# Patient Record
Sex: MALE | Race: OTHER | HISPANIC OR LATINO | URBAN - METROPOLITAN AREA
[De-identification: names, ages, dates, MRNs, and addresses within clinical notes are randomized per-mention and may not be internally consistent; named-entity substitution may affect disease eponyms.]

---

## 2018-01-08 ENCOUNTER — INPATIENT (INPATIENT)
Facility: HOSPITAL | Age: 73
LOS: 55 days | Discharge: NURSING, REHAB OR LONG-TERM CARE | DRG: 853 | End: 2018-03-05
Attending: STUDENT IN AN ORGANIZED HEALTH CARE EDUCATION/TRAINING PROGRAM | Admitting: STUDENT IN AN ORGANIZED HEALTH CARE EDUCATION/TRAINING PROGRAM
Payer: COMMERCIAL

## 2018-01-08 VITALS
RESPIRATION RATE: 18 BRPM | TEMPERATURE: 98 F | HEART RATE: 125 BPM | DIASTOLIC BLOOD PRESSURE: 63 MMHG | OXYGEN SATURATION: 97 % | SYSTOLIC BLOOD PRESSURE: 97 MMHG

## 2018-01-08 DIAGNOSIS — I48.91 UNSPECIFIED ATRIAL FIBRILLATION: ICD-10-CM

## 2018-01-08 LAB
ALBUMIN SERPL ELPH-MCNC: 3.1 G/DL — LOW (ref 3.3–5)
ALP SERPL-CCNC: 160 U/L — HIGH (ref 40–120)
ALT FLD-CCNC: 35 U/L RC — SIGNIFICANT CHANGE UP (ref 10–45)
ANION GAP SERPL CALC-SCNC: 16 MMOL/L — SIGNIFICANT CHANGE UP (ref 5–17)
APPEARANCE UR: ABNORMAL
APTT BLD: 29.1 SEC — SIGNIFICANT CHANGE UP (ref 27.5–37.4)
AST SERPL-CCNC: 56 U/L — HIGH (ref 10–40)
BACTERIA # UR AUTO: ABNORMAL /HPF
BASE EXCESS BLDV CALC-SCNC: -4.3 MMOL/L — LOW (ref -2–2)
BASE EXCESS BLDV CALC-SCNC: 0.3 MMOL/L — SIGNIFICANT CHANGE UP (ref -2–2)
BASOPHILS # BLD AUTO: 0 K/UL — SIGNIFICANT CHANGE UP (ref 0–0.2)
BASOPHILS NFR BLD AUTO: 0 % — SIGNIFICANT CHANGE UP (ref 0–2)
BILIRUB SERPL-MCNC: 1.2 MG/DL — SIGNIFICANT CHANGE UP (ref 0.2–1.2)
BILIRUB UR-MCNC: NEGATIVE — SIGNIFICANT CHANGE UP
BUN SERPL-MCNC: 35 MG/DL — HIGH (ref 7–23)
CA-I SERPL-SCNC: 1.04 MMOL/L — LOW (ref 1.12–1.3)
CA-I SERPL-SCNC: 1.05 MMOL/L — LOW (ref 1.12–1.3)
CALCIUM SERPL-MCNC: 8.9 MG/DL — SIGNIFICANT CHANGE UP (ref 8.4–10.5)
CHLORIDE BLDV-SCNC: 103 MMOL/L — SIGNIFICANT CHANGE UP (ref 96–108)
CHLORIDE BLDV-SCNC: 93 MMOL/L — LOW (ref 96–108)
CHLORIDE SERPL-SCNC: 87 MMOL/L — LOW (ref 96–108)
CO2 BLDV-SCNC: 21 MMOL/L — LOW (ref 22–30)
CO2 BLDV-SCNC: 26 MMOL/L — SIGNIFICANT CHANGE UP (ref 22–30)
CO2 SERPL-SCNC: 23 MMOL/L — SIGNIFICANT CHANGE UP (ref 22–31)
COLOR SPEC: YELLOW — SIGNIFICANT CHANGE UP
CREAT SERPL-MCNC: 0.83 MG/DL — SIGNIFICANT CHANGE UP (ref 0.5–1.3)
DIFF PNL FLD: ABNORMAL
EOSINOPHIL # BLD AUTO: 0 K/UL — SIGNIFICANT CHANGE UP (ref 0–0.5)
EOSINOPHIL NFR BLD AUTO: 0 % — SIGNIFICANT CHANGE UP (ref 0–6)
ETHANOL SERPL-MCNC: SIGNIFICANT CHANGE UP MG/DL (ref 0–10)
GAS PNL BLDV: 122 MMOL/L — LOW (ref 136–145)
GAS PNL BLDV: 125 MMOL/L — LOW (ref 136–145)
GAS PNL BLDV: SIGNIFICANT CHANGE UP
GLUCOSE BLDV-MCNC: 339 MG/DL — HIGH (ref 70–99)
GLUCOSE BLDV-MCNC: 397 MG/DL — HIGH (ref 70–99)
GLUCOSE SERPL-MCNC: 417 MG/DL — HIGH (ref 70–99)
GLUCOSE UR QL: >1000
HCO3 BLDV-SCNC: 20 MMOL/L — LOW (ref 21–29)
HCO3 BLDV-SCNC: 25 MMOL/L — SIGNIFICANT CHANGE UP (ref 21–29)
HCT VFR BLD CALC: 45.4 % — SIGNIFICANT CHANGE UP (ref 39–50)
HCT VFR BLDA CALC: 42 % — SIGNIFICANT CHANGE UP (ref 39–50)
HCT VFR BLDA CALC: 48 % — SIGNIFICANT CHANGE UP (ref 39–50)
HGB BLD CALC-MCNC: 13.6 G/DL — SIGNIFICANT CHANGE UP (ref 13–17)
HGB BLD CALC-MCNC: 15.8 G/DL — SIGNIFICANT CHANGE UP (ref 13–17)
HGB BLD-MCNC: 15.9 G/DL — SIGNIFICANT CHANGE UP (ref 13–17)
INR BLD: 1.18 RATIO — HIGH (ref 0.88–1.16)
KETONES UR-MCNC: ABNORMAL
LACTATE BLDV-MCNC: 2.9 MMOL/L — HIGH (ref 0.7–2)
LACTATE BLDV-MCNC: 5.2 MMOL/L — CRITICAL HIGH (ref 0.7–2)
LEUKOCYTE ESTERASE UR-ACNC: ABNORMAL
LYMPHOCYTES # BLD AUTO: 0.4 K/UL — LOW (ref 1–3.3)
LYMPHOCYTES # BLD AUTO: 3 % — LOW (ref 13–44)
MAGNESIUM SERPL-MCNC: 2.1 MG/DL — SIGNIFICANT CHANGE UP (ref 1.6–2.6)
MCHC RBC-ENTMCNC: 33 PG — SIGNIFICANT CHANGE UP (ref 27–34)
MCHC RBC-ENTMCNC: 35.1 GM/DL — SIGNIFICANT CHANGE UP (ref 32–36)
MCV RBC AUTO: 94.3 FL — SIGNIFICANT CHANGE UP (ref 80–100)
MONOCYTES # BLD AUTO: 0.6 K/UL — SIGNIFICANT CHANGE UP (ref 0–0.9)
MONOCYTES NFR BLD AUTO: 4 % — SIGNIFICANT CHANGE UP (ref 2–14)
NEUTROPHILS # BLD AUTO: 25.2 K/UL — HIGH (ref 1.8–7.4)
NEUTROPHILS NFR BLD AUTO: 90 % — HIGH (ref 43–77)
NEUTS BAND # BLD: 3 % — SIGNIFICANT CHANGE UP (ref 0–8)
NITRITE UR-MCNC: NEGATIVE — SIGNIFICANT CHANGE UP
PCO2 BLDV: 34 MMHG — LOW (ref 35–50)
PCO2 BLDV: 42 MMHG — SIGNIFICANT CHANGE UP (ref 35–50)
PH BLDV: 7.38 — SIGNIFICANT CHANGE UP (ref 7.35–7.45)
PH BLDV: 7.39 — SIGNIFICANT CHANGE UP (ref 7.35–7.45)
PH UR: 6.5 — SIGNIFICANT CHANGE UP (ref 5–8)
PHOSPHATE SERPL-MCNC: 2.8 MG/DL — SIGNIFICANT CHANGE UP (ref 2.5–4.5)
PLAT MORPH BLD: NORMAL — SIGNIFICANT CHANGE UP
PLATELET # BLD AUTO: 149 K/UL — LOW (ref 150–400)
PO2 BLDV: 23 MMHG — LOW (ref 25–45)
PO2 BLDV: 37 MMHG — SIGNIFICANT CHANGE UP (ref 25–45)
POTASSIUM BLDV-SCNC: 4 MMOL/L — SIGNIFICANT CHANGE UP (ref 3.5–5)
POTASSIUM BLDV-SCNC: 5.3 MMOL/L — HIGH (ref 3.5–5)
POTASSIUM SERPL-MCNC: 4.5 MMOL/L — SIGNIFICANT CHANGE UP (ref 3.5–5.3)
POTASSIUM SERPL-SCNC: 4.5 MMOL/L — SIGNIFICANT CHANGE UP (ref 3.5–5.3)
PROT SERPL-MCNC: 7.7 G/DL — SIGNIFICANT CHANGE UP (ref 6–8.3)
PROT UR-MCNC: 30 MG/DL
PROTHROM AB SERPL-ACNC: 12.9 SEC — HIGH (ref 9.8–12.7)
RAPID RVP RESULT: SIGNIFICANT CHANGE UP
RBC # BLD: 4.82 M/UL — SIGNIFICANT CHANGE UP (ref 4.2–5.8)
RBC # FLD: 11.8 % — SIGNIFICANT CHANGE UP (ref 10.3–14.5)
RBC BLD AUTO: NORMAL — SIGNIFICANT CHANGE UP
RBC CASTS # UR COMP ASSIST: ABNORMAL /HPF (ref 0–2)
SAO2 % BLDV: 34 % — LOW (ref 67–88)
SAO2 % BLDV: 65 % — LOW (ref 67–88)
SODIUM SERPL-SCNC: 126 MMOL/L — LOW (ref 135–145)
SP GR SPEC: >1.03 — HIGH (ref 1.01–1.02)
UROBILINOGEN FLD QL: 4
WBC # BLD: 26.3 K/UL — HIGH (ref 3.8–10.5)
WBC # FLD AUTO: 26.3 K/UL — HIGH (ref 3.8–10.5)
WBC UR QL: >50 /HPF (ref 0–5)

## 2018-01-08 PROCEDURE — 99285 EMERGENCY DEPT VISIT HI MDM: CPT | Mod: 25

## 2018-01-08 PROCEDURE — 93321 DOPPLER ECHO F-UP/LMTD STD: CPT | Mod: 26

## 2018-01-08 PROCEDURE — 92960 CARDIOVERSION ELECTRIC EXT: CPT

## 2018-01-08 PROCEDURE — 93308 TTE F-UP OR LMTD: CPT | Mod: 26

## 2018-01-08 PROCEDURE — 74174 CTA ABD&PLVS W/CONTRAST: CPT | Mod: 26

## 2018-01-08 PROCEDURE — 70450 CT HEAD/BRAIN W/O DYE: CPT | Mod: 26

## 2018-01-08 PROCEDURE — 71045 X-RAY EXAM CHEST 1 VIEW: CPT | Mod: 26

## 2018-01-08 PROCEDURE — 99223 1ST HOSP IP/OBS HIGH 75: CPT | Mod: GC

## 2018-01-08 PROCEDURE — 99152 MOD SED SAME PHYS/QHP 5/>YRS: CPT

## 2018-01-08 RX ORDER — ASPIRIN/CALCIUM CARB/MAGNESIUM 324 MG
324 TABLET ORAL ONCE
Qty: 0 | Refills: 0 | Status: COMPLETED | OUTPATIENT
Start: 2018-01-08 | End: 2018-01-08

## 2018-01-08 RX ORDER — PHENYLEPHRINE HYDROCHLORIDE 10 MG/ML
0.4 INJECTION INTRAVENOUS
Qty: 80 | Refills: 0 | Status: DISCONTINUED | OUTPATIENT
Start: 2018-01-08 | End: 2018-01-10

## 2018-01-08 RX ORDER — SODIUM CHLORIDE 9 MG/ML
1000 INJECTION INTRAMUSCULAR; INTRAVENOUS; SUBCUTANEOUS ONCE
Qty: 0 | Refills: 0 | Status: COMPLETED | OUTPATIENT
Start: 2018-01-08 | End: 2018-01-08

## 2018-01-08 RX ORDER — HEPARIN SODIUM 5000 [USP'U]/ML
6000 INJECTION INTRAVENOUS; SUBCUTANEOUS EVERY 6 HOURS
Qty: 0 | Refills: 0 | Status: DISCONTINUED | OUTPATIENT
Start: 2018-01-08 | End: 2018-01-09

## 2018-01-08 RX ORDER — FENTANYL CITRATE 50 UG/ML
100 INJECTION INTRAVENOUS ONCE
Qty: 0 | Refills: 0 | Status: DISCONTINUED | OUTPATIENT
Start: 2018-01-08 | End: 2018-01-08

## 2018-01-08 RX ORDER — CEFTRIAXONE 500 MG/1
1 INJECTION, POWDER, FOR SOLUTION INTRAMUSCULAR; INTRAVENOUS ONCE
Qty: 0 | Refills: 0 | Status: COMPLETED | OUTPATIENT
Start: 2018-01-08 | End: 2018-01-08

## 2018-01-08 RX ORDER — AZITHROMYCIN 500 MG/1
500 TABLET, FILM COATED ORAL ONCE
Qty: 0 | Refills: 0 | Status: COMPLETED | OUTPATIENT
Start: 2018-01-08 | End: 2018-01-08

## 2018-01-08 RX ORDER — METOPROLOL TARTRATE 50 MG
5 TABLET ORAL ONCE
Qty: 0 | Refills: 0 | Status: COMPLETED | OUTPATIENT
Start: 2018-01-08 | End: 2018-01-08

## 2018-01-08 RX ORDER — HEPARIN SODIUM 5000 [USP'U]/ML
INJECTION INTRAVENOUS; SUBCUTANEOUS
Qty: 25000 | Refills: 0 | Status: DISCONTINUED | OUTPATIENT
Start: 2018-01-08 | End: 2018-01-09

## 2018-01-08 RX ORDER — HEPARIN SODIUM 5000 [USP'U]/ML
3000 INJECTION INTRAVENOUS; SUBCUTANEOUS EVERY 6 HOURS
Qty: 0 | Refills: 0 | Status: DISCONTINUED | OUTPATIENT
Start: 2018-01-08 | End: 2018-01-09

## 2018-01-08 RX ORDER — HEPARIN SODIUM 5000 [USP'U]/ML
6000 INJECTION INTRAVENOUS; SUBCUTANEOUS ONCE
Qty: 0 | Refills: 0 | Status: COMPLETED | OUTPATIENT
Start: 2018-01-08 | End: 2018-01-08

## 2018-01-08 RX ORDER — SODIUM CHLORIDE 9 MG/ML
2000 INJECTION INTRAMUSCULAR; INTRAVENOUS; SUBCUTANEOUS ONCE
Qty: 0 | Refills: 0 | Status: COMPLETED | OUTPATIENT
Start: 2018-01-08 | End: 2018-01-08

## 2018-01-08 RX ORDER — KETAMINE HYDROCHLORIDE 100 MG/ML
100 INJECTION INTRAMUSCULAR; INTRAVENOUS ONCE
Qty: 0 | Refills: 0 | Status: DISCONTINUED | OUTPATIENT
Start: 2018-01-08 | End: 2018-01-08

## 2018-01-08 RX ADMIN — Medication 5 MILLIGRAM(S): at 18:30

## 2018-01-08 RX ADMIN — Medication 324 MILLIGRAM(S): at 18:34

## 2018-01-08 RX ADMIN — KETAMINE HYDROCHLORIDE 100 MILLIGRAM(S): 100 INJECTION INTRAMUSCULAR; INTRAVENOUS at 20:40

## 2018-01-08 RX ADMIN — FENTANYL CITRATE 100 MICROGRAM(S): 50 INJECTION INTRAVENOUS at 21:02

## 2018-01-08 RX ADMIN — CEFTRIAXONE 100 GRAM(S): 500 INJECTION, POWDER, FOR SOLUTION INTRAMUSCULAR; INTRAVENOUS at 21:06

## 2018-01-08 RX ADMIN — HEPARIN SODIUM 1300 UNIT(S)/HR: 5000 INJECTION INTRAVENOUS; SUBCUTANEOUS at 23:50

## 2018-01-08 RX ADMIN — PHENYLEPHRINE HYDROCHLORIDE 10.5 MICROGRAM(S)/KG/MIN: 10 INJECTION INTRAVENOUS at 21:51

## 2018-01-08 RX ADMIN — SODIUM CHLORIDE 4000 MILLILITER(S): 9 INJECTION INTRAMUSCULAR; INTRAVENOUS; SUBCUTANEOUS at 21:04

## 2018-01-08 RX ADMIN — FENTANYL CITRATE 100 MICROGRAM(S): 50 INJECTION INTRAVENOUS at 21:04

## 2018-01-08 RX ADMIN — AZITHROMYCIN 250 MILLIGRAM(S): 500 TABLET, FILM COATED ORAL at 21:51

## 2018-01-08 RX ADMIN — HEPARIN SODIUM 6000 UNIT(S): 5000 INJECTION INTRAVENOUS; SUBCUTANEOUS at 23:00

## 2018-01-08 RX ADMIN — SODIUM CHLORIDE 4000 MILLILITER(S): 9 INJECTION INTRAMUSCULAR; INTRAVENOUS; SUBCUTANEOUS at 17:44

## 2018-01-08 RX ADMIN — SODIUM CHLORIDE 4000 MILLILITER(S): 9 INJECTION INTRAMUSCULAR; INTRAVENOUS; SUBCUTANEOUS at 18:20

## 2018-01-08 NOTE — ED PROVIDER NOTE - ATTENDING CONTRIBUTION TO CARE
I performed a history and physical exam of the patient and discussed their management with the resident and /or advanced care provider. I reviewed the resident and /or ACP's note and agree with the documented findings and plan of care. My medical decision making and observations are found above.  Lungs clear.

## 2018-01-08 NOTE — ED PROVIDER NOTE - MEDICAL DECISION MAKING DETAILS
Joann: Found on floor, acute altered sensorium. ?trauma. Had had a cough for 3 days. On arrival EKG concerning for MI. Cardiology called to bedside for cath eval. Decision made by them to monitor and continue medical eval. Ct first.

## 2018-01-08 NOTE — ED ADULT NURSE REASSESSMENT NOTE - NS ED NURSE REASSESS COMMENT FT1
20:36: ketamine 70mg given ivp by md. and 20:45ketamine 30mg ivp given for sedation for cardioversion . hypotensive, heart rate 140-170's and mentating well prior to ketamine administration.  20:50: Cardioverted with 50J x1 time , re-evaluated no response. 20:55= cardioverted with 100J x1 with fentanyl 100mcg IVP given. no response.

## 2018-01-08 NOTE — ED PROCEDURE NOTE - PROCEDURE ADDITIONAL DETAILS
Ketamine and fentanyl used for sedation 50 then 100 then 200J cardioversion attempted. Patient remained in afib at a rate of 130-150. Patient tolerated procedure well.

## 2018-01-08 NOTE — ED PROVIDER NOTE - CARE PLAN
Principal Discharge DX:	Atrial fibrillation with rapid ventricular response  Secondary Diagnosis:	Elevated lactic acid level  Secondary Diagnosis:	Leukocytosis

## 2018-01-08 NOTE — ED PROVIDER NOTE - PHYSICAL EXAMINATION
Gen: NAD, AOx3, slight slurred speech  Head: NCAT  HEENT: PERRL, oral mucosa moist, normal conjunctiva, +scleral icterus, neck supple  Lung: CTAB, no respiratory distress  CV: rrr, no murmur, Normal perfusion  Abd: soft, NTND  MSK: No edema, no visible deformities, pelvis stable, no midline cervical ttp  Neuro: No focal neurologic deficits, CN II-XII intact, 5/5 global strength, sensation intact, no asterixis  Skin: No rash   Psych: normal affect

## 2018-01-08 NOTE — ED PROVIDER NOTE - PROGRESS NOTE DETAILS
hr to 160-170s, appears to be new onset A fib, no h/o a fib, not on A/C, CT head negative, will give ASA 324mg. now with some abd pain and diffuse ttp, denies nausea/vomiting/diarrhea will get CTA abd -Slowey DO BP low after 5mg metoprolol, normal MS, no pain. no CP/SOB. HR improved. will continue hydration -Slowey DO patient remained HD unstable, still mentating but weak/dizzy, BP in 70s HR in 160s a fib. received 3.5L and long since 1x dose of 5mg metoprolol. cardioversion attempted with patient and son consent, patient sedated with ketamine/fentanyl, unsuccessful cardioversion after 3 attempts with max 200J. still in A fib. BP now stable. will obtain CTA abd r/o ischemic gut, repeat VBG, 5th L fluid may need CCU and cardizem gtt for a fib rvr -Slowey DO Joann: attempted cardioversion to improve profusion. No response at 200J. BP up enough to get CT scan looking for bowel pathology. Will continue to treat as sepsis vs primary cardiac problem. patient awake, states 'feeling better and crazy' no respiratory distress, no CP. patient still with hr up to 160/170s, BP downtrending again after attempted cardioversion now 80systolic. will start phenylephrine and Follow up CTA possible CCU admission -Faizan DALAL on phenylephrine, RN titrating to appropriate BP, CTA prelim from radiology resident Christiano- no ischemic gut or cute pathology. CCU accepting -Slowey DO

## 2018-01-08 NOTE — CONSULT NOTE ADULT - SUBJECTIVE AND OBJECTIVE BOX
Called by ED: 5:24pm  Saw patient at bedside: 5:26pm  Spoke with Cath Attendin:36pm    Patient seen and evaluated at bedside in Critical D - son present at bedside    Note: The patient could not name his medications, PCP or Cardiologist, was AOx2, son unable to add to history. No prior records in [facility] or [facility]    Chief Complaint: "I fell down" today    HPI: 72M w/ reported PMHx of DM2, denied h/o CAD who came to the ED after he fell today. The fall was unwitnessed. He denied LOC or HT. No current CP, SOB. Per the son the patient is an alcoholic. The patient reports his last drink was 2 weeks ago. The patient states this was his first fall. He does not know why he saw a Cardiologist in the past. No FHx of CAD. Nonsmoker.    PMHx:   DM2    PSHx: Denied    Home Meds: The patient can not remember any of his home meds    Allergies:  No Known Allergies    FAMILY HISTORY: Noncontributory    Social History: Retired, used to work in maintenance  Smoking History: denied  Alcohol Use: Family reports patient is an "alcoholic"  Drug Use: denied    Review of Systems:  REVIEW OF SYSTEMS:    CONSTITUTIONAL: No weakness, fevers or chills  EYES/ENT: No visual changes;  No dysphagia  NECK: No pain or stiffness  RESPIRATORY: No cough, wheezing, hemoptysis; No shortness of breath  CARDIOVASCULAR: No chest pain or palpitations; No lower extremity edema  GASTROINTESTINAL: No abdominal or epigastric pain. No nausea, vomiting, or hematemesis; No diarrhea or constipation. No melena or hematochezia.  BACK: No back pain  GENITOURINARY: No dysuria, frequency or hematuria  NEUROLOGICAL: No numbness or weakness, + fall  SKIN: No itching, burning, rashes, or lesions   All other review of systems is negative unless indicated above.    Physical Exam: On my exam BP: 133/64, HR:114  T(F): 97.5 (-), Max: 97.5 ()  HR: 112 () (112 - 125)  BP: 133/64 () (97/63 - 133/64)  RR: 16 (-)  SpO2: 100% on RA  GENERAL: No acute distress, well-developed, AOx2, no tongue fasciculations, no asterixis  HEAD:  Atraumatic, Normocephalic  ENT: EOMI, PERRLA, conjunctiva and sclera clear, Neck supple, No JVD, dry mucosa  CHEST/LUNG: Clear to auscultation bilaterally; No wheeze, equal breath sounds bilaterally   BACK: No spinal tenderness  HEART: tachy, regular; 2/6 systolic murmur at the base, no rubs, or gallops  ABDOMEN: Soft, Nontender, Nondistended; Bowel sounds present  EXTREMITIES:  No clubbing, cyanosis, or edema  PSYCH: Nl behavior, nl affect  NEUROLOGY: AAOx2, non-focal, cranial nerves intact  SKIN: Normal color, No rashes or lesions  LINES: PIV    Cardiovascular Diagnostic Testing:    ECG: Personally reviewed (NO Prior available) sinus tachy @ 117, nl axis, nl GA, ARSd:122 (RBBB), QTc:482, GENEVIEVE in II, III, avf, STD in I, avl    Imaging: No imaging back currently    Labs: No labs back at this time

## 2018-01-08 NOTE — ED PROVIDER NOTE - OBJECTIVE STATEMENT
71yo M with unknown PMH except for DM2, last seen by son 3 days ago, today found down on floor beside bed face down, patient does not recall event and is poor historian. son fought patient to come here today. +trouble speaking and walking which is not normal. denies focal weakness. no HA. no CP/SOB. has had cough last 2 weeks, no fevers. +generalized weakness. patient denies drinking, son states drinks 6+ beers daily, patient then admits to drinking and states last drink a few weeks ago. no h/o withdrawal.     PCP- unknown

## 2018-01-08 NOTE — CONSULT NOTE ADULT - ASSESSMENT
72M w/ reported PMHx of DM2, denied h/o CAD who came to the ED after he fell today. Cardiology called due to GENEVIEVE in the inferior leads in the setting of a RBBB. The patient is currently hemodynamically stable without symptoms (AOx2 however). The ECG is concerning however the clinical picture is not c/w ACS.    Plan:    1. ST Elevations: given AMS a/w CTH ordered by ED. This patient does not need to be taken for a LHC at this time. If CTH neg for bleed would treat empirically with , Brilinta 180 (may need to ultimately switch to Plavix pending assessment of compliance), Heparin 5000U then gtt and Lipitor 80. If serial ECGs and Elma are unremarkable will not perform LCH urgently. If the patient displays any signs of hemodynamic or electrical instability will take patient to the cath lab. Monitor on Tele, check TTE, TSH, HbA1c, FLP. d/w Interventional Cardiology

## 2018-01-08 NOTE — ED ADULT NURSE NOTE - OBJECTIVE STATEMENT
72 year old male , A+O x3, presents with son stating that he was found on floor at 9am today face down next to his bed. pt reports having a mechanical fall this morning.  Pt reported not feeling well x1wk with a cough. Denies sob, dyspnia, chest pain, dizziness, or any nausea. tachycardic on arrival. HR regular. Pt reports that he has generalized body pain. Lungs cta unlabored, abd soft nontender, nondistended. Moves all extremities x4 . Purple bruise with abrasion to left knee. able to bare weight on legs. Sclera yellow. Pts son states he drinks beer daily, pt states he hasn't had a alcoholic drink in one month.  EKG done at bedside revealing ST elevations. Cardiology called and at bedside at 17:40. 72 year old male , A+O x3, presents with son stating that he was found on floor at 9am today face down next to his bed. pt reports having a mechanical fall this morning.  Denies hitting head, denies LOC. Pt is not a great historian and must be coaxed to answer questions. Son at bedside assisting with interview. Pt reported not feeling well x1wk with a cough. Denies sob, dyspnia, chest pain, dizziness, or any nausea. tachycardic on arrival. HR regular. Pt reports that he has generalized body pain. Lungs cta unlabored, abd soft nontender, nondistended. Moves all extremities x4 . Purple bruise with abrasion to left knee. able to bare weight on legs. Sclera yellow. Pts son states he drinks beer daily, pt states he hasn't had a alcoholic drink in one month.  EKG done at bedside revealing ST elevations. Cardiology called and at bedside at 17:40.

## 2018-01-08 NOTE — ED ADULT TRIAGE NOTE - CHIEF COMPLAINT QUOTE
Pt was found on the ground this afternoon by family member, found to be weak, c/o thirst. Pt lives alone and was last seen normal 2 days ago.   Family also notes slurred speech x 2.

## 2018-01-09 DIAGNOSIS — Z29.9 ENCOUNTER FOR PROPHYLACTIC MEASURES, UNSPECIFIED: ICD-10-CM

## 2018-01-09 DIAGNOSIS — I21.4 NON-ST ELEVATION (NSTEMI) MYOCARDIAL INFARCTION: ICD-10-CM

## 2018-01-09 DIAGNOSIS — E11.65 TYPE 2 DIABETES MELLITUS WITH HYPERGLYCEMIA: ICD-10-CM

## 2018-01-09 DIAGNOSIS — N30.00 ACUTE CYSTITIS WITHOUT HEMATURIA: ICD-10-CM

## 2018-01-09 DIAGNOSIS — A41.9 SEPSIS, UNSPECIFIED ORGANISM: ICD-10-CM

## 2018-01-09 DIAGNOSIS — I48.91 UNSPECIFIED ATRIAL FIBRILLATION: ICD-10-CM

## 2018-01-09 DIAGNOSIS — F10.10 ALCOHOL ABUSE, UNCOMPLICATED: ICD-10-CM

## 2018-01-09 DIAGNOSIS — G93.41 METABOLIC ENCEPHALOPATHY: ICD-10-CM

## 2018-01-09 LAB
ALBUMIN SERPL ELPH-MCNC: 2 G/DL — LOW (ref 3.3–5)
ALBUMIN SERPL ELPH-MCNC: 2.1 G/DL — LOW (ref 3.3–5)
ALBUMIN SERPL ELPH-MCNC: 2.2 G/DL — LOW (ref 3.3–5)
ALP SERPL-CCNC: 126 U/L — HIGH (ref 40–120)
ALP SERPL-CCNC: 154 U/L — HIGH (ref 40–120)
ALP SERPL-CCNC: 182 U/L — HIGH (ref 40–120)
ALT FLD-CCNC: 33 U/L RC — SIGNIFICANT CHANGE UP (ref 10–45)
ALT FLD-CCNC: 41 U/L RC — SIGNIFICANT CHANGE UP (ref 10–45)
ALT FLD-CCNC: 49 U/L RC — HIGH (ref 10–45)
ANION GAP SERPL CALC-SCNC: 10 MMOL/L — SIGNIFICANT CHANGE UP (ref 5–17)
ANION GAP SERPL CALC-SCNC: 14 MMOL/L — SIGNIFICANT CHANGE UP (ref 5–17)
ANION GAP SERPL CALC-SCNC: 16 MMOL/L — SIGNIFICANT CHANGE UP (ref 5–17)
APPEARANCE UR: CLEAR — SIGNIFICANT CHANGE UP
APTT BLD: 127.7 SEC — CRITICAL HIGH (ref 27.5–37.4)
APTT BLD: 80.1 SEC — HIGH (ref 27.5–37.4)
APTT BLD: > 200 SEC (ref 27.5–37.4)
AST SERPL-CCNC: 40 U/L — SIGNIFICANT CHANGE UP (ref 10–40)
AST SERPL-CCNC: 56 U/L — HIGH (ref 10–40)
AST SERPL-CCNC: 80 U/L — HIGH (ref 10–40)
BASOPHILS # BLD AUTO: 0 K/UL — SIGNIFICANT CHANGE UP (ref 0–0.2)
BILIRUB SERPL-MCNC: 0.4 MG/DL — SIGNIFICANT CHANGE UP (ref 0.2–1.2)
BILIRUB SERPL-MCNC: 0.6 MG/DL — SIGNIFICANT CHANGE UP (ref 0.2–1.2)
BILIRUB SERPL-MCNC: 0.9 MG/DL — SIGNIFICANT CHANGE UP (ref 0.2–1.2)
BILIRUB UR-MCNC: NEGATIVE — SIGNIFICANT CHANGE UP
BLD GP AB SCN SERPL QL: NEGATIVE — SIGNIFICANT CHANGE UP
BUN SERPL-MCNC: 20 MG/DL — SIGNIFICANT CHANGE UP (ref 7–23)
BUN SERPL-MCNC: 23 MG/DL — SIGNIFICANT CHANGE UP (ref 7–23)
BUN SERPL-MCNC: 26 MG/DL — HIGH (ref 7–23)
BURR CELLS BLD QL SMEAR: PRESENT — SIGNIFICANT CHANGE UP
CALCIUM SERPL-MCNC: 7.3 MG/DL — LOW (ref 8.4–10.5)
CALCIUM SERPL-MCNC: 7.3 MG/DL — LOW (ref 8.4–10.5)
CALCIUM SERPL-MCNC: 7.5 MG/DL — LOW (ref 8.4–10.5)
CHLORIDE SERPL-SCNC: 103 MMOL/L — SIGNIFICANT CHANGE UP (ref 96–108)
CHLORIDE SERPL-SCNC: 105 MMOL/L — SIGNIFICANT CHANGE UP (ref 96–108)
CHLORIDE SERPL-SCNC: 98 MMOL/L — SIGNIFICANT CHANGE UP (ref 96–108)
CHOLEST SERPL-MCNC: 103 MG/DL — SIGNIFICANT CHANGE UP (ref 10–199)
CK MB BLD-MCNC: 1.4 % — SIGNIFICANT CHANGE UP (ref 0–3.5)
CK MB BLD-MCNC: 1.6 % — SIGNIFICANT CHANGE UP (ref 0–3.5)
CK MB CFR SERPL CALC: 7.2 NG/ML — HIGH (ref 0–6.7)
CK MB CFR SERPL CALC: 8.7 NG/ML — HIGH (ref 0–6.7)
CK SERPL-CCNC: 461 U/L — HIGH (ref 30–200)
CK SERPL-CCNC: 633 U/L — HIGH (ref 30–200)
CO2 SERPL-SCNC: 15 MMOL/L — LOW (ref 22–31)
CO2 SERPL-SCNC: 15 MMOL/L — LOW (ref 22–31)
CO2 SERPL-SCNC: 20 MMOL/L — LOW (ref 22–31)
COLOR SPEC: YELLOW — SIGNIFICANT CHANGE UP
CREAT SERPL-MCNC: 0.43 MG/DL — LOW (ref 0.5–1.3)
CREAT SERPL-MCNC: 0.48 MG/DL — LOW (ref 0.5–1.3)
CREAT SERPL-MCNC: 0.61 MG/DL — SIGNIFICANT CHANGE UP (ref 0.5–1.3)
DIFF PNL FLD: ABNORMAL
EOSINOPHIL # BLD AUTO: 0 K/UL — SIGNIFICANT CHANGE UP (ref 0–0.5)
GLUCOSE BLDC GLUCOMTR-MCNC: 168 MG/DL — HIGH (ref 70–99)
GLUCOSE BLDC GLUCOMTR-MCNC: 185 MG/DL — HIGH (ref 70–99)
GLUCOSE BLDC GLUCOMTR-MCNC: 220 MG/DL — HIGH (ref 70–99)
GLUCOSE BLDC GLUCOMTR-MCNC: 269 MG/DL — HIGH (ref 70–99)
GLUCOSE BLDC GLUCOMTR-MCNC: 290 MG/DL — HIGH (ref 70–99)
GLUCOSE BLDC GLUCOMTR-MCNC: 346 MG/DL — HIGH (ref 70–99)
GLUCOSE SERPL-MCNC: 210 MG/DL — HIGH (ref 70–99)
GLUCOSE SERPL-MCNC: 302 MG/DL — HIGH (ref 70–99)
GLUCOSE SERPL-MCNC: 363 MG/DL — HIGH (ref 70–99)
GLUCOSE UR QL: >1000
HBA1C BLD-MCNC: 9.1 % — HIGH (ref 4–5.6)
HCT VFR BLD CALC: 37.6 % — LOW (ref 39–50)
HCT VFR BLD CALC: 40.7 % — SIGNIFICANT CHANGE UP (ref 39–50)
HCT VFR BLD CALC: 41.2 % — SIGNIFICANT CHANGE UP (ref 39–50)
HDLC SERPL-MCNC: 16 MG/DL — LOW (ref 40–125)
HGB BLD-MCNC: 12.5 G/DL — LOW (ref 13–17)
HGB BLD-MCNC: 13.8 G/DL — SIGNIFICANT CHANGE UP (ref 13–17)
HGB BLD-MCNC: 13.9 G/DL — SIGNIFICANT CHANGE UP (ref 13–17)
HIV 1+2 AB+HIV1 P24 AG SERPL QL IA: SIGNIFICANT CHANGE UP
KETONES UR-MCNC: ABNORMAL
LACTATE SERPL-SCNC: 2 MMOL/L — SIGNIFICANT CHANGE UP (ref 0.7–2)
LACTATE SERPL-SCNC: 2.9 MMOL/L — HIGH (ref 0.7–2)
LEUKOCYTE ESTERASE UR-ACNC: ABNORMAL
LIPID PNL WITH DIRECT LDL SERPL: 67 MG/DL — SIGNIFICANT CHANGE UP
LYMPHOCYTES # BLD AUTO: 0.9 K/UL — LOW (ref 1–3.3)
LYMPHOCYTES # BLD AUTO: 1 % — LOW (ref 13–44)
MAGNESIUM SERPL-MCNC: 1.9 MG/DL — SIGNIFICANT CHANGE UP (ref 1.6–2.6)
MAGNESIUM SERPL-MCNC: 2.1 MG/DL — SIGNIFICANT CHANGE UP (ref 1.6–2.6)
MAGNESIUM SERPL-MCNC: 2.5 MG/DL — SIGNIFICANT CHANGE UP (ref 1.6–2.6)
MCHC RBC-ENTMCNC: 31.2 PG — SIGNIFICANT CHANGE UP (ref 27–34)
MCHC RBC-ENTMCNC: 31.6 PG — SIGNIFICANT CHANGE UP (ref 27–34)
MCHC RBC-ENTMCNC: 32.1 PG — SIGNIFICANT CHANGE UP (ref 27–34)
MCHC RBC-ENTMCNC: 33.2 GM/DL — SIGNIFICANT CHANGE UP (ref 32–36)
MCHC RBC-ENTMCNC: 33.5 GM/DL — SIGNIFICANT CHANGE UP (ref 32–36)
MCHC RBC-ENTMCNC: 34.2 GM/DL — SIGNIFICANT CHANGE UP (ref 32–36)
MCV RBC AUTO: 91.5 FL — SIGNIFICANT CHANGE UP (ref 80–100)
MCV RBC AUTO: 95.1 FL — SIGNIFICANT CHANGE UP (ref 80–100)
MCV RBC AUTO: 95.9 FL — SIGNIFICANT CHANGE UP (ref 80–100)
MONOCYTES # BLD AUTO: 2 K/UL — HIGH (ref 0–0.9)
MONOCYTES NFR BLD AUTO: 3 % — SIGNIFICANT CHANGE UP (ref 2–14)
NEUTROPHILS # BLD AUTO: 29.4 K/UL — HIGH (ref 1.8–7.4)
NEUTROPHILS NFR BLD AUTO: 86 % — HIGH (ref 43–77)
NEUTS BAND # BLD: 10 % — HIGH (ref 0–8)
NITRITE UR-MCNC: NEGATIVE — SIGNIFICANT CHANGE UP
PH UR: 6.5 — SIGNIFICANT CHANGE UP (ref 5–8)
PHOSPHATE SERPL-MCNC: 2 MG/DL — LOW (ref 2.5–4.5)
PHOSPHATE SERPL-MCNC: 2.8 MG/DL — SIGNIFICANT CHANGE UP (ref 2.5–4.5)
PLAT MORPH BLD: NORMAL — SIGNIFICANT CHANGE UP
PLATELET # BLD AUTO: 133 K/UL — LOW (ref 150–400)
PLATELET # BLD AUTO: 145 K/UL — LOW (ref 150–400)
PLATELET # BLD AUTO: 162 K/UL — SIGNIFICANT CHANGE UP (ref 150–400)
POTASSIUM SERPL-MCNC: 3.6 MMOL/L — SIGNIFICANT CHANGE UP (ref 3.5–5.3)
POTASSIUM SERPL-MCNC: 4 MMOL/L — SIGNIFICANT CHANGE UP (ref 3.5–5.3)
POTASSIUM SERPL-MCNC: 4.3 MMOL/L — SIGNIFICANT CHANGE UP (ref 3.5–5.3)
POTASSIUM SERPL-SCNC: 3.6 MMOL/L — SIGNIFICANT CHANGE UP (ref 3.5–5.3)
POTASSIUM SERPL-SCNC: 4 MMOL/L — SIGNIFICANT CHANGE UP (ref 3.5–5.3)
POTASSIUM SERPL-SCNC: 4.3 MMOL/L — SIGNIFICANT CHANGE UP (ref 3.5–5.3)
PROT SERPL-MCNC: 5.2 G/DL — LOW (ref 6–8.3)
PROT SERPL-MCNC: 5.7 G/DL — LOW (ref 6–8.3)
PROT SERPL-MCNC: 6.1 G/DL — SIGNIFICANT CHANGE UP (ref 6–8.3)
PROT UR-MCNC: SIGNIFICANT CHANGE UP
RBC # BLD: 3.96 M/UL — LOW (ref 4.2–5.8)
RBC # BLD: 4.29 M/UL — SIGNIFICANT CHANGE UP (ref 4.2–5.8)
RBC # BLD: 4.45 M/UL — SIGNIFICANT CHANGE UP (ref 4.2–5.8)
RBC # FLD: 12 % — SIGNIFICANT CHANGE UP (ref 10.3–14.5)
RBC # FLD: 12.1 % — SIGNIFICANT CHANGE UP (ref 10.3–14.5)
RBC # FLD: 13.3 % — SIGNIFICANT CHANGE UP (ref 10.3–14.5)
RBC BLD AUTO: ABNORMAL
RH IG SCN BLD-IMP: POSITIVE — SIGNIFICANT CHANGE UP
SODIUM SERPL-SCNC: 129 MMOL/L — LOW (ref 135–145)
SODIUM SERPL-SCNC: 132 MMOL/L — LOW (ref 135–145)
SODIUM SERPL-SCNC: 135 MMOL/L — SIGNIFICANT CHANGE UP (ref 135–145)
SP GR SPEC: >1.03 — HIGH (ref 1.01–1.02)
TOTAL CHOLESTEROL/HDL RATIO MEASUREMENT: 6.4 RATIO — SIGNIFICANT CHANGE UP (ref 3.4–9.6)
TRIGL SERPL-MCNC: 102 MG/DL — SIGNIFICANT CHANGE UP (ref 10–149)
TROPONIN T SERPL-MCNC: 0.33 NG/ML — HIGH (ref 0–0.06)
TROPONIN T SERPL-MCNC: 0.38 NG/ML — HIGH (ref 0–0.06)
TSH SERPL-MCNC: 4.51 UIU/ML — HIGH (ref 0.27–4.2)
TSH SERPL-MCNC: 4.6 UIU/ML — HIGH (ref 0.27–4.2)
UROBILINOGEN FLD QL: 8
WBC # BLD: 26.1 K/UL — HIGH (ref 3.8–10.5)
WBC # BLD: 29.48 K/UL — HIGH (ref 3.8–10.5)
WBC # BLD: 32.3 K/UL — HIGH (ref 3.8–10.5)
WBC # FLD AUTO: 26.1 K/UL — HIGH (ref 3.8–10.5)
WBC # FLD AUTO: 29.48 K/UL — HIGH (ref 3.8–10.5)
WBC # FLD AUTO: 32.3 K/UL — HIGH (ref 3.8–10.5)

## 2018-01-09 PROCEDURE — 93306 TTE W/DOPPLER COMPLETE: CPT | Mod: 26

## 2018-01-09 PROCEDURE — 93010 ELECTROCARDIOGRAM REPORT: CPT | Mod: 77

## 2018-01-09 PROCEDURE — 93010 ELECTROCARDIOGRAM REPORT: CPT | Mod: 76

## 2018-01-09 PROCEDURE — 99291 CRITICAL CARE FIRST HOUR: CPT

## 2018-01-09 RX ORDER — SODIUM CHLORIDE 9 MG/ML
1000 INJECTION INTRAMUSCULAR; INTRAVENOUS; SUBCUTANEOUS
Qty: 0 | Refills: 0 | Status: DISCONTINUED | OUTPATIENT
Start: 2018-01-09 | End: 2018-01-09

## 2018-01-09 RX ORDER — DEXTROSE 50 % IN WATER 50 %
12.5 SYRINGE (ML) INTRAVENOUS ONCE
Qty: 0 | Refills: 0 | Status: DISCONTINUED | OUTPATIENT
Start: 2018-01-09 | End: 2018-01-26

## 2018-01-09 RX ORDER — PIPERACILLIN AND TAZOBACTAM 4; .5 G/20ML; G/20ML
3.38 INJECTION, POWDER, LYOPHILIZED, FOR SOLUTION INTRAVENOUS EVERY 8 HOURS
Qty: 0 | Refills: 0 | Status: DISCONTINUED | OUTPATIENT
Start: 2018-01-09 | End: 2018-01-09

## 2018-01-09 RX ORDER — FOLIC ACID 0.8 MG
1 TABLET ORAL DAILY
Qty: 0 | Refills: 0 | Status: DISCONTINUED | OUTPATIENT
Start: 2018-01-09 | End: 2018-01-30

## 2018-01-09 RX ORDER — DEXTROSE 50 % IN WATER 50 %
25 SYRINGE (ML) INTRAVENOUS ONCE
Qty: 0 | Refills: 0 | Status: DISCONTINUED | OUTPATIENT
Start: 2018-01-09 | End: 2018-01-26

## 2018-01-09 RX ORDER — SIMVASTATIN 20 MG/1
1 TABLET, FILM COATED ORAL
Qty: 0 | Refills: 0 | COMMUNITY

## 2018-01-09 RX ORDER — ASPIRIN/CALCIUM CARB/MAGNESIUM 324 MG
81 TABLET ORAL DAILY
Qty: 0 | Refills: 0 | Status: DISCONTINUED | OUTPATIENT
Start: 2018-01-10 | End: 2018-01-30

## 2018-01-09 RX ORDER — DIGOXIN 250 MCG
0.25 TABLET ORAL EVERY 6 HOURS
Qty: 0 | Refills: 0 | Status: DISCONTINUED | OUTPATIENT
Start: 2018-01-09 | End: 2018-01-09

## 2018-01-09 RX ORDER — FINASTERIDE 5 MG/1
1 TABLET, FILM COATED ORAL
Qty: 0 | Refills: 0 | COMMUNITY

## 2018-01-09 RX ORDER — METFORMIN HYDROCHLORIDE 850 MG/1
1000 TABLET ORAL
Qty: 0 | Refills: 0 | COMMUNITY

## 2018-01-09 RX ORDER — INFLUENZA VIRUS VACCINE 15; 15; 15; 15 UG/.5ML; UG/.5ML; UG/.5ML; UG/.5ML
0.5 SUSPENSION INTRAMUSCULAR ONCE
Qty: 0 | Refills: 0 | Status: DISCONTINUED | OUTPATIENT
Start: 2018-01-09 | End: 2018-01-26

## 2018-01-09 RX ORDER — MAGNESIUM SULFATE 500 MG/ML
2 VIAL (ML) INJECTION ONCE
Qty: 0 | Refills: 0 | Status: COMPLETED | OUTPATIENT
Start: 2018-01-09 | End: 2018-01-09

## 2018-01-09 RX ORDER — ATORVASTATIN CALCIUM 80 MG/1
80 TABLET, FILM COATED ORAL AT BEDTIME
Qty: 0 | Refills: 0 | Status: DISCONTINUED | OUTPATIENT
Start: 2018-01-09 | End: 2018-01-30

## 2018-01-09 RX ORDER — AMIODARONE HYDROCHLORIDE 400 MG/1
1 TABLET ORAL
Qty: 900 | Refills: 0 | Status: DISCONTINUED | OUTPATIENT
Start: 2018-01-09 | End: 2018-01-09

## 2018-01-09 RX ORDER — SODIUM CHLORIDE 9 MG/ML
1000 INJECTION INTRAMUSCULAR; INTRAVENOUS; SUBCUTANEOUS ONCE
Qty: 0 | Refills: 0 | Status: COMPLETED | OUTPATIENT
Start: 2018-01-09 | End: 2018-01-09

## 2018-01-09 RX ORDER — INSULIN LISPRO 100/ML
2 VIAL (ML) SUBCUTANEOUS
Qty: 0 | Refills: 0 | Status: DISCONTINUED | OUTPATIENT
Start: 2018-01-09 | End: 2018-01-10

## 2018-01-09 RX ORDER — GLUCAGON INJECTION, SOLUTION 0.5 MG/.1ML
1 INJECTION, SOLUTION SUBCUTANEOUS ONCE
Qty: 0 | Refills: 0 | Status: DISCONTINUED | OUTPATIENT
Start: 2018-01-09 | End: 2018-01-26

## 2018-01-09 RX ORDER — INSULIN LISPRO 100/ML
VIAL (ML) SUBCUTANEOUS
Qty: 0 | Refills: 0 | Status: DISCONTINUED | OUTPATIENT
Start: 2018-01-09 | End: 2018-01-30

## 2018-01-09 RX ORDER — HEPARIN SODIUM 5000 [USP'U]/ML
INJECTION INTRAVENOUS; SUBCUTANEOUS
Qty: 25000 | Refills: 0 | Status: DISCONTINUED | OUTPATIENT
Start: 2018-01-09 | End: 2018-01-10

## 2018-01-09 RX ORDER — TICAGRELOR 90 MG/1
180 TABLET ORAL ONCE
Qty: 0 | Refills: 0 | Status: COMPLETED | OUTPATIENT
Start: 2018-01-09 | End: 2018-01-09

## 2018-01-09 RX ORDER — AMIODARONE HYDROCHLORIDE 400 MG/1
150 TABLET ORAL ONCE
Qty: 0 | Refills: 0 | Status: DISCONTINUED | OUTPATIENT
Start: 2018-01-09 | End: 2018-01-09

## 2018-01-09 RX ORDER — INSULIN LISPRO 100/ML
VIAL (ML) SUBCUTANEOUS AT BEDTIME
Qty: 0 | Refills: 0 | Status: DISCONTINUED | OUTPATIENT
Start: 2018-01-09 | End: 2018-01-30

## 2018-01-09 RX ORDER — TAMSULOSIN HYDROCHLORIDE 0.4 MG/1
0.4 CAPSULE ORAL AT BEDTIME
Qty: 0 | Refills: 0 | Status: DISCONTINUED | OUTPATIENT
Start: 2018-01-09 | End: 2018-01-23

## 2018-01-09 RX ORDER — INSULIN GLARGINE 100 [IU]/ML
8 INJECTION, SOLUTION SUBCUTANEOUS ONCE
Qty: 0 | Refills: 0 | Status: COMPLETED | OUTPATIENT
Start: 2018-01-09 | End: 2018-01-09

## 2018-01-09 RX ORDER — THIAMINE MONONITRATE (VIT B1) 100 MG
100 TABLET ORAL DAILY
Qty: 0 | Refills: 0 | Status: DISCONTINUED | OUTPATIENT
Start: 2018-01-09 | End: 2018-01-30

## 2018-01-09 RX ORDER — CEFTRIAXONE 500 MG/1
1 INJECTION, POWDER, FOR SOLUTION INTRAMUSCULAR; INTRAVENOUS ONCE
Qty: 0 | Refills: 0 | Status: DISCONTINUED | OUTPATIENT
Start: 2018-01-09 | End: 2018-01-09

## 2018-01-09 RX ORDER — HEPARIN SODIUM 5000 [USP'U]/ML
3000 INJECTION INTRAVENOUS; SUBCUTANEOUS EVERY 6 HOURS
Qty: 0 | Refills: 0 | Status: DISCONTINUED | OUTPATIENT
Start: 2018-01-09 | End: 2018-01-10

## 2018-01-09 RX ORDER — METFORMIN HYDROCHLORIDE 850 MG/1
0 TABLET ORAL
Qty: 0 | Refills: 0 | COMMUNITY

## 2018-01-09 RX ORDER — AMIODARONE HYDROCHLORIDE 400 MG/1
150 TABLET ORAL ONCE
Qty: 0 | Refills: 0 | Status: COMPLETED | OUTPATIENT
Start: 2018-01-09 | End: 2018-01-09

## 2018-01-09 RX ORDER — CEFTRIAXONE 500 MG/1
INJECTION, POWDER, FOR SOLUTION INTRAMUSCULAR; INTRAVENOUS
Qty: 0 | Refills: 0 | Status: DISCONTINUED | OUTPATIENT
Start: 2018-01-09 | End: 2018-01-09

## 2018-01-09 RX ORDER — TAMSULOSIN HYDROCHLORIDE 0.4 MG/1
1 CAPSULE ORAL
Qty: 0 | Refills: 0 | COMMUNITY

## 2018-01-09 RX ORDER — TICAGRELOR 90 MG/1
90 TABLET ORAL
Qty: 0 | Refills: 0 | Status: DISCONTINUED | OUTPATIENT
Start: 2018-01-10 | End: 2018-01-12

## 2018-01-09 RX ORDER — PANTOPRAZOLE SODIUM 20 MG/1
40 TABLET, DELAYED RELEASE ORAL
Qty: 0 | Refills: 0 | Status: DISCONTINUED | OUTPATIENT
Start: 2018-01-09 | End: 2018-01-30

## 2018-01-09 RX ORDER — INSULIN GLARGINE 100 [IU]/ML
11 INJECTION, SOLUTION SUBCUTANEOUS AT BEDTIME
Qty: 0 | Refills: 0 | Status: DISCONTINUED | OUTPATIENT
Start: 2018-01-09 | End: 2018-01-10

## 2018-01-09 RX ORDER — DEXTROSE 50 % IN WATER 50 %
1 SYRINGE (ML) INTRAVENOUS ONCE
Qty: 0 | Refills: 0 | Status: DISCONTINUED | OUTPATIENT
Start: 2018-01-09 | End: 2018-01-26

## 2018-01-09 RX ORDER — CALCIUM GLUCONATE 100 MG/ML
2 VIAL (ML) INTRAVENOUS ONCE
Qty: 0 | Refills: 0 | Status: COMPLETED | OUTPATIENT
Start: 2018-01-09 | End: 2018-01-09

## 2018-01-09 RX ORDER — INSULIN LISPRO 100/ML
8 VIAL (ML) SUBCUTANEOUS ONCE
Qty: 0 | Refills: 0 | Status: COMPLETED | OUTPATIENT
Start: 2018-01-09 | End: 2018-01-09

## 2018-01-09 RX ORDER — DIGOXIN 250 MCG
0.5 TABLET ORAL ONCE
Qty: 0 | Refills: 0 | Status: COMPLETED | OUTPATIENT
Start: 2018-01-09 | End: 2018-01-09

## 2018-01-09 RX ORDER — SODIUM CHLORIDE 9 MG/ML
1000 INJECTION, SOLUTION INTRAVENOUS
Qty: 0 | Refills: 0 | Status: DISCONTINUED | OUTPATIENT
Start: 2018-01-09 | End: 2018-01-26

## 2018-01-09 RX ORDER — PIPERACILLIN AND TAZOBACTAM 4; .5 G/20ML; G/20ML
3.38 INJECTION, POWDER, LYOPHILIZED, FOR SOLUTION INTRAVENOUS EVERY 8 HOURS
Qty: 0 | Refills: 0 | Status: DISCONTINUED | OUTPATIENT
Start: 2018-01-09 | End: 2018-01-16

## 2018-01-09 RX ORDER — SODIUM CHLORIDE 9 MG/ML
1000 INJECTION INTRAMUSCULAR; INTRAVENOUS; SUBCUTANEOUS
Qty: 0 | Refills: 0 | Status: DISCONTINUED | OUTPATIENT
Start: 2018-01-09 | End: 2018-01-10

## 2018-01-09 RX ORDER — VANCOMYCIN HCL 1 G
1000 VIAL (EA) INTRAVENOUS EVERY 12 HOURS
Qty: 0 | Refills: 0 | Status: DISCONTINUED | OUTPATIENT
Start: 2018-01-09 | End: 2018-01-11

## 2018-01-09 RX ORDER — POTASSIUM CHLORIDE 20 MEQ
40 PACKET (EA) ORAL ONCE
Qty: 0 | Refills: 0 | Status: COMPLETED | OUTPATIENT
Start: 2018-01-09 | End: 2018-01-09

## 2018-01-09 RX ORDER — FINASTERIDE 5 MG/1
5 TABLET, FILM COATED ORAL DAILY
Qty: 0 | Refills: 0 | Status: DISCONTINUED | OUTPATIENT
Start: 2018-01-09 | End: 2018-01-30

## 2018-01-09 RX ORDER — PANTOPRAZOLE SODIUM 20 MG/1
1 TABLET, DELAYED RELEASE ORAL
Qty: 0 | Refills: 0 | COMMUNITY

## 2018-01-09 RX ORDER — HEPARIN SODIUM 5000 [USP'U]/ML
6000 INJECTION INTRAVENOUS; SUBCUTANEOUS EVERY 6 HOURS
Qty: 0 | Refills: 0 | Status: DISCONTINUED | OUTPATIENT
Start: 2018-01-09 | End: 2018-01-10

## 2018-01-09 RX ORDER — POTASSIUM PHOSPHATE, MONOBASIC POTASSIUM PHOSPHATE, DIBASIC 236; 224 MG/ML; MG/ML
15 INJECTION, SOLUTION INTRAVENOUS ONCE
Qty: 0 | Refills: 0 | Status: COMPLETED | OUTPATIENT
Start: 2018-01-09 | End: 2018-01-09

## 2018-01-09 RX ADMIN — SODIUM CHLORIDE 250 MILLILITER(S): 9 INJECTION INTRAMUSCULAR; INTRAVENOUS; SUBCUTANEOUS at 13:00

## 2018-01-09 RX ADMIN — Medication 8 UNIT(S): at 04:46

## 2018-01-09 RX ADMIN — HEPARIN SODIUM 1300 UNIT(S)/HR: 5000 INJECTION INTRAVENOUS; SUBCUTANEOUS at 01:09

## 2018-01-09 RX ADMIN — Medication 100 MILLIGRAM(S): at 11:47

## 2018-01-09 RX ADMIN — TAMSULOSIN HYDROCHLORIDE 0.4 MILLIGRAM(S): 0.4 CAPSULE ORAL at 22:25

## 2018-01-09 RX ADMIN — Medication 1: at 17:20

## 2018-01-09 RX ADMIN — Medication 40 MILLIEQUIVALENT(S): at 06:32

## 2018-01-09 RX ADMIN — Medication 50 GRAM(S): at 02:35

## 2018-01-09 RX ADMIN — Medication 0.5 MILLIGRAM(S): at 00:45

## 2018-01-09 RX ADMIN — Medication 250 MILLIGRAM(S): at 17:20

## 2018-01-09 RX ADMIN — TICAGRELOR 180 MILLIGRAM(S): 90 TABLET ORAL at 02:01

## 2018-01-09 RX ADMIN — HEPARIN SODIUM 1100 UNIT(S)/HR: 5000 INJECTION INTRAVENOUS; SUBCUTANEOUS at 08:03

## 2018-01-09 RX ADMIN — POTASSIUM PHOSPHATE, MONOBASIC POTASSIUM PHOSPHATE, DIBASIC 62.5 MILLIMOLE(S): 236; 224 INJECTION, SOLUTION INTRAVENOUS at 09:44

## 2018-01-09 RX ADMIN — Medication 400 GRAM(S): at 10:52

## 2018-01-09 RX ADMIN — HEPARIN SODIUM 900 UNIT(S)/HR: 5000 INJECTION INTRAVENOUS; SUBCUTANEOUS at 15:25

## 2018-01-09 RX ADMIN — PIPERACILLIN AND TAZOBACTAM 25 GRAM(S): 4; .5 INJECTION, POWDER, LYOPHILIZED, FOR SOLUTION INTRAVENOUS at 13:35

## 2018-01-09 RX ADMIN — PHENYLEPHRINE HYDROCHLORIDE 10.5 MICROGRAM(S)/KG/MIN: 10 INJECTION INTRAVENOUS at 18:55

## 2018-01-09 RX ADMIN — Medication 1 MILLIGRAM(S): at 11:47

## 2018-01-09 RX ADMIN — Medication 0.25 MILLIGRAM(S): at 06:25

## 2018-01-09 RX ADMIN — Medication 3: at 11:47

## 2018-01-09 RX ADMIN — AMIODARONE HYDROCHLORIDE 600 MILLIGRAM(S): 400 TABLET ORAL at 01:43

## 2018-01-09 RX ADMIN — Medication 2 UNIT(S): at 17:19

## 2018-01-09 RX ADMIN — PHENYLEPHRINE HYDROCHLORIDE 10.5 MICROGRAM(S)/KG/MIN: 10 INJECTION INTRAVENOUS at 09:48

## 2018-01-09 RX ADMIN — PIPERACILLIN AND TAZOBACTAM 25 GRAM(S): 4; .5 INJECTION, POWDER, LYOPHILIZED, FOR SOLUTION INTRAVENOUS at 22:27

## 2018-01-09 RX ADMIN — ATORVASTATIN CALCIUM 80 MILLIGRAM(S): 80 TABLET, FILM COATED ORAL at 22:25

## 2018-01-09 RX ADMIN — PHENYLEPHRINE HYDROCHLORIDE 10.5 MICROGRAM(S)/KG/MIN: 10 INJECTION INTRAVENOUS at 22:24

## 2018-01-09 RX ADMIN — SODIUM CHLORIDE 2000 MILLILITER(S): 9 INJECTION INTRAMUSCULAR; INTRAVENOUS; SUBCUTANEOUS at 00:45

## 2018-01-09 RX ADMIN — HEPARIN SODIUM 0 UNIT(S)/HR: 5000 INJECTION INTRAVENOUS; SUBCUTANEOUS at 07:01

## 2018-01-09 RX ADMIN — HEPARIN SODIUM 900 UNIT(S)/HR: 5000 INJECTION INTRAVENOUS; SUBCUTANEOUS at 22:25

## 2018-01-09 RX ADMIN — SODIUM CHLORIDE 250 MILLILITER(S): 9 INJECTION INTRAMUSCULAR; INTRAVENOUS; SUBCUTANEOUS at 10:35

## 2018-01-09 RX ADMIN — INSULIN GLARGINE 11 UNIT(S): 100 INJECTION, SOLUTION SUBCUTANEOUS at 22:27

## 2018-01-09 RX ADMIN — Medication 1 TABLET(S): at 11:47

## 2018-01-09 RX ADMIN — INSULIN GLARGINE 8 UNIT(S): 100 INJECTION, SOLUTION SUBCUTANEOUS at 05:42

## 2018-01-09 RX ADMIN — Medication 2: at 08:04

## 2018-01-09 RX ADMIN — FINASTERIDE 5 MILLIGRAM(S): 5 TABLET, FILM COATED ORAL at 16:19

## 2018-01-09 NOTE — H&P ADULT - FAMILY HISTORY
Sibling  Still living? Unknown  Family history of lung cancer, Age at diagnosis: Age Unknown  Family history of type 2 diabetes mellitus, Age at diagnosis: Age Unknown

## 2018-01-09 NOTE — H&P ADULT - PROBLEM SELECTOR PROBLEM 8
Need for prophylactic measure R/O Deep vein thrombosis (DVT) of popliteal vein of left lower extremity, unspecified chronicity

## 2018-01-09 NOTE — PROGRESS NOTE ADULT - SUBJECTIVE AND OBJECTIVE BOX
Patient is a 72y old  Male who presents with a chief complaint of Fall (09 Jan 2018 00:52)      Events 	    MEDICATIONS  (STANDING):  atorvastatin 80 milliGRAM(s) Oral at bedtime  cefTRIAXone   IVPB 1 Gram(s) IV Intermittent once  cefTRIAXone   IVPB      dextrose 5%. 1000 milliLiter(s) (50 mL/Hr) IV Continuous <Continuous>  dextrose 50% Injectable 12.5 Gram(s) IV Push once  dextrose 50% Injectable 25 Gram(s) IV Push once  dextrose 50% Injectable 25 Gram(s) IV Push once  digoxin  Injectable 0.25 milliGRAM(s) IV Push every 6 hours  folic acid 1 milliGRAM(s) Oral daily  heparin  Infusion.  Unit(s)/Hr (13 mL/Hr) IV Continuous <Continuous>  influenza   Vaccine 0.5 milliLiter(s) IntraMuscular once  insulin lispro (HumaLOG) corrective regimen sliding scale   SubCutaneous three times a day before meals  insulin lispro (HumaLOG) corrective regimen sliding scale   SubCutaneous at bedtime  multivitamin 1 Tablet(s) Oral daily  phenylephrine    Infusion 0.4 MICROgram(s)/kG/Min (10.5 mL/Hr) IV Continuous <Continuous>  potassium phosphate IVPB 15 milliMole(s) IV Intermittent once  thiamine 100 milliGRAM(s) Oral daily    MEDICATIONS  (PRN):  dextrose Gel 1 Dose(s) Oral once PRN Blood Glucose LESS THAN 70 milliGRAM(s)/deciliter  glucagon  Injectable 1 milliGRAM(s) IntraMuscular once PRN Glucose LESS THAN 70 milligrams/deciliter  heparin  Injectable 6000 Unit(s) IV Push every 6 hours PRN For aPTT less than 40  heparin  Injectable 3000 Unit(s) IV Push every 6 hours PRN For aPTT between 40 - 57  LORazepam   Injectable 2 milliGRAM(s) IV Push every 2 hours PRN Symptom-triggered: 2 point increase in CIWA -Ar score and a total score of 7 or LESS      REVIEW OF SYSTEMS:  Otherwise, 10 point ROS done and otherwise negative.      Vital Signs Last 24 Hrs  T(C): 36.5 (09 Jan 2018 03:55), Max: 36.6 (09 Jan 2018 00:05)  T(F): 97.7 (09 Jan 2018 03:55), Max: 97.8 (09 Jan 2018 00:05)  HR: 128 (09 Jan 2018 06:37) (112 - 166)  BP: 95/51 (09 Jan 2018 06:37) (66/55 - 133/64)  BP(mean): 67 (09 Jan 2018 06:37) (52 - 92)  RR: 19 (09 Jan 2018 06:37) (10 - 26)  SpO2: 100% (09 Jan 2018 06:37) (97% - 100%)  I&O's Summary    08 Jan 2018 07:01  -  09 Jan 2018 07:00  --------------------------------------------------------  IN: 5876.9 mL / OUT: 1500 mL / NET: 4376.9 mL      CAPILLARY BLOOD GLUCOSE      POCT Blood Glucose.: 290 mg/dL (09 Jan 2018 03:58)  POCT Blood Glucose.: 346 mg/dL (09 Jan 2018 00:21)  POCT Blood Glucose.: 362 mg/dL (08 Jan 2018 17:30)        PHYSICAL EXAM:  Appearance: Normal	  HEENT:   Normal oral mucosa, PERRL, EOMI	  Lymphatic: No lymphadenopathy  Cardiovascular: Normal S1 S2, No JVD, No murmurs, No edema  Respiratory: Lungs clear to auscultation	  Psychiatry: A & O x 3, Mood & affect appropriate  Gastrointestinal:  Soft, Non-tender, + BS	  Skin: No rashes, No ecchymoses, No cyanosis	  Neurologic: Non-focal  Extremities: Normal range of motion, No clubbing, cyanosis or edema  Vascular: Peripheral pulses palpable 2+ bilaterally    LABS:                        13.8   32.3  )-----------( 145      ( 09 Jan 2018 05:37 )             41.2                         13.9   29.48 )-----------( 162      ( 09 Jan 2018 00:31 )             40.7     01-09    132<L>  |  103  |  23  ----------------------------<  302<H>  3.6   |  15<L>  |  0.48<L>  01-09    129<L>  |  98  |  26<H>  ----------------------------<  363<H>  4.3   |  15<L>  |  0.61    Ca    7.3<L>      09 Jan 2018 05:37  Ca    7.3<L>      09 Jan 2018 00:31  Phos  2.0     01-09  Mg     2.5     01-09    TPro  5.7<L>  /  Alb  2.1<L>  /  TBili  0.6  /  DBili  x   /  AST  56<H>  /  ALT  41  /  AlkPhos  154<H>  01-09  TPro  6.1  /  Alb  2.2<L>  /  TBili  0.9  /  DBili  x   /  AST  80<H>  /  ALT  49<H>  /  AlkPhos  182<H>  01-09  	 	    PT/INR - ( 08 Jan 2018 17:43 )   PT: 12.9 sec;   INR: 1.18 ratio         PTT - ( 09 Jan 2018 05:37 )  PTT:> 200 sec    proBNP:     HgA1c: Hemoglobin A1C, Whole Blood: 9.1 % (01-09 @ 03:39)    TSH: Thyroid Stimulating Hormone, Serum: 4.51 uIU/mL (01-08 @ 21:45)       09 Jan 2018 00:31 Troponin 0.38 ng/mL /  U/L / CKMB x     / CKMB Units 8.7 ng/mL   08 Jan 2018 17:43 Troponin 0.60 ng/mL /  U/L / CKMB x     / CKMB Units 13.4 ng/mL        TELEMETRY: 	    ECG:  	  RADIOLOGY:  OTHER: 	    PREVIOUS DIAGNOSTIC TESTING:    [ ] Echocardiogram:  [ ]  Catheterization:  [ ] Stress Test: Patient is a 72y old  Male who presents with a chief complaint of Fall (09 Jan 2018 00:52)    Patient is a 72 year-old male with past medical history of T2DM, DLD, alcohol misuse brought in by family after having been found face down on the floor for approximately 2 days. Patient was currently residing alone on one floor of the house (wife away in [place]) while other family members including a son live on other floors within the same house. He was not checked upon for 2 days. Patient reports multiple recent episodes of falls at home due to physical instability". He reports chronic alcohol use, which family states ranges from 6-12 beers almost daily. He has been more lethargic lately, with forgetfulness and disorientationHe also endorses fever, chills, dysuria and urinary frequency. Patient denies SOB, chest pain, back pain, diarrhea, melena/hematochezia, recent travel, sick contact or surgery. Patient is originally from [place] and has been in the  for 9 years.     ED course: Initial EKG noted for IW ST-elevations. Patient was hypotensive to 70/40 with HR in 170bpm with Atrial fibrillation + RVR on EKG. Labs notable for WBC 26K, lac 5.2 and troponin 0.6. Given IV NS blus x 5L and multiple DCCV with temporary resumption of NSR in 80s bpm before return to atrial fibrillation.    Events 	    MEDICATIONS  (STANDING):  atorvastatin 80 milliGRAM(s) Oral at bedtime  cefTRIAXone   IVPB 1 Gram(s) IV Intermittent once  cefTRIAXone   IVPB      dextrose 5%. 1000 milliLiter(s) (50 mL/Hr) IV Continuous <Continuous>  dextrose 50% Injectable 12.5 Gram(s) IV Push once  dextrose 50% Injectable 25 Gram(s) IV Push once  dextrose 50% Injectable 25 Gram(s) IV Push once  digoxin  Injectable 0.25 milliGRAM(s) IV Push every 6 hours  folic acid 1 milliGRAM(s) Oral daily  heparin  Infusion.  Unit(s)/Hr (13 mL/Hr) IV Continuous <Continuous>  influenza   Vaccine 0.5 milliLiter(s) IntraMuscular once  insulin lispro (HumaLOG) corrective regimen sliding scale   SubCutaneous three times a day before meals  insulin lispro (HumaLOG) corrective regimen sliding scale   SubCutaneous at bedtime  multivitamin 1 Tablet(s) Oral daily  phenylephrine    Infusion 0.4 MICROgram(s)/kG/Min (10.5 mL/Hr) IV Continuous <Continuous>  potassium phosphate IVPB 15 milliMole(s) IV Intermittent once  thiamine 100 milliGRAM(s) Oral daily    MEDICATIONS  (PRN):  dextrose Gel 1 Dose(s) Oral once PRN Blood Glucose LESS THAN 70 milliGRAM(s)/deciliter  glucagon  Injectable 1 milliGRAM(s) IntraMuscular once PRN Glucose LESS THAN 70 milligrams/deciliter  heparin  Injectable 6000 Unit(s) IV Push every 6 hours PRN For aPTT less than 40  heparin  Injectable 3000 Unit(s) IV Push every 6 hours PRN For aPTT between 40 - 57  LORazepam   Injectable 2 milliGRAM(s) IV Push every 2 hours PRN Symptom-triggered: 2 point increase in CIWA -Ar score and a total score of 7 or LESS      REVIEW OF SYSTEMS:  Unable to attain given altered mentation.       Vital Signs Last 24 Hrs  T(C): 36.5 (09 Jan 2018 03:55), Max: 36.6 (09 Jan 2018 00:05)  T(F): 97.7 (09 Jan 2018 03:55), Max: 97.8 (09 Jan 2018 00:05)  HR: 128 (09 Jan 2018 06:37) (112 - 166)  BP: 95/51 (09 Jan 2018 06:37) (66/55 - 133/64)  BP(mean): 67 (09 Jan 2018 06:37) (52 - 92)  RR: 19 (09 Jan 2018 06:37) (10 - 26)  SpO2: 100% (09 Jan 2018 06:37) (97% - 100%)  I&O's Summary    08 Jan 2018 07:01  -  09 Jan 2018 07:00  --------------------------------------------------------  IN: 5876.9 mL / OUT: 1500 mL / NET: 4376.9 mL      CAPILLARY BLOOD GLUCOSE      POCT Blood Glucose.: 290 mg/dL (09 Jan 2018 03:58)  POCT Blood Glucose.: 346 mg/dL (09 Jan 2018 00:21)  POCT Blood Glucose.: 362 mg/dL (08 Jan 2018 17:30)        PHYSICAL EXAM:  Appearance: Normal	  HEENT:   Normal oral mucosa, PERRL, EOMI	  Lymphatic: No lymphadenopathy  Cardiovascular: Irregularly irregular rhythm, No JVD, No murmurs, No edema  Respiratory: Lungs clear to auscultation	  Psychiatry: Altered mentation  Gastrointestinal:  Soft, Non-tender, + BS	  Skin: No rashes, No ecchymoses, No cyanosis	  Neurologic: Non-focal  Extremities: Normal range of motion, No clubbing, cyanosis or edema  Vascular: Peripheral pulses palpable 2+ bilaterally    LABS:                        13.8   32.3  )-----------( 145      ( 09 Jan 2018 05:37 )             41.2                         13.9   29.48 )-----------( 162      ( 09 Jan 2018 00:31 )             40.7     01-09    132<L>  |  103  |  23  ----------------------------<  302<H>  3.6   |  15<L>  |  0.48<L>  01-09    129<L>  |  98  |  26<H>  ----------------------------<  363<H>  4.3   |  15<L>  |  0.61    Ca    7.3<L>      09 Jan 2018 05:37  Ca    7.3<L>      09 Jan 2018 00:31  Phos  2.0     01-09  Mg     2.5     01-09    TPro  5.7<L>  /  Alb  2.1<L>  /  TBili  0.6  /  DBili  x   /  AST  56<H>  /  ALT  41  /  AlkPhos  154<H>  01-09  TPro  6.1  /  Alb  2.2<L>  /  TBili  0.9  /  DBili  x   /  AST  80<H>  /  ALT  49<H>  /  AlkPhos  182<H>  01-09  	 	    PT/INR - ( 08 Jan 2018 17:43 )   PT: 12.9 sec;   INR: 1.18 ratio         PTT - ( 09 Jan 2018 05:37 )  PTT:> 200 sec    proBNP:     HgA1c: Hemoglobin A1C, Whole Blood: 9.1 % (01-09 @ 03:39)    TSH: Thyroid Stimulating Hormone, Serum: 4.51 uIU/mL (01-08 @ 21:45)       09 Jan 2018 00:31 Troponin 0.38 ng/mL /  U/L / CKMB x     / CKMB Units 8.7 ng/mL   08 Jan 2018 17:43 Troponin 0.60 ng/mL /  U/L / CKMB x     / CKMB Units 13.4 ng/mL        TELEMETRY: 	    ECG:  	  RADIOLOGY:  OTHER: 	    PREVIOUS DIAGNOSTIC TESTING:    [ ] Echocardiogram:  [ ]  Catheterization:  [ ] Stress Test:

## 2018-01-09 NOTE — H&P ADULT - PROBLEM SELECTOR PLAN 4
-Extensive history of drinking 6-12 cans of beer daily  -No evidence of fasciculations or tremors on exam  - CIWA protocol, symptom-triggered ativan -Complicated UTI in this male patient in sepsis  -CTX 1g daily (day#2) out of 7-day course  -Follow up urine cultures.

## 2018-01-09 NOTE — H&P ADULT - PROBLEM SELECTOR PLAN 3
-Leukocytosis, lactic acidosis, +tropnin, s/p IVF x 6L with positive U/A. Only atelectasis on CT chest  - Starting CTX 1g daily for UTI  -Follow up urine culture and blood culture  - Volume resuscitation as needed

## 2018-01-09 NOTE — H&P ADULT - NSHPREVIEWOFSYSTEMS_GEN_ALL_CORE
REVIEW OF SYSTEMS:  CONSTITUTIONAL: + fevers, chills, night sweats  EYES/ENT: No visual changes;  No vertigo or throat pain   NECK: No pain or stiffness  RESPIRATORY: No cough, wheezing, hemoptysis; No shortness of breath  CARDIOVASCULAR: No chest pain or palpitations  GASTROINTESTINAL: No abdominal or epigastric pain. No nausea, vomiting, or hematemesis; No diarrhea or constipation. No melena or hematochezia.  GENITOURINARY: No dysuria, frequency or hematuria  NEUROLOGICAL: No numbness or weakness  SKIN: No itching, burning, rashes, or lesions   All other review of systems is negative unless indicated above.

## 2018-01-09 NOTE — H&P ADULT - HISTORY OF PRESENT ILLNESS
Patient is a 72 year-old male with past medical history of T2DM, DLD, alcohol misuse brought in by family after having been found face down on the floor for approximately 2 days. Patient was currently residing alone on one floor of the house (wife away in [place]) while other family members including a son live on other floors within the same house. He was not checked upon for 2 days. Patient reports multiple recent episodes of falls at home due to physical instability". He reports chronic alcohol use, which family states ranges from 6-12 beers almost daily. He has been more lethargic lately, with forgetfulness and disorientationHe also endorses fever, chills, dysuria and urinary frequency. Patient denies SOB, chest pain, back pain, diarrhea, melena/hematochezia, recent travel, sick contact or surgery. Patient is originally from [place] and has been in the  for 9 years.     ED course: Hypotensive to 70/40 with HR in 170bpm with Atrial fibrillation + RVR on EKG. Labs notable for WBC 26K, lac 5.2 and troponin 0.6. Given IV NS blus x 5L and cardioversion x 2 with temporary resumption of NSR in 80s bpm. Patient is a 72 year-old male with past medical history of T2DM, DLD, alcohol misuse brought in by family after having been found face down on the floor for approximately 2 days. Patient was currently residing alone on one floor of the house (wife away in [place]) while other family members including a son live on other floors within the same house. He was not checked upon for 2 days. Patient reports multiple recent episodes of falls at home due to physical instability". He reports chronic alcohol use, which family states ranges from 6-12 beers almost daily. He has been more lethargic lately, with forgetfulness and disorientationHe also endorses fever, chills, dysuria and urinary frequency. Patient denies SOB, chest pain, back pain, diarrhea, melena/hematochezia, recent travel, sick contact or surgery. Patient is originally from [place] and has been in the  for 9 years.     ED course: Hypotensive to 70/40 with HR in 170bpm with Atrial fibrillation + RVR on EKG. Labs notable for WBC 26K, lac 5.2 and troponin 0.6. Given IV NS blus x 5L and multiple DCCV with temporary resumption of NSR in 80s bpm before return to atrial fibrillation. Patient is a 72 year-old male with past medical history of T2DM, DLD, alcohol misuse brought in by family after having been found face down on the floor for approximately 2 days. Patient was currently residing alone on one floor of the house (wife away in [place]) while other family members including a son live on other floors within the same house. He was not checked upon for 2 days. Patient reports multiple recent episodes of falls at home due to physical instability". He reports chronic alcohol use, which family states ranges from 6-12 beers almost daily. He has been more lethargic lately, with forgetfulness and disorientationHe also endorses fever, chills, dysuria and urinary frequency. Patient denies SOB, chest pain, back pain, diarrhea, melena/hematochezia, recent travel, sick contact or surgery. Patient is originally from [place] and has been in the  for 9 years.     ED course: Initial EKG noted for IW ST-elevations. Patient was hypotensive to 70/40 with HR in 170bpm with Atrial fibrillation + RVR on EKG. Labs notable for WBC 26K, lac 5.2 and troponin 0.6. Given IV NS blus x 5L and multiple DCCV with temporary resumption of NSR in 80s bpm before return to atrial fibrillation.

## 2018-01-09 NOTE — H&P ADULT - PROBLEM SELECTOR PLAN 2
-No ST elevations on EKG, making troponins. Likely due to sepsis  -ASA and brilinta load  -Starting lipitor 80mg  -Introduce BB and ACE when appropriate  -Repeat TTE when rate controlled -No ST elevations on EKG, making troponins. Likely due to sepsis  -ASA and brilinta load followed by daily maintenance.  -Starting lipitor 80mg  -Introduce BB and ACE when appropriate  -Repeat TTE when rate controlled

## 2018-01-09 NOTE — H&P ADULT - PROBLEM SELECTOR PLAN 6
-A1c  -FS + ISS AC and qhs  -Diabetic diet -Hyperglycemia on presentation, no evidence of anion gap on labs  -A1c  -FS + ISS AC and qhs  -Diabetic diet -Likely causing patient to fall, due to hypotension from sepsis vs A-fib  -CTH head negative for blees. Likely 2/2 severe sepsis vs rapid A-fib/hypotension  -C/w CTX 1g daily  -Optimize rate control

## 2018-01-09 NOTE — H&P ADULT - PROBLEM SELECTOR PLAN 5
-CTH head negative for blees. Likely 2/2 severe sepsis vs rapid A-fib/hypotension  -C/w CTX 1g daily  -Optimize rate control -Likely causing patient to fall, due to hypotension from sepsis vs A-fib  -CTH head negative for blees. Likely 2/2 severe sepsis vs rapid A-fib/hypotension  -C/w CTX 1g daily  -Optimize rate control -Extensive history of drinking 6-12 cans of beer daily  -No evidence of fasciculations or tremors on exam  - CIWA protocol, symptom-triggered ativan

## 2018-01-09 NOTE — H&P ADULT - PROBLEM SELECTOR PLAN 8
-On heparin drip  -Podiatry consult for foot care -Palpable cord-like lesions on LLE  -On heparin drip which would not   -Duplex of LE ordered.

## 2018-01-09 NOTE — H&P ADULT - PROBLEM SELECTOR PROBLEM 6
Type 2 diabetes mellitus with hyperglycemia, unspecified long term insulin use status Metabolic encephalopathy

## 2018-01-09 NOTE — H&P ADULT - NSHPSOCIALHISTORY_GEN_ALL_CORE
Denies smoking. Everyday alcohol user. No Denies smoking. Everyday alcohol user. Not sexually active  Lives with wife

## 2018-01-09 NOTE — H&P ADULT - ASSESSMENT
Patient is a 72 year-old male with past medical history of T2DM, DLD, alcohol misuse brought in by family after having been found face down on the floor for approximately 2 days found in hemodynamically unstable A-fib with RVR (170s bpm) s/p multiple cardioversions and requiring vasopressin for pressor support

## 2018-01-09 NOTE — PROGRESS NOTE ADULT - ASSESSMENT
Patient is a 72 year-old male with past medical history of T2DM, DLD, alcohol misuse brought in by family after having been found face down on the floor for approximately 2 days found in hemodynamically unstable A-fib with RVR (170s bpm) s/p multiple cardioversions and requiring vasopressin for pressor support.     Neuro:   -Altered Mental Status/metabolic encephalopathy, in setting of severe sepsis, likely leading to fall  -CT head negative for bleed  -Abx management with Vanc/Zosyn for broad spectrum coverage    Pulmonary:   -CT Chest notable for atelectasis/trace pleural effusions bilaterally  -CXR without evidence of clear infiltrate  -Currently satting well on RA    Cardiovascular:   -Afib with RVR (HR 80s); underwent cardioversion in ED several times unsuccessfully; now with spontaneous conversion to NSR  -Given troponin elevations, can possibly be 2/2 primary cardiac event, although more likely due to sepsis vs alcohol misuse  -Received digoxin load 0.5 mg followd by 0/25 mg x 2 doses, each 6 hours apart ON  -Amiodarone x 1 mg ON  -Heparin gtt for anticoagulation (CHADS VASC of 2, Age, T2DM)  -f/u TSH level  -Elevated cardiac enzymes, NSTEMI vs demand ischemia 2/2 sepsis Patient is a 72 year-old male with past medical history of T2DM, DLD, alcohol misuse brought in by family after having been found face down on the floor for approximately 2 days found in hemodynamically unstable A-fib with RVR (170s bpm) s/p multiple cardioversions and requiring vasopressin for pressor support.     Neuro:   -Altered Mental Status/metabolic encephalopathy, in setting of severe sepsis, likely leading to fall  -CT head negative for bleed  -Abx management with Vanc/Zosyn for broad spectrum coverage    Pulmonary:   -CT Chest notable for atelectasis/trace pleural effusions bilaterally  -CXR without evidence of clear infiltrate  -Currently satting well on RA    Cardiovascular:   -Afib with RVR (HR 80s); underwent cardioversion in ED several times unsuccessfully; now with spontaneous conversion to NSR  -Given troponin elevations, can possibly be 2/2 primary cardiac event, although more likely due to sepsis vs alcohol misuse  -Received digoxin load 0.5 mg followd by 0/25 mg x 2 doses, each 6 hours apart ON  -Amiodarone x 1 mg ON  -Heparin gtt for anticoagulation (CHADS VASC of 2, Age, T2DM)  -f/u TSH level  -Elevated cardiac enzymes, NSTEMI vs demand ischemia 2/2 sepsis  -ASA and brilinta load followed by daily maintenance  -Lipitor 80 mg  -Introduce 80 mg when appropriate  -R/o DVT of LLE; palpable, cord-like lesion on exam  -Duplex ordered, already on anticoagulation with heparin gtt    GI:   -No active issues  -Carb controlled diet    /Renal:   -Grossly positive u/a, + leuk esterase, negative nitrites  -Patient has hx of BPH; on home flomax and finasteride  -CT A/P with contrast without evidence of pyelonephritis  -Cystitis vs prostatitis; on broad spectrum abx as above  -f/u urine cx Patient is a 72 year-old male with past medical history of T2DM, DLD, alcohol misuse brought in by family after having been found face down on the floor for approximately 2 days found in hemodynamically unstable A-fib with RVR (170s bpm) s/p multiple cardioversions and requiring vasopressin for pressor support.     Neuro:   -Altered Mental Status/metabolic encephalopathy, in setting of severe sepsis, likely leading to fall  -CT head negative for bleed  -Abx management with Vanc/Zosyn for broad spectrum coverage    Pulmonary:   -CT Chest notable for atelectasis/trace pleural effusions bilaterally  -CXR without evidence of clear infiltrate  -Currently satting well on RA    Cardiovascular:   -Afib with RVR (HR 80s); underwent cardioversion in ED several times unsuccessfully; now with spontaneous conversion to NSR  -Given troponin elevations, can possibly be 2/2 primary cardiac event, although more likely due to sepsis vs alcohol misuse  -Received digoxin load 0.5 mg followd by 0/25 mg x 2 doses, each 6 hours apart ON  -Amiodarone x 1 mg ON  -Heparin gtt for anticoagulation (CHADS VASC of 2, Age, T2DM)  -f/u TSH level  -Elevated cardiac enzymes, NSTEMI vs demand ischemia 2/2 sepsis  -ASA and brilinta load followed by daily maintenance  -Lipitor 80 mg  -Introduce 80 mg when appropriate  -R/o DVT of LLE; palpable, cord-like lesion on exam  -Duplex ordered, already on anticoagulation with heparin gtt    GI:   -No active issues  -Carb controlled diet    /Renal:   -Grossly positive u/a, + leuk esterase, negative nitrites  -Patient has hx of BPH; on home flomax and finasteride  -CT A/P with contrast without evidence of pyelonephritis  -Cystitis vs prostatitis; on broad spectrum abx as above  -f/u urine cx     ID:   -Severe sepsis (leukocytosis, tachycardia, lactic acidosis, hypotension) s/p IVF x 6L, on phenylephrine  - source likely given grossly positive U/a, cystitis vs prostatitis  -On Vanc/Zosyn  -f/u urine and blood cx     Endo:   -On home metformin   -A1C 9.2 with FS elevated to 300s  -Started lantus 11 u QHS and humalog 2 u with meals   -monitor FS    Hematology  -Leukocytosis >20 in likely setting of sepsis  -Bandemia noted on differential  -Abx as above  -trend daily cbc Patient is a 72 year-old male with past medical history of T2DM, DLD, alcohol misuse brought in by family after having been found face down on the floor for approximately 2 days found in hemodynamically unstable A-fib with RVR (170s bpm) s/p multiple cardioversions and requiring vasopressin for pressor support.     Neuro:   -Altered Mental Status/metabolic encephalopathy, in setting of severe sepsis, likely leading to fall  -CT head negative for bleed  -Abx management with Vanc/Zosyn for broad spectrum coverage  -CIWA protocol for alcohol misuse    Pulmonary:   -CT Chest notable for atelectasis/trace pleural effusions bilaterally  -CXR without evidence of clear infiltrate  -Currently satting well on RA    Cardiovascular:   -Afib with RVR (HR 80s); underwent cardioversion in ED several times unsuccessfully; now with spontaneous conversion to NSR  -Given troponin elevations, can possibly be 2/2 primary cardiac event, although more likely due to sepsis vs alcohol misuse  -Received digoxin load 0.5 mg followd by 0/25 mg x 2 doses, each 6 hours apart ON  -Amiodarone x 1 mg ON  -Heparin gtt for anticoagulation (CHADS VASC of 2, Age, T2DM)  -f/u TSH level  -Elevated cardiac enzymes, NSTEMI vs demand ischemia 2/2 sepsis  -ASA and brilinta load followed by daily maintenance  -Lipitor 80 mg  -Introduce 80 mg when appropriate  -R/o DVT of LLE; palpable, cord-like lesion on exam  -Duplex ordered, already on anticoagulation with heparin gtt    GI:   -No active issues  -Carb controlled diet    /Renal:   -Grossly positive u/a, + leuk esterase, negative nitrites  -Patient has hx of BPH; on home flomax and finasteride  -CT A/P with contrast without evidence of pyelonephritis  -Cystitis vs prostatitis; on broad spectrum abx as above  -f/u urine cx     ID:   -Severe sepsis (leukocytosis, tachycardia, lactic acidosis, hypotension) s/p IVF x 6L, on phenylephrine  - source likely given grossly positive U/a, cystitis vs prostatitis  -On Vanc/Zosyn  -f/u urine and blood cx     Endo:   -On home metformin   -A1C 9.2 with FS elevated to 300s  -Started lantus 11 u QHS and humalog 2 u with meals   -monitor FS    Hematology  -Leukocytosis >20 in likely setting of sepsis  -Bandemia noted on differential  -Abx as above  -trend daily cbc

## 2018-01-09 NOTE — CHART NOTE - NSCHARTNOTEFT_GEN_A_CORE
**************************************  MIDNIGHT ROUNDS  MIREYA MARTINEZ MD, PGY-2  SPECTRE# [id number]  **************************************  Patient is a 72y old  Male who presents with a chief complaint of Fall (09 Jan 2018 00:52)    HPI: Patient is a 72 year-old male with past medical history of T2DM, DLD, alcohol misuse brought in by family after having been found face down on the floor for approximately 2 days. Patient was currently residing alone on one floor of the house (wife away in [place]) while other family members including a son live on other floors within the same house. He was not checked upon for 2 days. Patient reports multiple recent episodes of falls at home due to physical instability". He reports chronic alcohol use, which family states ranges from 6-12 beers almost daily. He has been more lethargic lately, with forgetfulness and disorientationHe also endorses fever, chills, dysuria and urinary frequency. Patient denies SOB, chest pain, back pain, diarrhea, melena/hematochezia, recent travel, sick contact or surgery. Patient is originally from [place] and has been in the  for 9 years.     ED course: Initial EKG noted for IW ST-elevations. Patient was hypotensive to 70/40 with HR in 170bpm with Atrial fibrillation + RVR on EKG. Labs notable for WBC 26K, lac 5.2 and troponin 0.6. Given IV NS blus x 5L and multiple DCCV with temporary resumption of NSR in 80s bpm before return to atrial fibrillation. (09 Jan 2018 00:52)      SUBJECTIVE / OVERNIGHT EVENTS: No overnight events, In normal sinus rhythm      MEDICATIONS  (STANDING):  atorvastatin 80 milliGRAM(s) Oral at bedtime  dextrose 5%. 1000 milliLiter(s) (50 mL/Hr) IV Continuous <Continuous>  dextrose 50% Injectable 12.5 Gram(s) IV Push once  dextrose 50% Injectable 25 Gram(s) IV Push once  dextrose 50% Injectable 25 Gram(s) IV Push once  finasteride 5 milliGRAM(s) Oral daily  folic acid 1 milliGRAM(s) Oral daily  heparin  Infusion.  Unit(s)/Hr (13 mL/Hr) IV Continuous <Continuous>  influenza   Vaccine 0.5 milliLiter(s) IntraMuscular once  insulin glargine Injectable (LANTUS) 11 Unit(s) SubCutaneous at bedtime  insulin lispro (HumaLOG) corrective regimen sliding scale   SubCutaneous three times a day before meals  insulin lispro (HumaLOG) corrective regimen sliding scale   SubCutaneous at bedtime  insulin lispro Injectable (HumaLOG) 2 Unit(s) SubCutaneous three times a day with meals  multivitamin 1 Tablet(s) Oral daily  pantoprazole    Tablet 40 milliGRAM(s) Oral before breakfast  phenylephrine    Infusion 0.4 MICROgram(s)/kG/Min (10.5 mL/Hr) IV Continuous <Continuous>  piperacillin/tazobactam IVPB. 3.375 Gram(s) IV Intermittent every 8 hours  sodium chloride 0.9%. 1000 milliLiter(s) (250 mL/Hr) IV Continuous <Continuous>  tamsulosin 0.4 milliGRAM(s) Oral at bedtime  thiamine 100 milliGRAM(s) Oral daily  vancomycin  IVPB 1000 milliGRAM(s) IV Intermittent every 12 hours    MEDICATIONS  (PRN):  dextrose Gel 1 Dose(s) Oral once PRN Blood Glucose LESS THAN 70 milliGRAM(s)/deciliter  glucagon  Injectable 1 milliGRAM(s) IntraMuscular once PRN Glucose LESS THAN 70 milligrams/deciliter  heparin  Injectable 6000 Unit(s) IV Push every 6 hours PRN For aPTT less than 40  heparin  Injectable 3000 Unit(s) IV Push every 6 hours PRN For aPTT between 40 - 57  LORazepam   Injectable 2 milliGRAM(s) IV Push every 2 hours PRN Symptom-triggered: 2 point increase in CIWA -Ar score and a total score of 7 or LESS        CAPILLARY BLOOD GLUCOSE      POCT Blood Glucose.: 185 mg/dL (09 Jan 2018 17:11)  POCT Blood Glucose.: 269 mg/dL (09 Jan 2018 11:44)  POCT Blood Glucose.: 220 mg/dL (09 Jan 2018 08:01)  POCT Blood Glucose.: 290 mg/dL (09 Jan 2018 03:58)  POCT Blood Glucose.: 346 mg/dL (09 Jan 2018 00:21)    I&O's Summary    08 Jan 2018 07:01  -  09 Jan 2018 07:00  --------------------------------------------------------  IN: 5905.9 mL / OUT: 1500 mL / NET: 4405.9 mL    09 Jan 2018 07:01  -  09 Jan 2018 21:57  --------------------------------------------------------  IN: 4044.9 mL / OUT: 1660 mL / NET: 2384.9 mL      ICU Vital Signs Last 24 Hrs  T(C): 36.9 (09 Jan 2018 20:00), Max: 36.9 (09 Jan 2018 20:00)  T(F): 98.4 (09 Jan 2018 20:00), Max: 98.4 (09 Jan 2018 20:00)  HR: 74 (09 Jan 2018 21:00) (72 - 146)  BP: 92/56 (09 Jan 2018 21:00) (74/43 - 118/83)  BP(mean): 73 (09 Jan 2018 21:00) (52 - 103)  ABP: --  ABP(mean): --  RR: 16 (09 Jan 2018 21:00) (10 - 26)  SpO2: 100% (09 Jan 2018 21:00) (97% - 100%)    PHYSICAL EXAM  GENERAL: NAD, well-developed  HEAD:  Atraumatic, Normocephalic  EYES: EOMI, PERRLA, conjunctiva and sclera clear  NECK: Supple, No JVD  CHEST/LUNG: Clear to auscultation bilaterally; No wheeze  HEART: Regular rate and rhythm; No murmurs, rubs, or gallops  ABDOMEN: Soft, Nontender, Nondistended; Bowel sounds present  EXTREMITIES:  2+ Peripheral Pulses. Evidence of b/l onychomycosis and dry feet.  PSYCH: AAOx2. No focal deficits  SKIN: No rashes or lesions    LABS:                        12.5   26.1  )-----------( 133      ( 09 Jan 2018 14:07 )             37.6     01-09    135  |  105  |  20  ----------------------------<  210<H>  4.0   |  20<L>  |  0.43<L>    Ca    7.5<L>      09 Jan 2018 14:07  Phos  2.0     01-09  Mg     2.1     01-09    TPro  5.2<L>  /  Alb  2.0<L>  /  TBili  0.4  /  DBili  x   /  AST  40  /  ALT  33  /  AlkPhos  126<H>  01-09    PT/INR - ( 08 Jan 2018 17:43 )   PT: 12.9 sec;   INR: 1.18 ratio         PTT - ( 09 Jan 2018 21:15 )  PTT:80.1 sec  CARDIAC MARKERS ( 09 Jan 2018 05:37 )  x     / 0.33 ng/mL / 461 U/L / x     / 7.2 ng/mL  CARDIAC MARKERS ( 09 Jan 2018 00:31 )  x     / 0.38 ng/mL / 633 U/L / x     / 8.7 ng/mL  CARDIAC MARKERS ( 08 Jan 2018 17:43 )  x     / 0.60 ng/mL / 961 U/L / x     / 13.4 ng/mL      Urinalysis Basic - ( 09 Jan 2018 01:19 )    Color: Yellow / Appearance: Clear / SG: >1.030 / pH: x  Gluc: x / Ketone: Trace  / Bili: Negative / Urobili: 8   Blood: x / Protein: Trace / Nitrite: Negative   Leuk Esterase: Large / RBC: 0-2 /HPF / WBC >50 /HPF   Sq Epi: x / Non Sq Epi: x / Bacteria: Few /HPF    RADIOLOGY & ADDITIONAL TESTS:    Imaging Personally Reviewed: CT abdomen/pelvis      A/P: Patient is a 72 year-old male with past medical history of T2DM, DLD, alcohol misuse brought in by family after having been found face down on the floor for approximately 2 days brought in for A-fib with RVR (170s bpm) s/p failed DCCV found in severe sepsis requiring pressor support    #Neuro: Metabolic encephalopathy likely 2/2 severe sepsis vs persistent hypotension, complicated by fall at home. CT head negative for acute findings.   -Fall precautions, infectious work up, [facility] protocol for unclear alcohol use history.  #Pulm: CT scan notable for atelectasis/trace pleural effusions bilaterally. CXR without evidence of clear infiltrate. Would consider CT chest. Tolerating room air  #Cardiac:   -Afib with RVR s/p unsuccessful DCCV. Currently in NSR. Likely 2/2 underlying infection vs primary cardiac event  -Received digoxin load 0.5 mg followd by 0/25 mg x 2 doses, each 6 hours apart. S/p amiodarone 150mg IVP. Currently off of rate control medications  -Heparin gtt for anticoagulation (CHADSVASC of 2, Age, T2DM)    -NSTEMI: vs demand ischemia in setting of sepsis. Troponins peaked   -C/w DAPT, statin. Will start BB and ACE when BP tolerates   -Eventual LHC when medically optimized    -Hypotension: wean off of vasopressin as tolerated    #ID: Patient in severe sepsis, likely due to UTI vs prostatitis. Grossly positive U/A, + leuk esterase, negative nitrites. s/p IVF x 6L, on pressor support. RVP (-)ve  -CT A/P with contrast without evidence of pyelonephritis  -F/u UCx, BCx, urine legionella   -C/w vanc/zosyn for broad coverage  #: Started on flomax and finasteride  #Endo: Endo: A1C 9.2, poorly controlled. Started lantus 11 u QHS and humalog 2 u with meals. FS+ISS coverage, titrate iinsulin as tolerated. Podiatry consult.  #Heme: Rule out DVT of LLE; palpable, cord-like lesion on exam. Duplex ordered, already on anticoagulation with heparin gtt                                      Care Discussed with Consultants/Other Providers: **************************************  MIDNIGHT ROUNDS  MIREYA MARTINEZ MD, PGY-2  SPECTRE# [id number]  **************************************  Patient is a 72y old  Male who presents with a chief complaint of Fall (09 Jan 2018 00:52)    HPI: Patient is a 72 year-old male with past medical history of T2DM, DLD, alcohol misuse brought in by family after having been found face down on the floor for approximately 2 days. Patient was currently residing alone on one floor of the house (wife away in [place]) while other family members including a son live on other floors within the same house. He was not checked upon for 2 days. Patient reports multiple recent episodes of falls at home due to physical instability". He reports chronic alcohol use, which family states ranges from 6-12 beers almost daily. He has been more lethargic lately, with forgetfulness and disorientationHe also endorses fever, chills, dysuria and urinary frequency. Patient denies SOB, chest pain, back pain, diarrhea, melena/hematochezia, recent travel, sick contact or surgery. Patient is originally from [place] and has been in the  for 9 years.     ED course: Initial EKG noted for IW ST-elevations. Patient was hypotensive to 70/40 with HR in 170bpm with Atrial fibrillation + RVR on EKG. Labs notable for WBC 26K, lac 5.2 and troponin 0.6. Given IV NS blus x 5L and multiple DCCV with temporary resumption of NSR in 80s bpm before return to atrial fibrillation. (09 Jan 2018 00:52)      SUBJECTIVE / OVERNIGHT EVENTS: No overnight events, In normal sinus rhythm      MEDICATIONS  (STANDING):  atorvastatin 80 milliGRAM(s) Oral at bedtime  dextrose 5%. 1000 milliLiter(s) (50 mL/Hr) IV Continuous <Continuous>  dextrose 50% Injectable 12.5 Gram(s) IV Push once  dextrose 50% Injectable 25 Gram(s) IV Push once  dextrose 50% Injectable 25 Gram(s) IV Push once  finasteride 5 milliGRAM(s) Oral daily  folic acid 1 milliGRAM(s) Oral daily  heparin  Infusion.  Unit(s)/Hr (13 mL/Hr) IV Continuous <Continuous>  influenza   Vaccine 0.5 milliLiter(s) IntraMuscular once  insulin glargine Injectable (LANTUS) 11 Unit(s) SubCutaneous at bedtime  insulin lispro (HumaLOG) corrective regimen sliding scale   SubCutaneous three times a day before meals  insulin lispro (HumaLOG) corrective regimen sliding scale   SubCutaneous at bedtime  insulin lispro Injectable (HumaLOG) 2 Unit(s) SubCutaneous three times a day with meals  multivitamin 1 Tablet(s) Oral daily  pantoprazole    Tablet 40 milliGRAM(s) Oral before breakfast  phenylephrine    Infusion 0.4 MICROgram(s)/kG/Min (10.5 mL/Hr) IV Continuous <Continuous>  piperacillin/tazobactam IVPB. 3.375 Gram(s) IV Intermittent every 8 hours  sodium chloride 0.9%. 1000 milliLiter(s) (250 mL/Hr) IV Continuous <Continuous>  tamsulosin 0.4 milliGRAM(s) Oral at bedtime  thiamine 100 milliGRAM(s) Oral daily  vancomycin  IVPB 1000 milliGRAM(s) IV Intermittent every 12 hours    MEDICATIONS  (PRN):  dextrose Gel 1 Dose(s) Oral once PRN Blood Glucose LESS THAN 70 milliGRAM(s)/deciliter  glucagon  Injectable 1 milliGRAM(s) IntraMuscular once PRN Glucose LESS THAN 70 milligrams/deciliter  heparin  Injectable 6000 Unit(s) IV Push every 6 hours PRN For aPTT less than 40  heparin  Injectable 3000 Unit(s) IV Push every 6 hours PRN For aPTT between 40 - 57  LORazepam   Injectable 2 milliGRAM(s) IV Push every 2 hours PRN Symptom-triggered: 2 point increase in CIWA -Ar score and a total score of 7 or LESS        CAPILLARY BLOOD GLUCOSE      POCT Blood Glucose.: 185 mg/dL (09 Jan 2018 17:11)  POCT Blood Glucose.: 269 mg/dL (09 Jan 2018 11:44)  POCT Blood Glucose.: 220 mg/dL (09 Jan 2018 08:01)  POCT Blood Glucose.: 290 mg/dL (09 Jan 2018 03:58)  POCT Blood Glucose.: 346 mg/dL (09 Jan 2018 00:21)    I&O's Summary    08 Jan 2018 07:01  -  09 Jan 2018 07:00  --------------------------------------------------------  IN: 5905.9 mL / OUT: 1500 mL / NET: 4405.9 mL    09 Jan 2018 07:01  -  09 Jan 2018 21:57  --------------------------------------------------------  IN: 4044.9 mL / OUT: 1660 mL / NET: 2384.9 mL      ICU Vital Signs Last 24 Hrs  T(C): 36.9 (09 Jan 2018 20:00), Max: 36.9 (09 Jan 2018 20:00)  T(F): 98.4 (09 Jan 2018 20:00), Max: 98.4 (09 Jan 2018 20:00)  HR: 74 (09 Jan 2018 21:00) (72 - 146)  BP: 92/56 (09 Jan 2018 21:00) (74/43 - 118/83)  BP(mean): 73 (09 Jan 2018 21:00) (52 - 103)  ABP: --  ABP(mean): --  RR: 16 (09 Jan 2018 21:00) (10 - 26)  SpO2: 100% (09 Jan 2018 21:00) (97% - 100%)    PHYSICAL EXAM  GENERAL: NAD, well-developed  HEAD:  Atraumatic, Normocephalic  EYES: EOMI, PERRLA, conjunctiva and sclera clear  NECK: Supple, No JVD  CHEST/LUNG: Clear to auscultation bilaterally; No wheeze  HEART: Regular rate and rhythm; No murmurs, rubs, or gallops  ABDOMEN: Soft, Nontender, Nondistended; Bowel sounds present  EXTREMITIES:  2+ Peripheral Pulses. Evidence of b/l onychomycosis and dry feet.  PSYCH: AAOx2. No focal deficits  SKIN: No rashes or lesions    LABS:                        12.5   26.1  )-----------( 133      ( 09 Jan 2018 14:07 )             37.6     01-09    135  |  105  |  20  ----------------------------<  210<H>  4.0   |  20<L>  |  0.43<L>    Ca    7.5<L>      09 Jan 2018 14:07  Phos  2.0     01-09  Mg     2.1     01-09    TPro  5.2<L>  /  Alb  2.0<L>  /  TBili  0.4  /  DBili  x   /  AST  40  /  ALT  33  /  AlkPhos  126<H>  01-09    PT/INR - ( 08 Jan 2018 17:43 )   PT: 12.9 sec;   INR: 1.18 ratio         PTT - ( 09 Jan 2018 21:15 )  PTT:80.1 sec  CARDIAC MARKERS ( 09 Jan 2018 05:37 )  x     / 0.33 ng/mL / 461 U/L / x     / 7.2 ng/mL  CARDIAC MARKERS ( 09 Jan 2018 00:31 )  x     / 0.38 ng/mL / 633 U/L / x     / 8.7 ng/mL  CARDIAC MARKERS ( 08 Jan 2018 17:43 )  x     / 0.60 ng/mL / 961 U/L / x     / 13.4 ng/mL      Urinalysis Basic - ( 09 Jan 2018 01:19 )    Color: Yellow / Appearance: Clear / SG: >1.030 / pH: x  Gluc: x / Ketone: Trace  / Bili: Negative / Urobili: 8   Blood: x / Protein: Trace / Nitrite: Negative   Leuk Esterase: Large / RBC: 0-2 /HPF / WBC >50 /HPF   Sq Epi: x / Non Sq Epi: x / Bacteria: Few /HPF    RADIOLOGY & ADDITIONAL TESTS:    Imaging Personally Reviewed: CT abdomen/pelvis      A/P: Patient is a 72 year-old male with past medical history of T2DM, DLD, alcohol misuse brought in by family after having been found face down on the floor for approximately 2 days brought in for A-fib with RVR (170s bpm) s/p failed DCCV found in severe sepsis requiring pressor support    #Neuro: Metabolic encephalopathy likely 2/2 severe sepsis vs persistent hypotension, complicated by fall at home. CT head negative for acute findings.   -Fall precautions, infectious work up, [facility] protocol for unclear alcohol use history. Folate, thiamine, MVI. Encourage PO intake.   #Pulm: CT scan notable for atelectasis/trace pleural effusions bilaterally. CXR without evidence of clear infiltrate. Would consider CT chest. Tolerating room air  #Cardiac:   -Afib with RVR s/p unsuccessful DCCV. Currently in NSR. Likely 2/2 underlying infection vs primary cardiac event  -Received digoxin load 0.5 mg followd by 0/25 mg x 2 doses, each 6 hours apart. S/p amiodarone 150mg IVP. Currently off of rate control medications  -Heparin gtt for anticoagulation (CHADSVASC of 2, Age, T2DM)    -NSTEMI: vs demand ischemia in setting of sepsis. Troponins peaked   -C/w DAPT, statin. Will start BB and ACE when BP tolerates   -Eventual LHC when medically optimized    -Hypotension: wean off of vasopressin as tolerated    #ID: Patient in severe sepsis, likely due to UTI vs prostatitis. Grossly positive U/A, + leuk esterase, negative nitrites. s/p IVF x 6L, on pressor support. RVP (-)ve  -CT A/P with contrast without evidence of pyelonephritis  -F/u UCx, BCx, urine legionella   -C/w vanc/zosyn for broad coverage  #: Started on flomax and finasteride  #Endo: Endo: A1C 9.2, poorly controlled. Started lantus 11 u QHS and humalog 2 u with meals. FS+ISS coverage, titrate iinsulin as tolerated. Podiatry consult.  #Heme: Rule out DVT of LLE; palpable, cord-like lesion on exam. Duplex ordered, already on anticoagulation with heparin gtt                                      Care Discussed with Consultants/Other Providers:

## 2018-01-09 NOTE — H&P ADULT - NSHPLABSRESULTS_GEN_ALL_CORE
< from: CT Angio Abdomen and Pelvis w/ IV Cont (01.08.18 @ 21:26) >      EXAM:  CT ANGIO ABD PELV (W)AW IC                            PROCEDURE DATE:  01/08/2018            INTERPRETATION:  CLINICAL INFORMATION: Abdominal pain for several days,   clinical concern for ischemic bowel.    COMPARISON: No similar prior studies available for comparison.    PROCEDURE:   CT Angiography of the Abdomen and Pelvis.   Images were obtained in the arterial phase and venous phases.  Intravenous contrast: 90 ml Omnipaque 350. 10 ml discarded.  Oral contrast: None.  Sagittal and coronal reformats were performed as well as 3D (MIP)   reconstructions.    FINDINGS:    LOWER CHEST: Bibasilar subsegmental atelectasis. Trace bilateral pleural   effusions. Coronary and aortic valvular calcifications.    LIVER: Within normal limits.  BILE DUCTS: Normal caliber.  GALLBLADDER: Within normal limits.  SPLEEN: Within normal limits.  PANCREAS: Within normal limits.  ADRENALS: Within normal limits.  KIDNEYS/URETERS: Within normal limits.    BLADDER: Within normal limits.  REPRODUCTIVE ORGANS: Enlarged, heterogeneous prostate.    BOWEL: No bowel obstruction. Appendix normal.  PERITONEUM: No ascites.  VESSELS:  The abdominal aorta, celiac axis, superior mesenteric artery,   bilateral renal arteries, bilateral common iliac arteries, and bilateral   internal/external iliac arteries are widely patent without evidence of   aneurysm or dissection. Calcifications are noted at the origin of the   caliac and superior mesenteric arteries without significant stenosis.  RETROPERITONEUM: No lymphadenopathy.    ABDOMINAL WALL: Within normal limits.  BONES: Degenerative changes.    IMPRESSION:   No CT evidence of mesenteric ischemia.                    AMMY MUNSON M.D., RADIOLOGY RESIDENT  This document has been electronically signed.  BHARAT LAY M.D., ATTENDING RADIOLOGIST  This document has been electronically signed. Jan 8 2018 11:33PM                < end of copied text >

## 2018-01-09 NOTE — H&P ADULT - NSHPPHYSICALEXAM_GEN_ALL_CORE
Vital Signs Last 24 Hrs  T(C): 36.6 (09 Jan 2018 00:05), Max: 36.6 (09 Jan 2018 00:05)  T(F): 97.8 (09 Jan 2018 00:05), Max: 97.8 (09 Jan 2018 00:05)  HR: 140 (09 Jan 2018 01:07) (112 - 166)  BP: 99/61 (09 Jan 2018 01:07) (66/55 - 133/64)  BP(mean): 66 (09 Jan 2018 01:07) (52 - 79)  RR: 19 (09 Jan 2018 01:07) (15 - 23)  SpO2: 99% (09 Jan 2018 01:07) (97% - 100%)    General: WN/WD NAD  Neurology: A&Ox1, nonfocal, DAVIES x 4  Head:  Normocephalic, atraumatic  ENT:  Mucosa moist, no ulcerations  Neck:  Supple, no sinuses or palpable masses  Lymphatic:  No palpable cervical, supraclavicular, axillary or inguinal adenopathy  Respiratory: CTA B/L  CV: RRR, S1S2, no murmur  Abdominal: Soft, NT, ND no palpable mass  MSK: No edema, cord-like veins LLE, + peripheral pulses, FROM all 4 extremity

## 2018-01-09 NOTE — H&P ADULT - PROBLEM SELECTOR PROBLEM 7
R/O Deep vein thrombosis (DVT) of popliteal vein of left lower extremity, unspecified chronicity Type 2 diabetes mellitus with hyperglycemia, unspecified long term insulin use status

## 2018-01-09 NOTE — H&P ADULT - PROBLEM SELECTOR PLAN 1
-Patient presented in A-fib with RVR in 180s with BP 70/30 underwent cardioversion multiple times with only temporary resumption of NSR.  -Given troponin elevations, can possible be 2/2 primary cardiac event, although more likely due to sepsis vs alcohol misuse  -Digoxin load 0.5mg followed by 0.25mg x 2 doses, each 6 hours apart  -Amiodarone 150mg IVP followed by IVBP for rate control  -Heparin drip for anticoagulation -Patient presented in A-fib with RVR in 180s with BP 70/30 underwent cardioversion multiple times with only temporary resumption of NSR.  -Given troponin elevations, can possible be 2/2 primary cardiac event, although more likely due to sepsis vs alcohol misuse  -Digoxin load 0.5mg followed by 0.25mg x 2 doses, each 6 hours apart  -Amiodarone 150mg IVP followed by IVBP for rate control  -Heparin drip for anticoagulation CHADSVASC=2 (Age, T2DM) -Patient presented in A-fib with RVR in 180s with BP 70/30 underwent cardioversion multiple times with only temporary resumption of NSR.  -Given troponin elevations, can possible be 2/2 primary cardiac event, although more likely due to sepsis vs alcohol misuse  -Digoxin load 0.5mg followed by 0.25mg x 2 doses, each 6 hours apart  -Amiodarone 150mg IVP bolus x 1  -Heparin drip for anticoagulation CHADSVASC=2 (Age, T2DM)  -Follow up TSH level.

## 2018-01-09 NOTE — H&P ADULT - PROBLEM SELECTOR PLAN 7
-Palpable cord-like lesions on LLE  -On heparin drip which would not   -Duplex of LE ordered. -Hyperglycemia on presentation, no evidence of anion gap on labs  -A1c  -FS + ISS AC and qhs  -Diabetic diet

## 2018-01-10 LAB
ALBUMIN SERPL ELPH-MCNC: 1.7 G/DL — LOW (ref 3.3–5)
ALBUMIN SERPL ELPH-MCNC: 1.8 G/DL — LOW (ref 3.3–5)
ALP SERPL-CCNC: 98 U/L — SIGNIFICANT CHANGE UP (ref 40–120)
ALP SERPL-CCNC: 99 U/L — SIGNIFICANT CHANGE UP (ref 40–120)
ALT FLD-CCNC: 29 U/L RC — SIGNIFICANT CHANGE UP (ref 10–45)
ALT FLD-CCNC: 32 U/L RC — SIGNIFICANT CHANGE UP (ref 10–45)
ANION GAP SERPL CALC-SCNC: 8 MMOL/L — SIGNIFICANT CHANGE UP (ref 5–17)
ANION GAP SERPL CALC-SCNC: 8 MMOL/L — SIGNIFICANT CHANGE UP (ref 5–17)
APTT BLD: 67.9 SEC — HIGH (ref 27.5–37.4)
APTT BLD: 81.1 SEC — HIGH (ref 27.5–37.4)
AST SERPL-CCNC: 34 U/L — SIGNIFICANT CHANGE UP (ref 10–40)
AST SERPL-CCNC: 35 U/L — SIGNIFICANT CHANGE UP (ref 10–40)
BASOPHILS # BLD AUTO: 0 K/UL — SIGNIFICANT CHANGE UP (ref 0–0.2)
BILIRUB SERPL-MCNC: 0.5 MG/DL — SIGNIFICANT CHANGE UP (ref 0.2–1.2)
BILIRUB SERPL-MCNC: 0.5 MG/DL — SIGNIFICANT CHANGE UP (ref 0.2–1.2)
BUN SERPL-MCNC: 14 MG/DL — SIGNIFICANT CHANGE UP (ref 7–23)
BUN SERPL-MCNC: 17 MG/DL — SIGNIFICANT CHANGE UP (ref 7–23)
C TRACH RRNA SPEC QL NAA+PROBE: SIGNIFICANT CHANGE UP
CALCIUM SERPL-MCNC: 7.4 MG/DL — LOW (ref 8.4–10.5)
CALCIUM SERPL-MCNC: 7.5 MG/DL — LOW (ref 8.4–10.5)
CHLORIDE SERPL-SCNC: 105 MMOL/L — SIGNIFICANT CHANGE UP (ref 96–108)
CHLORIDE SERPL-SCNC: 105 MMOL/L — SIGNIFICANT CHANGE UP (ref 96–108)
CO2 SERPL-SCNC: 21 MMOL/L — LOW (ref 22–31)
CO2 SERPL-SCNC: 21 MMOL/L — LOW (ref 22–31)
CREAT SERPL-MCNC: 0.44 MG/DL — LOW (ref 0.5–1.3)
CREAT SERPL-MCNC: 0.44 MG/DL — LOW (ref 0.5–1.3)
CULTURE RESULTS: NO GROWTH — SIGNIFICANT CHANGE UP
EOSINOPHIL # BLD AUTO: 0 K/UL — SIGNIFICANT CHANGE UP (ref 0–0.5)
GLUCOSE BLDC GLUCOMTR-MCNC: 156 MG/DL — HIGH (ref 70–99)
GLUCOSE BLDC GLUCOMTR-MCNC: 177 MG/DL — HIGH (ref 70–99)
GLUCOSE BLDC GLUCOMTR-MCNC: 179 MG/DL — HIGH (ref 70–99)
GLUCOSE BLDC GLUCOMTR-MCNC: 201 MG/DL — HIGH (ref 70–99)
GLUCOSE SERPL-MCNC: 150 MG/DL — HIGH (ref 70–99)
GLUCOSE SERPL-MCNC: 216 MG/DL — HIGH (ref 70–99)
HCT VFR BLD CALC: 32.2 % — LOW (ref 39–50)
HCT VFR BLD CALC: 34.5 % — LOW (ref 39–50)
HCT VFR BLD CALC: 34.7 % — LOW (ref 39–50)
HGB BLD-MCNC: 10.2 G/DL — LOW (ref 13–17)
HGB BLD-MCNC: 11.4 G/DL — LOW (ref 13–17)
HGB BLD-MCNC: 11.5 G/DL — LOW (ref 13–17)
INR BLD: 1.15 RATIO — SIGNIFICANT CHANGE UP (ref 0.88–1.16)
LYMPHOCYTES # BLD AUTO: 1 K/UL — SIGNIFICANT CHANGE UP (ref 1–3.3)
LYMPHOCYTES # BLD AUTO: 6 % — LOW (ref 13–44)
MAGNESIUM SERPL-MCNC: 1.9 MG/DL — SIGNIFICANT CHANGE UP (ref 1.6–2.6)
MAGNESIUM SERPL-MCNC: 2 MG/DL — SIGNIFICANT CHANGE UP (ref 1.6–2.6)
MCHC RBC-ENTMCNC: 30.2 PG — SIGNIFICANT CHANGE UP (ref 27–34)
MCHC RBC-ENTMCNC: 31.4 PG — SIGNIFICANT CHANGE UP (ref 27–34)
MCHC RBC-ENTMCNC: 31.8 GM/DL — LOW (ref 32–36)
MCHC RBC-ENTMCNC: 31.8 PG — SIGNIFICANT CHANGE UP (ref 27–34)
MCHC RBC-ENTMCNC: 33 GM/DL — SIGNIFICANT CHANGE UP (ref 32–36)
MCHC RBC-ENTMCNC: 33.4 GM/DL — SIGNIFICANT CHANGE UP (ref 32–36)
MCV RBC AUTO: 95 FL — SIGNIFICANT CHANGE UP (ref 80–100)
MCV RBC AUTO: 95.2 FL — SIGNIFICANT CHANGE UP (ref 80–100)
MCV RBC AUTO: 95.2 FL — SIGNIFICANT CHANGE UP (ref 80–100)
METAMYELOCYTES # FLD: 1 % — HIGH (ref 0–0)
MONOCYTES # BLD AUTO: 1 K/UL — HIGH (ref 0–0.9)
MONOCYTES NFR BLD AUTO: 6 % — SIGNIFICANT CHANGE UP (ref 2–14)
N GONORRHOEA RRNA SPEC QL NAA+PROBE: SIGNIFICANT CHANGE UP
NEUTROPHILS # BLD AUTO: 11.3 K/UL — HIGH (ref 1.8–7.4)
NEUTROPHILS NFR BLD AUTO: 81 % — HIGH (ref 43–77)
NEUTS BAND # BLD: 6 % — SIGNIFICANT CHANGE UP (ref 0–8)
PHOSPHATE SERPL-MCNC: 2 MG/DL — LOW (ref 2.5–4.5)
PHOSPHATE SERPL-MCNC: 2.2 MG/DL — LOW (ref 2.5–4.5)
PLAT MORPH BLD: NORMAL — SIGNIFICANT CHANGE UP
PLATELET # BLD AUTO: 131 K/UL — LOW (ref 150–400)
PLATELET # BLD AUTO: 133 K/UL — LOW (ref 150–400)
PLATELET # BLD AUTO: 145 K/UL — LOW (ref 150–400)
POTASSIUM SERPL-MCNC: 3.5 MMOL/L — SIGNIFICANT CHANGE UP (ref 3.5–5.3)
POTASSIUM SERPL-MCNC: 3.8 MMOL/L — SIGNIFICANT CHANGE UP (ref 3.5–5.3)
POTASSIUM SERPL-SCNC: 3.5 MMOL/L — SIGNIFICANT CHANGE UP (ref 3.5–5.3)
POTASSIUM SERPL-SCNC: 3.8 MMOL/L — SIGNIFICANT CHANGE UP (ref 3.5–5.3)
PROT SERPL-MCNC: 5.1 G/DL — LOW (ref 6–8.3)
PROT SERPL-MCNC: 5.3 G/DL — LOW (ref 6–8.3)
PROTHROM AB SERPL-ACNC: 12.5 SEC — SIGNIFICANT CHANGE UP (ref 9.8–12.7)
RBC # BLD: 3.38 M/UL — LOW (ref 4.2–5.8)
RBC # BLD: 3.63 M/UL — LOW (ref 4.2–5.8)
RBC # BLD: 3.64 M/UL — LOW (ref 4.2–5.8)
RBC # FLD: 12 % — SIGNIFICANT CHANGE UP (ref 10.3–14.5)
RBC # FLD: 12.1 % — SIGNIFICANT CHANGE UP (ref 10.3–14.5)
RBC # FLD: 12.1 % — SIGNIFICANT CHANGE UP (ref 10.3–14.5)
RBC BLD AUTO: SIGNIFICANT CHANGE UP
SODIUM SERPL-SCNC: 134 MMOL/L — LOW (ref 135–145)
SODIUM SERPL-SCNC: 134 MMOL/L — LOW (ref 135–145)
SPECIMEN SOURCE: SIGNIFICANT CHANGE UP
SPECIMEN SOURCE: SIGNIFICANT CHANGE UP
WBC # BLD: 10.6 K/UL — HIGH (ref 3.8–10.5)
WBC # BLD: 13.3 K/UL — HIGH (ref 3.8–10.5)
WBC # BLD: 16.3 K/UL — HIGH (ref 3.8–10.5)
WBC # FLD AUTO: 10.6 K/UL — HIGH (ref 3.8–10.5)
WBC # FLD AUTO: 13.3 K/UL — HIGH (ref 3.8–10.5)
WBC # FLD AUTO: 16.3 K/UL — HIGH (ref 3.8–10.5)

## 2018-01-10 PROCEDURE — 93010 ELECTROCARDIOGRAM REPORT: CPT

## 2018-01-10 PROCEDURE — 93970 EXTREMITY STUDY: CPT | Mod: 26

## 2018-01-10 PROCEDURE — 99291 CRITICAL CARE FIRST HOUR: CPT

## 2018-01-10 PROCEDURE — 71250 CT THORAX DX C-: CPT | Mod: 26

## 2018-01-10 RX ORDER — PHENYLEPHRINE HYDROCHLORIDE 10 MG/ML
0.37 INJECTION INTRAVENOUS
Qty: 80 | Refills: 0 | Status: DISCONTINUED | OUTPATIENT
Start: 2018-01-10 | End: 2018-01-10

## 2018-01-10 RX ORDER — HEPARIN SODIUM 5000 [USP'U]/ML
900 INJECTION INTRAVENOUS; SUBCUTANEOUS
Qty: 25000 | Refills: 0 | Status: DISCONTINUED | OUTPATIENT
Start: 2018-01-10 | End: 2018-01-11

## 2018-01-10 RX ORDER — POTASSIUM PHOSPHATE, MONOBASIC POTASSIUM PHOSPHATE, DIBASIC 236; 224 MG/ML; MG/ML
15 INJECTION, SOLUTION INTRAVENOUS ONCE
Qty: 0 | Refills: 0 | Status: COMPLETED | OUTPATIENT
Start: 2018-01-10 | End: 2018-01-10

## 2018-01-10 RX ORDER — SODIUM CHLORIDE 9 MG/ML
500 INJECTION INTRAMUSCULAR; INTRAVENOUS; SUBCUTANEOUS
Qty: 0 | Refills: 0 | Status: DISCONTINUED | OUTPATIENT
Start: 2018-01-10 | End: 2018-01-10

## 2018-01-10 RX ORDER — HEPARIN SODIUM 5000 [USP'U]/ML
INJECTION INTRAVENOUS; SUBCUTANEOUS
Qty: 25000 | Refills: 0 | Status: DISCONTINUED | OUTPATIENT
Start: 2018-01-10 | End: 2018-01-10

## 2018-01-10 RX ORDER — POTASSIUM CHLORIDE 20 MEQ
40 PACKET (EA) ORAL ONCE
Qty: 0 | Refills: 0 | Status: DISCONTINUED | OUTPATIENT
Start: 2018-01-10 | End: 2018-01-10

## 2018-01-10 RX ORDER — INSULIN GLARGINE 100 [IU]/ML
6 INJECTION, SOLUTION SUBCUTANEOUS AT BEDTIME
Qty: 0 | Refills: 0 | Status: DISCONTINUED | OUTPATIENT
Start: 2018-01-10 | End: 2018-01-11

## 2018-01-10 RX ORDER — HEPARIN SODIUM 5000 [USP'U]/ML
3000 INJECTION INTRAVENOUS; SUBCUTANEOUS EVERY 6 HOURS
Qty: 0 | Refills: 0 | Status: DISCONTINUED | OUTPATIENT
Start: 2018-01-10 | End: 2018-01-10

## 2018-01-10 RX ORDER — INSULIN LISPRO 100/ML
3 VIAL (ML) SUBCUTANEOUS
Qty: 0 | Refills: 0 | Status: DISCONTINUED | OUTPATIENT
Start: 2018-01-10 | End: 2018-01-30

## 2018-01-10 RX ORDER — SODIUM,POTASSIUM PHOSPHATES 278-250MG
1 POWDER IN PACKET (EA) ORAL ONCE
Qty: 0 | Refills: 0 | Status: COMPLETED | OUTPATIENT
Start: 2018-01-10 | End: 2018-01-10

## 2018-01-10 RX ORDER — MAGNESIUM SULFATE 500 MG/ML
1 VIAL (ML) INJECTION ONCE
Qty: 0 | Refills: 0 | Status: COMPLETED | OUTPATIENT
Start: 2018-01-10 | End: 2018-01-10

## 2018-01-10 RX ORDER — SODIUM CHLORIDE 9 MG/ML
500 INJECTION INTRAMUSCULAR; INTRAVENOUS; SUBCUTANEOUS ONCE
Qty: 0 | Refills: 0 | Status: COMPLETED | OUTPATIENT
Start: 2018-01-10 | End: 2018-01-10

## 2018-01-10 RX ORDER — VASOPRESSIN 20 [USP'U]/ML
0.04 INJECTION INTRAVENOUS
Qty: 100 | Refills: 0 | Status: DISCONTINUED | OUTPATIENT
Start: 2018-01-10 | End: 2018-01-11

## 2018-01-10 RX ORDER — INSULIN GLARGINE 100 [IU]/ML
13 INJECTION, SOLUTION SUBCUTANEOUS AT BEDTIME
Qty: 0 | Refills: 0 | Status: DISCONTINUED | OUTPATIENT
Start: 2018-01-10 | End: 2018-01-10

## 2018-01-10 RX ORDER — HEPARIN SODIUM 5000 [USP'U]/ML
6000 INJECTION INTRAVENOUS; SUBCUTANEOUS EVERY 6 HOURS
Qty: 0 | Refills: 0 | Status: DISCONTINUED | OUTPATIENT
Start: 2018-01-10 | End: 2018-01-10

## 2018-01-10 RX ORDER — POTASSIUM CHLORIDE 20 MEQ
40 PACKET (EA) ORAL ONCE
Qty: 0 | Refills: 0 | Status: COMPLETED | OUTPATIENT
Start: 2018-01-10 | End: 2018-01-10

## 2018-01-10 RX ADMIN — SODIUM CHLORIDE 1000 MILLILITER(S): 9 INJECTION INTRAMUSCULAR; INTRAVENOUS; SUBCUTANEOUS at 05:50

## 2018-01-10 RX ADMIN — VASOPRESSIN 2.4 UNIT(S)/MIN: 20 INJECTION INTRAVENOUS at 13:37

## 2018-01-10 RX ADMIN — HEPARIN SODIUM 900 UNIT(S)/HR: 5000 INJECTION INTRAVENOUS; SUBCUTANEOUS at 20:28

## 2018-01-10 RX ADMIN — PANTOPRAZOLE SODIUM 40 MILLIGRAM(S): 20 TABLET, DELAYED RELEASE ORAL at 05:52

## 2018-01-10 RX ADMIN — Medication 2: at 13:32

## 2018-01-10 RX ADMIN — ATORVASTATIN CALCIUM 80 MILLIGRAM(S): 80 TABLET, FILM COATED ORAL at 21:21

## 2018-01-10 RX ADMIN — Medication 100 MILLIGRAM(S): at 11:20

## 2018-01-10 RX ADMIN — Medication 250 MILLIGRAM(S): at 17:44

## 2018-01-10 RX ADMIN — Medication 1: at 17:45

## 2018-01-10 RX ADMIN — Medication 1 PACKET(S): at 16:42

## 2018-01-10 RX ADMIN — Medication 1: at 08:40

## 2018-01-10 RX ADMIN — Medication 3 UNIT(S): at 17:44

## 2018-01-10 RX ADMIN — Medication 1 TABLET(S): at 11:21

## 2018-01-10 RX ADMIN — Medication 2 UNIT(S): at 08:39

## 2018-01-10 RX ADMIN — POTASSIUM PHOSPHATE, MONOBASIC POTASSIUM PHOSPHATE, DIBASIC 62.5 MILLIMOLE(S): 236; 224 INJECTION, SOLUTION INTRAVENOUS at 05:49

## 2018-01-10 RX ADMIN — Medication 100 GRAM(S): at 05:50

## 2018-01-10 RX ADMIN — INSULIN GLARGINE 6 UNIT(S): 100 INJECTION, SOLUTION SUBCUTANEOUS at 21:21

## 2018-01-10 RX ADMIN — TICAGRELOR 90 MILLIGRAM(S): 90 TABLET ORAL at 05:52

## 2018-01-10 RX ADMIN — TAMSULOSIN HYDROCHLORIDE 0.4 MILLIGRAM(S): 0.4 CAPSULE ORAL at 21:21

## 2018-01-10 RX ADMIN — SODIUM CHLORIDE 1000 MILLILITER(S): 9 INJECTION INTRAMUSCULAR; INTRAVENOUS; SUBCUTANEOUS at 12:39

## 2018-01-10 RX ADMIN — VASOPRESSIN 2.4 UNIT(S)/MIN: 20 INJECTION INTRAVENOUS at 20:28

## 2018-01-10 RX ADMIN — VASOPRESSIN 2.4 UNIT(S)/MIN: 20 INJECTION INTRAVENOUS at 13:13

## 2018-01-10 RX ADMIN — TICAGRELOR 90 MILLIGRAM(S): 90 TABLET ORAL at 17:45

## 2018-01-10 RX ADMIN — HEPARIN SODIUM 900 UNIT(S)/HR: 5000 INJECTION INTRAVENOUS; SUBCUTANEOUS at 05:46

## 2018-01-10 RX ADMIN — Medication 1 MILLIGRAM(S): at 11:20

## 2018-01-10 RX ADMIN — PIPERACILLIN AND TAZOBACTAM 25 GRAM(S): 4; .5 INJECTION, POWDER, LYOPHILIZED, FOR SOLUTION INTRAVENOUS at 05:48

## 2018-01-10 RX ADMIN — Medication 81 MILLIGRAM(S): at 11:20

## 2018-01-10 RX ADMIN — Medication 2 UNIT(S): at 13:32

## 2018-01-10 RX ADMIN — PIPERACILLIN AND TAZOBACTAM 25 GRAM(S): 4; .5 INJECTION, POWDER, LYOPHILIZED, FOR SOLUTION INTRAVENOUS at 21:21

## 2018-01-10 RX ADMIN — Medication 250 MILLIGRAM(S): at 05:49

## 2018-01-10 RX ADMIN — SODIUM CHLORIDE 1500 MILLILITER(S): 9 INJECTION INTRAMUSCULAR; INTRAVENOUS; SUBCUTANEOUS at 02:01

## 2018-01-10 RX ADMIN — FINASTERIDE 5 MILLIGRAM(S): 5 TABLET, FILM COATED ORAL at 11:20

## 2018-01-10 RX ADMIN — Medication 40 MILLIEQUIVALENT(S): at 05:51

## 2018-01-10 RX ADMIN — HEPARIN SODIUM 900 UNIT(S)/HR: 5000 INJECTION INTRAVENOUS; SUBCUTANEOUS at 13:35

## 2018-01-10 RX ADMIN — PIPERACILLIN AND TAZOBACTAM 25 GRAM(S): 4; .5 INJECTION, POWDER, LYOPHILIZED, FOR SOLUTION INTRAVENOUS at 13:12

## 2018-01-10 NOTE — DIETITIAN INITIAL EVALUATION ADULT. - NS AS NUTRI DX KNOWLEDGE BELIEFS2
Educated pt on consistent carbohydrate diet, portion sizing including benefit of mixed meals, "my plate" model, label reading and strategies to promote improved glucose control. Recommended pt to increase consumption of whole grains, consume protein and fiber with CHO, avoid concentrated sweets. Discussed healthier snack and meal ideas, carbohydrate counting methods, and reviewed sample meal. Discussed heart healthy diet; identified foods high in sodium and fats, limit sodium intake, increased consumption of lean meats, fruit and vegetables, healthy fats and oils, cooking tips, and healthy snacking options./Food - and nutrition - related knowledge deficit

## 2018-01-10 NOTE — CHART NOTE - NSCHARTNOTEFT_GEN_A_CORE
Upon Nutritional Assessment by the Registered Dietitian your patient was determined to meet criteria / has evidence of the following diagnosis/diagnoses:          [ ]  Mild Protein Calorie Malnutrition        [x ]  Moderate Protein Calorie Malnutrition        [ ] Severe Protein Calorie Malnutrition        [ ] Unspecified Protein Calorie Malnutrition        [ ] Underweight / BMI <19        [ ] Morbid Obesity / BMI > 40      Findings as based on:  [x ] Comprehensive nutrition assessment   [ ] Nutrition Focused Physical Exam  [x ] Other: pt reports decreased po intakes since 2-3 weeks PTA w/EtOH usage, reports unintended wt loss      Nutrition Plan/Recommendations:  Recommend diet change to Consistent Carbohydrate with evening snack DASH + Glucerna 1 x day        PROVIDER Section:     By signing this assessment you are acknowledging and agree with the diagnosis/diagnoses assigned by the Registered Dietitian    Comments:

## 2018-01-10 NOTE — DIETITIAN INITIAL EVALUATION ADULT. - PERTINENT LABORATORY DATA
Na 134 [135 - 145], K+ 3.8 [3.5 - 5.3], BUN 14 [7 - 23], Cr 0.44 [0.50 - 1.30],  [70 - 99], Phos 2.2 [2.5 - 4.5], Alk Phos 98 [40 - 120], AST 34 [10 - 40], ALT 32 [10 - 45], Mg 2.0 [1.6 - 2.6], Ca 7.5 [8.4 - 10.5], HbA1c 9.1%; Fingersticks (1/8-10) [phone]

## 2018-01-10 NOTE — DIETITIAN INITIAL EVALUATION ADULT. - ORAL INTAKE PTA
fair/Pt reports appetite and po intakes were not as good since 2-3 weeks PTA 2/2 'not feeling well'. Usual breakfast - coffee, ham and cheese sandwich; Lunch - at senior center - vegetables, salads, chicken, rice; Dinner - 'lady who cooks and cleans the house' prepares soups, pasta, fish and vegetables. Pt snacks on fruits and drinks small amounts of orange juice or water

## 2018-01-10 NOTE — CHART NOTE - NSCHARTNOTEFT_GEN_A_CORE
**************************************  MIDNIGHT ROUNDS  MIREYA MARTINEZ MD, PGY-2  SPECTRE# [id number]  **************************************  Patient is a 72y old  Male who presents with a chief complaint of Fall (09 Jan 2018 00:52)    HPI:  Patient is a 72 year-old male with past medical history of T2DM, DLD, alcohol misuse brought in by family after having been found face down on the floor for approximately 2 days. Patient was currently residing alone on one floor of the house (wife away in [place]) while other family members including a son live on other floors within the same house. He was not checked upon for 2 days. Patient reports multiple recent episodes of falls at home due to physical instability". He reports chronic alcohol use, which family states ranges from 6-12 beers almost daily. He has been more lethargic lately, with forgetfulness and disorientationHe also endorses fever, chills, dysuria and urinary frequency. Patient denies SOB, chest pain, back pain, diarrhea, melena/hematochezia, recent travel, sick contact or surgery. Patient is originally from [place] and has been in the  for 9 years.     ED course: Initial EKG noted for IW ST-elevations. Patient was hypotensive to 70/40 with HR in 170bpm with Atrial fibrillation + RVR on EKG. Labs notable for WBC 26K, lac 5.2 and troponin 0.6. Given IV NS blus x 5L and multiple DCCV with temporary resumption of NSR in 80s bpm before return to atrial fibrillation. (09 Jan 2018 00:52)      SUBJECTIVE / OVERNIGHT EVENTS: No overnight events.      MEDICATIONS  (STANDING):  aspirin  chewable 81 milliGRAM(s) Oral daily  atorvastatin 80 milliGRAM(s) Oral at bedtime  dextrose 5%. 1000 milliLiter(s) (50 mL/Hr) IV Continuous <Continuous>  dextrose 50% Injectable 12.5 Gram(s) IV Push once  dextrose 50% Injectable 25 Gram(s) IV Push once  dextrose 50% Injectable 25 Gram(s) IV Push once  finasteride 5 milliGRAM(s) Oral daily  folic acid 1 milliGRAM(s) Oral daily  heparin  Infusion. 900 Unit(s)/Hr (9 mL/Hr) IV Continuous <Continuous>  influenza   Vaccine 0.5 milliLiter(s) IntraMuscular once  insulin glargine Injectable (LANTUS) 6 Unit(s) SubCutaneous at bedtime  insulin lispro (HumaLOG) corrective regimen sliding scale   SubCutaneous three times a day before meals  insulin lispro (HumaLOG) corrective regimen sliding scale   SubCutaneous at bedtime  insulin lispro Injectable (HumaLOG) 3 Unit(s) SubCutaneous three times a day with meals  multivitamin 1 Tablet(s) Oral daily  pantoprazole    Tablet 40 milliGRAM(s) Oral before breakfast  piperacillin/tazobactam IVPB. 3.375 Gram(s) IV Intermittent every 8 hours  tamsulosin 0.4 milliGRAM(s) Oral at bedtime  thiamine 100 milliGRAM(s) Oral daily  ticagrelor 90 milliGRAM(s) Oral two times a day  vancomycin  IVPB 1000 milliGRAM(s) IV Intermittent every 12 hours  vasopressin Infusion 0.04 Unit(s)/Min (2.4 mL/Hr) IV Continuous <Continuous>    MEDICATIONS  (PRN):  dextrose Gel 1 Dose(s) Oral once PRN Blood Glucose LESS THAN 70 milliGRAM(s)/deciliter  glucagon  Injectable 1 milliGRAM(s) IntraMuscular once PRN Glucose LESS THAN 70 milligrams/deciliter  LORazepam   Injectable 2 milliGRAM(s) IV Push every 2 hours PRN Symptom-triggered: 2 point increase in CIWA -Ar score and a total score of 7 or LESS        CAPILLARY BLOOD GLUCOSE      POCT Blood Glucose.: 156 mg/dL (10 Chao 2018 21:18)  POCT Blood Glucose.: 177 mg/dL (10 Chao 2018 17:41)  POCT Blood Glucose.: 201 mg/dL (10 Chao 2018 12:16)  POCT Blood Glucose.: 179 mg/dL (10 Chao 2018 07:56)  POCT Blood Glucose.: 168 mg/dL (09 Jan 2018 22:23)    I&O's Summary    09 Jan 2018 07:01  -  10 Chao 2018 07:00  --------------------------------------------------------  IN: 6159.2 mL / OUT: 4060 mL / NET: 2099.2 mL    10 Chao 2018 07:01  -  10 Chao 2018 21:32  --------------------------------------------------------  IN: 1952.3 mL / OUT: 1300 mL / NET: 652.3 mL        PHYSICAL EXAM  GENERAL: NAD, well-developed  HEAD:  Atraumatic, Normocephalic  EYES: EOMI, PERRLA, conjunctiva and sclera clear  NECK: Supple, No JVD  CHEST/LUNG: Clear to auscultation bilaterally; No wheeze  HEART: Regular rate and rhythm; No murmurs, rubs, or gallops  ABDOMEN: Soft, Nontender, Nondistended; Bowel sounds present  EXTREMITIES:  2+ Peripheral Pulses, No clubbing, cyanosis, or edema  PSYCH: AAOx3  SKIN: No rashes or lesions    LABS:                        10.2   10.6  )-----------( 133      ( 10 Chao 2018 19:04 )             32.2     01-10    134<L>  |  105  |  14  ----------------------------<  216<H>  3.8   |  21<L>  |  0.44<L>    Ca    7.5<L>      10 Chao 2018 13:18  Phos  2.2     01-10  Mg     2.0     01-10    TPro  5.3<L>  /  Alb  1.8<L>  /  TBili  0.5  /  DBili  x   /  AST  34  /  ALT  32  /  AlkPhos  98  01-10    PT/INR - ( 10 Chao 2018 03:38 )   PT: 12.5 sec;   INR: 1.15 ratio         PTT - ( 10 Chao 2018 19:04 )  PTT:81.1 sec  CARDIAC MARKERS ( 09 Jan 2018 05:37 )  x     / 0.33 ng/mL / 461 U/L / x     / 7.2 ng/mL  CARDIAC MARKERS ( 09 Jan 2018 00:31 )  x     / 0.38 ng/mL / 633 U/L / x     / 8.7 ng/mL      Urinalysis Basic - ( 09 Jan 2018 01:19 )    Color: Yellow / Appearance: Clear / SG: >1.030 / pH: x  Gluc: x / Ketone: Trace  / Bili: Negative / Urobili: 8   Blood: x / Protein: Trace / Nitrite: Negative   Leuk Esterase: Large / RBC: 0-2 /HPF / WBC >50 /HPF   Sq Epi: x / Non Sq Epi: x / Bacteria: Few /HPF        RADIOLOGY & ADDITIONAL TESTS:    Imaging Personally Reviewed:  Consultant(s) Notes Reviewed:    Care Discussed with Consultants/Other Providers: **************************************  MIDNIGHT ROUNDS  MIREYA MARTINEZ MD, PGY-2  SPECTRE# [id number]  **************************************  Patient is a 72y old  Male who presents with a chief complaint of Fall (09 Jan 2018 00:52)    HPI:  Patient is a 72 year-old male with past medical history of T2DM, DLD, alcohol misuse brought in by family after having been found face down on the floor for approximately 2 days. Patient was currently residing alone on one floor of the house (wife away in [place]) while other family members including a son live on other floors within the same house. He was not checked upon for 2 days. Patient reports multiple recent episodes of falls at home due to physical instability". He reports chronic alcohol use, which family states ranges from 6-12 beers almost daily. He has been more lethargic lately, with forgetfulness and disorientationHe also endorses fever, chills, dysuria and urinary frequency. Patient denies SOB, chest pain, back pain, diarrhea, melena/hematochezia, recent travel, sick contact or surgery. Patient is originally from [place] and has been in the  for 9 years.     ED course: Initial EKG noted for IW ST-elevations. Patient was hypotensive to 70/40 with HR in 170bpm with Atrial fibrillation + RVR on EKG. Labs notable for WBC 26K, lac 5.2 and troponin 0.6. Given IV NS blus x 5L and multiple DCCV with temporary resumption of NSR in 80s bpm before return to atrial fibrillation. (09 Jan 2018 00:52)      SUBJECTIVE / OVERNIGHT EVENTS: No overnight events.      MEDICATIONS  (STANDING):  aspirin  chewable 81 milliGRAM(s) Oral daily  atorvastatin 80 milliGRAM(s) Oral at bedtime  dextrose 5%. 1000 milliLiter(s) (50 mL/Hr) IV Continuous <Continuous>  dextrose 50% Injectable 12.5 Gram(s) IV Push once  dextrose 50% Injectable 25 Gram(s) IV Push once  dextrose 50% Injectable 25 Gram(s) IV Push once  finasteride 5 milliGRAM(s) Oral daily  folic acid 1 milliGRAM(s) Oral daily  heparin  Infusion. 900 Unit(s)/Hr (9 mL/Hr) IV Continuous <Continuous>  influenza   Vaccine 0.5 milliLiter(s) IntraMuscular once  insulin glargine Injectable (LANTUS) 6 Unit(s) SubCutaneous at bedtime  insulin lispro (HumaLOG) corrective regimen sliding scale   SubCutaneous three times a day before meals  insulin lispro (HumaLOG) corrective regimen sliding scale   SubCutaneous at bedtime  insulin lispro Injectable (HumaLOG) 3 Unit(s) SubCutaneous three times a day with meals  multivitamin 1 Tablet(s) Oral daily  pantoprazole    Tablet 40 milliGRAM(s) Oral before breakfast  piperacillin/tazobactam IVPB. 3.375 Gram(s) IV Intermittent every 8 hours  tamsulosin 0.4 milliGRAM(s) Oral at bedtime  thiamine 100 milliGRAM(s) Oral daily  ticagrelor 90 milliGRAM(s) Oral two times a day  vancomycin  IVPB 1000 milliGRAM(s) IV Intermittent every 12 hours  vasopressin Infusion 0.04 Unit(s)/Min (2.4 mL/Hr) IV Continuous <Continuous>    MEDICATIONS  (PRN):  dextrose Gel 1 Dose(s) Oral once PRN Blood Glucose LESS THAN 70 milliGRAM(s)/deciliter  glucagon  Injectable 1 milliGRAM(s) IntraMuscular once PRN Glucose LESS THAN 70 milligrams/deciliter  LORazepam   Injectable 2 milliGRAM(s) IV Push every 2 hours PRN Symptom-triggered: 2 point increase in CIWA -Ar score and a total score of 7 or LESS        CAPILLARY BLOOD GLUCOSE      POCT Blood Glucose.: 156 mg/dL (10 Chao 2018 21:18)  POCT Blood Glucose.: 177 mg/dL (10 Chao 2018 17:41)  POCT Blood Glucose.: 201 mg/dL (10 Chao 2018 12:16)  POCT Blood Glucose.: 179 mg/dL (10 Chao 2018 07:56)  POCT Blood Glucose.: 168 mg/dL (09 Jan 2018 22:23)    I&O's Summary    09 Jan 2018 07:01  -  10 Hcao 2018 07:00  --------------------------------------------------------  IN: 6159.2 mL / OUT: 4060 mL / NET: 2099.2 mL    10 Chao 2018 07:01  -  10 Chao 2018 21:32  --------------------------------------------------------  IN: 1952.3 mL / OUT: 1300 mL / NET: 652.3 mL        PHYSICAL EXAM  GENERAL: NAD, well-developed  HEAD:  Atraumatic, Normocephalic  EYES: EOMI, PERRLA, conjunctiva and sclera clear  NECK: Supple, No JVD  CHEST/LUNG: Clear to auscultation bilaterally; No wheeze  HEART: Regular rate and rhythm; No murmurs, rubs, or gallops  ABDOMEN: Soft, Nontender, Nondistended; Bowel sounds present  EXTREMITIES:  2+ Peripheral Pulses, No clubbing, cyanosis, or edema  PSYCH: AAOx3  SKIN: No rashes or lesions    LABS:                        10.2   10.6  )-----------( 133      ( 10 Chao 2018 19:04 )             32.2     01-10    134<L>  |  105  |  14  ----------------------------<  216<H>  3.8   |  21<L>  |  0.44<L>    Ca    7.5<L>      10 Chao 2018 13:18  Phos  2.2     01-10  Mg     2.0     01-10    TPro  5.3<L>  /  Alb  1.8<L>  /  TBili  0.5  /  DBili  x   /  AST  34  /  ALT  32  /  AlkPhos  98  01-10    PT/INR - ( 10 Chao 2018 03:38 )   PT: 12.5 sec;   INR: 1.15 ratio         PTT - ( 10 Chao 2018 19:04 )  PTT:81.1 sec  CARDIAC MARKERS ( 09 Jan 2018 05:37 )  x     / 0.33 ng/mL / 461 U/L / x     / 7.2 ng/mL  CARDIAC MARKERS ( 09 Jan 2018 00:31 )  x     / 0.38 ng/mL / 633 U/L / x     / 8.7 ng/mL      Urinalysis Basic - ( 09 Jan 2018 01:19 )    Color: Yellow / Appearance: Clear / SG: >1.030 / pH: x  Gluc: x / Ketone: Trace  / Bili: Negative / Urobili: 8   Blood: x / Protein: Trace / Nitrite: Negative   Leuk Esterase: Large / RBC: 0-2 /HPF / WBC >50 /HPF   Sq Epi: x / Non Sq Epi: x / Bacteria: Few /HPF      A/P: 72 year-old male with past medical history of T2DM, DLD, alcohol misuse brought in by family after having been found face down on the floor for approximately 2 days brought in for A-fib with RVR (170s bpm) s/p failed DCCV found in severe sepsis requiring pressor support a/w NSTEMI likely 2/2 demand.  - Currently in NSR  - Discontinue heparin gtt this morning  - Cont vanc/zosyn  - C/w DAPT, statin. Will start BB and ACE when BP tolerates   - Lantus qhs, humalog AC, titrate as needed  - wean off of pressors as tolerated. AM cortisol  - Eventual LHC when medically optimized **************************************  MIDNIGHT ROUNDS  MIREYA MARTINEZ MD, PGY-2  SPECTRE# [id number]  **************************************  Patient is a 72y old  Male who presents with a chief complaint of Fall (09 Jan 2018 00:52)    HPI:  Patient is a 72 year-old male with past medical history of T2DM, DLD, alcohol misuse brought in by family after having been found face down on the floor for approximately 2 days. Patient was currently residing alone on one floor of the house (wife away in [place]) while other family members including a son live on other floors within the same house. He was not checked upon for 2 days. Patient reports multiple recent episodes of falls at home due to physical instability". He reports chronic alcohol use, which family states ranges from 6-12 beers almost daily. He has been more lethargic lately, with forgetfulness and disorientationHe also endorses fever, chills, dysuria and urinary frequency. Patient denies SOB, chest pain, back pain, diarrhea, melena/hematochezia, recent travel, sick contact or surgery. Patient is originally from [place] and has been in the  for 9 years.     ED course: Initial EKG noted for IW ST-elevations. Patient was hypotensive to 70/40 with HR in 170bpm with Atrial fibrillation + RVR on EKG. Labs notable for WBC 26K, lac 5.2 and troponin 0.6. Given IV NS blus x 5L and multiple DCCV with temporary resumption of NSR in 80s bpm before return to atrial fibrillation. (09 Jan 2018 00:52)      SUBJECTIVE / OVERNIGHT EVENTS: No overnight events.      MEDICATIONS  (STANDING):  aspirin  chewable 81 milliGRAM(s) Oral daily  atorvastatin 80 milliGRAM(s) Oral at bedtime  dextrose 5%. 1000 milliLiter(s) (50 mL/Hr) IV Continuous <Continuous>  dextrose 50% Injectable 12.5 Gram(s) IV Push once  dextrose 50% Injectable 25 Gram(s) IV Push once  dextrose 50% Injectable 25 Gram(s) IV Push once  finasteride 5 milliGRAM(s) Oral daily  folic acid 1 milliGRAM(s) Oral daily  heparin  Infusion. 900 Unit(s)/Hr (9 mL/Hr) IV Continuous <Continuous>  influenza   Vaccine 0.5 milliLiter(s) IntraMuscular once  insulin glargine Injectable (LANTUS) 6 Unit(s) SubCutaneous at bedtime  insulin lispro (HumaLOG) corrective regimen sliding scale   SubCutaneous three times a day before meals  insulin lispro (HumaLOG) corrective regimen sliding scale   SubCutaneous at bedtime  insulin lispro Injectable (HumaLOG) 3 Unit(s) SubCutaneous three times a day with meals  multivitamin 1 Tablet(s) Oral daily  pantoprazole    Tablet 40 milliGRAM(s) Oral before breakfast  piperacillin/tazobactam IVPB. 3.375 Gram(s) IV Intermittent every 8 hours  tamsulosin 0.4 milliGRAM(s) Oral at bedtime  thiamine 100 milliGRAM(s) Oral daily  ticagrelor 90 milliGRAM(s) Oral two times a day  vancomycin  IVPB 1000 milliGRAM(s) IV Intermittent every 12 hours  vasopressin Infusion 0.04 Unit(s)/Min (2.4 mL/Hr) IV Continuous <Continuous>    MEDICATIONS  (PRN):  dextrose Gel 1 Dose(s) Oral once PRN Blood Glucose LESS THAN 70 milliGRAM(s)/deciliter  glucagon  Injectable 1 milliGRAM(s) IntraMuscular once PRN Glucose LESS THAN 70 milligrams/deciliter  LORazepam   Injectable 2 milliGRAM(s) IV Push every 2 hours PRN Symptom-triggered: 2 point increase in CIWA -Ar score and a total score of 7 or LESS        CAPILLARY BLOOD GLUCOSE      POCT Blood Glucose.: 156 mg/dL (10 Chao 2018 21:18)  POCT Blood Glucose.: 177 mg/dL (10 Chao 2018 17:41)  POCT Blood Glucose.: 201 mg/dL (10 Chao 2018 12:16)  POCT Blood Glucose.: 179 mg/dL (10 Chao 2018 07:56)  POCT Blood Glucose.: 168 mg/dL (09 Jan 2018 22:23)    I&O's Summary    09 Jan 2018 07:01  -  10 Chao 2018 07:00  --------------------------------------------------------  IN: 6159.2 mL / OUT: 4060 mL / NET: 2099.2 mL    10 Chao 2018 07:01  -  10 Chao 2018 21:32  --------------------------------------------------------  IN: 1952.3 mL / OUT: 1300 mL / NET: 652.3 mL        PHYSICAL EXAM  GENERAL: NAD, well-developed  HEAD:  Atraumatic, Normocephalic  EYES: EOMI, PERRLA, conjunctiva and sclera clear  NECK: Supple, No JVD  CHEST/LUNG: Clear to auscultation bilaterally; No wheeze  HEART: Regular rate and rhythm; No murmurs, rubs, or gallops  ABDOMEN: Soft, Nontender, Nondistended; Bowel sounds present  EXTREMITIES:  2+ Peripheral Pulses, No clubbing, cyanosis, or edema  PSYCH: AAOx3  SKIN: No rashes or lesions    LABS:                        10.2   10.6  )-----------( 133      ( 10 Chao 2018 19:04 )             32.2     01-10    134<L>  |  105  |  14  ----------------------------<  216<H>  3.8   |  21<L>  |  0.44<L>    Ca    7.5<L>      10 Chao 2018 13:18  Phos  2.2     01-10  Mg     2.0     01-10    TPro  5.3<L>  /  Alb  1.8<L>  /  TBili  0.5  /  DBili  x   /  AST  34  /  ALT  32  /  AlkPhos  98  01-10    PT/INR - ( 10 Chao 2018 03:38 )   PT: 12.5 sec;   INR: 1.15 ratio         PTT - ( 10 Chao 2018 19:04 )  PTT:81.1 sec  CARDIAC MARKERS ( 09 Jan 2018 05:37 )  x     / 0.33 ng/mL / 461 U/L / x     / 7.2 ng/mL  CARDIAC MARKERS ( 09 Jan 2018 00:31 )  x     / 0.38 ng/mL / 633 U/L / x     / 8.7 ng/mL      Urinalysis Basic - ( 09 Jan 2018 01:19 )    Color: Yellow / Appearance: Clear / SG: >1.030 / pH: x  Gluc: x / Ketone: Trace  / Bili: Negative / Urobili: 8   Blood: x / Protein: Trace / Nitrite: Negative   Leuk Esterase: Large / RBC: 0-2 /HPF / WBC >50 /HPF   Sq Epi: x / Non Sq Epi: x / Bacteria: Few /HPF      A/P: 72 year-old male with past medical history of T2DM, DLD, alcohol misuse brought in by family after having been found face down on the floor for approximately 2 days brought in for A-fib with RVR (170s bpm) s/p failed DCCV found in severe sepsis requiring pressor support a/w NSTEMI likely 2/2 demand.  - Currently in NSR  - Discontinue heparin gtt this morning since 48hrs for NSTEMI  - NPO for JANES this morning  - Cont vanc/zosyn  - C/w DAPT, statin. Will start BB and ACE when BP tolerates   - Lantus qhs, humalog AC, titrate as needed  - wean off of pressors as tolerated. AM cortisol  - Eventual LHC when medically optimized

## 2018-01-10 NOTE — DIETITIAN INITIAL EVALUATION ADULT. - PROBLEM SELECTOR PLAN 5
-Extensive history of drinking 6-12 cans of beer daily  -No evidence of fasciculations or tremors on exam  - CIWA protocol, symptom-triggered ativan

## 2018-01-10 NOTE — DIETITIAN INITIAL EVALUATION ADULT. - PERTINENT MEDS FT
phenylephrine, pitressin, lantus, insulin sliding scale, protonix, multivitamin, thiamine, folic acid

## 2018-01-10 NOTE — DIETITIAN INITIAL EVALUATION ADULT. - PROBLEM SELECTOR PLAN 1
-Patient presented in A-fib with RVR in 180s with BP 70/30 underwent cardioversion multiple times with only temporary resumption of NSR.  -Given troponin elevations, can possible be 2/2 primary cardiac event, although more likely due to sepsis vs alcohol misuse  -Digoxin load 0.5mg followed by 0.25mg x 2 doses, each 6 hours apart  -Amiodarone 150mg IVP bolus x 1  -Heparin drip for anticoagulation CHADSVASC=2 (Age, T2DM)  -Follow up TSH level.

## 2018-01-10 NOTE — PROGRESS NOTE ADULT - SUBJECTIVE AND OBJECTIVE BOX
Patient is a 72y old  Male who presents with a chief complaint of Fall (09 Jan 2018 00:52)      Events 	    MEDICATIONS  (STANDING):  aspirin  chewable 81 milliGRAM(s) Oral daily  atorvastatin 80 milliGRAM(s) Oral at bedtime  dextrose 5%. 1000 milliLiter(s) (50 mL/Hr) IV Continuous <Continuous>  dextrose 50% Injectable 12.5 Gram(s) IV Push once  dextrose 50% Injectable 25 Gram(s) IV Push once  dextrose 50% Injectable 25 Gram(s) IV Push once  finasteride 5 milliGRAM(s) Oral daily  folic acid 1 milliGRAM(s) Oral daily  heparin  Infusion.  Unit(s)/Hr (13 mL/Hr) IV Continuous <Continuous>  influenza   Vaccine 0.5 milliLiter(s) IntraMuscular once  insulin glargine Injectable (LANTUS) 11 Unit(s) SubCutaneous at bedtime  insulin lispro (HumaLOG) corrective regimen sliding scale   SubCutaneous three times a day before meals  insulin lispro (HumaLOG) corrective regimen sliding scale   SubCutaneous at bedtime  insulin lispro Injectable (HumaLOG) 2 Unit(s) SubCutaneous three times a day with meals  multivitamin 1 Tablet(s) Oral daily  pantoprazole    Tablet 40 milliGRAM(s) Oral before breakfast  phenylephrine    Infusion 0.4 MICROgram(s)/kG/Min (10.5 mL/Hr) IV Continuous <Continuous>  piperacillin/tazobactam IVPB. 3.375 Gram(s) IV Intermittent every 8 hours  sodium chloride 0.9%. 1000 milliLiter(s) (250 mL/Hr) IV Continuous <Continuous>  tamsulosin 0.4 milliGRAM(s) Oral at bedtime  thiamine 100 milliGRAM(s) Oral daily  ticagrelor 90 milliGRAM(s) Oral two times a day  vancomycin  IVPB 1000 milliGRAM(s) IV Intermittent every 12 hours    MEDICATIONS  (PRN):  dextrose Gel 1 Dose(s) Oral once PRN Blood Glucose LESS THAN 70 milliGRAM(s)/deciliter  glucagon  Injectable 1 milliGRAM(s) IntraMuscular once PRN Glucose LESS THAN 70 milligrams/deciliter  heparin  Injectable 6000 Unit(s) IV Push every 6 hours PRN For aPTT less than 40  heparin  Injectable 3000 Unit(s) IV Push every 6 hours PRN For aPTT between 40 - 57  LORazepam   Injectable 2 milliGRAM(s) IV Push every 2 hours PRN Symptom-triggered: 2 point increase in CIWA -Ar score and a total score of 7 or LESS      REVIEW OF SYSTEMS:  Otherwise, 10 point ROS done and otherwise negative.      Vital Signs Last 24 Hrs  T(C): 36.7 (10 Chao 2018 07:45), Max: 36.9 (09 Jan 2018 20:00)  T(F): 98 (10 Chao 2018 07:45), Max: 98.4 (09 Jan 2018 20:00)  HR: 88 (10 Chao 2018 10:15) (68 - 94)  BP: 94/53 (10 Chao 2018 10:15) (81/38 - 134/66)  BP(mean): 69 (10 Chao 2018 10:15) (53 - 103)  RR: 19 (10 Chao 2018 10:15) (15 - 24)  SpO2: 97% (10 Chao 2018 10:15) (97% - 100%)  I&O's Summary    09 Jan 2018 07:01  -  10 Chao 2018 07:00  --------------------------------------------------------  IN: 6159.2 mL / OUT: 4060 mL / NET: 2099.2 mL    10 Chao 2018 07:01  -  10 Chao 2018 11:28  --------------------------------------------------------  IN: 156.8 mL / OUT: 300 mL / NET: -143.2 mL      CAPILLARY BLOOD GLUCOSE      POCT Blood Glucose.: 179 mg/dL (10 Chao 2018 07:56)  POCT Blood Glucose.: 168 mg/dL (09 Jan 2018 22:23)  POCT Blood Glucose.: 185 mg/dL (09 Jan 2018 17:11)  POCT Blood Glucose.: 269 mg/dL (09 Jan 2018 11:44)        PHYSICAL EXAM:  Appearance: Normal	  HEENT:   Normal oral mucosa, PERRL, EOMI	  Lymphatic: No lymphadenopathy  Cardiovascular: Normal S1 S2, No JVD, No murmurs, No edema  Respiratory: Lungs clear to auscultation	  Psychiatry: A & O x 3, Mood & affect appropriate  Gastrointestinal:  Soft, Non-tender, + BS	  Skin: No rashes, No ecchymoses, No cyanosis	  Neurologic: Non-focal  Extremities: Normal range of motion, No clubbing, cyanosis or edema  Vascular: Peripheral pulses palpable 2+ bilaterally    LABS:                        11.5   16.3  )-----------( 131      ( 10 Chao 2018 03:38 )             34.5                         12.5   26.1  )-----------( 133      ( 09 Jan 2018 14:07 )             37.6     01-10    134<L>  |  105  |  17  ----------------------------<  150<H>  3.5   |  21<L>  |  0.44<L>  01-09    135  |  105  |  20  ----------------------------<  210<H>  4.0   |  20<L>  |  0.43<L>    Ca    7.4<L>      10 Chao 2018 03:38  Ca    7.5<L>      09 Jan 2018 14:07  Phos  2.0     01-10  Mg     1.9     01-10    TPro  5.1<L>  /  Alb  1.7<L>  /  TBili  0.5  /  DBili  x   /  AST  35  /  ALT  29  /  AlkPhos  99  01-10  TPro  5.2<L>  /  Alb  2.0<L>  /  TBili  0.4  /  DBili  x   /  AST  40  /  ALT  33  /  AlkPhos  126<H>  01-09  	 	    PT/INR - ( 10 Chao 2018 03:38 )   PT: 12.5 sec;   INR: 1.15 ratio         PTT - ( 10 Chao 2018 03:38 )  PTT:67.9 sec    proBNP:   01-09 Chol 103 LDL 67 HDL 16<L> Trig 102  HgA1c:   TSH:      09 Jan 2018 05:37 Troponin 0.33 ng/mL /  U/L / CKMB x     / CKMB Units 7.2 ng/mL   09 Jan 2018 00:31 Troponin 0.38 ng/mL /  U/L / CKMB x     / CKMB Units 8.7 ng/mL   08 Jan 2018 17:43 Troponin 0.60 ng/mL /  U/L / CKMB x     / CKMB Units 13.4 ng/mL        TELEMETRY: 	    ECG:  	  RADIOLOGY:  OTHER: 	    PREVIOUS DIAGNOSTIC TESTING:    [ ] Echocardiogram:  [ ]  Catheterization:  [ ] Stress Test: Patient is a 72y old  Male who presents with a chief complaint of Fall (09 Jan 2018 00:52)      Events 	  Patient with persistent hypotension ON.     MEDICATIONS  (STANDING):  aspirin  chewable 81 milliGRAM(s) Oral daily  atorvastatin 80 milliGRAM(s) Oral at bedtime  dextrose 5%. 1000 milliLiter(s) (50 mL/Hr) IV Continuous <Continuous>  dextrose 50% Injectable 12.5 Gram(s) IV Push once  dextrose 50% Injectable 25 Gram(s) IV Push once  dextrose 50% Injectable 25 Gram(s) IV Push once  finasteride 5 milliGRAM(s) Oral daily  folic acid 1 milliGRAM(s) Oral daily  heparin  Infusion.  Unit(s)/Hr (13 mL/Hr) IV Continuous <Continuous>  influenza   Vaccine 0.5 milliLiter(s) IntraMuscular once  insulin glargine Injectable (LANTUS) 11 Unit(s) SubCutaneous at bedtime  insulin lispro (HumaLOG) corrective regimen sliding scale   SubCutaneous three times a day before meals  insulin lispro (HumaLOG) corrective regimen sliding scale   SubCutaneous at bedtime  insulin lispro Injectable (HumaLOG) 2 Unit(s) SubCutaneous three times a day with meals  multivitamin 1 Tablet(s) Oral daily  pantoprazole    Tablet 40 milliGRAM(s) Oral before breakfast  phenylephrine    Infusion 0.4 MICROgram(s)/kG/Min (10.5 mL/Hr) IV Continuous <Continuous>  piperacillin/tazobactam IVPB. 3.375 Gram(s) IV Intermittent every 8 hours  sodium chloride 0.9%. 1000 milliLiter(s) (250 mL/Hr) IV Continuous <Continuous>  tamsulosin 0.4 milliGRAM(s) Oral at bedtime  thiamine 100 milliGRAM(s) Oral daily  ticagrelor 90 milliGRAM(s) Oral two times a day  vancomycin  IVPB 1000 milliGRAM(s) IV Intermittent every 12 hours    MEDICATIONS  (PRN):  dextrose Gel 1 Dose(s) Oral once PRN Blood Glucose LESS THAN 70 milliGRAM(s)/deciliter  glucagon  Injectable 1 milliGRAM(s) IntraMuscular once PRN Glucose LESS THAN 70 milligrams/deciliter  heparin  Injectable 6000 Unit(s) IV Push every 6 hours PRN For aPTT less than 40  heparin  Injectable 3000 Unit(s) IV Push every 6 hours PRN For aPTT between 40 - 57  LORazepam   Injectable 2 milliGRAM(s) IV Push every 2 hours PRN Symptom-triggered: 2 point increase in CIWA -Ar score and a total score of 7 or LESS      REVIEW OF SYSTEMS:  ROS notable for positive for non-productive cough.       Vital Signs Last 24 Hrs  T(C): 36.7 (10 Chao 2018 07:45), Max: 36.9 (09 Jan 2018 20:00)  T(F): 98 (10 Chao 2018 07:45), Max: 98.4 (09 Jan 2018 20:00)  HR: 88 (10 Chao 2018 10:15) (68 - 94)  BP: 94/53 (10 Chao 2018 10:15) (81/38 - 134/66)  BP(mean): 69 (10 Chao 2018 10:15) (53 - 103)  RR: 19 (10 Chao 2018 10:15) (15 - 24)  SpO2: 97% (10 Chao 2018 10:15) (97% - 100%)  I&O's Summary    09 Jan 2018 07:01  -  10 Chao 2018 07:00  --------------------------------------------------------  IN: 6159.2 mL / OUT: 4060 mL / NET: 2099.2 mL    10 Chao 2018 07:01  -  10 Chao 2018 11:28  --------------------------------------------------------  IN: 156.8 mL / OUT: 300 mL / NET: -143.2 mL      CAPILLARY BLOOD GLUCOSE      POCT Blood Glucose.: 179 mg/dL (10 Chao 2018 07:56)  POCT Blood Glucose.: 168 mg/dL (09 Jan 2018 22:23)  POCT Blood Glucose.: 185 mg/dL (09 Jan 2018 17:11)  POCT Blood Glucose.: 269 mg/dL (09 Jan 2018 11:44)        PHYSICAL EXAM:  Appearance: Normal	  HEENT:   Normal oral mucosa, PERRL, EOMI	  Lymphatic: No lymphadenopathy  Cardiovascular: Normal S1 S2, No JVD, No murmurs, No edema  Respiratory: Lungs clear to auscultation	  Psychiatry: A & O x 3, Mood & affect appropriate  Gastrointestinal:  Soft, mild tenderness to palpation  Skin: No rashes, No ecchymoses, No cyanosis	  Neurologic: Non-focal  Extremities: Normal range of motion, No clubbing, cyanosis or edema  Vascular: Peripheral pulses palpable 2+ bilaterally    LABS:                        11.5   16.3  )-----------( 131      ( 10 Chao 2018 03:38 )             34.5                         12.5   26.1  )-----------( 133      ( 09 Jan 2018 14:07 )             37.6     01-10    134<L>  |  105  |  17  ----------------------------<  150<H>  3.5   |  21<L>  |  0.44<L>  01-09    135  |  105  |  20  ----------------------------<  210<H>  4.0   |  20<L>  |  0.43<L>    Ca    7.4<L>      10 Chao 2018 03:38  Ca    7.5<L>      09 Jan 2018 14:07  Phos  2.0     01-10  Mg     1.9     01-10    TPro  5.1<L>  /  Alb  1.7<L>  /  TBili  0.5  /  DBili  x   /  AST  35  /  ALT  29  /  AlkPhos  99  01-10  TPro  5.2<L>  /  Alb  2.0<L>  /  TBili  0.4  /  DBili  x   /  AST  40  /  ALT  33  /  AlkPhos  126<H>  01-09  	 	    PT/INR - ( 10 Chao 2018 03:38 )   PT: 12.5 sec;   INR: 1.15 ratio         PTT - ( 10 Chao 2018 03:38 )  PTT:67.9 sec    proBNP:   01-09 Chol 103 LDL 67 HDL 16<L> Trig 102  HgA1c:   TSH:      09 Jan 2018 05:37 Troponin 0.33 ng/mL /  U/L / CKMB x     / CKMB Units 7.2 ng/mL   09 Jan 2018 00:31 Troponin 0.38 ng/mL /  U/L / CKMB x     / CKMB Units 8.7 ng/mL   08 Jan 2018 17:43 Troponin 0.60 ng/mL /  U/L / CKMB x     / CKMB Units 13.4 ng/mL        TELEMETRY: 	    ECG:  	  RADIOLOGY:  OTHER: 	    PREVIOUS DIAGNOSTIC TESTING:    [ ] Echocardiogram:  [ ]  Catheterization:  [ ] Stress Test:

## 2018-01-10 NOTE — DIETITIAN INITIAL EVALUATION ADULT. - PROBLEM SELECTOR PLAN 7
-Hyperglycemia on presentation, no evidence of anion gap on labs  -A1c  -FS + ISS AC and qhs  -Diabetic diet

## 2018-01-10 NOTE — DIETITIAN INITIAL EVALUATION ADULT. - NS AS NUTRI INTERV MEALS SNACK
Recommend diet change to Consistent Carbohydrate with evening snack DASH, discussed with CCU team. Reviewed alternate menu options and menu ordering procedure. Provide food preferences within therapeutic diet when requested. Encouraged pt to increase po intake of meals as tolerated and discussed importance of adequate nutrition intake.

## 2018-01-10 NOTE — DIETITIAN INITIAL EVALUATION ADULT. - ADHERENCE
poor/Pt with T2DM admitted with HgbA1c 9.1%, states he avoids concentrated sweets, no other diet modification has been made. Pt also with EtOH abuse according to chart, drinking 6-12 beers daily.

## 2018-01-10 NOTE — DIETITIAN INITIAL EVALUATION ADULT. - PROBLEM SELECTOR PLAN 2
-No ST elevations on EKG, making troponins. Likely due to sepsis  -ASA and brilinta load followed by daily maintenance.  -Starting lipitor 80mg  -Introduce BB and ACE when appropriate  -Repeat TTE when rate controlled

## 2018-01-10 NOTE — DIETITIAN INITIAL EVALUATION ADULT. - ENERGY NEEDS
Height: 67 inches, Weight: 166 pounds  BMI: 26 kg/m2 IBW: 148 pounds (+/-10%), %IBW: 112%  Pertinent Info: Per chart, 73 y/o male with PMH of T2DM, DLD, EtOH abuse, found face down for 2 days,, lethargic/forgetful and disoriented. Pt found to be hemodynamically unstable and with severe sepsis, metabolic encephalopathy, NSTEMI, Afib with RVR s/p multiple cardioversion and needing pressor support, NPO midnight today for JANES tomorrow. Current CIWA score is 0. No edema, no pressure ulcers noted at this time.

## 2018-01-10 NOTE — DIETITIAN INITIAL EVALUATION ADULT. - OTHER INFO
Nutrition consult received for HgbA1c 9.1%. Pt initially unable to provide UBW, stated 'I don't know, around 200-250 pounds?' reports some wt loss 2/2 decreased po intakes since 2-3 weeks PTA, but unable to elaborate and provide more detailed information regarding wt history. Suspect pt may have had some wt loss but questionable if pt lost close to  pounds wt loss in 2-3 weeks timeframe.  Pt's current dosing wt is 166 pounds and wt today 162.7 pounds. Pt reports improving appetite, noted fluctuating po intakes between 20-75% per flowsheet. Pt denies GI distress but reports last BM 'last week'. Pt denies chewing/swallowing difficulties. NKFA. PTA takes 'eye vitamin', vitamin D, and vitamin B12. Pt with uncontrolled T2DM, on Metformin and 2 other po meds which pt unable to identify at this time. Pt does not check fingersticks at home regularly, but gets them checked 2 x week at the [facility] with usual readings around 197-208mg/dL.

## 2018-01-10 NOTE — PROGRESS NOTE ADULT - ATTENDING COMMENTS
Patient is seen and examined with fellow, NP and the CCU house-staff. I agree with the history, physical and the assessment and plan.  wean pressor off to maintain SBP >90  chest CT  NPO post MN for JANES  IVF bolus

## 2018-01-10 NOTE — DIETITIAN INITIAL EVALUATION ADULT. - PROBLEM SELECTOR PLAN 6
-Likely causing patient to fall, due to hypotension from sepsis vs A-fib  -CTH head negative for blees. Likely 2/2 severe sepsis vs rapid A-fib/hypotension  -C/w CTX 1g daily  -Optimize rate control

## 2018-01-10 NOTE — DIETITIAN INITIAL EVALUATION ADULT. - PROBLEM SELECTOR PLAN 4
-Complicated UTI in this male patient in sepsis  -CTX 1g daily (day#2) out of 7-day course  -Follow up urine cultures.

## 2018-01-10 NOTE — PROGRESS NOTE ADULT - SUBJECTIVE AND OBJECTIVE BOX
Was called for consult by team.  Went to see pt, and he was exiting room for CT scan.  Spoke with nurse and team.  Team indicated this is not an urgent consult and can be deferred to the AM.  Sugars 100-200s on 11 units glargine qHS (also had 8 units given yesterday AM but sugars today reflective of having 11 units in his system).  Team already increased to 13G and added 3 H which I agree wtih.  Recomend low SSI.  Will see at later juncture.  A1c 9.1.

## 2018-01-10 NOTE — PROGRESS NOTE ADULT - ASSESSMENT
Patient is a 72 year-old male with past medical history of T2DM, DLD, alcohol misuse brought in by family after having been found face down on the floor for approximately 2 days found in hemodynamically unstable A-fib with RVR (170s bpm) s/p multiple cardioversions and requiring phenylephrine for pressor support.     Neuro:   -Altered Mental Status/metabolic encephalopathy, in setting of severe sepsis, likely leading to fall  -CT head negative for bleed  -Abx management with Vanc/Zosyn for broad spectrum coverage  -[facility] protocol for alcohol misuse; current score of 1     Pulmonary:   -CT Chest notable for atelectasis/trace pleural effusions bilaterally  -Patient endorsing cough today  -Chest CT to evaluate for potential infiltrates    Cardiovascular:   -Given unknown etiology for sepsis, concern for hidden endocarditis; NPO after midnight for JANES tomorrow  -EKG with significant aortic insufficiency  -Wean phenylephrine given aortic insufficiency, use vasopressin for pressor support to maintain SBP > 90 if required  -f/u RPR  -Afib with RVR (HR 80s); underwent cardioversion in ED several times unsuccessfully; now with spontaneous conversion to NSR  -Heparin gtt to be d/franklin tonight at midnight  -ASA and brilinta load followed by daily maintenance for now  -Lipitor 80 mg  -f/u Pro BNP       GI:   -Start bowel regimen for constipation  -Carb controlled diet    /Renal:   -Grossly positive u/a, + leuk esterase, negative nitrites  -Patient has hx of BPH; on home flomax and finasteride  -CT A/P with contrast without evidence of pyelonephritis  -Cystitis vs prostatitis; on broad spectrum abx as above  -f/u urine cx     ID:   -Severe sepsis (leukocytosis, tachycardia, lactic acidosis, hypotension) s/p IVF x 6L, on phenylephrine  -GC/Chlamydia negative, HIV negative  -f/u RPR  -JANES to eval for endocarditis  - source likely given grossly positive U/a, cystitis vs prostatitis  -On Vanc/Zosyn  -U cx negative, Blood cx x 2 with NGTD    Endo:   -On home metformin   -A1C 9.2 with FS elevated to 300s  -Started lantus 11 u QHS and humalog 2 u with meals   -monitor FS    Hematology  -Leukocytosis downtrending to 16  -f/u on diff to eval for resolution of bandemia  -Abx as above  -trend daily cbc

## 2018-01-11 DIAGNOSIS — R79.89 OTHER SPECIFIED ABNORMAL FINDINGS OF BLOOD CHEMISTRY: ICD-10-CM

## 2018-01-11 DIAGNOSIS — E11.65 TYPE 2 DIABETES MELLITUS WITH HYPERGLYCEMIA: ICD-10-CM

## 2018-01-11 DIAGNOSIS — I33.0 ACUTE AND SUBACUTE INFECTIVE ENDOCARDITIS: ICD-10-CM

## 2018-01-11 LAB
ALBUMIN SERPL ELPH-MCNC: 1.9 G/DL — LOW (ref 3.3–5)
ALP SERPL-CCNC: 89 U/L — SIGNIFICANT CHANGE UP (ref 40–120)
ALT FLD-CCNC: 29 U/L RC — SIGNIFICANT CHANGE UP (ref 10–45)
ANION GAP SERPL CALC-SCNC: 10 MMOL/L — SIGNIFICANT CHANGE UP (ref 5–17)
APTT BLD: 34 SEC — SIGNIFICANT CHANGE UP (ref 27.5–37.4)
AST SERPL-CCNC: 33 U/L — SIGNIFICANT CHANGE UP (ref 10–40)
BILIRUB SERPL-MCNC: 0.4 MG/DL — SIGNIFICANT CHANGE UP (ref 0.2–1.2)
BUN SERPL-MCNC: 9 MG/DL — SIGNIFICANT CHANGE UP (ref 7–23)
CALCIUM SERPL-MCNC: 7.5 MG/DL — LOW (ref 8.4–10.5)
CHLORIDE SERPL-SCNC: 98 MMOL/L — SIGNIFICANT CHANGE UP (ref 96–108)
CO2 SERPL-SCNC: 22 MMOL/L — SIGNIFICANT CHANGE UP (ref 22–31)
CORTIS AM PEAK SERPL-MCNC: 20.7 UG/DL — HIGH (ref 6–18.4)
CREAT SERPL-MCNC: 0.44 MG/DL — LOW (ref 0.5–1.3)
GLUCOSE BLDC GLUCOMTR-MCNC: 125 MG/DL — HIGH (ref 70–99)
GLUCOSE BLDC GLUCOMTR-MCNC: 184 MG/DL — HIGH (ref 70–99)
GLUCOSE BLDC GLUCOMTR-MCNC: 98 MG/DL — SIGNIFICANT CHANGE UP (ref 70–99)
GLUCOSE SERPL-MCNC: 157 MG/DL — HIGH (ref 70–99)
HCT VFR BLD CALC: 31 % — LOW (ref 39–50)
HGB BLD-MCNC: 11 G/DL — LOW (ref 13–17)
INR BLD: 1.05 RATIO — SIGNIFICANT CHANGE UP (ref 0.88–1.16)
LACTATE SERPL-SCNC: 1 MMOL/L — SIGNIFICANT CHANGE UP (ref 0.7–2)
MAGNESIUM SERPL-MCNC: 1.7 MG/DL — SIGNIFICANT CHANGE UP (ref 1.6–2.6)
MCHC RBC-ENTMCNC: 33.3 PG — SIGNIFICANT CHANGE UP (ref 27–34)
MCHC RBC-ENTMCNC: 35.4 GM/DL — SIGNIFICANT CHANGE UP (ref 32–36)
MCV RBC AUTO: 94.2 FL — SIGNIFICANT CHANGE UP (ref 80–100)
NT-PROBNP SERPL-SCNC: 3078 PG/ML — HIGH (ref 0–300)
PHOSPHATE SERPL-MCNC: 2.7 MG/DL — SIGNIFICANT CHANGE UP (ref 2.5–4.5)
PLATELET # BLD AUTO: 136 K/UL — LOW (ref 150–400)
POTASSIUM SERPL-MCNC: 3.8 MMOL/L — SIGNIFICANT CHANGE UP (ref 3.5–5.3)
POTASSIUM SERPL-SCNC: 3.8 MMOL/L — SIGNIFICANT CHANGE UP (ref 3.5–5.3)
PROT SERPL-MCNC: 5.5 G/DL — LOW (ref 6–8.3)
PROTHROM AB SERPL-ACNC: 11.5 SEC — SIGNIFICANT CHANGE UP (ref 9.8–12.7)
RBC # BLD: 3.29 M/UL — LOW (ref 4.2–5.8)
RBC # FLD: 11.8 % — SIGNIFICANT CHANGE UP (ref 10.3–14.5)
SODIUM SERPL-SCNC: 130 MMOL/L — LOW (ref 135–145)
VANCOMYCIN TROUGH SERPL-MCNC: 5.4 UG/ML — LOW (ref 10–20)
WBC # BLD: 11.3 K/UL — HIGH (ref 3.8–10.5)
WBC # FLD AUTO: 11.3 K/UL — HIGH (ref 3.8–10.5)

## 2018-01-11 PROCEDURE — 99223 1ST HOSP IP/OBS HIGH 75: CPT

## 2018-01-11 PROCEDURE — 93325 DOPPLER ECHO COLOR FLOW MAPG: CPT | Mod: 26

## 2018-01-11 PROCEDURE — 93320 DOPPLER ECHO COMPLETE: CPT | Mod: 26

## 2018-01-11 PROCEDURE — 93010 ELECTROCARDIOGRAM REPORT: CPT

## 2018-01-11 PROCEDURE — 93312 ECHO TRANSESOPHAGEAL: CPT | Mod: 26

## 2018-01-11 RX ORDER — VANCOMYCIN HCL 1 G
1250 VIAL (EA) INTRAVENOUS EVERY 8 HOURS
Qty: 0 | Refills: 0 | Status: DISCONTINUED | OUTPATIENT
Start: 2018-01-11 | End: 2018-01-13

## 2018-01-11 RX ORDER — POTASSIUM CHLORIDE 20 MEQ
40 PACKET (EA) ORAL ONCE
Qty: 0 | Refills: 0 | Status: COMPLETED | OUTPATIENT
Start: 2018-01-11 | End: 2018-01-11

## 2018-01-11 RX ORDER — MAGNESIUM SULFATE 500 MG/ML
2 VIAL (ML) INJECTION ONCE
Qty: 0 | Refills: 0 | Status: DISCONTINUED | OUTPATIENT
Start: 2018-01-11 | End: 2018-01-11

## 2018-01-11 RX ORDER — INSULIN GLARGINE 100 [IU]/ML
12 INJECTION, SOLUTION SUBCUTANEOUS AT BEDTIME
Qty: 0 | Refills: 0 | Status: DISCONTINUED | OUTPATIENT
Start: 2018-01-11 | End: 2018-01-26

## 2018-01-11 RX ORDER — MAGNESIUM SULFATE 500 MG/ML
2 VIAL (ML) INJECTION ONCE
Qty: 0 | Refills: 0 | Status: COMPLETED | OUTPATIENT
Start: 2018-01-11 | End: 2018-01-11

## 2018-01-11 RX ORDER — POLYETHYLENE GLYCOL 3350 17 G/17G
17 POWDER, FOR SOLUTION ORAL DAILY
Qty: 0 | Refills: 0 | Status: DISCONTINUED | OUTPATIENT
Start: 2018-01-11 | End: 2018-01-30

## 2018-01-11 RX ORDER — DOCUSATE SODIUM 100 MG
100 CAPSULE ORAL DAILY
Qty: 0 | Refills: 0 | Status: DISCONTINUED | OUTPATIENT
Start: 2018-01-11 | End: 2018-01-30

## 2018-01-11 RX ORDER — ENOXAPARIN SODIUM 100 MG/ML
40 INJECTION SUBCUTANEOUS DAILY
Qty: 0 | Refills: 0 | Status: DISCONTINUED | OUTPATIENT
Start: 2018-01-11 | End: 2018-01-26

## 2018-01-11 RX ORDER — VANCOMYCIN HCL 1 G
1250 VIAL (EA) INTRAVENOUS EVERY 12 HOURS
Qty: 0 | Refills: 0 | Status: DISCONTINUED | OUTPATIENT
Start: 2018-01-11 | End: 2018-01-11

## 2018-01-11 RX ADMIN — Medication 166.67 MILLIGRAM(S): at 18:33

## 2018-01-11 RX ADMIN — PIPERACILLIN AND TAZOBACTAM 25 GRAM(S): 4; .5 INJECTION, POWDER, LYOPHILIZED, FOR SOLUTION INTRAVENOUS at 06:37

## 2018-01-11 RX ADMIN — Medication 166.67 MILLIGRAM(S): at 05:28

## 2018-01-11 RX ADMIN — Medication 100 MILLIGRAM(S): at 16:23

## 2018-01-11 RX ADMIN — Medication 50 GRAM(S): at 04:50

## 2018-01-11 RX ADMIN — PANTOPRAZOLE SODIUM 40 MILLIGRAM(S): 20 TABLET, DELAYED RELEASE ORAL at 05:28

## 2018-01-11 RX ADMIN — PIPERACILLIN AND TAZOBACTAM 25 GRAM(S): 4; .5 INJECTION, POWDER, LYOPHILIZED, FOR SOLUTION INTRAVENOUS at 16:33

## 2018-01-11 RX ADMIN — ATORVASTATIN CALCIUM 80 MILLIGRAM(S): 80 TABLET, FILM COATED ORAL at 21:52

## 2018-01-11 RX ADMIN — INSULIN GLARGINE 12 UNIT(S): 100 INJECTION, SOLUTION SUBCUTANEOUS at 21:53

## 2018-01-11 RX ADMIN — TAMSULOSIN HYDROCHLORIDE 0.4 MILLIGRAM(S): 0.4 CAPSULE ORAL at 21:52

## 2018-01-11 RX ADMIN — Medication 1 TABLET(S): at 16:23

## 2018-01-11 RX ADMIN — Medication 1 MILLIGRAM(S): at 16:23

## 2018-01-11 RX ADMIN — TICAGRELOR 90 MILLIGRAM(S): 90 TABLET ORAL at 17:48

## 2018-01-11 RX ADMIN — PIPERACILLIN AND TAZOBACTAM 25 GRAM(S): 4; .5 INJECTION, POWDER, LYOPHILIZED, FOR SOLUTION INTRAVENOUS at 21:52

## 2018-01-11 RX ADMIN — FINASTERIDE 5 MILLIGRAM(S): 5 TABLET, FILM COATED ORAL at 16:24

## 2018-01-11 RX ADMIN — Medication 81 MILLIGRAM(S): at 16:23

## 2018-01-11 RX ADMIN — TICAGRELOR 90 MILLIGRAM(S): 90 TABLET ORAL at 05:28

## 2018-01-11 RX ADMIN — Medication 3 UNIT(S): at 17:48

## 2018-01-11 RX ADMIN — Medication 40 MILLIEQUIVALENT(S): at 05:34

## 2018-01-11 RX ADMIN — ENOXAPARIN SODIUM 40 MILLIGRAM(S): 100 INJECTION SUBCUTANEOUS at 16:32

## 2018-01-11 NOTE — CONSULT NOTE ADULT - PROBLEM SELECTOR RECOMMENDATION 3
See above re: lactic acidosis, but mgmt per primary team.  If pt stops drinking, could consider restarting metformin.    WILL SIGN OFF AS SUGARS WELL CONTROLLED AND LEFT D/C RECS ABOVE.  Please reconsult if any issues arise or sugars are CONSISTENTLY high >200 or low <70. [phone] to reconsult, p [phone]

## 2018-01-11 NOTE — PROGRESS NOTE ADULT - ASSESSMENT
Patient is a 72 year-old male with past medical history of T2DM, DLD, alcohol misuse brought in by family after having been found face down on the floor for approximately 2 days found in hemodynamically unstable A-fib with RVR (170s bpm) s/p multiple cardioversions and requiring phenylephrine for pressor support.     Neuro:   -Altered Mental Status/metabolic encephalopathy, in setting of severe sepsis, likely leading to fall  -CT head negative for bleed  -Abx management with Vanc/Zosyn for broad spectrum coverage  -[facility] protocol for alcohol misuse; current score of 1     Pulmonary:   -CT Chest notable for atelectasis/trace pleural effusions bilaterally  -Patient endorsing cough today  -Chest CT to evaluate for potential infiltrates    Cardiovascular:   -Given unknown etiology for sepsis, concern for hidden endocarditis; NPO after midnight for JANES tomorrow  -EKG with significant aortic insufficiency  -Wean phenylephrine given aortic insufficiency, use vasopressin for pressor support to maintain SBP > 90 if required  -f/u RPR  -Afib with RVR (HR 80s); underwent cardioversion in ED several times unsuccessfully; now with spontaneous conversion to NSR  -Heparin gtt to be d/franklin tonight at midnight  -ASA and brilinta load followed by daily maintenance for now  -Lipitor 80 mg  -f/u Pro BNP       GI:   -Start bowel regimen for constipation  -Carb controlled diet    /Renal:   -Grossly positive u/a, + leuk esterase, negative nitrites  -Patient has hx of BPH; on home flomax and finasteride  -CT A/P with contrast without evidence of pyelonephritis  -Cystitis vs prostatitis; on broad spectrum abx as above  -f/u urine cx     ID:   -Severe sepsis (leukocytosis, tachycardia, lactic acidosis, hypotension) s/p IVF x 6L, on phenylephrine  -GC/Chlamydia negative, HIV negative  -f/u RPR  -JANES to eval for endocarditis  - source likely given grossly positive U/a, cystitis vs prostatitis  -On Vanc/Zosyn  -U cx negative, Blood cx x 2 with NGTD    Endo:   -On home metformin   -A1C 9.2 with FS elevated to 300s  -Started lantus 11 u QHS and humalog 2 u with meals   -monitor FS    Hematology  -Leukocytosis downtrending to 16  -f/u on diff to eval for resolution of bandemia  -Abx as above  -trend daily cbc Patient is a 72 year-old male with past medical history of T2DM, DLD, alcohol misuse brought in by family after having been found face down on the floor for approximately 2 days found in hemodynamically unstable A-fib with RVR (170s bpm) s/p multiple cardioversions and requiring phenylephrine for pressor support.     Neuro:   -Improved mental status  -Altered Mental Status/metabolic encephalopathy, in setting of severe sepsis, likely leading to fall  -CT head negative for bleed  -Abx management with Vanc/Zosyn for broad spectrum coverage    Pulmonary:   -CT Chest notable for atelectasis/trace pleural effusions bilaterally  -Patient endorsing cough  -Chest CT notable for trace b/l pleural effusions and mild pulmonary edema; no appreciable infiltrates noted    Cardiovascular:   -Given unknown etiology for sepsis, concern for hidden endocarditis; JANES today   -EKG with significant aortic insufficiency  -d/franklin vasopressin on 1/11  -f/u RPR  -ASA and brilinta load followed by daily maintenance for now  -Lipitor 80 mg  -f/u Pro BNP       GI:   -Start bowel regimen for constipation  -Carb controlled diet    /Renal:   -Grossly positive u/a, + leuk esterase, negative nitrites  -Patient has hx of BPH; on home flomax and finasteride  -CT A/P with contrast without evidence of pyelonephritis  -Cystitis vs prostatitis; on broad spectrum abx as above  -f/u urine cx     ID:   -Sepsis of unknown etiology; ID consulted; f/u recs  -Severe sepsis   -GC/Chlamydia negative, HIV negative  -f/u RPR  -JANES to eval for endocarditis  - source likely given grossly positive U/a, cystitis vs prostatitis  -On Vanc/Zosyn  -U cx negative, Blood cx x 2 with NGTD    Endo:   -Endo consulted; recs to follow  -On home metformin   -A1C 9.2 with FS elevated to 300s  -Lantus 13 u QHS and humalog 3 u with meals   -monitor FS    Hematology  -Leukocytosis resolved; bandemia resolved  -Abx as above  -trend daily cbc

## 2018-01-11 NOTE — CHART NOTE - NSCHARTNOTEFT_GEN_A_CORE
**************************************  MIDNIGHT ROUNDS  MIREYA MARTINEZ MD, PGY-2  SPECTRE# [id number]  ************************************** **************************************  MIDNIGHT ROUNDS  MIREYA MARTINEZ MD, PGY-2  SPECTRE# [id number]  **************************************  Patient is a 72y old  Male who presents with a chief complaint of Fall (09 Jan 2018 00:52)    HPI:  Patient is a 72 year-old male with past medical history of T2DM, DLD, alcohol misuse brought in by family after having been found face down on the floor for approximately 2 days. Patient was currently residing alone on one floor of the house (wife away in [place]) while other family members including a son live on other floors within the same house. He was not checked upon for 2 days. Patient reports multiple recent episodes of falls at home due to physical instability". He reports chronic alcohol use, which family states ranges from 6-12 beers almost daily. He has been more lethargic lately, with forgetfulness and disorientationHe also endorses fever, chills, dysuria and urinary frequency. Patient denies SOB, chest pain, back pain, diarrhea, melena/hematochezia, recent travel, sick contact or surgery. Patient is originally from [place] and has been in the  for 9 years.     ED course: Initial EKG noted for IW ST-elevations. Patient was hypotensive to 70/40 with HR in 170bpm with Atrial fibrillation + RVR on EKG. Labs notable for WBC 26K, lac 5.2 and troponin 0.6. Given IV NS blus x 5L and multiple DCCV with temporary resumption of NSR in 80s bpm before return to atrial fibrillation. (09 Jan 2018 00:52)      SUBJECTIVE / OVERNIGHT EVENTS: No overnight events.      MEDICATIONS  (STANDING):  aspirin  chewable 81 milliGRAM(s) Oral daily  atorvastatin 80 milliGRAM(s) Oral at bedtime  dextrose 5%. 1000 milliLiter(s) (50 mL/Hr) IV Continuous <Continuous>  dextrose 50% Injectable 12.5 Gram(s) IV Push once  dextrose 50% Injectable 25 Gram(s) IV Push once  dextrose 50% Injectable 25 Gram(s) IV Push once  docusate sodium 100 milliGRAM(s) Oral daily  enoxaparin Injectable 40 milliGRAM(s) SubCutaneous daily  finasteride 5 milliGRAM(s) Oral daily  folic acid 1 milliGRAM(s) Oral daily  influenza   Vaccine 0.5 milliLiter(s) IntraMuscular once  insulin glargine Injectable (LANTUS) 12 Unit(s) SubCutaneous at bedtime  insulin lispro (HumaLOG) corrective regimen sliding scale   SubCutaneous three times a day before meals  insulin lispro (HumaLOG) corrective regimen sliding scale   SubCutaneous at bedtime  insulin lispro Injectable (HumaLOG) 3 Unit(s) SubCutaneous three times a day with meals  multivitamin 1 Tablet(s) Oral daily  pantoprazole    Tablet 40 milliGRAM(s) Oral before breakfast  piperacillin/tazobactam IVPB. 3.375 Gram(s) IV Intermittent every 8 hours  tamsulosin 0.4 milliGRAM(s) Oral at bedtime  thiamine 100 milliGRAM(s) Oral daily  ticagrelor 90 milliGRAM(s) Oral two times a day  vancomycin  IVPB 1250 milliGRAM(s) IV Intermittent every 8 hours    MEDICATIONS  (PRN):  dextrose Gel 1 Dose(s) Oral once PRN Blood Glucose LESS THAN 70 milliGRAM(s)/deciliter  glucagon  Injectable 1 milliGRAM(s) IntraMuscular once PRN Glucose LESS THAN 70 milligrams/deciliter  LORazepam   Injectable 2 milliGRAM(s) IV Push every 2 hours PRN Symptom-triggered: 2 point increase in CIWA -Ar score and a total score of 7 or LESS  polyethylene glycol 3350 17 Gram(s) Oral daily PRN Constipation        CAPILLARY BLOOD GLUCOSE      POCT Blood Glucose.: 125 mg/dL (11 Jan 2018 16:55)  POCT Blood Glucose.: 184 mg/dL (11 Jan 2018 06:23)    I&O's Summary    10 Chao 2018 07:01  -  11 Jan 2018 07:00  --------------------------------------------------------  IN: 2548.3 mL / OUT: 2500 mL / NET: 48.3 mL    11 Jan 2018 07:01  -  11 Jan 2018 21:48  --------------------------------------------------------  IN: 342.4 mL / OUT: 1850 mL / NET: -1507.6 mL        PHYSICAL EXAM  GENERAL: NAD, well-developed  HEAD:  Atraumatic, Normocephalic  EYES: EOMI, PERRLA, conjunctiva and sclera clear  NECK: Supple, No JVD  CHEST/LUNG: Clear to auscultation bilaterally; No wheeze  HEART: Regular rate and rhythm; No rubs, or gallops. + murmur  ABDOMEN: Soft, Nontender, Nondistended; Bowel sounds present  EXTREMITIES:  2+ Peripheral Pulses, No clubbing, cyanosis, or edema  PSYCH: AAOx3  SKIN: No rashes or lesions    LABS:                        11.0   11.3  )-----------( 136      ( 11 Jan 2018 04:11 )             31.0     01-11    130<L>  |  98  |  9   ----------------------------<  157<H>  3.8   |  22  |  0.44<L>    Ca    7.5<L>      11 Jan 2018 04:11  Phos  2.7     01-11  Mg     1.7     01-11    TPro  5.5<L>  /  Alb  1.9<L>  /  TBili  0.4  /  DBili  x   /  AST  33  /  ALT  29  /  AlkPhos  89  01-11    PT/INR - ( 11 Jan 2018 04:11 )   PT: 11.5 sec;   INR: 1.05 ratio         PTT - ( 11 Jan 2018 04:11 )  PTT:34.0 sec        A/P: 72 year-old male with past medical history of T2DM, DLD, alcohol misuse brought in by family after having been found face down on the floor for approximately 2 days in A-fib with RVR (170s bpm) s/p failed DCCV found in severe sepsis requiring pressor support a/w NSTEMI likely 2/2 demand, course c/b aoritc vegetation  -Currently in NSR  - JANES (+)ve for echogenic structure seen on the aortic valve consistent with vegetation (left coronary cusp) suggestive of endocarditis  - Pending CT coronaries  - CT surgery and ID following  - Cont vanc/zosyn, ID recs appreciated re: discontinuing vancomycin appreciated  - C/w DAPT, statin. Will start BB and ACE when BP tolerates   - Lantus qhs, humalog AC, titrate as needed  - Eventual LHC when medically optimized. **************************************  MIDNIGHT ROUNDS  MIREYA MARTINEZ MD, PGY-2  SPECTRE# [id number]  **************************************  Patient is a 72y old  Male who presents with a chief complaint of Fall (09 Jan 2018 00:52)    HPI:  Patient is a 72 year-old male with past medical history of T2DM, DLD, alcohol misuse brought in by family after having been found face down on the floor for approximately 2 days. Patient was currently residing alone on one floor of the house (wife away in [place]) while other family members including a son live on other floors within the same house. He was not checked upon for 2 days. Patient reports multiple recent episodes of falls at home due to physical instability". He reports chronic alcohol use, which family states ranges from 6-12 beers almost daily. He has been more lethargic lately, with forgetfulness and disorientationHe also endorses fever, chills, dysuria and urinary frequency. Patient denies SOB, chest pain, back pain, diarrhea, melena/hematochezia, recent travel, sick contact or surgery. Patient is originally from [place] and has been in the  for 9 years.     ED course: Initial EKG noted for IW ST-elevations. Patient was hypotensive to 70/40 with HR in 170bpm with Atrial fibrillation + RVR on EKG. Labs notable for WBC 26K, lac 5.2 and troponin 0.6. Given IV NS blus x 5L and multiple DCCV with temporary resumption of NSR in 80s bpm before return to atrial fibrillation. (09 Jan 2018 00:52)      SUBJECTIVE / OVERNIGHT EVENTS: No overnight events.      MEDICATIONS  (STANDING):  aspirin  chewable 81 milliGRAM(s) Oral daily  atorvastatin 80 milliGRAM(s) Oral at bedtime  dextrose 5%. 1000 milliLiter(s) (50 mL/Hr) IV Continuous <Continuous>  dextrose 50% Injectable 12.5 Gram(s) IV Push once  dextrose 50% Injectable 25 Gram(s) IV Push once  dextrose 50% Injectable 25 Gram(s) IV Push once  docusate sodium 100 milliGRAM(s) Oral daily  enoxaparin Injectable 40 milliGRAM(s) SubCutaneous daily  finasteride 5 milliGRAM(s) Oral daily  folic acid 1 milliGRAM(s) Oral daily  influenza   Vaccine 0.5 milliLiter(s) IntraMuscular once  insulin glargine Injectable (LANTUS) 12 Unit(s) SubCutaneous at bedtime  insulin lispro (HumaLOG) corrective regimen sliding scale   SubCutaneous three times a day before meals  insulin lispro (HumaLOG) corrective regimen sliding scale   SubCutaneous at bedtime  insulin lispro Injectable (HumaLOG) 3 Unit(s) SubCutaneous three times a day with meals  multivitamin 1 Tablet(s) Oral daily  pantoprazole    Tablet 40 milliGRAM(s) Oral before breakfast  piperacillin/tazobactam IVPB. 3.375 Gram(s) IV Intermittent every 8 hours  tamsulosin 0.4 milliGRAM(s) Oral at bedtime  thiamine 100 milliGRAM(s) Oral daily  ticagrelor 90 milliGRAM(s) Oral two times a day  vancomycin  IVPB 1250 milliGRAM(s) IV Intermittent every 8 hours    MEDICATIONS  (PRN):  dextrose Gel 1 Dose(s) Oral once PRN Blood Glucose LESS THAN 70 milliGRAM(s)/deciliter  glucagon  Injectable 1 milliGRAM(s) IntraMuscular once PRN Glucose LESS THAN 70 milligrams/deciliter  LORazepam   Injectable 2 milliGRAM(s) IV Push every 2 hours PRN Symptom-triggered: 2 point increase in CIWA -Ar score and a total score of 7 or LESS  polyethylene glycol 3350 17 Gram(s) Oral daily PRN Constipation        CAPILLARY BLOOD GLUCOSE      POCT Blood Glucose.: 125 mg/dL (11 Jan 2018 16:55)  POCT Blood Glucose.: 184 mg/dL (11 Jan 2018 06:23)    I&O's Summary    10 Chao 2018 07:01  -  11 Jan 2018 07:00  --------------------------------------------------------  IN: 2548.3 mL / OUT: 2500 mL / NET: 48.3 mL    11 Jan 2018 07:01  -  11 Jan 2018 21:48  --------------------------------------------------------  IN: 342.4 mL / OUT: 1850 mL / NET: -1507.6 mL        PHYSICAL EXAM  GENERAL: NAD, well-developed  HEAD:  Atraumatic, Normocephalic  EYES: EOMI, PERRLA, conjunctiva and sclera clear  NECK: Supple, No JVD  CHEST/LUNG: Clear to auscultation bilaterally; No wheeze  HEART: Regular rate and rhythm; No rubs, or gallops. + murmur  ABDOMEN: Soft, Nontender, Nondistended; Bowel sounds present  EXTREMITIES:  2+ Peripheral Pulses, No clubbing, cyanosis, or edema  PSYCH: AAOx3  SKIN: No rashes or lesions    LABS:                        11.0   11.3  )-----------( 136      ( 11 Jan 2018 04:11 )             31.0     01-11    130<L>  |  98  |  9   ----------------------------<  157<H>  3.8   |  22  |  0.44<L>    Ca    7.5<L>      11 Jan 2018 04:11  Phos  2.7     01-11  Mg     1.7     01-11    TPro  5.5<L>  /  Alb  1.9<L>  /  TBili  0.4  /  DBili  x   /  AST  33  /  ALT  29  /  AlkPhos  89  01-11    PT/INR - ( 11 Jan 2018 04:11 )   PT: 11.5 sec;   INR: 1.05 ratio         PTT - ( 11 Jan 2018 04:11 )  PTT:34.0 sec        A/P: 72 year-old male with past medical history of T2DM, DLD, alcohol misuse brought in by family after having been found face down on the floor for approximately 2 days in A-fib with RVR (170s bpm) s/p failed DCCV found in severe sepsis requiring pressor support a/w NSTEMI likely 2/2 demand, course c/b aoritc vegetation  -Currently in NSR  - JANES (+)ve for echogenic structure seen on the aortic valve consistent with vegetation (left coronary cusp) suggestive of endocarditis  - Pending CT coronaries (Ca score)  - CT surgery and ID following  - Cont vanc/zosyn, ID recs appreciated re: discontinuing vancomycin appreciated  - C/w DAPT, statin. Will start BB and ACE when BP tolerates   - Lantus qhs, humalog AC, titrate as needed  - Eventual LHC when medically optimized. **************************************  MIDNIGHT ROUNDS  MIREYA MARTINEZ MD, PGY-2  SPECTRE# [id number]  **************************************  Patient is a 72y old  Male who presents with a chief complaint of Fall (09 Jan 2018 00:52)    HPI:  Patient is a 72 year-old male with past medical history of T2DM, DLD, alcohol misuse brought in by family after having been found face down on the floor for approximately 2 days. Patient was currently residing alone on one floor of the house (wife away in [place]) while other family members including a son live on other floors within the same house. He was not checked upon for 2 days. Patient reports multiple recent episodes of falls at home due to physical instability". He reports chronic alcohol use, which family states ranges from 6-12 beers almost daily. He has been more lethargic lately, with forgetfulness and disorientationHe also endorses fever, chills, dysuria and urinary frequency. Patient denies SOB, chest pain, back pain, diarrhea, melena/hematochezia, recent travel, sick contact or surgery. Patient is originally from [place] and has been in the  for 9 years.     ED course: Initial EKG noted for IW ST-elevations. Patient was hypotensive to 70/40 with HR in 170bpm with Atrial fibrillation + RVR on EKG. Labs notable for WBC 26K, lac 5.2 and troponin 0.6. Given IV NS blus x 5L and multiple DCCV with temporary resumption of NSR in 80s bpm before return to atrial fibrillation. (09 Jan 2018 00:52)      SUBJECTIVE / OVERNIGHT EVENTS: No overnight events.      MEDICATIONS  (STANDING):  aspirin  chewable 81 milliGRAM(s) Oral daily  atorvastatin 80 milliGRAM(s) Oral at bedtime  dextrose 5%. 1000 milliLiter(s) (50 mL/Hr) IV Continuous <Continuous>  dextrose 50% Injectable 12.5 Gram(s) IV Push once  dextrose 50% Injectable 25 Gram(s) IV Push once  dextrose 50% Injectable 25 Gram(s) IV Push once  docusate sodium 100 milliGRAM(s) Oral daily  enoxaparin Injectable 40 milliGRAM(s) SubCutaneous daily  finasteride 5 milliGRAM(s) Oral daily  folic acid 1 milliGRAM(s) Oral daily  influenza   Vaccine 0.5 milliLiter(s) IntraMuscular once  insulin glargine Injectable (LANTUS) 12 Unit(s) SubCutaneous at bedtime  insulin lispro (HumaLOG) corrective regimen sliding scale   SubCutaneous three times a day before meals  insulin lispro (HumaLOG) corrective regimen sliding scale   SubCutaneous at bedtime  insulin lispro Injectable (HumaLOG) 3 Unit(s) SubCutaneous three times a day with meals  multivitamin 1 Tablet(s) Oral daily  pantoprazole    Tablet 40 milliGRAM(s) Oral before breakfast  piperacillin/tazobactam IVPB. 3.375 Gram(s) IV Intermittent every 8 hours  tamsulosin 0.4 milliGRAM(s) Oral at bedtime  thiamine 100 milliGRAM(s) Oral daily  ticagrelor 90 milliGRAM(s) Oral two times a day  vancomycin  IVPB 1250 milliGRAM(s) IV Intermittent every 8 hours    MEDICATIONS  (PRN):  dextrose Gel 1 Dose(s) Oral once PRN Blood Glucose LESS THAN 70 milliGRAM(s)/deciliter  glucagon  Injectable 1 milliGRAM(s) IntraMuscular once PRN Glucose LESS THAN 70 milligrams/deciliter  LORazepam   Injectable 2 milliGRAM(s) IV Push every 2 hours PRN Symptom-triggered: 2 point increase in CIWA -Ar score and a total score of 7 or LESS  polyethylene glycol 3350 17 Gram(s) Oral daily PRN Constipation        CAPILLARY BLOOD GLUCOSE      POCT Blood Glucose.: 125 mg/dL (11 Jan 2018 16:55)  POCT Blood Glucose.: 184 mg/dL (11 Jan 2018 06:23)    I&O's Summary    10 Chao 2018 07:01  -  11 Jan 2018 07:00  --------------------------------------------------------  IN: 2548.3 mL / OUT: 2500 mL / NET: 48.3 mL    11 Jan 2018 07:01  -  11 Jan 2018 21:48  --------------------------------------------------------  IN: 342.4 mL / OUT: 1850 mL / NET: -1507.6 mL        PHYSICAL EXAM  GENERAL: NAD, well-developed  HEAD:  Atraumatic, Normocephalic  EYES: EOMI, PERRLA, conjunctiva and sclera clear  NECK: Supple, No JVD  CHEST/LUNG: Clear to auscultation bilaterally; No wheeze  HEART: Regular rate and rhythm; No rubs, or gallops. + murmur  ABDOMEN: Soft, Nontender, Nondistended; Bowel sounds present  EXTREMITIES:  2+ Peripheral Pulses, No clubbing, cyanosis, or edema  PSYCH: AAOx3  SKIN: No rashes or lesions    LABS:                        11.0   11.3  )-----------( 136      ( 11 Jan 2018 04:11 )             31.0     01-11    130<L>  |  98  |  9   ----------------------------<  157<H>  3.8   |  22  |  0.44<L>    Ca    7.5<L>      11 Jan 2018 04:11  Phos  2.7     01-11  Mg     1.7     01-11    TPro  5.5<L>  /  Alb  1.9<L>  /  TBili  0.4  /  DBili  x   /  AST  33  /  ALT  29  /  AlkPhos  89  01-11    PT/INR - ( 11 Jan 2018 04:11 )   PT: 11.5 sec;   INR: 1.05 ratio         PTT - ( 11 Jan 2018 04:11 )  PTT:34.0 sec        A/P: 72 year-old male with past medical history of T2DM, DLD, alcohol misuse brought in by family after having been found face down on the floor for approximately 2 days in A-fib with RVR (170s bpm) s/p failed DCCV found in severe sepsis requiring pressor support a/w NSTEMI likely 2/2 demand, course c/b aoritc vegetation  -Currently in NSR  - JANES (+)ve for echogenic structure seen on the aortic valve consistent with vegetation (left coronary cusp) suggestive of endocarditis. Will probably need PICC line  - Pending CT coronaries (Ca score)  - CT surgery and ID following  - Cont vanc/zosyn, ID recs appreciated re: discontinuing vancomycin appreciated  - C/w DAPT, statin. Will start BB and ACE when BP tolerates   - Lantus qhs, humalog AC, titrate as needed  - Eventual LHC when medically optimized.

## 2018-01-11 NOTE — CONSULT NOTE ADULT - SUBJECTIVE AND OBJECTIVE BOX
"HPI: Patient is a 72 year-old male with past medical history of T2DM, DLD, alcohol misuse brought in by family after having been found face down on the floor for approximately 2 days. Patient was currently residing alone on one floor of the house (wife away in [place]) while other family members including a son live on other floors within the same house. He was not checked upon for 2 days. Patient reports multiple recent episodes of falls at home due to physical instability". He reports chronic alcohol use, which family states ranges from 6-12 beers almost daily. He has been more lethargic lately, with forgetfulness and disorientationHe also endorses fever, chills, dysuria and urinary frequency. Patient denies SOB, chest pain, back pain, diarrhea, melena/hematochezia, recent travel, sick contact or surgery. Patient is originally from [place] and has been in the  for 9 years.     ED course: Initial EKG noted for IW ST-elevations. Patient was hypotensive to 70/40 with HR in 170bpm with Atrial fibrillation + RVR on EKG. Labs notable for WBC 26K, lac 5.2 and troponin 0.6. Given IV NS blus x 5L and multiple DCCV with temporary resumption of NSR in 80s bpm before return to atrial fibrillation. (09 Jan 2018 00:52)"    Above reviewed. Saw/spoke to patient. Son at bedside. Patient able to provide history but uncertain reliability. Patient has had increasing altered mental status over past week prior to admission. Then patient found on floor by family after 2 days. Patient states has felt warm, but no overt fevers, no chills. He states has had urinary frequency as was told that he had an enlarged prostate by a physician. No flank pain. Also has had dysuria. No CP, no SOB, no abd pain, no diarrhea, no skin or soft tissue wounds or redness. Originally came from [place].    PAST MEDICAL & SURGICAL HISTORY:  High cholesterol  Diabetes: type 2  No significant past surgical history    Allergies    No Known Allergies    ANTIMICROBIALS:  piperacillin/tazobactam IVPB. 3.375 every 8 hours    OTHER MEDS:  aspirin  chewable 81 milliGRAM(s) Oral daily  atorvastatin 80 milliGRAM(s) Oral at bedtime  dextrose 5%. 1000 milliLiter(s) IV Continuous <Continuous>  dextrose 50% Injectable 12.5 Gram(s) IV Push once  dextrose 50% Injectable 25 Gram(s) IV Push once  dextrose 50% Injectable 25 Gram(s) IV Push once  dextrose Gel 1 Dose(s) Oral once PRN  docusate sodium 100 milliGRAM(s) Oral daily  enoxaparin Injectable 40 milliGRAM(s) SubCutaneous daily  finasteride 5 milliGRAM(s) Oral daily  folic acid 1 milliGRAM(s) Oral daily  glucagon  Injectable 1 milliGRAM(s) IntraMuscular once PRN  influenza   Vaccine 0.5 milliLiter(s) IntraMuscular once  insulin glargine Injectable (LANTUS) 6 Unit(s) SubCutaneous at bedtime  insulin lispro (HumaLOG) corrective regimen sliding scale   SubCutaneous three times a day before meals  insulin lispro (HumaLOG) corrective regimen sliding scale   SubCutaneous at bedtime  insulin lispro Injectable (HumaLOG) 3 Unit(s) SubCutaneous three times a day with meals  LORazepam   Injectable 2 milliGRAM(s) IV Push every 2 hours PRN  multivitamin 1 Tablet(s) Oral daily  pantoprazole    Tablet 40 milliGRAM(s) Oral before breakfast  polyethylene glycol 3350 17 Gram(s) Oral daily PRN  tamsulosin 0.4 milliGRAM(s) Oral at bedtime  thiamine 100 milliGRAM(s) Oral daily  ticagrelor 90 milliGRAM(s) Oral two times a day    SOCIAL HISTORY: Alcohol use, no tobacco, no illicit drugs    FAMILY HISTORY:  Family history of type 2 diabetes mellitus (Sibling)  Family history of lung cancer (Sibling)    Drug Dosing Weight  Height (cm): 170.18 (09 Jan 2018 00:25)  Weight (kg): 75.4 (08 Jan 2018 22:56)  BMI (kg/m2): 26 (09 Jan 2018 00:25)  BSA (m2): 1.87 (09 Jan 2018 00:25)    PE:    Vital Signs Last 24 Hrs  T(C): 36.8 (11 Jan 2018 15:45), Max: 36.8 (11 Jan 2018 10:45)  T(F): 98.2 (11 Jan 2018 15:45), Max: 98.2 (11 Jan 2018 10:45)  HR: 82 (11 Jan 2018 15:45) (64 - 90)  BP: 111/67 (11 Jan 2018 15:45) (86/39 - 148/59)  BP(mean): 90 (11 Jan 2018 15:45) (56 - 91)  RR: 23 (11 Jan 2018 15:45) (11 - 27)  SpO2: 98% (11 Jan 2018 15:45) (93% - 100%)    Gen: AOx2-3, NAD, non-toxic, pleasant  CV: S1+S2 normal, no murmurs, nontachycardic  Resp: Clear bilat, no resp distress, no crackles/wheezes  Abd: Soft, nontender, +BS  Ext: No LE edema, no wounds  : No Dimas, tenderness to palpation bladder  IV/Skin: No thrombophlebitis, no rash  Msk: No low back pain, no arthralgias, no joint swelling  Neuro: No sensory deficits, no motor deficits    LABS:                        11.0   11.3  )-----------( 136      ( 11 Jan 2018 04:11 )             31.0     01-11    130<L>  |  98  |  9   ----------------------------<  157<H>  3.8   |  22  |  0.44<L>    Ca    7.5<L>      11 Jan 2018 04:11  Phos  2.7     01-11  Mg     1.7     01-11    TPro  5.5<L>  /  Alb  1.9<L>  /  TBili  0.4  /  DBili  x   /  AST  33  /  ALT  29  /  AlkPhos  89  01-11    MICROBIOLOGY:  Vancomycin Level, Trough: 5.4 ug/mL (01-11-18 @ 04:11)    .Blood Blood-Venous  01-09-18   No growth to date.      .Blood Blood-Venous  01-09-18   No growth to date.      .Urine Clean Catch (Midstream)  01-09-18   No growth     Rapid RVP Result: NotDetec (01-08 @ 19:38)    Urine GC/Chlamydia Neg; 1/8, 1/9 UA positive    HIV negative    RADIOLOGY:    1/10 CT Chest:    FINDINGS:    CHEST:     LUNGS AND LARGE AIRWAYS: Patent central airways.  No pulmonary nodules.   Mild interlobular septal thickening which may be secondary to mild   pulmonary edema.  PLEURA: Trace bilateral pleural effusions with adjacent atelectasis.  VESSELS: Atheromatous disease of the thoracic aorta and coronary arteries.  HEART: Heart size is normal. No pericardial effusion. Aortic valvular and   mitral annular calcifications.  MEDIASTINUM AND KIM: No lymphadenopathy.  CHEST WALL AND LOWER NECK: Heterogeneous thyroid gland.  VISUALIZED UPPER ABDOMEN: Within normal limits.  BONES: Multilevel spinal degenerative changes.    IMPRESSION:   Mild pulmonary edema with trace bilateral pleural effusions and adjacent   atelectasis.

## 2018-01-11 NOTE — PROGRESS NOTE ADULT - ATTENDING COMMENTS
Patient is seen and examined with fellow, NP and the [facility] house-staff. I agree with the history, physical and the assessment and plan.  JANES today to assess the aortic valve  c/w ABx - f/u ID to evaluate for length of abx  CT angio for further ishemic evaluation

## 2018-01-11 NOTE — CONSULT NOTE ADULT - PROBLEM SELECTOR RECOMMENDATION 9
Here, sugars already well controlled on MDI. Goal BG inpt is 140-180.  While NOT NPO, would recommend 12 units latnus qHS + 3 units humalog tidac + low SSI.  If pt is NPO after midnight in the future, would recommend to reduce to 8 units the evening that he is going to be NPO after midnight and obviously humalog will be held if he isn't eating, but ok to continue SSI.     On discharge, would stop his metformin given elevated lactate + heavy EtOH use at home.  Will steer away from GLP1 and DPP4 meds given pt's alcohol use as well (as felt to possibly increase risk of pancreatitis).  Would recommend starting jardiance 10 mg daily (and would later increase dose if tolerated) - side effects are UTI, euglycemic DKA, and genital yeast infections.  Would also start glipizide 10 mg bid, but HOLD dose if he is not eating a meal after taking it.  Our outpatient number if he would like to follow in our [facility] clinic is [phone].

## 2018-01-11 NOTE — CONSULT NOTE ADULT - PROBLEM SELECTOR RECOMMENDATION 2
There is a relative contraindication listed for metformin if pts are heavy drinkers.  Given that he arrived with elevated lactate, would suggest stopping his metformin on discharge.

## 2018-01-11 NOTE — CONSULT NOTE ADULT - ASSESSMENT
71 yo male with h/o DM2 x 3-4 years, following with PCP, with A1c here 9.1 on metformin 1000 mg daily only.  He notably does have alcohol misuse history.

## 2018-01-11 NOTE — PROGRESS NOTE ADULT - SUBJECTIVE AND OBJECTIVE BOX
Patient is a 72y old  Male who presents with a chief complaint of Fall (09 Jan 2018 00:52)      Events 	  No events ON  MEDICATIONS  (STANDING):  aspirin  chewable 81 milliGRAM(s) Oral daily  atorvastatin 80 milliGRAM(s) Oral at bedtime  dextrose 5%. 1000 milliLiter(s) (50 mL/Hr) IV Continuous <Continuous>  dextrose 50% Injectable 12.5 Gram(s) IV Push once  dextrose 50% Injectable 25 Gram(s) IV Push once  dextrose 50% Injectable 25 Gram(s) IV Push once  finasteride 5 milliGRAM(s) Oral daily  folic acid 1 milliGRAM(s) Oral daily  influenza   Vaccine 0.5 milliLiter(s) IntraMuscular once  insulin glargine Injectable (LANTUS) 6 Unit(s) SubCutaneous at bedtime  insulin lispro (HumaLOG) corrective regimen sliding scale   SubCutaneous three times a day before meals  insulin lispro (HumaLOG) corrective regimen sliding scale   SubCutaneous at bedtime  insulin lispro Injectable (HumaLOG) 3 Unit(s) SubCutaneous three times a day with meals  multivitamin 1 Tablet(s) Oral daily  pantoprazole    Tablet 40 milliGRAM(s) Oral before breakfast  piperacillin/tazobactam IVPB. 3.375 Gram(s) IV Intermittent every 8 hours  tamsulosin 0.4 milliGRAM(s) Oral at bedtime  thiamine 100 milliGRAM(s) Oral daily  ticagrelor 90 milliGRAM(s) Oral two times a day  vancomycin  IVPB 1250 milliGRAM(s) IV Intermittent every 12 hours  vasopressin Infusion 0.04 Unit(s)/Min (2.4 mL/Hr) IV Continuous <Continuous>    MEDICATIONS  (PRN):  dextrose Gel 1 Dose(s) Oral once PRN Blood Glucose LESS THAN 70 milliGRAM(s)/deciliter  glucagon  Injectable 1 milliGRAM(s) IntraMuscular once PRN Glucose LESS THAN 70 milligrams/deciliter  LORazepam   Injectable 2 milliGRAM(s) IV Push every 2 hours PRN Symptom-triggered: 2 point increase in CIWA -Ar score and a total score of 7 or LESS      REVIEW OF SYSTEMS:  Otherwise, 10 point ROS done and otherwise negative.      Vital Signs Last 24 Hrs  T(C): 36.4 (11 Jan 2018 04:00), Max: 37.1 (10 Chao 2018 12:45)  T(F): 97.5 (11 Jan 2018 04:00), Max: 98.7 (10 Chao 2018 12:45)  HR: 82 (11 Jan 2018 07:15) (64 - 94)  BP: 131/61 (11 Jan 2018 07:15) (64/47 - 133/77)  BP(mean): 87 (11 Jan 2018 07:15) (52 - 116)  RR: 15 (11 Jan 2018 07:15) (11 - 26)  SpO2: 96% (11 Jan 2018 07:15) (96% - 100%)  I&O's Summary    10 Chao 2018 07:01  -  11 Jan 2018 07:00  --------------------------------------------------------  IN: 2548.3 mL / OUT: 2500 mL / NET: 48.3 mL      CAPILLARY BLOOD GLUCOSE      POCT Blood Glucose.: 184 mg/dL (11 Jan 2018 06:23)  POCT Blood Glucose.: 156 mg/dL (10 Chao 2018 21:18)  POCT Blood Glucose.: 177 mg/dL (10 Chao 2018 17:41)  POCT Blood Glucose.: 201 mg/dL (10 Chao 2018 12:16)  POCT Blood Glucose.: 179 mg/dL (10 Chao 2018 07:56)        PHYSICAL EXAM:  Appearance: Normal	  HEENT:   Normal oral mucosa, PERRL, EOMI	  Lymphatic: No lymphadenopathy  Cardiovascular: Normal S1 S2, No JVD, No murmurs, No edema  Respiratory: Lungs clear to auscultation	  Psychiatry: A & O x 3, Mood & affect appropriate  Gastrointestinal:  Soft, Non-tender, + BS	  Skin: No rashes, No ecchymoses, No cyanosis	  Neurologic: Non-focal  Extremities: Normal range of motion, No clubbing, cyanosis or edema  Vascular: Peripheral pulses palpable 2+ bilaterally    LABS:                        11.0   11.3  )-----------( 136      ( 11 Jan 2018 04:11 )             31.0                         10.2   10.6  )-----------( 133      ( 10 Chao 2018 19:04 )             32.2     01-11    130<L>  |  98  |  9   ----------------------------<  157<H>  3.8   |  22  |  0.44<L>  01-10    134<L>  |  105  |  14  ----------------------------<  216<H>  3.8   |  21<L>  |  0.44<L>    Ca    7.5<L>      11 Jan 2018 04:11  Ca    7.5<L>      10 Chao 2018 13:18  Phos  2.7     01-11  Mg     1.7     01-11    TPro  5.5<L>  /  Alb  1.9<L>  /  TBili  0.4  /  DBili  x   /  AST  33  /  ALT  29  /  AlkPhos  89  01-11  TPro  5.3<L>  /  Alb  1.8<L>  /  TBili  0.5  /  DBili  x   /  AST  34  /  ALT  32  /  AlkPhos  98  01-10  	 	    PT/INR - ( 11 Jan 2018 04:11 )   PT: 11.5 sec;   INR: 1.05 ratio         PTT - ( 11 Jan 2018 04:11 )  PTT:34.0 sec    proBNP:   01-09 Chol 103 LDL 67 HDL 16<L> Trig 102  HgA1c:   TSH:      09 Jan 2018 05:37 Troponin 0.33 ng/mL /  U/L / CKMB x     / CKMB Units 7.2 ng/mL   09 Jan 2018 00:31 Troponin 0.38 ng/mL /  U/L / CKMB x     / CKMB Units 8.7 ng/mL   08 Jan 2018 17:43 Troponin 0.60 ng/mL /  U/L / CKMB x     / CKMB Units 13.4 ng/mL        TELEMETRY: 	    ECG:  	  RADIOLOGY:  OTHER: 	    PREVIOUS DIAGNOSTIC TESTING:    [ ] Echocardiogram:  [ ]  Catheterization:  [ ] Stress Test: Patient is a 72y old  Male who presents with a chief complaint of Fall (09 Jan 2018 00:52)      Events 	  No events ON.     MEDICATIONS  (STANDING):  aspirin  chewable 81 milliGRAM(s) Oral daily  atorvastatin 80 milliGRAM(s) Oral at bedtime  dextrose 5%. 1000 milliLiter(s) (50 mL/Hr) IV Continuous <Continuous>  dextrose 50% Injectable 12.5 Gram(s) IV Push once  dextrose 50% Injectable 25 Gram(s) IV Push once  dextrose 50% Injectable 25 Gram(s) IV Push once  finasteride 5 milliGRAM(s) Oral daily  folic acid 1 milliGRAM(s) Oral daily  influenza   Vaccine 0.5 milliLiter(s) IntraMuscular once  insulin glargine Injectable (LANTUS) 6 Unit(s) SubCutaneous at bedtime  insulin lispro (HumaLOG) corrective regimen sliding scale   SubCutaneous three times a day before meals  insulin lispro (HumaLOG) corrective regimen sliding scale   SubCutaneous at bedtime  insulin lispro Injectable (HumaLOG) 3 Unit(s) SubCutaneous three times a day with meals  multivitamin 1 Tablet(s) Oral daily  pantoprazole    Tablet 40 milliGRAM(s) Oral before breakfast  piperacillin/tazobactam IVPB. 3.375 Gram(s) IV Intermittent every 8 hours  tamsulosin 0.4 milliGRAM(s) Oral at bedtime  thiamine 100 milliGRAM(s) Oral daily  ticagrelor 90 milliGRAM(s) Oral two times a day  vancomycin  IVPB 1250 milliGRAM(s) IV Intermittent every 12 hours  vasopressin Infusion 0.04 Unit(s)/Min (2.4 mL/Hr) IV Continuous <Continuous>    MEDICATIONS  (PRN):  dextrose Gel 1 Dose(s) Oral once PRN Blood Glucose LESS THAN 70 milliGRAM(s)/deciliter  glucagon  Injectable 1 milliGRAM(s) IntraMuscular once PRN Glucose LESS THAN 70 milligrams/deciliter  LORazepam   Injectable 2 milliGRAM(s) IV Push every 2 hours PRN Symptom-triggered: 2 point increase in CIWA -Ar score and a total score of 7 or LESS      REVIEW OF SYSTEMS:  REVIEW OF SYSTEMS:    CONSTITUTIONAL: No weakness, fevers or chills  EYES/ENT: No visual changes;  No vertigo or throat pain   NECK: No pain or stiffness  RESPIRATORY: Positive for cough  CARDIOVASCULAR: No chest pain or palpitations  GASTROINTESTINAL: No abdominal or epigastric pain. No nausea, vomiting, or hematemesis; No diarrhea or constipation. No melena or hematochezia.  GENITOURINARY: No dysuria, frequency or hematuria  NEUROLOGICAL: No numbness or weakness  SKIN: No itching, rashes        Vital Signs Last 24 Hrs  T(C): 36.4 (11 Jan 2018 04:00), Max: 37.1 (10 Chao 2018 12:45)  T(F): 97.5 (11 Jan 2018 04:00), Max: 98.7 (10 Chao 2018 12:45)  HR: 82 (11 Jan 2018 07:15) (64 - 94)  BP: 131/61 (11 Jan 2018 07:15) (64/47 - 133/77)  BP(mean): 87 (11 Jan 2018 07:15) (52 - 116)  RR: 15 (11 Jan 2018 07:15) (11 - 26)  SpO2: 96% (11 Jan 2018 07:15) (96% - 100%)  I&O's Summary    10 Chao 2018 07:01  -  11 Jan 2018 07:00  --------------------------------------------------------  IN: 2548.3 mL / OUT: 2500 mL / NET: 48.3 mL      CAPILLARY BLOOD GLUCOSE      POCT Blood Glucose.: 184 mg/dL (11 Jan 2018 06:23)  POCT Blood Glucose.: 156 mg/dL (10 Chao 2018 21:18)  POCT Blood Glucose.: 177 mg/dL (10 Chao 2018 17:41)  POCT Blood Glucose.: 201 mg/dL (10 Chao 2018 12:16)  POCT Blood Glucose.: 179 mg/dL (10 Chao 2018 07:56)        PHYSICAL EXAM:  Appearance: Normal	  HEENT:   Normal oral mucosa, PERRL, EOMI	  Lymphatic: No lymphadenopathy  Cardiovascular: Normal S1 S2, No JVD, No murmurs, No edema  Respiratory: Lungs clear to auscultation	  Psychiatry: A & O x 3, Mood & affect appropriate  Gastrointestinal:  Soft, Non-tender, + BS	  Skin: No rashes, No ecchymoses, No cyanosis	  Neurologic: Non-focal  Extremities: Normal range of motion, No clubbing, cyanosis or edema  Vascular: Peripheral pulses palpable 2+ bilaterally    LABS:                        11.0   11.3  )-----------( 136      ( 11 Jan 2018 04:11 )             31.0                         10.2   10.6  )-----------( 133      ( 10 Chao 2018 19:04 )             32.2     01-11    130<L>  |  98  |  9   ----------------------------<  157<H>  3.8   |  22  |  0.44<L>  01-10    134<L>  |  105  |  14  ----------------------------<  216<H>  3.8   |  21<L>  |  0.44<L>    Ca    7.5<L>      11 Jan 2018 04:11  Ca    7.5<L>      10 Chao 2018 13:18  Phos  2.7     01-11  Mg     1.7     01-11    TPro  5.5<L>  /  Alb  1.9<L>  /  TBili  0.4  /  DBili  x   /  AST  33  /  ALT  29  /  AlkPhos  89  01-11  TPro  5.3<L>  /  Alb  1.8<L>  /  TBili  0.5  /  DBili  x   /  AST  34  /  ALT  32  /  AlkPhos  98  01-10  	 	    PT/INR - ( 11 Jan 2018 04:11 )   PT: 11.5 sec;   INR: 1.05 ratio         PTT - ( 11 Jan 2018 04:11 )  PTT:34.0 sec    proBNP:   01-09 Chol 103 LDL 67 HDL 16<L> Trig 102  HgA1c:   TSH:      09 Jan 2018 05:37 Troponin 0.33 ng/mL /  U/L / CKMB x     / CKMB Units 7.2 ng/mL   09 Jan 2018 00:31 Troponin 0.38 ng/mL /  U/L / CKMB x     / CKMB Units 8.7 ng/mL   08 Jan 2018 17:43 Troponin 0.60 ng/mL /  U/L / CKMB x     / CKMB Units 13.4 ng/mL        TELEMETRY: 	    ECG:  	  RADIOLOGY:  OTHER: 	    PREVIOUS DIAGNOSTIC TESTING:    [ ] Echocardiogram:  [ ]  Catheterization:  [ ] Stress Test:

## 2018-01-11 NOTE — CONSULT NOTE ADULT - SUBJECTIVE AND OBJECTIVE BOX
HPI:  Patient is a 72 year-old male with past medical history of T2DM, DLD, alcohol misuse brought in by family after having been found face down on the floor for approximately 2 days. Pt found to have ST elevations on initial EKG and now in CCU.    Endocrine consulted for DM2 mgmt.  Multiple attempts made to see pt, out of room, finally was back in room this evening.  He notes that he has h/o DM2 for 3-4 years to his knowledge and currently follows with a PCP for such.  He states he was formerly on 500 mg metformin dose taking this twice a day but now is taking 1000 mg once daily.  He denies diarrhea or GI distress from his metfomrin.  He has never been on insulin outpt.  he is not on any other anti hyperglycmeics currently. A1c is 9.1. He denies complications from his diabetes including neuropathic pain.  He denies h/o low BG at home <70, also denies DKA (although unclear what his level of informedness really is regarding his medical history).      Here, his sugars have been well controlled on MDI by primary team.  He was NPO after midnight last night but today will start eating again at dinner.  Endorses good appetite.       PAST MEDICAL & SURGICAL HISTORY:  High cholesterol  Diabetes: type 2  No significant past surgical history      FAMILY HISTORY:  Family history of type 2 diabetes mellitus (Sibling)  Family history of lung cancer (Sibling)      Social History: + heavy alcohol use     Outpatient Medications:    MEDICATIONS  (STANDING):  aspirin  chewable 81 milliGRAM(s) Oral daily  atorvastatin 80 milliGRAM(s) Oral at bedtime  dextrose 5%. 1000 milliLiter(s) (50 mL/Hr) IV Continuous <Continuous>  dextrose 50% Injectable 12.5 Gram(s) IV Push once  dextrose 50% Injectable 25 Gram(s) IV Push once  dextrose 50% Injectable 25 Gram(s) IV Push once  docusate sodium 100 milliGRAM(s) Oral daily  enoxaparin Injectable 40 milliGRAM(s) SubCutaneous daily  finasteride 5 milliGRAM(s) Oral daily  folic acid 1 milliGRAM(s) Oral daily  influenza   Vaccine 0.5 milliLiter(s) IntraMuscular once  insulin glargine Injectable (LANTUS) 6 Unit(s) SubCutaneous at bedtime  insulin lispro (HumaLOG) corrective regimen sliding scale   SubCutaneous three times a day before meals  insulin lispro (HumaLOG) corrective regimen sliding scale   SubCutaneous at bedtime  insulin lispro Injectable (HumaLOG) 3 Unit(s) SubCutaneous three times a day with meals  multivitamin 1 Tablet(s) Oral daily  pantoprazole    Tablet 40 milliGRAM(s) Oral before breakfast  piperacillin/tazobactam IVPB. 3.375 Gram(s) IV Intermittent every 8 hours  tamsulosin 0.4 milliGRAM(s) Oral at bedtime  thiamine 100 milliGRAM(s) Oral daily  ticagrelor 90 milliGRAM(s) Oral two times a day    MEDICATIONS  (PRN):  dextrose Gel 1 Dose(s) Oral once PRN Blood Glucose LESS THAN 70 milliGRAM(s)/deciliter  glucagon  Injectable 1 milliGRAM(s) IntraMuscular once PRN Glucose LESS THAN 70 milligrams/deciliter  LORazepam   Injectable 2 milliGRAM(s) IV Push every 2 hours PRN Symptom-triggered: 2 point increase in CIWA -Ar score and a total score of 7 or LESS  polyethylene glycol 3350 17 Gram(s) Oral daily PRN Constipation      Allergies    No Known Allergies    Intolerances      Review of Systems:  Constitutional: No fever  Neuro: No tremors  HEENT: No pain  Cardiovascular: No chest pain, palpitations  Respiratory: No SOB, no cough  GI: No nausea, vomiting, abdominal pain  : No dysuria  Skin: no rash  Psych: no depression  Endocrine: no polyuria, polydipsia  Hem/lymph: no swelling    ALL OTHER SYSTEMS REVIEWED AND NEGATIVE    PHYSICAL EXAM:  VITALS: T(C): 36.8 (01-11-18 @ 15:45)  T(F): 98.2 (01-11-18 @ 15:45), Max: 98.2 (01-11-18 @ 10:45)  HR: 82 (01-11-18 @ 15:45) (64 - 90)  BP: 111/67 (01-11-18 @ 15:45) (86/39 - 148/59)  RR:  (11 - 27)  SpO2:  (93% - 100%)  Wt(kg): --  GENERAL: NAD, well-developed  EYES: No proptosis, no lid lag, anicteric  HEENT:  Atraumatic, Normocephalic, moist mucous membranes  RESPIRATORY: breathing comfortably, no accessory muscle use or costal retractions  CARDIOVASCULAR: warm and well perfused, no peripheral edema  GI: non distended, normal bowel sounds  SKIN: Dry, intact, No rashes   MUSCULOSKELETAL: Full range of motion, no lordosis   NEURO: speech fluent, face symmetric   PSYCH: Alert and oriented x 3, normal affect, normal mood    POCT Blood Glucose.: 184 mg/dL (01-11-18 @ 06:23)  POCT Blood Glucose.: 156 mg/dL (01-10-18 @ 21:18)  POCT Blood Glucose.: 177 mg/dL (01-10-18 @ 17:41)  POCT Blood Glucose.: 201 mg/dL (01-10-18 @ 12:16)  POCT Blood Glucose.: 179 mg/dL (01-10-18 @ 07:56)  POCT Blood Glucose.: 168 mg/dL (01-09-18 @ 22:23)  POCT Blood Glucose.: 185 mg/dL (01-09-18 @ 17:11)  POCT Blood Glucose.: 269 mg/dL (01-09-18 @ 11:44)  POCT Blood Glucose.: 220 mg/dL (01-09-18 @ 08:01)  POCT Blood Glucose.: 290 mg/dL (01-09-18 @ 03:58)  POCT Blood Glucose.: 346 mg/dL (01-09-18 @ 00:21)  POCT Blood Glucose.: 362 mg/dL (01-08-18 @ 17:30)                            11.0   11.3  )-----------( 136      ( 11 Jan 2018 04:11 )             31.0       01-11    130<L>  |  98  |  9   ----------------------------<  157<H>  3.8   |  22  |  0.44<L>    EGFR if : 130  EGFR if non : 113    Ca    7.5<L>      01-11  Mg     1.7     01-11  Phos  2.7     01-11    TPro  5.5<L>  /  Alb  1.9<L>  /  TBili  0.4  /  DBili  x   /  AST  33  /  ALT  29  /  AlkPhos  89  01-11      Thyroid Function Tests:  01-09 @ 03:39 TSH 4.60 FreeT4 -- T3 -- Anti TPO -- Anti Thyroglobulin Ab -- TSI --  01-08 @ 21:45 TSH 4.51 FreeT4 -- T3 -- Anti TPO -- Anti Thyroglobulin Ab -- TSI --      Hemoglobin A1C, Whole Blood: 9.1 % <H> [4.0 - 5.6] (01-09-18 @ 03:39)      01-09 Chol 103 LDL 67 HDL 16<L> Trig 102    Radiology:

## 2018-01-11 NOTE — CONSULT NOTE ADULT - ASSESSMENT
Patient is a 72 year-old male with past medical history of T2DM, DLD, alcohol misuse brought in by family after having been found face down on the floor for approximately 2 days.   ED course: Initial EKG noted for IW ST-elevations. Patient was hypotensive to 70/40 with HR in 170bpm with Atrial fibrillation + RVR on EKG. Labs notable for WBC 26K, lac 5.2 and troponin 0.6. Given IV NS blus x 5L and multiple DCCV with temporary resumption of NSR in 80s bpm before return to atrial fibrillation. (09 Jan 2018 00:52)   CT Surgery consulted for  Vegetation  Followed by ID blood cx negative on vancomycin and zosyn  Scheduled for Cardiac CT of [place] in am  On brillinta Patient is a 72 year-old male with past medical history of T2DM, HLD, alcohol misuse brought in by family after having been found face down on the floor for approximately 2 days.   ED course: Initial EKG noted for IW ST-elevations. Patient was hypotensive to 70/40 with HR in 170bpm with Atrial fibrillation + RVR on EKG. Labs notable for WBC 26K, lac 5.2 and troponin 0.6. Given IV NS blus x 5L and multiple DCCV with temporary resumption of NSR in 80s bpm before return to atrial fibrillation. (09 Jan 2018 00:52)   CT Surgery consulted for  Vegetation  Followed by ID blood cx negative on vancomycin and zosyn  Scheduled for Cardiac CT of [place] in am  On brillinta

## 2018-01-12 LAB
ALBUMIN SERPL ELPH-MCNC: 1.9 G/DL — LOW (ref 3.3–5)
ALP SERPL-CCNC: 91 U/L — SIGNIFICANT CHANGE UP (ref 40–120)
ALT FLD-CCNC: 28 U/L RC — SIGNIFICANT CHANGE UP (ref 10–45)
ANION GAP SERPL CALC-SCNC: 10 MMOL/L — SIGNIFICANT CHANGE UP (ref 5–17)
AST SERPL-CCNC: 28 U/L — SIGNIFICANT CHANGE UP (ref 10–40)
BASOPHILS # BLD AUTO: 0 K/UL — SIGNIFICANT CHANGE UP (ref 0–0.2)
BILIRUB SERPL-MCNC: 0.5 MG/DL — SIGNIFICANT CHANGE UP (ref 0.2–1.2)
BUN SERPL-MCNC: 5 MG/DL — LOW (ref 7–23)
CALCIUM SERPL-MCNC: 7.7 MG/DL — LOW (ref 8.4–10.5)
CHLORIDE SERPL-SCNC: 101 MMOL/L — SIGNIFICANT CHANGE UP (ref 96–108)
CO2 SERPL-SCNC: 22 MMOL/L — SIGNIFICANT CHANGE UP (ref 22–31)
CREAT SERPL-MCNC: 0.49 MG/DL — LOW (ref 0.5–1.3)
EOSINOPHIL # BLD AUTO: 0 K/UL — SIGNIFICANT CHANGE UP (ref 0–0.5)
EOSINOPHIL NFR BLD AUTO: 1 % — SIGNIFICANT CHANGE UP (ref 0–6)
GLUCOSE BLDC GLUCOMTR-MCNC: 134 MG/DL — HIGH (ref 70–99)
GLUCOSE BLDC GLUCOMTR-MCNC: 199 MG/DL — HIGH (ref 70–99)
GLUCOSE SERPL-MCNC: 171 MG/DL — HIGH (ref 70–99)
HCT VFR BLD CALC: 33.6 % — LOW (ref 39–50)
HGB BLD-MCNC: 11.7 G/DL — LOW (ref 13–17)
LYMPHOCYTES # BLD AUTO: 0.9 K/UL — LOW (ref 1–3.3)
LYMPHOCYTES # BLD AUTO: 9 % — LOW (ref 13–44)
MAGNESIUM SERPL-MCNC: 1.8 MG/DL — SIGNIFICANT CHANGE UP (ref 1.6–2.6)
MCHC RBC-ENTMCNC: 32.2 PG — SIGNIFICANT CHANGE UP (ref 27–34)
MCHC RBC-ENTMCNC: 34.9 GM/DL — SIGNIFICANT CHANGE UP (ref 32–36)
MCV RBC AUTO: 92.3 FL — SIGNIFICANT CHANGE UP (ref 80–100)
METAMYELOCYTES # FLD: 3 % — HIGH (ref 0–0)
MONOCYTES # BLD AUTO: 0.8 K/UL — SIGNIFICANT CHANGE UP (ref 0–0.9)
MONOCYTES NFR BLD AUTO: 4 % — SIGNIFICANT CHANGE UP (ref 2–14)
NEUTROPHILS # BLD AUTO: 8.6 K/UL — HIGH (ref 1.8–7.4)
NEUTROPHILS NFR BLD AUTO: 82 % — HIGH (ref 43–77)
NEUTS BAND # BLD: 1 % — SIGNIFICANT CHANGE UP (ref 0–8)
PHOSPHATE SERPL-MCNC: 3.7 MG/DL — SIGNIFICANT CHANGE UP (ref 2.5–4.5)
PLAT MORPH BLD: NORMAL — SIGNIFICANT CHANGE UP
PLATELET # BLD AUTO: 215 K/UL — SIGNIFICANT CHANGE UP (ref 150–400)
POTASSIUM SERPL-MCNC: 3.8 MMOL/L — SIGNIFICANT CHANGE UP (ref 3.5–5.3)
POTASSIUM SERPL-SCNC: 3.8 MMOL/L — SIGNIFICANT CHANGE UP (ref 3.5–5.3)
PROT SERPL-MCNC: 5.8 G/DL — LOW (ref 6–8.3)
RBC # BLD: 3.64 M/UL — LOW (ref 4.2–5.8)
RBC # FLD: 11.9 % — SIGNIFICANT CHANGE UP (ref 10.3–14.5)
RBC BLD AUTO: SIGNIFICANT CHANGE UP
SODIUM SERPL-SCNC: 133 MMOL/L — LOW (ref 135–145)
T PALLIDUM AB TITR SER: NEGATIVE — SIGNIFICANT CHANGE UP
VANCOMYCIN TROUGH SERPL-MCNC: 16.3 UG/ML — SIGNIFICANT CHANGE UP (ref 10–20)
WBC # BLD: 10.4 K/UL — SIGNIFICANT CHANGE UP (ref 3.8–10.5)
WBC # FLD AUTO: 10.4 K/UL — SIGNIFICANT CHANGE UP (ref 3.8–10.5)

## 2018-01-12 PROCEDURE — 99152 MOD SED SAME PHYS/QHP 5/>YRS: CPT

## 2018-01-12 PROCEDURE — 99232 SBSQ HOSP IP/OBS MODERATE 35: CPT

## 2018-01-12 PROCEDURE — 93454 CORONARY ARTERY ANGIO S&I: CPT | Mod: 26

## 2018-01-12 RX ORDER — POTASSIUM CHLORIDE 20 MEQ
20 PACKET (EA) ORAL ONCE
Qty: 0 | Refills: 0 | Status: COMPLETED | OUTPATIENT
Start: 2018-01-12 | End: 2018-01-12

## 2018-01-12 RX ORDER — MAGNESIUM SULFATE 500 MG/ML
1 VIAL (ML) INJECTION ONCE
Qty: 0 | Refills: 0 | Status: COMPLETED | OUTPATIENT
Start: 2018-01-12 | End: 2018-01-12

## 2018-01-12 RX ADMIN — PIPERACILLIN AND TAZOBACTAM 25 GRAM(S): 4; .5 INJECTION, POWDER, LYOPHILIZED, FOR SOLUTION INTRAVENOUS at 14:52

## 2018-01-12 RX ADMIN — Medication 81 MILLIGRAM(S): at 12:06

## 2018-01-12 RX ADMIN — ENOXAPARIN SODIUM 40 MILLIGRAM(S): 100 INJECTION SUBCUTANEOUS at 12:05

## 2018-01-12 RX ADMIN — Medication 166.67 MILLIGRAM(S): at 03:44

## 2018-01-12 RX ADMIN — Medication 1 MILLIGRAM(S): at 12:05

## 2018-01-12 RX ADMIN — PANTOPRAZOLE SODIUM 40 MILLIGRAM(S): 20 TABLET, DELAYED RELEASE ORAL at 06:07

## 2018-01-12 RX ADMIN — ATORVASTATIN CALCIUM 80 MILLIGRAM(S): 80 TABLET, FILM COATED ORAL at 21:48

## 2018-01-12 RX ADMIN — Medication 1 TABLET(S): at 12:05

## 2018-01-12 RX ADMIN — Medication 166.67 MILLIGRAM(S): at 19:47

## 2018-01-12 RX ADMIN — Medication 166.67 MILLIGRAM(S): at 10:49

## 2018-01-12 RX ADMIN — Medication 100 MILLIGRAM(S): at 12:05

## 2018-01-12 RX ADMIN — INSULIN GLARGINE 12 UNIT(S): 100 INJECTION, SOLUTION SUBCUTANEOUS at 21:48

## 2018-01-12 RX ADMIN — TICAGRELOR 90 MILLIGRAM(S): 90 TABLET ORAL at 06:07

## 2018-01-12 RX ADMIN — Medication 20 MILLIEQUIVALENT(S): at 06:08

## 2018-01-12 RX ADMIN — Medication 100 GRAM(S): at 06:07

## 2018-01-12 RX ADMIN — FINASTERIDE 5 MILLIGRAM(S): 5 TABLET, FILM COATED ORAL at 12:05

## 2018-01-12 RX ADMIN — PIPERACILLIN AND TAZOBACTAM 25 GRAM(S): 4; .5 INJECTION, POWDER, LYOPHILIZED, FOR SOLUTION INTRAVENOUS at 06:08

## 2018-01-12 RX ADMIN — TAMSULOSIN HYDROCHLORIDE 0.4 MILLIGRAM(S): 0.4 CAPSULE ORAL at 21:48

## 2018-01-12 RX ADMIN — PIPERACILLIN AND TAZOBACTAM 25 GRAM(S): 4; .5 INJECTION, POWDER, LYOPHILIZED, FOR SOLUTION INTRAVENOUS at 21:48

## 2018-01-12 NOTE — CHART NOTE - NSCHARTNOTEFT_GEN_A_CORE
Removal of Femoral Arterial Sheath    Pulses in the (right) lower extremity are (palpable). The patient was placed in the supine position. The insertion site was identified and the sutures were removed per protocol.  The _5___ Swazi femoral sheath was then removed. Direct pressure was applied for  ___25___ minutes.     Monitoring of the (right) groin and both lower extremities including neuro-vascular checks and vital signs every 15 minutes x 4, then every 30 minutes x 2, then every 1 hour was ordered.    Complications: No hematoma or bleeding noted. Fem pulse 2+ DP/PT 1+/2+    Comments: Drsg applied followed by derek.

## 2018-01-12 NOTE — CHART NOTE - NSCHARTNOTEFT_GEN_A_CORE
Pt pending cardiac cath today, CT Surgeon Dr. Mary to review cath results and speak to patient to determine if pt will be a surgical candidate. Hold Brilinta for now. Continue IV abx per ID and management per primary team.

## 2018-01-12 NOTE — PROGRESS NOTE ADULT - SUBJECTIVE AND OBJECTIVE BOX
CC: F/U for valvular lesion    Saw/spoke to patient. Patient overall well. No chills, no fevers, no new complaints. Doing well. In interim found to have valvular lesion so patient restarted on vancomycin.    Allergies  No Known Allergies    ANTIMICROBIALS:  piperacillin/tazobactam IVPB. 3.375 every 8 hours  vancomycin  IVPB 1250 every 8 hours    PE:    Vital Signs Last 24 Hrs  T(C): 36.8 (12 Jan 2018 07:44), Max: 36.8 (11 Jan 2018 15:45)  T(F): 98.2 (12 Jan 2018 07:44), Max: 98.2 (11 Jan 2018 15:45)  HR: 98 (12 Jan 2018 11:44) (74 - 100)  BP: 116/50 (12 Jan 2018 11:44) (97/53 - 134/56)  BP(mean): 69 (12 Jan 2018 11:44) (64 - 92)  RR: 19 (12 Jan 2018 11:44) (13 - 23)  SpO2: 97% (12 Jan 2018 09:44) (96% - 99%)    Gen: AOx3, NAD, non-toxic, pleasant  CV: S1+S2 normal, no murmurs, nontachycardic  Resp: Clear bilat, no resp distress, no crackles/wheezes  Abd: Soft, nontender, +BS  Ext: No LE edema, no wounds    LABS:                        11.7   10.4  )-----------( 215      ( 12 Jan 2018 04:40 )             33.6     01-12    133<L>  |  101  |  5<L>  ----------------------------<  171<H>  3.8   |  22  |  0.49<L>    Ca    7.7<L>      12 Jan 2018 04:40  Phos  3.7     01-12  Mg     1.8     01-12    TPro  5.8<L>  /  Alb  1.9<L>  /  TBili  0.5  /  DBili  x   /  AST  28  /  ALT  28  /  AlkPhos  91  01-12    MICROBIOLOGY:  Vancomycin Level, Trough: 16.3 ug/mL (01-12-18 @ 11:09)    .Blood Blood-Venous  01-09-18   No growth to date.     .Blood Blood-Venous  01-09-18   No growth to date.      .Urine Clean Catch (Midstream)  01-09-18   No growth    Rapid RVP Result: NotDetec (01-08 @ 19:38)    RADIOLOGY:    1/11 JANES:    Conclusions:  1. Calcified trileaflet aortic valve with decreased  opening.   Echogenic structure seen on the aortic valve  consistent with vegetation (left coronary cusp).   Peak transaortic valve gradient equals 31 mm Hg, mean  transaortic valve gradient equals 18 mm Hg, estimated  aortic valve area equals 1 sqcm (by continuity equation),  aortic valve velocity time integral equals 64 cm,  consistent with moderate to severe aortic stenosis.  Moderate-severe eccentric aortic regurgitation.  2. No left atrial or left atrial appendage thrombus.  3. Normal left ventricular internal dimensions and wall  thicknesses.  4. Normal left ventricular systolic function. No segmental  wall motion abnormalities.  5. Agitated saline injection and color flow Doppler  demonstrates no evidence of a patent foramen ovale.  6. Left pleural effusion.

## 2018-01-12 NOTE — CHART NOTE - NSCHARTNOTEFT_GEN_A_CORE
**************************************  MIDNIGHT ROUNDS  MIREYA MARTINEZ MD, PGY-2  SPECTRE# [id number]  **************************************  Patient is a 72y old  Male who presents with a chief complaint of Fall (09 Jan 2018 00:52)    HPI:  Patient is a 72 year-old male with past medical history of T2DM, DLD, alcohol misuse brought in by family after having been found face down on the floor for approximately 2 days. Patient was currently residing alone on one floor of the house (wife away in [place]) while other family members including a son live on other floors within the same house. He was not checked upon for 2 days. Patient reports multiple recent episodes of falls at home due to physical instability". He reports chronic alcohol use, which family states ranges from 6-12 beers almost daily. He has been more lethargic lately, with forgetfulness and disorientationHe also endorses fever, chills, dysuria and urinary frequency. Patient denies SOB, chest pain, back pain, diarrhea, melena/hematochezia, recent travel, sick contact or surgery. Patient is originally from [place] and has been in the  for 9 years.     ED course: Initial EKG noted for IW ST-elevations. Patient was hypotensive to 70/40 with HR in 170bpm with Atrial fibrillation + RVR on EKG. Labs notable for WBC 26K, lac 5.2 and troponin 0.6. Given IV NS blus x 5L and multiple DCCV with temporary resumption of NSR in 80s bpm before return to atrial fibrillation. (09 Jan 2018 00:52)      SUBJECTIVE / OVERNIGHT EVENTS:      MEDICATIONS  (STANDING):  aspirin  chewable 81 milliGRAM(s) Oral daily  atorvastatin 80 milliGRAM(s) Oral at bedtime  dextrose 5%. 1000 milliLiter(s) (50 mL/Hr) IV Continuous <Continuous>  dextrose 50% Injectable 12.5 Gram(s) IV Push once  dextrose 50% Injectable 25 Gram(s) IV Push once  dextrose 50% Injectable 25 Gram(s) IV Push once  docusate sodium 100 milliGRAM(s) Oral daily  enoxaparin Injectable 40 milliGRAM(s) SubCutaneous daily  finasteride 5 milliGRAM(s) Oral daily  folic acid 1 milliGRAM(s) Oral daily  influenza   Vaccine 0.5 milliLiter(s) IntraMuscular once  insulin glargine Injectable (LANTUS) 12 Unit(s) SubCutaneous at bedtime  insulin lispro (HumaLOG) corrective regimen sliding scale   SubCutaneous three times a day before meals  insulin lispro (HumaLOG) corrective regimen sliding scale   SubCutaneous at bedtime  insulin lispro Injectable (HumaLOG) 3 Unit(s) SubCutaneous three times a day with meals  multivitamin 1 Tablet(s) Oral daily  pantoprazole    Tablet 40 milliGRAM(s) Oral before breakfast  piperacillin/tazobactam IVPB. 3.375 Gram(s) IV Intermittent every 8 hours  tamsulosin 0.4 milliGRAM(s) Oral at bedtime  thiamine 100 milliGRAM(s) Oral daily  vancomycin  IVPB 1250 milliGRAM(s) IV Intermittent every 8 hours    MEDICATIONS  (PRN):  dextrose Gel 1 Dose(s) Oral once PRN Blood Glucose LESS THAN 70 milliGRAM(s)/deciliter  glucagon  Injectable 1 milliGRAM(s) IntraMuscular once PRN Glucose LESS THAN 70 milligrams/deciliter  polyethylene glycol 3350 17 Gram(s) Oral daily PRN Constipation        CAPILLARY BLOOD GLUCOSE      POCT Blood Glucose.: 134 mg/dL (12 Jan 2018 21:47)  POCT Blood Glucose.: 199 mg/dL (12 Jan 2018 11:59)    I&O's Summary    11 Jan 2018 07:01  -  12 Jan 2018 07:00  --------------------------------------------------------  IN: 982.4 mL / OUT: 2100 mL / NET: -1117.6 mL    12 Jan 2018 07:01  -  12 Jan 2018 22:06  --------------------------------------------------------  IN: 1030 mL / OUT: 1450 mL / NET: -420 mL        PHYSICAL EXAM  GENERAL: NAD, well-developed  HEAD:  Atraumatic, Normocephalic  EYES: EOMI, PERRLA, conjunctiva and sclera clear  NECK: Supple, No JVD  CHEST/LUNG: Clear to auscultation bilaterally; No wheeze  HEART: Regular rate and rhythm; No murmurs, rubs, or gallops  ABDOMEN: Soft, Nontender, Nondistended; Bowel sounds present  EXTREMITIES:  2+ Peripheral Pulses, No clubbing, cyanosis, or edema  PSYCH: AAOx3  SKIN: No rashes or lesions    LABS:                        11.7   10.4  )-----------( 215      ( 12 Jan 2018 04:40 )             33.6     01-12    133<L>  |  101  |  5<L>  ----------------------------<  171<H>  3.8   |  22  |  0.49<L>    Ca    7.7<L>      12 Jan 2018 04:40  Phos  3.7     01-12  Mg     1.8     01-12    TPro  5.8<L>  /  Alb  1.9<L>  /  TBili  0.5  /  DBili  x   /  AST  28  /  ALT  28  /  AlkPhos  91  01-12    PT/INR - ( 11 Jan 2018 04:11 )   PT: 11.5 sec;   INR: 1.05 ratio         PTT - ( 11 Jan 2018 04:11 )  PTT:34.0 sec        A/P: 72 year-old male with past medical history of T2DM, DLD, alcohol misuse brought in by family after having been found face down on the floor for approximately 2 days in A-fib with RVR (170s bpm) s/p failed DCCV found in severe sepsis requiring pressor support a/w NSTEMI likely 2/2 demand, course c/b aortic vegetation concerning for endocarditis pending surgical evaluation  - Currently in NSR  - Holding [facility], pending cath review by CT surgery for possible surgery next week fro AVR. Severe LAD disease noted on [facility].  - Pending CT coronaries (Ca score)  - ID following: c/w vanc/zosyn, check vanco level. Follow up Bartonella, Brucella, Q fever, Legionella serologies  - C/w ASA, statin. Will start BB and ACE when BP tolerates   - Lantus qhs, humalog AC, titrate as needed  - DVT PPx on lovenox

## 2018-01-12 NOTE — PROGRESS NOTE ADULT - ASSESSMENT
72 year-old male with past medical history of T2DM, DLD, alcohol misuse brought in by family after having been found face down on the floor for approximately 2 days.  No fevers. Patient with history of diagnosis of enlarged prostate and urinary frequency. Also complains of dysuria, and bladder pain. Initially had high WBC, now resolving. On treatment for UTI, but now JANES with Aortic Valve lesions. Suspected culture negative endocarditis. Aortic valve vegetation, AMS, leukocytosis, UTI.  - Vancomycin 1250 q 8 (Check trough before next dose--dosing seem high)  - Zosyn 3.375g q 8 for now  - Repeat BCXs  - Send Bartonella, Brucella, Q fever, Legionella serologies, fungal cultures  - Follow up CTS dwight Small MD  Pager [phone]  After 5pm and on weekends call [phone]

## 2018-01-12 NOTE — PROGRESS NOTE ADULT - ATTENDING COMMENTS
Patient is seen and examined with fellow, NP and the CCU house-staff. I agree with the history, physical and the assessment and plan.  plan for cardiac cath for pre-op for IE  f/u with ID and CTA  brillinta held

## 2018-01-12 NOTE — PHYSICAL THERAPY INITIAL EVALUATION ADULT - MODALITIES TREATMENT COMMENTS
3/4/18: midline sternal surgical complication wound 3/4/18: midline sternal surgical complication wound, 1.8cm x 0.4cm x 0.9cm with tunneling 4.2cm at 12:00, wound bed clean, moist, no odor, no purulence, no erythema

## 2018-01-12 NOTE — PROGRESS NOTE ADULT - SUBJECTIVE AND OBJECTIVE BOX
Patient is a 72y old  Male who presents with a chief complaint of Fall (09 Jan 2018 00:52)      Events 	    MEDICATIONS  (STANDING):  aspirin  chewable 81 milliGRAM(s) Oral daily  atorvastatin 80 milliGRAM(s) Oral at bedtime  dextrose 5%. 1000 milliLiter(s) (50 mL/Hr) IV Continuous <Continuous>  dextrose 50% Injectable 12.5 Gram(s) IV Push once  dextrose 50% Injectable 25 Gram(s) IV Push once  dextrose 50% Injectable 25 Gram(s) IV Push once  docusate sodium 100 milliGRAM(s) Oral daily  enoxaparin Injectable 40 milliGRAM(s) SubCutaneous daily  finasteride 5 milliGRAM(s) Oral daily  folic acid 1 milliGRAM(s) Oral daily  influenza   Vaccine 0.5 milliLiter(s) IntraMuscular once  insulin glargine Injectable (LANTUS) 12 Unit(s) SubCutaneous at bedtime  insulin lispro (HumaLOG) corrective regimen sliding scale   SubCutaneous three times a day before meals  insulin lispro (HumaLOG) corrective regimen sliding scale   SubCutaneous at bedtime  insulin lispro Injectable (HumaLOG) 3 Unit(s) SubCutaneous three times a day with meals  multivitamin 1 Tablet(s) Oral daily  pantoprazole    Tablet 40 milliGRAM(s) Oral before breakfast  piperacillin/tazobactam IVPB. 3.375 Gram(s) IV Intermittent every 8 hours  tamsulosin 0.4 milliGRAM(s) Oral at bedtime  thiamine 100 milliGRAM(s) Oral daily  ticagrelor 90 milliGRAM(s) Oral two times a day  vancomycin  IVPB 1250 milliGRAM(s) IV Intermittent every 8 hours    MEDICATIONS  (PRN):  dextrose Gel 1 Dose(s) Oral once PRN Blood Glucose LESS THAN 70 milliGRAM(s)/deciliter  glucagon  Injectable 1 milliGRAM(s) IntraMuscular once PRN Glucose LESS THAN 70 milligrams/deciliter  polyethylene glycol 3350 17 Gram(s) Oral daily PRN Constipation      REVIEW OF SYSTEMS:  Otherwise, 10 point ROS done and otherwise negative.      Vital Signs Last 24 Hrs  T(C): 36.8 (11 Jan 2018 19:44), Max: 36.8 (11 Jan 2018 10:45)  T(F): 98.2 (11 Jan 2018 19:44), Max: 98.2 (11 Jan 2018 10:45)  HR: 76 (12 Jan 2018 06:44) (72 - 90)  BP: 115/54 (12 Jan 2018 06:44) (97/50 - 134/56)  BP(mean): 86 (12 Jan 2018 06:44) (58 - 92)  RR: 13 (12 Jan 2018 06:44) (13 - 27)  SpO2: 96% (12 Jan 2018 06:44) (94% - 99%)  I&O's Summary    11 Jan 2018 07:01  -  12 Jan 2018 07:00  --------------------------------------------------------  IN: 957.4 mL / OUT: 2100 mL / NET: -1142.6 mL      CAPILLARY BLOOD GLUCOSE      POCT Blood Glucose.: 98 mg/dL (11 Jan 2018 21:51)  POCT Blood Glucose.: 125 mg/dL (11 Jan 2018 16:55)        PHYSICAL EXAM:  Appearance: Normal	  HEENT:   Normal oral mucosa, PERRL, EOMI	  Lymphatic: No lymphadenopathy  Cardiovascular: Normal S1 S2, No JVD, No murmurs, No edema  Respiratory: Lungs clear to auscultation	  Psychiatry: A & O x 3, Mood & affect appropriate  Gastrointestinal:  Soft, Non-tender, + BS	  Skin: No rashes, No ecchymoses, No cyanosis	  Neurologic: Non-focal  Extremities: Normal range of motion, No clubbing, cyanosis or edema  Vascular: Peripheral pulses palpable 2+ bilaterally    LABS:                        11.7   10.4  )-----------( 215      ( 12 Jan 2018 04:40 )             33.6                         11.0   11.3  )-----------( 136      ( 11 Jan 2018 04:11 )             31.0     01-12    133<L>  |  101  |  5<L>  ----------------------------<  171<H>  3.8   |  22  |  0.49<L>  01-11    130<L>  |  98  |  9   ----------------------------<  157<H>  3.8   |  22  |  0.44<L>    Ca    7.7<L>      12 Jan 2018 04:40  Ca    7.5<L>      11 Jan 2018 04:11  Phos  3.7     01-12  Mg     1.8     01-12    TPro  5.8<L>  /  Alb  1.9<L>  /  TBili  0.5  /  DBili  x   /  AST  28  /  ALT  28  /  AlkPhos  91  01-12  TPro  5.5<L>  /  Alb  1.9<L>  /  TBili  0.4  /  DBili  x   /  AST  33  /  ALT  29  /  AlkPhos  89  01-11  	 	    PT/INR - ( 11 Jan 2018 04:11 )   PT: 11.5 sec;   INR: 1.05 ratio         PTT - ( 11 Jan 2018 04:11 )  PTT:34.0 sec    proBNP:   01-09 Chol 103 LDL 67 HDL 16<L> Trig 102  HgA1c:   TSH:      09 Jan 2018 05:37 Troponin 0.33 ng/mL /  U/L / CKMB x     / CKMB Units 7.2 ng/mL   09 Jan 2018 00:31 Troponin 0.38 ng/mL /  U/L / CKMB x     / CKMB Units 8.7 ng/mL   08 Jan 2018 17:43 Troponin 0.60 ng/mL /  U/L / CKMB x     / CKMB Units 13.4 ng/mL        TELEMETRY: 	    ECG:  	  RADIOLOGY:  OTHER: 	    PREVIOUS DIAGNOSTIC TESTING:    [ ] Echocardiogram:  [ ]  Catheterization:  [ ] Stress Test:

## 2018-01-12 NOTE — PROGRESS NOTE ADULT - ASSESSMENT
Patient is a 72 year-old male with past medical history of T2DM, DLD, alcohol misuse brought in by family after having been found face down on the floor for approximately 2 days found in hemodynamically unstable A-fib with RVR (170s bpm) s/p multiple cardioversions and initially requiring pressor support in the setting of septic shock 2/2 to culture negative aortic valve endocarditis.     Neuro:   -No active issues    Pulmonary:   -CT Chest notable for atelectasis/trace pleural effusions bilaterally  -Patient continues to endorse cough  -Chest CT notable for trace b/l pleural effusions and mild pulmonary edema; no appreciable infiltrates noted    Cardiovascular:   -JANES with large vegetation (1.4 x 0.8) on aortic valve leaflet  -CT surgery following aortic valve replacement to take place on Tuesday, potentially  -Plan for ischemic workup with cardiac cath today  -currently hemodynamically stable; d/franklin vasopressin on 1/11  -obtain p2y12 level on Monday to eval for level of platelet inhibition  -ASA and brilinta load followed by daily maintenance for now  -Lipitor 80 mg      GI:   -Start bowel regimen for constipation  -Carb controlled diet    /Renal:   -Grossly positive u/a, + leuk esterase, negative nitrites  -Patient has hx of BPH; on home flomax and finasteride  -CT A/P with contrast without evidence of pyelonephritis  -Cystitis vs prostatitis; on broad spectrum abx as above  -Urine cx negative    ID:   -Aortic valve endocarditis of unknown bacterial etiology; blood cultures negative thus far  -Patient underwent  instrumentation with cystoscopy back in November, perhaps that is the route of infection   -ID following; workup for culture negative endocarditis as per ID   -Septic shock resolved   -GC/Chlamydia negative, HIV negative; syphillis negative  -Continue Vanc/Zosyn begun on 1/9  -f/u new set of blood cultures    Endo:   -Endo consulted; recs as follows:   -While inpatient:     - Lantus 12 u    - Humalog 3 u TIDAC    - If NPO, Lantus 8 u    -[place] regimen after discharge as follows:     -d/c home metformin    - Begin Jardiance 10 mg daily    - Glipizide 10 mg BID - instruct pt to hold if not eating    Follow up in endo clinic:   [phone]      Hematology  -Leukocytosis resolved; bandemia resolved  -Abx as above  -trend daily cbc

## 2018-01-13 LAB
ALBUMIN SERPL ELPH-MCNC: 1.8 G/DL — LOW (ref 3.3–5)
ALP SERPL-CCNC: 78 U/L — SIGNIFICANT CHANGE UP (ref 40–120)
ALT FLD-CCNC: 24 U/L RC — SIGNIFICANT CHANGE UP (ref 10–45)
ANION GAP SERPL CALC-SCNC: 8 MMOL/L — SIGNIFICANT CHANGE UP (ref 5–17)
AST SERPL-CCNC: 24 U/L — SIGNIFICANT CHANGE UP (ref 10–40)
BILIRUB SERPL-MCNC: 0.5 MG/DL — SIGNIFICANT CHANGE UP (ref 0.2–1.2)
BUN SERPL-MCNC: 5 MG/DL — LOW (ref 7–23)
CALCIUM SERPL-MCNC: 7.5 MG/DL — LOW (ref 8.4–10.5)
CHLORIDE SERPL-SCNC: 105 MMOL/L — SIGNIFICANT CHANGE UP (ref 96–108)
CO2 SERPL-SCNC: 23 MMOL/L — SIGNIFICANT CHANGE UP (ref 22–31)
CREAT SERPL-MCNC: 0.54 MG/DL — SIGNIFICANT CHANGE UP (ref 0.5–1.3)
GLUCOSE BLDC GLUCOMTR-MCNC: 115 MG/DL — HIGH (ref 70–99)
GLUCOSE BLDC GLUCOMTR-MCNC: 119 MG/DL — HIGH (ref 70–99)
GLUCOSE BLDC GLUCOMTR-MCNC: 121 MG/DL — HIGH (ref 70–99)
GLUCOSE BLDC GLUCOMTR-MCNC: 150 MG/DL — HIGH (ref 70–99)
GLUCOSE BLDC GLUCOMTR-MCNC: 175 MG/DL — HIGH (ref 70–99)
GLUCOSE SERPL-MCNC: 135 MG/DL — HIGH (ref 70–99)
HCT VFR BLD CALC: 32.4 % — LOW (ref 39–50)
HGB BLD-MCNC: 11.1 G/DL — LOW (ref 13–17)
MAGNESIUM SERPL-MCNC: 1.8 MG/DL — SIGNIFICANT CHANGE UP (ref 1.6–2.6)
MCHC RBC-ENTMCNC: 31.9 PG — SIGNIFICANT CHANGE UP (ref 27–34)
MCHC RBC-ENTMCNC: 34.2 GM/DL — SIGNIFICANT CHANGE UP (ref 32–36)
MCV RBC AUTO: 93.2 FL — SIGNIFICANT CHANGE UP (ref 80–100)
MRSA PCR RESULT.: SIGNIFICANT CHANGE UP
PHOSPHATE SERPL-MCNC: 3.1 MG/DL — SIGNIFICANT CHANGE UP (ref 2.5–4.5)
PLATELET # BLD AUTO: 240 K/UL — SIGNIFICANT CHANGE UP (ref 150–400)
POTASSIUM SERPL-MCNC: 3.7 MMOL/L — SIGNIFICANT CHANGE UP (ref 3.5–5.3)
POTASSIUM SERPL-SCNC: 3.7 MMOL/L — SIGNIFICANT CHANGE UP (ref 3.5–5.3)
PROT SERPL-MCNC: 5.4 G/DL — LOW (ref 6–8.3)
RBC # BLD: 3.48 M/UL — LOW (ref 4.2–5.8)
RBC # FLD: 12.1 % — SIGNIFICANT CHANGE UP (ref 10.3–14.5)
S AUREUS DNA NOSE QL NAA+PROBE: SIGNIFICANT CHANGE UP
SODIUM SERPL-SCNC: 136 MMOL/L — SIGNIFICANT CHANGE UP (ref 135–145)
VANCOMYCIN TROUGH SERPL-MCNC: 23.8 UG/ML — HIGH (ref 10–20)
WBC # BLD: 12.5 K/UL — HIGH (ref 3.8–10.5)
WBC # FLD AUTO: 12.5 K/UL — HIGH (ref 3.8–10.5)

## 2018-01-13 PROCEDURE — 93010 ELECTROCARDIOGRAM REPORT: CPT

## 2018-01-13 PROCEDURE — 99233 SBSQ HOSP IP/OBS HIGH 50: CPT

## 2018-01-13 RX ORDER — POTASSIUM CHLORIDE 20 MEQ
20 PACKET (EA) ORAL ONCE
Qty: 0 | Refills: 0 | Status: COMPLETED | OUTPATIENT
Start: 2018-01-13 | End: 2018-01-13

## 2018-01-13 RX ORDER — VANCOMYCIN HCL 1 G
1000 VIAL (EA) INTRAVENOUS EVERY 8 HOURS
Qty: 0 | Refills: 0 | Status: DISCONTINUED | OUTPATIENT
Start: 2018-01-13 | End: 2018-01-14

## 2018-01-13 RX ORDER — VANCOMYCIN HCL 1 G
1000 VIAL (EA) INTRAVENOUS EVERY 8 HOURS
Qty: 0 | Refills: 0 | Status: DISCONTINUED | OUTPATIENT
Start: 2018-01-13 | End: 2018-01-13

## 2018-01-13 RX ORDER — MAGNESIUM SULFATE 500 MG/ML
2 VIAL (ML) INJECTION ONCE
Qty: 0 | Refills: 0 | Status: COMPLETED | OUTPATIENT
Start: 2018-01-13 | End: 2018-01-13

## 2018-01-13 RX ADMIN — Medication 1: at 18:12

## 2018-01-13 RX ADMIN — Medication 100 MILLIGRAM(S): at 11:50

## 2018-01-13 RX ADMIN — PIPERACILLIN AND TAZOBACTAM 25 GRAM(S): 4; .5 INJECTION, POWDER, LYOPHILIZED, FOR SOLUTION INTRAVENOUS at 14:08

## 2018-01-13 RX ADMIN — Medication 20 MILLIEQUIVALENT(S): at 05:58

## 2018-01-13 RX ADMIN — ATORVASTATIN CALCIUM 80 MILLIGRAM(S): 80 TABLET, FILM COATED ORAL at 22:09

## 2018-01-13 RX ADMIN — Medication 81 MILLIGRAM(S): at 11:50

## 2018-01-13 RX ADMIN — Medication 1 TABLET(S): at 11:51

## 2018-01-13 RX ADMIN — INSULIN GLARGINE 12 UNIT(S): 100 INJECTION, SOLUTION SUBCUTANEOUS at 22:10

## 2018-01-13 RX ADMIN — Medication 1 MILLIGRAM(S): at 11:51

## 2018-01-13 RX ADMIN — Medication 166.67 MILLIGRAM(S): at 04:07

## 2018-01-13 RX ADMIN — FINASTERIDE 5 MILLIGRAM(S): 5 TABLET, FILM COATED ORAL at 11:51

## 2018-01-13 RX ADMIN — Medication 250 MILLIGRAM(S): at 18:03

## 2018-01-13 RX ADMIN — Medication 3 UNIT(S): at 18:12

## 2018-01-13 RX ADMIN — Medication 3 UNIT(S): at 08:36

## 2018-01-13 RX ADMIN — ENOXAPARIN SODIUM 40 MILLIGRAM(S): 100 INJECTION SUBCUTANEOUS at 11:49

## 2018-01-13 RX ADMIN — PIPERACILLIN AND TAZOBACTAM 25 GRAM(S): 4; .5 INJECTION, POWDER, LYOPHILIZED, FOR SOLUTION INTRAVENOUS at 22:10

## 2018-01-13 RX ADMIN — Medication 50 GRAM(S): at 05:58

## 2018-01-13 RX ADMIN — TAMSULOSIN HYDROCHLORIDE 0.4 MILLIGRAM(S): 0.4 CAPSULE ORAL at 22:10

## 2018-01-13 RX ADMIN — PIPERACILLIN AND TAZOBACTAM 25 GRAM(S): 4; .5 INJECTION, POWDER, LYOPHILIZED, FOR SOLUTION INTRAVENOUS at 05:58

## 2018-01-13 RX ADMIN — PANTOPRAZOLE SODIUM 40 MILLIGRAM(S): 20 TABLET, DELAYED RELEASE ORAL at 05:59

## 2018-01-13 NOTE — CHART NOTE - NSCHARTNOTEFT_GEN_A_CORE
**************************************  MIDNIGHT ROUNDS  MIREYA MARTINEZ MD, PGY-2  SPECTRE# [id number]  **************************************  Patient is a 72y old  Male who presents with a chief complaint of Fall (09 Jan 2018 00:52)  HPI:  Patient is a 72 year-old male with past medical history of T2DM, DLD, alcohol misuse brought in by family after having been found face down on the floor for approximately 2 days. Patient was currently residing alone on one floor of the house (wife away in [place]) while other family members including a son live on other floors within the same house. He was not checked upon for 2 days. Patient reports multiple recent episodes of falls at home due to physical instability". He reports chronic alcohol use, which family states ranges from 6-12 beers almost daily. He has been more lethargic lately, with forgetfulness and disorientationHe also endorses fever, chills, dysuria and urinary frequency. Patient denies SOB, chest pain, back pain, diarrhea, melena/hematochezia, recent travel, sick contact or surgery. Patient is originally from [place] and has been in the  for 9 years.     ED course: Initial EKG noted for IW ST-elevations. Patient was hypotensive to 70/40 with HR in 170bpm with Atrial fibrillation + RVR on EKG. Labs notable for WBC 26K, lac 5.2 and troponin 0.6. Given IV NS blus x 5L and multiple DCCV with temporary resumption of NSR in 80s bpm before return to atrial fibrillation. (09 Jan 2018 00:52)      SUBJECTIVE / OVERNIGHT EVENTS: No overnight events.      MEDICATIONS  (STANDING):  aspirin  chewable 81 milliGRAM(s) Oral daily  atorvastatin 80 milliGRAM(s) Oral at bedtime  dextrose 5%. 1000 milliLiter(s) (50 mL/Hr) IV Continuous <Continuous>  dextrose 50% Injectable 12.5 Gram(s) IV Push once  dextrose 50% Injectable 25 Gram(s) IV Push once  dextrose 50% Injectable 25 Gram(s) IV Push once  docusate sodium 100 milliGRAM(s) Oral daily  enoxaparin Injectable 40 milliGRAM(s) SubCutaneous daily  finasteride 5 milliGRAM(s) Oral daily  folic acid 1 milliGRAM(s) Oral daily  influenza   Vaccine 0.5 milliLiter(s) IntraMuscular once  insulin glargine Injectable (LANTUS) 12 Unit(s) SubCutaneous at bedtime  insulin lispro (HumaLOG) corrective regimen sliding scale   SubCutaneous three times a day before meals  insulin lispro (HumaLOG) corrective regimen sliding scale   SubCutaneous at bedtime  insulin lispro Injectable (HumaLOG) 3 Unit(s) SubCutaneous three times a day with meals  multivitamin 1 Tablet(s) Oral daily  pantoprazole    Tablet 40 milliGRAM(s) Oral before breakfast  piperacillin/tazobactam IVPB. 3.375 Gram(s) IV Intermittent every 8 hours  tamsulosin 0.4 milliGRAM(s) Oral at bedtime  thiamine 100 milliGRAM(s) Oral daily  vancomycin  IVPB 1000 milliGRAM(s) IV Intermittent every 8 hours    MEDICATIONS  (PRN):  dextrose Gel 1 Dose(s) Oral once PRN Blood Glucose LESS THAN 70 milliGRAM(s)/deciliter  glucagon  Injectable 1 milliGRAM(s) IntraMuscular once PRN Glucose LESS THAN 70 milligrams/deciliter  polyethylene glycol 3350 17 Gram(s) Oral daily PRN Constipation        CAPILLARY BLOOD GLUCOSE      POCT Blood Glucose.: 115 mg/dL (13 Jan 2018 21:15)  POCT Blood Glucose.: 150 mg/dL (13 Jan 2018 18:08)  POCT Blood Glucose.: 175 mg/dL (13 Jan 2018 17:05)  POCT Blood Glucose.: 119 mg/dL (13 Jan 2018 12:17)  POCT Blood Glucose.: 121 mg/dL (13 Jan 2018 08:26)    I&O's Summary    12 Jan 2018 07:01  -  13 Jan 2018 07:00  --------------------------------------------------------  IN: 1430 mL / OUT: 2650 mL / NET: -1220 mL    13 Jan 2018 07:01  -  13 Jan 2018 22:06  --------------------------------------------------------  IN: 905 mL / OUT: 900 mL / NET: 5 mL      ICU Vital Signs Last 24 Hrs  T(C): 36.4 (13 Jan 2018 19:00), Max: 36.6 (13 Jan 2018 08:00)  T(F): 97.5 (13 Jan 2018 19:00), Max: 97.8 (13 Jan 2018 08:00)  HR: 82 (13 Jan 2018 21:00) (54 - 102)  BP: 110/43 (13 Jan 2018 21:00) (92/37 - 123/47)  BP(mean): 67 (13 Jan 2018 21:00) (51 - 88)  ABP: --  ABP(mean): --  RR: 14 (13 Jan 2018 21:00) (13 - 24)  SpO2: 98% (13 Jan 2018 19:00) (98% - 98%)    PHYSICAL EXAM  GENERAL: NAD, well-developed  HEAD:  Atraumatic, Normocephalic  EYES: EOMI, PERRLA, conjunctiva and sclera clear  NECK: Supple, No JVD  CHEST/LUNG: Clear to auscultation bilaterally; No wheeze  HEART: Regular rate and rhythm; No murmurs, rubs, or gallops  ABDOMEN: Soft, Nontender, Nondistended; Bowel sounds present  EXTREMITIES:  2+ Peripheral Pulses, No clubbing, cyanosis, or edema  PSYCH: AAOx3      LABS:                        11.1   12.5  )-----------( 240      ( 13 Jan 2018 04:56 )             32.4     01-13    136  |  105  |  5<L>  ----------------------------<  135<H>  3.7   |  23  |  0.54    Ca    7.5<L>      13 Jan 2018 04:56  Phos  3.1     01-13  Mg     1.8     01-13    TPro  5.4<L>  /  Alb  1.8<L>  /  TBili  0.5  /  DBili  x   /  AST  24  /  ALT  24  /  AlkPhos  78  01-13      A/P: 72 year-old male with past medical history of T2DM, DLD, alcohol misuse brought in by family after having been found face down on the floor for approximately 2 days in A-fib with RVR (170s bpm) s/p failed DCCV found in severe sepsis requiring pressor support a/w NSTEMI likely 2/2 demand, course c/b aortic vegetation concerning for endocarditis pending surgical evaluation  - Currently in NSR  - Holding Brilinta, pending cath review by CT surgery for possible surgery next week for AVR. Severe LAD disease noted on LHC. (Discussion likely concluded re-eval for surgical intervention in 3 mo given vegetation is likely chronic)  - ID following: c/w vanc/zosyn. Vanco level supratherapeutic, decreased dose. Follow up culture negative endocardisits work-up:  Bartonella, Brucella, Q fever, Legionella serologies  - C/w ASA, statin. Will start BB and ACE when BP tolerates   - Lantus qhs, humalog AC, titrate as needed  - DVT PPx on lovenox. **************************************  MIDNIGHT ROUNDS  MIREYA MARTINEZ MD, PGY-2  SPECTRE# [id number]  **************************************  Patient is a 72y old  Male who presents with a chief complaint of Fall (09 Jan 2018 00:52)  HPI:  Patient is a 72 year-old male with past medical history of T2DM, DLD, alcohol misuse brought in by family after having been found face down on the floor for approximately 2 days. Patient was currently residing alone on one floor of the house (wife away in [place]) while other family members including a son live on other floors within the same house. He was not checked upon for 2 days. Patient reports multiple recent episodes of falls at home due to physical instability". He reports chronic alcohol use, which family states ranges from 6-12 beers almost daily. He has been more lethargic lately, with forgetfulness and disorientationHe also endorses fever, chills, dysuria and urinary frequency. Patient denies SOB, chest pain, back pain, diarrhea, melena/hematochezia, recent travel, sick contact or surgery. Patient is originally from [place] and has been in the  for 9 years.     ED course: Initial EKG noted for IW ST-elevations. Patient was hypotensive to 70/40 with HR in 170bpm with Atrial fibrillation + RVR on EKG. Labs notable for WBC 26K, lac 5.2 and troponin 0.6. Given IV NS blus x 5L and multiple DCCV with temporary resumption of NSR in 80s bpm before return to atrial fibrillation. (09 Jan 2018 00:52)      SUBJECTIVE / OVERNIGHT EVENTS: No overnight events.      MEDICATIONS  (STANDING):  aspirin  chewable 81 milliGRAM(s) Oral daily  atorvastatin 80 milliGRAM(s) Oral at bedtime  dextrose 5%. 1000 milliLiter(s) (50 mL/Hr) IV Continuous <Continuous>  dextrose 50% Injectable 12.5 Gram(s) IV Push once  dextrose 50% Injectable 25 Gram(s) IV Push once  dextrose 50% Injectable 25 Gram(s) IV Push once  docusate sodium 100 milliGRAM(s) Oral daily  enoxaparin Injectable 40 milliGRAM(s) SubCutaneous daily  finasteride 5 milliGRAM(s) Oral daily  folic acid 1 milliGRAM(s) Oral daily  influenza   Vaccine 0.5 milliLiter(s) IntraMuscular once  insulin glargine Injectable (LANTUS) 12 Unit(s) SubCutaneous at bedtime  insulin lispro (HumaLOG) corrective regimen sliding scale   SubCutaneous three times a day before meals  insulin lispro (HumaLOG) corrective regimen sliding scale   SubCutaneous at bedtime  insulin lispro Injectable (HumaLOG) 3 Unit(s) SubCutaneous three times a day with meals  multivitamin 1 Tablet(s) Oral daily  pantoprazole    Tablet 40 milliGRAM(s) Oral before breakfast  piperacillin/tazobactam IVPB. 3.375 Gram(s) IV Intermittent every 8 hours  tamsulosin 0.4 milliGRAM(s) Oral at bedtime  thiamine 100 milliGRAM(s) Oral daily  vancomycin  IVPB 1000 milliGRAM(s) IV Intermittent every 8 hours    MEDICATIONS  (PRN):  dextrose Gel 1 Dose(s) Oral once PRN Blood Glucose LESS THAN 70 milliGRAM(s)/deciliter  glucagon  Injectable 1 milliGRAM(s) IntraMuscular once PRN Glucose LESS THAN 70 milligrams/deciliter  polyethylene glycol 3350 17 Gram(s) Oral daily PRN Constipation        CAPILLARY BLOOD GLUCOSE      POCT Blood Glucose.: 115 mg/dL (13 Jan 2018 21:15)  POCT Blood Glucose.: 150 mg/dL (13 Jan 2018 18:08)  POCT Blood Glucose.: 175 mg/dL (13 Jan 2018 17:05)  POCT Blood Glucose.: 119 mg/dL (13 Jan 2018 12:17)  POCT Blood Glucose.: 121 mg/dL (13 Jan 2018 08:26)    I&O's Summary    12 Jan 2018 07:01  -  13 Jan 2018 07:00  --------------------------------------------------------  IN: 1430 mL / OUT: 2650 mL / NET: -1220 mL    13 Jan 2018 07:01  -  13 Jan 2018 22:06  --------------------------------------------------------  IN: 905 mL / OUT: 900 mL / NET: 5 mL      ICU Vital Signs Last 24 Hrs  T(C): 36.4 (13 Jan 2018 19:00), Max: 36.6 (13 Jan 2018 08:00)  T(F): 97.5 (13 Jan 2018 19:00), Max: 97.8 (13 Jan 2018 08:00)  HR: 82 (13 Jan 2018 21:00) (54 - 102)  BP: 110/43 (13 Jan 2018 21:00) (92/37 - 123/47)  BP(mean): 67 (13 Jan 2018 21:00) (51 - 88)  ABP: --  ABP(mean): --  RR: 14 (13 Jan 2018 21:00) (13 - 24)  SpO2: 98% (13 Jan 2018 19:00) (98% - 98%)    PHYSICAL EXAM  GENERAL: NAD, well-developed  HEAD:  Atraumatic, Normocephalic  EYES: EOMI, PERRLA, conjunctiva and sclera clear  NECK: Supple, No JVD  CHEST/LUNG: Clear to auscultation bilaterally; No wheeze  HEART: Regular rate and rhythm; No murmurs, rubs, or gallops  ABDOMEN: Soft, Nontender, Nondistended; Bowel sounds present  EXTREMITIES:  2+ Peripheral Pulses, No clubbing, cyanosis, or edema  PSYCH: AAOx3      LABS:                        11.1   12.5  )-----------( 240      ( 13 Jan 2018 04:56 )             32.4     01-13    136  |  105  |  5<L>  ----------------------------<  135<H>  3.7   |  23  |  0.54    Ca    7.5<L>      13 Jan 2018 04:56  Phos  3.1     01-13  Mg     1.8     01-13    TPro  5.4<L>  /  Alb  1.8<L>  /  TBili  0.5  /  DBili  x   /  AST  24  /  ALT  24  /  AlkPhos  78  01-13      A/P: 72 year-old male with past medical history of T2DM, DLD, alcohol misuse brought in by family after having been found face down on the floor for approximately 2 days in A-fib with RVR (170s bpm) s/p failed DCCV found in severe sepsis requiring pressor support a/w NSTEMI likely 2/2 demand, course c/b aortic vegetation concerning for endocarditis pending surgical evaluation  - Currently in NSR  - Holding Brilinta, pending cath review by CT surgery, severe LAD disease noted on LHC.  Per CTS likely CABG/AVR decision will depend on re-evaluation in 3 months. (calcified vegetation is likely chronic)  - ID following: c/w vanc/zosyn. Vanco level supratherapeutic, decreased dose. Follow up culture negative endocarditis work-up:  Bartonella, Brucella, Q fever, Legionella serologies  - C/w ASA, statin. Will start BB and ACE when BP tolerates   - Lantus qhs, humalog AC, titrate as needed  - DVT PPx on lovenox.

## 2018-01-13 NOTE — PROGRESS NOTE ADULT - ASSESSMENT
Patient is a 72 year-old male with past medical history of T2DM, DLD, alcohol misuse brought in by family after having been found face down on the floor for approximately 2 days found in hemodynamically unstable A-fib with RVR (170s bpm) s/p multiple cardioversions and initially requiring pressor support in the setting of septic shock 2/2 to culture negative aortic valve endocarditis.     Neuro:   -No active issues    Pulmonary:   -CT Chest notable for atelectasis/trace pleural effusions bilaterally  -Patient continues to endorse cough  -Chest CT notable for trace b/l pleural effusions and mild pulmonary edema; no appreciable infiltrates noted    Cardiovascular:   -JANES with large vegetation (1.4 x 0.8) on aortic valve leaflet  -CT surgery following aortic valve replacement to take place on Tuesday, potentially  -Plan for ischemic workup with cardiac cath today  -currently hemodynamically stable; d/franklin vasopressin on 1/11  -obtain p2y12 level on Monday to eval for level of platelet inhibition  -ASA and brilinta load followed by daily maintenance for now  -Lipitor 80 mg      GI:   -Start bowel regimen for constipation  -Carb controlled diet    /Renal:   -Grossly positive u/a, + leuk esterase, negative nitrites  -Patient has hx of BPH; on home flomax and finasteride  -CT A/P with contrast without evidence of pyelonephritis  -Cystitis vs prostatitis; on broad spectrum abx as above  -Urine cx negative    ID:   -Aortic valve endocarditis of unknown bacterial etiology; blood cultures negative thus far  -Patient underwent  instrumentation with cystoscopy back in November, perhaps that is the route of infection   -ID following; workup for culture negative endocarditis as per ID   -Septic shock resolved   -GC/Chlamydia negative, HIV negative; syphillis negative  -Continue Vanc/Zosyn begun on 1/9  -f/u new set of blood cultures    Endo:   -Endo consulted; recs as follows:   -While inpatient:     - Lantus 12 u    - Humalog 3 u TIDAC    - If NPO, Lantus 8 u    -[place] regimen after discharge as follows:     -d/c home metformin    - Begin Jardiance 10 mg daily    - Glipizide 10 mg BID - instruct pt to hold if not eating    Follow up in endo clinic:   [phone]      Hematology  -Leukocytosis resolved; bandemia resolved  -Abx as above  -trend daily cbc Patient is a 72 year-old male with past medical history of T2DM, DLD, alcohol misuse brought in by family after having been found face down on the floor for approximately 2 days found in hemodynamically unstable A-fib with RVR (170s bpm) s/p multiple cardioversions and initially requiring pressor support in the setting of septic shock 2/2 to culture negative aortic valve endocarditis.     Neuro:   -No active issues    Pulmonary:   -CT Chest notable for atelectasis/trace pleural effusions bilaterally  -Patient continues to endorse cough  -Mild bibasilar crackles   -No plan for diuresis at the moment; would diurese with Lasix only if patient's becomes uncomfortable     Cardiovascular:   1) Endocarditis of aortic valve  -JANES with large vegetation (1.4 x 0.8) on aortic valve leaflet  -Per CT surgery, vegetation appears chronic, heavily calcified. Unlikely to embolize. Thus, patient will be re-evaluated for aortic valve replacement surgery in 3 months.   -Will continue with vanc/zosyn. Patient will require PICC line; rehab placement.   -Await final ID recs  -Goal for vanc trough to remain between 15-20. Lowered dose to 1 g q8hr as trough was elevated to 24.     2) CAD  -Cath with severe proximal LAD disease (70% stenosis), 60% stenosis of RCA  -Plan for CABG? Await CT's note.   -Continue ASA, no need to restart brillinta as pt is not stented  -Continue Lipitor      GI:   -Start bowel regimen for constipation  -Carb controlled diet    /Renal:   -Grossly positive u/a, + leuk esterase, negative nitrites  -Patient has hx of BPH; on home flomax and finasteride  -CT A/P with contrast without evidence of pyelonephritis  -Cystitis vs prostatitis; on broad spectrum abx as above  -Urine cx negative    ID:   -Aortic valve endocarditis of unknown bacterial etiology; blood cultures negative thus far  -Patient underwent  instrumentation with cystoscopy back in November, perhaps that is the route of infection   -ID following; workup for culture negative endocarditis as per ID   -Septic shock resolved   -GC/Chlamydia negative, HIV negative; syphillis negative  -Continue Vanc/Zosyn begun on 1/9  -f/u new set of blood cultures    Endo:   -Endo consulted; recs as follows:   -While inpatient:     - Lantus 12 u    - Humalog 3 u TIDAC    - If NPO, Lantus 8 u    -[place] regimen after discharge as follows:     -d/c home metformin    - Begin Jardiance 10 mg daily    - Glipizide 10 mg BID - instruct pt to hold if not eating    Follow up in endo clinic:   [phone]      Hematology  -Leukocytosis resolved; bandemia resolved  -Abx as above  -trend daily cbc

## 2018-01-13 NOTE — PROGRESS NOTE ADULT - SUBJECTIVE AND OBJECTIVE BOX
Patient is a 72y old  Male who presents with a chief complaint of Fall (09 Jan 2018 00:52)      Events 	    MEDICATIONS  (STANDING):  aspirin  chewable 81 milliGRAM(s) Oral daily  atorvastatin 80 milliGRAM(s) Oral at bedtime  dextrose 5%. 1000 milliLiter(s) (50 mL/Hr) IV Continuous <Continuous>  dextrose 50% Injectable 12.5 Gram(s) IV Push once  dextrose 50% Injectable 25 Gram(s) IV Push once  dextrose 50% Injectable 25 Gram(s) IV Push once  docusate sodium 100 milliGRAM(s) Oral daily  enoxaparin Injectable 40 milliGRAM(s) SubCutaneous daily  finasteride 5 milliGRAM(s) Oral daily  folic acid 1 milliGRAM(s) Oral daily  influenza   Vaccine 0.5 milliLiter(s) IntraMuscular once  insulin glargine Injectable (LANTUS) 12 Unit(s) SubCutaneous at bedtime  insulin lispro (HumaLOG) corrective regimen sliding scale   SubCutaneous three times a day before meals  insulin lispro (HumaLOG) corrective regimen sliding scale   SubCutaneous at bedtime  insulin lispro Injectable (HumaLOG) 3 Unit(s) SubCutaneous three times a day with meals  multivitamin 1 Tablet(s) Oral daily  pantoprazole    Tablet 40 milliGRAM(s) Oral before breakfast  piperacillin/tazobactam IVPB. 3.375 Gram(s) IV Intermittent every 8 hours  tamsulosin 0.4 milliGRAM(s) Oral at bedtime  thiamine 100 milliGRAM(s) Oral daily  vancomycin  IVPB 1250 milliGRAM(s) IV Intermittent every 8 hours    MEDICATIONS  (PRN):  dextrose Gel 1 Dose(s) Oral once PRN Blood Glucose LESS THAN 70 milliGRAM(s)/deciliter  glucagon  Injectable 1 milliGRAM(s) IntraMuscular once PRN Glucose LESS THAN 70 milligrams/deciliter  polyethylene glycol 3350 17 Gram(s) Oral daily PRN Constipation      REVIEW OF SYSTEMS:  Otherwise, 10 point ROS done and otherwise negative.      Vital Signs Last 24 Hrs  T(C): 36.3 (12 Jan 2018 19:45), Max: 36.7 (12 Jan 2018 17:20)  T(F): 97.4 (12 Jan 2018 19:45), Max: 98 (12 Jan 2018 17:20)  HR: 84 (13 Jan 2018 07:00) (54 - 100)  BP: 97/40 (13 Jan 2018 07:00) (87/48 - 116/50)  BP(mean): 61 (13 Jan 2018 07:00) (54 - 78)  RR: 16 (13 Jan 2018 07:00) (13 - 24)  SpO2: 97% (12 Jan 2018 17:20) (97% - 98%)  I&O's Summary    12 Jan 2018 07:01  -  13 Jan 2018 07:00  --------------------------------------------------------  IN: 1380 mL / OUT: 2650 mL / NET: -1270 mL      CAPILLARY BLOOD GLUCOSE      POCT Blood Glucose.: 134 mg/dL (12 Jan 2018 21:47)  POCT Blood Glucose.: 199 mg/dL (12 Jan 2018 11:59)        PHYSICAL EXAM:  Appearance: Normal	  HEENT:   Normal oral mucosa, PERRL, EOMI	  Lymphatic: No lymphadenopathy  Cardiovascular: Normal S1 S2, No JVD, No murmurs, No edema  Respiratory: Lungs clear to auscultation	  Psychiatry: A & O x 3, Mood & affect appropriate  Gastrointestinal:  Soft, Non-tender, + BS	  Skin: No rashes, No ecchymoses, No cyanosis	  Neurologic: Non-focal  Extremities: Normal range of motion, No clubbing, cyanosis or edema  Vascular: Peripheral pulses palpable 2+ bilaterally    LABS:                        11.1   12.5  )-----------( 240      ( 13 Jan 2018 04:56 )             32.4                         11.7   10.4  )-----------( 215      ( 12 Jan 2018 04:40 )             33.6     01-13    136  |  105  |  5<L>  ----------------------------<  135<H>  3.7   |  23  |  0.54  01-12    133<L>  |  101  |  5<L>  ----------------------------<  171<H>  3.8   |  22  |  0.49<L>    Ca    7.5<L>      13 Jan 2018 04:56  Ca    7.7<L>      12 Jan 2018 04:40  Phos  3.1     01-13  Mg     1.8     01-13    TPro  5.4<L>  /  Alb  1.8<L>  /  TBili  0.5  /  DBili  x   /  AST  24  /  ALT  24  /  AlkPhos  78  01-13  TPro  5.8<L>  /  Alb  1.9<L>  /  TBili  0.5  /  DBili  x   /  AST  28  /  ALT  28  /  AlkPhos  91  01-12  	 	        proBNP:   01-09 Chol 103 LDL 67 HDL 16<L> Trig 102  HgA1c:   TSH:      09 Jan 2018 05:37 Troponin 0.33 ng/mL /  U/L / CKMB x     / CKMB Units 7.2 ng/mL   09 Jan 2018 00:31 Troponin 0.38 ng/mL /  U/L / CKMB x     / CKMB Units 8.7 ng/mL   08 Jan 2018 17:43 Troponin 0.60 ng/mL /  U/L / CKMB x     / CKMB Units 13.4 ng/mL        TELEMETRY: 	    ECG:  	  RADIOLOGY:  OTHER: 	    PREVIOUS DIAGNOSTIC TESTING:    [ ] Echocardiogram:  [ ]  Catheterization:  [ ] Stress Test: Patient is a 72y old  Male who presents with a chief complaint of Fall (09 Jan 2018 00:52)      Events 	  No events ON. Patient continues to complain of cough.     MEDICATIONS  (STANDING):  aspirin  chewable 81 milliGRAM(s) Oral daily  atorvastatin 80 milliGRAM(s) Oral at bedtime  dextrose 5%. 1000 milliLiter(s) (50 mL/Hr) IV Continuous <Continuous>  dextrose 50% Injectable 12.5 Gram(s) IV Push once  dextrose 50% Injectable 25 Gram(s) IV Push once  dextrose 50% Injectable 25 Gram(s) IV Push once  docusate sodium 100 milliGRAM(s) Oral daily  enoxaparin Injectable 40 milliGRAM(s) SubCutaneous daily  finasteride 5 milliGRAM(s) Oral daily  folic acid 1 milliGRAM(s) Oral daily  influenza   Vaccine 0.5 milliLiter(s) IntraMuscular once  insulin glargine Injectable (LANTUS) 12 Unit(s) SubCutaneous at bedtime  insulin lispro (HumaLOG) corrective regimen sliding scale   SubCutaneous three times a day before meals  insulin lispro (HumaLOG) corrective regimen sliding scale   SubCutaneous at bedtime  insulin lispro Injectable (HumaLOG) 3 Unit(s) SubCutaneous three times a day with meals  multivitamin 1 Tablet(s) Oral daily  pantoprazole    Tablet 40 milliGRAM(s) Oral before breakfast  piperacillin/tazobactam IVPB. 3.375 Gram(s) IV Intermittent every 8 hours  tamsulosin 0.4 milliGRAM(s) Oral at bedtime  thiamine 100 milliGRAM(s) Oral daily  vancomycin  IVPB 1250 milliGRAM(s) IV Intermittent every 8 hours    MEDICATIONS  (PRN):  dextrose Gel 1 Dose(s) Oral once PRN Blood Glucose LESS THAN 70 milliGRAM(s)/deciliter  glucagon  Injectable 1 milliGRAM(s) IntraMuscular once PRN Glucose LESS THAN 70 milligrams/deciliter  polyethylene glycol 3350 17 Gram(s) Oral daily PRN Constipation      REVIEW OF SYSTEMS:  Otherwise, 10 point ROS done and otherwise negative.      Vital Signs Last 24 Hrs  T(C): 36.3 (12 Jan 2018 19:45), Max: 36.7 (12 Jan 2018 17:20)  T(F): 97.4 (12 Jan 2018 19:45), Max: 98 (12 Jan 2018 17:20)  HR: 84 (13 Jan 2018 07:00) (54 - 100)  BP: 97/40 (13 Jan 2018 07:00) (87/48 - 116/50)  BP(mean): 61 (13 Jan 2018 07:00) (54 - 78)  RR: 16 (13 Jan 2018 07:00) (13 - 24)  SpO2: 97% (12 Jan 2018 17:20) (97% - 98%)  I&O's Summary    12 Jan 2018 07:01  -  13 Jan 2018 07:00  --------------------------------------------------------  IN: 1380 mL / OUT: 2650 mL / NET: -1270 mL      CAPILLARY BLOOD GLUCOSE      POCT Blood Glucose.: 134 mg/dL (12 Jan 2018 21:47)  POCT Blood Glucose.: 199 mg/dL (12 Jan 2018 11:59)        PHYSICAL EXAM:  Appearance: Normal	  HEENT:   Normal oral mucosa, PERRL, EOMI	  Lymphatic: No lymphadenopathy  Cardiovascular: Normal S1 S2, No JVD, No murmurs, No edema  Respiratory: Lungs clear to auscultation	  Psychiatry: A & O x 3, Mood & affect appropriate  Gastrointestinal:  Soft, Non-tender, + BS	  Skin: No rashes, No ecchymoses, No cyanosis	  Neurologic: Non-focal  Extremities: Normal range of motion, No clubbing, cyanosis or edema  Vascular: Peripheral pulses palpable 2+ bilaterally    LABS:                        11.1   12.5  )-----------( 240      ( 13 Jan 2018 04:56 )             32.4                         11.7   10.4  )-----------( 215      ( 12 Jan 2018 04:40 )             33.6     01-13    136  |  105  |  5<L>  ----------------------------<  135<H>  3.7   |  23  |  0.54  01-12    133<L>  |  101  |  5<L>  ----------------------------<  171<H>  3.8   |  22  |  0.49<L>    Ca    7.5<L>      13 Jan 2018 04:56  Ca    7.7<L>      12 Jan 2018 04:40  Phos  3.1     01-13  Mg     1.8     01-13    TPro  5.4<L>  /  Alb  1.8<L>  /  TBili  0.5  /  DBili  x   /  AST  24  /  ALT  24  /  AlkPhos  78  01-13  TPro  5.8<L>  /  Alb  1.9<L>  /  TBili  0.5  /  DBili  x   /  AST  28  /  ALT  28  /  AlkPhos  91  01-12  	 	        proBNP:   01-09 Chol 103 LDL 67 HDL 16<L> Trig 102  HgA1c:   TSH:      09 Jan 2018 05:37 Troponin 0.33 ng/mL /  U/L / CKMB x     / CKMB Units 7.2 ng/mL   09 Jan 2018 00:31 Troponin 0.38 ng/mL /  U/L / CKMB x     / CKMB Units 8.7 ng/mL   08 Jan 2018 17:43 Troponin 0.60 ng/mL /  U/L / CKMB x     / CKMB Units 13.4 ng/mL        TELEMETRY: 	    ECG:  	  RADIOLOGY:  OTHER: 	    PREVIOUS DIAGNOSTIC TESTING:    [ ] Echocardiogram:  [ ]  Catheterization:  [ ] Stress Test:

## 2018-01-14 LAB
ALBUMIN SERPL ELPH-MCNC: 1.8 G/DL — LOW (ref 3.3–5)
ALP SERPL-CCNC: 79 U/L — SIGNIFICANT CHANGE UP (ref 40–120)
ALT FLD-CCNC: 22 U/L RC — SIGNIFICANT CHANGE UP (ref 10–45)
ANION GAP SERPL CALC-SCNC: 10 MMOL/L — SIGNIFICANT CHANGE UP (ref 5–17)
APTT BLD: 36.3 SEC — SIGNIFICANT CHANGE UP (ref 27.5–37.4)
AST SERPL-CCNC: 31 U/L — SIGNIFICANT CHANGE UP (ref 10–40)
BILIRUB SERPL-MCNC: 0.4 MG/DL — SIGNIFICANT CHANGE UP (ref 0.2–1.2)
BUN SERPL-MCNC: 14 MG/DL — SIGNIFICANT CHANGE UP (ref 7–23)
CALCIUM SERPL-MCNC: 7.5 MG/DL — LOW (ref 8.4–10.5)
CHLORIDE SERPL-SCNC: 102 MMOL/L — SIGNIFICANT CHANGE UP (ref 96–108)
CHLORIDE UR-SCNC: <35 MMOL/L — SIGNIFICANT CHANGE UP
CO2 SERPL-SCNC: 21 MMOL/L — LOW (ref 22–31)
CREAT ?TM UR-MCNC: 28 MG/DL — SIGNIFICANT CHANGE UP
CREAT SERPL-MCNC: 1.59 MG/DL — HIGH (ref 0.5–1.3)
CULTURE RESULTS: SIGNIFICANT CHANGE UP
CULTURE RESULTS: SIGNIFICANT CHANGE UP
GLUCOSE BLDC GLUCOMTR-MCNC: 100 MG/DL — HIGH (ref 70–99)
GLUCOSE BLDC GLUCOMTR-MCNC: 103 MG/DL — HIGH (ref 70–99)
GLUCOSE BLDC GLUCOMTR-MCNC: 134 MG/DL — HIGH (ref 70–99)
GLUCOSE BLDC GLUCOMTR-MCNC: 85 MG/DL — SIGNIFICANT CHANGE UP (ref 70–99)
GLUCOSE BLDC GLUCOMTR-MCNC: 85 MG/DL — SIGNIFICANT CHANGE UP (ref 70–99)
GLUCOSE SERPL-MCNC: 105 MG/DL — HIGH (ref 70–99)
HCT VFR BLD CALC: 32.3 % — LOW (ref 39–50)
HGB BLD-MCNC: 11 G/DL — LOW (ref 13–17)
INR BLD: 1.04 RATIO — SIGNIFICANT CHANGE UP (ref 0.88–1.16)
MAGNESIUM SERPL-MCNC: 2.2 MG/DL — SIGNIFICANT CHANGE UP (ref 1.6–2.6)
MCHC RBC-ENTMCNC: 32.5 PG — SIGNIFICANT CHANGE UP (ref 27–34)
MCHC RBC-ENTMCNC: 34.2 GM/DL — SIGNIFICANT CHANGE UP (ref 32–36)
MCV RBC AUTO: 94.9 FL — SIGNIFICANT CHANGE UP (ref 80–100)
OSMOLALITY UR: 100 MOS/KG — LOW (ref 300–900)
PHOSPHATE SERPL-MCNC: 4.5 MG/DL — SIGNIFICANT CHANGE UP (ref 2.5–4.5)
PLATELET # BLD AUTO: 233 K/UL — SIGNIFICANT CHANGE UP (ref 150–400)
POTASSIUM SERPL-MCNC: 4.1 MMOL/L — SIGNIFICANT CHANGE UP (ref 3.5–5.3)
POTASSIUM SERPL-SCNC: 4.1 MMOL/L — SIGNIFICANT CHANGE UP (ref 3.5–5.3)
PROT SERPL-MCNC: 5.4 G/DL — LOW (ref 6–8.3)
PROTHROM AB SERPL-ACNC: 11.2 SEC — SIGNIFICANT CHANGE UP (ref 9.8–12.7)
RBC # BLD: 3.4 M/UL — LOW (ref 4.2–5.8)
RBC # FLD: 12.6 % — SIGNIFICANT CHANGE UP (ref 10.3–14.5)
SODIUM SERPL-SCNC: 133 MMOL/L — LOW (ref 135–145)
SODIUM UR-SCNC: <20 MMOL/L — SIGNIFICANT CHANGE UP
SPECIMEN SOURCE: SIGNIFICANT CHANGE UP
SPECIMEN SOURCE: SIGNIFICANT CHANGE UP
UUN UR-MCNC: 150 MG/DL — SIGNIFICANT CHANGE UP
VANCOMYCIN FLD-MCNC: 29.8 UG/ML
WBC # BLD: 16.4 K/UL — HIGH (ref 3.8–10.5)
WBC # FLD AUTO: 16.4 K/UL — HIGH (ref 3.8–10.5)

## 2018-01-14 PROCEDURE — 99232 SBSQ HOSP IP/OBS MODERATE 35: CPT

## 2018-01-14 PROCEDURE — 99233 SBSQ HOSP IP/OBS HIGH 50: CPT

## 2018-01-14 PROCEDURE — 93010 ELECTROCARDIOGRAM REPORT: CPT

## 2018-01-14 RX ORDER — METOPROLOL TARTRATE 50 MG
12.5 TABLET ORAL
Qty: 0 | Refills: 0 | Status: DISCONTINUED | OUTPATIENT
Start: 2018-01-14 | End: 2018-01-30

## 2018-01-14 RX ORDER — TICAGRELOR 90 MG/1
90 TABLET ORAL EVERY 12 HOURS
Qty: 0 | Refills: 0 | Status: DISCONTINUED | OUTPATIENT
Start: 2018-01-14 | End: 2018-01-26

## 2018-01-14 RX ADMIN — PIPERACILLIN AND TAZOBACTAM 25 GRAM(S): 4; .5 INJECTION, POWDER, LYOPHILIZED, FOR SOLUTION INTRAVENOUS at 22:46

## 2018-01-14 RX ADMIN — TAMSULOSIN HYDROCHLORIDE 0.4 MILLIGRAM(S): 0.4 CAPSULE ORAL at 21:42

## 2018-01-14 RX ADMIN — Medication 250 MILLIGRAM(S): at 02:21

## 2018-01-14 RX ADMIN — Medication 1 MILLIGRAM(S): at 11:43

## 2018-01-14 RX ADMIN — Medication 100 MILLIGRAM(S): at 11:43

## 2018-01-14 RX ADMIN — PIPERACILLIN AND TAZOBACTAM 25 GRAM(S): 4; .5 INJECTION, POWDER, LYOPHILIZED, FOR SOLUTION INTRAVENOUS at 05:22

## 2018-01-14 RX ADMIN — POLYETHYLENE GLYCOL 3350 17 GRAM(S): 17 POWDER, FOR SOLUTION ORAL at 11:42

## 2018-01-14 RX ADMIN — ENOXAPARIN SODIUM 40 MILLIGRAM(S): 100 INJECTION SUBCUTANEOUS at 11:48

## 2018-01-14 RX ADMIN — Medication 81 MILLIGRAM(S): at 11:43

## 2018-01-14 RX ADMIN — PANTOPRAZOLE SODIUM 40 MILLIGRAM(S): 20 TABLET, DELAYED RELEASE ORAL at 07:15

## 2018-01-14 RX ADMIN — FINASTERIDE 5 MILLIGRAM(S): 5 TABLET, FILM COATED ORAL at 11:43

## 2018-01-14 RX ADMIN — Medication 3 UNIT(S): at 17:52

## 2018-01-14 RX ADMIN — Medication 3 UNIT(S): at 08:30

## 2018-01-14 RX ADMIN — INSULIN GLARGINE 12 UNIT(S): 100 INJECTION, SOLUTION SUBCUTANEOUS at 21:42

## 2018-01-14 RX ADMIN — ATORVASTATIN CALCIUM 80 MILLIGRAM(S): 80 TABLET, FILM COATED ORAL at 21:42

## 2018-01-14 RX ADMIN — PIPERACILLIN AND TAZOBACTAM 25 GRAM(S): 4; .5 INJECTION, POWDER, LYOPHILIZED, FOR SOLUTION INTRAVENOUS at 14:31

## 2018-01-14 RX ADMIN — TICAGRELOR 90 MILLIGRAM(S): 90 TABLET ORAL at 21:42

## 2018-01-14 RX ADMIN — Medication 3 UNIT(S): at 12:55

## 2018-01-14 RX ADMIN — Medication 1 TABLET(S): at 11:43

## 2018-01-14 NOTE — PROGRESS NOTE ADULT - ASSESSMENT
72 year-old male with past medical history of T2DM, DLD, alcohol misuse brought in by family after having been found face down on the floor for approximately 2 days.  No fevers. Patient with history of diagnosis of enlarged prostate and urinary frequency. Also complains of dysuria, and bladder pain. Initially had high WBC, now resolving. On treatment for UTI, but now JANES with Aortic Valve lesions. Suspected culture negative endocarditis. Aortic valve vegetation, AMS, leukocytosis, UTI.    - Would dose vanco by level until renal function stable - would hold for now and check a level and redose if <20.  - Continue Zosyn 3.375g q 8 for now  - All cultures NGTD  - F/U Bartonella, Brucella, Q fever, Legionella serologies, fungal cultures  - Follow up CTS eval  - Monitor CrCl  - Next CBC check with diff.    Yo Feliciano MD  Pager [phone]  After 5pm/weekends call [phone]

## 2018-01-14 NOTE — PROGRESS NOTE ADULT - SUBJECTIVE AND OBJECTIVE BOX
72M found down admitted for shock found to have aortic valve vegetation culture negative on abx with resolving shock.  Patient has moderate to severe AI/ moderate to severe AS with >1cm vegetation on aortic valve with no evidence of emboli.  Pt currently asx, denies SOB, with normal EF and nondilated LV, no evidence of heart block.  Pt does not need urgent surgery at this time, recommend pt to have f/u TTE after completion of course of abx to evaluate for progression of AI/AS.  Pt would need AVR should the aortic valve lesions progress.  Pt would need more urgent surgery should he develop worsening heart failure, heart block, and/or evidence of active endocarditis not controlled with abx.  D/w pt who understands the importance of followup for his aortic valve.

## 2018-01-14 NOTE — PROGRESS NOTE ADULT - SUBJECTIVE AND OBJECTIVE BOX
HPI:  Patient is a 72 year-old male with past medical history of T2DM, DLD, alcohol misuse brought in by family after having been found face down on the floor for approximately 2 days. Patient was currently residing alone on one floor of the house (wife away in [place]) while other family members including a son live on other floors within the same house. He was not checked upon for 2 days. Patient reports multiple recent episodes of falls at home due to physical instability". He reports chronic alcohol use, which family states ranges from 6-12 beers almost daily. He has been more lethargic lately, with forgetfulness and disorientationHe also endorses fever, chills, dysuria and urinary frequency. Patient denies SOB, chest pain, back pain, diarrhea, melena/hematochezia, recent travel, sick contact or surgery. Patient is originally from [place] and has been in the  for 9 years.     ED course: Initial EKG noted for IW ST-elevations. Patient was hypotensive to 70/40 with HR in 170bpm with Atrial fibrillation + RVR on EKG. Labs notable for WBC 26K, lac 5.2 and troponin 0.6. Given IV NS blus x 5L and multiple DCCV with temporary resumption of NSR in 80s bpm before return to atrial fibrillation. (09 Jan 2018 00:52)    Interval Events:    REVIEW OF SYSTEMS:  Constitutional:   Eyes:  ENT:  CV:  Resp:  GI:  :  MSK:  Integumentary:  Neurological:  Psychiatric:  Endocrine:  Hematologic/Lymphatic:  Allergic/Immunologic:  [ ] All other systems negative  [ ] Unable to assess ROS because ________    OBJECTIVE:  ICU Vital Signs Last 24 Hrs  T(C): 36.7 (14 Jan 2018 04:00), Max: 36.7 (14 Jan 2018 04:00)  T(F): 98 (14 Jan 2018 04:00), Max: 98 (14 Jan 2018 04:00)  HR: 80 (14 Jan 2018 06:05) (78 - 102)  BP: 100/40 (14 Jan 2018 06:05) (92/44 - 123/47)  BP(mean): 62 (14 Jan 2018 06:05) (51 - 92)  ABP: --  ABP(mean): --  RR: 15 (14 Jan 2018 06:05) (13 - 27)  SpO2: 98% (14 Jan 2018 04:00) (98% - 98%)        01-13 @ 07:01  -  01-14 @ 07:00  --------------------------------------------------------  IN: 1640 mL / OUT: 1800 mL / NET: -160 mL      CAPILLARY BLOOD GLUCOSE      POCT Blood Glucose.: 115 mg/dL (13 Jan 2018 21:15)      PHYSICAL EXAM:  General:   HEENT:   Lymph Nodes:  Neck:   Respiratory:   Cardiovascular:   Abdomen:   Extremities:   Skin:   Neurological:  Psychiatry:    LINES:    HOSPITAL MEDICATIONS:  MEDICATIONS  (STANDING):  aspirin  chewable 81 milliGRAM(s) Oral daily  atorvastatin 80 milliGRAM(s) Oral at bedtime  dextrose 5%. 1000 milliLiter(s) (50 mL/Hr) IV Continuous <Continuous>  dextrose 50% Injectable 12.5 Gram(s) IV Push once  dextrose 50% Injectable 25 Gram(s) IV Push once  dextrose 50% Injectable 25 Gram(s) IV Push once  docusate sodium 100 milliGRAM(s) Oral daily  enoxaparin Injectable 40 milliGRAM(s) SubCutaneous daily  finasteride 5 milliGRAM(s) Oral daily  folic acid 1 milliGRAM(s) Oral daily  influenza   Vaccine 0.5 milliLiter(s) IntraMuscular once  insulin glargine Injectable (LANTUS) 12 Unit(s) SubCutaneous at bedtime  insulin lispro (HumaLOG) corrective regimen sliding scale   SubCutaneous three times a day before meals  insulin lispro (HumaLOG) corrective regimen sliding scale   SubCutaneous at bedtime  insulin lispro Injectable (HumaLOG) 3 Unit(s) SubCutaneous three times a day with meals  multivitamin 1 Tablet(s) Oral daily  pantoprazole    Tablet 40 milliGRAM(s) Oral before breakfast  piperacillin/tazobactam IVPB. 3.375 Gram(s) IV Intermittent every 8 hours  tamsulosin 0.4 milliGRAM(s) Oral at bedtime  thiamine 100 milliGRAM(s) Oral daily  vancomycin  IVPB 1000 milliGRAM(s) IV Intermittent every 8 hours    MEDICATIONS  (PRN):  dextrose Gel 1 Dose(s) Oral once PRN Blood Glucose LESS THAN 70 milliGRAM(s)/deciliter  glucagon  Injectable 1 milliGRAM(s) IntraMuscular once PRN Glucose LESS THAN 70 milligrams/deciliter  polyethylene glycol 3350 17 Gram(s) Oral daily PRN Constipation                            11.0   16.4  )-----------( 233      ( 14 Jan 2018 05:32 )             32.3     01-14    133<L>  |  102  |  14  ----------------------------<  105<H>  4.1   |  21<L>  |  1.59<H>    Ca    7.5<L>      14 Jan 2018 05:32  Phos  4.5     01-14  Mg     2.2     01-14    TPro  5.4<L>  /  Alb  1.8<L>  /  TBili  0.4  /  DBili  x   /  AST  31  /  ALT  22  /  AlkPhos  79  01-14    PT/INR - ( 14 Jan 2018 05:32 )   PT: 11.2 sec;   INR: 1.04 ratio         PTT - ( 14 Jan 2018 05:32 )  PTT:36.3 sec          MICROBIOLOGY:     RADIOLOGY:  [ ] Reviewed and interpreted by me    EKG: HPI:  Patient is a 72 year-old male with past medical history of T2DM, DLD, alcohol misuse brought in by family after having been found face down on the floor for approximately 2 days. Patient was currently residing alone on one floor of the house (wife away in [place]) while other family members including a son live on other floors within the same house. He was not checked upon for 2 days. Patient reports multiple recent episodes of falls at home due to physical instability". He reports chronic alcohol use, which family states ranges from 6-12 beers almost daily. He has been more lethargic lately, with forgetfulness and disorientationHe also endorses fever, chills, dysuria and urinary frequency. Patient denies SOB, chest pain, back pain, diarrhea, melena/hematochezia, recent travel, sick contact or surgery. Patient is originally from [place] and has been in the  for 9 years.     ED course: Initial EKG noted for IW ST-elevations. Patient was hypotensive to 70/40 with HR in 170bpm with Atrial fibrillation + RVR on EKG. Labs notable for WBC 26K, lac 5.2 and troponin 0.6. Given IV NS blus x 5L and multiple DCCV with temporary resumption of NSR in 80s bpm before return to atrial fibrillation. (09 Jan 2018 00:52)    Interval Events:    REVIEW OF SYSTEMS:  No CP, SOB.  [X] All other systems negative  [ ] Unable to assess ROS because ________    OBJECTIVE:  ICU Vital Signs Last 24 Hrs  T(C): 36.7 (14 Jan 2018 04:00), Max: 36.7 (14 Jan 2018 04:00)  T(F): 98 (14 Jan 2018 04:00), Max: 98 (14 Jan 2018 04:00)  HR: 80 (14 Jan 2018 06:05) (78 - 102)  BP: 100/40 (14 Jan 2018 06:05) (92/44 - 123/47)  BP(mean): 62 (14 Jan 2018 06:05) (51 - 92)  ABP: --  ABP(mean): --  RR: 15 (14 Jan 2018 06:05) (13 - 27)  SpO2: 98% (14 Jan 2018 04:00) (98% - 98%)        01-13 @ 07:01  -  01-14 @ 07:00  --------------------------------------------------------  IN: 1640 mL / OUT: 1800 mL / NET: -160 mL      CAPILLARY BLOOD GLUCOSE      POCT Blood Glucose.: 115 mg/dL (13 Jan 2018 21:15)    PHYSICAL EXAM:  General: Well appearing NAD  HEENT: MMM  Neck: No JVD  Respiratory: CTA BL  Cardiovascular: RRR  Abdomen: Soft NTND  Extremities: No swelling/edema BL LE  Skin: Warm dry intact  Neurological: AOx3      HOSPITAL MEDICATIONS:  MEDICATIONS  (STANDING):  aspirin  chewable 81 milliGRAM(s) Oral daily  atorvastatin 80 milliGRAM(s) Oral at bedtime  dextrose 5%. 1000 milliLiter(s) (50 mL/Hr) IV Continuous <Continuous>  dextrose 50% Injectable 12.5 Gram(s) IV Push once  dextrose 50% Injectable 25 Gram(s) IV Push once  dextrose 50% Injectable 25 Gram(s) IV Push once  docusate sodium 100 milliGRAM(s) Oral daily  enoxaparin Injectable 40 milliGRAM(s) SubCutaneous daily  finasteride 5 milliGRAM(s) Oral daily  folic acid 1 milliGRAM(s) Oral daily  influenza   Vaccine 0.5 milliLiter(s) IntraMuscular once  insulin glargine Injectable (LANTUS) 12 Unit(s) SubCutaneous at bedtime  insulin lispro (HumaLOG) corrective regimen sliding scale   SubCutaneous three times a day before meals  insulin lispro (HumaLOG) corrective regimen sliding scale   SubCutaneous at bedtime  insulin lispro Injectable (HumaLOG) 3 Unit(s) SubCutaneous three times a day with meals  multivitamin 1 Tablet(s) Oral daily  pantoprazole    Tablet 40 milliGRAM(s) Oral before breakfast  piperacillin/tazobactam IVPB. 3.375 Gram(s) IV Intermittent every 8 hours  tamsulosin 0.4 milliGRAM(s) Oral at bedtime  thiamine 100 milliGRAM(s) Oral daily  vancomycin  IVPB 1000 milliGRAM(s) IV Intermittent every 8 hours    MEDICATIONS  (PRN):  dextrose Gel 1 Dose(s) Oral once PRN Blood Glucose LESS THAN 70 milliGRAM(s)/deciliter  glucagon  Injectable 1 milliGRAM(s) IntraMuscular once PRN Glucose LESS THAN 70 milligrams/deciliter  polyethylene glycol 3350 17 Gram(s) Oral daily PRN Constipation                            11.0   16.4  )-----------( 233      ( 14 Jan 2018 05:32 )             32.3     01-14    133<L>  |  102  |  14  ----------------------------<  105<H>  4.1   |  21<L>  |  1.59<H>    Ca    7.5<L>      14 Jan 2018 05:32  Phos  4.5     01-14  Mg     2.2     01-14    TPro  5.4<L>  /  Alb  1.8<L>  /  TBili  0.4  /  DBili  x   /  AST  31  /  ALT  22  /  AlkPhos  79  01-14    PT/INR - ( 14 Jan 2018 05:32 )   PT: 11.2 sec;   INR: 1.04 ratio         PTT - ( 14 Jan 2018 05:32 )  PTT:36.3 sec          MICROBIOLOGY:     RADIOLOGY:  [ ] Reviewed and interpreted by me    EKG:

## 2018-01-14 NOTE — CHART NOTE - NSCHARTNOTEFT_GEN_A_CORE
====================  CCU MIDNIGHT ROUNDS  ====================    SAMANTHA CADENA  [id number]    ====================  SUMMARY: HPI:  Patient is a 72 year-old male with past medical history of T2DM, DLD, alcohol misuse brought in by family after having been found face down on the floor for approximately 2 days. Patient was currently residing alone on one floor of the house (wife away in [place]) while other family members including a son live on other floors within the same house. He was not checked upon for 2 days. Patient reports multiple recent episodes of falls at home due to physical instability". He reports chronic alcohol use, which family states ranges from 6-12 beers almost daily. He has been more lethargic lately, with forgetfulness and disorientationHe also endorses fever, chills, dysuria and urinary frequency. Patient denies SOB, chest pain, back pain, diarrhea, melena/hematochezia, recent travel, sick contact or surgery. Patient is originally from [place] and has been in the  for 9 years.     ED course: Initial EKG noted for IW ST-elevations. Patient was hypotensive to 70/40 with HR in 170bpm with Atrial fibrillation + RVR on EKG. Labs notable for WBC 26K, lac 5.2 and troponin 0.6. Given IV NS blus x 5L and multiple DCCV with temporary resumption of NSR in 80s bpm before return to atrial fibrillation. (09 Jan 2018 00:52)    ====================        ====================  NEW EVENTS: No acute events  ====================        ====================  VITALS (Last 12 hrs):  ====================    T(C): 36.4 (01-14-18 @ 19:00), Max: 36.7 (01-14-18 @ 16:00)  HR: 85 (01-14-18 @ 21:00) (80 - 96)  BP: 97/39 (01-14-18 @ 21:00) (96/39 - 124/47)  RR: 25 (01-14-18 @ 21:00) (12 - 25)  SpO2: 98% (01-14-18 @ 21:00) (97% - 100%)      TELEMETRY:      I&O's Summary    13 Jan 2018 07:01  -  14 Jan 2018 07:00  --------------------------------------------------------  IN: 1665 mL / OUT: 2225 mL / NET: -560 mL    14 Jan 2018 07:01  -  14 Jan 2018 21:39  --------------------------------------------------------  IN: 600 mL / OUT: 695 mL / NET: -95 mL        ====================  PLAN:  1.   ====================    HEALTH ISSUES - PROBLEM Dx:  Vegetation of heart valve: Vegetation of heart valve  Elevated lactic acid level: Elevated lactic acid level  Severe sepsis: Severe sepsis  NSTEMI (non-ST elevated myocardial infarction): NSTEMI (non-ST elevated myocardial infarction)  Atrial fibrillation with rapid ventricular response: Atrial fibrillation with rapid ventricular response      [place], U PA #[id number] ====================  CCU MIDNIGHT ROUNDS  ====================    SAMANTHA CADENA  [id number]    ====================  SUMMARY: HPI:  Patient is a 72 year-old male with past medical history of T2DM, DLD, alcohol misuse brought in by family after having been found face down on the floor for approximately 2 days. Patient was currently residing alone on one floor of the house (wife away in [place]) while other family members including a son live on other floors within the same house. He was not checked upon for 2 days. Patient reports multiple recent episodes of falls at home due to physical instability". He reports chronic alcohol use, which family states ranges from 6-12 beers almost daily. He has been more lethargic lately, with forgetfulness and disorientationHe also endorses fever, chills, dysuria and urinary frequency. Patient denies SOB, chest pain, back pain, diarrhea, melena/hematochezia, recent travel, sick contact or surgery. Patient is originally from [place] and has been in the  for 9 years.     ED course: Initial EKG noted for IW ST-elevations. Patient was hypotensive to 70/40 with HR in 170bpm with Atrial fibrillation + RVR on EKG. Labs notable for WBC 26K, lac 5.2 and troponin 0.6. Given IV NS blus x 5L and multiple DCCV with temporary resumption of NSR in 80s bpm before return to atrial fibrillation. (09 Jan 2018 00:52)    ====================        ====================  NEW EVENTS: No acute events  ====================        ====================  VITALS (Last 12 hrs):  ====================    T(C): 36.4 (01-14-18 @ 19:00), Max: 36.7 (01-14-18 @ 16:00)  HR: 85 (01-14-18 @ 21:00) (80 - 96)  BP: 97/39 (01-14-18 @ 21:00) (96/39 - 124/47)  RR: 25 (01-14-18 @ 21:00) (12 - 25)  SpO2: 98% (01-14-18 @ 21:00) (97% - 100%)      TELEMETRY:      I&O's Summary    13 Jan 2018 07:01  -  14 Jan 2018 07:00  --------------------------------------------------------  IN: 1665 mL / OUT: 2225 mL / NET: -560 mL    14 Jan 2018 07:01  -  14 Jan 2018 21:39  --------------------------------------------------------  IN: 600 mL / OUT: 695 mL / NET: -95 mL        ====================  PLAN:  1. Endocarditis  -plan to re-evaluate condition with f/u TTE in 3 months after abx course has been completed  -per CTS no need for surgery at this time unless condition worsens  -vanc by level  -c/w zosyn  -will need eventual PICC line  2. 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  Elevated lactic acid level: Elevated lactic acid level  Severe sepsis: Severe sepsis  NSTEMI (non-ST elevated myocardial infarction): NSTEMI (non-ST elevated myocardial infarction)  Atrial fibrillation with rapid ventricular response: Atrial fibrillation with rapid ventricular response      [place], [facility] PA #[id number] ====================  CCU MIDNIGHT ROUNDS  ====================    SAMANTHA CADENA  [id number]    ====================  SUMMARY: HPI:  Patient is a 72 year-old male with past medical history of T2DM, DLD, alcohol misuse brought in by family after having been found face down on the floor for approximately 2 days. Patient was currently residing alone on one floor of the house (wife away in [place]) while other family members including a son live on other floors within the same house. He was not checked upon for 2 days. Patient reports multiple recent episodes of falls at home due to physical instability". He reports chronic alcohol use, which family states ranges from 6-12 beers almost daily. He has been more lethargic lately, with forgetfulness and disorientationHe also endorses fever, chills, dysuria and urinary frequency. Patient denies SOB, chest pain, back pain, diarrhea, melena/hematochezia, recent travel, sick contact or surgery. Patient is originally from [place] and has been in the  for 9 years.     ED course: Initial EKG noted for IW ST-elevations. Patient was hypotensive to 70/40 with HR in 170bpm with Atrial fibrillation + RVR on EKG. Labs notable for WBC 26K, lac 5.2 and troponin 0.6. Given IV NS blus x 5L and multiple DCCV with temporary resumption of NSR in 80s bpm before return to atrial fibrillation. (09 Jan 2018 00:52)    ====================        ====================  NEW EVENTS: No acute events  ====================        ====================  VITALS (Last 12 hrs):  ====================    T(C): 36.4 (01-14-18 @ 19:00), Max: 36.7 (01-14-18 @ 16:00)  HR: 85 (01-14-18 @ 21:00) (80 - 96)  BP: 97/39 (01-14-18 @ 21:00) (96/39 - 124/47)  RR: 25 (01-14-18 @ 21:00) (12 - 25)  SpO2: 98% (01-14-18 @ 21:00) (97% - 100%)      TELEMETRY:      I&O's Summary    13 Jan 2018 07:01  -  14 Jan 2018 07:00  --------------------------------------------------------  IN: 1665 mL / OUT: 2225 mL / NET: -560 mL    14 Jan 2018 07:01  -  14 Jan 2018 21:39  --------------------------------------------------------  IN: 600 mL / OUT: 695 mL / NET: -95 mL        ====================  PLAN:  1. Endocarditis  -plan to re-evaluate condition with f/u TTE in 3 months after abx course has been completed  -per CTS no need for surgery at this time unless condition worsens  -vanc by level  -c/w zosyn  -will need eventual PICC line  2. CAD  -DAPT, statin, BB initiated  -will f/u with CTS as outpatient for CABG/AVR evaluation  3. 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

## 2018-01-14 NOTE — PROGRESS NOTE ADULT - SUBJECTIVE AND OBJECTIVE BOX
CC: F/U valvular lesion    Interval History/ROS: Patient seen lying in bed, comfortable, remains with nonproductive cough unchanged for several days. Otherwise, feels well and has no other complaints. Denies N/V/D/C, fever, chills, chest pain, sob, abd pain, dysuria. Rising leukocytosis 16K. Elevated Cr 1.59.    Allergies  No Known Allergies      ANTIMICROBIALS:  piperacillin/tazobactam IVPB. 3.375 every 8 hours      OTHER MEDS:  aspirin  chewable 81 milliGRAM(s) Oral daily  atorvastatin 80 milliGRAM(s) Oral at bedtime  dextrose 5%. 1000 milliLiter(s) IV Continuous <Continuous>  dextrose 50% Injectable 12.5 Gram(s) IV Push once  dextrose 50% Injectable 25 Gram(s) IV Push once  dextrose 50% Injectable 25 Gram(s) IV Push once  dextrose Gel 1 Dose(s) Oral once PRN  docusate sodium 100 milliGRAM(s) Oral daily  enoxaparin Injectable 40 milliGRAM(s) SubCutaneous daily  finasteride 5 milliGRAM(s) Oral daily  folic acid 1 milliGRAM(s) Oral daily  glucagon  Injectable 1 milliGRAM(s) IntraMuscular once PRN  influenza   Vaccine 0.5 milliLiter(s) IntraMuscular once  insulin glargine Injectable (LANTUS) 12 Unit(s) SubCutaneous at bedtime  insulin lispro (HumaLOG) corrective regimen sliding scale   SubCutaneous three times a day before meals  insulin lispro (HumaLOG) corrective regimen sliding scale   SubCutaneous at bedtime  insulin lispro Injectable (HumaLOG) 3 Unit(s) SubCutaneous three times a day with meals  multivitamin 1 Tablet(s) Oral daily  pantoprazole    Tablet 40 milliGRAM(s) Oral before breakfast  polyethylene glycol 3350 17 Gram(s) Oral daily PRN  tamsulosin 0.4 milliGRAM(s) Oral at bedtime  thiamine 100 milliGRAM(s) Oral daily      PE:    Vital Signs Last 24 Hrs  T(C): 36.7 (14 Jan 2018 07:00), Max: 36.7 (14 Jan 2018 04:00)  T(F): 98.1 (14 Jan 2018 07:00), Max: 98.1 (14 Jan 2018 07:00)  HR: 82 (14 Jan 2018 08:00) (78 - 102)  BP: 97/39 (14 Jan 2018 08:00) (92/44 - 123/47)  BP(mean): 63 (14 Jan 2018 08:00) (51 - 92)  RR: 19 (14 Jan 2018 08:00) (13 - 27)  SpO2: 96% (14 Jan 2018 08:00) (96% - 98%)    Gen: AOx3, NAD, non-toxic  CV: S1+S2 normal, no murmurs  Resp: Clear bilat, no resp distress  Abd: Soft, nontender, +BS  Ext: No LE edema, no wounds  : No CVA tenderness  IV/Skin: No thrombophlebitis  Neuro: no focal deficits    LABS:                          11.0   16.4  )-----------( 233      ( 14 Jan 2018 05:32 )             32.3       01-14    133<L>  |  102  |  14  ----------------------------<  105<H>  4.1   |  21<L>  |  1.59<H>    Ca    7.5<L>      14 Jan 2018 05:32  Phos  4.5     01-14  Mg     2.2     01-14    TPro  5.4<L>  /  Alb  1.8<L>  /  TBili  0.4  /  DBili  x   /  AST  31  /  ALT  22  /  AlkPhos  79  01-14          MICROBIOLOGY:  Vancomycin Level, Trough: 23.8 ug/mL (01-13-18 @ 09:57)  v  .Blood Blood-Venous  01-13-18   No growth to date.  --  --      .Blood Blood-Peripheral  01-13-18   No growth to date.  --  --      .Blood Blood-Venous  01-09-18   No growth to date.  --  --      .Blood Blood-Venous  01-09-18   No growth to date.  --  --      .Urine Clean Catch (Midstream)  01-09-18   No growth  --  --    Rapid RVP Result: NotDetec (01-08 @ 19:38)    RADIOLOGY:    No new images.

## 2018-01-14 NOTE — PROGRESS NOTE ADULT - ASSESSMENT
Patient is a 72 year-old male with past medical history of T2DM, DLD, alcohol misuse brought in by family after having been found face down on the floor for approximately 2 days found in hemodynamically unstable A-fib with RVR (170s bpm) s/p multiple cardioversions and initially requiring pressor support in the setting of septic shock 2/2 to culture negative aortic valve endocarditis.     Neuro:   -No active issues    Pulmonary:   -CT Chest notable for atelectasis/trace pleural effusions bilaterally  -Patient continues to endorse cough  -Mild bibasilar crackles   -No plan for diuresis at the moment; would diurese with Lasix only if patient's becomes uncomfortable     Cardiovascular:   1) Endocarditis of aortic valve  -JANES with large vegetation (1.4 x 0.8) on aortic valve leaflet  -Per CT surgery, vegetation appears chronic, heavily calcified. Unlikely to embolize. Thus, patient will be re-evaluated for aortic valve replacement surgery in 3 months.   -Will continue with vanc/zosyn. Patient will require PICC line; rehab placement.   -Await final ID recs  -Goal for vanc trough to remain between 15-20. Lowered dose to 1 g q8hr as trough was elevated to 24.     2) CAD  -Cath with severe proximal LAD disease (70% stenosis), 60% stenosis of RCA  -Plan for CABG? Await CT's note.   -Continue ASA, no need to restart brillinta as pt is not stented  -Continue Lipitor      GI:   -Start bowel regimen for constipation  -Carb controlled diet    /Renal:   -Grossly positive u/a, + leuk esterase, negative nitrites  -Patient has hx of BPH; on home flomax and finasteride  -CT A/P with contrast without evidence of pyelonephritis  -Cystitis vs prostatitis; on broad spectrum abx as above  -Urine cx negative    ID:   -Aortic valve endocarditis of unknown bacterial etiology; blood cultures negative thus far  -Patient underwent  instrumentation with cystoscopy back in November, perhaps that is the route of infection   -ID following; workup for culture negative endocarditis as per ID   -Septic shock resolved   -GC/Chlamydia negative, HIV negative; syphillis negative  -Continue Vanc/Zosyn begun on 1/9  -f/u new set of blood cultures    Endo:   -Endo consulted; recs as follows:   -While inpatient:     - Lantus 12 u    - Humalog 3 u TIDAC    - If NPO, Lantus 8 u    -[place] regimen after discharge as follows:     -d/c home metformin    - Begin Jardiance 10 mg daily    - Glipizide 10 mg BID - instruct pt to hold if not eating    Follow up in endo clinic:   [phone]      Hematology  -Leukocytosis resolved; bandemia resolved  -Abx as above  -trend daily cbc

## 2018-01-14 NOTE — PROVIDER CONTACT NOTE (CRITICAL VALUE NOTIFICATION) - ACTION/TREATMENT ORDERED:
DO made aware and to review labs/medication orders. DO made aware and to review labs/medication orders.  Vancomycin DC'd at this time. Repeat vanco level ordered for 1/15 AM.

## 2018-01-15 DIAGNOSIS — N17.9 ACUTE KIDNEY FAILURE, UNSPECIFIED: ICD-10-CM

## 2018-01-15 LAB
ALBUMIN SERPL ELPH-MCNC: 1.8 G/DL — LOW (ref 3.3–5)
ALBUMIN SERPL ELPH-MCNC: 2 G/DL — LOW (ref 3.3–5)
ALP SERPL-CCNC: 82 U/L — SIGNIFICANT CHANGE UP (ref 40–120)
ALP SERPL-CCNC: 83 U/L — SIGNIFICANT CHANGE UP (ref 40–120)
ALT FLD-CCNC: 25 U/L RC — SIGNIFICANT CHANGE UP (ref 10–45)
ALT FLD-CCNC: 25 U/L RC — SIGNIFICANT CHANGE UP (ref 10–45)
ANION GAP SERPL CALC-SCNC: 11 MMOL/L — SIGNIFICANT CHANGE UP (ref 5–17)
ANION GAP SERPL CALC-SCNC: 12 MMOL/L — SIGNIFICANT CHANGE UP (ref 5–17)
APPEARANCE UR: CLEAR — SIGNIFICANT CHANGE UP
AST SERPL-CCNC: 32 U/L — SIGNIFICANT CHANGE UP (ref 10–40)
AST SERPL-CCNC: 33 U/L — SIGNIFICANT CHANGE UP (ref 10–40)
BILIRUB SERPL-MCNC: 0.4 MG/DL — SIGNIFICANT CHANGE UP (ref 0.2–1.2)
BILIRUB SERPL-MCNC: 0.4 MG/DL — SIGNIFICANT CHANGE UP (ref 0.2–1.2)
BILIRUB UR-MCNC: NEGATIVE — SIGNIFICANT CHANGE UP
BUN SERPL-MCNC: 21 MG/DL — SIGNIFICANT CHANGE UP (ref 7–23)
BUN SERPL-MCNC: 22 MG/DL — SIGNIFICANT CHANGE UP (ref 7–23)
CALCIUM SERPL-MCNC: 7.5 MG/DL — LOW (ref 8.4–10.5)
CALCIUM SERPL-MCNC: 7.6 MG/DL — LOW (ref 8.4–10.5)
CHLORIDE SERPL-SCNC: 103 MMOL/L — SIGNIFICANT CHANGE UP (ref 96–108)
CHLORIDE SERPL-SCNC: 105 MMOL/L — SIGNIFICANT CHANGE UP (ref 96–108)
CO2 SERPL-SCNC: 20 MMOL/L — LOW (ref 22–31)
CO2 SERPL-SCNC: 21 MMOL/L — LOW (ref 22–31)
COLOR SPEC: COLORLESS — SIGNIFICANT CHANGE UP
CREAT SERPL-MCNC: 2.03 MG/DL — HIGH (ref 0.5–1.3)
CREAT SERPL-MCNC: 2.13 MG/DL — HIGH (ref 0.5–1.3)
DIFF PNL FLD: ABNORMAL
GLUCOSE BLDC GLUCOMTR-MCNC: 100 MG/DL — HIGH (ref 70–99)
GLUCOSE BLDC GLUCOMTR-MCNC: 118 MG/DL — HIGH (ref 70–99)
GLUCOSE BLDC GLUCOMTR-MCNC: 86 MG/DL — SIGNIFICANT CHANGE UP (ref 70–99)
GLUCOSE BLDC GLUCOMTR-MCNC: 88 MG/DL — SIGNIFICANT CHANGE UP (ref 70–99)
GLUCOSE SERPL-MCNC: 100 MG/DL — HIGH (ref 70–99)
GLUCOSE SERPL-MCNC: 110 MG/DL — HIGH (ref 70–99)
GLUCOSE UR QL: NEGATIVE — SIGNIFICANT CHANGE UP
HCT VFR BLD CALC: 30.7 % — LOW (ref 39–50)
HGB BLD-MCNC: 10.7 G/DL — LOW (ref 13–17)
KETONES UR-MCNC: NEGATIVE — SIGNIFICANT CHANGE UP
LEUKOCYTE ESTERASE UR-ACNC: NEGATIVE — SIGNIFICANT CHANGE UP
MAGNESIUM SERPL-MCNC: 2.3 MG/DL — SIGNIFICANT CHANGE UP (ref 1.6–2.6)
MAGNESIUM SERPL-MCNC: 2.4 MG/DL — SIGNIFICANT CHANGE UP (ref 1.6–2.6)
MCHC RBC-ENTMCNC: 33 PG — SIGNIFICANT CHANGE UP (ref 27–34)
MCHC RBC-ENTMCNC: 35.1 GM/DL — SIGNIFICANT CHANGE UP (ref 32–36)
MCV RBC AUTO: 94.2 FL — SIGNIFICANT CHANGE UP (ref 80–100)
NITRITE UR-MCNC: NEGATIVE — SIGNIFICANT CHANGE UP
PH UR: 6 — SIGNIFICANT CHANGE UP (ref 5–8)
PHOSPHATE SERPL-MCNC: 4.2 MG/DL — SIGNIFICANT CHANGE UP (ref 2.5–4.5)
PHOSPHATE SERPL-MCNC: 4.3 MG/DL — SIGNIFICANT CHANGE UP (ref 2.5–4.5)
PLATELET # BLD AUTO: 281 K/UL — SIGNIFICANT CHANGE UP (ref 150–400)
POTASSIUM SERPL-MCNC: 4 MMOL/L — SIGNIFICANT CHANGE UP (ref 3.5–5.3)
POTASSIUM SERPL-MCNC: 4.4 MMOL/L — SIGNIFICANT CHANGE UP (ref 3.5–5.3)
POTASSIUM SERPL-SCNC: 4 MMOL/L — SIGNIFICANT CHANGE UP (ref 3.5–5.3)
POTASSIUM SERPL-SCNC: 4.4 MMOL/L — SIGNIFICANT CHANGE UP (ref 3.5–5.3)
PROT SERPL-MCNC: 5.3 G/DL — LOW (ref 6–8.3)
PROT SERPL-MCNC: 5.6 G/DL — LOW (ref 6–8.3)
PROT UR-MCNC: NEGATIVE — SIGNIFICANT CHANGE UP
RBC # BLD: 3.26 M/UL — LOW (ref 4.2–5.8)
RBC # FLD: 12.9 % — SIGNIFICANT CHANGE UP (ref 10.3–14.5)
SODIUM SERPL-SCNC: 134 MMOL/L — LOW (ref 135–145)
SODIUM SERPL-SCNC: 138 MMOL/L — SIGNIFICANT CHANGE UP (ref 135–145)
SP GR SPEC: 1.01 — LOW (ref 1.01–1.02)
UROBILINOGEN FLD QL: NEGATIVE — SIGNIFICANT CHANGE UP
VANCOMYCIN TROUGH SERPL-MCNC: 21.8 UG/ML — HIGH (ref 10–20)
WBC # BLD: 14.5 K/UL — HIGH (ref 3.8–10.5)
WBC # FLD AUTO: 14.5 K/UL — HIGH (ref 3.8–10.5)

## 2018-01-15 PROCEDURE — 99222 1ST HOSP IP/OBS MODERATE 55: CPT | Mod: GC

## 2018-01-15 PROCEDURE — 93010 ELECTROCARDIOGRAM REPORT: CPT

## 2018-01-15 PROCEDURE — 99223 1ST HOSP IP/OBS HIGH 75: CPT

## 2018-01-15 PROCEDURE — 99233 SBSQ HOSP IP/OBS HIGH 50: CPT

## 2018-01-15 RX ORDER — SODIUM CHLORIDE 9 MG/ML
500 INJECTION INTRAMUSCULAR; INTRAVENOUS; SUBCUTANEOUS ONCE
Qty: 0 | Refills: 0 | Status: COMPLETED | OUTPATIENT
Start: 2018-01-15 | End: 2018-01-15

## 2018-01-15 RX ADMIN — ENOXAPARIN SODIUM 40 MILLIGRAM(S): 100 INJECTION SUBCUTANEOUS at 12:21

## 2018-01-15 RX ADMIN — Medication 1 TABLET(S): at 12:21

## 2018-01-15 RX ADMIN — PANTOPRAZOLE SODIUM 40 MILLIGRAM(S): 20 TABLET, DELAYED RELEASE ORAL at 06:20

## 2018-01-15 RX ADMIN — TAMSULOSIN HYDROCHLORIDE 0.4 MILLIGRAM(S): 0.4 CAPSULE ORAL at 22:09

## 2018-01-15 RX ADMIN — Medication 12.5 MILLIGRAM(S): at 17:26

## 2018-01-15 RX ADMIN — TICAGRELOR 90 MILLIGRAM(S): 90 TABLET ORAL at 22:40

## 2018-01-15 RX ADMIN — Medication 12.5 MILLIGRAM(S): at 05:45

## 2018-01-15 RX ADMIN — Medication 3 UNIT(S): at 12:21

## 2018-01-15 RX ADMIN — PIPERACILLIN AND TAZOBACTAM 25 GRAM(S): 4; .5 INJECTION, POWDER, LYOPHILIZED, FOR SOLUTION INTRAVENOUS at 22:09

## 2018-01-15 RX ADMIN — ATORVASTATIN CALCIUM 80 MILLIGRAM(S): 80 TABLET, FILM COATED ORAL at 22:08

## 2018-01-15 RX ADMIN — Medication 3 UNIT(S): at 08:14

## 2018-01-15 RX ADMIN — Medication 81 MILLIGRAM(S): at 12:21

## 2018-01-15 RX ADMIN — PIPERACILLIN AND TAZOBACTAM 25 GRAM(S): 4; .5 INJECTION, POWDER, LYOPHILIZED, FOR SOLUTION INTRAVENOUS at 06:41

## 2018-01-15 RX ADMIN — Medication 100 MILLIGRAM(S): at 12:21

## 2018-01-15 RX ADMIN — PIPERACILLIN AND TAZOBACTAM 25 GRAM(S): 4; .5 INJECTION, POWDER, LYOPHILIZED, FOR SOLUTION INTRAVENOUS at 13:02

## 2018-01-15 RX ADMIN — INSULIN GLARGINE 12 UNIT(S): 100 INJECTION, SOLUTION SUBCUTANEOUS at 22:08

## 2018-01-15 RX ADMIN — SODIUM CHLORIDE 1000 MILLILITER(S): 9 INJECTION INTRAMUSCULAR; INTRAVENOUS; SUBCUTANEOUS at 09:21

## 2018-01-15 RX ADMIN — Medication 1 MILLIGRAM(S): at 12:21

## 2018-01-15 RX ADMIN — FINASTERIDE 5 MILLIGRAM(S): 5 TABLET, FILM COATED ORAL at 12:21

## 2018-01-15 RX ADMIN — Medication 3 UNIT(S): at 17:26

## 2018-01-15 RX ADMIN — TICAGRELOR 90 MILLIGRAM(S): 90 TABLET ORAL at 08:16

## 2018-01-15 NOTE — CONSULT NOTE ADULT - ASSESSMENT
Patient is a 71 y/o man with history of ETOH abuse who presents with fall and found with endocarditis, developed ELIAN in-hospital shortly after undergoing cardiac catheterization.

## 2018-01-15 NOTE — CHART NOTE - NSCHARTNOTEFT_GEN_A_CORE
Patient is a 72 year-old male with past medical history of T2DM, DLD, alcohol misuse brought in by family after having been found face down on the floor for approximately 2 days.  Patient reports multiple recent episodes of falls at home due to physical instability". He reports chronic alcohol use, which family states ranges from 6-12 beers almost daily. He has been more lethargic lately, with forgetfulness and disorientation. He also endorses fever, chills, dysuria and urinary frequency.  In the ED patient was noted to be in rapid a-fib, with positive troponin to 0.6. Received 5L bolus and amiodarone and digoxin loaded.   Subsequent JANES revealed aortic valve vegetation.  Patient was started on vancomycin and zosyn. Blood cultures negative to date. Patient also underwent cardiac cath on 1/12 which revealed LAD with 70% stenosis. Patient was seen by CT surgery for evaluation of aortic valve vegetation. Given significant calcification of lesion, it was felt that likely chronic endocarditis and low likelihood of embolization, so emergent surgery was deferred. Patient to follow up with CT surgery in 2-3 months.   Also noted was increasing Cr from baseline, with FeNa 0.9. Likely in setting of vancomycin and cath. Vancomycin with switched to renal dosing. Patient was also fluid challenged, and renal was consulted for increasing Cr.     Plan:     #Neuro - stable    # CV   - CAD- continue ASA, brillinta, atorvastatin, metoprolol. ACEI currently avoided in setting of ELIAN  - Endocarditis - continue zosyn and vancomycin per level. Requires PICC for long term antibiotics. follow up outpatient with CT surgery unless new symptoms develop    #Pulmonary - no active issues    # GI   - Continue PPI and bowel regimen    #   - Continue to trend Cr, follow up renal recs, follow up renal US for infarction,   - Continue home finasteride and tamsulosin    #ID   - endocarditis: continue vancomycin by level and zosyn, PICC line planning    # PPX   - PPI   - lovenox  - thiamine     # Endo   - DM - continue current management, see endo note for outpatient recommendations. Patient is a 72 year-old male with past medical history of T2DM, DLD, alcohol misuse brought in by family after having been found face down on the floor for approximately 2 days.  Patient reports multiple recent episodes of falls at home due to physical instability". He reports chronic alcohol use, which family states ranges from 6-12 beers almost daily. He has been more lethargic lately, with forgetfulness and disorientation. He also endorses fever, chills, dysuria and urinary frequency.  In the ED patient was noted to be in rapid a-fib, with positive troponin to 0.6. Received 5L bolus and amiodarone and digoxin loaded.   Subsequent JANES revealed aortic valve vegetation.  Patient was started on vancomycin and zosyn. Blood cultures negative to date. Patient also underwent cardiac cath on 1/12 which revealed LAD with 70% stenosis. Patient was seen by CT surgery for evaluation of aortic valve vegetation. Given significant calcification of lesion, it was felt that likely chronic endocarditis and low likelihood of embolization, so emergent surgery was deferred. Patient to follow up with CT surgery in 2-3 months.   Patient found to have LAD disease, but no intervention. Will require planning for definitive management.   Also noted was increasing Cr from baseline, with FeNa 0.9. Likely in setting of vancomycin and cath. Vancomycin with switched to renal dosing. Patient was also fluid challenged, and renal was consulted for increasing Cr.                           10.7   14.5  )-----------( 281      ( 15 Chao 2018 03:52 )             30.7   01-15    138  |  105  |  21  ----------------------------<  110<H>  4.4   |  21<L>  |  2.13<H>    Ca    7.6<L>      15 Chao 2018 13:30  Phos  4.2     01-15  Mg     2.3     01-15    TPro  5.6<L>  /  Alb  2.0<L>  /  TBili  0.4  /  DBili  x   /  AST  32  /  ALT  25  /  AlkPhos  82  01-15    CORONARY VESSELS: The coronary circulation is right dominant.  LM:   --  LM: Normal.  LAD:   --  Mid LAD: There was a discrete 70 % stenosis in the proximal  third of the vessel segment.  --  Distal LAD: Angiography showed minor luminal irregularities with no  flow limiting lesions.  CX:   --  Circumflex: Normal.  --  OM1: Normal.  RCA:   --  RPLS: There was a 60 % stenosis.    Conclusions:  1. Calcified trileaflet aortic valve with decreased  opening.   Echogenic structure seen on the aortic valve  consistent with vegetation (left coronary cusp).   Peak transaortic valve gradient equals 31 mm Hg, mean  transaortic valve gradient equals 18 mm Hg, estimated  aortic valve area equals 1 sqcm (by continuity equation),  aortic valve velocity time integral equals 64 cm,  consistent with moderate to severe aortic stenosis.  Moderate-severe eccentric aortic regurgitation.  2. No left atrial or left atrial appendage thrombus.  3. Normal left ventricular internal dimensions and wall  thicknesses.  4. Normal left ventricular systolic function. No segmental  wall motion abnormalities.  5. Agitated saline injection and color flow Doppler  demonstrates no evidence of a patent foramen ovale.  6. Left pleural effusion.    Plan:     #Neuro - stable    # CV   - CAD- continue ASA, brillinta, atorvastatin, metoprolol. ACEI currently avoided in setting of ELIAN. Will plan for definitive management of severe LAD disease.  - Endocarditis - continue zosyn and vancomycin per level. Requires PICC for long term antibiotics. follow up outpatient with CT surgery unless new symptoms develop    #Pulmonary - no active issues    # GI   - Continue PPI and bowel regimen    #   - Continue to trend Cr, follow up renal recs, follow up renal US for infarction  - Continue home finasteride and tamsulosin    #ID   - endocarditis: continue vancomycin by level and zosyn, PICC line planning      # Endo   - DM - continue current management, see endo note for outpatient recommendations.    # PPX   - PPI   - lovenox  - thiamine

## 2018-01-15 NOTE — PROGRESS NOTE ADULT - SUBJECTIVE AND OBJECTIVE BOX
HPI:  Patient is a 72 year-old male with past medical history of T2DM, DLD, alcohol misuse brought in by family after having been found face down on the floor for approximately 2 days. Patient was currently residing alone on one floor of the house (wife away in [place]) while other family members including a son live on other floors within the same house. He was not checked upon for 2 days. Patient reports multiple recent episodes of falls at home due to physical instability". He reports chronic alcohol use, which family states ranges from 6-12 beers almost daily. He has been more lethargic lately, with forgetfulness and disorientationHe also endorses fever, chills, dysuria and urinary frequency. Patient denies SOB, chest pain, back pain, diarrhea, melena/hematochezia, recent travel, sick contact or surgery. Patient is originally from [place] and has been in the  for 9 years.     ED course: Initial EKG noted for IW ST-elevations. Patient was hypotensive to 70/40 with HR in 170bpm with Atrial fibrillation + RVR on EKG. Labs notable for WBC 26K, lac 5.2 and troponin 0.6. Given IV NS blus x 5L and multiple DCCV with temporary resumption of NSR in 80s bpm before return to atrial fibrillation. (2018 00:52)    Interval events: no acute events overnight.     Review Of Systems:  Constitutional: [ ] Fever [ ] Chills [ ] Fatigue [ ] Weight change   HEENT: [ ] Blurred vision [ ] Eye Pain [ ] Headache [ ] Runny nose [ ] Sore Throat   Respiratory: [ ] Cough [ ] Wheezing [ ] Shortness of breath  Cardiovascular: [ ] Chest Pain [ ] Palpitations [ ] MCLEOD [ ] PND [ ] Orthopnea  Gastrointestinal: [ ] Abdominal Pain [ ] Diarrhea [ ] Constipation [ ] Hemorrhoids [ ] Nausea [ ] Vomiting  Genitourinary: [ ] Nocturia [ ] Dysuria [ ] Incontinence  Extremities: [ ] Swelling [ ] Joint Pain  Neurologic: [ ] Focal deficit [ ] Paresthesias [ ] Syncope  Lymphatic: [ ] Swelling [ ] Lymphadenopathy   Skin: [ ] Rash [ ] Ecchymoses [ ] Wounds [ ] Lesions  Psychiatry: [ ] Depression [ ] Suicidal/Homicidal Ideation [ ] Anxiety [ ] Sleep Disturbances  [ ] 10 point review of systems is otherwise negative except as mentioned above            [ ]Unable to obtain    Medications:  aspirin  chewable 81 milliGRAM(s) Oral daily  atorvastatin 80 milliGRAM(s) Oral at bedtime  dextrose 5%. 1000 milliLiter(s) IV Continuous <Continuous>  dextrose 50% Injectable 12.5 Gram(s) IV Push once  dextrose 50% Injectable 25 Gram(s) IV Push once  dextrose 50% Injectable 25 Gram(s) IV Push once  dextrose Gel 1 Dose(s) Oral once PRN  docusate sodium 100 milliGRAM(s) Oral daily  enoxaparin Injectable 40 milliGRAM(s) SubCutaneous daily  finasteride 5 milliGRAM(s) Oral daily  folic acid 1 milliGRAM(s) Oral daily  glucagon  Injectable 1 milliGRAM(s) IntraMuscular once PRN  influenza   Vaccine 0.5 milliLiter(s) IntraMuscular once  insulin glargine Injectable (LANTUS) 12 Unit(s) SubCutaneous at bedtime  insulin lispro (HumaLOG) corrective regimen sliding scale   SubCutaneous three times a day before meals  insulin lispro (HumaLOG) corrective regimen sliding scale   SubCutaneous at bedtime  insulin lispro Injectable (HumaLOG) 3 Unit(s) SubCutaneous three times a day with meals  metoprolol     tartrate 12.5 milliGRAM(s) Oral two times a day  multivitamin 1 Tablet(s) Oral daily  pantoprazole    Tablet 40 milliGRAM(s) Oral before breakfast  piperacillin/tazobactam IVPB. 3.375 Gram(s) IV Intermittent every 8 hours  polyethylene glycol 3350 17 Gram(s) Oral daily PRN  tamsulosin 0.4 milliGRAM(s) Oral at bedtime  thiamine 100 milliGRAM(s) Oral daily  ticagrelor 90 milliGRAM(s) Oral every 12 hours    PMH/PSH/FH/SH: [ ] Unchanged  Vitals:  T(C): 36.8 (01-15-18 @ 06:00), Max: 36.8 (01-15-18 @ 06:00)  HR: 82 (01-15-18 @ 07:00) (80 - 96)  BP: 96/39 (01-15-18 @ 07:00) (96/36 - 124/47)  BP(mean): 59 (01-15-18 @ 07:00) (55 - 92)  RR: 16 (01-15-18 @ 07:00) (12 - 25)  SpO2: 97% (01-15-18 @ 07:00) (94% - 100%)  Wt(kg): --  Daily     Daily Weight in k.3 (15 Chao 2018 04:00)  I&O's Summary    2018 07:01  -  15 Chao 2018 07:00  --------------------------------------------------------  IN: 1205 mL / OUT: 1895 mL / NET: -690 mL        Physical Exam:  Appearance:  Normal, NAD  Eyes: PERRL, EOMI  HENT: Normal oral mus[place]a NC/AT  Cardiovascular: S1, S2, RRR, No m/r/g appreciated, No edema, no elevation in JVP  Procedural Access Site: No hematoma, Non-tender to palpation, 2+ pulse , No bruit, No Ecchymosis  Respiratory: Clear to auscultation bilaterally  Gastrointestinal: Soft, Non-tender, Non-distended, BS+  Musculoskeletal:  No clubbing, No joint deformity   Neurologic: Non-focal  Lymphatic: No lymphadenopathy  Psychiatry: AAOx3, Mood & affect appropriate  Skin: No rashes, No ecchymoses, No cyanosis    Labs:                        10.7   14.5  )-----------( 281      ( 15 Chao 2018 03:52 )             30.7     -15    134<L>  |  103  |  22  ----------------------------<  100<H>  4.0   |  20<L>  |  2.03<H>    Ca    7.5<L>      15 Chao 2018 03:52  Phos  4.3     -15  Mg     2.4     01-15    TPro  5.3<L>  /  Alb  1.8<L>  /  TBili  0.4  /  DBili  x   /  AST  33  /  ALT  25  /  AlkPhos  83  -15    PT/INR - ( 2018 05:32 )   PT: 11.2 sec;   INR: 1.04 ratio         PTT - ( 2018 05:32 )  PTT:36.3 sec      Serum Pro-Brain Natriuretic Peptide: 3078 pg/mL ( @ 04:11)          ECG:    Echo:    Stress Testing:     Cath:    Imaging:    Interpretation of Telemetry: HPI:  Patient is a 72 year-old male with past medical history of T2DM, DLD, alcohol misuse brought in by family after having been found face down on the floor for approximately 2 days. Patient was currently residing alone on one floor of the house (wife away in [place]) while other family members including a son live on other floors within the same house. He was not checked upon for 2 days. Patient reports multiple recent episodes of falls at home due to physical instability". He reports chronic alcohol use, which family states ranges from 6-12 beers almost daily. He has been more lethargic lately, with forgetfulness and disorientationHe also endorses fever, chills, dysuria and urinary frequency. Patient denies SOB, chest pain, back pain, diarrhea, melena/hematochezia, recent travel, sick contact or surgery. Patient is originally from [place] and has been in the  for 9 years.     ED course: Initial EKG noted for IW ST-elevations. Patient was hypotensive to 70/40 with HR in 170bpm with Atrial fibrillation + RVR on EKG. Labs notable for WBC 26K, lac 5.2 and troponin 0.6. Given IV NS blus x 5L and multiple DCCV with temporary resumption of NSR in 80s bpm before return to atrial fibrillation. (2018 00:52)    Interval events: no acute events overnight. Patient had three liquid BM. Tolerating diet.     Review Of Systems:  Constitutional: [ ] Fever [ ] Chills [ ] Fatigue [ ] Weight change   HEENT: [ ] Blurred vision [ ] Eye Pain [ ] Headache [ ] Runny nose [ ] Sore Throat   Respiratory: [x ] Cough [ ] Wheezing [ ] Shortness of breath  Cardiovascular: [ ] Chest Pain [ ] Palpitations [ ] MCLEOD [ ] PND [ ] Orthopnea  Gastrointestinal: [ ] Abdominal Pain [ ] Diarrhea [ ] Constipation [ ] Hemorrhoids [ ] Nausea [ ] Vomiting  Genitourinary: [ ] Nocturia [ ] Dysuria [ ] Incontinence  Extremities: [ ] Swelling [ ] Joint Pain  Neurologic: [ ] Focal deficit [ ] Paresthesias [ ] Syncope  Lymphatic: [ ] Swelling [ ] Lymphadenopathy   Skin: [ ] Rash [ ] Ecchymoses [ ] Wounds [ ] Lesions  Psychiatry: [ ] Depression [ ] Suicidal/Homicidal Ideation [ ] Anxiety [ ] Sleep Disturbances  [x ] 10 point review of systems is otherwise negative except as mentioned above            [ ]Unable to obtain    Medications:  aspirin  chewable 81 milliGRAM(s) Oral daily  atorvastatin 80 milliGRAM(s) Oral at bedtime  dextrose 5%. 1000 milliLiter(s) IV Continuous <Continuous>  dextrose 50% Injectable 12.5 Gram(s) IV Push once  dextrose 50% Injectable 25 Gram(s) IV Push once  dextrose 50% Injectable 25 Gram(s) IV Push once  dextrose Gel 1 Dose(s) Oral once PRN  docusate sodium 100 milliGRAM(s) Oral daily  enoxaparin Injectable 40 milliGRAM(s) SubCutaneous daily  finasteride 5 milliGRAM(s) Oral daily  folic acid 1 milliGRAM(s) Oral daily  glucagon  Injectable 1 milliGRAM(s) IntraMuscular once PRN  influenza   Vaccine 0.5 milliLiter(s) IntraMuscular once  insulin glargine Injectable (LANTUS) 12 Unit(s) SubCutaneous at bedtime  insulin lispro (HumaLOG) corrective regimen sliding scale   SubCutaneous three times a day before meals  insulin lispro (HumaLOG) corrective regimen sliding scale   SubCutaneous at bedtime  insulin lispro Injectable (HumaLOG) 3 Unit(s) SubCutaneous three times a day with meals  metoprolol     tartrate 12.5 milliGRAM(s) Oral two times a day  multivitamin 1 Tablet(s) Oral daily  pantoprazole    Tablet 40 milliGRAM(s) Oral before breakfast  piperacillin/tazobactam IVPB. 3.375 Gram(s) IV Intermittent every 8 hours  polyethylene glycol 3350 17 Gram(s) Oral daily PRN  tamsulosin 0.4 milliGRAM(s) Oral at bedtime  thiamine 100 milliGRAM(s) Oral daily  ticagrelor 90 milliGRAM(s) Oral every 12 hours    PMH/PSH/FH/SH: [x ] Unchanged  Vitals:  T(C): 36.8 (01-15-18 @ 06:00), Max: 36.8 (01-15-18 @ 06:00)  HR: 82 (01-15-18 @ 07:00) (80 - 96)  BP: 96/39 (01-15-18 @ 07:00) (96/36 - 124/47)  BP(mean): 59 (01-15-18 @ 07:00) (55 - 92)  RR: 16 (01-15-18 @ 07:00) (12 - 25)  SpO2: 97% (01-15-18 @ 07:00) (94% - 100%)  Wt(kg): --  Daily     Daily Weight in k.3 (15 Chao 2018 04:00)  I&O's Summary    2018 07:01  -  15 Chao 2018 07:00  --------------------------------------------------------  IN: 1205 mL / OUT: 1895 mL / NET: -690 mL        Physical Exam:  Appearance:  Normal, NAD  Eyes: PERRL, EOMI  HENT: Normal oral mus[place]a NC/AT  Cardiovascular: S1, S2, RRR, No m/r/g appreciated, trace edema at the ankles, no elevation in JVP  Procedural Access Site: No hematoma, Non-tender to palpation, 2+ pulse , No bruit, No Ecchymosis  Respiratory: Trace crackles and bases  Gastrointestinal: Soft, Non-tender, Non-distended, BS+  Musculoskeletal:  No clubbing, No joint deformity   Neurologic: Non-focal  Lymphatic: No lymphadenopathy  Psychiatry: AAOx3, Mood & affect appropriate  Skin: No rashes, No ecchymoses, No cyanosis    Labs:                        10.7   14.5  )-----------( 281      ( 15 Chao 2018 03:52 )             30.7     01-15    134<L>  |  103  |  22  ----------------------------<  100<H>  4.0   |  20<L>  |  2.03<H>    Ca    7.5<L>      15 Chao 2018 03:52  Phos  4.3     01-15  Mg     2.4     01-15    TPro  5.3<L>  /  Alb  1.8<L>  /  TBili  0.4  /  DBili  x   /  AST  33  /  ALT  25  /  AlkPhos  83  -15    PT/INR - ( 2018 05:32 )   PT: 11.2 sec;   INR: 1.04 ratio         PTT - ( 2018 05:32 )  PTT:36.3 sec      Serum Pro-Brain Natriuretic Peptide: 3078 pg/mL ( @ 04:11)

## 2018-01-15 NOTE — CHART NOTE - NSCHARTNOTEFT_GEN_A_CORE
Please see CCU Transfer Note for detailed summary.     Briefly, patient is a 72M w/ hx of DM2, HLD and ETOH use who was brought in by family after being found down. On admission, pt was found to have afib w/ RVR (now s/p cardioversion and digoxin load) and NSTEMI (now s/p cath which showed 70% stenosis of LAD, 60% stenosis of RPLS, no intervention) and subsequent workup was notable for an aortic valve vegetation. CT surgery is following and pt is not a candidate for urgent surgical intervention, and his cultures have all remained negative.  He is on broad spectrum coverage with vancomycin and zosyn. Pt's course has also been complicated by ELIAN (creatinine now 2.13, wnl on admission), which is likely contrast-induced.     Follow up:   - monitor renal function and f/u renal recs. 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 F/u ID recs  - pt will eventually need repeat echo and CT surgery f/u for repeat surgical evaluation     Hilary Coles, MAR   [id number]

## 2018-01-15 NOTE — CONSULT NOTE ADULT - PROBLEM SELECTOR RECOMMENDATION 9
Likely contrast induced nephropathy from cath done on 1/12.  Creatinine first elevated in AM 1/14.  Possible pre-renal contribution + vancomycin.  Urine is completely bland.  Urine electrolytes consistent with pre-renal picture and patient is not clinically in heart failure.  Would conduct bedside sonogram for evidence of urinary retention.  Agree with gentle hydration, can start 75 cc/hr.  Monitor UOP, creatinine trend.  BUN / Cr ratio in setting of urine lytes more consistent with injury from contrast. Likely contrast induced nephropathy from cath done on 1/12.  Creatinine first elevated in AM 1/14.  Possible pre-renal contribution + vancomycin.  Urine is completely bland.  Urine electrolytes consistent with pre-renal picture and patient is not clinically in heart failure.  Would conduct bedside sonogram for evidence of urinary retention.  Agree with gentle hydration, can start 75 cc/hr.  Monitor UOP, creatinine trend.  BUN / Cr ratio in setting of urine lytes more consistent with injury from contrast.  Would check vanco trough before each dose while in ELIAN.

## 2018-01-15 NOTE — CHART NOTE - NSCHARTNOTEFT_GEN_A_CORE
CC:    HPI:      PHYSICAL EXAM:  Vital Signs Last 24 Hrs  T(C): 36.6 (01-15-18 @ 20:23)  T(F): 97.8 (01-15-18 @ 20:23), Max: 98.3 (01-15-18 @ 08:00)  HR: 88 (01-15-18 @ 20:23) (80 - 94)  BP: 103/55 (01-15-18 @ 20:23)  BP(mean): 59 (01-15-18 @ 19:00) (55 - 73)  RR: 18 (01-15-18 @ 20:23) (13 - 20)  SpO2: 95% (01-15-18 @ 20:23) (94% - 100%)  Wt(kg): --    01-14 @ 07:01  -  01-15 @ 07:00  --------------------------------------------------------  IN: 1205 mL / OUT: 1895 mL / NET: -690 mL    01-15 @ 07:01  -  01-15 @ 22:58  --------------------------------------------------------  IN: 1250 mL / OUT: 800 mL / NET: 450 mL      Constitutional: NAD, awake and alert  HEENT: Moist mucus membranes, anicteric, PERRL, no oral ulcers  Neck: Soft and supple  Respiratory: Breath sounds are clear bilaterally, No wheezing, rales or rhonchi  Cardiovascular: S1 and S2, regular rate and rhythm, no ,urmurs, gallops or rubs.   No JVD. No LE edema  Gastrointestinal:  Soft, nontender, nondistended, no guarding, no rebound  Extremities: Warm, well perfused, no cyanosis or clubbing  Vascular: 2+ peripheral pulses in lower ex  Neurological: A/O x 3, no focal deficits, 5/5 strength in upper and lower ext, CN 3-12 intact  Musculoskeletal: Joints without erythema, swelling, tendernss  Skin: No rashes  Psych: affect appropriate, linear speech  Lymph: no cervical lymphadenopathy CC: found down    HPI:  Adapted from chart  72 year-old male with T2DM, alcohol abuse brought in by family  after being found down approx 2 days. Multiple recent falls at home due to physical instability,    chronic alcohol use 6-12 beers daily (patient denies this to me), increased lethargy, forgetfulness, disorientation. +Fever, chills, dysuria and urinary frequency.    In the ED EKG with inferior ST-elevations, hypotensive to 70/40 with AF with RVR     Atrial fibrillation + RVR on EKG. Labs notable for WBC 26K, lac 5.2 and troponin 0.6. Given 5L NS, multiple DCCV with temp resumption of NSR in 80s bpm before return to atrial fibrillation. received amiodarone and digoxin loads.       JANES showed aortic valve vegetation. Started on vancomycin and zosyn, cardiac cath : LAD 70% stenosis, no intervention. CT surgery eval deferred surgery as thought to be chronic endocarditis and low likelihood of embolization.   Cr noted 2.13.      On my exam patient feels well, complains only of chronic dysuria (~1month) and non productive cough x 5 days.     REVIEW OF SYSTEMS:    CONSTITUTIONAL: No weakness, fevers or chills.  HEENT: no blurry vision, sore throat  RESPIRATORY: cough, no wheezing, hemoptysis; No shortness of breath  CARDIOVASCULAR: No chest pain or palpitations, No orthopnea, PN  GASTROINTESTINAL: No abdominal or epigastric pain. No nausea, vomiting, or hematemesis; No diarrhea or constipation. No melena or hematochezia.  GENITOURINARY: +dysuria/frequency  MSK: No joint pains or swelling  SKIN: No rashes    PHYSICAL EXAM:  Vital Signs Last 24 Hrs  T(C): 36.6 (01-15-18 @ 20:23)  T(F): 97.8 (01-15-18 @ 20:23), Max: 98.3 (01-15-18 @ 08:00)  HR: 88 (01-15-18 @ 20:23) (80 - 94)  BP: 103/55 (01-15-18 @ 20:23)  BP(mean): 59 (01-15-18 @ 19:00) (55 - 73)  RR: 18 (01-15-18 @ 20:23) (13 - 20)  SpO2: 95% (01-15-18 @ 20:23) (94% - 100%)  Wt(kg): --     @ 07:01  -  01-15 @ 07:00  --------------------------------------------------------  IN: 1205 mL / OUT: 1895 mL / NET: -690 mL    01-15 @ 07:01  -  01-15 @ 22:58  --------------------------------------------------------  IN: 1250 mL / OUT: 800 mL / NET: 450 mL      Constitutional: NAD, awake and alert  HEENT: Moist mucus membranes, anicteric, PERRL, no oral ulcers  Neck: Soft and supple  Respiratory: Breath sounds are clear bilaterally, No wheezing, rales or rhonchi  Cardiovascular: S1 and S2, regular rate and rhythm, 2/6 systolic murmur, no gallops or rubs.   No JVD. No LE edema  Gastrointestinal:  Soft, nontender, nondistended, no guarding, no rebound  Extremities: Warm, well perfused, no cyanosis or clubbing  Neurological: A/O x 3, no focal deficits, CN 3-12 intact  Musculoskeletal: Joints without erythema, swelling, tendernss  Skin: No rashes  Psych: affect appropriate, linear speech  Lymph: no cervical lymphadenopathy                            10.7   14.5  )-----------( 281      ( 15 Chao 2018 03:52 )             30.7       01-15    138  |  105  |  21  ----------------------------<  110<H>  4.4   |  21<L>  |  2.13<H>    Ca    7.6<L>      15 Chao 2018 13:30  Phos  4.2     01-15  Mg     2.3     01-15    TPro  5.6<L>  /  Alb  2.0<L>  /  TBili  0.4  /  DBili  x   /  AST  32  /  ALT  25  /  AlkPhos  82  -15          CAPILLARY BLOOD GLUCOSE      POCT Blood Glucose.: 86 mg/dL (15 Chao 2018 20:25)      PT/INR - ( 2018 05:32 )   PT: 11.2 sec;   INR: 1.04 ratio         PTT - ( 2018 05:32 )  PTT:36.3 sec    Lactate Trend  01-11 @ 04:11 Lactate:1.0     Urinalysis Basic - ( 15 Chao 2018 09:30 )    Color: Colorless / Appearance: Clear / S.007 / pH: x  Gluc: x / Ketone: Negative  / Bili: Negative / Urobili: Negative   Blood: x / Protein: Negative / Nitrite: Negative   Leuk Esterase: Negative / RBC: 0-2 /HPF / WBC 0-2 /HPF   Sq Epi: x / Non Sq Epi: Occasional /HPF / Bacteria: x    CORONARY VESSELS: The coronary circulation is right dominant.  LM:   --  LM: Normal.  LAD:   --  Mid LAD: There was a discrete 70 % stenosis in the proximal  third of the vessel segment.  --  Distal LAD: Angiography showed minor luminal irregularities with no  flow limiting lesions.  CX:   --  Circumflex: Normal.  --  OM1: Normal.  RCA:   --  RPLS: There was a 60 % stenosis.    Conclusions:  1. Calcified trileaflet aortic valve with decreased  opening.   Echogenic structure seen on the aortic valve  consistent with vegetation (left coronary cusp).   Peak transaortic valve gradient equals 31 mm Hg, mean  transaortic valve gradient equals 18 mm Hg, estimated  aortic valve area equals 1 sqcm (by continuity equation),  aortic valve velocity time integral equals 64 cm,  consistent with moderate to severe aortic stenosis.  Moderate-severe eccentric aortic regurgitation.  2. No left atrial or left atrial appendage thrombus.  3. Normal left ventricular internal dimensions and wall  thicknesses.  4. Normal left ventricular systolic function. No segmental  wall motion abnormalities.  5. Agitated saline injection and color flow Doppler  demonstrates no evidence of a patent foramen ovale.  6. Left pleural effusion.      MEDICATIONS  (STANDING):  aspirin  chewable 81 milliGRAM(s) Oral daily  atorvastatin 80 milliGRAM(s) Oral at bedtime  dextrose 5%. 1000 milliLiter(s) (50 mL/Hr) IV Continuous <Continuous>  dextrose 50% Injectable 12.5 Gram(s) IV Push once  dextrose 50% Injectable 25 Gram(s) IV Push once  dextrose 50% Injectable 25 Gram(s) IV Push once  docusate sodium 100 milliGRAM(s) Oral daily  enoxaparin Injectable 40 milliGRAM(s) SubCutaneous daily  finasteride 5 milliGRAM(s) Oral daily  folic acid 1 milliGRAM(s) Oral daily  influenza   Vaccine 0.5 milliLiter(s) IntraMuscular once  insulin glargine Injectable (LANTUS) 12 Unit(s) SubCutaneous at bedtime  insulin lispro (HumaLOG) corrective regimen sliding scale   SubCutaneous three times a day before meals  insulin lispro (HumaLOG) corrective regimen sliding scale   SubCutaneous at bedtime  insulin lispro Injectable (HumaLOG) 3 Unit(s) SubCutaneous three times a day with meals  metoprolol     tartrate 12.5 milliGRAM(s) Oral two times a day  multivitamin 1 Tablet(s) Oral daily  pantoprazole    Tablet 40 milliGRAM(s) Oral before breakfast  piperacillin/tazobactam IVPB. 3.375 Gram(s) IV Intermittent every 8 hours  tamsulosin 0.4 milliGRAM(s) Oral at bedtime  thiamine 100 milliGRAM(s) Oral daily  ticagrelor 90 milliGRAM(s) Oral every 12 hours    MEDICATIONS  (PRN):  dextrose Gel 1 Dose(s) Oral once PRN Blood Glucose LESS THAN 70 milliGRAM(s)/deciliter  glucagon  Injectable 1 milliGRAM(s) IntraMuscular once PRN Glucose LESS THAN 70 milligrams/deciliter  polyethylene glycol 3350 17 Gram(s) Oral daily PRN Constipation      I personally reviewed and interpreted laboratory values  I personally reviewed and interpreted radiology  I personally reviewed and interpreted EKG        A/P:  72 year old with ETOH abuse who p/w fall, found to have culture negative aortic valve vegetation/endocarditis, developed ELIAN after undergoing cardiac catheterization.    #Endocarditis c/b septic shock  -was on pressors, off since . BP stable ~100  -no signs of emboli, heart failure or heart block  -moderate to severe AI/ moderate to severe AS with >1cm vegetation on aortic valve  -per surgery f/u TTE after completion of course of abx to evaluate for progression of AI/AS and f/u with CTS  -cultures NGTD F/U Bartonella, Brucella, Q fever, Legionella serologies, fungal cultures  -continue vancomycin by level <20 and zosyn 3.375 q8h (started ). Daily vanc levels  -will need PICC    #ELIAN  -eval by renal: thought to be 2/2 ADARSH.   -bladder scan to r/o retention.  -gentle IVF 75 cc/hr.    #NSTEMI  -with 70% LAD stenosis, 60% RCA  -c/w ASA 81 daily, lipitor 80, brilinta BID, lopressor 12.5 BID. ACEI after ELIAN and hypotension.  -discuss with cardiology re necessity of DAPT as no intervention performed  -f/u as outpatient with cardiology    #UTI vs prostatitis  -c/w broad spectrum abx    #T2DM  -per endocrine consult  Lantus 12 u  Humalog 3 u TIDAC  If NPO, Lantus 8 u  --on discharge home: d/c home metformin, begin Jardiance 10 mg daily, glipizide 10 mg BID - instruct pt to hold if not eating    #ETOH abuse  -no signs of withdrawal at this time  -folic acid, thiamine    #DVT ppx  -lovenox 40    Sofie Denson MD  Hospitalist   [phone] CC: found down/septic shock/culture negative aortic valve endocarditis.     HPI:  Adapted from chart  72 year-old male with T2DM, alcohol abuse brought in by family  after being found down approx 2 days. Multiple recent falls at home due to physical instability,    chronic alcohol use 6-12 beers daily (patient denies this to me), increased lethargy, forgetfulness, disorientation. +Fever, chills, dysuria and urinary frequency.    In the ED EKG with inferior ST-elevations, hypotensive to 70/40 with AF with RVR     Atrial fibrillation + RVR on EKG. Labs notable for WBC 26K, lac 5.2 and troponin 0.6. Given 5L NS, multiple DCCV with temp resumption of NSR in 80s bpm before return to atrial fibrillation. received amiodarone and digoxin loads.       JANES showed aortic valve vegetation. Started on vancomycin and zosyn, cardiac cath : LAD 70% stenosis, no intervention. CT surgery eval deferred surgery as thought to be chronic endocarditis and low likelihood of embolization.   Cr noted 2.13.      On my exam patient feels well, complains only of chronic dysuria (~1month) and non productive cough x 5 days.     REVIEW OF SYSTEMS:    CONSTITUTIONAL: No weakness, fevers or chills.  HEENT: no blurry vision, sore throat  RESPIRATORY: cough, no wheezing, hemoptysis; No shortness of breath  CARDIOVASCULAR: No chest pain or palpitations, No orthopnea, PN  GASTROINTESTINAL: No abdominal or epigastric pain. No nausea, vomiting, or hematemesis; No diarrhea or constipation. No melena or hematochezia.  GENITOURINARY: +dysuria/frequency  MSK: No joint pains or swelling  SKIN: No rashes    PHYSICAL EXAM:  Vital Signs Last 24 Hrs  T(C): 36.6 (01-15-18 @ 20:23)  T(F): 97.8 (01-15-18 @ 20:23), Max: 98.3 (01-15-18 @ 08:00)  HR: 88 (01-15-18 @ 20:23) (80 - 94)  BP: 103/55 (01-15-18 @ 20:23)  BP(mean): 59 (01-15-18 @ 19:00) (55 - 73)  RR: 18 (01-15-18 @ 20:23) (13 - 20)  SpO2: 95% (01-15-18 @ 20:23) (94% - 100%)  Wt(kg): --     @ 07:01  -  01-15 @ 07:00  --------------------------------------------------------  IN: 1205 mL / OUT: 1895 mL / NET: -690 mL    01-15 @ 07:01  -  01-15 @ 22:58  --------------------------------------------------------  IN: 1250 mL / OUT: 800 mL / NET: 450 mL      Constitutional: NAD, awake and alert  HEENT: Moist mucus membranes, anicteric, PERRL, no oral ulcers  Neck: Soft and supple  Respiratory: Breath sounds are clear bilaterally, No wheezing, rales or rhonchi  Cardiovascular: S1 and S2, regular rate and rhythm, 2/6 systolic murmur, no gallops or rubs.   No JVD. No LE edema  Gastrointestinal:  Soft, nontender, nondistended, no guarding, no rebound  Extremities: Warm, well perfused, no cyanosis or clubbing  Neurological: A/O x 3, no focal deficits, CN 3-12 intact  Musculoskeletal: Joints without erythema, swelling, tendernss  Skin: No rashes  Psych: affect appropriate, linear speech  Lymph: no cervical lymphadenopathy                            10.7   14.5  )-----------( 281      ( 15 Chao 2018 03:52 )             30.7       01-15    138  |  105  |  21  ----------------------------<  110<H>  4.4   |  21<L>  |  2.13<H>    Ca    7.6<L>      15 Chao 2018 13:30  Phos  4.2     01-15  Mg     2.3     01-15    TPro  5.6<L>  /  Alb  2.0<L>  /  TBili  0.4  /  DBili  x   /  AST  32  /  ALT  25  /  AlkPhos  82  -15          CAPILLARY BLOOD GLUCOSE      POCT Blood Glucose.: 86 mg/dL (15 Chao 2018 20:25)      PT/INR - ( 2018 05:32 )   PT: 11.2 sec;   INR: 1.04 ratio         PTT - ( 2018 05:32 )  PTT:36.3 sec    Lactate Trend   @ 04:11 Lactate:1.0     Urinalysis Basic - ( 15 Chao 2018 09:30 )    Color: Colorless / Appearance: Clear / S.007 / pH: x  Gluc: x / Ketone: Negative  / Bili: Negative / Urobili: Negative   Blood: x / Protein: Negative / Nitrite: Negative   Leuk Esterase: Negative / RBC: 0-2 /HPF / WBC 0-2 /HPF   Sq Epi: x / Non Sq Epi: Occasional /HPF / Bacteria: x    CORONARY VESSELS: The coronary circulation is right dominant.  LM:   --  LM: Normal.  LAD:   --  Mid LAD: There was a discrete 70 % stenosis in the proximal  third of the vessel segment.  --  Distal LAD: Angiography showed minor luminal irregularities with no  flow limiting lesions.  CX:   --  Circumflex: Normal.  --  OM1: Normal.  RCA:   --  RPLS: There was a 60 % stenosis.    Conclusions:  1. Calcified trileaflet aortic valve with decreased  opening.   Echogenic structure seen on the aortic valve  consistent with vegetation (left coronary cusp).   Peak transaortic valve gradient equals 31 mm Hg, mean  transaortic valve gradient equals 18 mm Hg, estimated  aortic valve area equals 1 sqcm (by continuity equation),  aortic valve velocity time integral equals 64 cm,  consistent with moderate to severe aortic stenosis.  Moderate-severe eccentric aortic regurgitation.  2. No left atrial or left atrial appendage thrombus.  3. Normal left ventricular internal dimensions and wall  thicknesses.  4. Normal left ventricular systolic function. No segmental  wall motion abnormalities.  5. Agitated saline injection and color flow Doppler  demonstrates no evidence of a patent foramen ovale.  6. Left pleural effusion.      MEDICATIONS  (STANDING):  aspirin  chewable 81 milliGRAM(s) Oral daily  atorvastatin 80 milliGRAM(s) Oral at bedtime  dextrose 5%. 1000 milliLiter(s) (50 mL/Hr) IV Continuous <Continuous>  dextrose 50% Injectable 12.5 Gram(s) IV Push once  dextrose 50% Injectable 25 Gram(s) IV Push once  dextrose 50% Injectable 25 Gram(s) IV Push once  docusate sodium 100 milliGRAM(s) Oral daily  enoxaparin Injectable 40 milliGRAM(s) SubCutaneous daily  finasteride 5 milliGRAM(s) Oral daily  folic acid 1 milliGRAM(s) Oral daily  influenza   Vaccine 0.5 milliLiter(s) IntraMuscular once  insulin glargine Injectable (LANTUS) 12 Unit(s) SubCutaneous at bedtime  insulin lispro (HumaLOG) corrective regimen sliding scale   SubCutaneous three times a day before meals  insulin lispro (HumaLOG) corrective regimen sliding scale   SubCutaneous at bedtime  insulin lispro Injectable (HumaLOG) 3 Unit(s) SubCutaneous three times a day with meals  metoprolol     tartrate 12.5 milliGRAM(s) Oral two times a day  multivitamin 1 Tablet(s) Oral daily  pantoprazole    Tablet 40 milliGRAM(s) Oral before breakfast  piperacillin/tazobactam IVPB. 3.375 Gram(s) IV Intermittent every 8 hours  tamsulosin 0.4 milliGRAM(s) Oral at bedtime  thiamine 100 milliGRAM(s) Oral daily  ticagrelor 90 milliGRAM(s) Oral every 12 hours    MEDICATIONS  (PRN):  dextrose Gel 1 Dose(s) Oral once PRN Blood Glucose LESS THAN 70 milliGRAM(s)/deciliter  glucagon  Injectable 1 milliGRAM(s) IntraMuscular once PRN Glucose LESS THAN 70 milligrams/deciliter  polyethylene glycol 3350 17 Gram(s) Oral daily PRN Constipation      I personally reviewed and interpreted laboratory values  I personally reviewed and interpreted radiology  I personally reviewed and interpreted EKG        A/P:  72 year old with ETOH abuse who p/w fall, found to have culture negative aortic valve vegetation/endocarditis, developed ELIAN after undergoing cardiac catheterization.    #Endocarditis c/b septic shock  -was on pressors, off since . BP stable ~100  -no signs of emboli, heart failure or heart block  -moderate to severe AI/ moderate to severe AS with >1cm vegetation on aortic valve  -per surgery f/u TTE after completion of course of abx to evaluate for progression of AI/AS and f/u with CTS  -cultures NGTD F/U Bartonella, Brucella, Q fever, Legionella serologies, fungal cultures  -continue vancomycin by level <20 and zosyn 3.375 q8h (started ). Daily vanc levels  -will need PICC    #ELIAN  -eval by renal: thought to be 2/2 ADARSH.   -bladder scan to r/o retention.  -gentle IVF 75 cc/hr.    #NSTEMI  -with 70% LAD stenosis, 60% RCA  -c/w ASA 81 daily, lipitor 80, brilinta BID, lopressor 12.5 BID. ACEI after ELIAN and hypotension.  -discuss with cardiology re necessity of DAPT as no intervention performed  -f/u as outpatient with cardiology    #UTI vs prostatitis  -c/w broad spectrum abx    #T2DM  -per endocrine consult  Lantus 12 u  Humalog 3 u TIDAC  If NPO, Lantus 8 u  --on discharge home: d/c home metformin, begin Jardiance 10 mg daily, glipizide 10 mg BID - instruct pt to hold if not eating    #ETOH abuse  -no signs of withdrawal at this time  -folic acid, thiamine    #DVT ppx  -lovenox 40    Sofie Denson MD  Hospitalist   [phone] CC: found down/septic shock/culture negative aortic valve endocarditis.     HPI:  Adapted from chart  72 year-old male with T2DM, alcohol abuse brought in by family  after being found down approx 2 days. Multiple recent falls at home due to physical instability,    chronic alcohol use 6-12 beers daily (patient denies this to me), increased lethargy, forgetfulness, disorientation. +Fever, chills, dysuria and urinary frequency.    In the ED EKG with inferior ST-elevations, hypotensive to 70/40 with AF with RVR     Atrial fibrillation + RVR on EKG. Labs notable for WBC 26K, lac 5.2 and troponin 0.6. Given 5L NS, multiple DCCV with temp resumption of NSR in 80s bpm before return to atrial fibrillation. received amiodarone and digoxin loads.       JANES showed aortic valve vegetation. Started on vancomycin and zosyn, cardiac cath : LAD 70% stenosis, no intervention. CT surgery eval deferred surgery as thought to be chronic endocarditis and low likelihood of embolization.   Cr noted 2.13.      On my exam patient feels well, complains only of chronic dysuria (~1month) and non productive cough x 5 days.     PAST MEDICAL & SURGICAL HISTORY:  High cholesterol  Diabetes: type 2  No significant past surgical history    FAMILY HISTORY:  Family history of type 2 diabetes mellitus (Sibling)  Family history of lung cancer (Sibling)      Social History:  Tobacco use: denies   EtOH use: >4 months ago per patient  Illicit drug use: no    REVIEW OF SYSTEMS:    CONSTITUTIONAL: No weakness, fevers or chills.  HEENT: no blurry vision, sore throat  RESPIRATORY: cough, no wheezing, hemoptysis; No shortness of breath  CARDIOVASCULAR: No chest pain or palpitations, No orthopnea, PN  GASTROINTESTINAL: No abdominal or epigastric pain. No nausea, vomiting, or hematemesis; No diarrhea or constipation. No melena or hematochezia.  GENITOURINARY: +dysuria/frequency  MSK: No joint pains or swelling  SKIN: No rashes    PHYSICAL EXAM:  Vital Signs Last 24 Hrs  T(C): 36.6 (01-15-18 @ 20:23)  T(F): 97.8 (01-15-18 @ 20:23), Max: 98.3 (01-15-18 @ 08:00)  HR: 88 (01-15-18 @ 20:23) (80 - 94)  BP: 103/55 (01-15-18 @ 20:23)  BP(mean): 59 (01-15-18 @ 19:00) (55 - 73)  RR: 18 (01-15-18 @ 20:23) (13 - 20)  SpO2: 95% (01-15-18 @ 20:23) (94% - 100%)  Wt(kg): --     @ 07:  -  01-15 @ 07:00  --------------------------------------------------------  IN: 1205 mL / OUT: 1895 mL / NET: -690 mL    01-15 @ 07:01  -  01-15 @ 22:58  --------------------------------------------------------  IN: 1250 mL / OUT: 800 mL / NET: 450 mL      Constitutional: NAD, awake and alert  HEENT: Moist mucus membranes, anicteric, PERRL, no oral ulcers  Neck: Soft and supple  Respiratory: Breath sounds are clear bilaterally, No wheezing, rales or rhonchi  Cardiovascular: S1 and S2, regular rate and rhythm, 2/6 systolic murmur, no gallops or rubs.   No JVD. No LE edema  Gastrointestinal:  Soft, nontender, nondistended, no guarding, no rebound  Extremities: Warm, well perfused, no cyanosis or clubbing  Neurological: A/O x 3, no focal deficits, CN 3-12 intact  Musculoskeletal: Joints without erythema, swelling, tendernss  Skin: No rashes  Psych: affect appropriate, linear speech  Lymph: no cervical lymphadenopathy                            10.7   14.5  )-----------( 281      ( 15 Chao 2018 03:52 )             30.7       -15    138  |  105  |  21  ----------------------------<  110<H>  4.4   |  21<L>  |  2.13<H>    Ca    7.6<L>      15 Chao 2018 13:30  Phos  4.2     01-15  Mg     2.3     -15    TPro  5.6<L>  /  Alb  2.0<L>  /  TBili  0.4  /  DBili  x   /  AST  32  /  ALT  25  /  AlkPhos  82  01-15          CAPILLARY BLOOD GLUCOSE      POCT Blood Glucose.: 86 mg/dL (15 Chao 2018 20:25)      PT/INR - ( 2018 05:32 )   PT: 11.2 sec;   INR: 1.04 ratio         PTT - ( 2018 05:32 )  PTT:36.3 sec    Lactate Trend   @ 04:11 Lactate:1.0     Urinalysis Basic - ( 15 Chao 2018 09:30 )    Color: Colorless / Appearance: Clear / S.007 / pH: x  Gluc: x / Ketone: Negative  / Bili: Negative / Urobili: Negative   Blood: x / Protein: Negative / Nitrite: Negative   Leuk Esterase: Negative / RBC: 0-2 /HPF / WBC 0-2 /HPF   Sq Epi: x / Non Sq Epi: Occasional /HPF / Bacteria: x    CORONARY VESSELS: The coronary circulation is right dominant.  LM:   --  LM: Normal.  LAD:   --  Mid LAD: There was a discrete 70 % stenosis in the proximal  third of the vessel segment.  --  Distal LAD: Angiography showed minor luminal irregularities with no  flow limiting lesions.  CX:   --  Circumflex: Normal.  --  OM1: Normal.  RCA:   --  RPLS: There was a 60 % stenosis.    Conclusions:  1. Calcified trileaflet aortic valve with decreased  opening.   Echogenic structure seen on the aortic valve  consistent with vegetation (left coronary cusp).   Peak transaortic valve gradient equals 31 mm Hg, mean  transaortic valve gradient equals 18 mm Hg, estimated  aortic valve area equals 1 sqcm (by continuity equation),  aortic valve velocity time integral equals 64 cm,  consistent with moderate to severe aortic stenosis.  Moderate-severe eccentric aortic regurgitation.  2. No left atrial or left atrial appendage thrombus.  3. Normal left ventricular internal dimensions and wall  thicknesses.  4. Normal left ventricular systolic function. No segmental  wall motion abnormalities.  5. Agitated saline injection and color flow Doppler  demonstrates no evidence of a patent foramen ovale.  6. Left pleural effusion.      MEDICATIONS  (STANDING):  aspirin  chewable 81 milliGRAM(s) Oral daily  atorvastatin 80 milliGRAM(s) Oral at bedtime  dextrose 5%. 1000 milliLiter(s) (50 mL/Hr) IV Continuous <Continuous>  dextrose 50% Injectable 12.5 Gram(s) IV Push once  dextrose 50% Injectable 25 Gram(s) IV Push once  dextrose 50% Injectable 25 Gram(s) IV Push once  docusate sodium 100 milliGRAM(s) Oral daily  enoxaparin Injectable 40 milliGRAM(s) SubCutaneous daily  finasteride 5 milliGRAM(s) Oral daily  folic acid 1 milliGRAM(s) Oral daily  influenza   Vaccine 0.5 milliLiter(s) IntraMuscular once  insulin glargine Injectable (LANTUS) 12 Unit(s) SubCutaneous at bedtime  insulin lispro (HumaLOG) corrective regimen sliding scale   SubCutaneous three times a day before meals  insulin lispro (HumaLOG) corrective regimen sliding scale   SubCutaneous at bedtime  insulin lispro Injectable (HumaLOG) 3 Unit(s) SubCutaneous three times a day with meals  metoprolol     tartrate 12.5 milliGRAM(s) Oral two times a day  multivitamin 1 Tablet(s) Oral daily  pantoprazole    Tablet 40 milliGRAM(s) Oral before breakfast  piperacillin/tazobactam IVPB. 3.375 Gram(s) IV Intermittent every 8 hours  tamsulosin 0.4 milliGRAM(s) Oral at bedtime  thiamine 100 milliGRAM(s) Oral daily  ticagrelor 90 milliGRAM(s) Oral every 12 hours    MEDICATIONS  (PRN):  dextrose Gel 1 Dose(s) Oral once PRN Blood Glucose LESS THAN 70 milliGRAM(s)/deciliter  glucagon  Injectable 1 milliGRAM(s) IntraMuscular once PRN Glucose LESS THAN 70 milligrams/deciliter  polyethylene glycol 3350 17 Gram(s) Oral daily PRN Constipation      I personally reviewed and interpreted laboratory values  I personally reviewed and interpreted radiology  I personally reviewed and interpreted EKG        A/P:  72 year old with ETOH abuse who p/w fall, found to have culture negative aortic valve vegetation/endocarditis, developed ELIAN after undergoing cardiac catheterization.    #Endocarditis c/b septic shock  -was on pressors, off since . BP stable ~100  -no signs of emboli, heart failure or heart block  -moderate to severe AI/ moderate to severe AS with >1cm vegetation on aortic valve  -per surgery f/u TTE after completion of course of abx to evaluate for progression of AI/AS and f/u with CTS  -cultures NGTD F/U Bartonella, Brucella, Q fever, Legionella serologies, fungal cultures  -continue vancomycin by level <20 and zosyn 3.375 q8h (started ). Daily vanc levels  -will need PICC    #ELIAN  -eval by renal: thought to be 2/2 ADARSH.   -bladder scan to r/o retention.  -gentle IVF 75 cc/hr.    #NSTEMI  -with 70% LAD stenosis, 60% RCA  -c/w ASA 81 daily, lipitor 80, brilinta BID, lopressor 12.5 BID. ACEI after ELIAN and hypotension.  -discuss with cardiology re necessity of DAPT as no intervention performed  -f/u as outpatient with cardiology    #UTI vs prostatitis  -c/w broad spectrum abx    #T2DM  -per endocrine consult  Lantus 12 u  Humalog 3 u TIDAC  If NPO, Lantus 8 u  --on discharge home: d/c home metformin, begin Jardiance 10 mg daily, glipizide 10 mg BID - instruct pt to hold if not eating    #ETOH abuse  -no signs of withdrawal at this time  -folic acid, thiamine    #DVT ppx  -lovenox 40    Sofie Denson MD  Hospitalist   [phone]

## 2018-01-15 NOTE — PROGRESS NOTE ADULT - ASSESSMENT
Patient is a 72 year-old male with past medical history of T2DM, DLD, alcohol misuse brought in by family after having been found face down on the floor for approximately 2 days found in hemodynamically unstable A-fib with RVR (170s bpm) s/p multiple cardioversions and initially requiring pressor support in the setting of septic shock 2/2 to culture negative aortic valve endocarditis.     Neuro:   -No active issues    Pulmonary:   -CT Chest notable for atelectasis/trace pleural effusions bilaterally  -Patient continues to endorse cough  -Mild bibasilar crackles   -No plan for diuresis at the moment; would diurese with Lasix only if patient's becomes uncomfortable     Cardiovascular:   1) Endocarditis of aortic valve  -JANES with large vegetation (1.4 x 0.8) on aortic valve leaflet  -Per CT surgery, vegetation appears chronic, heavily calcified. Unlikely to embolize. Thus, patient will be re-evaluated for aortic valve replacement surgery in 3 months.   -Will continue with vanc/zosyn. Patient will require PICC line; rehab placement.   -Per ID to continue vanc by level (trough goal >20) and zosyn  -Vanc level today 21.8, no need to redose today    2) CAD  -Cath with severe proximal LAD disease (70% stenosis), 60% stenosis of RCA  -Per CT surgery no plan for CABG  -Continue ASA, no need to restart brillinta as pt is not stented  -Continue Lipitor      GI:   -Start bowel regimen for constipation  -Carb controlled diet    /Renal:   -Grossly positive u/a, + leuk esterase, negative nitrites  -Patient has hx of BPH; on home flomax and finasteride  -CT A/P with contrast without evidence of pyelonephritis  -Cystitis vs prostatitis; on broad spectrum abx as above  -Urine cx negative    ID:   -Aortic valve endocarditis of unknown bacterial etiology; blood cultures negative thus far  -Patient underwent  instrumentation with cystoscopy back in November, perhaps that is the route of infection   -ID following; workup for culture negative endocarditis as per ID   -Septic shock resolved   -GC/Chlamydia negative, HIV negative; syphillis negative  -Continue Vanc/Zosyn begun on 1/9  -f/u new set of blood cultures    Endo:   -Endo consulted; recs as follows:   -While inpatient:     - Lantus 12 u    - Humalog 3 u TIDAC    - If NPO, Lantus 8 u    -[place] regimen after discharge as follows:     -d/c home metformin    - Begin Jardiance 10 mg daily    - Glipizide 10 mg BID - instruct pt to hold if not eating    Follow up in endo clinic:   [phone]      Hematology  -Leukocytosis resolved; bandemia resolved  -Abx as above  -trend daily cbc Patient is a 72 year-old male with past medical history of T2DM, DLD, alcohol misuse brought in by family after having been found face down on the floor for approximately 2 days found in hemodynamically unstable A-fib with RVR (170s bpm) s/p multiple cardioversions and initially requiring pressor support in the setting of septic shock 2/2 to culture negative aortic valve endocarditis.     Neuro:   -No active issues    Pulmonary:   -CT Chest notable for atelectasis/trace pleural effusions bilaterally  -Patient continues to endorse cough, but no respiratory distress, and normal VS.  -Mild bibasilar crackles   -No plan for diuresis at the moment; would diurese with Lasix only if patient's becomes uncomfortable     Cardiovascular:   1) Endocarditis of aortic valve  -JANES with large vegetation (1.4 x 0.8) on aortic valve leaflet  -Per CT surgery, vegetation appears chronic, heavily calcified. Unlikely to embolize. Thus, patient will be re-evaluated for aortic valve replacement surgery in 3 months.   -Will continue with vanc/zosyn. Patient will require PICC line, will d.w IR tomorrow given holiday today; rehab placement.   -Per ID to continue vanc by level (trough goal >20) and zosyn  -Vanc level today 21.8, no need to re-dose vancomycin today    2) CAD  -Cath with severe proximal LAD disease (70% stenosis), 60% stenosis of RCA  -Per CT surgery no plan for CABG  -Continue ASA, and brillinta   -Continue Lipitor      GI:   - Continue bowel regimen for constipation  -Carb controlled diet    /Renal:   - Cr still uptrending, likely in setting of vancomycin +contrast. Also with FeNa 0.9 indicating pre-renal ELIAN. Will fluid challenge, repeat UA, and repeat labs in afternoon. Renal consult.   -Patient has hx of BPH; on home flomax and finasteride- continue  -CT A/P with contrast without evidence of pyelonephritis  -Cystitis vs prostatitis; on broad spectrum abx as above  -Urine cx negative    ID:   -Aortic valve endocarditis of unknown bacterial etiology; blood cultures negative thus far  -Patient underwent  instrumentation with cystoscopy back in November, perhaps that is the route of infection   -ID following; workup for culture negative endocarditis as per ID   -Septic shock resolved   -GC/Chlamydia negative, HIV negative; syphillis negative  -Continue Vanc/Zosyn begun on 1/9  -f/u new set of blood cultures    Endo:   -Endo consulted; recs as follows:   -While inpatient:     - Lantus 12 u    - Humalog 3 u TIDAC    - If NPO, Lantus 8 u    -[place] regimen after discharge as follows:     -d/c home metformin    - Begin Jardiance 10 mg daily    - Glipizide 10 mg BID - instruct pt to hold if not eating    Follow up in endo clinic:   [phone]      Hematology  -Leukocytosis resolved; bandemia resolved  -Abx as above  -trend daily cbc Patient is a 72 year-old male with past medical history of T2DM, DLD, alcohol misuse brought in by family after having been found face down on the floor for approximately 2 days found in hemodynamically unstable A-fib with RVR (170s bpm) s/p multiple cardioversions and initially requiring pressor support in the setting of septic shock 2/2 to culture negative aortic valve endocarditis.     Neuro:   -No active issues    Pulmonary:   -CT Chest notable for atelectasis/trace pleural effusions bilaterally  -Patient continues to endorse cough, but no respiratory distress, and normal VS.  -Mild bibasilar crackles   -No plan for diuresis at the moment; would diurese with Lasix only if patient's becomes uncomfortable     Cardiovascular:   1) Endocarditis of aortic valve  -JANES with large vegetation (1.4 x 0.8) on aortic valve leaflet  -Per CT surgery, vegetation appears chronic, heavily calcified. Unlikely to embolize. Thus, patient will be re-evaluated for aortic valve replacement surgery in 3 months.   -Will continue with vanc/zosyn. Patient will require PICC line, will d.w IR tomorrow given holiday today; rehab placement.   -Per ID to continue vanc by level (trough goal >20) and zosyn  -Vanc level today 21.8, no need to re-dose vancomycin today    2) CAD  -Cath with severe proximal LAD disease (70% stenosis), 60% stenosis of RCA  -Per CT surgery no plan for CABG  -Continue ASA, and brillinta   -Continue Lipitor      GI:   - Continue bowel regimen for constipation  -Carb controlled diet    /Renal:   - Cr still uptrending, likely in setting of vancomycin +contrast. Also with FeNa 0.9 indicating pre-renal ELIAN. Will fluid challenge, repeat UA, and repeat labs in afternoon. Renal consult.   - Renal US r/o infarct given patient witn endocarditis and increasing cr  -Patient has hx of BPH; on home flomax and finasteride- continue  -CT A/P with contrast without evidence of pyelonephritis  -Cystitis vs prostatitis; on broad spectrum abx as above  -Urine cx negative    ID:   -Aortic valve endocarditis of unknown bacterial etiology; blood cultures negative thus far  -Patient underwent  instrumentation with cystoscopy back in November, perhaps that is the route of infection   -ID following; workup for culture negative endocarditis as per ID   -Septic shock resolved   -GC/Chlamydia negative, HIV negative; syphillis negative  -Continue Vanc/Zosyn begun on 1/9  -f/u new set of blood cultures    Endo:   -Endo consulted; recs as follows:   -While inpatient:     - Lantus 12 u    - Humalog 3 u TIDAC    - If NPO, Lantus 8 u    -[place] regimen after discharge as follows:     -d/c home metformin    - Begin Jardiance 10 mg daily    - Glipizide 10 mg BID - instruct pt to hold if not eating    Follow up in endo clinic:   [phone]      Hematology  -Leukocytosis resolved; bandemia resolved  -Abx as above  -trend daily cbc

## 2018-01-15 NOTE — CONSULT NOTE ADULT - SUBJECTIVE AND OBJECTIVE BOX
[facility] DIVISION OF KIDNEY DISEASES AND HYPERTENSION -- INITIAL CONSULT NOTE  --------------------------------------------------------------------------------  HPI:  Patient is a 73 y/o M w/ known history of alcohol abuse, 6-12 drinks per day who presented initially due to being down down, reportedly for 2 days.  Patient denies any abuse and says that he drinks every once in a while which contradicts history provided by family and medical record.  Patient reports he has been falling frequently, at least once every month and has been unable to get up due to weakness.  He believes his weakness is due to bone issues that were described to him by a doctor 20-30 years prior.  Patient was found to have a cardiac vegetations on imaging when he arrived to the emergency department and has been treated with IV antibiotics.  Patient denies any recent fevers or chills.  Patient was hypotensive initially in the emergency department on admission one week prior but has not since had any episodes of hypotension.        PAST HISTORY  --------------------------------------------------------------------------------  PAST MEDICAL & SURGICAL HISTORY:  High cholesterol  Diabetes: type 2  No significant past surgical history    FAMILY HISTORY:  Family history of type 2 diabetes mellitus (Sibling)  Family history of lung cancer (Sibling)    PAST SOCIAL HISTORY:  reports occasional etoh intake, at odds with history in medical record    ALLERGIES & MEDICATIONS  --------------------------------------------------------------------------------  Allergies    No Known Allergies    Intolerances      Standing Inpatient Medications  aspirin  chewable 81 milliGRAM(s) Oral daily  atorvastatin 80 milliGRAM(s) Oral at bedtime  dextrose 5%. 1000 milliLiter(s) IV Continuous <Continuous>  dextrose 50% Injectable 12.5 Gram(s) IV Push once  dextrose 50% Injectable 25 Gram(s) IV Push once  dextrose 50% Injectable 25 Gram(s) IV Push once  docusate sodium 100 milliGRAM(s) Oral daily  enoxaparin Injectable 40 milliGRAM(s) SubCutaneous daily  finasteride 5 milliGRAM(s) Oral daily  folic acid 1 milliGRAM(s) Oral daily  influenza   Vaccine 0.5 milliLiter(s) IntraMuscular once  insulin glargine Injectable (LANTUS) 12 Unit(s) SubCutaneous at bedtime  insulin lispro (HumaLOG) corrective regimen sliding scale   SubCutaneous three times a day before meals  insulin lispro (HumaLOG) corrective regimen sliding scale   SubCutaneous at bedtime  insulin lispro Injectable (HumaLOG) 3 Unit(s) SubCutaneous three times a day with meals  metoprolol     tartrate 12.5 milliGRAM(s) Oral two times a day  multivitamin 1 Tablet(s) Oral daily  pantoprazole    Tablet 40 milliGRAM(s) Oral before breakfast  piperacillin/tazobactam IVPB. 3.375 Gram(s) IV Intermittent every 8 hours  tamsulosin 0.4 milliGRAM(s) Oral at bedtime  thiamine 100 milliGRAM(s) Oral daily  ticagrelor 90 milliGRAM(s) Oral every 12 hours    PRN Inpatient Medications  dextrose Gel 1 Dose(s) Oral once PRN  glucagon  Injectable 1 milliGRAM(s) IntraMuscular once PRN  polyethylene glycol 3350 17 Gram(s) Oral daily PRN      REVIEW OF SYSTEMS  --------------------------------------------------------------------------------  Gen: No fevers/chills, +weakness, fatigue  Skin: No rashes  Head/Eyes/Ears/Mouth: No headache, no sore throat  Respiratory: No dyspnea, cough  CV: No chest pain, orthopnea  GI: No abdominal pain, diarrhea, constipation, nausea, vomiting  : No increased frequency, dysuria  MSK: No joint pain/swelling; no edema, + LE weakness  Neuro: No dizziness/lightheadedness, frequent falls  Heme: No easy bruising or bleeding  Endo: No heat/cold intolerance  Psych: ETOH abuse, 6-12 drinks daily    All other systems were reviewed and are negative, except as noted.    VITALS/PHYSICAL EXAM  --------------------------------------------------------------------------------  T(C): 36.8 (01-15-18 @ 08:00), Max: 36.8 (01-15-18 @ 06:00)  HR: 86 (01-15-18 @ 11:00) (82 - 96)  BP: 103/42 (01-15-18 @ 11:00) (96/36 - 119/44)  RR: 19 (01-15-18 @ 11:00) (13 - 25)  SpO2: 97% (01-15-18 @ 11:00) (94% - 100%)  Wt(kg): --        01-14-18 @ 07:01  -  01-15-18 @ 07:00  --------------------------------------------------------  IN: 1205 mL / OUT: 1895 mL / NET: -690 mL    01-15-18 @ 07:01  -  01-15-18 @ 12:08  --------------------------------------------------------  IN: 600 mL / OUT: 300 mL / NET: 300 mL      Physical Exam:  	Gen: NAD  	HEENT: MMM  	Pulm: CTA B/L  	CV: RRR, S1S2; no rub  	Abd: +BS, soft, nontender/nondistended  	: No suprapubic tenderness  	UE: Warm, no edema  	LE: Warm, no pitting edema  	Neuro: No focal deficits  	Psych: Normal affect and mood  	Skin: Warm, without rashes    LABS/STUDIES  --------------------------------------------------------------------------------              10.7   14.5  >-----------<  281      [01-15-18 @ 03:52]              30.7     134  |  103  |  22  ----------------------------<  100      [01-15-18 @ 03:52]  4.0   |  20  |  2.03        Ca     7.5     [01-15-18 @ 03:52]      Mg     2.4     [01-15-18 @ 03:52]      Phos  4.3     [01-15-18 @ 03:52]    TPro  5.3  /  Alb  1.8  /  TBili  0.4  /  DBili  x   /  AST  33  /  ALT  25  /  AlkPhos  83  [01-15-18 @ 03:52]    PT/INR: PT 11.2 , INR 1.04       [01-14-18 @ 05:32]  PTT: 36.3       [01-14-18 @ 05:32]      Creatinine Trend:  SCr 2.03 [01-15 @ 03:52]  SCr 1.59 [01-14 @ 05:32]  SCr 0.54 [01-13 @ 04:56]  SCr 0.49 [01-12 @ 04:40]  SCr 0.44 [01-11 @ 04:11]    Urinalysis - [01-15-18 @ 09:30]      Color Colorless / Appearance Clear / SG 1.007 / pH 6.0      Gluc Negative / Ketone Negative  / Bili Negative / Urobili Negative       Blood Small / Protein Negative / Leuk Est Negative / Nitrite Negative      RBC 0-2 / WBC 0-2 / Hyaline  / Gran  / Sq Epi  / Non Sq Epi Occasional / Bacteria     Urine Creatinine 28      [01-14-18 @ 12:15]  Urine Sodium <20      [01-14-18 @ 12:15]  Urine Urea Nitrogen 150      [01-14-18 @ 14:19]  Urine Chloride <35      [01-14-18 @ 12:15]  Urine Osmolality 100      [01-14-18 @ 12:15]

## 2018-01-16 DIAGNOSIS — D72.829 ELEVATED WHITE BLOOD CELL COUNT, UNSPECIFIED: ICD-10-CM

## 2018-01-16 DIAGNOSIS — Z29.9 ENCOUNTER FOR PROPHYLACTIC MEASURES, UNSPECIFIED: ICD-10-CM

## 2018-01-16 LAB
ALBUMIN SERPL ELPH-MCNC: 2.2 G/DL — LOW (ref 3.3–5)
ALP SERPL-CCNC: 78 U/L — SIGNIFICANT CHANGE UP (ref 40–120)
ALT FLD-CCNC: 12 U/L — SIGNIFICANT CHANGE UP (ref 10–45)
ANION GAP SERPL CALC-SCNC: 16 MMOL/L — SIGNIFICANT CHANGE UP (ref 5–17)
APTT BLD: 38.8 SEC — HIGH (ref 27.5–37.4)
AST SERPL-CCNC: 30 U/L — SIGNIFICANT CHANGE UP (ref 10–40)
B HENSELAE IGG SER-ACNC: SIGNIFICANT CHANGE UP TITER
B HENSELAE IGM SER-ACNC: SIGNIFICANT CHANGE UP TITER
B QUINTANA IGG SERPL-ACNC: SIGNIFICANT CHANGE UP TITER
B QUINTANA IGM SER-ACNC: SIGNIFICANT CHANGE UP TITER
BILIRUB SERPL-MCNC: 0.5 MG/DL — SIGNIFICANT CHANGE UP (ref 0.2–1.2)
BUN SERPL-MCNC: 24 MG/DL — HIGH (ref 7–23)
C BURNET PH1 + PH2 IGG+IGM SER-IMP: SIGNIFICANT CHANGE UP
C BURNET PH1 IGG TITR FLD: SIGNIFICANT CHANGE UP
C BURNET PH1 IGG TITR FLD: SIGNIFICANT CHANGE UP
C BURNET PH1 IGM TITR FLD: SIGNIFICANT CHANGE UP
C BURNET PH1 IGM TITR FLD: SIGNIFICANT CHANGE UP
CALCIUM SERPL-MCNC: 7.9 MG/DL — LOW (ref 8.4–10.5)
CHLORIDE SERPL-SCNC: 105 MMOL/L — SIGNIFICANT CHANGE UP (ref 96–108)
CO2 SERPL-SCNC: 19 MMOL/L — LOW (ref 22–31)
CREAT SERPL-MCNC: 2.1 MG/DL — HIGH (ref 0.5–1.3)
GLUCOSE BLDC GLUCOMTR-MCNC: 125 MG/DL — HIGH (ref 70–99)
GLUCOSE BLDC GLUCOMTR-MCNC: 141 MG/DL — HIGH (ref 70–99)
GLUCOSE BLDC GLUCOMTR-MCNC: 91 MG/DL — SIGNIFICANT CHANGE UP (ref 70–99)
GLUCOSE BLDC GLUCOMTR-MCNC: 99 MG/DL — SIGNIFICANT CHANGE UP (ref 70–99)
GLUCOSE SERPL-MCNC: 64 MG/DL — LOW (ref 70–99)
HCT VFR BLD CALC: 32.4 % — LOW (ref 39–50)
HGB BLD-MCNC: 10.5 G/DL — LOW (ref 13–17)
INR BLD: 0.97 RATIO — SIGNIFICANT CHANGE UP (ref 0.88–1.16)
LEGIONELLA AB SER-ACNC: NEGATIVE — SIGNIFICANT CHANGE UP
MAGNESIUM SERPL-MCNC: 2.5 MG/DL — SIGNIFICANT CHANGE UP (ref 1.6–2.6)
MCHC RBC-ENTMCNC: 30.4 PG — SIGNIFICANT CHANGE UP (ref 27–34)
MCHC RBC-ENTMCNC: 32.4 GM/DL — SIGNIFICANT CHANGE UP (ref 32–36)
MCV RBC AUTO: 93.9 FL — SIGNIFICANT CHANGE UP (ref 80–100)
PHOSPHATE SERPL-MCNC: 4.4 MG/DL — SIGNIFICANT CHANGE UP (ref 2.5–4.5)
PLATELET # BLD AUTO: 317 K/UL — SIGNIFICANT CHANGE UP (ref 150–400)
POTASSIUM SERPL-MCNC: 4.2 MMOL/L — SIGNIFICANT CHANGE UP (ref 3.5–5.3)
POTASSIUM SERPL-SCNC: 4.2 MMOL/L — SIGNIFICANT CHANGE UP (ref 3.5–5.3)
PROT SERPL-MCNC: 5.6 G/DL — LOW (ref 6–8.3)
PROTHROM AB SERPL-ACNC: 10.9 SEC — SIGNIFICANT CHANGE UP (ref 10–13.1)
RBC # BLD: 3.45 M/UL — LOW (ref 4.2–5.8)
RBC # FLD: 14.5 % — SIGNIFICANT CHANGE UP (ref 10.3–14.5)
SODIUM SERPL-SCNC: 140 MMOL/L — SIGNIFICANT CHANGE UP (ref 135–145)
VANCOMYCIN FLD-MCNC: 13.9 UG/ML — SIGNIFICANT CHANGE UP
WBC # BLD: 15.06 K/UL — HIGH (ref 3.8–10.5)
WBC # FLD AUTO: 15.06 K/UL — HIGH (ref 3.8–10.5)

## 2018-01-16 PROCEDURE — 99233 SBSQ HOSP IP/OBS HIGH 50: CPT

## 2018-01-16 PROCEDURE — 76775 US EXAM ABDO BACK WALL LIM: CPT | Mod: 26

## 2018-01-16 PROCEDURE — 99233 SBSQ HOSP IP/OBS HIGH 50: CPT | Mod: GC

## 2018-01-16 PROCEDURE — 93010 ELECTROCARDIOGRAM REPORT: CPT

## 2018-01-16 RX ORDER — CEFTRIAXONE 500 MG/1
2 INJECTION, POWDER, FOR SOLUTION INTRAMUSCULAR; INTRAVENOUS EVERY 24 HOURS
Qty: 0 | Refills: 0 | Status: DISCONTINUED | OUTPATIENT
Start: 2018-01-16 | End: 2018-01-16

## 2018-01-16 RX ORDER — CEFTRIAXONE 500 MG/1
2 INJECTION, POWDER, FOR SOLUTION INTRAMUSCULAR; INTRAVENOUS EVERY 24 HOURS
Qty: 0 | Refills: 0 | Status: DISCONTINUED | OUTPATIENT
Start: 2018-01-16 | End: 2018-01-30

## 2018-01-16 RX ORDER — VANCOMYCIN HCL 1 G
1000 VIAL (EA) INTRAVENOUS ONCE
Qty: 0 | Refills: 0 | Status: COMPLETED | OUTPATIENT
Start: 2018-01-16 | End: 2018-01-16

## 2018-01-16 RX ADMIN — PANTOPRAZOLE SODIUM 40 MILLIGRAM(S): 20 TABLET, DELAYED RELEASE ORAL at 06:35

## 2018-01-16 RX ADMIN — Medication 1 MILLIGRAM(S): at 11:41

## 2018-01-16 RX ADMIN — CEFTRIAXONE 100 GRAM(S): 500 INJECTION, POWDER, FOR SOLUTION INTRAMUSCULAR; INTRAVENOUS at 13:14

## 2018-01-16 RX ADMIN — INSULIN GLARGINE 12 UNIT(S): 100 INJECTION, SOLUTION SUBCUTANEOUS at 21:51

## 2018-01-16 RX ADMIN — TICAGRELOR 90 MILLIGRAM(S): 90 TABLET ORAL at 21:51

## 2018-01-16 RX ADMIN — ENOXAPARIN SODIUM 40 MILLIGRAM(S): 100 INJECTION SUBCUTANEOUS at 11:41

## 2018-01-16 RX ADMIN — Medication 100 MILLIGRAM(S): at 11:41

## 2018-01-16 RX ADMIN — TAMSULOSIN HYDROCHLORIDE 0.4 MILLIGRAM(S): 0.4 CAPSULE ORAL at 21:51

## 2018-01-16 RX ADMIN — ATORVASTATIN CALCIUM 80 MILLIGRAM(S): 80 TABLET, FILM COATED ORAL at 21:51

## 2018-01-16 RX ADMIN — Medication 1 TABLET(S): at 11:40

## 2018-01-16 RX ADMIN — TICAGRELOR 90 MILLIGRAM(S): 90 TABLET ORAL at 09:27

## 2018-01-16 RX ADMIN — Medication 250 MILLIGRAM(S): at 11:39

## 2018-01-16 RX ADMIN — Medication 12.5 MILLIGRAM(S): at 06:11

## 2018-01-16 RX ADMIN — Medication 81 MILLIGRAM(S): at 11:40

## 2018-01-16 RX ADMIN — Medication 3 UNIT(S): at 08:26

## 2018-01-16 RX ADMIN — FINASTERIDE 5 MILLIGRAM(S): 5 TABLET, FILM COATED ORAL at 11:40

## 2018-01-16 RX ADMIN — PIPERACILLIN AND TAZOBACTAM 25 GRAM(S): 4; .5 INJECTION, POWDER, LYOPHILIZED, FOR SOLUTION INTRAVENOUS at 05:59

## 2018-01-16 RX ADMIN — Medication 3 UNIT(S): at 11:47

## 2018-01-16 NOTE — CHART NOTE - NSCHARTNOTEFT_GEN_A_CORE
Source: Patient [X ]    Family [ ]     other [X ] RN, Medical record,     Pt seen for malnutrition follow up. Pt report an improving PO Intake day by day. States he ate 100% of breakfast thus far today, 1 yogurt, 1 oatmeal, 1 banana. Pt states intake is increasing from % intake. Pt was amenable to brief Heart healthy and MD education, written materials provided. Pt reports still being interested in Glucerna x1 daily.     Pt admitted for s/p fall down for 2 day related to ETOH abuse. Pt found in Afib with RVR, s/p unsuccessful cardioversion, severe sepsis and NSTEMI. S/p NSTEMI with AV endocarditis, moderate/severe AI and AS. plan for Antibiotics, no surgical intervention at this time.     Diet : Consistent CHO, DASH      Patient reports no GI distress.      PO intake:  %    Source for PO intake [ X] Patient [ ] family [X ] chart [ ] staff [ ] other      Current Weight: 166.6lbs, slight fluctuations   % Weight Change    Pertinent Medications: MEDICATIONS  (STANDING):  aspirin  chewable 81 milliGRAM(s) Oral daily  atorvastatin 80 milliGRAM(s) Oral at bedtime  cefTRIAXone   IVPB 2 Gram(s) IV Intermittent every 24 hours  dextrose 5%. 1000 milliLiter(s) (50 mL/Hr) IV Continuous <Continuous>  dextrose 50% Injectable 12.5 Gram(s) IV Push once  dextrose 50% Injectable 25 Gram(s) IV Push once  dextrose 50% Injectable 25 Gram(s) IV Push once  docusate sodium 100 milliGRAM(s) Oral daily  enoxaparin Injectable 40 milliGRAM(s) SubCutaneous daily  finasteride 5 milliGRAM(s) Oral daily  folic acid 1 milliGRAM(s) Oral daily  influenza   Vaccine 0.5 milliLiter(s) IntraMuscular once  insulin glargine Injectable (LANTUS) 12 Unit(s) SubCutaneous at bedtime  insulin lispro (HumaLOG) corrective regimen sliding scale   SubCutaneous three times a day before meals  insulin lispro (HumaLOG) corrective regimen sliding scale   SubCutaneous at bedtime  insulin lispro Injectable (HumaLOG) 3 Unit(s) SubCutaneous three times a day with meals  metoprolol     tartrate 12.5 milliGRAM(s) Oral two times a day  multivitamin 1 Tablet(s) Oral daily  pantoprazole    Tablet 40 milliGRAM(s) Oral before breakfast  tamsulosin 0.4 milliGRAM(s) Oral at bedtime  thiamine 100 milliGRAM(s) Oral daily  ticagrelor 90 milliGRAM(s) Oral every 12 hours  vancomycin  IVPB 1000 milliGRAM(s) IV Intermittent once    MEDICATIONS  (PRN):  dextrose Gel 1 Dose(s) Oral once PRN Blood Glucose LESS THAN 70 milliGRAM(s)/deciliter  glucagon  Injectable 1 milliGRAM(s) IntraMuscular once PRN Glucose LESS THAN 70 milligrams/deciliter  polyethylene glycol 3350 17 Gram(s) Oral daily PRN Constipation    Pertinent Labs:  01-16 Na140 mmol/L Glu 64 mg/dL<L> K+ 4.2 mmol/L Cr  2.10 mg/dL<H> BUN 24 mg/dL<H> Phos 4.4 mg/dL Alb 2.2 g/dL<L>BG levels: 1/16: 99, 1/15:       Skin: intact     Estimated Needs:   [ X] no change since previous assessment  [ ] recalculated:       Previous Nutrition Diagnosis:     [X] Food & Nutrition Related Knowledge Deficit [ X] Malnutrition       Nutrition Diagnosis is [X ] ongoing     New Nutrition Diagnosis: [X ] not applicable      Recommend...    1. Adding Glucerna x1 daily   2. Continue MVI, Folic acid and Vit B1  3. Heart healthy and DM education reinforced   4. Provide food preferences as requested by Pt/family within diet restrictions    5. Encourage PO intake during meal times   6. Menu selection assistance provided       Monitoring and Evaluation:     [X ] PO intake [X ] Tolerance to diet prescription [ X] weights [X ] follow up per protocol    [X ] other: RD remains available Sarah Siegler RD. Pager #[phone]

## 2018-01-16 NOTE — PROGRESS NOTE ADULT - PROBLEM SELECTOR PLAN 3
LHC demonstrated 70% LAD stenosis, 60% RCA  - c/w ASA 81 daily, lipitor 80, brilinta BID, lopressor 12.5 BID.   - hold ACEI 2/2 ELIAN and hypotension  - f/u as outpatient with cardiology  - no plan for CABG as per cardiology

## 2018-01-16 NOTE — PROGRESS NOTE ADULT - PROBLEM SELECTOR PLAN 2
Likely 2/2 ADARSH s/p cardiac cath. Cr plateaued around 2.1  - appreciate nephro recs  - monitor BMP  - renally dose meds  - avoid nephrotoxins

## 2018-01-16 NOTE — PROGRESS NOTE ADULT - SUBJECTIVE AND OBJECTIVE BOX
[facility] DIVISION OF KIDNEY DISEASES AND HYPERTENSION -- FOLLOW UP NOTE  --------------------------------------------------------------------------------  Chief Complaint: ELIAN     24 hour events/subjective:  Patient seen and examined. Patient has no complaints and is doing well at this time.       PAST HISTORY  --------------------------------------------------------------------------------  No significant changes to PMH, PSH, FHx, SHx, unless otherwise noted    ALLERGIES & MEDICATIONS  --------------------------------------------------------------------------------  Allergies    No Known Allergies    Intolerances      Standing Inpatient Medications  aspirin  chewable 81 milliGRAM(s) Oral daily  atorvastatin 80 milliGRAM(s) Oral at bedtime  cefTRIAXone   IVPB 2 Gram(s) IV Intermittent every 24 hours  dextrose 5%. 1000 milliLiter(s) IV Continuous <Continuous>  dextrose 50% Injectable 12.5 Gram(s) IV Push once  dextrose 50% Injectable 25 Gram(s) IV Push once  dextrose 50% Injectable 25 Gram(s) IV Push once  docusate sodium 100 milliGRAM(s) Oral daily  enoxaparin Injectable 40 milliGRAM(s) SubCutaneous daily  finasteride 5 milliGRAM(s) Oral daily  folic acid 1 milliGRAM(s) Oral daily  influenza   Vaccine 0.5 milliLiter(s) IntraMuscular once  insulin glargine Injectable (LANTUS) 12 Unit(s) SubCutaneous at bedtime  insulin lispro (HumaLOG) corrective regimen sliding scale   SubCutaneous three times a day before meals  insulin lispro (HumaLOG) corrective regimen sliding scale   SubCutaneous at bedtime  insulin lispro Injectable (HumaLOG) 3 Unit(s) SubCutaneous three times a day with meals  metoprolol     tartrate 12.5 milliGRAM(s) Oral two times a day  multivitamin 1 Tablet(s) Oral daily  pantoprazole    Tablet 40 milliGRAM(s) Oral before breakfast  tamsulosin 0.4 milliGRAM(s) Oral at bedtime  thiamine 100 milliGRAM(s) Oral daily  ticagrelor 90 milliGRAM(s) Oral every 12 hours    PRN Inpatient Medications  dextrose Gel 1 Dose(s) Oral once PRN  glucagon  Injectable 1 milliGRAM(s) IntraMuscular once PRN  polyethylene glycol 3350 17 Gram(s) Oral daily PRN      REVIEW OF SYSTEMS  --------------------------------------------------------------------------------  Gen: No weakness  Skin: No rashes  Head/Eyes/Ears/Mouth: No headache  Respiratory: No dyspnea, cough  CV: No chest pain, PND, orthopnea  GI: No abdominal pain, diarrhea  : No increased frequency  MSK: No edema  Neuro: No dizziness/lightheadedness  Heme: No bleeding    All other systems were reviewed and are negative, except as noted.    VITALS/PHYSICAL EXAM  --------------------------------------------------------------------------------  T(C): 36.6 (01-16-18 @ 11:32), Max: 36.8 (01-15-18 @ 19:00)  HR: 80 (01-16-18 @ 17:00) (77 - 88)  BP: 92/51 (01-16-18 @ 17:00) (92/51 - 105/41)  RR: 18 (01-16-18 @ 11:32) (14 - 18)  SpO2: 98% (01-16-18 @ 11:32) (95% - 98%)  Wt(kg): --        01-15-18 @ 07:01  -  01-16-18 @ 07:00  --------------------------------------------------------  IN: 1400 mL / OUT: 1800 mL / NET: -400 mL    01-16-18 @ 07:01  -  01-16-18 @ 17:36  --------------------------------------------------------  IN: 720 mL / OUT: 800 mL / NET: -80 mL      Physical Exam:  	Gen: NAD  	HEENT:  supple neck  	Pulm: CTA B/L  	CV: RRR, S1S2  	Abd: +BS, soft, nontender/nondistended  	UE: Warm, no asterixis  	LE: Warm, no edema  	Psych: Normal affect and mood  	Skin: Warm, without rashes      LABS/STUDIES  --------------------------------------------------------------------------------              10.5   15.06 >-----------<  317      [01-16-18 @ 08:56]              32.4     140  |  105  |  24  ----------------------------<  64      [01-16-18 @ 09:07]  4.2   |  19  |  2.10        Ca     7.9     [01-16-18 @ 09:07]      Mg     2.5     [01-16-18 @ 09:07]      Phos  4.4     [01-16-18 @ 09:07]    TPro  5.6  /  Alb  2.2  /  TBili  0.5  /  DBili  x   /  AST  30  /  ALT  12  /  AlkPhos  78  [01-16-18 @ 09:07]    PT/INR: PT 10.9 , INR 0.97       [01-16-18 @ 08:56]  PTT: 38.8       [01-16-18 @ 08:56]      Creatinine Trend:  SCr 2.10 [01-16 @ 09:07]  SCr 2.13 [01-15 @ 13:30]  SCr 2.03 [01-15 @ 03:52]  SCr 1.59 [01-14 @ 05:32]  SCr 0.54 [01-13 @ 04:56]    Urinalysis - [01-15-18 @ 09:30]      Color Colorless / Appearance Clear / SG 1.007 / pH 6.0      Gluc Negative / Ketone Negative  / Bili Negative / Urobili Negative       Blood Small / Protein Negative / Leuk Est Negative / Nitrite Negative      RBC 0-2 / WBC 0-2 / Hyaline  / Gran  / Sq Epi  / Non Sq Epi Occasional / Bacteria     Urine Creatinine 28      [01-14-18 @ 12:15]  Urine Sodium <20      [01-14-18 @ 12:15]  Urine Urea Nitrogen 150      [01-14-18 @ 14:19]  Urine Chloride <35      [01-14-18 @ 12:15]  Urine Osmolality 100      [01-14-18 @ 12:15]    HbA1c 9.1      [01-09-18 @ 03:39]  TSH 4.60      [01-09-18 @ 03:39]  Lipid: chol 103, , HDL 16, LDL 67      [01-09-18 @ 08:09]    HIV Nonreact      [01-09-18 @ 07:54]    Syphilis Screen (Treponema Pallidum Ab) Negative      [01-11-18 @ 19:58]

## 2018-01-16 NOTE — PROGRESS NOTE ADULT - ASSESSMENT
72 year-old male with past medical history of T2DM, DLD, alcohol misuse brought in by family after having been found face down on the floor for approximately 2 days.  No fevers. Patient with history of diagnosis of enlarged prostate and urinary frequency. Also complains of dysuria, and bladder pain. Initially had high WBC, now resolving. On treatment for UTI, but now JANES with Aortic Valve lesions. Suspected culture negative endocarditis. Aortic valve vegetation, AMS, leukocytosis, UTI.  - Change Zosyn to Ceftriaxone 2g q 24  - Vanco by level in setting of ELIAN  - F/U BCXs  - F/U pending Bartonella, Brucella, Q fever, Legionella serologies, fungal cultures  - Appreciate CTS--chronic, calcified vegetation, plan for re-eval after treatment  - Please send Dental Eval for ? dental lesion leading to endocarditis    Baldev Small MD  Pager [phone]  After 5pm and on weekends call [phone] 72 year-old male with past medical history of T2DM, DLD, alcohol misuse brought in by family after having been found face down on the floor for approximately 2 days.  No fevers. Patient with history of diagnosis of enlarged prostate and urinary frequency. Also complains of dysuria, and bladder pain. Initially had high WBC, now resolving. On treatment for UTI, but now JANES with Aortic Valve lesions. Suspected culture negative endocarditis. Aortic valve vegetation, AMS, leukocytosis, UTI.  - Change Zosyn to Ceftriaxone 2g q 24  - Vanco by level in setting of ELIAN--vanco 1g x 1 (ordered), level <15 today  - F/U BCXs  - F/U pending Bartonella, Brucella, Q fever, Legionella serologies, fungal cultures  - Appreciate CTS--chronic, calcified vegetation, plan for re-eval after treatment  - Please send Dental Eval for ? dental lesion leading to endocarditis    Baldev Small MD  Pager [phone]  After 5pm and on weekends call [phone]

## 2018-01-16 NOTE — PROGRESS NOTE ADULT - SUBJECTIVE AND OBJECTIVE BOX
CC: F/U for Culture Neg Endocarditis    Saw/spoke to patient. No fevers, no chills. Patient overall well. No new complaints.    Allergies  No Known Allergies    ANTIMICROBIALS:  piperacillin/tazobactam IVPB. 3.375 every 8 hours    PE:    Vital Signs Last 24 Hrs  T(C): 36.4 (2018 04:35), Max: 36.8 (15 Chao 2018 19:00)  T(F): 97.6 (2018 04:35), Max: 98.3 (15 Chao 2018 19:00)  HR: 82 (2018 06:09) (77 - 88)  BP: 101/48 (2018 06:09) (91/41 - 110/45)  BP(mean): 59 (15 Chao 2018 19:00) (59 - 73)  RR: 18 (2018 06:09) (14 - 19)  SpO2: 98% (2018 06:09) (95% - 100%)    Gen: AOx3, NAD, non-toxic, pleasant  CV: S1+S2 normal, + systolic murmur, nontachycardic  Resp: Clear bilat, no resp distress, no crackles/wheezes  Abd: Soft, nontender, +BS  Ext: No LE edema, no wounds    LABS:                        10.5   15.06 )-----------( 317      ( 2018 08:56 )             32.4     01-15    138  |  105  |  21  ----------------------------<  110<H>  4.4   |  21<L>  |  2.13<H>    Ca    7.6<L>      15 Chao 2018 13:30  Phos  4.2     01-15  Mg     2.3     01-15    TPro  5.6<L>  /  Alb  2.0<L>  /  TBili  0.4  /  DBili  x   /  AST  32  /  ALT  25  /  AlkPhos  82  01-15    Urinalysis Basic - ( 15 Chao 2018 09:30 )    Color: Colorless / Appearance: Clear / S.007 / pH: x  Gluc: x / Ketone: Negative  / Bili: Negative / Urobili: Negative   Blood: x / Protein: Negative / Nitrite: Negative   Leuk Esterase: Negative / RBC: 0-2 /HPF / WBC 0-2 /HPF   Sq Epi: x / Non Sq Epi: Occasional /HPF / Bacteria: x    MICROBIOLOGY:  Vancomycin Level, Random: 13.9 ug/mL (18 @ 06:41)    .Blood Blood-Venous  18   No growth to date.      .Blood Blood-Peripheral  18   No growth to date.      .Blood Blood-Venous  18   No growth at 5 days.     .Blood Blood-Venous  18   No growth at 5 days.     .Urine Clean Catch (Midstream)  18   No growth      RADIOLOGY:    1/10 CT Chest:    IMPRESSION:   Mild pulmonary edema with trace bilateral pleural effusions and adjacent   atelectasis.

## 2018-01-16 NOTE — PROGRESS NOTE ADULT - ASSESSMENT
Patient is a 73 y/o man with history of ETOH abuse who presents with fall and found with endocarditis, developed ELIAN in-hospital shortly after undergoing cardiac catheterization.

## 2018-01-16 NOTE — PROGRESS NOTE ADULT - ASSESSMENT
This is a 72 yr old male with ETOH abuse who initially p/w fall, found to have culture negative aortic valve vegetation/endocarditis, hospital course complicated by ELIAN s/p cardiac catheterization.

## 2018-01-16 NOTE — PROGRESS NOTE ADULT - SUBJECTIVE AND OBJECTIVE BOX
Patient is a 72y old  Male who presents with a chief complaint of Fall (2018 00:52)      SUBJECTIVE / OVERNIGHT EVENTS:  no acute events overnight. vss, afebrile. Pt with no complaints.     MEDICATIONS  (STANDING):  aspirin  chewable 81 milliGRAM(s) Oral daily  atorvastatin 80 milliGRAM(s) Oral at bedtime  cefTRIAXone   IVPB 2 Gram(s) IV Intermittent every 24 hours  dextrose 5%. 1000 milliLiter(s) (50 mL/Hr) IV Continuous <Continuous>  dextrose 50% Injectable 12.5 Gram(s) IV Push once  dextrose 50% Injectable 25 Gram(s) IV Push once  dextrose 50% Injectable 25 Gram(s) IV Push once  docusate sodium 100 milliGRAM(s) Oral daily  enoxaparin Injectable 40 milliGRAM(s) SubCutaneous daily  finasteride 5 milliGRAM(s) Oral daily  folic acid 1 milliGRAM(s) Oral daily  influenza   Vaccine 0.5 milliLiter(s) IntraMuscular once  insulin glargine Injectable (LANTUS) 12 Unit(s) SubCutaneous at bedtime  insulin lispro (HumaLOG) corrective regimen sliding scale   SubCutaneous three times a day before meals  insulin lispro (HumaLOG) corrective regimen sliding scale   SubCutaneous at bedtime  insulin lispro Injectable (HumaLOG) 3 Unit(s) SubCutaneous three times a day with meals  metoprolol     tartrate 12.5 milliGRAM(s) Oral two times a day  multivitamin 1 Tablet(s) Oral daily  pantoprazole    Tablet 40 milliGRAM(s) Oral before breakfast  tamsulosin 0.4 milliGRAM(s) Oral at bedtime  thiamine 100 milliGRAM(s) Oral daily  ticagrelor 90 milliGRAM(s) Oral every 12 hours  vancomycin  IVPB 1000 milliGRAM(s) IV Intermittent once    MEDICATIONS  (PRN):  dextrose Gel 1 Dose(s) Oral once PRN Blood Glucose LESS THAN 70 milliGRAM(s)/deciliter  glucagon  Injectable 1 milliGRAM(s) IntraMuscular once PRN Glucose LESS THAN 70 milligrams/deciliter  polyethylene glycol 3350 17 Gram(s) Oral daily PRN Constipation      CAPILLARY BLOOD GLUCOSE      POCT Blood Glucose.: 99 mg/dL (2018 07:44)  POCT Blood Glucose.: 86 mg/dL (15 Chao 2018 20:25)  POCT Blood Glucose.: 100 mg/dL (15 Chao 2018 17:07)  POCT Blood Glucose.: 118 mg/dL (15 Chao 2018 12:01)    I&O's Summary    15 Chao 2018 07:01  -  2018 07:00  --------------------------------------------------------  IN: 1400 mL / OUT: 1800 mL / NET: -400 mL    2018 07:01  -  2018 11:32  --------------------------------------------------------  IN: 360 mL / OUT: 0 mL / NET: 360 mL        PHYSICAL EXAM:  Vital Signs Last 24 Hrs  T(C): 36.4 (2018 04:35), Max: 36.8 (15 Chao 2018 19:00)  T(F): 97.6 (2018 04:35), Max: 98.3 (15 Chao 2018 19:00)  HR: 82 (2018 06:09) (77 - 88)  BP: 101/48 (2018 06:09) (91/41 - 105/41)  BP(mean): 59 (15 Chao 2018 19:00) (59 - 67)  RR: 18 (2018 06:09) (14 - 18)  SpO2: 98% (2018 06:09) (95% - 100%)  GENERAL: NAD, well-developed  HEAD:  Atraumatic, Normocephalic  EYES: EOMI, PERRLA, conjunctiva and sclera clear  NECK: Supple, No JVD  CHEST/LUNG: Clear to auscultation bilaterally; No wheeze  HEART: Regular rate and rhythm; No murmurs, rubs, or gallops  ABDOMEN: Soft, Nontender, Nondistended; Bowel sounds present  EXTREMITIES:  2+ Peripheral Pulses, No clubbing, cyanosis, or edema  PSYCH: AAOx3  NEUROLOGY: non-focal  SKIN: No rashes or lesions    LABS:                        10.5   15.06 )-----------( 317      ( 2018 08:56 )             32.4     -    140  |  105  |  24<H>  ----------------------------<  64<L>  4.2   |  19<L>  |  2.10<H>    Ca    7.9<L>      2018 09:07  Phos  4.4       Mg     2.5         TPro  5.6<L>  /  Alb  2.2<L>  /  TBili  0.5  /  DBili  x   /  AST  30  /  ALT  12  /  AlkPhos  78      PT/INR - ( 2018 08:56 )   PT: 10.9 sec;   INR: 0.97 ratio         PTT - ( 2018 08:56 )  PTT:38.8 sec      Urinalysis Basic - ( 15 Chao 2018 09:30 )    Color: Colorless / Appearance: Clear / S.007 / pH: x  Gluc: x / Ketone: Negative  / Bili: Negative / Urobili: Negative   Blood: x / Protein: Negative / Nitrite: Negative   Leuk Esterase: Negative / RBC: 0-2 /HPF / WBC 0-2 /HPF   Sq Epi: x / Non Sq Epi: Occasional /HPF / Bacteria: x        RADIOLOGY & ADDITIONAL TESTS:    Imaging Personally Reviewed:    Consultant(s) Notes Reviewed:      Care Discussed with Consultants/Other Providers:

## 2018-01-16 NOTE — PROGRESS NOTE ADULT - SUBJECTIVE AND OBJECTIVE BOX
Patient seen and examined at bedside.    Overnight Events: AMAURI. no complaints.    Review Of Systems: No chest pain, shortness of breath, or palpitations            Medications:  aspirin  chewable 81 milliGRAM(s) Oral daily  atorvastatin 80 milliGRAM(s) Oral at bedtime  dextrose 5%. 1000 milliLiter(s) IV Continuous <Continuous>  dextrose 50% Injectable 12.5 Gram(s) IV Push once  dextrose 50% Injectable 25 Gram(s) IV Push once  dextrose 50% Injectable 25 Gram(s) IV Push once  dextrose Gel 1 Dose(s) Oral once PRN  docusate sodium 100 milliGRAM(s) Oral daily  enoxaparin Injectable 40 milliGRAM(s) SubCutaneous daily  finasteride 5 milliGRAM(s) Oral daily  folic acid 1 milliGRAM(s) Oral daily  glucagon  Injectable 1 milliGRAM(s) IntraMuscular once PRN  influenza   Vaccine 0.5 milliLiter(s) IntraMuscular once  insulin glargine Injectable (LANTUS) 12 Unit(s) SubCutaneous at bedtime  insulin lispro (HumaLOG) corrective regimen sliding scale   SubCutaneous three times a day before meals  insulin lispro (HumaLOG) corrective regimen sliding scale   SubCutaneous at bedtime  insulin lispro Injectable (HumaLOG) 3 Unit(s) SubCutaneous three times a day with meals  metoprolol     tartrate 12.5 milliGRAM(s) Oral two times a day  multivitamin 1 Tablet(s) Oral daily  pantoprazole    Tablet 40 milliGRAM(s) Oral before breakfast  piperacillin/tazobactam IVPB. 3.375 Gram(s) IV Intermittent every 8 hours  polyethylene glycol 3350 17 Gram(s) Oral daily PRN  tamsulosin 0.4 milliGRAM(s) Oral at bedtime  thiamine 100 milliGRAM(s) Oral daily  ticagrelor 90 milliGRAM(s) Oral every 12 hours      PAST MEDICAL & SURGICAL HISTORY:  High cholesterol  Diabetes: type 2  No significant past surgical history      Vitals:  T(F): 97.6 (01-16), Max: 98.3 (01-15)  HR: 82 (01-16) (77 - 88)  BP: 101/48 (01-16) (91/41 - 110/45)  RR: 18 (01-16)  SpO2: 98% (01-16)  I&O's Summary    15 Chao 2018 07:01  -  16 Jan 2018 07:00  --------------------------------------------------------  IN: 1400 mL / OUT: 1800 mL / NET: -400 mL        Physical Exam:  Appearance:  Normal, NAD  Eyes: PERRL, EOMI  HENT: Normal oral mus[place]a NC/AT  Cardiovascular: S1, S2, RRR, No m/r/g appreciated, trace edema at the ankles, no elevation in JVP  Procedural Access Site: No hematoma, Non-tender to palpation, 2+ pulse , No bruit, No Ecchymosis  Respiratory: CTAB  Gastrointestinal: Soft, Non-tender, Non-distended, BS+  Musculoskeletal:  No clubbing, No joint deformity   Neurologic: Non-focal  Lymphatic: No lymphadenopathy  Psychiatry: AAOx3, Mood & affect appropriate  Skin: No rashes, No ecchymoses, No cyanosis                          10.5   15.06 )-----------( 317      ( 16 Jan 2018 08:56 )             32.4     01-15    138  |  105  |  21  ----------------------------<  110<H>  4.4   |  21<L>  |  2.13<H>    Ca    7.6<L>      15 Chao 2018 13:30  Phos  4.2     01-15  Mg     2.3     01-15    TPro  5.6<L>  /  Alb  2.0<L>  /  TBili  0.4  /  DBili  x   /  AST  32  /  ALT  25  /  AlkPhos  82  01-15    PT/INR - ( 16 Jan 2018 08:56 )   PT: 10.9 sec;   INR: 0.97 ratio         PTT - ( 16 Jan 2018 08:56 )  PTT:38.8 sec      Serum Pro-Brain Natriuretic Peptide: 3078 pg/mL (01-11 @ 04:11)      A/P:  Patient is a 72 year-old male with past medical history of T2DM, DLD, alcohol misuse brought in by family after having been found face down on the floor for approximately 2 days found in hemodynamically unstable A-fib with RVR (170s bpm) s/p multiple cardioversions and initially requiring pressor support in the setting of septic shock 2/2 to culture negative aortic valve endocarditis.     Endocarditis of aortic valve. JANES with large vegetation (1.4 x 0.8) on aortic valve leaflet. Per CT surgery, vegetation appears chronic, heavily calcified. Unlikely to embolize. Thus, patient will be re-evaluated for aortic valve replacement surgery in 3 months.   -continue with vanc/zosyn, abx per ID  -needs PICC line for long term abx    CAD. C with proximal LAD disease (70% stenosis), 60% stenosis of RCA  -No plan for CABG  -Continue ASA, brillinta, and lipitor    ELIAN. prerenal v 2/2 vanc.  -renal following    John Tenorio  Cardiology fellow  [id number] Patient seen and examined at bedside.    Overnight Events: AMAURI. No chest pain, shortness of breath, or palpitations, no complaints.    Review Of Systems:          CONSTITUTIONAL: no fevers or chills  EYES/ENT: No visual changes;  No vertigo or throat pain   NECK: No pain or stiffness  RESPIRATORY: No cough, wheezing. No shortness of breath  CARDIOVASCULAR: No chest pain or palpitations  GASTROINTESTINAL: No abdominal or epigastric pain. No nausea, vomiting. No diarrhea. No melena.  GENITOURINARY: No dysuria, frequency or hematuria  NEUROLOGICAL: No numbness or weakness  SKIN: No itching, burning, rashes, or lesions   All other review of systems is negative unless indicated above.     Medications:  aspirin  chewable 81 milliGRAM(s) Oral daily  atorvastatin 80 milliGRAM(s) Oral at bedtime  dextrose 5%. 1000 milliLiter(s) IV Continuous <Continuous>  dextrose 50% Injectable 12.5 Gram(s) IV Push once  dextrose 50% Injectable 25 Gram(s) IV Push once  dextrose 50% Injectable 25 Gram(s) IV Push once  dextrose Gel 1 Dose(s) Oral once PRN  docusate sodium 100 milliGRAM(s) Oral daily  enoxaparin Injectable 40 milliGRAM(s) SubCutaneous daily  finasteride 5 milliGRAM(s) Oral daily  folic acid 1 milliGRAM(s) Oral daily  glucagon  Injectable 1 milliGRAM(s) IntraMuscular once PRN  influenza   Vaccine 0.5 milliLiter(s) IntraMuscular once  insulin glargine Injectable (LANTUS) 12 Unit(s) SubCutaneous at bedtime  insulin lispro (HumaLOG) corrective regimen sliding scale   SubCutaneous three times a day before meals  insulin lispro (HumaLOG) corrective regimen sliding scale   SubCutaneous at bedtime  insulin lispro Injectable (HumaLOG) 3 Unit(s) SubCutaneous three times a day with meals  metoprolol     tartrate 12.5 milliGRAM(s) Oral two times a day  multivitamin 1 Tablet(s) Oral daily  pantoprazole    Tablet 40 milliGRAM(s) Oral before breakfast  piperacillin/tazobactam IVPB. 3.375 Gram(s) IV Intermittent every 8 hours  polyethylene glycol 3350 17 Gram(s) Oral daily PRN  tamsulosin 0.4 milliGRAM(s) Oral at bedtime  thiamine 100 milliGRAM(s) Oral daily  ticagrelor 90 milliGRAM(s) Oral every 12 hours      PAST MEDICAL & SURGICAL HISTORY:  High cholesterol  Diabetes: type 2  No significant past surgical history      Vitals:  T(F): 97.6 (01-16), Max: 98.3 (01-15)  HR: 82 (01-16) (77 - 88)  BP: 101/48 (01-16) (91/41 - 110/45)  RR: 18 (01-16)  SpO2: 98% (01-16)  I&O's Summary    15 Chao 2018 07:01  -  16 Jan 2018 07:00  --------------------------------------------------------  IN: 1400 mL / OUT: 1800 mL / NET: -400 mL        Physical Exam:  Appearance:  Normal, NAD  Eyes: PERRL, EOMI  HENT: Normal oral [place]a NC/AT  Cardiovascular: S1, S2, RRR, No m/r/g appreciated, trace edema at the ankles, no elevation in JVP  Procedural Access Site: No hematoma, Non-tender to palpation, 2+ pulse , No bruit, No Ecchymosis  Respiratory: CTAB  Gastrointestinal: Soft, Non-tender, Non-distended, BS+  Musculoskeletal:  No clubbing, No joint deformity   Neurologic: Non-focal  Lymphatic: No lymphadenopathy  Psychiatry: AAOx3, Mood & affect appropriate  Skin: No rashes, No ecchymoses, No cyanosis                          10.5   15.06 )-----------( 317      ( 16 Jan 2018 08:56 )             32.4     01-15    138  |  105  |  21  ----------------------------<  110<H>  4.4   |  21<L>  |  2.13<H>    Ca    7.6<L>      15 Chao 2018 13:30  Phos  4.2     01-15  Mg     2.3     01-15    TPro  5.6<L>  /  Alb  2.0<L>  /  TBili  0.4  /  DBili  x   /  AST  32  /  ALT  25  /  AlkPhos  82  01-15    PT/INR - ( 16 Jan 2018 08:56 )   PT: 10.9 sec;   INR: 0.97 ratio         PTT - ( 16 Jan 2018 08:56 )  PTT:38.8 sec      Serum Pro-Brain Natriuretic Peptide: 3078 pg/mL (01-11 @ 04:11)      A/P:  Patient is a 72 year-old male with past medical history of T2DM, DLD, alcohol misuse brought in by family after having been found face down on the floor for approximately 2 days found in hemodynamically unstable A-fib with RVR (170s bpm) s/p multiple cardioversions and initially requiring pressor support in the setting of septic shock 2/2 to culture negative aortic valve endocarditis.     Endocarditis of aortic valve. JANES with large vegetation (1.4 x 0.8) on aortic valve leaflet. Per CT surgery, vegetation appears chronic, heavily calcified. Unlikely to embolize. Thus, patient will be re-evaluated for aortic valve replacement surgery in 3 months.   -continue with vanc/zosyn, abx per ID  -needs PICC line for long term abx    CAD. C with proximal LAD disease (70% stenosis), 60% stenosis of RCA  -No plan for CABG  -Continue ASA, brillinta, and lipitor    ELIAN. prerenal v 2/2 vanc.  -renal following    John Tenorio  Cardiology fellow  [id number]

## 2018-01-16 NOTE — PROGRESS NOTE ADULT - ATTENDING COMMENTS
Endocarditis on antibiotics. Plan now to complete 6 week course of antibiotic and re-evaluate. No plan for urgent surgery.

## 2018-01-16 NOTE — PROGRESS NOTE ADULT - ATTENDING COMMENTS
ELIAN- s/p cardiac cath (with endocarditis now)- likely contrast induced nephropathy  -cr has plateaued  -monitor trend

## 2018-01-16 NOTE — PROGRESS NOTE ADULT - PROBLEM SELECTOR PLAN 1
Pt with culture-negative endocarditis, complicated by septic shock requiring pressors (off since 1/11). JANES with moderate to severe AI/ moderate to severe AS with >1cm vegetation on aortic valve; no signs of emboli, heart failure or heart block  - ID recs appreciated: change zosyn to ceftriaxone and continue vanc by level given ELIAN  - care discussed with Dr. Small: plan for prolonged IV abx 6-8 weeks.   - fu Bartonella, Brucella, Q fever, Legionella serologies, fungal cultures  - PICC line order placed

## 2018-01-16 NOTE — PROGRESS NOTE ADULT - PROBLEM SELECTOR PLAN 1
Likely contrast induced nephropathy from cardiac cath done on 1/12.  Creatinine first elevated in AM 1/14.  Possible pre-renal contribution + vancomycin. Possible ATN. Patient Scr remains stable at 2.1 today   Would recommend bladder scan.   Monitor UOP, creatinine trend.  BUN / Cr ratio in setting of urine lytes more consistent with injury from contrast.  Would check vanco trough before each dose while in ELIAN. May stop IVF if patient is tolerating PO intake   AVOID NSAIDs, ACE/ARBS, and nephrotoxins

## 2018-01-17 LAB
ANION GAP SERPL CALC-SCNC: 11 MMOL/L — SIGNIFICANT CHANGE UP (ref 5–17)
BUN SERPL-MCNC: 23 MG/DL — SIGNIFICANT CHANGE UP (ref 7–23)
CALCIUM SERPL-MCNC: 8 MG/DL — LOW (ref 8.4–10.5)
CHLORIDE SERPL-SCNC: 106 MMOL/L — SIGNIFICANT CHANGE UP (ref 96–108)
CO2 SERPL-SCNC: 20 MMOL/L — LOW (ref 22–31)
CREAT SERPL-MCNC: 1.9 MG/DL — HIGH (ref 0.5–1.3)
GLUCOSE BLDC GLUCOMTR-MCNC: 117 MG/DL — HIGH (ref 70–99)
GLUCOSE BLDC GLUCOMTR-MCNC: 164 MG/DL — HIGH (ref 70–99)
GLUCOSE BLDC GLUCOMTR-MCNC: 76 MG/DL — SIGNIFICANT CHANGE UP (ref 70–99)
GLUCOSE BLDC GLUCOMTR-MCNC: 84 MG/DL — SIGNIFICANT CHANGE UP (ref 70–99)
GLUCOSE SERPL-MCNC: 155 MG/DL — HIGH (ref 70–99)
HCT VFR BLD CALC: 32.5 % — LOW (ref 39–50)
HGB BLD-MCNC: 10.4 G/DL — LOW (ref 13–17)
MAGNESIUM SERPL-MCNC: 2.3 MG/DL — SIGNIFICANT CHANGE UP (ref 1.6–2.6)
MCHC RBC-ENTMCNC: 29.5 PG — SIGNIFICANT CHANGE UP (ref 27–34)
MCHC RBC-ENTMCNC: 32 GM/DL — SIGNIFICANT CHANGE UP (ref 32–36)
MCV RBC AUTO: 92.3 FL — SIGNIFICANT CHANGE UP (ref 80–100)
PLATELET # BLD AUTO: 333 K/UL — SIGNIFICANT CHANGE UP (ref 150–400)
POTASSIUM SERPL-MCNC: 4.2 MMOL/L — SIGNIFICANT CHANGE UP (ref 3.5–5.3)
POTASSIUM SERPL-SCNC: 4.2 MMOL/L — SIGNIFICANT CHANGE UP (ref 3.5–5.3)
RBC # BLD: 3.52 M/UL — LOW (ref 4.2–5.8)
RBC # FLD: 14.4 % — SIGNIFICANT CHANGE UP (ref 10.3–14.5)
SODIUM SERPL-SCNC: 137 MMOL/L — SIGNIFICANT CHANGE UP (ref 135–145)
VANCOMYCIN TROUGH SERPL-MCNC: 17 UG/ML — SIGNIFICANT CHANGE UP (ref 10–20)
WBC # BLD: 13.67 K/UL — HIGH (ref 3.8–10.5)
WBC # FLD AUTO: 13.67 K/UL — HIGH (ref 3.8–10.5)

## 2018-01-17 PROCEDURE — 99232 SBSQ HOSP IP/OBS MODERATE 35: CPT

## 2018-01-17 PROCEDURE — 99233 SBSQ HOSP IP/OBS HIGH 50: CPT

## 2018-01-17 PROCEDURE — 32557 INSERT CATH PLEURA W/ IMAGE: CPT

## 2018-01-17 PROCEDURE — 99233 SBSQ HOSP IP/OBS HIGH 50: CPT | Mod: GC

## 2018-01-17 RX ORDER — VANCOMYCIN HCL 1 G
750 VIAL (EA) INTRAVENOUS ONCE
Qty: 0 | Refills: 0 | Status: DISCONTINUED | OUTPATIENT
Start: 2018-01-17 | End: 2018-01-17

## 2018-01-17 RX ADMIN — TAMSULOSIN HYDROCHLORIDE 0.4 MILLIGRAM(S): 0.4 CAPSULE ORAL at 22:29

## 2018-01-17 RX ADMIN — Medication 100 MILLIGRAM(S): at 11:35

## 2018-01-17 RX ADMIN — TICAGRELOR 90 MILLIGRAM(S): 90 TABLET ORAL at 22:29

## 2018-01-17 RX ADMIN — Medication 3 UNIT(S): at 08:27

## 2018-01-17 RX ADMIN — Medication 12.5 MILLIGRAM(S): at 05:47

## 2018-01-17 RX ADMIN — CEFTRIAXONE 100 GRAM(S): 500 INJECTION, POWDER, FOR SOLUTION INTRAMUSCULAR; INTRAVENOUS at 12:39

## 2018-01-17 RX ADMIN — Medication 81 MILLIGRAM(S): at 11:36

## 2018-01-17 RX ADMIN — Medication 100 MILLIGRAM(S): at 11:36

## 2018-01-17 RX ADMIN — ENOXAPARIN SODIUM 40 MILLIGRAM(S): 100 INJECTION SUBCUTANEOUS at 11:35

## 2018-01-17 RX ADMIN — PANTOPRAZOLE SODIUM 40 MILLIGRAM(S): 20 TABLET, DELAYED RELEASE ORAL at 06:41

## 2018-01-17 RX ADMIN — Medication 1: at 12:38

## 2018-01-17 RX ADMIN — Medication 12.5 MILLIGRAM(S): at 17:33

## 2018-01-17 RX ADMIN — ATORVASTATIN CALCIUM 80 MILLIGRAM(S): 80 TABLET, FILM COATED ORAL at 22:29

## 2018-01-17 RX ADMIN — Medication 1 MILLIGRAM(S): at 11:35

## 2018-01-17 RX ADMIN — TICAGRELOR 90 MILLIGRAM(S): 90 TABLET ORAL at 09:50

## 2018-01-17 RX ADMIN — FINASTERIDE 5 MILLIGRAM(S): 5 TABLET, FILM COATED ORAL at 11:35

## 2018-01-17 RX ADMIN — Medication 3 UNIT(S): at 12:38

## 2018-01-17 RX ADMIN — INSULIN GLARGINE 12 UNIT(S): 100 INJECTION, SOLUTION SUBCUTANEOUS at 22:29

## 2018-01-17 RX ADMIN — Medication 1 TABLET(S): at 11:36

## 2018-01-17 NOTE — PROGRESS NOTE ADULT - ATTENDING COMMENTS
Feeling improved.  Needs PICC for rx endocarditis  1.  ARF--major radiocontrast contribution+/- meds.  Improving, non oliguric  Optimize med dosing including anticoagulant, antibiotics  2.  Endocarditis--reviewed with team, radiology.  Agree with PICC in non dominant arm with remote liklihood of ESRD in short term

## 2018-01-17 NOTE — PROGRESS NOTE ADULT - PROBLEM SELECTOR PLAN 1
Pt with culture-negative endocarditis, complicated by septic shock requiring pressors (off since 1/11). JANES with moderate to severe AI/ moderate to severe AS with >1cm vegetation on aortic valve; no signs of emboli, heart failure or heart block  - ID recs appreciated: continue ceftriaxone and vanc by level given ELIAN  - care discussed with Dr. Small: plan for prolonged IV abx 6-8 weeks.   - vanc level 17: no need for dose today. Will check tmrw's level and dose accordingly  - fu Bartonella, Brucella, Q fever, Legionella serologies, fungal cultures  - PICC line order placed: needs renal clearance

## 2018-01-17 NOTE — PROGRESS NOTE ADULT - SUBJECTIVE AND OBJECTIVE BOX
Patient is a 72y old  Male who presents with a chief complaint of Fall (09 Jan 2018 00:52)      SUBJECTIVE / OVERNIGHT EVENTS:  no acute events overnight. vss, afebrile. Pt feels well this morning, denies any chest pain, sob, abdominal pain, n/v/c/d.    MEDICATIONS  (STANDING):  aspirin  chewable 81 milliGRAM(s) Oral daily  atorvastatin 80 milliGRAM(s) Oral at bedtime  cefTRIAXone   IVPB 2 Gram(s) IV Intermittent every 24 hours  dextrose 5%. 1000 milliLiter(s) (50 mL/Hr) IV Continuous <Continuous>  dextrose 50% Injectable 12.5 Gram(s) IV Push once  dextrose 50% Injectable 25 Gram(s) IV Push once  dextrose 50% Injectable 25 Gram(s) IV Push once  docusate sodium 100 milliGRAM(s) Oral daily  enoxaparin Injectable 40 milliGRAM(s) SubCutaneous daily  finasteride 5 milliGRAM(s) Oral daily  folic acid 1 milliGRAM(s) Oral daily  influenza   Vaccine 0.5 milliLiter(s) IntraMuscular once  insulin glargine Injectable (LANTUS) 12 Unit(s) SubCutaneous at bedtime  insulin lispro (HumaLOG) corrective regimen sliding scale   SubCutaneous three times a day before meals  insulin lispro (HumaLOG) corrective regimen sliding scale   SubCutaneous at bedtime  insulin lispro Injectable (HumaLOG) 3 Unit(s) SubCutaneous three times a day with meals  metoprolol     tartrate 12.5 milliGRAM(s) Oral two times a day  multivitamin 1 Tablet(s) Oral daily  pantoprazole    Tablet 40 milliGRAM(s) Oral before breakfast  tamsulosin 0.4 milliGRAM(s) Oral at bedtime  thiamine 100 milliGRAM(s) Oral daily  ticagrelor 90 milliGRAM(s) Oral every 12 hours    MEDICATIONS  (PRN):  dextrose Gel 1 Dose(s) Oral once PRN Blood Glucose LESS THAN 70 milliGRAM(s)/deciliter  glucagon  Injectable 1 milliGRAM(s) IntraMuscular once PRN Glucose LESS THAN 70 milligrams/deciliter  polyethylene glycol 3350 17 Gram(s) Oral daily PRN Constipation      CAPILLARY BLOOD GLUCOSE      POCT Blood Glucose.: 164 mg/dL (17 Jan 2018 11:40)  POCT Blood Glucose.: 117 mg/dL (17 Jan 2018 07:33)  POCT Blood Glucose.: 125 mg/dL (16 Jan 2018 21:19)  POCT Blood Glucose.: 91 mg/dL (16 Jan 2018 16:41)  POCT Blood Glucose.: 141 mg/dL (16 Jan 2018 11:46)    I&O's Summary    16 Jan 2018 07:01  -  17 Jan 2018 07:00  --------------------------------------------------------  IN: 970 mL / OUT: 1550 mL / NET: -580 mL    17 Jan 2018 07:01  -  17 Jan 2018 11:42  --------------------------------------------------------  IN: 240 mL / OUT: 0 mL / NET: 240 mL        PHYSICAL EXAM:  Vital Signs Last 24 Hrs  T(C): 36.3 (17 Jan 2018 11:23), Max: 36.8 (17 Jan 2018 04:13)  T(F): 97.4 (17 Jan 2018 11:23), Max: 98.2 (17 Jan 2018 04:13)  HR: 94 (17 Jan 2018 11:23) (80 - 94)  BP: 123/56 (17 Jan 2018 11:23) (92/51 - 123/56)  BP(mean): --  RR: 18 (17 Jan 2018 11:23) (18 - 18)  SpO2: 99% (17 Jan 2018 11:23) (97% - 99%)    GENERAL: NAD, well-developed  HEAD:  Atraumatic, Normocephalic  EYES: EOMI, PERRLA, conjunctiva and sclera clear  NECK: Supple, No JVD  CHEST/LUNG: Clear to auscultation bilaterally; No wheeze  HEART: Regular rate and rhythm; No murmurs, rubs, or gallops  ABDOMEN: Soft, Nontender, Nondistended; Bowel sounds present  EXTREMITIES:  2+ Peripheral Pulses, No clubbing, cyanosis, or edema  NEUROLOGY: aaox3; non-focal  SKIN: No rashes or lesions    LABS:  personally reviewed                        10.4   13.67 )-----------( 333      ( 17 Jan 2018 07:27 )             32.5     01-17    137  |  106  |  23  ----------------------------<  155<H>  4.2   |  20<L>  |  1.90<H>    Ca    8.0<L>      17 Jan 2018 07:26  Phos  4.4     01-16  Mg     2.3     01-17    TPro  5.6<L>  /  Alb  2.2<L>  /  TBili  0.5  /  DBili  x   /  AST  30  /  ALT  12  /  AlkPhos  78  01-16    PT/INR - ( 16 Jan 2018 08:56 )   PT: 10.9 sec;   INR: 0.97 ratio         PTT - ( 16 Jan 2018 08:56 )  PTT:38.8 sec          RADIOLOGY & ADDITIONAL TESTS:    Imaging Personally Reviewed:  - tele: NSR 80-100s, no events    Consultant(s) Notes Reviewed:  ID, cardiology, nephrology    Care Discussed with Consultants/Other Providers:

## 2018-01-17 NOTE — PROGRESS NOTE ADULT - SUBJECTIVE AND OBJECTIVE BOX
[facility] DIVISION OF KIDNEY DISEASES AND HYPERTENSION -- FOLLOW UP NOTE  --------------------------------------------------------------------------------  Chief Complaint: ELIAN     24 hour events/subjective:  Patient seen and examined. Denies CP, SOB or LE edema.       PAST HISTORY  --------------------------------------------------------------------------------  No significant changes to PMH, PSH, FHx, SHx, unless otherwise noted    ALLERGIES & MEDICATIONS  --------------------------------------------------------------------------------  Allergies    No Known Allergies    Intolerances      Standing Inpatient Medications  aspirin  chewable 81 milliGRAM(s) Oral daily  atorvastatin 80 milliGRAM(s) Oral at bedtime  cefTRIAXone   IVPB 2 Gram(s) IV Intermittent every 24 hours  dextrose 5%. 1000 milliLiter(s) IV Continuous <Continuous>  dextrose 50% Injectable 12.5 Gram(s) IV Push once  dextrose 50% Injectable 25 Gram(s) IV Push once  dextrose 50% Injectable 25 Gram(s) IV Push once  docusate sodium 100 milliGRAM(s) Oral daily  enoxaparin Injectable 40 milliGRAM(s) SubCutaneous daily  finasteride 5 milliGRAM(s) Oral daily  folic acid 1 milliGRAM(s) Oral daily  influenza   Vaccine 0.5 milliLiter(s) IntraMuscular once  insulin glargine Injectable (LANTUS) 12 Unit(s) SubCutaneous at bedtime  insulin lispro (HumaLOG) corrective regimen sliding scale   SubCutaneous three times a day before meals  insulin lispro (HumaLOG) corrective regimen sliding scale   SubCutaneous at bedtime  insulin lispro Injectable (HumaLOG) 3 Unit(s) SubCutaneous three times a day with meals  metoprolol     tartrate 12.5 milliGRAM(s) Oral two times a day  multivitamin 1 Tablet(s) Oral daily  pantoprazole    Tablet 40 milliGRAM(s) Oral before breakfast  tamsulosin 0.4 milliGRAM(s) Oral at bedtime  thiamine 100 milliGRAM(s) Oral daily  ticagrelor 90 milliGRAM(s) Oral every 12 hours    PRN Inpatient Medications  dextrose Gel 1 Dose(s) Oral once PRN  glucagon  Injectable 1 milliGRAM(s) IntraMuscular once PRN  polyethylene glycol 3350 17 Gram(s) Oral daily PRN      REVIEW OF SYSTEMS  --------------------------------------------------------------------------------  Gen: No weakness  Skin: No rashes  Head/Eyes/Ears/Mouth: No headache  Respiratory: No dyspnea  CV: No chest pain  GI: No abdominal pain, diarrhea  : No increased frequency  MSK: No edema  Neuro: No dizziness/lightheadedness  Heme: No bleeding    All other systems were reviewed and are negative, except as noted.    VITALS/PHYSICAL EXAM  --------------------------------------------------------------------------------  T(C): 36.3 (01-17-18 @ 11:23), Max: 36.8 (01-17-18 @ 04:13)  HR: 93 (01-17-18 @ 17:31) (84 - 94)  BP: 101/53 (01-17-18 @ 17:31) (101/53 - 123/56)  RR: 18 (01-17-18 @ 11:23) (18 - 18)  SpO2: 99% (01-17-18 @ 11:23) (97% - 99%)  Wt(kg): --        01-16-18 @ 07:01  -  01-17-18 @ 07:00  --------------------------------------------------------  IN: 970 mL / OUT: 1550 mL / NET: -580 mL    01-17-18 @ 07:01  -  01-17-18 @ 18:53  --------------------------------------------------------  IN: 290 mL / OUT: 200 mL / NET: 90 mL    Physical Exam:  	Gen: NAD  	HEENT:  supple neck  	Pulm: CTA B/L  	CV: RRR, S1S2  	Abd: +BS, soft, nontender/nondistended  	UE: Warm, no asterixis  	LE: Warm, no edema  	Psych: Normal affect and mood  	Skin: Warm, without rashes    LABS/STUDIES  --------------------------------------------------------------------------------              10.4   13.67 >-----------<  333      [01-17-18 @ 07:27]              32.5     137  |  106  |  23  ----------------------------<  155      [01-17-18 @ 07:26]  4.2   |  20  |  1.90        Ca     8.0     [01-17-18 @ 07:26]      Mg     2.3     [01-17-18 @ 07:26]      Phos  4.4     [01-16-18 @ 09:07]    TPro  5.6  /  Alb  2.2  /  TBili  0.5  /  DBili  x   /  AST  30  /  ALT  12  /  AlkPhos  78  [01-16-18 @ 09:07]    PT/INR: PT 10.9 , INR 0.97       [01-16-18 @ 08:56]  PTT: 38.8       [01-16-18 @ 08:56]    Creatinine Trend:  SCr 1.90 [01-17 @ 07:26]  SCr 2.10 [01-16 @ 09:07]  SCr 2.13 [01-15 @ 13:30]  SCr 2.03 [01-15 @ 03:52]  SCr 1.59 [01-14 @ 05:32]    Urinalysis - [01-15-18 @ 09:30]      Color Colorless / Appearance Clear / SG 1.007 / pH 6.0      Gluc Negative / Ketone Negative  / Bili Negative / Urobili Negative       Blood Small / Protein Negative / Leuk Est Negative / Nitrite Negative      RBC 0-2 / WBC 0-2 / Hyaline  / Gran  / Sq Epi  / Non Sq Epi Occasional / Bacteria     Urine Creatinine 28      [01-14-18 @ 12:15]  Urine Sodium <20      [01-14-18 @ 12:15]  Urine Urea Nitrogen 150      [01-14-18 @ 14:19]  Urine Chloride <35      [01-14-18 @ 12:15]  Urine Osmolality 100      [01-14-18 @ 12:15]    HbA1c 9.1      [01-09-18 @ 03:39]  TSH 4.60      [01-09-18 @ 03:39]  Lipid: chol 103, , HDL 16, LDL 67      [01-09-18 @ 08:09]    HIV Nonreact      [01-09-18 @ 07:54]    Syphilis Screen (Treponema Pallidum Ab) Negative      [01-11-18 @ 19:58]

## 2018-01-17 NOTE — PROGRESS NOTE ADULT - SUBJECTIVE AND OBJECTIVE BOX
CC: F/U for Culture Neg Endocarditis    Saw/spoke to patient. Patient well. No new complaints. No fevers, no chills.    Allergies  No Known Allergies    ANTIMICROBIALS:  cefTRIAXone   IVPB 2 every 24 hours    PE:    Vital Signs Last 24 Hrs  T(C): 36.8 (17 Jan 2018 04:13), Max: 36.8 (17 Jan 2018 04:13)  T(F): 98.2 (17 Jan 2018 04:13), Max: 98.2 (17 Jan 2018 04:13)  HR: 93 (17 Jan 2018 04:13) (80 - 93)  BP: 115/63 (17 Jan 2018 04:13) (92/51 - 115/63)  BP(mean): --  RR: 18 (17 Jan 2018 04:13) (18 - 18)  SpO2: 97% (17 Jan 2018 04:13) (97% - 98%)    Gen: AOx3, NAD, non-toxic, pleasant  CV: S1+S2 normal, no murmurs, nontachycardic  Resp: Clear bilat, no resp distress, no crackles/wheezes  Abd: Soft, nontender, +BS  Ext: No LE edema, no wounds    LABS:                        10.4   13.67 )-----------( 333      ( 17 Jan 2018 07:27 )             32.5     01-17    137  |  106  |  23  ----------------------------<  155<H>  4.2   |  20<L>  |  1.90<H>    Ca    8.0<L>      17 Jan 2018 07:26  Phos  4.4     01-16  Mg     2.3     01-17    TPro  5.6<L>  /  Alb  2.2<L>  /  TBili  0.5  /  DBili  x   /  AST  30  /  ALT  12  /  AlkPhos  78  01-16    MICROBIOLOGY:  Vancomycin Level, Trough: 17.0 ug/mL (01-17-18 @ 05:36)    .Blood Blood-Venous  01-13-18   No growth to date.     (prior reviewed)    RADIOLOGY:    No new available

## 2018-01-17 NOTE — PROGRESS NOTE ADULT - SUBJECTIVE AND OBJECTIVE BOX
Patient seen and examined at bedside.    Overnight Events: AMAURI. Patient doing well, no complaints.    Review of Systems:  CONSTITUTIONAL: No fever, chills, or fatigue  EYES: No eye pain, visual disturbances, or discharge  ENMT:  No difficulty hearing, tinnitus, vertigo; No sinus or throat pain  NECK: No pain or stiffness  RESPIRATORY: Per above  CARDIOVASCULAR: No chest pain, palpitations, dizziness, or leg swelling  GASTROINTESTINAL: No abdominal or epigastric pain. No nausea, vomiting, or hematemesis; No diarrhea or constipation. No melena or hematochezia.  GENITOURINARY: No dysuria, frequency, hematuria, or incontinence  NEUROLOGICAL: No headaches, memory loss, loss of strength, numbness, or tremors  SKIN: No itching, burning, rashes, or lesions   MUSCULOSKELETAL: No joint pain or swelling; No muscle, back, or extremity pain  PSYCHIATRIC: No depression, anxiety, mood swings, or difficulty sleeping          Medications:  aspirin  chewable 81 milliGRAM(s) Oral daily  atorvastatin 80 milliGRAM(s) Oral at bedtime  cefTRIAXone   IVPB 2 Gram(s) IV Intermittent every 24 hours  dextrose 5%. 1000 milliLiter(s) IV Continuous <Continuous>  dextrose 50% Injectable 12.5 Gram(s) IV Push once  dextrose 50% Injectable 25 Gram(s) IV Push once  dextrose 50% Injectable 25 Gram(s) IV Push once  dextrose Gel 1 Dose(s) Oral once PRN  docusate sodium 100 milliGRAM(s) Oral daily  enoxaparin Injectable 40 milliGRAM(s) SubCutaneous daily  finasteride 5 milliGRAM(s) Oral daily  folic acid 1 milliGRAM(s) Oral daily  glucagon  Injectable 1 milliGRAM(s) IntraMuscular once PRN  influenza   Vaccine 0.5 milliLiter(s) IntraMuscular once  insulin glargine Injectable (LANTUS) 12 Unit(s) SubCutaneous at bedtime  insulin lispro (HumaLOG) corrective regimen sliding scale   SubCutaneous three times a day before meals  insulin lispro (HumaLOG) corrective regimen sliding scale   SubCutaneous at bedtime  insulin lispro Injectable (HumaLOG) 3 Unit(s) SubCutaneous three times a day with meals  metoprolol     tartrate 12.5 milliGRAM(s) Oral two times a day  multivitamin 1 Tablet(s) Oral daily  pantoprazole    Tablet 40 milliGRAM(s) Oral before breakfast  polyethylene glycol 3350 17 Gram(s) Oral daily PRN  tamsulosin 0.4 milliGRAM(s) Oral at bedtime  thiamine 100 milliGRAM(s) Oral daily  ticagrelor 90 milliGRAM(s) Oral every 12 hours      PAST MEDICAL & SURGICAL HISTORY:  High cholesterol  Diabetes: type 2  No significant past surgical history      Vitals:  T(F): 98 (01-17), Max: 98.2 (01-17)  HR: 85 (01-17) (80 - 93)  BP: 114/64 (01-17) (92/51 - 115/63)  RR: 18 (01-17)  SpO2: 99% (01-17)  I&O's Summary    16 Jan 2018 07:01  -  17 Jan 2018 07:00  --------------------------------------------------------  IN: 970 mL / OUT: 1550 mL / NET: -580 mL    17 Jan 2018 07:01  -  17 Jan 2018 11:01  --------------------------------------------------------  IN: 240 mL / OUT: 0 mL / NET: 240 mL        Physical Exam:  Appearance:  Normal, NAD  Eyes: PERRL, EOMI  HENT: Normal oral muscosa NC/AT  Cardiovascular: S1, S2, RRR, No m/r/g appreciated, trace edema at the ankles, no elevation in JVP  Procedural Access Site: No hematoma, Non-tender to palpation, 2+ pulse , No bruit, No Ecchymosis  Respiratory: CTAB  Gastrointestinal: Soft, Non-tender, Non-distended, BS+  Musculoskeletal:  No clubbing, No joint deformity   Neurologic: Non-focal  Lymphatic: No lymphadenopathy  Psychiatry: AAOx3, mood & affect appropriate  Skin: No rashes, ecchymoses, or cyanosis                          10.4   13.67 )-----------( 333      ( 17 Jan 2018 07:27 )             32.5     01-17    137  |  106  |  23  ----------------------------<  155<H>  4.2   |  20<L>  |  1.90<H>    Ca    8.0<L>      17 Jan 2018 07:26  Phos  4.4     01-16  Mg     2.3     01-17    TPro  5.6<L>  /  Alb  2.2<L>  /  TBili  0.5  /  DBili  x   /  AST  30  /  ALT  12  /  AlkPhos  78  01-16    PT/INR - ( 16 Jan 2018 08:56 )   PT: 10.9 sec;   INR: 0.97 ratio         PTT - ( 16 Jan 2018 08:56 )  PTT:38.8 sec      Serum Pro-Brain Natriuretic Peptide: 3078 pg/mL (01-11 @ 04:11)    Interpretation of Telemetry:  SR 80-100s      A/P:  Patient is a 72 year-old male with past medical history of T2DM, DLD, alcohol misuse brought in by family after having been found face down on the floor for approximately 2 days found in hemodynamically unstable A-fib with RVR (170s bpm) s/p multiple cardioversions and initially requiring pressor support in the setting of septic shock 2/2 to culture negative aortic valve endocarditis.     Endocarditis of aortic valve. JANES with large vegetation (1.4 x 0.8) on aortic valve leaflet. Per CT surgery, vegetation appears chronic, heavily calcified. Unlikely to embolize. Thus, patient will be re-evaluated for aortic valve replacement surgery in 3 months.   -continue with vanc/zosyn, abx per ID  -needs PICC line for long term abx    CAD. [facility] with proximal LAD disease (70% stenosis), 60% stenosis of RCA  -No plan for CABG  -Continue ASA, brillinta, and lipitor    ELIAN. prerenal v 2/2 vanc.  -renal following    John Tenorio  Cardiology fellow  [id number]

## 2018-01-17 NOTE — PROGRESS NOTE ADULT - PROBLEM SELECTOR PLAN 2
Likely 2/2 ADARSH s/p cardiac cath. Cr improving to 1.9 this morning  - appreciate nephro recs  - monitor BMP  - renally dose meds  - avoid nephrotoxins

## 2018-01-17 NOTE — PROGRESS NOTE ADULT - PROBLEM SELECTOR PLAN 1
Likely contrast induced nephropathy from cardiac cath done on 1/12. Creatinine first elevated in AM 1/14. Possible pre-renal contribution + vancomycin. Possible ATN. Patient Scr improved to 1.9 today.  Monitor UOP, creatinine trend.    May stop IVF if patient is tolerating PO intake   AVOID NSAIDs, ACE/ARBS, and nephrotoxins

## 2018-01-17 NOTE — PROGRESS NOTE ADULT - ASSESSMENT
72 year-old male with past medical history of T2DM, DLD, alcohol misuse brought in by family after having been found face down on the floor for approximately 2 days.  No fevers. Patient with history of diagnosis of enlarged prostate and urinary frequency. Also complains of dysuria, and bladder pain. Initially had high WBC, now resolving. On treatment for UTI, but now JANES with Aortic Valve lesions. Suspected culture negative endocarditis. Appears well, but has ELIAN, improving. Aortic valve vegetation, AMS, leukocytosis, UTI.  - Ceftriaxone 2g q 24  - Vanco by level in setting of ELIAN, redose when level <15  - F/U pending Bartonella, Brucella, Q fever, Legionella serologies, fungal cultures  - Appreciate CTS--chronic, calcified vegetation, plan for re-eval after treatment  - Please send Dental Eval for ? dental lesion leading to endocarditis  - Anticipate will empirically treat with Vanco/Ceftriaxone for 6 weeks course for culture neg endocarditis--okay for PICC    Baldev Small MD  Pager [phone]  After 5pm and on weekends call [phone]

## 2018-01-17 NOTE — CHART NOTE - NSCHARTNOTEFT_GEN_A_CORE
Evaluated by IV nurse for bedside PICC   Recommended PICC by IR due to low GFR  Nephrology called for clearance for PICC by IR    [id number]

## 2018-01-17 NOTE — PROGRESS NOTE ADULT - ATTENDING COMMENTS
Endocarditis on antibiotics. Plan now to complete 6 week course of antibiotic and re-evaluate. No plan for urgent surgery and unclear that surgery will be indicated in foreseeable future.

## 2018-01-17 NOTE — CHART NOTE - NSCHARTNOTEFT_GEN_A_CORE
Discussed with Dr. Mejia - Nephrologist regarding clearance for PICC by IR in the setting of low GFR.   Dr. Mejia cleared patient for PICC by IR.    [id number]

## 2018-01-18 LAB
ANION GAP SERPL CALC-SCNC: 11 MMOL/L — SIGNIFICANT CHANGE UP (ref 5–17)
BUN SERPL-MCNC: 23 MG/DL — SIGNIFICANT CHANGE UP (ref 7–23)
CALCIUM SERPL-MCNC: 7.8 MG/DL — LOW (ref 8.4–10.5)
CHLORIDE SERPL-SCNC: 107 MMOL/L — SIGNIFICANT CHANGE UP (ref 96–108)
CO2 SERPL-SCNC: 22 MMOL/L — SIGNIFICANT CHANGE UP (ref 22–31)
CREAT SERPL-MCNC: 1.79 MG/DL — HIGH (ref 0.5–1.3)
CULTURE RESULTS: SIGNIFICANT CHANGE UP
CULTURE RESULTS: SIGNIFICANT CHANGE UP
GLUCOSE BLDC GLUCOMTR-MCNC: 139 MG/DL — HIGH (ref 70–99)
GLUCOSE BLDC GLUCOMTR-MCNC: 210 MG/DL — HIGH (ref 70–99)
GLUCOSE BLDC GLUCOMTR-MCNC: 57 MG/DL — LOW (ref 70–99)
GLUCOSE BLDC GLUCOMTR-MCNC: 59 MG/DL — LOW (ref 70–99)
GLUCOSE BLDC GLUCOMTR-MCNC: 82 MG/DL — SIGNIFICANT CHANGE UP (ref 70–99)
GLUCOSE BLDC GLUCOMTR-MCNC: 85 MG/DL — SIGNIFICANT CHANGE UP (ref 70–99)
GLUCOSE BLDC GLUCOMTR-MCNC: 87 MG/DL — SIGNIFICANT CHANGE UP (ref 70–99)
GLUCOSE SERPL-MCNC: 77 MG/DL — SIGNIFICANT CHANGE UP (ref 70–99)
HCT VFR BLD CALC: 29 % — LOW (ref 39–50)
HGB BLD-MCNC: 9.6 G/DL — LOW (ref 13–17)
MCHC RBC-ENTMCNC: 30.9 PG — SIGNIFICANT CHANGE UP (ref 27–34)
MCHC RBC-ENTMCNC: 33.1 GM/DL — SIGNIFICANT CHANGE UP (ref 32–36)
MCV RBC AUTO: 93.2 FL — SIGNIFICANT CHANGE UP (ref 80–100)
PLATELET # BLD AUTO: 314 K/UL — SIGNIFICANT CHANGE UP (ref 150–400)
POTASSIUM SERPL-MCNC: 4.3 MMOL/L — SIGNIFICANT CHANGE UP (ref 3.5–5.3)
POTASSIUM SERPL-SCNC: 4.3 MMOL/L — SIGNIFICANT CHANGE UP (ref 3.5–5.3)
RBC # BLD: 3.11 M/UL — LOW (ref 4.2–5.8)
RBC # FLD: 14.2 % — SIGNIFICANT CHANGE UP (ref 10.3–14.5)
SODIUM SERPL-SCNC: 140 MMOL/L — SIGNIFICANT CHANGE UP (ref 135–145)
SPECIMEN SOURCE: SIGNIFICANT CHANGE UP
SPECIMEN SOURCE: SIGNIFICANT CHANGE UP
VANCOMYCIN TROUGH SERPL-MCNC: 10.6 UG/ML — SIGNIFICANT CHANGE UP (ref 10–20)
WBC # BLD: 12.88 K/UL — HIGH (ref 3.8–10.5)
WBC # FLD AUTO: 12.88 K/UL — HIGH (ref 3.8–10.5)

## 2018-01-18 PROCEDURE — 99232 SBSQ HOSP IP/OBS MODERATE 35: CPT

## 2018-01-18 PROCEDURE — 76937 US GUIDE VASCULAR ACCESS: CPT | Mod: 26

## 2018-01-18 PROCEDURE — 99233 SBSQ HOSP IP/OBS HIGH 50: CPT

## 2018-01-18 PROCEDURE — 36569 INSJ PICC 5 YR+ W/O IMAGING: CPT

## 2018-01-18 PROCEDURE — 99233 SBSQ HOSP IP/OBS HIGH 50: CPT | Mod: GC

## 2018-01-18 PROCEDURE — 77001 FLUOROGUIDE FOR VEIN DEVICE: CPT | Mod: 26

## 2018-01-18 PROCEDURE — 99232 SBSQ HOSP IP/OBS MODERATE 35: CPT | Mod: GC

## 2018-01-18 RX ORDER — SODIUM CHLORIDE 9 MG/ML
20 INJECTION INTRAMUSCULAR; INTRAVENOUS; SUBCUTANEOUS ONCE
Qty: 0 | Refills: 0 | Status: DISCONTINUED | OUTPATIENT
Start: 2018-01-18 | End: 2018-01-30

## 2018-01-18 RX ORDER — SODIUM CHLORIDE 9 MG/ML
10 INJECTION INTRAMUSCULAR; INTRAVENOUS; SUBCUTANEOUS
Qty: 0 | Refills: 0 | Status: DISCONTINUED | OUTPATIENT
Start: 2018-01-18 | End: 2018-01-30

## 2018-01-18 RX ORDER — VANCOMYCIN HCL 1 G
1000 VIAL (EA) INTRAVENOUS ONCE
Qty: 0 | Refills: 0 | Status: COMPLETED | OUTPATIENT
Start: 2018-01-18 | End: 2018-01-18

## 2018-01-18 RX ORDER — SODIUM CHLORIDE 9 MG/ML
10 INJECTION INTRAMUSCULAR; INTRAVENOUS; SUBCUTANEOUS EVERY 12 HOURS
Qty: 0 | Refills: 0 | Status: DISCONTINUED | OUTPATIENT
Start: 2018-01-18 | End: 2018-01-30

## 2018-01-18 RX ADMIN — Medication 1 MILLIGRAM(S): at 12:04

## 2018-01-18 RX ADMIN — Medication 2: at 18:25

## 2018-01-18 RX ADMIN — PANTOPRAZOLE SODIUM 40 MILLIGRAM(S): 20 TABLET, DELAYED RELEASE ORAL at 06:44

## 2018-01-18 RX ADMIN — TICAGRELOR 90 MILLIGRAM(S): 90 TABLET ORAL at 22:48

## 2018-01-18 RX ADMIN — ATORVASTATIN CALCIUM 80 MILLIGRAM(S): 80 TABLET, FILM COATED ORAL at 22:48

## 2018-01-18 RX ADMIN — Medication 100 MILLIGRAM(S): at 12:04

## 2018-01-18 RX ADMIN — TAMSULOSIN HYDROCHLORIDE 0.4 MILLIGRAM(S): 0.4 CAPSULE ORAL at 22:48

## 2018-01-18 RX ADMIN — Medication 12.5 MILLIGRAM(S): at 06:44

## 2018-01-18 RX ADMIN — Medication 12.5 MILLIGRAM(S): at 18:25

## 2018-01-18 RX ADMIN — INSULIN GLARGINE 12 UNIT(S): 100 INJECTION, SOLUTION SUBCUTANEOUS at 22:48

## 2018-01-18 RX ADMIN — CEFTRIAXONE 100 GRAM(S): 500 INJECTION, POWDER, FOR SOLUTION INTRAMUSCULAR; INTRAVENOUS at 15:15

## 2018-01-18 RX ADMIN — ENOXAPARIN SODIUM 40 MILLIGRAM(S): 100 INJECTION SUBCUTANEOUS at 12:04

## 2018-01-18 RX ADMIN — FINASTERIDE 5 MILLIGRAM(S): 5 TABLET, FILM COATED ORAL at 12:04

## 2018-01-18 RX ADMIN — Medication 250 MILLIGRAM(S): at 15:15

## 2018-01-18 RX ADMIN — TICAGRELOR 90 MILLIGRAM(S): 90 TABLET ORAL at 12:04

## 2018-01-18 RX ADMIN — Medication 81 MILLIGRAM(S): at 12:04

## 2018-01-18 RX ADMIN — Medication 1 TABLET(S): at 12:04

## 2018-01-18 NOTE — PROGRESS NOTE ADULT - PROBLEM SELECTOR PLAN 2
Likely 2/2 ADARSH s/p cardiac cath. Cr continues to improve  - appreciate nephro recs  - monitor BMP  - renally dose meds  - avoid nephrotoxins

## 2018-01-18 NOTE — PROGRESS NOTE ADULT - PROBLEM SELECTOR PLAN 1
Pt with culture-negative endocarditis, complicated by septic shock requiring pressors (off since 1/11). JANES with moderate to severe AI/ moderate to severe AS with >1cm vegetation on aortic valve; no signs of emboli, heart failure or heart block  - vanc level 10.6 - dose 1g vanc this morning  - ID recs appreciated: continue ceftriaxone and vanc by level given ELIAN  - care discussed with Dr. Small: plan for prolonged IV abx 6-8 weeks.   - vanc level 17: no need for dose today. Will check tmrw's level and dose accordingly  - fu Bartonella, Brucella, Q fever, Legionella serologies, fungal cultures  - s/p PICC line placement this morning

## 2018-01-18 NOTE — PROGRESS NOTE ADULT - ASSESSMENT
72 year-old male with past medical history of T2DM, DLD, alcohol misuse brought in by family after having been found face down on the floor for approximately 2 days.  No fevers. Patient with history of diagnosis of enlarged prostate and urinary frequency. Also complains of dysuria, and bladder pain. Initially had high WBC, now resolving. On treatment for UTI, but now AJNES with Aortic Valve lesions. Suspected culture negative endocarditis. Appears well, but has ELIAN, improving. Overall appears stable. Aortic valve vegetation, AMS, leukocytosis, UTI.  - Ceftriaxone 2g q 24  - Vanco by level, dose ordered for today  - F/U pending Brucella sero  - Appreciate CTS--chronic, calcified vegetation, plan for re-eval after treatment  - Please send Dental Eval for ? dental lesion leading to endocarditis    Baldev Small MD  Pager [phone]  After 5pm and on weekends call [phone]

## 2018-01-18 NOTE — PROGRESS NOTE ADULT - SUBJECTIVE AND OBJECTIVE BOX
[facility] DIVISION OF KIDNEY DISEASES AND HYPERTENSION -- FOLLOW UP NOTE  --------------------------------------------------------------------------------  Chief Complaint: ELIAN    24 hour events/subjective:  Patient seen and examined. Patient improving. Denies CP, SOB or LE edema     PAST HISTORY  --------------------------------------------------------------------------------  No significant changes to PMH, PSH, FHx, SHx, unless otherwise noted    ALLERGIES & MEDICATIONS  --------------------------------------------------------------------------------  Allergies    No Known Allergies    Intolerances      Standing Inpatient Medications  aspirin  chewable 81 milliGRAM(s) Oral daily  atorvastatin 80 milliGRAM(s) Oral at bedtime  cefTRIAXone   IVPB 2 Gram(s) IV Intermittent every 24 hours  dextrose 5%. 1000 milliLiter(s) IV Continuous <Continuous>  dextrose 50% Injectable 12.5 Gram(s) IV Push once  dextrose 50% Injectable 25 Gram(s) IV Push once  dextrose 50% Injectable 25 Gram(s) IV Push once  docusate sodium 100 milliGRAM(s) Oral daily  enoxaparin Injectable 40 milliGRAM(s) SubCutaneous daily  finasteride 5 milliGRAM(s) Oral daily  folic acid 1 milliGRAM(s) Oral daily  influenza   Vaccine 0.5 milliLiter(s) IntraMuscular once  insulin glargine Injectable (LANTUS) 12 Unit(s) SubCutaneous at bedtime  insulin lispro (HumaLOG) corrective regimen sliding scale   SubCutaneous three times a day before meals  insulin lispro (HumaLOG) corrective regimen sliding scale   SubCutaneous at bedtime  insulin lispro Injectable (HumaLOG) 3 Unit(s) SubCutaneous three times a day with meals  metoprolol     tartrate 12.5 milliGRAM(s) Oral two times a day  multivitamin 1 Tablet(s) Oral daily  pantoprazole    Tablet 40 milliGRAM(s) Oral before breakfast  sodium chloride 0.9% lock flush 20 milliLiter(s) IV Push once  tamsulosin 0.4 milliGRAM(s) Oral at bedtime  thiamine 100 milliGRAM(s) Oral daily  ticagrelor 90 milliGRAM(s) Oral every 12 hours    PRN Inpatient Medications  dextrose Gel 1 Dose(s) Oral once PRN  glucagon  Injectable 1 milliGRAM(s) IntraMuscular once PRN  polyethylene glycol 3350 17 Gram(s) Oral daily PRN  sodium chloride 0.9% lock flush 10 milliLiter(s) IV Push every 1 hour PRN  sodium chloride 0.9% lock flush 10 milliLiter(s) IV Push every 12 hours PRN      REVIEW OF SYSTEMS  --------------------------------------------------------------------------------  Gen: No weakness  Skin: No rashes  Head/Eyes/Ears/Mouth: No headache  Respiratory: No dyspnea, cough  CV: No chest pain, PND, orthopnea  GI: No abdominal pain, diarrhea  : No increased frequency  MSK: No edema  Neuro: No dizziness/lightheadedness  Heme: No bleeding    All other systems were reviewed and are negative, except as noted.    VITALS/PHYSICAL EXAM  --------------------------------------------------------------------------------  T(C): 36.6 (01-18-18 @ 11:40), Max: 36.9 (01-18-18 @ 04:00)  HR: 87 (01-18-18 @ 11:40) (79 - 93)  BP: 102/47 (01-18-18 @ 11:40) (101/53 - 105/65)  RR: 17 (01-18-18 @ 11:40) (17 - 19)  SpO2: 98% (01-18-18 @ 11:40) (97% - 98%)  Wt(kg): --        01-17-18 @ 07:01  -  01-18-18 @ 07:00  --------------------------------------------------------  IN: 650 mL / OUT: 1200 mL / NET: -550 mL    01-18-18 @ 07:01  -  01-18-18 @ 17:27  --------------------------------------------------------  IN: 560 mL / OUT: 250 mL / NET: 310 mL    Physical Exam:  	Gen: NAD  	HEENT:  supple neck  	Pulm: CTA B/L  	CV: RRR, S1S2  	Abd: +BS, soft, nontender/nondistended  	UE: Warm, no asterixis  	LE: Warm, no edema  	Psych: Normal affect and mood  	Skin: Warm, without rashes    LABS/STUDIES  --------------------------------------------------------------------------------              9.6    12.88 >-----------<  314      [01-18-18 @ 07:26]              29.0     140  |  107  |  23  ----------------------------<  77      [01-18-18 @ 07:50]  4.3   |  22  |  1.79        Ca     7.8     [01-18-18 @ 07:50]      Mg     2.3     [01-17-18 @ 07:26]    Creatinine Trend:  SCr 1.79 [01-18 @ 07:50]  SCr 1.90 [01-17 @ 07:26]  SCr 2.10 [01-16 @ 09:07]  SCr 2.13 [01-15 @ 13:30]  SCr 2.03 [01-15 @ 03:52]    Urinalysis - [01-15-18 @ 09:30]      Color Colorless / Appearance Clear / SG 1.007 / pH 6.0      Gluc Negative / Ketone Negative  / Bili Negative / Urobili Negative       Blood Small / Protein Negative / Leuk Est Negative / Nitrite Negative      RBC 0-2 / WBC 0-2 / Hyaline  / Gran  / Sq Epi  / Non Sq Epi Occasional / Bacteria     Urine Creatinine 28      [01-14-18 @ 12:15]  Urine Sodium <20      [01-14-18 @ 12:15]  Urine Urea Nitrogen 150      [01-14-18 @ 14:19]  Urine Chloride <35      [01-14-18 @ 12:15]  Urine Osmolality 100      [01-14-18 @ 12:15]    HbA1c 9.1      [01-09-18 @ 03:39]  TSH 4.60      [01-09-18 @ 03:39]  Lipid: chol 103, , HDL 16, LDL 67      [01-09-18 @ 08:09]    HIV Nonreact      [01-09-18 @ 07:54]    Syphilis Screen (Treponema Pallidum Ab) Negative      [01-11-18 @ 19:58]

## 2018-01-18 NOTE — PROGRESS NOTE ADULT - SUBJECTIVE AND OBJECTIVE BOX
Patient seen and examined at bedside.    Overnight Events: AMAURI. S/p PICC line placement.    Review Of Systems:   CONSTITUTIONAL: No fever, chills, or fatigue  EYES: No eye pain, visual disturbances, or discharge  ENMT:  No difficulty hearing, tinnitus, vertigo; No sinus or throat pain  NECK: No pain or stiffness  RESPIRATORY: Per above  CARDIOVASCULAR: No chest pain, palpitations, dizziness, or leg swelling  GASTROINTESTINAL: No abdominal or epigastric pain. No nausea, vomiting, or hematemesis; No diarrhea or constipation. No melena or hematochezia.  GENITOURINARY: No dysuria, frequency, hematuria, or incontinence  NEUROLOGICAL: No headaches, memory loss, loss of strength, numbness, or tremors  SKIN: No itching, burning, rashes, or lesions   MUSCULOSKELETAL: No joint pain or swelling; No muscle, back, or extremity pain  PSYCHIATRIC: No depression, anxiety, mood swings, or difficulty sleeping                 Medications:  aspirin  chewable 81 milliGRAM(s) Oral daily  atorvastatin 80 milliGRAM(s) Oral at bedtime  cefTRIAXone   IVPB 2 Gram(s) IV Intermittent every 24 hours  dextrose 5%. 1000 milliLiter(s) IV Continuous <Continuous>  dextrose 50% Injectable 12.5 Gram(s) IV Push once  dextrose 50% Injectable 25 Gram(s) IV Push once  dextrose 50% Injectable 25 Gram(s) IV Push once  dextrose Gel 1 Dose(s) Oral once PRN  docusate sodium 100 milliGRAM(s) Oral daily  enoxaparin Injectable 40 milliGRAM(s) SubCutaneous daily  finasteride 5 milliGRAM(s) Oral daily  folic acid 1 milliGRAM(s) Oral daily  glucagon  Injectable 1 milliGRAM(s) IntraMuscular once PRN  influenza   Vaccine 0.5 milliLiter(s) IntraMuscular once  insulin glargine Injectable (LANTUS) 12 Unit(s) SubCutaneous at bedtime  insulin lispro (HumaLOG) corrective regimen sliding scale   SubCutaneous three times a day before meals  insulin lispro (HumaLOG) corrective regimen sliding scale   SubCutaneous at bedtime  insulin lispro Injectable (HumaLOG) 3 Unit(s) SubCutaneous three times a day with meals  metoprolol     tartrate 12.5 milliGRAM(s) Oral two times a day  multivitamin 1 Tablet(s) Oral daily  pantoprazole    Tablet 40 milliGRAM(s) Oral before breakfast  polyethylene glycol 3350 17 Gram(s) Oral daily PRN  sodium chloride 0.9% lock flush 10 milliLiter(s) IV Push every 1 hour PRN  sodium chloride 0.9% lock flush 10 milliLiter(s) IV Push every 12 hours PRN  sodium chloride 0.9% lock flush 20 milliLiter(s) IV Push once  tamsulosin 0.4 milliGRAM(s) Oral at bedtime  thiamine 100 milliGRAM(s) Oral daily  ticagrelor 90 milliGRAM(s) Oral every 12 hours  vancomycin  IVPB 1000 milliGRAM(s) IV Intermittent once      PAST MEDICAL & SURGICAL HISTORY:  High cholesterol  Diabetes: type 2  No significant past surgical history      Vitals:  T(F): 98.4 (01-18), Max: 98.4 (01-18)  HR: 79 (01-18) (79 - 94)  BP: 105/65 (01-18) (101/53 - 123/56)  RR: 18 (01-18)  SpO2: 97% (01-18)  I&O's Summary    17 Jan 2018 07:01  -  18 Jan 2018 07:00  --------------------------------------------------------  IN: 650 mL / OUT: 1200 mL / NET: -550 mL        Physical Exam:  Appearance:  Normal, NAD  Eyes: PERRL, EOMI  HENT: Normal oral [place] NC/AT  Cardiovascular: S1, S2, RRR, No m/r/g appreciated, trace edema at the ankles, no elevation in JVP  Procedural Access Site: No hematoma, Non-tender to palpation, 2+ pulse , No bruit, No Ecchymosis  Respiratory: CTAB  Gastrointestinal: Soft, Non-tender, Non-distended, BS+  Musculoskeletal:  No clubbing, No joint deformity   Neurologic: Non-focal                          9.6    12.88 )-----------( 314      ( 18 Jan 2018 07:26 )             29.0     01-18    140  |  107  |  23  ----------------------------<  77  4.3   |  22  |  1.79<H>    Ca    7.8<L>      18 Jan 2018 07:50  Mg     2.3     01-17    Interpretation of Telemetry:  SR 80-100s      A/P:  Patient is a 72 year-old male with past medical history of T2DM, DLD, alcohol misuse brought in by family after having been found face down on the floor for approximately 2 days found in hemodynamically unstable A-fib with RVR (170s bpm) s/p multiple cardioversions and initially requiring pressor support in the setting of septic shock 2/2 to culture negative aortic valve endocarditis.     Endocarditis of aortic valve. JANES with large vegetation (1.4 x 0.8) on aortic valve leaflet. Per CT surgery, vegetation appears chronic, heavily calcified. Unlikely to embolize. Thus, patient will be re-evaluated for aortic valve replacement surgery in 3 months. Now with PICC.  -continue with vanc/zosyn, abx per ID    CAD. C with proximal LAD disease (70% stenosis), 60% stenosis of RCA  -No plan for CABG  -Continue ASA, brillinta, and lipitor    ELIAN. prerenal v 2/2 vanc.  -renal following    John Tenorio  Cardiology fellow  [id number]

## 2018-01-18 NOTE — PROGRESS NOTE ADULT - ATTENDING COMMENTS
No new complaints  1.  ARF--progressive improvement  2.  Endocarditis--PICC delivery long term antibiotics. Trend for alterations 1 which can include GN.

## 2018-01-18 NOTE — PROGRESS NOTE ADULT - SUBJECTIVE AND OBJECTIVE BOX
Interventional Radiology Pre-Procedure Note    Procedure: PICC Line placement    Diagnosis/Indication: Patient is a 71 y/o gentleman with ELIAN and endocarditis presenting for PICC line placement for antibiotic administration. PICC line placement discussed with nephrology.    PAST MEDICAL & SURGICAL HISTORY:  High cholesterol  Diabetes: type 2  No significant past surgical history     Allergies: No Known Allergies    LABS:  CBC Full  -  ( 17 Jan 2018 07:27 )  WBC Count : 13.67 K/uL  Hemoglobin : 10.4 g/dL  Hematocrit : 32.5 %  Platelet Count - Automated : 333 K/uL  Mean Cell Volume : 92.3 fl  Mean Cell Hemoglobin : 29.5 pg  Mean Cell Hemoglobin Concentration : 32.0 gm/dL  Auto Neutrophil # : x  Auto Lymphocyte # : x  Auto Monocyte # : x  Auto Eosinophil # : x  Auto Basophil # : x  Auto Neutrophil % : x  Auto Lymphocyte % : x  Auto Monocyte % : x  Auto Eosinophil % : x  Auto Basophil % : x    01-17    137  |  106  |  23  ----------------------------<  155<H>  4.2   |  20<L>  |  1.90<H>    Ca    8.0<L>      17 Jan 2018 07:26  Phos  4.4     01-16  Mg     2.3     01-17    TPro  5.6<L>  /  Alb  2.2<L>  /  TBili  0.5  /  DBili  x   /  AST  30  /  ALT  12  /  AlkPhos  78  01-16    PT/INR - ( 16 Jan 2018 08:56 )   PT: 10.9 sec;   INR: 0.97 ratio      PTT - ( 16 Jan 2018 08:56 )  PTT:38.8 sec    Procedure/ risks/ benefits/ alternatives were explained, informed consent obtained from patient, verbalizes understanding.

## 2018-01-18 NOTE — PROGRESS NOTE ADULT - SUBJECTIVE AND OBJECTIVE BOX
Interventional Radiology     Pre- Procedure   Patient and/or family/surrogate educated about central line-associated blood stream infection prevention practices  Central Line insertion checklist utilized    Catheter Type: (  ) Dialysis  (  ) Introducer  (   ) Central venous catheter   ( x ) peripherally inserted central catheter (PICC)      Number of Lumens: ( x ) single   (  ) Double   (   ) Triple  (  ) Antimicrobial catheter    TIME OUT performed prior to this procedure with verbal confirmation of correct patient identity, correct site, side and talisha (if applicable), agreement of the procedure, correct patient position, availability of necessary equipment.  The consent form (if applicable) is complete and accurate.  Safety precautions based on patient history or medication use has been addressed   RN at bedside    Procedure  The patient was placed in the (  x)Supine position, (   ) Trendelenburg postiton.  The patient's placement site was prepped with Chlorhexidine solution then drapped using maximum sterile barrier protection.  The area was injected with 8cc of 2% Lidocaine.  Using the  (  ) Seldinger technique    ( x ) Modified Seldinger technique   ( x ) Ultrasound, the catheter was introduced.  Strict adherence to outlined aseptic technique, including hand washing prior to donning sterile barriers (gloves and gown), utilizing a mask and cap, plus draping the patient with a sterile drape was observed.  Uponcompletion of the line placement, the insertion site was covered with sterile dressing.    All materials used for catheter insertion, including the intact guide wire, were accounted for at the end of the procedure      Emergency placement (x  ) No    (   ) Yes   (   x) New Site    (   )  Guide Wire change      Attempted site and actual site (   ) Right  (  x )  Left                Brachial, 38cm in length        Post Procedure (Catheter Placement Verification)  Correct placement confirmed by pressure transducer or ultrasnography or venous saturation  The tip of the catheter is confirmed to be in the SVC by radiograpy which revealed no pneumothorax and line may be accessed

## 2018-01-18 NOTE — PROGRESS NOTE ADULT - SUBJECTIVE AND OBJECTIVE BOX
CC: F/U for Culture Neg Endocarditis    Saw/spoke to patient. No fevers, no chills. No new complaints. Overall well. S/P PICC.    Allergies  No Known Allergies    ANTIMICROBIALS:  cefTRIAXone   IVPB 2 every 24 hours  vancomycin  IVPB 1000 once    PE:    Vital Signs Last 24 Hrs  T(C): 36.6 (18 Jan 2018 11:40), Max: 36.9 (18 Jan 2018 04:00)  T(F): 97.9 (18 Jan 2018 11:40), Max: 98.4 (18 Jan 2018 04:00)  HR: 87 (18 Jan 2018 11:40) (79 - 93)  BP: 102/47 (18 Jan 2018 11:40) (101/53 - 105/65)  BP(mean): --  RR: 17 (18 Jan 2018 11:40) (17 - 19)  SpO2: 98% (18 Jan 2018 11:40) (97% - 98%)    Gen: AOx3, NAD, non-toxic, pleasant  CV: S1+S2 normal, no murmurs, nontachycardic  Resp: Clear bilat, no resp distress, no crackles/wheezes  Abd: Soft, nontender, +BS  Ext: S/p PICC--no erythema, no pain    LABS:                        9.6    12.88 )-----------( 314      ( 18 Jan 2018 07:26 )             29.0     01-18    140  |  107  |  23  ----------------------------<  77  4.3   |  22  |  1.79<H>    Ca    7.8<L>      18 Jan 2018 07:50  Mg     2.3     01-17    MICROBIOLOGY:  Vancomycin Level, Trough: 10.6 ug/mL (01-18-18 @ 06:18)    .Blood Blood-Venous  01-13-18   No growth at 5 days.     .Blood Blood-Peripheral  01-13-18   No growth at 5 days.     .Blood Blood-Venous  01-09-18   No growth at 5 days.     .Blood Blood-Venous  01-09-18   No growth at 5 days.      .Urine Clean Catch (Midstream)  01-09-18   No growth     RADIOLOGY:    No new available

## 2018-01-18 NOTE — PROGRESS NOTE ADULT - PROBLEM SELECTOR PLAN 1
Likely contrast induced nephropathy from cardiac cath done on 1/12. Creatinine first elevated in AM 1/14. Possible pre-renal contribution + vancomycin. Possible ATN. Patient Scr improved to 1.79 today.  Monitor UOP, creatinine trend.    May stop IVF if patient is tolerating PO intake   AVOID NSAIDs, ACE/ARBS, and nephrotoxins

## 2018-01-18 NOTE — PROGRESS NOTE ADULT - SUBJECTIVE AND OBJECTIVE BOX
Patient is a 72y old  Male who presents with a chief complaint of Fall (09 Jan 2018 00:52)      SUBJECTIVE / OVERNIGHT EVENTS:  no acute events overnight. vss, afebrile. s/p PICC line insertion this morning without complication.   Pt has no complaints    MEDICATIONS  (STANDING):  aspirin  chewable 81 milliGRAM(s) Oral daily  atorvastatin 80 milliGRAM(s) Oral at bedtime  cefTRIAXone   IVPB 2 Gram(s) IV Intermittent every 24 hours  dextrose 5%. 1000 milliLiter(s) (50 mL/Hr) IV Continuous <Continuous>  dextrose 50% Injectable 12.5 Gram(s) IV Push once  dextrose 50% Injectable 25 Gram(s) IV Push once  dextrose 50% Injectable 25 Gram(s) IV Push once  docusate sodium 100 milliGRAM(s) Oral daily  enoxaparin Injectable 40 milliGRAM(s) SubCutaneous daily  finasteride 5 milliGRAM(s) Oral daily  folic acid 1 milliGRAM(s) Oral daily  influenza   Vaccine 0.5 milliLiter(s) IntraMuscular once  insulin glargine Injectable (LANTUS) 12 Unit(s) SubCutaneous at bedtime  insulin lispro (HumaLOG) corrective regimen sliding scale   SubCutaneous three times a day before meals  insulin lispro (HumaLOG) corrective regimen sliding scale   SubCutaneous at bedtime  insulin lispro Injectable (HumaLOG) 3 Unit(s) SubCutaneous three times a day with meals  metoprolol     tartrate 12.5 milliGRAM(s) Oral two times a day  multivitamin 1 Tablet(s) Oral daily  pantoprazole    Tablet 40 milliGRAM(s) Oral before breakfast  sodium chloride 0.9% lock flush 20 milliLiter(s) IV Push once  tamsulosin 0.4 milliGRAM(s) Oral at bedtime  thiamine 100 milliGRAM(s) Oral daily  ticagrelor 90 milliGRAM(s) Oral every 12 hours  vancomycin  IVPB 1000 milliGRAM(s) IV Intermittent once    MEDICATIONS  (PRN):  dextrose Gel 1 Dose(s) Oral once PRN Blood Glucose LESS THAN 70 milliGRAM(s)/deciliter  glucagon  Injectable 1 milliGRAM(s) IntraMuscular once PRN Glucose LESS THAN 70 milligrams/deciliter  polyethylene glycol 3350 17 Gram(s) Oral daily PRN Constipation  sodium chloride 0.9% lock flush 10 milliLiter(s) IV Push every 1 hour PRN After each medication administration  sodium chloride 0.9% lock flush 10 milliLiter(s) IV Push every 12 hours PRN Lumen of catheter NOT used      CAPILLARY BLOOD GLUCOSE      POCT Blood Glucose.: 139 mg/dL (18 Jan 2018 11:39)  POCT Blood Glucose.: 87 mg/dL (18 Jan 2018 10:57)  POCT Blood Glucose.: 85 mg/dL (18 Jan 2018 10:35)  POCT Blood Glucose.: 59 mg/dL (18 Jan 2018 10:10)  POCT Blood Glucose.: 57 mg/dL (18 Jan 2018 10:08)  POCT Blood Glucose.: 84 mg/dL (17 Jan 2018 21:19)  POCT Blood Glucose.: 76 mg/dL (17 Jan 2018 16:33)    I&O's Summary    17 Jan 2018 07:01  -  18 Jan 2018 07:00  --------------------------------------------------------  IN: 650 mL / OUT: 1200 mL / NET: -550 mL        PHYSICAL EXAM:  Vital Signs Last 24 Hrs  T(C): 36.6 (18 Jan 2018 11:40), Max: 36.9 (18 Jan 2018 04:00)  T(F): 97.9 (18 Jan 2018 11:40), Max: 98.4 (18 Jan 2018 04:00)  HR: 87 (18 Jan 2018 11:40) (79 - 93)  BP: 102/47 (18 Jan 2018 11:40) (101/53 - 105/65)  BP(mean): --  RR: 17 (18 Jan 2018 11:40) (17 - 19)  SpO2: 98% (18 Jan 2018 11:40) (97% - 98%)    GENERAL: NAD, well-developed  HEAD:  Atraumatic, Normocephalic  EYES: EOMI, PERRLA, conjunctiva and sclera clear  NECK: Supple, No JVD  CHEST/LUNG: Clear to auscultation bilaterally; No wheeze  HEART: Regular rate and rhythm; No murmurs, rubs, or gallops  ABDOMEN: Soft, Nontender, Nondistended; Bowel sounds present  EXTREMITIES:  2+ Peripheral Pulses, No clubbing, cyanosis, or edema  NEUROLOGY: aaox3; non-focal  SKIN: No rashes or lesions    LABS:  personally reviewed                        9.6    12.88 )-----------( 314      ( 18 Jan 2018 07:26 )             29.0     01-18    140  |  107  |  23  ----------------------------<  77  4.3   |  22  |  1.79<H>    Ca    7.8<L>      18 Jan 2018 07:50  Mg     2.3     01-17      Vanc level 10.6          RADIOLOGY & ADDITIONAL TESTS:    Imaging Personally Reviewed:  - tele: SR 70-90s, no events    Consultant(s) Notes Reviewed:  cardiology, ID    Care Discussed with Consultants/Other Providers: Dr. Small

## 2018-01-19 LAB
ANION GAP SERPL CALC-SCNC: 9 MMOL/L — SIGNIFICANT CHANGE UP (ref 5–17)
BASOPHILS # BLD AUTO: 0.02 K/UL — SIGNIFICANT CHANGE UP (ref 0–0.2)
BASOPHILS NFR BLD AUTO: 0.2 % — SIGNIFICANT CHANGE UP (ref 0–2)
BUN SERPL-MCNC: 20 MG/DL — SIGNIFICANT CHANGE UP (ref 7–23)
CALCIUM SERPL-MCNC: 8 MG/DL — LOW (ref 8.4–10.5)
CHLORIDE SERPL-SCNC: 105 MMOL/L — SIGNIFICANT CHANGE UP (ref 96–108)
CO2 SERPL-SCNC: 25 MMOL/L — SIGNIFICANT CHANGE UP (ref 22–31)
CREAT SERPL-MCNC: 1.77 MG/DL — HIGH (ref 0.5–1.3)
EOSINOPHIL # BLD AUTO: 0.09 K/UL — SIGNIFICANT CHANGE UP (ref 0–0.5)
EOSINOPHIL NFR BLD AUTO: 0.7 % — SIGNIFICANT CHANGE UP (ref 0–6)
GLUCOSE BLDC GLUCOMTR-MCNC: 118 MG/DL — HIGH (ref 70–99)
GLUCOSE BLDC GLUCOMTR-MCNC: 250 MG/DL — HIGH (ref 70–99)
GLUCOSE BLDC GLUCOMTR-MCNC: 93 MG/DL — SIGNIFICANT CHANGE UP (ref 70–99)
GLUCOSE BLDC GLUCOMTR-MCNC: 96 MG/DL — SIGNIFICANT CHANGE UP (ref 70–99)
GLUCOSE SERPL-MCNC: 86 MG/DL — SIGNIFICANT CHANGE UP (ref 70–99)
HCT VFR BLD CALC: 29.7 % — LOW (ref 39–50)
HGB BLD-MCNC: 9.6 G/DL — LOW (ref 13–17)
IMM GRANULOCYTES NFR BLD AUTO: 0.7 % — SIGNIFICANT CHANGE UP (ref 0–1.5)
LYMPHOCYTES # BLD AUTO: 0.89 K/UL — LOW (ref 1–3.3)
LYMPHOCYTES # BLD AUTO: 6.8 % — LOW (ref 13–44)
MCHC RBC-ENTMCNC: 30.5 PG — SIGNIFICANT CHANGE UP (ref 27–34)
MCHC RBC-ENTMCNC: 32.3 GM/DL — SIGNIFICANT CHANGE UP (ref 32–36)
MCV RBC AUTO: 94.3 FL — SIGNIFICANT CHANGE UP (ref 80–100)
MONOCYTES # BLD AUTO: 0.73 K/UL — SIGNIFICANT CHANGE UP (ref 0–0.9)
MONOCYTES NFR BLD AUTO: 5.6 % — SIGNIFICANT CHANGE UP (ref 2–14)
NEUTROPHILS # BLD AUTO: 11.21 K/UL — HIGH (ref 1.8–7.4)
NEUTROPHILS NFR BLD AUTO: 86 % — HIGH (ref 43–77)
PLATELET # BLD AUTO: 350 K/UL — SIGNIFICANT CHANGE UP (ref 150–400)
POTASSIUM SERPL-MCNC: 4.2 MMOL/L — SIGNIFICANT CHANGE UP (ref 3.5–5.3)
POTASSIUM SERPL-SCNC: 4.2 MMOL/L — SIGNIFICANT CHANGE UP (ref 3.5–5.3)
RBC # BLD: 3.15 M/UL — LOW (ref 4.2–5.8)
RBC # FLD: 14.6 % — HIGH (ref 10.3–14.5)
SODIUM SERPL-SCNC: 139 MMOL/L — SIGNIFICANT CHANGE UP (ref 135–145)
VANCOMYCIN TROUGH SERPL-MCNC: 10.1 UG/ML — SIGNIFICANT CHANGE UP (ref 10–20)
WBC # BLD: 13.03 K/UL — HIGH (ref 3.8–10.5)
WBC # FLD AUTO: 13.03 K/UL — HIGH (ref 3.8–10.5)

## 2018-01-19 PROCEDURE — 99233 SBSQ HOSP IP/OBS HIGH 50: CPT

## 2018-01-19 PROCEDURE — 99232 SBSQ HOSP IP/OBS MODERATE 35: CPT

## 2018-01-19 PROCEDURE — 99232 SBSQ HOSP IP/OBS MODERATE 35: CPT | Mod: GC

## 2018-01-19 RX ORDER — VANCOMYCIN HCL 1 G
1000 VIAL (EA) INTRAVENOUS EVERY 24 HOURS
Qty: 0 | Refills: 0 | Status: DISCONTINUED | OUTPATIENT
Start: 2018-01-20 | End: 2018-01-29

## 2018-01-19 RX ORDER — VANCOMYCIN HCL 1 G
1000 VIAL (EA) INTRAVENOUS ONCE
Qty: 0 | Refills: 0 | Status: COMPLETED | OUTPATIENT
Start: 2018-01-19 | End: 2018-01-19

## 2018-01-19 RX ORDER — BENZOCAINE AND MENTHOL 5; 1 G/100ML; G/100ML
1 LIQUID ORAL THREE TIMES A DAY
Qty: 0 | Refills: 0 | Status: DISCONTINUED | OUTPATIENT
Start: 2018-01-19 | End: 2018-01-30

## 2018-01-19 RX ADMIN — Medication 100 MILLIGRAM(S): at 13:25

## 2018-01-19 RX ADMIN — TICAGRELOR 90 MILLIGRAM(S): 90 TABLET ORAL at 21:02

## 2018-01-19 RX ADMIN — Medication 1 TABLET(S): at 13:24

## 2018-01-19 RX ADMIN — Medication 12.5 MILLIGRAM(S): at 17:30

## 2018-01-19 RX ADMIN — FINASTERIDE 5 MILLIGRAM(S): 5 TABLET, FILM COATED ORAL at 13:25

## 2018-01-19 RX ADMIN — Medication 3 UNIT(S): at 17:30

## 2018-01-19 RX ADMIN — INSULIN GLARGINE 12 UNIT(S): 100 INJECTION, SOLUTION SUBCUTANEOUS at 22:06

## 2018-01-19 RX ADMIN — BENZOCAINE AND MENTHOL 1 LOZENGE: 5; 1 LIQUID ORAL at 13:24

## 2018-01-19 RX ADMIN — TAMSULOSIN HYDROCHLORIDE 0.4 MILLIGRAM(S): 0.4 CAPSULE ORAL at 21:03

## 2018-01-19 RX ADMIN — ENOXAPARIN SODIUM 40 MILLIGRAM(S): 100 INJECTION SUBCUTANEOUS at 13:24

## 2018-01-19 RX ADMIN — Medication 2: at 13:24

## 2018-01-19 RX ADMIN — PANTOPRAZOLE SODIUM 40 MILLIGRAM(S): 20 TABLET, DELAYED RELEASE ORAL at 05:09

## 2018-01-19 RX ADMIN — Medication 12.5 MILLIGRAM(S): at 05:09

## 2018-01-19 RX ADMIN — ATORVASTATIN CALCIUM 80 MILLIGRAM(S): 80 TABLET, FILM COATED ORAL at 21:03

## 2018-01-19 RX ADMIN — Medication 3 UNIT(S): at 08:52

## 2018-01-19 RX ADMIN — BENZOCAINE AND MENTHOL 1 LOZENGE: 5; 1 LIQUID ORAL at 21:03

## 2018-01-19 RX ADMIN — TICAGRELOR 90 MILLIGRAM(S): 90 TABLET ORAL at 08:52

## 2018-01-19 RX ADMIN — Medication 81 MILLIGRAM(S): at 13:25

## 2018-01-19 RX ADMIN — Medication 1 MILLIGRAM(S): at 13:25

## 2018-01-19 RX ADMIN — Medication 250 MILLIGRAM(S): at 08:56

## 2018-01-19 RX ADMIN — CEFTRIAXONE 100 GRAM(S): 500 INJECTION, POWDER, FOR SOLUTION INTRAMUSCULAR; INTRAVENOUS at 13:24

## 2018-01-19 RX ADMIN — SODIUM CHLORIDE 10 MILLILITER(S): 9 INJECTION INTRAMUSCULAR; INTRAVENOUS; SUBCUTANEOUS at 21:04

## 2018-01-19 NOTE — PROGRESS NOTE ADULT - PROBLEM SELECTOR PLAN 1
Pt with culture-negative endocarditis, complicated by septic shock requiring pressors (off since 1/11). JANES with moderate to severe AI/ moderate to severe AS with >1cm vegetation on aortic valve; no signs of emboli, heart failure or heart block  - vanc level 10.1 - dose 1g vanc this morning  - ID recs appreciated: continue ceftriaxone and vanc by level given ELIAN  - care discussed with Dr. Small: plan for prolonged IV abx 6-8 weeks.   - fu Bartonella, Brucella, Q fever, Legionella serologies, fungal cultures  - s/p PICC line 1/18

## 2018-01-19 NOTE — PROGRESS NOTE ADULT - PROBLEM SELECTOR PLAN 2
Likely 2/2 ADARSH s/p cardiac cath. Cr continues to improve and demonstrating appropriate vanco clearance.   - appreciate nephro recs  - monitor BMP  - renally dose meds  - avoid nephrotoxins

## 2018-01-19 NOTE — PROGRESS NOTE ADULT - SUBJECTIVE AND OBJECTIVE BOX
[facility] DIVISION OF KIDNEY DISEASES AND HYPERTENSION -- FOLLOW UP NOTE  --------------------------------------------------------------------------------  Chief Complaint: ELIAN    24 hour events/subjective:  Patient seen and examined. Doing well today and is without complaints.       PAST HISTORY  --------------------------------------------------------------------------------  No significant changes to PMH, PSH, FHx, SHx, unless otherwise noted    ALLERGIES & MEDICATIONS  --------------------------------------------------------------------------------  Allergies    No Known Allergies    Intolerances      Standing Inpatient Medications  aspirin  chewable 81 milliGRAM(s) Oral daily  atorvastatin 80 milliGRAM(s) Oral at bedtime  benzocaine 15 mG/menthol 3.6 mG Lozenge 1 Lozenge Oral three times a day  cefTRIAXone   IVPB 2 Gram(s) IV Intermittent every 24 hours  dextrose 5%. 1000 milliLiter(s) IV Continuous <Continuous>  dextrose 50% Injectable 12.5 Gram(s) IV Push once  dextrose 50% Injectable 25 Gram(s) IV Push once  dextrose 50% Injectable 25 Gram(s) IV Push once  docusate sodium 100 milliGRAM(s) Oral daily  enoxaparin Injectable 40 milliGRAM(s) SubCutaneous daily  finasteride 5 milliGRAM(s) Oral daily  folic acid 1 milliGRAM(s) Oral daily  influenza   Vaccine 0.5 milliLiter(s) IntraMuscular once  insulin glargine Injectable (LANTUS) 12 Unit(s) SubCutaneous at bedtime  insulin lispro (HumaLOG) corrective regimen sliding scale   SubCutaneous three times a day before meals  insulin lispro (HumaLOG) corrective regimen sliding scale   SubCutaneous at bedtime  insulin lispro Injectable (HumaLOG) 3 Unit(s) SubCutaneous three times a day with meals  metoprolol     tartrate 12.5 milliGRAM(s) Oral two times a day  multivitamin 1 Tablet(s) Oral daily  pantoprazole    Tablet 40 milliGRAM(s) Oral before breakfast  sodium chloride 0.9% lock flush 20 milliLiter(s) IV Push once  tamsulosin 0.4 milliGRAM(s) Oral at bedtime  thiamine 100 milliGRAM(s) Oral daily  ticagrelor 90 milliGRAM(s) Oral every 12 hours    PRN Inpatient Medications  dextrose Gel 1 Dose(s) Oral once PRN  glucagon  Injectable 1 milliGRAM(s) IntraMuscular once PRN  polyethylene glycol 3350 17 Gram(s) Oral daily PRN  sodium chloride 0.9% lock flush 10 milliLiter(s) IV Push every 1 hour PRN  sodium chloride 0.9% lock flush 10 milliLiter(s) IV Push every 12 hours PRN      REVIEW OF SYSTEMS  --------------------------------------------------------------------------------  Gen: No weakness  Skin: No rashes  Head/Eyes/Ears/Mouth: No headache  Respiratory: No dyspnea  CV: No chest pain, PND, orthopnea  GI: No abdominal pain, diarrhea  : No increased frequency, dysuria  MSK: No edema  Neuro: No dizziness/lightheadedness  Heme: No bleeding    All other systems were reviewed and are negative, except as noted.    VITALS/PHYSICAL EXAM  --------------------------------------------------------------------------------  T(C): 36.4 (01-19-18 @ 11:43), Max: 36.7 (01-18-18 @ 20:49)  HR: 86 (01-19-18 @ 11:43) (78 - 86)  BP: 107/53 (01-19-18 @ 11:43) (101/51 - 107/53)  RR: 17 (01-19-18 @ 11:43) (17 - 18)  SpO2: 98% (01-19-18 @ 11:43) (95% - 98%)  Wt(kg): --        01-18-18 @ 07:01  -  01-19-18 @ 07:00  --------------------------------------------------------  IN: 920 mL / OUT: 850 mL / NET: 70 mL    Physical Exam:  	Gen: NAD  	HEENT:  supple neck  	Pulm: CTA B/L  	CV: RRR, S1S2  	Abd: +BS, soft, nontender/nondistended  	UE: Warm, no asterixis  	LE: Warm, no edema  	Psych: Normal affect and mood  	Skin: Warm, without rashes    LABS/STUDIES  --------------------------------------------------------------------------------              9.6    13.03 >-----------<  350      [01-19-18 @ 07:33]              29.7     139  |  105  |  20  ----------------------------<  86      [01-19-18 @ 07:25]  4.2   |  25  |  1.77        Ca     8.0     [01-19-18 @ 07:25]    Creatinine Trend:  SCr 1.77 [01-19 @ 07:25]  SCr 1.79 [01-18 @ 07:50]  SCr 1.90 [01-17 @ 07:26]  SCr 2.10 [01-16 @ 09:07]  SCr 2.13 [01-15 @ 13:30]    Urinalysis - [01-15-18 @ 09:30]      Color Colorless / Appearance Clear / SG 1.007 / pH 6.0      Gluc Negative / Ketone Negative  / Bili Negative / Urobili Negative       Blood Small / Protein Negative / Leuk Est Negative / Nitrite Negative      RBC 0-2 / WBC 0-2 / Hyaline  / Gran  / Sq Epi  / Non Sq Epi Occasional / Bacteria     Urine Creatinine 28      [01-14-18 @ 12:15]  Urine Sodium <20      [01-14-18 @ 12:15]  Urine Urea Nitrogen 150      [01-14-18 @ 14:19]  Urine Chloride <35      [01-14-18 @ 12:15]  Urine Osmolality 100      [01-14-18 @ 12:15]    HbA1c 9.1      [01-09-18 @ 03:39]  TSH 4.60      [01-09-18 @ 03:39]  Lipid: chol 103, , HDL 16, LDL 67      [01-09-18 @ 08:09]    HIV Nonreact      [01-09-18 @ 07:54]    Syphilis Screen (Treponema Pallidum Ab) Negative      [01-11-18 @ 19:58]

## 2018-01-19 NOTE — PROGRESS NOTE ADULT - PROBLEM SELECTOR PLAN 1
Likely contrast induced nephropathy from cardiac cath done on 1/12. Creatinine first elevated in AM 1/14. Possible pre-renal contribution + vancomycin. Possible ATN may take some time to recover. Patient Scr improved to 1.77 today.  Monitor UOP, creatinine trend.    AVOID NSAIDs, ACE/ARBS, and nephrotoxins

## 2018-01-19 NOTE — PROGRESS NOTE ADULT - SUBJECTIVE AND OBJECTIVE BOX
CC: F/U for Culture Negative Endocarditis    Saw/spoke to patient. No fevers, no chills. Patient well. No new complaints.    Allergies  No Known Allergies    ANTIMICROBIALS:  cefTRIAXone   IVPB 2 every 24 hours    PE:    Vital Signs Last 24 Hrs  T(C): 36.4 (19 Jan 2018 11:43), Max: 36.7 (18 Jan 2018 20:49)  T(F): 97.5 (19 Jan 2018 11:43), Max: 98.1 (18 Jan 2018 20:49)  HR: 86 (19 Jan 2018 11:43) (78 - 86)  BP: 107/53 (19 Jan 2018 11:43) (101/51 - 107/53)  BP(mean): --  RR: 17 (19 Jan 2018 11:43) (17 - 18)  SpO2: 98% (19 Jan 2018 11:43) (95% - 98%)    Gen: AOx3, NAD, non-toxic, pleasant  CV: S1+S2 normal, no murmurs, nontachycardic  Resp: Clear bilat, no resp distress, no crackles/wheezes  Abd: Soft, nontender, +BS  Ext: No LE edema, no wounds, PICC line in place    LABS:                        9.6    13.03 )-----------( 350      ( 19 Jan 2018 07:33 )             29.7     01-19    139  |  105  |  20  ----------------------------<  86  4.2   |  25  |  1.77<H>    Ca    8.0<L>      19 Jan 2018 07:25    MICROBIOLOGY:  Vancomycin Level, Trough: 10.1 ug/mL (01-19-18 @ 05:33)    .Blood Blood-Venous  01-13-18   No growth at 5 days.     .Blood Blood-Peripheral  01-13-18   No growth at 5 days.     .Blood Blood-Venous  01-09-18   No growth at 5 days.     .Blood Blood-Venous  01-09-18   No growth at 5 days.    .Urine Clean Catch (Midstream)  01-09-18   No growth    RADIOLOGY:    No new available

## 2018-01-19 NOTE — PROGRESS NOTE ADULT - SUBJECTIVE AND OBJECTIVE BOX
Patient is a 72y old  Male who presents with a chief complaint of Fall (09 Jan 2018 00:52)      SUBJECTIVE / OVERNIGHT EVENTS:  no acute events overnight. vss, afebrile. Pt complains mild sorethroat, sneezing this morning - concern that he is catching a cold      MEDICATIONS  (STANDING):  aspirin  chewable 81 milliGRAM(s) Oral daily  atorvastatin 80 milliGRAM(s) Oral at bedtime  cefTRIAXone   IVPB 2 Gram(s) IV Intermittent every 24 hours  dextrose 5%. 1000 milliLiter(s) (50 mL/Hr) IV Continuous <Continuous>  dextrose 50% Injectable 12.5 Gram(s) IV Push once  dextrose 50% Injectable 25 Gram(s) IV Push once  dextrose 50% Injectable 25 Gram(s) IV Push once  docusate sodium 100 milliGRAM(s) Oral daily  enoxaparin Injectable 40 milliGRAM(s) SubCutaneous daily  finasteride 5 milliGRAM(s) Oral daily  folic acid 1 milliGRAM(s) Oral daily  influenza   Vaccine 0.5 milliLiter(s) IntraMuscular once  insulin glargine Injectable (LANTUS) 12 Unit(s) SubCutaneous at bedtime  insulin lispro (HumaLOG) corrective regimen sliding scale   SubCutaneous three times a day before meals  insulin lispro (HumaLOG) corrective regimen sliding scale   SubCutaneous at bedtime  insulin lispro Injectable (HumaLOG) 3 Unit(s) SubCutaneous three times a day with meals  metoprolol     tartrate 12.5 milliGRAM(s) Oral two times a day  multivitamin 1 Tablet(s) Oral daily  pantoprazole    Tablet 40 milliGRAM(s) Oral before breakfast  sodium chloride 0.9% lock flush 20 milliLiter(s) IV Push once  tamsulosin 0.4 milliGRAM(s) Oral at bedtime  thiamine 100 milliGRAM(s) Oral daily  ticagrelor 90 milliGRAM(s) Oral every 12 hours    MEDICATIONS  (PRN):  dextrose Gel 1 Dose(s) Oral once PRN Blood Glucose LESS THAN 70 milliGRAM(s)/deciliter  glucagon  Injectable 1 milliGRAM(s) IntraMuscular once PRN Glucose LESS THAN 70 milligrams/deciliter  polyethylene glycol 3350 17 Gram(s) Oral daily PRN Constipation  sodium chloride 0.9% lock flush 10 milliLiter(s) IV Push every 1 hour PRN After each medication administration  sodium chloride 0.9% lock flush 10 milliLiter(s) IV Push every 12 hours PRN Lumen of catheter NOT used      CAPILLARY BLOOD GLUCOSE      POCT Blood Glucose.: 118 mg/dL (19 Jan 2018 07:39)  POCT Blood Glucose.: 82 mg/dL (18 Jan 2018 20:59)  POCT Blood Glucose.: 210 mg/dL (18 Jan 2018 16:34)  POCT Blood Glucose.: 139 mg/dL (18 Jan 2018 11:39)    I&O's Summary    18 Jan 2018 07:01  -  19 Jan 2018 07:00  --------------------------------------------------------  IN: 920 mL / OUT: 850 mL / NET: 70 mL        PHYSICAL EXAM:  Vital Signs Last 24 Hrs  T(C): 36.6 (19 Jan 2018 04:00), Max: 36.7 (18 Jan 2018 20:49)  T(F): 97.8 (19 Jan 2018 04:00), Max: 98.1 (18 Jan 2018 20:49)  HR: 78 (19 Jan 2018 04:00) (78 - 87)  BP: 105/53 (19 Jan 2018 04:00) (101/51 - 105/53)  BP(mean): --  RR: 18 (19 Jan 2018 04:00) (17 - 18)  SpO2: 97% (19 Jan 2018 04:00) (95% - 98%)    GENERAL: NAD, well-developed  HEAD:  Atraumatic, Normocephalic  EYES: EOMI, PERRLA, conjunctiva and sclera clear  NECK: Supple, No JVD  CHEST/LUNG: Clear to auscultation bilaterally; No wheeze  HEART: Regular rate and rhythm; No murmurs, rubs, or gallops  ABDOMEN: Soft, Nontender, Nondistended; Bowel sounds present  EXTREMITIES:  2+ Peripheral Pulses, No clubbing, cyanosis, or edema  NEUROLOGY: aaox3; non-focal  SKIN: No rashes or lesions    LABS:  personally reviewed                        9.6    13.03 )-----------( 350      ( 19 Jan 2018 07:33 )             29.7     01-19    139  |  105  |  20  ----------------------------<  86  4.2   |  25  |  1.77<H>    Ca    8.0<L>      19 Jan 2018 07:25          RADIOLOGY & ADDITIONAL TESTS:    Imaging Personally Reviewed:  - tele: NSR 80-100s, no events    Consultant(s) Notes Reviewed:  ID, cardiology    Care Discussed with Consultants/Other Providers: Dr. Small

## 2018-01-19 NOTE — PROGRESS NOTE ADULT - ATTENDING COMMENTS
Feeling improved, ARF SBE.  1.  ARF--improved.  Non oliguric.  No HD required.  2.  SBE--may be associated with GN.  Current antibiotics not overtly nephrotoxic but can be associated with AIN

## 2018-01-19 NOTE — PROGRESS NOTE ADULT - SUBJECTIVE AND OBJECTIVE BOX
Patient is a 72y old  Male who presents with a chief complaint of Fall (09 Jan 2018 00:52)      HPI:  Patient is a 72 year-old male with past medical history of T2DM, DLD, alcohol misuse brought in by family after having been found face down on the floor for approximately 2 days. Patient was currently residing alone on one floor of the house (wife away in [place]) while other family members including a son live on other floors within the same house. He was not checked upon for 2 days. Patient reports multiple recent episodes of falls at home due to physical instability". He reports chronic alcohol use, which family states ranges from 6-12 beers almost daily. He has been more lethargic lately, with forgetfulness and disorientationHe also endorses fever, chills, dysuria and urinary frequency. Patient denies SOB, chest pain, back pain, diarrhea, melena/hematochezia, recent travel, sick contact or surgery. Patient is originally from [place] and has been in the  for 9 years.     ED course: Initial EKG noted for IW ST-elevations. Patient was hypotensive to 70/40 with HR in 170bpm with Atrial fibrillation + RVR on EKG. Labs notable for WBC 26K, lac 5.2 and troponin 0.6. Given IV NS blus x 5L and multiple DCCV with temporary resumption of NSR in 80s bpm before return to atrial fibrillation. (09 Jan 2018 00:52)      PAST MEDICAL & SURGICAL HISTORY:  High cholesterol  Diabetes: type 2  No significant past surgical history      MEDICATIONS  (STANDING):  aspirin  chewable 81 milliGRAM(s) Oral daily  atorvastatin 80 milliGRAM(s) Oral at bedtime  benzocaine 15 mG/menthol 3.6 mG Lozenge 1 Lozenge Oral three times a day  cefTRIAXone   IVPB 2 Gram(s) IV Intermittent every 24 hours  dextrose 5%. 1000 milliLiter(s) (50 mL/Hr) IV Continuous <Continuous>  dextrose 50% Injectable 12.5 Gram(s) IV Push once  dextrose 50% Injectable 25 Gram(s) IV Push once  dextrose 50% Injectable 25 Gram(s) IV Push once  docusate sodium 100 milliGRAM(s) Oral daily  enoxaparin Injectable 40 milliGRAM(s) SubCutaneous daily  finasteride 5 milliGRAM(s) Oral daily  folic acid 1 milliGRAM(s) Oral daily  influenza   Vaccine 0.5 milliLiter(s) IntraMuscular once  insulin glargine Injectable (LANTUS) 12 Unit(s) SubCutaneous at bedtime  insulin lispro (HumaLOG) corrective regimen sliding scale   SubCutaneous three times a day before meals  insulin lispro (HumaLOG) corrective regimen sliding scale   SubCutaneous at bedtime  insulin lispro Injectable (HumaLOG) 3 Unit(s) SubCutaneous three times a day with meals  metoprolol     tartrate 12.5 milliGRAM(s) Oral two times a day  multivitamin 1 Tablet(s) Oral daily  pantoprazole    Tablet 40 milliGRAM(s) Oral before breakfast  sodium chloride 0.9% lock flush 20 milliLiter(s) IV Push once  tamsulosin 0.4 milliGRAM(s) Oral at bedtime  thiamine 100 milliGRAM(s) Oral daily  ticagrelor 90 milliGRAM(s) Oral every 12 hours    MEDICATIONS  (PRN):  dextrose Gel 1 Dose(s) Oral once PRN Blood Glucose LESS THAN 70 milliGRAM(s)/deciliter  glucagon  Injectable 1 milliGRAM(s) IntraMuscular once PRN Glucose LESS THAN 70 milligrams/deciliter  polyethylene glycol 3350 17 Gram(s) Oral daily PRN Constipation  sodium chloride 0.9% lock flush 10 milliLiter(s) IV Push every 1 hour PRN After each medication administration  sodium chloride 0.9% lock flush 10 milliLiter(s) IV Push every 12 hours PRN Lumen of catheter NOT used      Allergies    No Known Allergies    Intolerances        FAMILY HISTORY:  Family history of type 2 diabetes mellitus (Sibling)  Family history of lung cancer (Sibling)      *SOCIAL HISTORY: Patient states he smoked very infrequently when he was younger.    *Last Dental Visit: Patient cannot recall.     Vital Signs Last 24 Hrs  T(C): 36.4 (19 Jan 2018 11:43), Max: 36.7 (18 Jan 2018 20:49)  T(F): 97.5 (19 Jan 2018 11:43), Max: 98.1 (18 Jan 2018 20:49)  HR: 86 (19 Jan 2018 11:43) (78 - 86)  BP: 107/53 (19 Jan 2018 11:43) (101/51 - 107/53)  BP(mean): --  RR: 17 (19 Jan 2018 11:43) (17 - 18)  SpO2: 98% (19 Jan 2018 11:43) (95% - 98%)    EOE:  TMJ ( -  ) clicks                    (  -  ) pops                    (  -  ) crepitus             Mandible FROM             Facial bones and MOM grossly intact             ( -  ) trismus             ( -  ) LAD             ( -  ) swelling             ( -  ) asymmetry             ( -  ) palpation             ( -  ) SOB             ( -  ) dysphagia             (  - ) LOC    IOE:  Secondary dentition with multiple missing teeth, multiple dental restorations, broken restorations, gingivitis, bruxism           hard/soft palate:  ( - ) palatal torus           tongue/FOM WNL           labial/buccal mucosa WNL           ( - ) percussion           ( -  ) palpation           ( -  ) swelling       Radiographs: 1 Panoramic radiograph taken    LABS:                        9.6    13.03 )-----------( 350      ( 19 Jan 2018 07:33 )             29.7     01-19    139  |  105  |  20  ----------------------------<  86  4.2   |  25  |  1.77<H>    Ca    8.0<L>      19 Jan 2018 07:25      WBC Count: 13.03 K/uL <H> [3.80 - 10.50] (01-19 @ 07:33)    Platelet Count - Automated: 350 K/uL [150 - 400] (01-19 @ 07:33)  Platelet Count - Automated: 314 K/uL [150 - 400] (01-18 @ 07:26)    RADIOLOGY & ADDITIONAL STUDIES: 1 Panoramic x-ray    ASSESSMENT: Generalized horizontal bone loss, periapical radiolucencies on teeth #17,18, perio-endodontic radiolucency #5. All teeth are asymptomatic to percussion/palpation. Patient has multiple missing teeth, multiple dental restorations, broken restorations.   Patient is negative for LAD, no change in midline of uvula, floor of mouth is wnl, no swelling intraoral/extraoral.   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 Patient did not have any knowledge of this patch, and does not know how long it has been present for.   Recommend patient has biopsy of this lesion once he is discharged from the hospital.   No active or symptomatic dental infection unlikely etiology.        RECOMMENDATIONS:   1) Recommend patient has biopsy of the lesion on the right dorsal lateral portion of his tongue once he is discharged from the hospital.   2) Dental F/U with outpatient dentist for comprehensive dental care.   3) If any difficulty swallowing/breathing, fever occur, page dental.     Resident Name, pager #[phone]

## 2018-01-19 NOTE — PROGRESS NOTE ADULT - ASSESSMENT
72 year-old male with past medical history of T2DM, DLD, alcohol misuse brought in by family after having been found face down on the floor for approximately 2 days.  No fevers. Patient with history of diagnosis of enlarged prostate and urinary frequency. Also complains of dysuria, and bladder pain. Initially had high WBC, now resolving. On treatment for UTI, but now JANES with Aortic Valve lesions. Suspected culture negative endocarditis. Appears well, but has ELIAN, improving. Overall appears stable. Aortic valve vegetation, AMS, leukocytosis, UTI.  - Ceftriaxone 2g q 24  - Vancomycin 1g q 24 (Trough before 3rd dose)  - F/U pending Brucella sero  - Appreciate CTS--chronic, calcified vegetation, plan for re-eval after treatment  - Please send Dental Eval for ? dental lesion leading to endocarditis  - Plan for 6 week course abx  - Over weekend, please call [phone] with questions or change in status    Baldev Small MD  Pager [phone]  After 5pm and on weekends call [phone]

## 2018-01-19 NOTE — PROGRESS NOTE ADULT - ATTENDING COMMENTS
Likely dc early next week after ELIAN resolves and with stable vanco regimen for endocarditis.     Sara Walker MD  Division of Hospital Medicine  Pager: [phone]  Office: [phone]

## 2018-01-20 DIAGNOSIS — R14.1 GAS PAIN: ICD-10-CM

## 2018-01-20 LAB
ANION GAP SERPL CALC-SCNC: 12 MMOL/L — SIGNIFICANT CHANGE UP (ref 5–17)
BUN SERPL-MCNC: 21 MG/DL — SIGNIFICANT CHANGE UP (ref 7–23)
CALCIUM SERPL-MCNC: 8 MG/DL — LOW (ref 8.4–10.5)
CHLORIDE SERPL-SCNC: 106 MMOL/L — SIGNIFICANT CHANGE UP (ref 96–108)
CK MB BLD-MCNC: 7.4 % — HIGH (ref 0–3.5)
CK MB CFR SERPL CALC: 1.7 NG/ML — SIGNIFICANT CHANGE UP (ref 0–6.7)
CK MB CFR SERPL CALC: 2.4 NG/ML — SIGNIFICANT CHANGE UP (ref 0–6.7)
CK SERPL-CCNC: 23 U/L — LOW (ref 30–200)
CK SERPL-CCNC: 26 U/L — LOW (ref 30–200)
CO2 SERPL-SCNC: 22 MMOL/L — SIGNIFICANT CHANGE UP (ref 22–31)
CREAT SERPL-MCNC: 1.71 MG/DL — HIGH (ref 0.5–1.3)
GLUCOSE BLDC GLUCOMTR-MCNC: 76 MG/DL — SIGNIFICANT CHANGE UP (ref 70–99)
GLUCOSE BLDC GLUCOMTR-MCNC: 79 MG/DL — SIGNIFICANT CHANGE UP (ref 70–99)
GLUCOSE BLDC GLUCOMTR-MCNC: 93 MG/DL — SIGNIFICANT CHANGE UP (ref 70–99)
GLUCOSE BLDC GLUCOMTR-MCNC: 94 MG/DL — SIGNIFICANT CHANGE UP (ref 70–99)
GLUCOSE SERPL-MCNC: 78 MG/DL — SIGNIFICANT CHANGE UP (ref 70–99)
HCT VFR BLD CALC: 28.1 % — LOW (ref 39–50)
HGB BLD-MCNC: 8.9 G/DL — LOW (ref 13–17)
MCHC RBC-ENTMCNC: 30 PG — SIGNIFICANT CHANGE UP (ref 27–34)
MCHC RBC-ENTMCNC: 31.7 GM/DL — LOW (ref 32–36)
MCV RBC AUTO: 94.6 FL — SIGNIFICANT CHANGE UP (ref 80–100)
PLATELET # BLD AUTO: 356 K/UL — SIGNIFICANT CHANGE UP (ref 150–400)
POTASSIUM SERPL-MCNC: 4.2 MMOL/L — SIGNIFICANT CHANGE UP (ref 3.5–5.3)
POTASSIUM SERPL-SCNC: 4.2 MMOL/L — SIGNIFICANT CHANGE UP (ref 3.5–5.3)
RBC # BLD: 2.97 M/UL — LOW (ref 4.2–5.8)
RBC # FLD: 14.6 % — HIGH (ref 10.3–14.5)
SODIUM SERPL-SCNC: 140 MMOL/L — SIGNIFICANT CHANGE UP (ref 135–145)
TROPONIN T SERPL-MCNC: 0.28 NG/ML — HIGH (ref 0–0.06)
TROPONIN T SERPL-MCNC: 0.32 NG/ML — HIGH (ref 0–0.06)
WBC # BLD: 11.03 K/UL — HIGH (ref 3.8–10.5)
WBC # FLD AUTO: 11.03 K/UL — HIGH (ref 3.8–10.5)

## 2018-01-20 PROCEDURE — 93010 ELECTROCARDIOGRAM REPORT: CPT

## 2018-01-20 PROCEDURE — 99233 SBSQ HOSP IP/OBS HIGH 50: CPT

## 2018-01-20 RX ORDER — ACETAMINOPHEN 500 MG
650 TABLET ORAL EVERY 6 HOURS
Qty: 0 | Refills: 0 | Status: DISCONTINUED | OUTPATIENT
Start: 2018-01-20 | End: 2018-01-29

## 2018-01-20 RX ORDER — SIMETHICONE 80 MG/1
80 TABLET, CHEWABLE ORAL EVERY 8 HOURS
Qty: 0 | Refills: 0 | Status: DISCONTINUED | OUTPATIENT
Start: 2018-01-20 | End: 2018-01-27

## 2018-01-20 RX ORDER — ACETAMINOPHEN 500 MG
650 TABLET ORAL ONCE
Qty: 0 | Refills: 0 | Status: COMPLETED | OUTPATIENT
Start: 2018-01-20 | End: 2018-01-20

## 2018-01-20 RX ADMIN — Medication 3 UNIT(S): at 09:14

## 2018-01-20 RX ADMIN — SIMETHICONE 80 MILLIGRAM(S): 80 TABLET, CHEWABLE ORAL at 22:05

## 2018-01-20 RX ADMIN — Medication 3 UNIT(S): at 13:36

## 2018-01-20 RX ADMIN — INSULIN GLARGINE 12 UNIT(S): 100 INJECTION, SOLUTION SUBCUTANEOUS at 22:05

## 2018-01-20 RX ADMIN — ENOXAPARIN SODIUM 40 MILLIGRAM(S): 100 INJECTION SUBCUTANEOUS at 11:33

## 2018-01-20 RX ADMIN — Medication 12.5 MILLIGRAM(S): at 05:52

## 2018-01-20 RX ADMIN — Medication 100 MILLIGRAM(S): at 11:33

## 2018-01-20 RX ADMIN — ATORVASTATIN CALCIUM 80 MILLIGRAM(S): 80 TABLET, FILM COATED ORAL at 22:04

## 2018-01-20 RX ADMIN — TICAGRELOR 90 MILLIGRAM(S): 90 TABLET ORAL at 22:04

## 2018-01-20 RX ADMIN — Medication 1 TABLET(S): at 11:33

## 2018-01-20 RX ADMIN — Medication 3 UNIT(S): at 17:47

## 2018-01-20 RX ADMIN — BENZOCAINE AND MENTHOL 1 LOZENGE: 5; 1 LIQUID ORAL at 13:54

## 2018-01-20 RX ADMIN — BENZOCAINE AND MENTHOL 1 LOZENGE: 5; 1 LIQUID ORAL at 22:04

## 2018-01-20 RX ADMIN — BENZOCAINE AND MENTHOL 1 LOZENGE: 5; 1 LIQUID ORAL at 05:52

## 2018-01-20 RX ADMIN — Medication 650 MILLIGRAM(S): at 01:13

## 2018-01-20 RX ADMIN — Medication 650 MILLIGRAM(S): at 01:57

## 2018-01-20 RX ADMIN — PANTOPRAZOLE SODIUM 40 MILLIGRAM(S): 20 TABLET, DELAYED RELEASE ORAL at 05:52

## 2018-01-20 RX ADMIN — Medication 81 MILLIGRAM(S): at 11:33

## 2018-01-20 RX ADMIN — Medication 250 MILLIGRAM(S): at 09:18

## 2018-01-20 RX ADMIN — Medication 12.5 MILLIGRAM(S): at 17:47

## 2018-01-20 RX ADMIN — FINASTERIDE 5 MILLIGRAM(S): 5 TABLET, FILM COATED ORAL at 11:33

## 2018-01-20 RX ADMIN — TICAGRELOR 90 MILLIGRAM(S): 90 TABLET ORAL at 09:15

## 2018-01-20 RX ADMIN — CEFTRIAXONE 100 GRAM(S): 500 INJECTION, POWDER, FOR SOLUTION INTRAMUSCULAR; INTRAVENOUS at 14:41

## 2018-01-20 RX ADMIN — TAMSULOSIN HYDROCHLORIDE 0.4 MILLIGRAM(S): 0.4 CAPSULE ORAL at 22:04

## 2018-01-20 RX ADMIN — Medication 1 MILLIGRAM(S): at 11:33

## 2018-01-20 NOTE — PROGRESS NOTE ADULT - SUBJECTIVE AND OBJECTIVE BOX
Patient is a 72y old  Male who presents with a chief complaint of Fall (09 Jan 2018 00:52)        SUBJECTIVE / OVERNIGHT EVENTS: Patient reports a little gas pains. He reports some chest and abdominal discomfort due to coughing. He also is having some sore throat, but he says that the cough drops are helping.       MEDICATIONS  (STANDING):  aspirin  chewable 81 milliGRAM(s) Oral daily  atorvastatin 80 milliGRAM(s) Oral at bedtime  benzocaine 15 mG/menthol 3.6 mG Lozenge 1 Lozenge Oral three times a day  cefTRIAXone   IVPB 2 Gram(s) IV Intermittent every 24 hours  dextrose 5%. 1000 milliLiter(s) (50 mL/Hr) IV Continuous <Continuous>  dextrose 50% Injectable 12.5 Gram(s) IV Push once  dextrose 50% Injectable 25 Gram(s) IV Push once  dextrose 50% Injectable 25 Gram(s) IV Push once  docusate sodium 100 milliGRAM(s) Oral daily  enoxaparin Injectable 40 milliGRAM(s) SubCutaneous daily  finasteride 5 milliGRAM(s) Oral daily  folic acid 1 milliGRAM(s) Oral daily  influenza   Vaccine 0.5 milliLiter(s) IntraMuscular once  insulin glargine Injectable (LANTUS) 12 Unit(s) SubCutaneous at bedtime  insulin lispro (HumaLOG) corrective regimen sliding scale   SubCutaneous three times a day before meals  insulin lispro (HumaLOG) corrective regimen sliding scale   SubCutaneous at bedtime  insulin lispro Injectable (HumaLOG) 3 Unit(s) SubCutaneous three times a day with meals  metoprolol     tartrate 12.5 milliGRAM(s) Oral two times a day  multivitamin 1 Tablet(s) Oral daily  pantoprazole    Tablet 40 milliGRAM(s) Oral before breakfast  simethicone 80 milliGRAM(s) Chew every 8 hours  sodium chloride 0.9% lock flush 20 milliLiter(s) IV Push once  tamsulosin 0.4 milliGRAM(s) Oral at bedtime  thiamine 100 milliGRAM(s) Oral daily  ticagrelor 90 milliGRAM(s) Oral every 12 hours  vancomycin  IVPB 1000 milliGRAM(s) IV Intermittent every 24 hours    MEDICATIONS  (PRN):  acetaminophen   Tablet. 650 milliGRAM(s) Oral every 6 hours PRN Moderate Pain (4 - 6)  dextrose Gel 1 Dose(s) Oral once PRN Blood Glucose LESS THAN 70 milliGRAM(s)/deciliter  glucagon  Injectable 1 milliGRAM(s) IntraMuscular once PRN Glucose LESS THAN 70 milligrams/deciliter  polyethylene glycol 3350 17 Gram(s) Oral daily PRN Constipation  sodium chloride 0.9% lock flush 10 milliLiter(s) IV Push every 1 hour PRN After each medication administration  sodium chloride 0.9% lock flush 10 milliLiter(s) IV Push every 12 hours PRN Lumen of catheter NOT used      Vital Signs Last 24 Hrs  T(C): 36.6 (20 Jan 2018 12:04), Max: 36.7 (19 Jan 2018 21:27)  T(F): 97.9 (20 Jan 2018 12:04), Max: 98 (19 Jan 2018 21:27)  HR: 94 (20 Jan 2018 17:31) (80 - 94)  BP: 100/60 (20 Jan 2018 17:31) (90/30 - 104/51)  BP(mean): --  RR: 18 (20 Jan 2018 12:04) (16 - 18)  SpO2: 96% (20 Jan 2018 12:04) (95% - 97%)  CAPILLARY BLOOD GLUCOSE      POCT Blood Glucose.: 79 mg/dL (20 Jan 2018 16:48)  POCT Blood Glucose.: 93 mg/dL (20 Jan 2018 12:06)  POCT Blood Glucose.: 76 mg/dL (20 Jan 2018 07:52)  POCT Blood Glucose.: 93 mg/dL (19 Jan 2018 21:20)    I&O's Summary    19 Jan 2018 07:01  -  20 Jan 2018 07:00  --------------------------------------------------------  IN: 1460 mL / OUT: 900 mL / NET: 560 mL    20 Jan 2018 07:01  -  20 Jan 2018 20:26  --------------------------------------------------------  IN: 780 mL / OUT: 550 mL / NET: 230 mL          PHYSICAL EXAM:  GENERAL: NAD, well-developed  HEAD:  Atraumatic, Normocephalic  EYES: EOMI, PERRLA, conjunctiva and sclera clear  NECK: Supple, No JVD  CHEST/LUNG: Clear to auscultation bilaterally; No wheeze  HEART: Regular rate and rhythm; No murmurs, rubs, or gallops  ABDOMEN: Soft, overweight, mild discomfort, Nondistended; Bowel sounds present  EXTREMITIES: No clubbing, cyanosis, or edema  PSYCH/Neuro: AAOx3. Non-focal.   SKIN: No rashes or lesions      LABS:                        8.9    11.03 )-----------( 356      ( 20 Jan 2018 07:00 )             28.1     01-20    140  |  106  |  21  ----------------------------<  78  4.2   |  22  |  1.71<H>    Ca    8.0<L>      20 Jan 2018 08:52        CARDIAC MARKERS ( 20 Jan 2018 10:11 )  x     / 0.28 ng/mL / 26 U/L / x     / 2.4 ng/mL  CARDIAC MARKERS ( 20 Jan 2018 02:16 )  x     / 0.32 ng/mL / 23 U/L / x     / 1.7 ng/mL          RADIOLOGY & ADDITIONAL TESTS:    Imaging Personally Reviewed:  Consultant(s) Notes Reviewed:    Care Discussed with Consultants/Other Providers:

## 2018-01-20 NOTE — CHART NOTE - NSCHARTNOTEFT_GEN_A_CORE
NP Note (episodic)     Called by RN because pt c/o CP. PT seen and evaluated at bedside. PT states that he is unsure if it's pain or discomfort.  Denies  SOB, dizziness, abdominal  pain, N/V/D, numbness/tingling, extremity weakness, dysuria. States pain is R side of chest 2/10 non radiating discomfort.     ROS: see HPI     Physical Exam:    Vital Signs Last 24 Hrs  T(C): 36.7 (20 Jan 2018 01:00), Max: 36.7 (19 Jan 2018 21:27)  T(F): 98 (20 Jan 2018 01:00), Max: 98 (19 Jan 2018 21:27)  HR: 94 (20 Jan 2018 01:09) (78 - 94)  BP: 90/30 (20 Jan 2018 01:09) (90/30 - 107/53)  BP(mean): --  RR: 16 (20 Jan 2018 01:00) (16 - 18)  SpO2: 95% (20 Jan 2018 01:00) (95% - 98%)    General:  NAD, AOx3, nontoxic appearing  Head:  NC/AT, no scleral injection, no JVD   CV: RRR, S1S2, Pain is in R chest wall mid clavicular, reproducible on palpation.   Respiratory: CTA B/L, nonlabored  Abdominal: Soft, NT, ND no palpable mass, no guarding or rebound tenderness  MSK: No BLLE edema, + peripheral pulses, FROM all 4 extremity      Labs:                          9.6    13.03 )-----------( 350      ( 19 Jan 2018 07:33 )             29.7     01-19    139  |  105  |  20  ----------------------------<  86  4.2   |  25  |  1.77<H>    Ca    8.0<L>      19 Jan 2018 07:25            Assessment & Plan:  This is a 72 yr old male with ETOH abuse who initially p/w fall, found to have culture negative aortic valve vegetation/endocarditis, hospital course complicated by ELIAN s/p cardiac catheterization p/w R sided CP that is reproducible with palpation likely pleuritic, lower suspicion for ACS.    - tylenol for pain   -EKG     Follow up with Primary Team in AM. NP Note (episodic)     Called by RN because pt c/o CP. PT seen and evaluated at bedside. PT states that he is unsure if it's pain or discomfort.  Denies  SOB, dizziness, abdominal  pain, N/V/D, numbness/tingling, extremity weakness, dysuria. States pain is R side of chest 2/10 non radiating discomfort.     ROS: see HPI     Physical Exam:    Vital Signs Last 24 Hrs  T(C): 36.7 (20 Jan 2018 01:00), Max: 36.7 (19 Jan 2018 21:27)  T(F): 98 (20 Jan 2018 01:00), Max: 98 (19 Jan 2018 21:27)  HR: 94 (20 Jan 2018 01:09) (78 - 94)  BP: 90/30 (20 Jan 2018 01:09) (90/30 - 107/53)  BP(mean): --  RR: 16 (20 Jan 2018 01:00) (16 - 18)  SpO2: 95% (20 Jan 2018 01:00) (95% - 98%)    General:  NAD, AOx3, nontoxic appearing  Head:  NC/AT, no scleral injection, no JVD   CV: RRR, S1S2, Pain is in R chest wall mid clavicular, reproducible on palpation.   Respiratory: CTA B/L, nonlabored  Abdominal: Soft, NT, ND no palpable mass, no guarding or rebound tenderness  MSK: No BLLE edema, + peripheral pulses, FROM all 4 extremity      Labs:                          9.6    13.03 )-----------( 350      ( 19 Jan 2018 07:33 )             29.7     01-19    139  |  105  |  20  ----------------------------<  86  4.2   |  25  |  1.77<H>    Ca    8.0<L>      19 Jan 2018 07:25            Assessment & Plan:  This is a 72 yr old male with ETOH abuse who initially p/w fall, found to have culture negative aortic valve vegetation/endocarditis, hospital course complicated by ELIAN s/p cardiac catheterization p/w R sided CP that is reproducible with palpation likely pleuritic, lower suspicion for ACS however given pt's cardiac hx will f/u with CE x 2.     - tylenol for pain   -EKG 82 bpm, no acute changes from prior EKG   -f/u CE x 2      Follow up with Primary Team in AM.    Zainab Shelley NP [id number]

## 2018-01-20 NOTE — PROGRESS NOTE ADULT - PROBLEM SELECTOR PLAN 1
-C/w vancomycin by level and ceftriaxone for 6 week course.   -ID recs appreciated.   -S/p PICC for long-term abx.  -Dental recs appreciated; recommend biopsy of tongue lesion on discharge and outpatient dental f/u.

## 2018-01-20 NOTE — PROGRESS NOTE ADULT - PROBLEM SELECTOR PLAN 3
-LHC showed 70% LAD stenosis, 60% RCA  -C/w ASA 81mg daily, lipitor 80mg QHS, brillinta 90mg BID, and lopressor 12.5mg BID.   -No ACEi in view of ELIAN.  -F/u as outpatient with cardiology  -No plan for CABG as per cardiology.

## 2018-01-20 NOTE — PROGRESS NOTE ADULT - ASSESSMENT
72 year old male with PMH of ETOH abuse and DM-2; presented with fall, found to have culture negative aortic valve vegetation/endocarditis, hospital course complicated by ELIAN secondary to cardiac catheterization for NSTEMI.

## 2018-01-21 LAB
ANION GAP SERPL CALC-SCNC: 13 MMOL/L — SIGNIFICANT CHANGE UP (ref 5–17)
BUN SERPL-MCNC: 24 MG/DL — HIGH (ref 7–23)
CALCIUM SERPL-MCNC: 8.3 MG/DL — LOW (ref 8.4–10.5)
CHLORIDE SERPL-SCNC: 104 MMOL/L — SIGNIFICANT CHANGE UP (ref 96–108)
CO2 SERPL-SCNC: 22 MMOL/L — SIGNIFICANT CHANGE UP (ref 22–31)
CREAT SERPL-MCNC: 1.72 MG/DL — HIGH (ref 0.5–1.3)
GLUCOSE BLDC GLUCOMTR-MCNC: 114 MG/DL — HIGH (ref 70–99)
GLUCOSE BLDC GLUCOMTR-MCNC: 159 MG/DL — HIGH (ref 70–99)
GLUCOSE BLDC GLUCOMTR-MCNC: 76 MG/DL — SIGNIFICANT CHANGE UP (ref 70–99)
GLUCOSE BLDC GLUCOMTR-MCNC: 82 MG/DL — SIGNIFICANT CHANGE UP (ref 70–99)
GLUCOSE SERPL-MCNC: 69 MG/DL — LOW (ref 70–99)
HCT VFR BLD CALC: 25.6 % — LOW (ref 39–50)
HGB BLD-MCNC: 8.2 G/DL — LOW (ref 13–17)
MCHC RBC-ENTMCNC: 30.7 PG — SIGNIFICANT CHANGE UP (ref 27–34)
MCHC RBC-ENTMCNC: 32 GM/DL — SIGNIFICANT CHANGE UP (ref 32–36)
MCV RBC AUTO: 95.9 FL — SIGNIFICANT CHANGE UP (ref 80–100)
PLATELET # BLD AUTO: 374 K/UL — SIGNIFICANT CHANGE UP (ref 150–400)
POTASSIUM SERPL-MCNC: 3.7 MMOL/L — SIGNIFICANT CHANGE UP (ref 3.5–5.3)
POTASSIUM SERPL-SCNC: 3.7 MMOL/L — SIGNIFICANT CHANGE UP (ref 3.5–5.3)
RBC # BLD: 2.67 M/UL — LOW (ref 4.2–5.8)
RBC # FLD: 14.6 % — HIGH (ref 10.3–14.5)
SODIUM SERPL-SCNC: 139 MMOL/L — SIGNIFICANT CHANGE UP (ref 135–145)
WBC # BLD: 11.17 K/UL — HIGH (ref 3.8–10.5)
WBC # FLD AUTO: 11.17 K/UL — HIGH (ref 3.8–10.5)

## 2018-01-21 PROCEDURE — 99233 SBSQ HOSP IP/OBS HIGH 50: CPT

## 2018-01-21 RX ADMIN — ATORVASTATIN CALCIUM 80 MILLIGRAM(S): 80 TABLET, FILM COATED ORAL at 21:38

## 2018-01-21 RX ADMIN — Medication 3 UNIT(S): at 08:26

## 2018-01-21 RX ADMIN — SIMETHICONE 80 MILLIGRAM(S): 80 TABLET, CHEWABLE ORAL at 05:12

## 2018-01-21 RX ADMIN — ENOXAPARIN SODIUM 40 MILLIGRAM(S): 100 INJECTION SUBCUTANEOUS at 11:42

## 2018-01-21 RX ADMIN — PANTOPRAZOLE SODIUM 40 MILLIGRAM(S): 20 TABLET, DELAYED RELEASE ORAL at 05:13

## 2018-01-21 RX ADMIN — Medication 81 MILLIGRAM(S): at 11:43

## 2018-01-21 RX ADMIN — BENZOCAINE AND MENTHOL 1 LOZENGE: 5; 1 LIQUID ORAL at 13:52

## 2018-01-21 RX ADMIN — BENZOCAINE AND MENTHOL 1 LOZENGE: 5; 1 LIQUID ORAL at 05:12

## 2018-01-21 RX ADMIN — Medication 100 MILLIGRAM(S): at 11:43

## 2018-01-21 RX ADMIN — TICAGRELOR 90 MILLIGRAM(S): 90 TABLET ORAL at 10:54

## 2018-01-21 RX ADMIN — Medication 12.5 MILLIGRAM(S): at 06:06

## 2018-01-21 RX ADMIN — FINASTERIDE 5 MILLIGRAM(S): 5 TABLET, FILM COATED ORAL at 11:44

## 2018-01-21 RX ADMIN — Medication 1 TABLET(S): at 11:44

## 2018-01-21 RX ADMIN — Medication 3 UNIT(S): at 17:07

## 2018-01-21 RX ADMIN — Medication 250 MILLIGRAM(S): at 10:44

## 2018-01-21 RX ADMIN — TICAGRELOR 90 MILLIGRAM(S): 90 TABLET ORAL at 21:38

## 2018-01-21 RX ADMIN — Medication 1 MILLIGRAM(S): at 11:43

## 2018-01-21 RX ADMIN — Medication 1: at 12:38

## 2018-01-21 RX ADMIN — BENZOCAINE AND MENTHOL 1 LOZENGE: 5; 1 LIQUID ORAL at 21:38

## 2018-01-21 RX ADMIN — SIMETHICONE 80 MILLIGRAM(S): 80 TABLET, CHEWABLE ORAL at 21:38

## 2018-01-21 RX ADMIN — TAMSULOSIN HYDROCHLORIDE 0.4 MILLIGRAM(S): 0.4 CAPSULE ORAL at 21:39

## 2018-01-21 RX ADMIN — CEFTRIAXONE 100 GRAM(S): 500 INJECTION, POWDER, FOR SOLUTION INTRAMUSCULAR; INTRAVENOUS at 13:52

## 2018-01-21 RX ADMIN — SIMETHICONE 80 MILLIGRAM(S): 80 TABLET, CHEWABLE ORAL at 13:52

## 2018-01-21 RX ADMIN — Medication 100 MILLIGRAM(S): at 11:42

## 2018-01-21 RX ADMIN — Medication 12.5 MILLIGRAM(S): at 17:06

## 2018-01-21 RX ADMIN — Medication 3 UNIT(S): at 12:39

## 2018-01-21 RX ADMIN — INSULIN GLARGINE 12 UNIT(S): 100 INJECTION, SOLUTION SUBCUTANEOUS at 21:39

## 2018-01-21 NOTE — PROGRESS NOTE ADULT - SUBJECTIVE AND OBJECTIVE BOX
Patient is a 72y old  Male who presents with a chief complaint of Fall (09 Jan 2018 00:52)        SUBJECTIVE / OVERNIGHT EVENTS: Patient says his gas pains are a little better today. He says that his sore throat is getting better. He denies any CP.       MEDICATIONS  (STANDING):  aspirin  chewable 81 milliGRAM(s) Oral daily  atorvastatin 80 milliGRAM(s) Oral at bedtime  benzocaine 15 mG/menthol 3.6 mG Lozenge 1 Lozenge Oral three times a day  cefTRIAXone   IVPB 2 Gram(s) IV Intermittent every 24 hours  dextrose 5%. 1000 milliLiter(s) (50 mL/Hr) IV Continuous <Continuous>  dextrose 50% Injectable 12.5 Gram(s) IV Push once  dextrose 50% Injectable 25 Gram(s) IV Push once  dextrose 50% Injectable 25 Gram(s) IV Push once  docusate sodium 100 milliGRAM(s) Oral daily  enoxaparin Injectable 40 milliGRAM(s) SubCutaneous daily  finasteride 5 milliGRAM(s) Oral daily  folic acid 1 milliGRAM(s) Oral daily  influenza   Vaccine 0.5 milliLiter(s) IntraMuscular once  insulin glargine Injectable (LANTUS) 12 Unit(s) SubCutaneous at bedtime  insulin lispro (HumaLOG) corrective regimen sliding scale   SubCutaneous three times a day before meals  insulin lispro (HumaLOG) corrective regimen sliding scale   SubCutaneous at bedtime  insulin lispro Injectable (HumaLOG) 3 Unit(s) SubCutaneous three times a day with meals  metoprolol     tartrate 12.5 milliGRAM(s) Oral two times a day  multivitamin 1 Tablet(s) Oral daily  pantoprazole    Tablet 40 milliGRAM(s) Oral before breakfast  simethicone 80 milliGRAM(s) Chew every 8 hours  sodium chloride 0.9% lock flush 20 milliLiter(s) IV Push once  tamsulosin 0.4 milliGRAM(s) Oral at bedtime  thiamine 100 milliGRAM(s) Oral daily  ticagrelor 90 milliGRAM(s) Oral every 12 hours  vancomycin  IVPB 1000 milliGRAM(s) IV Intermittent every 24 hours    MEDICATIONS  (PRN):  acetaminophen   Tablet. 650 milliGRAM(s) Oral every 6 hours PRN Moderate Pain (4 - 6)  dextrose Gel 1 Dose(s) Oral once PRN Blood Glucose LESS THAN 70 milliGRAM(s)/deciliter  glucagon  Injectable 1 milliGRAM(s) IntraMuscular once PRN Glucose LESS THAN 70 milligrams/deciliter  polyethylene glycol 3350 17 Gram(s) Oral daily PRN Constipation  sodium chloride 0.9% lock flush 10 milliLiter(s) IV Push every 1 hour PRN After each medication administration  sodium chloride 0.9% lock flush 10 milliLiter(s) IV Push every 12 hours PRN Lumen of catheter NOT used      Vital Signs Last 24 Hrs  T(C): 36.7 (22 Jan 2018 04:46), Max: 36.7 (22 Jan 2018 04:46)  T(F): 98.1 (22 Jan 2018 04:46), Max: 98.1 (22 Jan 2018 04:46)  HR: 103 (22 Jan 2018 04:46) (83 - 103)  BP: 110/49 (22 Jan 2018 04:46) (94/48 - 110/49)  BP(mean): --  RR: 18 (22 Jan 2018 04:46) (18 - 18)  SpO2: 96% (22 Jan 2018 04:46) (96% - 98%)  CAPILLARY BLOOD GLUCOSE      POCT Blood Glucose.: 82 mg/dL (21 Jan 2018 21:06)  POCT Blood Glucose.: 114 mg/dL (21 Jan 2018 16:40)  POCT Blood Glucose.: 159 mg/dL (21 Jan 2018 11:46)  POCT Blood Glucose.: 76 mg/dL (21 Jan 2018 08:10)    I&O's Summary    20 Jan 2018 07:01  -  21 Jan 2018 07:00  --------------------------------------------------------  IN: 1000 mL / OUT: 1825 mL / NET: -825 mL    21 Jan 2018 07:01  -  22 Jan 2018 05:09  --------------------------------------------------------  IN: 960 mL / OUT: 850 mL / NET: 110 mL          PHYSICAL EXAM  GENERAL: NAD, well-developed  HEAD:  Atraumatic, Normocephalic  EYES: EOMI, PERRLA, conjunctiva and sclera clear  NECK: Supple, No JVD  CHEST/LUNG: Clear to auscultation bilaterally; No wheeze  HEART: Regular rate and rhythm; No murmurs, rubs, or gallops  ABDOMEN: Soft, overweight, Nontender, Nondistended; Bowel sounds present  EXTREMITIES: Trace edema in LE's.   PSYCH/Neuro: AAOx3. Non-focal.   SKIN: No rashes or lesions      LABS:                        8.2    11.17 )-----------( 374      ( 21 Jan 2018 08:48 )             25.6     01-21    139  |  104  |  24<H>  ----------------------------<  69<L>  3.7   |  22  |  1.72<H>    Ca    8.3<L>      21 Jan 2018 08:36        CARDIAC MARKERS ( 20 Jan 2018 10:11 )  x     / 0.28 ng/mL / 26 U/L / x     / 2.4 ng/mL      Telemetry reviewed shows Sinus     RADIOLOGY & ADDITIONAL TESTS:    Imaging Personally Reviewed:  Consultant(s) Notes Reviewed:    Care Discussed with Consultants/Other Providers:

## 2018-01-21 NOTE — PROGRESS NOTE ADULT - ATTENDING COMMENTS
Dallin Rivers MD  Division of [facility] Medicine  Cell: [phone]  Pager: [phone]  Office: [phone] Dallin Rivers MD  Division of Hospital Medicine  Cell: [phone]  Pager: [phone]  Office: [phone]    -Patient seen and examined on 1/21/18.

## 2018-01-22 DIAGNOSIS — N17.0 ACUTE KIDNEY FAILURE WITH TUBULAR NECROSIS: ICD-10-CM

## 2018-01-22 DIAGNOSIS — I33.0 ACUTE AND SUBACUTE INFECTIVE ENDOCARDITIS: ICD-10-CM

## 2018-01-22 LAB
ANION GAP SERPL CALC-SCNC: 13 MMOL/L — SIGNIFICANT CHANGE UP (ref 5–17)
BUN SERPL-MCNC: 25 MG/DL — HIGH (ref 7–23)
CALCIUM SERPL-MCNC: 8.2 MG/DL — LOW (ref 8.4–10.5)
CHLORIDE SERPL-SCNC: 103 MMOL/L — SIGNIFICANT CHANGE UP (ref 96–108)
CO2 SERPL-SCNC: 23 MMOL/L — SIGNIFICANT CHANGE UP (ref 22–31)
CREAT SERPL-MCNC: 1.74 MG/DL — HIGH (ref 0.5–1.3)
GLUCOSE BLDC GLUCOMTR-MCNC: 112 MG/DL — HIGH (ref 70–99)
GLUCOSE BLDC GLUCOMTR-MCNC: 120 MG/DL — HIGH (ref 70–99)
GLUCOSE BLDC GLUCOMTR-MCNC: 133 MG/DL — HIGH (ref 70–99)
GLUCOSE BLDC GLUCOMTR-MCNC: 79 MG/DL — SIGNIFICANT CHANGE UP (ref 70–99)
GLUCOSE SERPL-MCNC: 114 MG/DL — HIGH (ref 70–99)
HCT VFR BLD CALC: 29.5 % — LOW (ref 39–50)
HGB BLD-MCNC: 9.5 G/DL — LOW (ref 13–17)
MCHC RBC-ENTMCNC: 30.6 PG — SIGNIFICANT CHANGE UP (ref 27–34)
MCHC RBC-ENTMCNC: 32.2 GM/DL — SIGNIFICANT CHANGE UP (ref 32–36)
MCV RBC AUTO: 95.2 FL — SIGNIFICANT CHANGE UP (ref 80–100)
PLATELET # BLD AUTO: 362 K/UL — SIGNIFICANT CHANGE UP (ref 150–400)
POTASSIUM SERPL-MCNC: 4.1 MMOL/L — SIGNIFICANT CHANGE UP (ref 3.5–5.3)
POTASSIUM SERPL-SCNC: 4.1 MMOL/L — SIGNIFICANT CHANGE UP (ref 3.5–5.3)
RBC # BLD: 3.1 M/UL — LOW (ref 4.2–5.8)
RBC # FLD: 14.4 % — SIGNIFICANT CHANGE UP (ref 10.3–14.5)
SODIUM SERPL-SCNC: 139 MMOL/L — SIGNIFICANT CHANGE UP (ref 135–145)
VANCOMYCIN FLD-MCNC: 17.1 UG/ML — SIGNIFICANT CHANGE UP
WBC # BLD: 11.22 K/UL — HIGH (ref 3.8–10.5)
WBC # FLD AUTO: 11.22 K/UL — HIGH (ref 3.8–10.5)

## 2018-01-22 PROCEDURE — 99233 SBSQ HOSP IP/OBS HIGH 50: CPT

## 2018-01-22 PROCEDURE — 99232 SBSQ HOSP IP/OBS MODERATE 35: CPT

## 2018-01-22 PROCEDURE — 99232 SBSQ HOSP IP/OBS MODERATE 35: CPT | Mod: GC

## 2018-01-22 RX ADMIN — Medication 3 UNIT(S): at 08:06

## 2018-01-22 RX ADMIN — ENOXAPARIN SODIUM 40 MILLIGRAM(S): 100 INJECTION SUBCUTANEOUS at 12:12

## 2018-01-22 RX ADMIN — INSULIN GLARGINE 12 UNIT(S): 100 INJECTION, SOLUTION SUBCUTANEOUS at 22:42

## 2018-01-22 RX ADMIN — PANTOPRAZOLE SODIUM 40 MILLIGRAM(S): 20 TABLET, DELAYED RELEASE ORAL at 05:20

## 2018-01-22 RX ADMIN — TICAGRELOR 90 MILLIGRAM(S): 90 TABLET ORAL at 22:42

## 2018-01-22 RX ADMIN — Medication 12.5 MILLIGRAM(S): at 05:20

## 2018-01-22 RX ADMIN — Medication 100 MILLIGRAM(S): at 12:14

## 2018-01-22 RX ADMIN — SIMETHICONE 80 MILLIGRAM(S): 80 TABLET, CHEWABLE ORAL at 22:42

## 2018-01-22 RX ADMIN — CEFTRIAXONE 100 GRAM(S): 500 INJECTION, POWDER, FOR SOLUTION INTRAMUSCULAR; INTRAVENOUS at 13:11

## 2018-01-22 RX ADMIN — Medication 100 MILLIGRAM(S): at 12:13

## 2018-01-22 RX ADMIN — BENZOCAINE AND MENTHOL 1 LOZENGE: 5; 1 LIQUID ORAL at 13:11

## 2018-01-22 RX ADMIN — BENZOCAINE AND MENTHOL 1 LOZENGE: 5; 1 LIQUID ORAL at 22:42

## 2018-01-22 RX ADMIN — Medication 3 UNIT(S): at 12:14

## 2018-01-22 RX ADMIN — ATORVASTATIN CALCIUM 80 MILLIGRAM(S): 80 TABLET, FILM COATED ORAL at 22:41

## 2018-01-22 RX ADMIN — SIMETHICONE 80 MILLIGRAM(S): 80 TABLET, CHEWABLE ORAL at 05:20

## 2018-01-22 RX ADMIN — FINASTERIDE 5 MILLIGRAM(S): 5 TABLET, FILM COATED ORAL at 12:14

## 2018-01-22 RX ADMIN — BENZOCAINE AND MENTHOL 1 LOZENGE: 5; 1 LIQUID ORAL at 05:20

## 2018-01-22 RX ADMIN — Medication 1 MILLIGRAM(S): at 12:13

## 2018-01-22 RX ADMIN — Medication 12.5 MILLIGRAM(S): at 17:13

## 2018-01-22 RX ADMIN — TICAGRELOR 90 MILLIGRAM(S): 90 TABLET ORAL at 08:49

## 2018-01-22 RX ADMIN — SIMETHICONE 80 MILLIGRAM(S): 80 TABLET, CHEWABLE ORAL at 13:11

## 2018-01-22 RX ADMIN — TAMSULOSIN HYDROCHLORIDE 0.4 MILLIGRAM(S): 0.4 CAPSULE ORAL at 22:42

## 2018-01-22 RX ADMIN — Medication 81 MILLIGRAM(S): at 12:13

## 2018-01-22 RX ADMIN — Medication 250 MILLIGRAM(S): at 09:27

## 2018-01-22 RX ADMIN — Medication 1 TABLET(S): at 12:13

## 2018-01-22 RX ADMIN — Medication 3 UNIT(S): at 17:13

## 2018-01-22 NOTE — PROGRESS NOTE ADULT - ASSESSMENT
72 year old man with PMH of ETOH abuse and DM-2; presented with fall, found to have culture negative aortic valve vegetation/endocarditis, hospital course complicated by ELIAN secondary to cardiac catheterization for NSTEMI.

## 2018-01-22 NOTE — PROGRESS NOTE ADULT - SUBJECTIVE AND OBJECTIVE BOX
CC: F/U culture neg endocarditis    Saw/spoke to patient. No fevers, no chills. Patient overall well. No new complaints.    Allergies  No Known Allergies    ANTIMICROBIALS:  cefTRIAXone   IVPB 2 every 24 hours  vancomycin  IVPB 1000 every 24 hours    PE:    Vital Signs Last 24 Hrs  T(C): 36.6 (22 Jan 2018 11:51), Max: 36.7 (22 Jan 2018 04:46)  T(F): 97.9 (22 Jan 2018 11:51), Max: 98.1 (22 Jan 2018 04:46)  HR: 90 (22 Jan 2018 11:51) (90 - 103)  BP: 102/53 (22 Jan 2018 11:51) (94/48 - 110/49)  BP(mean): --  RR: 18 (22 Jan 2018 11:51) (18 - 18)  SpO2: 100% (22 Jan 2018 11:51) (96% - 100%)    Gen: AOx3, NAD, non-toxic, pleasant  CV: S1+S2 normal, no murmurs, nontachycardic  Resp: Clear bilat, no resp distress, no crackles/wheezes  Abd: Soft, nontender, +BS  Ext: PICC in place    LABS:                        9.5    11.22 )-----------( 362      ( 22 Jan 2018 07:19 )             29.5     01-22    139  |  103  |  25<H>  ----------------------------<  114<H>  4.1   |  23  |  1.74<H>    Ca    8.2<L>      22 Jan 2018 09:26    MICROBIOLOGY:  Vancomycin Level, Random: 17.1 ug/mL (01-22-18 @ 06:46)    .Blood Blood-Venous  01-13-18   No growth at 5 days.     .Blood Blood-Peripheral  01-13-18   No growth at 5 days.      .Blood Blood-Venous  01-09-18   No growth at 5 days.     .Blood Blood-Venous  01-09-18   No growth at 5 days.      .Urine Clean Catch (Midstream)  01-09-18   No growth     RADIOLOGY:    No new available

## 2018-01-22 NOTE — PROGRESS NOTE ADULT - PROBLEM SELECTOR PLAN 2
Suspect secondary to cardiac cath, likely ADARSH.  Cr has plateaued, may take a bit of time to fully recover.    - Nephrology f/u appreciated, c/w monitoring  - Avoid nephrotoxins

## 2018-01-22 NOTE — PROGRESS NOTE ADULT - ASSESSMENT
72 year-old male with past medical history of T2DM, DLD, alcohol misuse brought in by family after having been found face down on the floor for approximately 2 days.  No fevers. Patient with history of diagnosis of enlarged prostate and urinary frequency. Also complains of dysuria, and bladder pain. Initially had high WBC, now resolving. On treatment for UTI, but now JANES with Aortic Valve lesions. Suspected culture negative endocarditis. Appears well, but has ELIAN, improving. Overall appears stable. Aortic valve vegetation, AMS, leukocytosis, UTI.  - Ceftriaxone 2g q 24  - Vancomycin 1g q 24 (Monitor troughs)  - F/U pending Brucella sero  - Appreciate CTS--chronic, calcified vegetation, plan for re-eval after treatment  - Appreciate dental eval  - Plan for 6 week course abx    Baldev Small MD  Pager [phone]  After 5pm and on weekends call [phone]

## 2018-01-22 NOTE — PROGRESS NOTE ADULT - PROBLEM SELECTOR PLAN 1
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  Currently stable on ceftriaxone and vancomycin.  Vanco trough therapeutic this AM.   -C/w vancomycin by level and ceftriaxone for 6 week course.   -ID recs appreciated, will f/u in AM re: specific dosing  -S/p PICC for long-term abx.  -Dental recs appreciated; recommend biopsy of tongue lesion on discharge and outpatient dental f/u, no further inpatient workup

## 2018-01-22 NOTE — PROGRESS NOTE ADULT - SUBJECTIVE AND OBJECTIVE BOX
Patient is a 72y old  Male who presents with a chief complaint of Fall (09 Jan 2018 00:52)        SUBJECTIVE / OVERNIGHT EVENTS: Patient seen and examined.  No acute events o/n.  Tele reviewed, NSR .  He feels well, no acute complaints.  Reports he has been ambulating with assistance and trying to keep as active as possible.  Denies chest pain and SOB.        MEDICATIONS  (STANDING):  aspirin  chewable 81 milliGRAM(s) Oral daily  atorvastatin 80 milliGRAM(s) Oral at bedtime  benzocaine 15 mG/menthol 3.6 mG Lozenge 1 Lozenge Oral three times a day  cefTRIAXone   IVPB 2 Gram(s) IV Intermittent every 24 hours  dextrose 5%. 1000 milliLiter(s) (50 mL/Hr) IV Continuous <Continuous>  dextrose 50% Injectable 12.5 Gram(s) IV Push once  dextrose 50% Injectable 25 Gram(s) IV Push once  dextrose 50% Injectable 25 Gram(s) IV Push once  docusate sodium 100 milliGRAM(s) Oral daily  enoxaparin Injectable 40 milliGRAM(s) SubCutaneous daily  finasteride 5 milliGRAM(s) Oral daily  folic acid 1 milliGRAM(s) Oral daily  influenza   Vaccine 0.5 milliLiter(s) IntraMuscular once  insulin glargine Injectable (LANTUS) 12 Unit(s) SubCutaneous at bedtime  insulin lispro (HumaLOG) corrective regimen sliding scale   SubCutaneous three times a day before meals  insulin lispro (HumaLOG) corrective regimen sliding scale   SubCutaneous at bedtime  insulin lispro Injectable (HumaLOG) 3 Unit(s) SubCutaneous three times a day with meals  metoprolol     tartrate 12.5 milliGRAM(s) Oral two times a day  multivitamin 1 Tablet(s) Oral daily  pantoprazole    Tablet 40 milliGRAM(s) Oral before breakfast  simethicone 80 milliGRAM(s) Chew every 8 hours  sodium chloride 0.9% lock flush 20 milliLiter(s) IV Push once  tamsulosin 0.4 milliGRAM(s) Oral at bedtime  thiamine 100 milliGRAM(s) Oral daily  ticagrelor 90 milliGRAM(s) Oral every 12 hours  vancomycin  IVPB 1000 milliGRAM(s) IV Intermittent every 24 hours    MEDICATIONS  (PRN):  acetaminophen   Tablet. 650 milliGRAM(s) Oral every 6 hours PRN Moderate Pain (4 - 6)  dextrose Gel 1 Dose(s) Oral once PRN Blood Glucose LESS THAN 70 milliGRAM(s)/deciliter  glucagon  Injectable 1 milliGRAM(s) IntraMuscular once PRN Glucose LESS THAN 70 milligrams/deciliter  polyethylene glycol 3350 17 Gram(s) Oral daily PRN Constipation  sodium chloride 0.9% lock flush 10 milliLiter(s) IV Push every 1 hour PRN After each medication administration  sodium chloride 0.9% lock flush 10 milliLiter(s) IV Push every 12 hours PRN Lumen of catheter NOT used      REVIEW OF SYSTEMS      General: No fevers, chills  	  Ophthalmologic: No change in vision    Respiratory and Thorax: No SOB or cough  	  Cardiovascular: No chest pain, palpitations, or LE edema    Gastrointestinal: No abdominal pain, nausea, vomiting, or diarrhea    Genitourinary: No dysuria or polyuria    	    Vital Signs Last 24 Hrs  T(C): 36.6 (22 Jan 2018 11:51), Max: 36.7 (22 Jan 2018 04:46)  T(F): 97.9 (22 Jan 2018 11:51), Max: 98.1 (22 Jan 2018 04:46)  HR: 90 (22 Jan 2018 11:51) (80 - 103)  BP: 102/53 (22 Jan 2018 11:51) (94/48 - 110/58)  BP(mean): --  RR: 18 (22 Jan 2018 11:51) (18 - 18)  SpO2: 100% (22 Jan 2018 11:51) (96% - 100%)  CAPILLARY BLOOD GLUCOSE      POCT Blood Glucose.: 112 mg/dL (22 Jan 2018 16:39)  POCT Blood Glucose.: 133 mg/dL (22 Jan 2018 11:53)  POCT Blood Glucose.: 120 mg/dL (22 Jan 2018 07:28)  POCT Blood Glucose.: 82 mg/dL (21 Jan 2018 21:06)    I&O's Summary    21 Jan 2018 07:01  -  22 Jan 2018 07:00  --------------------------------------------------------  IN: 1260 mL / OUT: 950 mL / NET: 310 mL    22 Jan 2018 07:01  -  22 Jan 2018 16:58  --------------------------------------------------------  IN: 240 mL / OUT: 0 mL / NET: 240 mL        PHYSICAL EXAM:      Constitutional: Well-appearing, NAD, lying in bed, appears stated age    Eyes: EOMI, PERRLA, clear conjunctiva    ENMT: No pharyngeal erythema, mucus membranes moist    Neck: No JVD    Back: No spinal tenderness or kyphosis    Respiratory: Lungs clear to auscultation     Cardiovascular: Regular rate and rhythm, S1S2+, no murmurs appreciated.  1+ pitting LE edema    Gastrointestinal: Soft, non-tender, non-distended, bowel sounds positive all four quadrants    Extremities: no clubbing or cyanosis    Vascular: 2+ pulses radially and dorsalis pedis    Neurological: Alert and oriented to name, place, and date.  Cranial nerves II-XII intact.  No focal neurological deficits.  5/5 motor strength all four extremities    Skin: Warm and dry.  No rashes    Lymph Nodes: No cervical lymphadenopathy    Musculoskeletal: No joint swelling or erythema    Psychiatric: Pleasant affect    LABS:                        9.5    11.22 )-----------( 362      ( 22 Jan 2018 07:19 )             29.5     01-22    139  |  103  |  25<H>  ----------------------------<  114<H>  4.1   |  23  |  1.74<H>    Ca    8.2<L>      22 Jan 2018 09:26          RADIOLOGY & ADDITIONAL TESTS:    Imaging Personally Reviewed:  Consultant(s) Notes Reviewed:  [x]  Care Discussed with Consultants/Other Providers:

## 2018-01-22 NOTE — PROGRESS NOTE ADULT - PROBLEM SELECTOR PLAN 1
Likely contrast induced nephropathy from cardiac cath done on 1/12. Creatinine first elevated in AM 1/14. Possible pre-renal contribution + vancomycin. Possible ATN may take some time to recover. Patient Scr improved to 1.74 today.  Monitor UOP, creatinine trend.    AVOID NSAIDs, ACE/ARBS, and nephrotoxins  Patient to follow up as an outpatient upon discharge

## 2018-01-23 ENCOUNTER — TRANSCRIPTION ENCOUNTER (OUTPATIENT)
Age: 73
End: 2018-01-23

## 2018-01-23 DIAGNOSIS — I95.9 HYPOTENSION, UNSPECIFIED: ICD-10-CM

## 2018-01-23 LAB
ANION GAP SERPL CALC-SCNC: 11 MMOL/L — SIGNIFICANT CHANGE UP (ref 5–17)
BUN SERPL-MCNC: 23 MG/DL — SIGNIFICANT CHANGE UP (ref 7–23)
CALCIUM SERPL-MCNC: 8.3 MG/DL — LOW (ref 8.4–10.5)
CHLORIDE SERPL-SCNC: 104 MMOL/L — SIGNIFICANT CHANGE UP (ref 96–108)
CO2 SERPL-SCNC: 23 MMOL/L — SIGNIFICANT CHANGE UP (ref 22–31)
CREAT SERPL-MCNC: 1.66 MG/DL — HIGH (ref 0.5–1.3)
GLUCOSE BLDC GLUCOMTR-MCNC: 101 MG/DL — HIGH (ref 70–99)
GLUCOSE BLDC GLUCOMTR-MCNC: 103 MG/DL — HIGH (ref 70–99)
GLUCOSE BLDC GLUCOMTR-MCNC: 111 MG/DL — HIGH (ref 70–99)
GLUCOSE BLDC GLUCOMTR-MCNC: 239 MG/DL — HIGH (ref 70–99)
GLUCOSE BLDC GLUCOMTR-MCNC: 82 MG/DL — SIGNIFICANT CHANGE UP (ref 70–99)
GLUCOSE SERPL-MCNC: 82 MG/DL — SIGNIFICANT CHANGE UP (ref 70–99)
HCT VFR BLD CALC: 28.8 % — LOW (ref 39–50)
HGB BLD-MCNC: 9.2 G/DL — LOW (ref 13–17)
MCHC RBC-ENTMCNC: 30.4 PG — SIGNIFICANT CHANGE UP (ref 27–34)
MCHC RBC-ENTMCNC: 31.9 GM/DL — LOW (ref 32–36)
MCV RBC AUTO: 95 FL — SIGNIFICANT CHANGE UP (ref 80–100)
PLATELET # BLD AUTO: 323 K/UL — SIGNIFICANT CHANGE UP (ref 150–400)
POTASSIUM SERPL-MCNC: 4 MMOL/L — SIGNIFICANT CHANGE UP (ref 3.5–5.3)
POTASSIUM SERPL-SCNC: 4 MMOL/L — SIGNIFICANT CHANGE UP (ref 3.5–5.3)
RBC # BLD: 3.03 M/UL — LOW (ref 4.2–5.8)
RBC # FLD: 14.8 % — HIGH (ref 10.3–14.5)
SODIUM SERPL-SCNC: 138 MMOL/L — SIGNIFICANT CHANGE UP (ref 135–145)
WBC # BLD: 10.17 K/UL — SIGNIFICANT CHANGE UP (ref 3.8–10.5)
WBC # FLD AUTO: 10.17 K/UL — SIGNIFICANT CHANGE UP (ref 3.8–10.5)

## 2018-01-23 PROCEDURE — 99232 SBSQ HOSP IP/OBS MODERATE 35: CPT

## 2018-01-23 PROCEDURE — 99233 SBSQ HOSP IP/OBS HIGH 50: CPT

## 2018-01-23 RX ORDER — POLYETHYLENE GLYCOL 3350 17 G/17G
17 POWDER, FOR SOLUTION ORAL
Qty: 0 | Refills: 0 | COMMUNITY
Start: 2018-01-23

## 2018-01-23 RX ORDER — SIMVASTATIN 20 MG/1
1 TABLET, FILM COATED ORAL
Qty: 0 | Refills: 0 | COMMUNITY

## 2018-01-23 RX ORDER — TICAGRELOR 90 MG/1
1 TABLET ORAL
Qty: 0 | Refills: 0 | COMMUNITY
Start: 2018-01-23

## 2018-01-23 RX ORDER — DOCUSATE SODIUM 100 MG
1 CAPSULE ORAL
Qty: 0 | Refills: 0 | COMMUNITY
Start: 2018-01-23

## 2018-01-23 RX ORDER — VANCOMYCIN HCL 1 G
1 VIAL (EA) INTRAVENOUS
Qty: 0 | Refills: 0 | COMMUNITY
Start: 2018-01-23

## 2018-01-23 RX ORDER — METOPROLOL TARTRATE 50 MG
12.5 TABLET ORAL
Qty: 0 | Refills: 0 | COMMUNITY
Start: 2018-01-23

## 2018-01-23 RX ORDER — ACETAMINOPHEN 500 MG
2 TABLET ORAL
Qty: 0 | Refills: 0 | COMMUNITY
Start: 2018-01-23

## 2018-01-23 RX ORDER — ERGOCALCIFEROL 1.25 MG/1
1 CAPSULE ORAL
Qty: 0 | Refills: 0 | COMMUNITY

## 2018-01-23 RX ORDER — THIAMINE MONONITRATE (VIT B1) 100 MG
1 TABLET ORAL
Qty: 0 | Refills: 0 | COMMUNITY
Start: 2018-01-23

## 2018-01-23 RX ORDER — ATORVASTATIN CALCIUM 80 MG/1
1 TABLET, FILM COATED ORAL
Qty: 0 | Refills: 0 | COMMUNITY
Start: 2018-01-23

## 2018-01-23 RX ORDER — FOLIC ACID 0.8 MG
1 TABLET ORAL
Qty: 0 | Refills: 0 | COMMUNITY
Start: 2018-01-23

## 2018-01-23 RX ORDER — ASPIRIN/CALCIUM CARB/MAGNESIUM 324 MG
1 TABLET ORAL
Qty: 0 | Refills: 0 | COMMUNITY
Start: 2018-01-23

## 2018-01-23 RX ORDER — SIMETHICONE 80 MG/1
1 TABLET, CHEWABLE ORAL
Qty: 0 | Refills: 0 | COMMUNITY
Start: 2018-01-23

## 2018-01-23 RX ADMIN — ENOXAPARIN SODIUM 40 MILLIGRAM(S): 100 INJECTION SUBCUTANEOUS at 12:19

## 2018-01-23 RX ADMIN — ATORVASTATIN CALCIUM 80 MILLIGRAM(S): 80 TABLET, FILM COATED ORAL at 21:46

## 2018-01-23 RX ADMIN — Medication 1 TABLET(S): at 12:19

## 2018-01-23 RX ADMIN — Medication 12.5 MILLIGRAM(S): at 21:46

## 2018-01-23 RX ADMIN — TICAGRELOR 90 MILLIGRAM(S): 90 TABLET ORAL at 10:34

## 2018-01-23 RX ADMIN — SIMETHICONE 80 MILLIGRAM(S): 80 TABLET, CHEWABLE ORAL at 05:55

## 2018-01-23 RX ADMIN — SIMETHICONE 80 MILLIGRAM(S): 80 TABLET, CHEWABLE ORAL at 13:10

## 2018-01-23 RX ADMIN — Medication 3 UNIT(S): at 12:19

## 2018-01-23 RX ADMIN — TICAGRELOR 90 MILLIGRAM(S): 90 TABLET ORAL at 21:46

## 2018-01-23 RX ADMIN — Medication 1 MILLIGRAM(S): at 12:19

## 2018-01-23 RX ADMIN — Medication 100 MILLIGRAM(S): at 12:19

## 2018-01-23 RX ADMIN — Medication 3 UNIT(S): at 08:18

## 2018-01-23 RX ADMIN — Medication 2: at 12:19

## 2018-01-23 RX ADMIN — FINASTERIDE 5 MILLIGRAM(S): 5 TABLET, FILM COATED ORAL at 12:19

## 2018-01-23 RX ADMIN — Medication 250 MILLIGRAM(S): at 10:55

## 2018-01-23 RX ADMIN — BENZOCAINE AND MENTHOL 1 LOZENGE: 5; 1 LIQUID ORAL at 21:46

## 2018-01-23 RX ADMIN — Medication 3 UNIT(S): at 17:37

## 2018-01-23 RX ADMIN — BENZOCAINE AND MENTHOL 1 LOZENGE: 5; 1 LIQUID ORAL at 13:10

## 2018-01-23 RX ADMIN — CEFTRIAXONE 100 GRAM(S): 500 INJECTION, POWDER, FOR SOLUTION INTRAMUSCULAR; INTRAVENOUS at 13:10

## 2018-01-23 RX ADMIN — INSULIN GLARGINE 12 UNIT(S): 100 INJECTION, SOLUTION SUBCUTANEOUS at 22:01

## 2018-01-23 RX ADMIN — PANTOPRAZOLE SODIUM 40 MILLIGRAM(S): 20 TABLET, DELAYED RELEASE ORAL at 08:17

## 2018-01-23 RX ADMIN — BENZOCAINE AND MENTHOL 1 LOZENGE: 5; 1 LIQUID ORAL at 05:55

## 2018-01-23 RX ADMIN — Medication 81 MILLIGRAM(S): at 12:19

## 2018-01-23 NOTE — PROGRESS NOTE ADULT - PROBLEM SELECTOR PLAN 1
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  Currently stable on ceftriaxone and vancomycin.  Case d/w ID (Dr. Small).  Cr has stabilized, will c/w current antibiotics x 6 weeks  - c/w Vancomycin 1 g IV q24h until 2/26/18  - c/w ceftriaxone 2 g IV q24h until 2/26/18  - Will need weekly CBC, CMP, and vancomycin trough while at rehab  - Monitor vancomycin trough to possibly adjust dosing  - Outpatient f/u with ID clinic first week of March  - f/u Brucella Ab  -Dental recs appreciated; recommend biopsy of tongue lesion on discharge and outpatient dental f/u, no further inpatient workup

## 2018-01-23 NOTE — PROGRESS NOTE ADULT - ASSESSMENT
72 year-old male with past medical history of T2DM, DLD, alcohol misuse brought in by family after having been found face down on the floor for approximately 2 days.  No fevers. Patient with history of diagnosis of enlarged prostate and urinary frequency. Also complains of dysuria, and bladder pain. Initially had high WBC, now resolving. On treatment for UTI, but now JANES with Aortic Valve lesions. Suspected culture negative endocarditis. Appears well, but has ELIAN, improving. Overall appears stable. Aortic valve vegetation, AMS, leukocytosis, UTI.  - Ceftriaxone 2g q 24  - Vancomycin 1g q 24 (Monitor troughs)  - F/U pending Brucella sero  - Plan for 6 week course abx through 2/26/18  - While at rehab, antibiotics to be overseen by rehab physicians (recommend CBC, CMP, Vanco weekly)  - Have patient follow up in ID clinic in 1st week March  - Will sign off. Please call with further questions or change in status.    Baldev Small MD  Pager [phone]  After 5pm and on weekends call [phone]

## 2018-01-23 NOTE — DISCHARGE NOTE ADULT - HOSPITAL COURSE
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  1/20/18 AVR (T)/C1L   Pod 1 Pressors inotropes,  R PL effusion s/p pigtail placement with 1.6L removed.  POD 2: Cardiogenic shock, RACHEAL-Reintubated.  IABP placed lasix infusion, hd catheter placed,  +inotropes. On antibiotics ceftriaxone and GENT for gram + cocci endocarditis, reintubation  enteral feeds  Vascular consulted for pseudoaneurysm  2/7 Seen by ENT,+ R VC paresis medially with glottic gap, pooling of secretions and b/l posterior intubation granulomas  2/8 Hematology consulted for prolonged aPTT of unclear etiology.   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  Feb 13 repeat thrombin injection for L Fem PSA  Feb 14 RTOR sternal wound debridement, removal of VAC, washout and closure  2/15 Per Vasc Sx pt is now s/p L pseudoanuerysm thrombin injection x3 with remaining pseudoaneursym injected with thrombin two days prior with U/S resolution. Pt underwent repeat arterial duplex that showed resolution of pseudoaneurysm.   2/16 Dysphagia 1 THIN STRAINED PUREE ONLY, with Honey thickened liquids. All Liquids via teaspoon only.   Feb 16 R pigtail for pleural effusion  2/19/18 Milan reinserted for retention , URO consulted, eventual TOV  2/22/18 - VSS - Keofeed in place - will contact S&S re: repeat FEEST   decrease free water boluses to 250 via kaofeed q6h   2/23 Dental consulted for OR cx + GroupB strep, D/c pigtail, Calorie count in progress. MBS Monday /Tues 2/24: Anemic requiring 1 unit PRBC. Milan light hematuria. Per Dental White plaques on tongue to be biopsied as outpatient-no signs of infection  2/25: c/o sore throat appreciate ENT input  2/26 Left pleural effusion --> s/p Left pigtail cath placed; TOV --> Bladder scan >900 cc; Milan cath reinserted with 1.9 L of urine.  Plan to repeat MBS in AM   2/27 W0ebrav drained 110/730 Milan in place - seen by Urology Dr Goldberg will maintain milan  in  rehab MBS this am. PICC not patent IV team aware  per ID d/c ceftriaxone 3/1  2/28 Pigtail D/C pacing wires d/c  shower today  dyshagia 3 thin liquid diet d/w social work -[id number] placement last dose abx 3/1 d/c PICC after last dose abx   3/1  HD stable.  Ceftriaxone dc after dose today.  Mid sternal incision with jematoma old blood oozing from incision.  DC PICC line.  Awaiting auth for rehab.  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  1/20/18 AVR (T)/C1L   Pod 1 Pressors inotropes,  R PL effusion s/p pigtail placement with 1.6L removed.  POD 2: Cardiogenic shock, RACHEAL-Reintubated.  IABP placed lasix infusion, hd catheter placed,  +inotropes. On antibiotics ceftriaxone and GENT for gram + cocci endocarditis, reintubation  enteral feeds  Vascular consulted for pseudoaneurysm  2/7 Seen by ENT,+ R VC paresis medially with glottic gap, pooling of secretions and b/l posterior intubation granulomas  2/8 Hematology consulted for prolonged aPTT of unclear etiology.   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  Feb 13 repeat thrombin injection for L Fem PSA  Feb 14 RTOR sternal wound debridement, removal of VAC, washout and closure  2/15 Per Vasc Sx pt is now s/p L pseudoanuerysm thrombin injection x3 with remaining pseudoaneursym injected with thrombin two days prior with U/S resolution. Pt underwent repeat arterial duplex that showed resolution of pseudoaneurysm.   2/16 Dysphagia 1 THIN STRAINED PUREE ONLY, with Honey thickened liquids. All Liquids via teaspoon only.   Feb 16 R pigtail for pleural effusion  2/19/18 [place] reinserted for retention , URO consulted, eventual TOV  2/22/18 - VSS - Keofeed in place - will contact S&S re: repeat FEEST   decrease free water boluses to 250 via kaofeed q6h   2/23 Dental consulted for OR cx + GroupB strep, D/c pigtail, Calorie count in progress. MBS Monday /Tues 2/24: Anemic requiring 1 unit PRBC. Milan light hematuria. Per Dental White plaques on tongue to be biopsied as outpatient-no signs of infection  2/25: c/o sore throat appreciate ENT input  2/26 Left pleural effusion --> s/p Left pigtail cath placed; TOV --> Bladder scan >900 cc; Milan cath reinserted with 1.9 L of urine.  Plan to repeat MBS in AM   2/27 W9lqxrg drained 110/730 Milan in place - seen by Urology Dr Goldberg will maintain milan  in  rehab MBS this am. PICC not patent IV team aware  per ID d/c ceftriaxone 3/1  2/28 Pigtail D/C pacing wires d/c  shower today  dyshagia 3 thin liquid diet d/w social work -[id number]-zapoc placement last dose abx 3/1 d/c PICC after last dose abx   3/1  HD stable.  Ceftriaxone dc after dose today.  Mid sternal incision with jematoma old blood oozing from incision.  DC PICC line.  Awaiting auth for rehab.  DC with milan  3/2 Hematoma to sternum evacuated at bedside by Plastics. ID reconsulted. Discharge postponed.  3/4 wound VAC placed to sternum  3/5 pending rehab placement

## 2018-01-23 NOTE — DISCHARGE NOTE ADULT - CARE PROVIDERS DIRECT ADDRESSES
,[email or web address],DirectAddress_Unknown

## 2018-01-23 NOTE — DISCHARGE NOTE ADULT - CARE PLAN
Principal Discharge DX:	Atrial fibrillation with rapid ventricular response  Goal:	Resolution of symptom  Assessment and plan of treatment:	Atrial fibrillation is the most common heart rhythm problem & has the risk of stroke & heart attack  It helps if you control your blood pressure, not drink more than 1-2 alcohol drinks per day, cut down on caffeine, getting treatment for over active thyroid gland, & getting exercise  Call your doctor if you feel your heart racing or beating unusually, chest tightness or pain, lightheaded, faint, shortness of breath especially with exercise  It is important to take your heart medication as prescribed  You may be on anticoagulation which is very important to take as directed - you may need blood work to monitor drug levels  Secondary Diagnosis:	Deep vein thrombosis (DVT) of popliteal vein of left lower extremity, unspecified chronicity  Assessment and plan of treatment:	Take your "blood thinners" as prescribed.  Walking is encouraged, increase activity as tolerated.  If you develop new leg pain, swelling, and/or redness contact your healthcare provider.  If you develop new chest pain with difficulty breathing, a rapid heart rate and/or a feeling of passing out call emergency medical services 911.  Secondary Diagnosis:	Type 2 diabetes mellitus with hyperglycemia, unspecified long term insulin use status  Assessment and plan of treatment:	HgA1C this admission.  Make sure you get your HgA1c checked every three months.  If you take oral diabetes medications, check your blood glucose two times a day.  If you take insulin, check your blood glucose before meals and at bedtime.  It's important not to skip any meals.  Keep a log of your blood glucose results and always take it with you to your doctor appointments.  Keep a list of your current medications including injectables and over the counter medications and bring this medication list with you to all your doctor appointments.  If you have not seen your ophthalmologist this year call for appointment.  Check your feet daily for redness, sores, or openings. Do not self treat. If no improvement in two days call your primary care physician for an appointment.  Low blood sugar (hypoglycemia) is a blood sugar below 70mg/dl. Check your blood sugar if you feel signs/symptoms of hypoglycemia. If your blood sugar is below 70 take 15 grams of carbohydrates (ex 4 oz of apple juice, 3-4 glucose tablets, or 4-6 oz of regular soda) wait 15 minutes and repeat blood sugar to make sure it comes up above 70.  If your blood sugar is above 70 and you are due for a meal, have a meal.  If you are not due for a meal have a snack.  This snack helps keeps your blood sugar at a safe range.  Secondary Diagnosis:	ELIAN (acute kidney injury)  Assessment and plan of treatment:	Avoid taking (NSAIDs) - (ex: Ibuprofen, Advil, Celebrex, Naprosyn)  Avoid taking any nephrotoxic agents (can harm kidneys) - Intravenous contrast for diagnostic testing, combination cold medications.  Have all medications adjusted for your renal function by your Health Care Provider.  Blood pressure control is important.  Take all medication as prescribed.  Secondary Diagnosis:	Alcohol abuse  Assessment and plan of treatment:	Follow up with AA meeting  Secondary Diagnosis:	Acute infective endocarditis, due to unspecified organism  Assessment and plan of treatment:	Continue with antibiotics until 2/26/18 to complete a 6 weeks course Principal Discharge DX:	S/P AVR (aortic valve replacement)  Goal:	recovery  Assessment and plan of treatment:	please folllow up with Dr Mary after you are discharged from rehab  please follow up with Cardiologist and PMD in 2 weeks  follow up with Oral surgeon Dr Fowler for tongue biopsy  follow up with Dr Goldberg for urinary retention  Please shower daily and wash all incisions with soap and water  Do not drink alcohol. Consider participation in AA meetings for alcohol cessation  Secondary Diagnosis:	S/P CABG x 1  Assessment and plan of treatment:	Follow up with AA meeting

## 2018-01-23 NOTE — DISCHARGE NOTE ADULT - CARE PROVIDER_API CALL
Baldev Small), Infectious Disease; Internal Medicine  [address] [place] [address]  Phone: [phone]  Fax: [phone] Mera Mary), Thoracic and Cardiac Surgery  [address] [place] [address]  Phone: [phone]  Fax: [phone]    Goldberg, Gary D (MD), Urology  [address]  [address]  [place] [address]  Phone: [phone]  Fax: [phone]    Gerard Fowler (SAI), Dental Medicine  [address] [facility]  [address] [place] [address]  Phone: [phone]  Fax: [phone]

## 2018-01-23 NOTE — PROGRESS NOTE ADULT - PROBLEM SELECTOR PLAN 2
Slightly lower BP this AM, although asymptomatic.  Will D/C flomax, as can contribute to orthostatic hypotension and patient originally a/w fall.

## 2018-01-23 NOTE — CHART NOTE - NSCHARTNOTEFT_GEN_A_CORE
Source: Patient [X ]    Family [ ]     other [ X] RN, Medical record,     Pt seen for malnutrition follow up. Pt seen, reports feeling well at this time with a good PO Intake. Per Pt he typically consumes 75% of meals. Pt states he is being encouraged to drink more fluids; therefore has been drinking more water. Pt denies need for education review at this time. Pt states he is interested in Glucerna x1 daily.     Pt admitted for s/p fall down for 2 day related to ETOH abuse. Pt found in Afib with RVR, s/p unsuccessful cardioversion, severe sepsis and NSTEMI. S/p NSTEMI with AV endocarditis, moderate/severe AI and AS. S/p PICC line for Antibiotics, no surgical intervention at this time. Pt with ARF, improving with no need for HD at this time.     Diet : Consistent CHO, DASH       Patient reports no GI distress at this time.      PO intake:  75%      Source for PO intake [X ] Patient [ ] family [ X] chart [ ] staff [ ] other      Current Weight: 165.7lbs, wt stable at this time   % Weight Change    Pertinent Medications: MEDICATIONS  (STANDING):  aspirin  chewable 81 milliGRAM(s) Oral daily  atorvastatin 80 milliGRAM(s) Oral at bedtime  benzocaine 15 mG/menthol 3.6 mG Lozenge 1 Lozenge Oral three times a day  cefTRIAXone   IVPB 2 Gram(s) IV Intermittent every 24 hours  dextrose 5%. 1000 milliLiter(s) (50 mL/Hr) IV Continuous <Continuous>  dextrose 50% Injectable 12.5 Gram(s) IV Push once  dextrose 50% Injectable 25 Gram(s) IV Push once  dextrose 50% Injectable 25 Gram(s) IV Push once  docusate sodium 100 milliGRAM(s) Oral daily  enoxaparin Injectable 40 milliGRAM(s) SubCutaneous daily  finasteride 5 milliGRAM(s) Oral daily  folic acid 1 milliGRAM(s) Oral daily  influenza   Vaccine 0.5 milliLiter(s) IntraMuscular once  insulin glargine Injectable (LANTUS) 12 Unit(s) SubCutaneous at bedtime  insulin lispro (HumaLOG) corrective regimen sliding scale   SubCutaneous three times a day before meals  insulin lispro (HumaLOG) corrective regimen sliding scale   SubCutaneous at bedtime  insulin lispro Injectable (HumaLOG) 3 Unit(s) SubCutaneous three times a day with meals  metoprolol     tartrate 12.5 milliGRAM(s) Oral two times a day  multivitamin 1 Tablet(s) Oral daily  pantoprazole    Tablet 40 milliGRAM(s) Oral before breakfast  simethicone 80 milliGRAM(s) Chew every 8 hours  sodium chloride 0.9% lock flush 20 milliLiter(s) IV Push once  thiamine 100 milliGRAM(s) Oral daily  ticagrelor 90 milliGRAM(s) Oral every 12 hours  vancomycin  IVPB 1000 milliGRAM(s) IV Intermittent every 24 hours    MEDICATIONS  (PRN):  acetaminophen   Tablet. 650 milliGRAM(s) Oral every 6 hours PRN Moderate Pain (4 - 6)  dextrose Gel 1 Dose(s) Oral once PRN Blood Glucose LESS THAN 70 milliGRAM(s)/deciliter  glucagon  Injectable 1 milliGRAM(s) IntraMuscular once PRN Glucose LESS THAN 70 milligrams/deciliter  polyethylene glycol 3350 17 Gram(s) Oral daily PRN Constipation  sodium chloride 0.9% lock flush 10 milliLiter(s) IV Push every 1 hour PRN After each medication administration  sodium chloride 0.9% lock flush 10 milliLiter(s) IV Push every 12 hours PRN Lumen of catheter NOT used    Pertinent Labs:  01-23 Na138 mmol/L Glu 82 mg/dL K+ 4.0 mmol/L Cr  1.66 mg/dL<H> BUN 23 mg/dL  BG levels: 1/22: 79-13, 1/23:       Skin: intact     Estimated Needs:   [X ] no change since previous assessment  [ ] recalculated:       Previous Nutrition Diagnosis:   [ x] Food & Nutrition Related Knowledge Deficit [ X] Malnutrition          Nutrition Diagnosis is [ X] ongoing  [ ] resolved [ ] not applicable          New Nutrition Diagnosis: [ X] not applicable      Recommend    1. Provide food preferences as requested by Pt/family within diet restrictions    2. Encourage PO intake during meal times   3. Reviewed menu ordering procedures   4. Recommend Glucerna x1 daily   5. Continue DASH, Consistent CHO       Monitoring and Evaluation:     [ X] PO intake [X ] Tolerance to diet prescription [ X] weights [X ] follow up per protocol    [ X] other: RD remains available Sarah Siegler RD. Pager #[phone]

## 2018-01-23 NOTE — DISCHARGE NOTE ADULT - PATIENT PORTAL LINK FT
“You can access the FollowHealth Patient Portal, offered by [facility], by registering with the following website: [email or web address]”

## 2018-01-23 NOTE — DISCHARGE NOTE ADULT - PLAN OF CARE
Resolution of symptom Atrial fibrillation is the most common heart rhythm problem & has the risk of stroke & heart attack  It helps if you control your blood pressure, not drink more than 1-2 alcohol drinks per day, cut down on caffeine, getting treatment for over active thyroid gland, & getting exercise  Call your doctor if you feel your heart racing or beating unusually, chest tightness or pain, lightheaded, faint, shortness of breath especially with exercise  It is important to take your heart medication as prescribed  You may be on anticoagulation which is very important to take as directed - you may need blood work to monitor drug levels Take your "blood thinners" as prescribed.  Walking is encouraged, increase activity as tolerated.  If you develop new leg pain, swelling, and/or redness contact your healthcare provider.  If you develop new chest pain with difficulty breathing, a rapid heart rate and/or a feeling of passing out call emergency medical services [phone]. HgA1C this admission.  Make sure you get your HgA1c checked every three months.  If you take oral diabetes medications, check your blood glucose two times a day.  If you take insulin, check your blood glucose before meals and at bedtime.  It's important not to skip any meals.  Keep a log of your blood glucose results and always take it with you to your doctor appointments.  Keep a list of your current medications including injectables and over the counter medications and bring this medication list with you to all your doctor appointments.  If you have not seen your ophthalmologist this year call for appointment.  Check your feet daily for redness, sores, or openings. Do not self treat. If no improvement in two days call your primary care physician for an appointment.  Low blood sugar (hypoglycemia) is a blood sugar below 70mg/dl. Check your blood sugar if you feel signs/symptoms of hypoglycemia. If your blood sugar is below 70 take 15 grams of carbohydrates (ex 4 oz of apple juice, 3-4 glucose tablets, or 4-6 oz of regular soda) wait 15 minutes and repeat blood sugar to make sure it comes up above 70.  If your blood sugar is above 70 and you are due for a meal, have a meal.  If you are not due for a meal have a snack.  This snack helps keeps your blood sugar at a safe range. Avoid taking (NSAIDs) - (ex: Ibuprofen, Advil, Celebrex, Naprosyn)  Avoid taking any nephrotoxic agents (can harm kidneys) - Intravenous contrast for diagnostic testing, combination cold medications.  Have all medications adjusted for your renal function by your Health Care Provider.  Blood pressure control is important.  Take all medication as prescribed. Follow up with AA meeting Continue with antibiotics until 2/26/18 to complete a 6 weeks course recovery please folllow up with Dr Mary after you are discharged from rehab  please follow up with Cardiologist and PMD in 2 weeks  follow up with Oral surgeon Dr Fowler for tongue biopsy  follow up with Dr Goldberg for urinary retention  Please shower daily and wash all incisions with soap and water  Do not drink alcohol. Consider participation in AA meetings for alcohol cessation Please folllow up with Dr Mary after you are discharged from rehab  Please follow up with Cardiologist and PMD in 2 weeks  follow up with Oral surgeon Dr Fowler for tongue biopsy  follow up with Dr Goldberg for urinary retention  Please shower daily and wash all incisions with soap and water  Do not drink alcohol. Consider participation in AA meetings for alcohol cessation

## 2018-01-23 NOTE — PROGRESS NOTE ADULT - SUBJECTIVE AND OBJECTIVE BOX
CC: F/U for Culture Neg Endocarditis    Saw/spoke to patient. Patient well. No new complaints. Overall well.    Allergies  No Known Allergies    ANTIMICROBIALS:  cefTRIAXone   IVPB 2 every 24 hours  vancomycin  IVPB 1000 every 24 hours    PE:    Vital Signs Last 24 Hrs  T(C): 36.7 (23 Jan 2018 11:07), Max: 36.7 (23 Jan 2018 11:07)  T(F): 98 (23 Jan 2018 11:07), Max: 98 (23 Jan 2018 11:07)  HR: 95 (23 Jan 2018 11:07) (87 - 95)  BP: 90/48 (23 Jan 2018 11:07) (88/40 - 106/54)  BP(mean): --  RR: 18 (23 Jan 2018 11:07) (18 - 18)  SpO2: 97% (23 Jan 2018 11:07) (97% - 100%)    Gen: AOx3, NAD, non-toxic, pleasant  CV: S1+S2 normal, no murmurs, nontachycardic  Resp: Clear bilat, no resp distress, no crackles/wheezes  Abd: Soft, nontender, +BS  Ext: LUE PICC    LABS:               9.2    10.17 )-----------( 323      ( 23 Jan 2018 07:30 )             28.8     01-23    138  |  104  |  23  ----------------------------<  82  4.0   |  23  |  1.66<H>    Ca    8.3<L>      23 Jan 2018 07:26    MICROBIOLOGY:    .Blood Blood-Venous  01-13-18   No growth at 5 days.      .Blood Blood-Peripheral  01-13-18   No growth at 5 days.      .Blood Blood-Venous  01-09-18   No growth at 5 days.    .Blood Blood-Venous  01-09-18   No growth at 5 days.      .Urine Clean Catch (Midstream)  01-09-18   No growth     RADIOLOGY:    No new available

## 2018-01-23 NOTE — DISCHARGE NOTE ADULT - SECONDARY DIAGNOSIS.
Deep vein thrombosis (DVT) of popliteal vein of left lower extremity, unspecified chronicity Type 2 diabetes mellitus with hyperglycemia, unspecified long term insulin use status ELIAN (acute kidney injury) Alcohol abuse Acute infective endocarditis, due to unspecified organism S/P CABG x 1

## 2018-01-23 NOTE — DISCHARGE NOTE ADULT - MEDICATION SUMMARY - MEDICATIONS TO TAKE
I will START or STAY ON the medications listed below when I get home from the hospital:    finasteride 5 mg oral tablet  -- 1 tab(s) by mouth once a day  -- Indication: For BPH with urinary obstruction    aspirin 81 mg oral tablet, chewable  -- 1 tab(s) by mouth once a day  -- Indication: For Antiplatelet    acetaminophen 325 mg oral tablet  -- 2 tab(s) by mouth every 6 hours, As needed, Moderate Pain (4 - 6)  -- Indication: For Pain control as needed    tamsulosin 0.4 mg oral capsule  -- 1 cap(s) by mouth once a day  -- Indication: For BPH with urinary obstruction    amiodarone 200 mg oral tablet  -- 1 tab(s) by mouth once a day  -- Indication: For Antiarrhythmic    metFORMIN  -- 1000  by mouth 2 times a day  -- Indication: For Glucose control    atorvastatin 80 mg oral tablet  -- 1 tab(s) by mouth once a day (at bedtime)  -- Indication: For Antihyperlipidemia    ticagrelor 90 mg oral tablet  -- 1 tab(s) by mouth every 12 hours  -- Indication: For Antiplatelet    metoprolol  -- 12.5 milligram(s) by mouth 2 times a day  -- Indication: For Cardiovascular agent    cefTRIAXone 2 g/50 mL intravenous solution  -- until 2/26/18  -- Indication: For Antibiotoc    vancomycin 1 g intravenous injection  -- 1 gram(s) intravenous every 24 hours  until 2/26/18  -- Indication: For Antibiotic    polyethylene glycol 3350 oral powder for reconstitution  -- 17 gram(s) by mouth once a day, As needed, Constipation  -- Indication: For Laxative as needed    docusate sodium 100 mg oral capsule  -- 1 cap(s) by mouth once a day  -- Indication: For Stool softener as needed    GaviLyte-C oral powder for reconstitution  -- 240 milliliter(s) by mouth every 15 minutes  -- Indication: For Laxative as needed    simethicone 80 mg oral tablet, chewable  -- 1 tab(s) by mouth every 8 hours  -- Indication: For Gastrointestinal agent    pantoprazole 40 mg oral delayed release tablet  -- 1 tab(s) by mouth once a day  -- Indication: For Dyspepsia    levothyroxine 150 mcg (0.15 mg) oral tablet  -- 1 tab(s) by mouth once a day  -- Indication: For Thyroid hormone    Multiple Vitamins oral tablet  -- 1 tab(s) by mouth once a day  -- Indication: For Vitamin suppleent    thiamine 100 mg oral tablet  -- 1 tab(s) by mouth once a day  -- Indication: For Vitamin supplement    folic acid 1 mg oral tablet  -- 1 tab(s) by mouth once a day  -- Indication: For Vitamin supplement

## 2018-01-23 NOTE — DISCHARGE NOTE ADULT - ADDITIONAL INSTRUCTIONS
Follow up with Dental services for biopsy of tongue as out patient for comprehensive dental care and biopsy of white, velvety plaque on right ventral surface of tongue   Oral surgeon consulted: Dr. Fowler   Follow up with Urology; Dr Goldberg for urinary retention

## 2018-01-23 NOTE — PROGRESS NOTE ADULT - ASSESSMENT
72 year old man with PMH of ETOH abuse and DM-2; presented with fall, found to have culture negative aortic valve vegetation/endocarditis w/ NSTEMI, hospital course complicated by ELIAN secondary to cardiac catheterization.

## 2018-01-23 NOTE — DISCHARGE NOTE ADULT - INSTRUCTIONS
Dysphagia III diet with thin liquids, low cholesterol low sodium, diabetic diet Dysphagia III diet with thin liquids, low cholesterol low sodium, diabetic diet  Wound Vac to intermittent suction [place] to sacral and perianal wound

## 2018-01-23 NOTE — PROGRESS NOTE ADULT - ATTENDING COMMENTS
Case d/w NP (Carole), patient at bedside, ID (Dr. Small),  (Tressa)    Patrick Bloom M.D.  Hospitalist  Pager: [phone]

## 2018-01-23 NOTE — DISCHARGE NOTE ADULT - OTHER SIGNIFICANT FINDINGS
VITAL SIGNS    Telemetry:    Vital Signs Last 24 Hrs  T(C): 36.3 (03-02-18 @ 13:15), Max: 36.9 (03-02-18 @ 05:00)  T(F): 97.3 (03-02-18 @ 13:15), Max: 98.5 (03-02-18 @ 05:00)  HR: 78 (03-02-18 @ 13:15) (77 - 81)  BP: 119/70 (03-02-18 @ 13:15) (102/65 - 119/70)  RR: 18 (03-02-18 @ 13:15) (18 - 18)  SpO2: 100% (03-02-18 @ 13:15) (97% - 100%)            03-01 @ 07:01  -  03-02 @ 07:00  --------------------------------------------------------  IN: 475 mL / OUT: 1550 mL / NET: -1075 mL       Daily     Daily   Admit Wt: Drug Dosing Weight  Height (cm): 170.18 (13 Feb 2018 21:46)  Weight (kg): 75.4 (13 Feb 2018 21:46)  BMI (kg/m2): 26 (13 Feb 2018 21:46)  BSA (m2): 1.87 (13 Feb 2018 21:46)      CAPILLARY BLOOD GLUCOSE      POCT Blood Glucose.: 114 mg/dL (02 Mar 2018 11:44)  POCT Blood Glucose.: 89 mg/dL (02 Mar 2018 07:24)  POCT Blood Glucose.: 110 mg/dL (01 Mar 2018 23:59)  POCT Blood Glucose.: 124 mg/dL (01 Mar 2018 21:45)  POCT Blood Glucose.: 115 mg/dL (01 Mar 2018 16:32)          MEDICATIONS  acetaminophen   Tablet 650 milliGRAM(s) Oral every 6 hours PRN  acetaminophen   Tablet. 650 milliGRAM(s) Oral every 6 hours PRN  amiodarone    Tablet 200 milliGRAM(s) Oral daily  aspirin 325 milliGRAM(s) Oral daily  atorvastatin 80 milliGRAM(s) Oral at bedtime  finasteride 5 milliGRAM(s) Oral daily  folic acid 1 milliGRAM(s) Oral daily  insulin lispro (HumaLOG) corrective regimen sliding scale   SubCutaneous Before meals and at bedtime  levothyroxine 150 MICROGram(s) Oral daily  lidocaine 5% Ointment 1 Application(s) Topical every 12 hours  metoprolol     tartrate 25 milliGRAM(s) Oral every 12 hours  pantoprazole    Tablet 40 milliGRAM(s) Oral before breakfast  tamsulosin 0.4 milliGRAM(s) Oral at bedtime  thiamine 100 milliGRAM(s) Oral daily      PHYSICAL EXAM  Subjective: NAD  Neurology: alert and oriented x 3, nonfocal, no gross deficits  CV : S1S2  Sternal Wound :  CDI , Stable  Lungs: CTA b/l  Abdomen: soft, NT,ND, (+ )BM  :  milan  Extremities: -c/c/e      LABS  03-02    138  |  104  |  18  ----------------------------<  95  3.9   |  24  |  1.20    Ca    7.8<L>      02 Mar 2018 06:11                                   9.3    11.8  )-----------( 399      ( 02 Mar 2018 06:11 )             28.1          PT/INR - ( 02 Mar 2018 06:11 )   PT: 12.1 sec;   INR: 1.12 ratio         PTT - ( 02 Mar 2018 06:11 )  PTT:32.1 sec VITAL SIGNS    Telemetry:    Vital Signs Last 24 Hrs  T(C): 36.3 (03-02-18 @ 13:15), Max: 36.9 (03-02-18 @ 05:00)  T(F): 97.3 (03-02-18 @ 13:15), Max: 98.5 (03-02-18 @ 05:00)  HR: 78 (03-02-18 @ 13:15) (77 - 81)  BP: 119/70 (03-02-18 @ 13:15) (102/65 - 119/70)  RR: 18 (03-02-18 @ 13:15) (18 - 18)  SpO2: 100% (03-02-18 @ 13:15) (97% - 100%)            03-01 @ 07:01  -  03-02 @ 07:00  --------------------------------------------------------  IN: 475 mL / OUT: 1550 mL / NET: -1075 mL       Daily     Daily   Admit Wt: Drug Dosing Weight  Height (cm): 170.18 (13 Feb 2018 21:46)  Weight (kg): 75.4 (13 Feb 2018 21:46)  BMI (kg/m2): 26 (13 Feb 2018 21:46)  BSA (m2): 1.87 (13 Feb 2018 21:46)      CAPILLARY BLOOD GLUCOSE      POCT Blood Glucose.: 114 mg/dL (02 Mar 2018 11:44)  POCT Blood Glucose.: 89 mg/dL (02 Mar 2018 07:24)  POCT Blood Glucose.: 110 mg/dL (01 Mar 2018 23:59)  POCT Blood Glucose.: 124 mg/dL (01 Mar 2018 21:45)  POCT Blood Glucose.: 115 mg/dL (01 Mar 2018 16:32)          MEDICATIONS  acetaminophen   Tablet 650 milliGRAM(s) Oral every 6 hours PRN  acetaminophen   Tablet. 650 milliGRAM(s) Oral every 6 hours PRN  amiodarone    Tablet 200 milliGRAM(s) Oral daily  aspirin 325 milliGRAM(s) Oral daily  atorvastatin 80 milliGRAM(s) Oral at bedtime  finasteride 5 milliGRAM(s) Oral daily  folic acid 1 milliGRAM(s) Oral daily  insulin lispro (HumaLOG) corrective regimen sliding scale   SubCutaneous Before meals and at bedtime  levothyroxine 150 MICROGram(s) Oral daily  lidocaine 5% Ointment 1 Application(s) Topical every 12 hours  metoprolol     tartrate 25 milliGRAM(s) Oral every 12 hours  pantoprazole    Tablet 40 milliGRAM(s) Oral before breakfast  tamsulosin 0.4 milliGRAM(s) Oral at bedtime  thiamine 100 milliGRAM(s) Oral daily      PHYSICAL EXAM  Subjective: NAD  Neurology: alert and oriented x 3, nonfocal, no gross deficits  CV : S1S2  Sternal Wound :  CDI , Stable  Lungs: CTA b/l  Abdomen: soft, NT,ND, (+ )BM sacral and perianal wound stage II  :  milan  Extremities: -c/c/e      LABS  03-02    138  |  104  |  18  ----------------------------<  95  3.9   |  24  |  1.20    Ca    7.8<L>      02 Mar 2018 06:11                                   9.3    11.8  )-----------( 399      ( 02 Mar 2018 06:11 )             28.1          PT/INR - ( 02 Mar 2018 06:11 )   PT: 12.1 sec;   INR: 1.12 ratio         PTT - ( 02 Mar 2018 06:11 )  PTT:32.1 sec

## 2018-01-23 NOTE — PROGRESS NOTE ADULT - PROBLEM SELECTOR PLAN 3
Suspect secondary to cardiac cath, likely ADARSH.  Cr has plateaued, slightly downtrending, may take a bit of time to fully recover.    - Nephrology f/u appreciated, c/w monitoring  - Avoid nephrotoxins

## 2018-01-23 NOTE — PROGRESS NOTE ADULT - SUBJECTIVE AND OBJECTIVE BOX
Patient is a 72y old  Male who presents with a chief complaint of Fall (09 Jan 2018 00:52)        SUBJECTIVE / OVERNIGHT EVENTS: Patient seen and examined.  Somewhat hypotensive this morning, but patient denied light-headedness or dizziness.   Tele reviewed, NSR .  He feels well, no acute complaints.  Reports he has been ambulating with assistance and trying to keep as active as possible.  Denies chest pain and SOB.        MEDICATIONS  (STANDING):  aspirin  chewable 81 milliGRAM(s) Oral daily  atorvastatin 80 milliGRAM(s) Oral at bedtime  benzocaine 15 mG/menthol 3.6 mG Lozenge 1 Lozenge Oral three times a day  cefTRIAXone   IVPB 2 Gram(s) IV Intermittent every 24 hours  dextrose 5%. 1000 milliLiter(s) (50 mL/Hr) IV Continuous <Continuous>  dextrose 50% Injectable 12.5 Gram(s) IV Push once  dextrose 50% Injectable 25 Gram(s) IV Push once  dextrose 50% Injectable 25 Gram(s) IV Push once  docusate sodium 100 milliGRAM(s) Oral daily  enoxaparin Injectable 40 milliGRAM(s) SubCutaneous daily  finasteride 5 milliGRAM(s) Oral daily  folic acid 1 milliGRAM(s) Oral daily  influenza   Vaccine 0.5 milliLiter(s) IntraMuscular once  insulin glargine Injectable (LANTUS) 12 Unit(s) SubCutaneous at bedtime  insulin lispro (HumaLOG) corrective regimen sliding scale   SubCutaneous three times a day before meals  insulin lispro (HumaLOG) corrective regimen sliding scale   SubCutaneous at bedtime  insulin lispro Injectable (HumaLOG) 3 Unit(s) SubCutaneous three times a day with meals  metoprolol     tartrate 12.5 milliGRAM(s) Oral two times a day  multivitamin 1 Tablet(s) Oral daily  pantoprazole    Tablet 40 milliGRAM(s) Oral before breakfast  simethicone 80 milliGRAM(s) Chew every 8 hours  sodium chloride 0.9% lock flush 20 milliLiter(s) IV Push once  thiamine 100 milliGRAM(s) Oral daily  ticagrelor 90 milliGRAM(s) Oral every 12 hours  vancomycin  IVPB 1000 milliGRAM(s) IV Intermittent every 24 hours    MEDICATIONS  (PRN):  acetaminophen   Tablet. 650 milliGRAM(s) Oral every 6 hours PRN Moderate Pain (4 - 6)  dextrose Gel 1 Dose(s) Oral once PRN Blood Glucose LESS THAN 70 milliGRAM(s)/deciliter  glucagon  Injectable 1 milliGRAM(s) IntraMuscular once PRN Glucose LESS THAN 70 milligrams/deciliter  polyethylene glycol 3350 17 Gram(s) Oral daily PRN Constipation  sodium chloride 0.9% lock flush 10 milliLiter(s) IV Push every 1 hour PRN After each medication administration  sodium chloride 0.9% lock flush 10 milliLiter(s) IV Push every 12 hours PRN Lumen of catheter NOT used        REVIEW OF SYSTEMS      General: No fevers, chills  	  Ophthalmologic: No change in vision    Respiratory and Thorax: No SOB or cough  	  Cardiovascular: No chest pain, palpitations, or LE edema    Gastrointestinal: No abdominal pain, nausea, vomiting, or diarrhea    Genitourinary: No dysuria or polyuria    	    Vital Signs Last 24 Hrs  T(C): 36.7 (23 Jan 2018 11:07), Max: 36.7 (23 Jan 2018 11:07)  T(F): 98 (23 Jan 2018 11:07), Max: 98 (23 Jan 2018 11:07)  HR: 95 (23 Jan 2018 11:07) (87 - 95)  BP: 90/48 (23 Jan 2018 11:07) (88/40 - 106/54)  BP(mean): --  RR: 18 (23 Jan 2018 11:07) (18 - 18)  SpO2: 97% (23 Jan 2018 11:07) (97% - 98%)    CAPILLARY BLOOD GLUCOSE      POCT Blood Glucose.: 239 mg/dL (23 Jan 2018 11:44)  POCT Blood Glucose.: 82 mg/dL (23 Jan 2018 08:03)  POCT Blood Glucose.: 79 mg/dL (22 Jan 2018 20:43)  POCT Blood Glucose.: 112 mg/dL (22 Jan 2018 16:39)      I&O's Summary    22 Jan 2018 07:01  -  23 Jan 2018 07:00  --------------------------------------------------------  IN: 480 mL / OUT: 850 mL / NET: -370 mL    23 Jan 2018 07:01  -  23 Jan 2018 11:57  --------------------------------------------------------  IN: 240 mL / OUT: 0 mL / NET: 240 mL            PHYSICAL EXAM:      Constitutional: Well-appearing, NAD, lying in bed, appears stated age    Eyes: EOMI, PERRLA, clear conjunctiva    ENMT: No pharyngeal erythema, mucus membranes moist    Neck: No JVD    Back: No spinal tenderness or kyphosis    Respiratory: Lungs clear to auscultation     Cardiovascular: Regular rate and rhythm, S1S2+, no murmurs appreciated.  trace pitting LE edema    Gastrointestinal: Soft, non-tender, non-distended, bowel sounds positive all four quadrants    Extremities: no clubbing or cyanosis    Vascular: 2+ pulses radially and dorsalis pedis    Neurological: Alert and oriented to name, place, and date.  Cranial nerves II-XII intact.  No focal neurological deficits.  5/5 motor strength all four extremities    Skin: Warm and dry.  No rashes    Lymph Nodes: No cervical lymphadenopathy    Musculoskeletal: No joint swelling or erythema    Psychiatric: Pleasant affect    (01-23 @ 07:30)                      9.2  10.17 )-----------( 323                 28.8    Neutrophils = -- (--%)  Lymphocytes = -- (--%)  Eosinophils = -- (--%)  Basophils = -- (--%)  Monocytes = -- (--%)  Bands = --%    01-23    138  |  104  |  23  ----------------------------<  82  4.0   |  23  |  1.66<H>    Ca    8.3<L>      23 Jan 2018 07:26            RADIOLOGY & ADDITIONAL TESTS:    Imaging Personally Reviewed:  Consultant(s) Notes Reviewed:  [x]  Care Discussed with Consultants/Other Providers:

## 2018-01-24 LAB
GLUCOSE BLDC GLUCOMTR-MCNC: 100 MG/DL — HIGH (ref 70–99)
GLUCOSE BLDC GLUCOMTR-MCNC: 108 MG/DL — HIGH (ref 70–99)
GLUCOSE BLDC GLUCOMTR-MCNC: 118 MG/DL — HIGH (ref 70–99)
GLUCOSE BLDC GLUCOMTR-MCNC: 159 MG/DL — HIGH (ref 70–99)

## 2018-01-24 PROCEDURE — 99233 SBSQ HOSP IP/OBS HIGH 50: CPT

## 2018-01-24 RX ORDER — SIMETHICONE 80 MG/1
80 TABLET, CHEWABLE ORAL ONCE
Qty: 0 | Refills: 0 | Status: COMPLETED | OUTPATIENT
Start: 2018-01-24 | End: 2018-01-24

## 2018-01-24 RX ADMIN — ATORVASTATIN CALCIUM 80 MILLIGRAM(S): 80 TABLET, FILM COATED ORAL at 22:43

## 2018-01-24 RX ADMIN — Medication 100 MILLIGRAM(S): at 11:42

## 2018-01-24 RX ADMIN — Medication 12.5 MILLIGRAM(S): at 06:06

## 2018-01-24 RX ADMIN — Medication: at 12:40

## 2018-01-24 RX ADMIN — Medication 3 UNIT(S): at 18:44

## 2018-01-24 RX ADMIN — TICAGRELOR 90 MILLIGRAM(S): 90 TABLET ORAL at 21:51

## 2018-01-24 RX ADMIN — FINASTERIDE 5 MILLIGRAM(S): 5 TABLET, FILM COATED ORAL at 11:43

## 2018-01-24 RX ADMIN — Medication 3 UNIT(S): at 08:44

## 2018-01-24 RX ADMIN — Medication 250 MILLIGRAM(S): at 10:07

## 2018-01-24 RX ADMIN — INSULIN GLARGINE 12 UNIT(S): 100 INJECTION, SOLUTION SUBCUTANEOUS at 22:46

## 2018-01-24 RX ADMIN — BENZOCAINE AND MENTHOL 1 LOZENGE: 5; 1 LIQUID ORAL at 06:06

## 2018-01-24 RX ADMIN — CEFTRIAXONE 100 GRAM(S): 500 INJECTION, POWDER, FOR SOLUTION INTRAMUSCULAR; INTRAVENOUS at 14:31

## 2018-01-24 RX ADMIN — PANTOPRAZOLE SODIUM 40 MILLIGRAM(S): 20 TABLET, DELAYED RELEASE ORAL at 06:06

## 2018-01-24 RX ADMIN — SIMETHICONE 80 MILLIGRAM(S): 80 TABLET, CHEWABLE ORAL at 02:48

## 2018-01-24 RX ADMIN — BENZOCAINE AND MENTHOL 1 LOZENGE: 5; 1 LIQUID ORAL at 22:43

## 2018-01-24 RX ADMIN — Medication 81 MILLIGRAM(S): at 11:42

## 2018-01-24 RX ADMIN — ENOXAPARIN SODIUM 40 MILLIGRAM(S): 100 INJECTION SUBCUTANEOUS at 11:42

## 2018-01-24 RX ADMIN — TICAGRELOR 90 MILLIGRAM(S): 90 TABLET ORAL at 10:07

## 2018-01-24 RX ADMIN — Medication 1 MILLIGRAM(S): at 11:42

## 2018-01-24 RX ADMIN — Medication 12.5 MILLIGRAM(S): at 22:43

## 2018-01-24 RX ADMIN — BENZOCAINE AND MENTHOL 1 LOZENGE: 5; 1 LIQUID ORAL at 11:43

## 2018-01-24 RX ADMIN — Medication 3 UNIT(S): at 12:39

## 2018-01-24 RX ADMIN — Medication 1 TABLET(S): at 11:42

## 2018-01-24 NOTE — PROGRESS NOTE ADULT - ATTENDING COMMENTS
Case d/w NP, patient at bedside, ID (Dr. Small),  (Tressa).  Patient pending insurance authorization, possible D/C today.    Discharge time 35 minutes.     Patrick Bloom M.D.  Hospitalist  Pager: [phone]

## 2018-01-24 NOTE — PROGRESS NOTE ADULT - SUBJECTIVE AND OBJECTIVE BOX
Patient is a 72y old  Male who presents with a chief complaint of Fall (09 Jan 2018 00:52)        SUBJECTIVE / OVERNIGHT EVENTS: Patient seen and examined.  Feels well this AM, blood pressure stabilized.  No acute complaints.     MEDICATIONS  (STANDING):  aspirin  chewable 81 milliGRAM(s) Oral daily  atorvastatin 80 milliGRAM(s) Oral at bedtime  benzocaine 15 mG/menthol 3.6 mG Lozenge 1 Lozenge Oral three times a day  cefTRIAXone   IVPB 2 Gram(s) IV Intermittent every 24 hours  dextrose 5%. 1000 milliLiter(s) (50 mL/Hr) IV Continuous <Continuous>  dextrose 50% Injectable 12.5 Gram(s) IV Push once  dextrose 50% Injectable 25 Gram(s) IV Push once  dextrose 50% Injectable 25 Gram(s) IV Push once  docusate sodium 100 milliGRAM(s) Oral daily  enoxaparin Injectable 40 milliGRAM(s) SubCutaneous daily  finasteride 5 milliGRAM(s) Oral daily  folic acid 1 milliGRAM(s) Oral daily  influenza   Vaccine 0.5 milliLiter(s) IntraMuscular once  insulin glargine Injectable (LANTUS) 12 Unit(s) SubCutaneous at bedtime  insulin lispro (HumaLOG) corrective regimen sliding scale   SubCutaneous three times a day before meals  insulin lispro (HumaLOG) corrective regimen sliding scale   SubCutaneous at bedtime  insulin lispro Injectable (HumaLOG) 3 Unit(s) SubCutaneous three times a day with meals  metoprolol     tartrate 12.5 milliGRAM(s) Oral two times a day  multivitamin 1 Tablet(s) Oral daily  pantoprazole    Tablet 40 milliGRAM(s) Oral before breakfast  simethicone 80 milliGRAM(s) Chew every 8 hours  sodium chloride 0.9% lock flush 20 milliLiter(s) IV Push once  thiamine 100 milliGRAM(s) Oral daily  ticagrelor 90 milliGRAM(s) Oral every 12 hours  vancomycin  IVPB 1000 milliGRAM(s) IV Intermittent every 24 hours    MEDICATIONS  (PRN):  acetaminophen   Tablet. 650 milliGRAM(s) Oral every 6 hours PRN Moderate Pain (4 - 6)  dextrose Gel 1 Dose(s) Oral once PRN Blood Glucose LESS THAN 70 milliGRAM(s)/deciliter  glucagon  Injectable 1 milliGRAM(s) IntraMuscular once PRN Glucose LESS THAN 70 milligrams/deciliter  polyethylene glycol 3350 17 Gram(s) Oral daily PRN Constipation  sodium chloride 0.9% lock flush 10 milliLiter(s) IV Push every 1 hour PRN After each medication administration  sodium chloride 0.9% lock flush 10 milliLiter(s) IV Push every 12 hours PRN Lumen of catheter NOT used        REVIEW OF SYSTEMS      General: No fevers, chills  	  Ophthalmologic: No change in vision    Respiratory and Thorax: No SOB or cough  	  Cardiovascular: No chest pain, palpitations, or LE edema    Gastrointestinal: No abdominal pain, nausea, vomiting, or diarrhea    Genitourinary: No dysuria or polyuria    	    Vital Signs Last 24 Hrs  T(C): 36.5 (24 Jan 2018 11:25), Max: 36.9 (23 Jan 2018 21:25)  T(F): 97.7 (24 Jan 2018 11:25), Max: 98.5 (23 Jan 2018 21:25)  HR: 99 (24 Jan 2018 11:25) (90 - 99)  BP: 93/51 (24 Jan 2018 11:25) (93/51 - 113/54)  BP(mean): --  RR: 18 (24 Jan 2018 11:25) (18 - 18)  SpO2: 96% (24 Jan 2018 11:25) (96% - 99%)    I&O's Summary    23 Jan 2018 07:01  -  24 Jan 2018 07:00  --------------------------------------------------------  IN: 1200 mL / OUT: 1675 mL / NET: -475 mL    24 Jan 2018 07:01  -  24 Jan 2018 13:00  --------------------------------------------------------  IN: 240 mL / OUT: 0 mL / NET: 240 mL      CAPILLARY BLOOD GLUCOSE      POCT Blood Glucose.: 159 mg/dL (24 Jan 2018 12:03)  POCT Blood Glucose.: 100 mg/dL (24 Jan 2018 08:04)  POCT Blood Glucose.: 111 mg/dL (23 Jan 2018 21:15)  POCT Blood Glucose.: 101 mg/dL (23 Jan 2018 17:36)  POCT Blood Glucose.: 103 mg/dL (23 Jan 2018 16:30)              PHYSICAL EXAM:      Constitutional: Well-appearing, NAD, lying in bed, appears stated age    Eyes: EOMI, PERRLA, clear conjunctiva    ENMT: No pharyngeal erythema, mucus membranes moist    Neck: No JVD    Back: No spinal tenderness or kyphosis    Respiratory: Lungs clear to auscultation     Cardiovascular: Regular rate and rhythm, S1S2+, no murmurs appreciated.  trace pitting LE edema    Gastrointestinal: Soft, non-tender, non-distended, bowel sounds positive all four quadrants    Extremities: no clubbing or cyanosis    Vascular: 2+ pulses radially and dorsalis pedis    Neurological: Alert and oriented to name, place, and date.  Cranial nerves II-XII intact.  No focal neurological deficits.  5/5 motor strength all four extremities    Skin: Warm and dry.  No rashes    Lymph Nodes: No cervical lymphadenopathy    Musculoskeletal: No joint swelling or erythema    Psychiatric: Pleasant affect      No labs today.                                      RADIOLOGY & ADDITIONAL TESTS:    Imaging Personally Reviewed:  Consultant(s) Notes Reviewed:  [x]  Care Discussed with Consultants/Other Providers:

## 2018-01-25 DIAGNOSIS — I50.31 ACUTE DIASTOLIC (CONGESTIVE) HEART FAILURE: ICD-10-CM

## 2018-01-25 LAB
ALBUMIN SERPL ELPH-MCNC: 2.4 G/DL — LOW (ref 3.3–5)
ALP SERPL-CCNC: 73 U/L — SIGNIFICANT CHANGE UP (ref 40–120)
ALT FLD-CCNC: 11 U/L RC — SIGNIFICANT CHANGE UP (ref 10–45)
ANION GAP SERPL CALC-SCNC: 12 MMOL/L — SIGNIFICANT CHANGE UP (ref 5–17)
APTT BLD: 38.5 SEC — HIGH (ref 27.5–37.4)
AST SERPL-CCNC: 21 U/L — SIGNIFICANT CHANGE UP (ref 10–40)
BILIRUB SERPL-MCNC: 0.5 MG/DL — SIGNIFICANT CHANGE UP (ref 0.2–1.2)
BUN SERPL-MCNC: 24 MG/DL — HIGH (ref 7–23)
CALCIUM SERPL-MCNC: 8.1 MG/DL — LOW (ref 8.4–10.5)
CHLORIDE SERPL-SCNC: 102 MMOL/L — SIGNIFICANT CHANGE UP (ref 96–108)
CK MB CFR SERPL CALC: 2.8 NG/ML — SIGNIFICANT CHANGE UP (ref 0–6.7)
CK SERPL-CCNC: 31 U/L — SIGNIFICANT CHANGE UP (ref 30–200)
CO2 SERPL-SCNC: 22 MMOL/L — SIGNIFICANT CHANGE UP (ref 22–31)
CREAT SERPL-MCNC: 1.79 MG/DL — HIGH (ref 0.5–1.3)
D DIMER BLD IA.RAPID-MCNC: 579 NG/ML DDU — HIGH
GAS PNL BLDA: SIGNIFICANT CHANGE UP
GLUCOSE BLDC GLUCOMTR-MCNC: 126 MG/DL — HIGH (ref 70–99)
GLUCOSE BLDC GLUCOMTR-MCNC: 152 MG/DL — HIGH (ref 70–99)
GLUCOSE BLDC GLUCOMTR-MCNC: 94 MG/DL — SIGNIFICANT CHANGE UP (ref 70–99)
GLUCOSE BLDC GLUCOMTR-MCNC: 96 MG/DL — SIGNIFICANT CHANGE UP (ref 70–99)
GLUCOSE BLDC GLUCOMTR-MCNC: 99 MG/DL — SIGNIFICANT CHANGE UP (ref 70–99)
GLUCOSE SERPL-MCNC: 111 MG/DL — HIGH (ref 70–99)
HCT VFR BLD CALC: 29.5 % — LOW (ref 39–50)
HGB BLD-MCNC: 10 G/DL — LOW (ref 13–17)
INR BLD: 1.08 RATIO — SIGNIFICANT CHANGE UP (ref 0.88–1.16)
MCHC RBC-ENTMCNC: 32.6 PG — SIGNIFICANT CHANGE UP (ref 27–34)
MCHC RBC-ENTMCNC: 33.8 GM/DL — SIGNIFICANT CHANGE UP (ref 32–36)
MCV RBC AUTO: 96.4 FL — SIGNIFICANT CHANGE UP (ref 80–100)
NT-PROBNP SERPL-SCNC: HIGH PG/ML (ref 0–300)
PLATELET # BLD AUTO: 308 K/UL — SIGNIFICANT CHANGE UP (ref 150–400)
POTASSIUM SERPL-MCNC: 3.7 MMOL/L — SIGNIFICANT CHANGE UP (ref 3.5–5.3)
POTASSIUM SERPL-SCNC: 3.7 MMOL/L — SIGNIFICANT CHANGE UP (ref 3.5–5.3)
PROT SERPL-MCNC: 6.5 G/DL — SIGNIFICANT CHANGE UP (ref 6–8.3)
PROTHROM AB SERPL-ACNC: 11.8 SEC — SIGNIFICANT CHANGE UP (ref 9.8–12.7)
RBC # BLD: 3.06 M/UL — LOW (ref 4.2–5.8)
RBC # FLD: 13.4 % — SIGNIFICANT CHANGE UP (ref 10.3–14.5)
SODIUM SERPL-SCNC: 136 MMOL/L — SIGNIFICANT CHANGE UP (ref 135–145)
TROPONIN T SERPL-MCNC: 0.18 NG/ML — HIGH (ref 0–0.06)
WBC # BLD: 11.7 K/UL — HIGH (ref 3.8–10.5)
WBC # FLD AUTO: 11.7 K/UL — HIGH (ref 3.8–10.5)

## 2018-01-25 PROCEDURE — 99233 SBSQ HOSP IP/OBS HIGH 50: CPT

## 2018-01-25 PROCEDURE — 99233 SBSQ HOSP IP/OBS HIGH 50: CPT | Mod: GC

## 2018-01-25 PROCEDURE — 93010 ELECTROCARDIOGRAM REPORT: CPT

## 2018-01-25 PROCEDURE — 71045 X-RAY EXAM CHEST 1 VIEW: CPT | Mod: 26

## 2018-01-25 RX ORDER — FUROSEMIDE 40 MG
20 TABLET ORAL EVERY 12 HOURS
Qty: 0 | Refills: 0 | Status: DISCONTINUED | OUTPATIENT
Start: 2018-01-25 | End: 2018-01-27

## 2018-01-25 RX ORDER — SIMETHICONE 80 MG/1
80 TABLET, CHEWABLE ORAL ONCE
Qty: 0 | Refills: 0 | Status: COMPLETED | OUTPATIENT
Start: 2018-01-25 | End: 2018-01-25

## 2018-01-25 RX ORDER — FUROSEMIDE 40 MG
20 TABLET ORAL ONCE
Qty: 0 | Refills: 0 | Status: COMPLETED | OUTPATIENT
Start: 2018-01-25 | End: 2018-01-25

## 2018-01-25 RX ORDER — IPRATROPIUM/ALBUTEROL SULFATE 18-103MCG
3 AEROSOL WITH ADAPTER (GRAM) INHALATION ONCE
Qty: 0 | Refills: 0 | Status: COMPLETED | OUTPATIENT
Start: 2018-01-25 | End: 2018-01-25

## 2018-01-25 RX ADMIN — TICAGRELOR 90 MILLIGRAM(S): 90 TABLET ORAL at 22:12

## 2018-01-25 RX ADMIN — Medication 81 MILLIGRAM(S): at 11:42

## 2018-01-25 RX ADMIN — Medication 100 MILLIGRAM(S): at 11:42

## 2018-01-25 RX ADMIN — Medication 3 UNIT(S): at 12:02

## 2018-01-25 RX ADMIN — Medication 3 UNIT(S): at 17:18

## 2018-01-25 RX ADMIN — Medication 1: at 12:02

## 2018-01-25 RX ADMIN — Medication 250 MILLIGRAM(S): at 09:35

## 2018-01-25 RX ADMIN — Medication 3 UNIT(S): at 08:32

## 2018-01-25 RX ADMIN — PANTOPRAZOLE SODIUM 40 MILLIGRAM(S): 20 TABLET, DELAYED RELEASE ORAL at 06:14

## 2018-01-25 RX ADMIN — Medication 20 MILLIGRAM(S): at 06:51

## 2018-01-25 RX ADMIN — FINASTERIDE 5 MILLIGRAM(S): 5 TABLET, FILM COATED ORAL at 11:42

## 2018-01-25 RX ADMIN — BENZOCAINE AND MENTHOL 1 LOZENGE: 5; 1 LIQUID ORAL at 13:04

## 2018-01-25 RX ADMIN — BENZOCAINE AND MENTHOL 1 LOZENGE: 5; 1 LIQUID ORAL at 06:14

## 2018-01-25 RX ADMIN — Medication 3 MILLILITER(S): at 02:22

## 2018-01-25 RX ADMIN — INSULIN GLARGINE 12 UNIT(S): 100 INJECTION, SOLUTION SUBCUTANEOUS at 22:12

## 2018-01-25 RX ADMIN — Medication 12.5 MILLIGRAM(S): at 20:29

## 2018-01-25 RX ADMIN — Medication 12.5 MILLIGRAM(S): at 09:12

## 2018-01-25 RX ADMIN — CEFTRIAXONE 100 GRAM(S): 500 INJECTION, POWDER, FOR SOLUTION INTRAMUSCULAR; INTRAVENOUS at 12:54

## 2018-01-25 RX ADMIN — SIMETHICONE 80 MILLIGRAM(S): 80 TABLET, CHEWABLE ORAL at 02:02

## 2018-01-25 RX ADMIN — Medication 1 MILLIGRAM(S): at 11:42

## 2018-01-25 RX ADMIN — Medication 1 TABLET(S): at 11:43

## 2018-01-25 RX ADMIN — TICAGRELOR 90 MILLIGRAM(S): 90 TABLET ORAL at 08:34

## 2018-01-25 RX ADMIN — BENZOCAINE AND MENTHOL 1 LOZENGE: 5; 1 LIQUID ORAL at 22:12

## 2018-01-25 RX ADMIN — ENOXAPARIN SODIUM 40 MILLIGRAM(S): 100 INJECTION SUBCUTANEOUS at 11:42

## 2018-01-25 RX ADMIN — ATORVASTATIN CALCIUM 80 MILLIGRAM(S): 80 TABLET, FILM COATED ORAL at 22:12

## 2018-01-25 NOTE — CHART NOTE - NSCHARTNOTEFT_GEN_A_CORE
Medicine PA    SUBJECTIVE: Notified by RN - pt c/o SOB, vital signs - , RR 24, SpO2 94%, T 99.1, /63. Patient placed on 2L NC. Patient seen and examined at bedside. Patient with slight increased WOB. Pt reports feeling SOB and having palpitations. Patient reports improvement on oxygen.     T(C): 36.9 (01-25-18 @ 01:53), Max: 37.3 (01-25-18 @ 01:40)  HR: 111 (01-25-18 @ 01:53) (88 - 120)  BP: 106/64 (01-25-18 @ 01:53) (91/50 - 122/63)  RR: 20 (01-25-18 @ 01:53) (18 - 24)  SpO2: 96% (01-25-18 @ 01:53) (94% - 98%)    PHYSICAL EXAM:  GENERAL: A&Ox3, in NAD  HEAD:  Atraumatic, Normocephalic  EYES: EOMI, PERRLA, conjunctiva and sclera clear  NECK: Supple, No JVD  CHEST/LUNG: Clear to auscultation bilaterally; No wheeze/rales/rhonchi  HEART: S1, S2. Regular rate and rhythm; No murmurs, rubs, or gallops  ABDOMEN: Nondistended, Normoactive Bowel sounds x 4, Soft, Nontender  EXTREMITIES:  2+ Peripheral Pulses, No clubbing, cyanosis, or edema  NEUROLOGY: non-focal  SKIN: No rashes or lesions    LABS:                        9.2    10.17 )-----------( 323      ( 23 Jan 2018 07:30 )             28.8     01-23    138  |  104  |  23  ----------------------------<  82  4.0   |  23  |  1.66<H>    Ca    8.3<L>      23 Jan 2018 07:26                RADIOLOGY & ADDITIONAL TESTS:    ASSESSMENT/PLAN:   HPI:  Patient is a 72 year-old male with past medical history of T2DM, DLD, alcohol misuse brought in by family after having been found face down on the floor for approximately 2 days. Patient was currently residing alone on one floor of the house (wife away in [place]) while other family members including a son live on other floors within the same house. He was not checked upon for 2 days. Patient reports multiple recent episodes of falls at home due to physical instability". He reports chronic alcohol use, which family states ranges from 6-12 beers almost daily. He has been more lethargic lately, with forgetfulness and disorientationHe also endorses fever, chills, dysuria and urinary frequency. Patient denies SOB, chest pain, back pain, diarrhea, melena/hematochezia, recent travel, sick contact or surgery. Patient is originally from [place] and has been in the  for 9 years.     ED course: Initial EKG noted for IW ST-elevations. Patient was hypotensive to 70/40 with HR in 170bpm with Atrial fibrillation + RVR on EKG. Labs notable for WBC 26K, lac 5.2 and troponin 0.6. Given IV NS blus x 5L and multiple DCCV with temporary resumption of NSR in 80s bpm before return to atrial fibrillation. (09 Jan 2018 00:52)      1)    Contact #______ Medicine PA    SUBJECTIVE: Notified by RN - pt c/o SOB, vital signs - , RR 24, SpO2 94% on room air, T 99.1, /63. Patient placed on 2L NC. Patient seen and examined at bedside. Patient with slight increased WOB. Pt reports feeling SOB and having palpitations. Patient reports improvement on oxygen. Patient reports increased swelling of legs last couple days and an increase in cough. Patient denies chest pain, dizziness/lightheadedness, abd pain, N/V, headache, leg pain.    T(C): 36.9 (01-25-18 @ 01:53), Max: 37.3 (01-25-18 @ 01:40)  HR: 111 (01-25-18 @ 01:53) (88 - 120)  BP: 106/64 (01-25-18 @ 01:53) (91/50 - 122/63)  RR: 20 (01-25-18 @ 01:53) (18 - 24)  SpO2: 96% (01-25-18 @ 01:53) (94% - 98%)    PHYSICAL EXAM:  GENERAL: A&Ox3, slight increased WOB  NECK: Supple, No JVD  CHEST/LUNG: Slight bibasilar rales b/l  HEART: S1, S2. Regular rate and rhythm; No murmurs, rubs, or gallops  ABDOMEN: Nondistended, Normoactive Bowel sounds x 4, Soft, Nontender  EXTREMITIES:  2+ Peripheral Pulses, B/L 1+ Pitting Edema  NEUROLOGY: non-focal  SKIN: No rashes or lesions    LABS:          RADIOLOGY & ADDITIONAL TESTS:    < from: Xray Chest 1 View AP- PORTABLE-Urgent (01.25.18 @ 02:36) >  EXAM:  XR CHEST PORTABLE URGENT 1V                        PROCEDURE DATE:  01/25/2018      ******PRELIMINARY REPORT******    ******PRELIMINARY REPORT******          INTERPRETATION:  mild pulmonary edema. small b/l pleural effusions.    < end of copied text >    ASSESSMENT/PLAN:   72 year old man with PMH of ETOH abuse and DM-2; presented with fall, found to have culture negative aortic valve vegetation/endocarditis w/ NSTEMI, hospital course complicated by ELIAN secondary to cardiac catheterization. Pt now with SOB, increased WOB requiring 2L NC.    1)SOB - requiring 2L NC (SpO2 increased to 97%) - likely fluid overload, r/o ACS, r/o new infection, consider PE  -Pt has S&S of fluid overload on exam, BNP increased from 3078 to 74172, CXR prelim shows mild pulm edema, small b/l pleural effusions which is slightly increased from CXR on 1/8 but not flash pulm edema  -ABG - WNL, lactate normal, slight leukocytosis   -EKG - Sinus tach , RBBB (old), no new ST-T wave changes compared to prior EKGs  -Troponin downtrending 0.28 to 0.18, CK normal  -D-dimer elevated 579  -Discussed case with overnight hospitalist Dr. Allen - will monitor pt overnight on 2L NC as patient is comfortable, Strict I&O, Daily weight, Will consider trial of IV lasix in AM depending on patient status overnight and urine output. Will f/u nephrology for recs regarding lasix due to patient recent ELIAN (cr 1.79)    Kevin Taylor PA-C  Department of Medicine  #[id number] Medicine PA    SUBJECTIVE: Notified by RN - pt c/o SOB, vital signs - , RR 24, SpO2 94% on room air, T 99.1, /63. Patient placed on 2L NC. Patient seen and examined at bedside. Patient with slight increased WOB. Pt reports feeling SOB and having palpitations. Patient reports improvement on oxygen. Patient reports increased swelling of legs last couple days and an increase in cough. Patient denies chest pain, dizziness/lightheadedness, abd pain, N/V, headache, leg pain.    T(C): 36.9 (01-25-18 @ 01:53), Max: 37.3 (01-25-18 @ 01:40)  HR: 111 (01-25-18 @ 01:53) (88 - 120)  BP: 106/64 (01-25-18 @ 01:53) (91/50 - 122/63)  RR: 20 (01-25-18 @ 01:53) (18 - 24)  SpO2: 96% (01-25-18 @ 01:53) (94% - 98%)    PHYSICAL EXAM:  GENERAL: A&Ox3, slight increased WOB  NECK: Supple, No JVD  CHEST/LUNG: Slight bibasilar rales b/l  HEART: S1, S2. Regular rate and rhythm; No murmurs, rubs, or gallops  ABDOMEN: Nondistended, Normoactive Bowel sounds x 4, Soft, Nontender  EXTREMITIES:  2+ Peripheral Pulses, B/L 1+ Pitting Edema  NEUROLOGY: non-focal  SKIN: No rashes or lesions    LABS:          RADIOLOGY & ADDITIONAL TESTS:    < from: Xray Chest 1 View AP- PORTABLE-Urgent (01.25.18 @ 02:36) >  EXAM:  XR CHEST PORTABLE URGENT 1V                        PROCEDURE DATE:  01/25/2018      ******PRELIMINARY REPORT******    ******PRELIMINARY REPORT******          INTERPRETATION:  mild pulmonary edema. small b/l pleural effusions.    < end of copied text >    ASSESSMENT/PLAN:   72 year old man with PMH of ETOH abuse and DM-2; presented with fall, found to have culture negative aortic valve vegetation/endocarditis w/ NSTEMI, hospital course complicated by ELIAN secondary to cardiac catheterization. Pt now with SOB, increased WOB requiring 2L NC.    1)SOB - requiring 2L NC (SpO2 increased to 97%) - likely fluid overload, r/o ACS, r/o new infection, consider PE  -Pt has S&S of fluid overload on exam, BNP increased from 3078 to 65064, CXR prelim shows mild pulm edema, small b/l pleural effusions which is slightly increased from CXR on 1/8 but not flash pulm edema  -ABG - WNL, lactate normal, slight leukocytosis   -EKG - Sinus tach , RBBB (old), no new ST-T wave changes compared to prior EKGs  -Tele - SR/ST 90s-105, 120 briefly for 5 min tonight  -Troponin downtrending 0.28 to 0.18, CK normal  -D-dimer elevated 579  -Discussed case with overnight hospitalist Dr. Allen - will monitor pt overnight on 2L NC as patient is comfortable, Strict I&O, Daily weight, Will consider trial of IV lasix in AM depending on patient status overnight and urine output. Will f/u nephrology for recs regarding lasix due to patient recent ELIAN (cr 1.79)    Kevin Taylor PA-C  Department of Medicine  #[id number] Medicine PA    SUBJECTIVE: Notified by RN - pt c/o SOB, vital signs - , RR 24, SpO2 94% on room air, T 99.1, /63. Patient placed on 2L NC. Patient seen and examined at bedside. Patient with slight increased WOB. Pt reports feeling SOB and having palpitations. Patient reports improvement on oxygen. Patient reports increased swelling of legs last couple days and an increase in cough. Patient denies chest pain, dizziness/lightheadedness, abd pain, N/V, headache, leg pain.    T(C): 36.9 (01-25-18 @ 01:53), Max: 37.3 (01-25-18 @ 01:40)  HR: 111 (01-25-18 @ 01:53) (88 - 120)  BP: 106/64 (01-25-18 @ 01:53) (91/50 - 122/63)  RR: 20 (01-25-18 @ 01:53) (18 - 24)  SpO2: 96% (01-25-18 @ 01:53) (94% - 98%)    PHYSICAL EXAM:  GENERAL: A&Ox3, slight increased WOB  NECK: Supple, No JVD  CHEST/LUNG: Slight bibasilar rales b/l  HEART: S1, S2. Regular rate and rhythm; No murmurs, rubs, or gallops  ABDOMEN: Nondistended, Normoactive Bowel sounds x 4, Soft, Nontender  EXTREMITIES:  2+ Peripheral Pulses, B/L 1+ Pitting Edema  NEUROLOGY: non-focal  SKIN: No rashes or lesions    LABS:          RADIOLOGY & ADDITIONAL TESTS:    < from: Xray Chest 1 View AP- PORTABLE-Urgent (01.25.18 @ 02:36) >  EXAM:  XR CHEST PORTABLE URGENT 1V                        PROCEDURE DATE:  01/25/2018      ******PRELIMINARY REPORT******    ******PRELIMINARY REPORT******          INTERPRETATION:  mild pulmonary edema. small b/l pleural effusions.    < end of copied text >    ASSESSMENT/PLAN:   72 year old man with PMH of ETOH abuse and DM-2; presented with fall, found to have culture negative aortic valve vegetation/endocarditis w/ NSTEMI, hospital course complicated by ELIAN secondary to cardiac catheterization. Pt now with SOB, increased WOB requiring 2L NC.    1)SOB - requiring 2L NC (SpO2 increased to 97%) - likely fluid overload, r/o ACS, r/o new infection, consider PE  -Pt has S&S of fluid overload on exam, BNP increased from 3078 to 88799, CXR prelim shows mild pulm edema, small b/l pleural effusions which is slightly increased from CXR on 1/8 but not flash pulm edema  -ABG - WNL, lactate normal, slight leukocytosis   -EKG - Sinus tach , RBBB (old), no new ST-T wave changes compared to prior EKGs  -Tele - SR/ST 90s-105, 120 briefly for 5 min tonight  -Troponin downtrending 0.28 to 0.18, CK normal  -D-dimer elevated 579  -Urine output 800 ml overnight so far since 7PM (1/24)  -Discussed case with overnight hospitalist Dr. Allen - will monitor pt overnight on 2L NC as patient is comfortable, Strict I&O, Daily weight, Will consider trial of IV lasix in AM depending on patient status overnight and urine output. Will f/u nephrology for recs regarding lasix due to patient recent ELIAN (cr 1.79)    Kevin Taylor PA-C  Department of Medicine  #[id number] Medicine PA    SUBJECTIVE: Notified by RN - pt c/o SOB, vital signs - , RR 24, SpO2 94% on room air, T 99.1, /63. Patient placed on 2L NC. Patient seen and examined at bedside. Patient with slight increased WOB. Pt reports feeling SOB and having palpitations. Patient reports improvement on oxygen. Patient reports increased swelling of legs last couple days and an increase in cough. Patient denies chest pain, dizziness/lightheadedness, abd pain, N/V, headache, leg pain.    T(C): 36.9 (01-25-18 @ 01:53), Max: 37.3 (01-25-18 @ 01:40)  HR: 111 (01-25-18 @ 01:53) (88 - 120)  BP: 106/64 (01-25-18 @ 01:53) (91/50 - 122/63)  RR: 20 (01-25-18 @ 01:53) (18 - 24)  SpO2: 96% (01-25-18 @ 01:53) (94% - 98%)    PHYSICAL EXAM:  GENERAL: A&Ox3, slight increased WOB  NECK: Supple, No JVD  CHEST/LUNG: Slight bibasilar rales b/l  HEART: S1, S2. Regular rate and rhythm; No murmurs, rubs, or gallops  ABDOMEN: Nondistended, Normoactive Bowel sounds x 4, Soft, Nontender  EXTREMITIES:  2+ Peripheral Pulses, B/L 1+ Pitting Edema  NEUROLOGY: non-focal  SKIN: No rashes or lesions    LABS:          RADIOLOGY & ADDITIONAL TESTS:    < from: Xray Chest 1 View AP- PORTABLE-Urgent (01.25.18 @ 02:36) >  EXAM:  XR CHEST PORTABLE URGENT 1V                        PROCEDURE DATE:  01/25/2018      ******PRELIMINARY REPORT******    ******PRELIMINARY REPORT******          INTERPRETATION:  mild pulmonary edema. small b/l pleural effusions.    < end of copied text >    ASSESSMENT/PLAN:   72 year old man with PMH of ETOH abuse and DM-2; presented with fall, found to have culture negative aortic valve vegetation/endocarditis w/ NSTEMI, hospital course complicated by ELIAN secondary to cardiac catheterization. Pt now with SOB, increased WOB requiring 2L NC.    1)SOB - requiring 2L NC (SpO2 increased to 97%) - likely fluid overload, r/o ACS, r/o new infection, consider PE  -Pt has S&S of fluid overload on exam, BNP increased from 3078 to 49889, CXR prelim shows mild pulm edema, small b/l pleural effusions which is slightly increased from CXR on 1/8 but not flash pulm edema  -ABG - WNL, lactate normal, slight leukocytosis   -EKG - Sinus tach , RBBB (old), no new ST-T wave changes compared to prior EKGs  -Tele - SR/ST 90s-105, 120 briefly for 5 min tonight  -Troponin downtrending 0.28 to 0.18, CK normal  -D-dimer elevated 579  -Urine output 800 ml overnight so far since 7PM (1/24)  -Discussed case with overnight hospitalist Dr. Allen - will monitor pt overnight on 2L NC as patient is comfortable, Strict I&O, Daily weight, Will consider trial of IV lasix in AM depending on patient status overnight and urine output. Need nephrology f/u for recs regarding lasix due to patient recent ELIAN (cr 1.79)  -Will f/u primary team in AM    Kevin Taylor PA-C  Department of Medicine  #[id number] Medicine PA    SUBJECTIVE: Notified by RN - pt c/o SOB, vital signs - , RR 24, SpO2 94% on room air, T 99.1, /63. Patient placed on 2L NC. Patient seen and examined at bedside. Patient with slight increased WOB. Pt reports feeling SOB and having palpitations. Patient reports improvement on oxygen. Patient reports increased swelling of legs last couple days and an increase in cough. Patient denies chest pain, dizziness/lightheadedness, abd pain, N/V, headache, leg pain.    T(C): 36.9 (01-25-18 @ 01:53), Max: 37.3 (01-25-18 @ 01:40)  HR: 111 (01-25-18 @ 01:53) (88 - 120)  BP: 106/64 (01-25-18 @ 01:53) (91/50 - 122/63)  RR: 20 (01-25-18 @ 01:53) (18 - 24)  SpO2: 96% (01-25-18 @ 01:53) (94% - 98%)    PHYSICAL EXAM:  GENERAL: A&Ox3, slight increased WOB  NECK: Supple, No JVD  CHEST/LUNG: Slight bibasilar rales b/l  HEART: S1, S2. Regular rate and rhythm; No murmurs, rubs, or gallops  ABDOMEN: Nondistended, Normoactive Bowel sounds x 4, Soft, Nontender  EXTREMITIES:  2+ Peripheral Pulses, B/L 1+ Pitting Edema  NEUROLOGY: non-focal  SKIN: No rashes or lesions    LABS:          RADIOLOGY & ADDITIONAL TESTS:    < from: Xray Chest 1 View AP- PORTABLE-Urgent (01.25.18 @ 02:36) >  EXAM:  XR CHEST PORTABLE URGENT 1V                        PROCEDURE DATE:  01/25/2018      ******PRELIMINARY REPORT******    ******PRELIMINARY REPORT******          INTERPRETATION:  mild pulmonary edema. small b/l pleural effusions.    < end of copied text >    ASSESSMENT/PLAN:   72 year old man with PMH of ETOH abuse and DM-2; presented with fall, found to have culture negative aortic valve vegetation/endocarditis w/ NSTEMI, hospital course complicated by ELIAN secondary to cardiac catheterization. Pt now with SOB, increased WOB requiring 2L NC.    1)SOB - requiring 2L NC (SpO2 increased to 97%) - likely fluid overload, r/o ACS, r/o new infection, consider PE  -Pt has S&S of fluid overload on exam, BNP increased from 3078 to 57173, CXR prelim shows mild pulm edema, small b/l pleural effusions which is slightly increased from CXR on 1/8 but not flash pulm edema  -ABG - WNL, lactate normal, slight leukocytosis   -EKG - Sinus tach , RBBB (old), no new ST-T wave changes compared to prior EKGs  -Tele - SR/ST 90s-105, 120 briefly for 5 min tonight  -Troponin downtrending 0.28 to 0.18, CK normal  -D-dimer elevated 579 - will order V/Q scan due to ELIAN and B/L LE Dopplers  -Urine output 800 ml overnight so far since 7PM (1/24)  -Discussed case with overnight hospitalist Dr. Allen - will monitor pt overnight on 2L NC as patient is comfortable, Strict I&O, Daily weight, Will consider trial of IV lasix in depending on patient status overnight and urine output. Need nephrology f/u for recs regarding lasix due to patient recent ELIAN (cr 1.79)  -Will f/u primary team in AM    Kevin Taylor PA-C  Department of Medicine  #[id number]

## 2018-01-25 NOTE — PROGRESS NOTE ADULT - ASSESSMENT
Patient is a 72 year-old male with past medical history of T2DM, DLD, alcohol misuse brought in by family after having been found face down on the floor for approximately 2 days found in hemodynamically unstable A-fib with RVR (170s bpm) s/p multiple cardioversions and initially requiring pressor support in the setting of septic shock. Concluded to have culture negative aortic valve endocarditis.     Endocarditis of aortic valve. JANES with large vegetation (1.4 x 0.8) on aortic valve leaflet. Per CT surgery, vegetation appears chronic, heavily calcified. Unlikely to embolize. Has PICC line, ongoing antibiotics.  Continuing Vancomycin and zosyn, per ID  Now hear prominent murmur of AI along with recurrence of heart failure, need repeat transthoracic echo and depending on results consider repeat JANES.    Now with recurrent CHF, chest x-ray consistent with pulmonary vascular congestion.  IV furosemide 20 mg BID (blood pressures relatively low, thus agree with this lower dose.    CAD. [facility] with proximal LAD disease (70% stenosis), 60% stenosis of RCA  -No immediate plan for CABG  -Continue ASA, brillinta, and lipitor    ELIAN. prerenal   -renal following

## 2018-01-25 NOTE — PROGRESS NOTE ADULT - SUBJECTIVE AND OBJECTIVE BOX
HPI:  Last night recurrence of shortness of breath. No fevers or chills. Has dry raspy cough.    Medications:  acetaminophen   Tablet. 650 milliGRAM(s) Oral every 6 hours PRN  aspirin  chewable 81 milliGRAM(s) Oral daily  atorvastatin 80 milliGRAM(s) Oral at bedtime  benzocaine 15 mG/menthol 3.6 mG Lozenge 1 Lozenge Oral three times a day  cefTRIAXone   IVPB 2 Gram(s) IV Intermittent every 24 hours  dextrose 5%. 1000 milliLiter(s) IV Continuous <Continuous>  dextrose 50% Injectable 12.5 Gram(s) IV Push once  dextrose 50% Injectable 25 Gram(s) IV Push once  dextrose 50% Injectable 25 Gram(s) IV Push once  dextrose Gel 1 Dose(s) Oral once PRN  docusate sodium 100 milliGRAM(s) Oral daily  enoxaparin Injectable 40 milliGRAM(s) SubCutaneous daily  finasteride 5 milliGRAM(s) Oral daily  folic acid 1 milliGRAM(s) Oral daily  furosemide   Injectable 20 milliGRAM(s) IV Push every 12 hours  glucagon  Injectable 1 milliGRAM(s) IntraMuscular once PRN  influenza   Vaccine 0.5 milliLiter(s) IntraMuscular once  insulin glargine Injectable (LANTUS) 12 Unit(s) SubCutaneous at bedtime  insulin lispro (HumaLOG) corrective regimen sliding scale   SubCutaneous three times a day before meals  insulin lispro (HumaLOG) corrective regimen sliding scale   SubCutaneous at bedtime  insulin lispro Injectable (HumaLOG) 3 Unit(s) SubCutaneous three times a day with meals  metoprolol     tartrate 12.5 milliGRAM(s) Oral two times a day  multivitamin 1 Tablet(s) Oral daily  pantoprazole    Tablet 40 milliGRAM(s) Oral before breakfast  polyethylene glycol 3350 17 Gram(s) Oral daily PRN  simethicone 80 milliGRAM(s) Chew every 8 hours  sodium chloride 0.9% lock flush 10 milliLiter(s) IV Push every 1 hour PRN  sodium chloride 0.9% lock flush 10 milliLiter(s) IV Push every 12 hours PRN  sodium chloride 0.9% lock flush 20 milliLiter(s) IV Push once  thiamine 100 milliGRAM(s) Oral daily  ticagrelor 90 milliGRAM(s) Oral every 12 hours  vancomycin  IVPB 1000 milliGRAM(s) IV Intermittent every 24 hours    PMH/PSH/FH/SH:  Unchanged    ROS:  CONSTITUTIONAL: No fever, chills, or fatigue  EYES: No eye pain, visual disturbances, or discharge  ENMT:  No difficulty hearing, tinnitus, vertigo; No sinus or throat pain  NECK: No pain or stiffness  RESPIRATORY: cough without wheezing  CARDIOVASCULAR: No chest pain, palpitations, dizziness, but has leg edema  GASTROINTESTINAL: No abdominal or epigastric pain. No nausea, vomiting, or hematemesis; No diarrhea or constipation. No melena or hematochezia.  GENITOURINARY: No dysuria, frequency, hematuria, or incontinence  NEUROLOGICAL: No headaches, memory loss, loss of strength, numbness, or tremors  SKIN: No itching, burning, rashes, or lesions   MUSCULOSKELETAL: No joint pain or swelling; No muscle, back, or extremity pain  PSYCHIATRIC: No depression, anxiety, mood swings, or difficulty sleeping                 Vitals:  T(C): 36.7 (18 @ 11:30), Max: 37.3 (18 @ 01:40)  HR: 95 (18 @ 11:30) (88 - 120)  BP: 90/44 (18 @ 11:30) (90/44 - 122/63)  BP(mean): --  RR: 18 (18 @ 11:30) (18 - 24)  SpO2: 99% (18 @ 11:30) (94% - 99%)  Wt(kg): --  Daily     Daily Weight in k.9 (2018 08:12)    Physical exam:  Appearance:  Normal, NAD  Eyes: PERRL, EOMI  HENT: Normal oral [place] NC/AT  Cardiovascular: S1, S2, RRR, gr ii/vi short systolic murmur base, prominent diastolic blow along LSB,  without rub, 1+ pretibial edema, JVD +  Procedural Access Site: No hematoma, Non-tender to palpation, 2+ pulse , No bruit, No Ecchymosis  Respiratory: CTAB  Gastrointestinal: Soft, Non-tender, Non-distended, BS+  Musculoskeletal:  No clubbing, No joint deformity   Neurologic: Non-focal    I&O's Summary    2018 07:01  -  2018 07:00  --------------------------------------------------------  IN: 960 mL / OUT: 1350 mL / NET: -390 mL    2018 07:  -  2018 15:00  --------------------------------------------------------  IN: 900 mL / OUT: 800 mL / NET: 100 mL                              10.0   11.7  )-----------( 308      ( 2018 03:17 )             29.5         136  |  102  |  24<H>  ----------------------------<  111<H>  3.7   |  22  |  1.79<H>    Ca    8.1<L>      2018 03:17    TPro  6.5  /  Alb  2.4<L>  /  TBili  0.5  /  DBili  x   /  AST  21  /  ALT  11  /  AlkPhos  73      PT/INR - ( 2018 03:17 )   PT: 11.8 sec;   INR: 1.08 ratio         PTT - ( 2018 03:17 )  PTT:38.5 sec  CARDIAC MARKERS ( 2018 03:17 )  x     / 0.18 ng/mL / 31 U/L / x     / 2.8 ng/mL      Serum Pro-Brain Natriuretic Peptide: 96729 pg/mL ( @ 03:17)    Interpretation of Telemetry: sinus, mild sinus tachycardia

## 2018-01-25 NOTE — PROGRESS NOTE ADULT - ATTENDING COMMENTS
Case d/w NP, patient at bedside,  (Tressa).  d/w cardiology fellow.  Case d/w patient's friend (and family representative) at bedside.     Patrick Bloom M.D.  Hospitalist  Pager: [phone]

## 2018-01-25 NOTE — PROGRESS NOTE ADULT - SUBJECTIVE AND OBJECTIVE BOX
Patient is a 72y old  Male who presents with a chief complaint of Fall (09 Jan 2018 00:52)        SUBJECTIVE / OVERNIGHT EVENTS: Patient seen and examined.  Developed increasing SOB overnight and found to be hypoxic.  Had associated cough and LE edema.  Denied fevers, chills, sore throat.  Denies chest pain.      MEDICATIONS  (STANDING):  aspirin  chewable 81 milliGRAM(s) Oral daily  atorvastatin 80 milliGRAM(s) Oral at bedtime  benzocaine 15 mG/menthol 3.6 mG Lozenge 1 Lozenge Oral three times a day  cefTRIAXone   IVPB 2 Gram(s) IV Intermittent every 24 hours  dextrose 5%. 1000 milliLiter(s) (50 mL/Hr) IV Continuous <Continuous>  dextrose 50% Injectable 12.5 Gram(s) IV Push once  dextrose 50% Injectable 25 Gram(s) IV Push once  dextrose 50% Injectable 25 Gram(s) IV Push once  docusate sodium 100 milliGRAM(s) Oral daily  enoxaparin Injectable 40 milliGRAM(s) SubCutaneous daily  finasteride 5 milliGRAM(s) Oral daily  folic acid 1 milliGRAM(s) Oral daily  influenza   Vaccine 0.5 milliLiter(s) IntraMuscular once  insulin glargine Injectable (LANTUS) 12 Unit(s) SubCutaneous at bedtime  insulin lispro (HumaLOG) corrective regimen sliding scale   SubCutaneous three times a day before meals  insulin lispro (HumaLOG) corrective regimen sliding scale   SubCutaneous at bedtime  insulin lispro Injectable (HumaLOG) 3 Unit(s) SubCutaneous three times a day with meals  metoprolol     tartrate 12.5 milliGRAM(s) Oral two times a day  multivitamin 1 Tablet(s) Oral daily  pantoprazole    Tablet 40 milliGRAM(s) Oral before breakfast  simethicone 80 milliGRAM(s) Chew every 8 hours  sodium chloride 0.9% lock flush 20 milliLiter(s) IV Push once  thiamine 100 milliGRAM(s) Oral daily  ticagrelor 90 milliGRAM(s) Oral every 12 hours  vancomycin  IVPB 1000 milliGRAM(s) IV Intermittent every 24 hours    MEDICATIONS  (PRN):  acetaminophen   Tablet. 650 milliGRAM(s) Oral every 6 hours PRN Moderate Pain (4 - 6)  dextrose Gel 1 Dose(s) Oral once PRN Blood Glucose LESS THAN 70 milliGRAM(s)/deciliter  glucagon  Injectable 1 milliGRAM(s) IntraMuscular once PRN Glucose LESS THAN 70 milligrams/deciliter  polyethylene glycol 3350 17 Gram(s) Oral daily PRN Constipation  sodium chloride 0.9% lock flush 10 milliLiter(s) IV Push every 1 hour PRN After each medication administration  sodium chloride 0.9% lock flush 10 milliLiter(s) IV Push every 12 hours PRN Lumen of catheter NOT used        REVIEW OF SYSTEMS      General: No fevers, chills  	  Ophthalmologic: No change in vision    Respiratory and Thorax: +SOB, +cough  	  Cardiovascular: No chest pain, palpitations, or LE edema    Gastrointestinal: No abdominal pain, nausea, vomiting, or diarrhea    Genitourinary: No dysuria or polyuria    	    Vital Signs Last 24 Hrs  T(C): 36.7 (25 Jan 2018 11:30), Max: 37.3 (25 Jan 2018 01:40)  T(F): 98.1 (25 Jan 2018 11:30), Max: 99.1 (25 Jan 2018 01:40)  HR: 95 (25 Jan 2018 11:30) (88 - 120)  BP: 90/44 (25 Jan 2018 11:30) (90/44 - 122/63)  BP(mean): --  RR: 18 (25 Jan 2018 11:30) (18 - 24)  SpO2: 99% (25 Jan 2018 11:30) (94% - 99%)    I&O's Summary    24 Jan 2018 07:01  -  25 Jan 2018 07:00  --------------------------------------------------------  IN: 960 mL / OUT: 1350 mL / NET: -390 mL    25 Jan 2018 07:01  -  25 Jan 2018 14:39  --------------------------------------------------------  IN: 900 mL / OUT: 800 mL / NET: 100 mL      CAPILLARY BLOOD GLUCOSE      POCT Blood Glucose.: 152 mg/dL (25 Jan 2018 11:45)  POCT Blood Glucose.: 99 mg/dL (25 Jan 2018 08:11)  POCT Blood Glucose.: 126 mg/dL (25 Jan 2018 02:48)  POCT Blood Glucose.: 108 mg/dL (24 Jan 2018 21:43)  POCT Blood Glucose.: 118 mg/dL (24 Jan 2018 16:47)                PHYSICAL EXAM:      Constitutional: Well-appearing, no resp distress on O2, lying in bed, appears stated age    Eyes: EOMI, PERRLA, clear conjunctiva    ENMT: No pharyngeal erythema, mucus membranes moist    Neck: No JVD    Back: No spinal tenderness or kyphosis    Respiratory: b/l crackles at lower lung fields    Cardiovascular: Regular rate and rhythm, I/VI systolic murmur. 1+ pitting edema b/l     Gastrointestinal: Soft, non-tender, non-distended, bowel sounds positive all four quadrants    Extremities: no clubbing or cyanosis    Vascular: 2+ pulses radially and dorsalis pedis    Neurological: Alert and oriented to name, place, and date.  Cranial nerves II-XII intact.  No focal neurological deficits.  5/5 motor strength all four extremities    Skin: Warm and dry.  No rashes    Lymph Nodes: No cervical lymphadenopathy    Musculoskeletal: No joint swelling or erythema    Psychiatric: Pleasant affect      (01-25 @ 03:17)                      10.0  11.7 )-----------( 308                 29.5    Neutrophils = -- (--%)  Lymphocytes = -- (--%)  Eosinophils = -- (--%)  Basophils = -- (--%)  Monocytes = -- (--%)  Bands = --%    01-25    136  |  102  |  24<H>  ----------------------------<  111<H>  3.7   |  22  |  1.79<H>    Ca    8.1<L>      25 Jan 2018 03:17    TPro  6.5  /  Alb  2.4<L>  /  TBili  0.5  /  DBili  x   /  AST  21  /  ALT  11  /  AlkPhos  73  01-25    ( 25 Jan 2018 03:17 )   PT: 11.8 sec;   INR: 1.08 ratio;       PTT:38.5 sec  CARDIAC MARKERS ( 25 Jan 2018 03:17 )  Trop 0.18 ng/mL<H> / CK 31 U/L / CKMB x                 Arterial Blood Gas:  01-25 @ 02:35  7.46/32/143/22/99/-.5  ABG lactate: --              RADIOLOGY & ADDITIONAL TESTS:    Imaging Personally Reviewed:  Consultant(s) Notes Reviewed:  [x]  Care Discussed with Consultants/Other Providers: [x]

## 2018-01-25 NOTE — PROGRESS NOTE ADULT - ATTENDING COMMENTS
Endocarditis on antibiotics. Cultures remained negative, no specific organism identified. Stabilized for period, but now recurrence of decompensated heart failure. IV furosemide as above. Murmur may be more prominent. Need repeat transthoracic echo. Continue course of antibiotic.

## 2018-01-25 NOTE — PROGRESS NOTE ADULT - PROBLEM SELECTOR PLAN 1
Patient with evidence of acute diastolic CHF.  Given lasix 20 mg IV this AM with some improvement this afternoon, but remains hypoxic.    - Start lasix 20 mg IV BID, close BP monitoring  - Case d/w cardiology fellow, will reevaluate

## 2018-01-26 LAB
ALBUMIN SERPL ELPH-MCNC: 2.5 G/DL — LOW (ref 3.3–5)
ALP SERPL-CCNC: 72 U/L — SIGNIFICANT CHANGE UP (ref 40–120)
ALT FLD-CCNC: 9 U/L RC — LOW (ref 10–45)
ANION GAP SERPL CALC-SCNC: 10 MMOL/L — SIGNIFICANT CHANGE UP (ref 5–17)
ANION GAP SERPL CALC-SCNC: 11 MMOL/L — SIGNIFICANT CHANGE UP (ref 5–17)
APTT BLD: 39.5 SEC — HIGH (ref 27.5–37.4)
AST SERPL-CCNC: 24 U/L — SIGNIFICANT CHANGE UP (ref 10–40)
BASE EXCESS BLDA CALC-SCNC: -0.3 MMOL/L — SIGNIFICANT CHANGE UP (ref -2–2)
BASOPHILS # BLD AUTO: 0.1 K/UL — SIGNIFICANT CHANGE UP (ref 0–0.2)
BASOPHILS NFR BLD AUTO: 0.6 % — SIGNIFICANT CHANGE UP (ref 0–2)
BILIRUB SERPL-MCNC: 0.4 MG/DL — SIGNIFICANT CHANGE UP (ref 0.2–1.2)
BLD GP AB SCN SERPL QL: NEGATIVE — SIGNIFICANT CHANGE UP
BUN SERPL-MCNC: 26 MG/DL — HIGH (ref 7–23)
BUN SERPL-MCNC: 29 MG/DL — HIGH (ref 7–23)
CALCIUM SERPL-MCNC: 8.3 MG/DL — LOW (ref 8.4–10.5)
CALCIUM SERPL-MCNC: 8.3 MG/DL — LOW (ref 8.4–10.5)
CHLORIDE SERPL-SCNC: 104 MMOL/L — SIGNIFICANT CHANGE UP (ref 96–108)
CHLORIDE SERPL-SCNC: 104 MMOL/L — SIGNIFICANT CHANGE UP (ref 96–108)
CK MB CFR SERPL CALC: 3.9 NG/ML — SIGNIFICANT CHANGE UP (ref 0–6.7)
CK SERPL-CCNC: 39 U/L — SIGNIFICANT CHANGE UP (ref 30–200)
CO2 BLDA-SCNC: 24 MMOL/L — SIGNIFICANT CHANGE UP (ref 22–30)
CO2 SERPL-SCNC: 24 MMOL/L — SIGNIFICANT CHANGE UP (ref 22–31)
CO2 SERPL-SCNC: 24 MMOL/L — SIGNIFICANT CHANGE UP (ref 22–31)
CREAT SERPL-MCNC: 1.88 MG/DL — HIGH (ref 0.5–1.3)
CREAT SERPL-MCNC: 1.91 MG/DL — HIGH (ref 0.5–1.3)
EOSINOPHIL # BLD AUTO: 0.1 K/UL — SIGNIFICANT CHANGE UP (ref 0–0.5)
EOSINOPHIL NFR BLD AUTO: 0.5 % — SIGNIFICANT CHANGE UP (ref 0–6)
GAS PNL BLDA: SIGNIFICANT CHANGE UP
GLUCOSE BLDC GLUCOMTR-MCNC: 154 MG/DL — HIGH (ref 70–99)
GLUCOSE BLDC GLUCOMTR-MCNC: 84 MG/DL — SIGNIFICANT CHANGE UP (ref 70–99)
GLUCOSE BLDC GLUCOMTR-MCNC: 88 MG/DL — SIGNIFICANT CHANGE UP (ref 70–99)
GLUCOSE BLDC GLUCOMTR-MCNC: 96 MG/DL — SIGNIFICANT CHANGE UP (ref 70–99)
GLUCOSE SERPL-MCNC: 84 MG/DL — SIGNIFICANT CHANGE UP (ref 70–99)
GLUCOSE SERPL-MCNC: 86 MG/DL — SIGNIFICANT CHANGE UP (ref 70–99)
HCO3 BLDA-SCNC: 22 MMOL/L — SIGNIFICANT CHANGE UP (ref 21–29)
HCT VFR BLD CALC: 29.4 % — LOW (ref 39–50)
HCT VFR BLD CALC: 29.7 % — LOW (ref 39–50)
HGB BLD-MCNC: 10.1 G/DL — LOW (ref 13–17)
HGB BLD-MCNC: 9.9 G/DL — LOW (ref 13–17)
INR BLD: 1.09 RATIO — SIGNIFICANT CHANGE UP (ref 0.88–1.16)
LYMPHOCYTES # BLD AUTO: 1 K/UL — SIGNIFICANT CHANGE UP (ref 1–3.3)
LYMPHOCYTES # BLD AUTO: 9.6 % — LOW (ref 13–44)
MAGNESIUM SERPL-MCNC: 2 MG/DL — SIGNIFICANT CHANGE UP (ref 1.6–2.6)
MCHC RBC-ENTMCNC: 32.3 PG — SIGNIFICANT CHANGE UP (ref 27–34)
MCHC RBC-ENTMCNC: 33.2 GM/DL — SIGNIFICANT CHANGE UP (ref 32–36)
MCHC RBC-ENTMCNC: 33.2 PG — SIGNIFICANT CHANGE UP (ref 27–34)
MCHC RBC-ENTMCNC: 34.3 GM/DL — SIGNIFICANT CHANGE UP (ref 32–36)
MCV RBC AUTO: 97 FL — SIGNIFICANT CHANGE UP (ref 80–100)
MCV RBC AUTO: 97.4 FL — SIGNIFICANT CHANGE UP (ref 80–100)
MONOCYTES # BLD AUTO: 0.6 K/UL — SIGNIFICANT CHANGE UP (ref 0–0.9)
MONOCYTES NFR BLD AUTO: 5.6 % — SIGNIFICANT CHANGE UP (ref 2–14)
NEUTROPHILS # BLD AUTO: 8.7 K/UL — HIGH (ref 1.8–7.4)
NEUTROPHILS NFR BLD AUTO: 83.7 % — HIGH (ref 43–77)
NT-PROBNP SERPL-SCNC: HIGH PG/ML (ref 0–300)
PCO2 BLDA: 32 MMHG — SIGNIFICANT CHANGE UP (ref 32–46)
PH BLDA: 7.47 — HIGH (ref 7.35–7.45)
PHOSPHATE SERPL-MCNC: 3.9 MG/DL — SIGNIFICANT CHANGE UP (ref 2.5–4.5)
PLATELET # BLD AUTO: 287 K/UL — SIGNIFICANT CHANGE UP (ref 150–400)
PLATELET # BLD AUTO: 293 K/UL — SIGNIFICANT CHANGE UP (ref 150–400)
PO2 BLDA: 184 MMHG — HIGH (ref 74–108)
POTASSIUM SERPL-MCNC: 3.9 MMOL/L — SIGNIFICANT CHANGE UP (ref 3.5–5.3)
POTASSIUM SERPL-MCNC: 3.9 MMOL/L — SIGNIFICANT CHANGE UP (ref 3.5–5.3)
POTASSIUM SERPL-SCNC: 3.9 MMOL/L — SIGNIFICANT CHANGE UP (ref 3.5–5.3)
POTASSIUM SERPL-SCNC: 3.9 MMOL/L — SIGNIFICANT CHANGE UP (ref 3.5–5.3)
PROT SERPL-MCNC: 6.6 G/DL — SIGNIFICANT CHANGE UP (ref 6–8.3)
PROTHROM AB SERPL-ACNC: 11.9 SEC — SIGNIFICANT CHANGE UP (ref 9.8–12.7)
RBC # BLD: 3.03 M/UL — LOW (ref 4.2–5.8)
RBC # BLD: 3.05 M/UL — LOW (ref 4.2–5.8)
RBC # FLD: 13.6 % — SIGNIFICANT CHANGE UP (ref 10.3–14.5)
RBC # FLD: 13.9 % — SIGNIFICANT CHANGE UP (ref 10.3–14.5)
RH IG SCN BLD-IMP: POSITIVE — SIGNIFICANT CHANGE UP
SAO2 % BLDA: 100 % — HIGH (ref 92–96)
SODIUM SERPL-SCNC: 138 MMOL/L — SIGNIFICANT CHANGE UP (ref 135–145)
SODIUM SERPL-SCNC: 139 MMOL/L — SIGNIFICANT CHANGE UP (ref 135–145)
TROPONIN T SERPL-MCNC: 0.24 NG/ML — HIGH (ref 0–0.06)
WBC # BLD: 10.4 K/UL — SIGNIFICANT CHANGE UP (ref 3.8–10.5)
WBC # BLD: 11 K/UL — HIGH (ref 3.8–10.5)
WBC # FLD AUTO: 10.4 K/UL — SIGNIFICANT CHANGE UP (ref 3.8–10.5)
WBC # FLD AUTO: 11 K/UL — HIGH (ref 3.8–10.5)

## 2018-01-26 PROCEDURE — 71045 X-RAY EXAM CHEST 1 VIEW: CPT | Mod: 26

## 2018-01-26 PROCEDURE — 99233 SBSQ HOSP IP/OBS HIGH 50: CPT

## 2018-01-26 PROCEDURE — 93010 ELECTROCARDIOGRAM REPORT: CPT

## 2018-01-26 PROCEDURE — 93010 ELECTROCARDIOGRAM REPORT: CPT | Mod: 76

## 2018-01-26 PROCEDURE — 99233 SBSQ HOSP IP/OBS HIGH 50: CPT | Mod: GC

## 2018-01-26 PROCEDURE — 93308 TTE F-UP OR LMTD: CPT | Mod: 26

## 2018-01-26 PROCEDURE — 93321 DOPPLER ECHO F-UP/LMTD STD: CPT | Mod: 26

## 2018-01-26 RX ORDER — IPRATROPIUM/ALBUTEROL SULFATE 18-103MCG
3 AEROSOL WITH ADAPTER (GRAM) INHALATION ONCE
Qty: 0 | Refills: 0 | Status: COMPLETED | OUTPATIENT
Start: 2018-01-26 | End: 2018-01-26

## 2018-01-26 RX ORDER — ACETAMINOPHEN 500 MG
650 TABLET ORAL EVERY 6 HOURS
Qty: 0 | Refills: 0 | Status: DISCONTINUED | OUTPATIENT
Start: 2018-01-26 | End: 2018-01-30

## 2018-01-26 RX ORDER — POTASSIUM CHLORIDE 20 MEQ
20 PACKET (EA) ORAL ONCE
Qty: 0 | Refills: 0 | Status: COMPLETED | OUTPATIENT
Start: 2018-01-26 | End: 2018-01-26

## 2018-01-26 RX ORDER — ISOSORBIDE DINITRATE 5 MG/1
10 TABLET ORAL EVERY 8 HOURS
Qty: 0 | Refills: 0 | Status: DISCONTINUED | OUTPATIENT
Start: 2018-01-26 | End: 2018-01-29

## 2018-01-26 RX ORDER — HYDRALAZINE HCL 50 MG
10 TABLET ORAL EVERY 8 HOURS
Qty: 0 | Refills: 0 | Status: DISCONTINUED | OUTPATIENT
Start: 2018-01-26 | End: 2018-01-27

## 2018-01-26 RX ORDER — HEPARIN SODIUM 5000 [USP'U]/ML
5000 INJECTION INTRAVENOUS; SUBCUTANEOUS EVERY 8 HOURS
Qty: 0 | Refills: 0 | Status: DISCONTINUED | OUTPATIENT
Start: 2018-01-26 | End: 2018-01-26

## 2018-01-26 RX ORDER — INSULIN GLARGINE 100 [IU]/ML
6 INJECTION, SOLUTION SUBCUTANEOUS AT BEDTIME
Qty: 0 | Refills: 0 | Status: DISCONTINUED | OUTPATIENT
Start: 2018-01-26 | End: 2018-01-30

## 2018-01-26 RX ORDER — HEPARIN SODIUM 5000 [USP'U]/ML
5000 INJECTION INTRAVENOUS; SUBCUTANEOUS EVERY 8 HOURS
Qty: 0 | Refills: 0 | Status: DISCONTINUED | OUTPATIENT
Start: 2018-01-27 | End: 2018-01-30

## 2018-01-26 RX ADMIN — TICAGRELOR 90 MILLIGRAM(S): 90 TABLET ORAL at 08:57

## 2018-01-26 RX ADMIN — BENZOCAINE AND MENTHOL 1 LOZENGE: 5; 1 LIQUID ORAL at 05:36

## 2018-01-26 RX ADMIN — CEFTRIAXONE 100 GRAM(S): 500 INJECTION, POWDER, FOR SOLUTION INTRAMUSCULAR; INTRAVENOUS at 12:55

## 2018-01-26 RX ADMIN — Medication 650 MILLIGRAM(S): at 22:30

## 2018-01-26 RX ADMIN — FINASTERIDE 5 MILLIGRAM(S): 5 TABLET, FILM COATED ORAL at 12:55

## 2018-01-26 RX ADMIN — Medication 81 MILLIGRAM(S): at 12:55

## 2018-01-26 RX ADMIN — BENZOCAINE AND MENTHOL 1 LOZENGE: 5; 1 LIQUID ORAL at 22:29

## 2018-01-26 RX ADMIN — Medication 3 UNIT(S): at 08:57

## 2018-01-26 RX ADMIN — SODIUM CHLORIDE 10 MILLILITER(S): 9 INJECTION INTRAMUSCULAR; INTRAVENOUS; SUBCUTANEOUS at 22:00

## 2018-01-26 RX ADMIN — Medication 1 MILLIGRAM(S): at 12:53

## 2018-01-26 RX ADMIN — Medication 20 MILLIGRAM(S): at 05:36

## 2018-01-26 RX ADMIN — Medication 1: at 12:51

## 2018-01-26 RX ADMIN — ISOSORBIDE DINITRATE 10 MILLIGRAM(S): 5 TABLET ORAL at 22:41

## 2018-01-26 RX ADMIN — Medication 100 MILLIGRAM(S): at 12:53

## 2018-01-26 RX ADMIN — Medication 20 MILLIEQUIVALENT(S): at 23:14

## 2018-01-26 RX ADMIN — Medication 1 TABLET(S): at 12:53

## 2018-01-26 RX ADMIN — Medication 12.5 MILLIGRAM(S): at 05:36

## 2018-01-26 RX ADMIN — ATORVASTATIN CALCIUM 80 MILLIGRAM(S): 80 TABLET, FILM COATED ORAL at 22:29

## 2018-01-26 RX ADMIN — PANTOPRAZOLE SODIUM 40 MILLIGRAM(S): 20 TABLET, DELAYED RELEASE ORAL at 07:02

## 2018-01-26 RX ADMIN — BENZOCAINE AND MENTHOL 1 LOZENGE: 5; 1 LIQUID ORAL at 12:57

## 2018-01-26 RX ADMIN — INSULIN GLARGINE 6 UNIT(S): 100 INJECTION, SOLUTION SUBCUTANEOUS at 22:46

## 2018-01-26 RX ADMIN — ENOXAPARIN SODIUM 40 MILLIGRAM(S): 100 INJECTION SUBCUTANEOUS at 12:54

## 2018-01-26 RX ADMIN — Medication 3 MILLILITER(S): at 06:50

## 2018-01-26 RX ADMIN — Medication 12.5 MILLIGRAM(S): at 18:50

## 2018-01-26 RX ADMIN — Medication 650 MILLIGRAM(S): at 22:00

## 2018-01-26 RX ADMIN — Medication 10 MILLIGRAM(S): at 22:29

## 2018-01-26 RX ADMIN — Medication 100 MILLIGRAM(S): at 12:54

## 2018-01-26 RX ADMIN — Medication 250 MILLIGRAM(S): at 09:55

## 2018-01-26 RX ADMIN — Medication 3 UNIT(S): at 12:51

## 2018-01-26 NOTE — PROGRESS NOTE ADULT - PROBLEM SELECTOR PLAN 2
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    - c/w Vancomycin 1 g IV q24h until 2/26/18  - c/w ceftriaxone 2 g IV q24h until 2/26/18  - Will need weekly CBC, CMP, and vancomycin trough while at rehab  - Monitor vancomycin trough to possibly adjust dosing  - Outpatient f/u with ID clinic first week of March  -Dental recs appreciated; recommend biopsy of tongue lesion on discharge and outpatient dental f/u, no further inpatient workup

## 2018-01-26 NOTE — CONSULT NOTE ADULT - ASSESSMENT
72M, DM2, ETOH, recent hospitalization for Afib RVR, Cx negative endocarditis.  Now readmitted for ADHF after outpt trial of abx.       ADHF, Endocarditis   - his worstening CV status, newly worstened AI,  suggests indication for surgery.   - Seen by Dr. Mary in past, will reconsult.  f/u CTS recs.   - cont abx, ID  - JANES tomorrow (arranged)  - CCU admit.   - GOC discussion in the event of further decompensation (?impella, no IABP due to AI)  - afterload reduction for AI.

## 2018-01-26 NOTE — CHART NOTE - NSCHARTNOTEFT_GEN_A_CORE
Medicine PA    SUBJECTIVE:    T(C): 36.3 (01-26-18 @ 06:00), Max: 36.7 (01-25-18 @ 11:30)  HR: 117 (01-26-18 @ 06:00) (95 - 117)  BP: 117/53 (01-26-18 @ 06:00) (90/44 - 117/53)  RR: 20 (01-26-18 @ 06:00) (18 - 20)  SpO2: 95% (01-26-18 @ 06:00) (95% - 100%)    PHYSICAL EXAM:  GENERAL: A&Ox3, in NAD  HEAD:  Atraumatic, Normocephalic  EYES: EOMI, PERRLA, conjunctiva and sclera clear  NECK: Supple, No JVD  CHEST/LUNG: Clear to auscultation bilaterally; No wheeze/rales/rhonchi  HEART: S1, S2. Regular rate and rhythm; No murmurs, rubs, or gallops  ABDOMEN: Nondistended, Normoactive Bowel sounds x 4, Soft, Nontender  EXTREMITIES:  2+ Peripheral Pulses, No clubbing, cyanosis, or edema  NEUROLOGY: non-focal  SKIN: No rashes or lesions    LABS:                        10.1   11.0  )-----------( 293      ( 26 Jan 2018 06:39 )             29.4     01-26    138  |  104  |  26<H>  ----------------------------<  86  3.9   |  24  |  1.91<H>    Ca    8.3<L>      26 Jan 2018 06:39    TPro  6.5  /  Alb  2.4<L>  /  TBili  0.5  /  DBili  x   /  AST  21  /  ALT  11  /  AlkPhos  73  01-25    PT/INR - ( 25 Jan 2018 03:17 )   PT: 11.8 sec;   INR: 1.08 ratio         PTT - ( 25 Jan 2018 03:17 )  PTT:38.5 sec  CARDIAC MARKERS ( 26 Jan 2018 06:39 )  x     / 0.24 ng/mL / 39 U/L / x     / x      CARDIAC MARKERS ( 25 Jan 2018 03:17 )  x     / 0.18 ng/mL / 31 U/L / x     / 2.8 ng/mL          RADIOLOGY & ADDITIONAL TESTS:    ASSESSMENT/PLAN:   HPI:  Patient is a 72 year-old male with past medical history of T2DM, DLD, alcohol misuse brought in by family after having been found face down on the floor for approximately 2 days. Patient was currently residing alone on one floor of the house (wife away in [place]) while other family members including a son live on other floors within the same house. He was not checked upon for 2 days. Patient reports multiple recent episodes of falls at home due to physical instability". He reports chronic alcohol use, which family states ranges from 6-12 beers almost daily. He has been more lethargic lately, with forgetfulness and disorientationHe also endorses fever, chills, dysuria and urinary frequency. Patient denies SOB, chest pain, back pain, diarrhea, melena/hematochezia, recent travel, sick contact or surgery. Patient is originally from [place] and has been in the  for 9 years.     ED course: Initial EKG noted for IW ST-elevations. Patient was hypotensive to 70/40 with HR in 170bpm with Atrial fibrillation + RVR on EKG. Labs notable for WBC 26K, lac 5.2 and troponin 0.6. Given IV NS blus x 5L and multiple DCCV with temporary resumption of NSR in 80s bpm before return to atrial fibrillation. (09 Jan 2018 00:52)      1)    Contact #______ Medicine PA    SUBJECTIVE: Notified by RN - pt c/o SOB and also vague chest discomfort, . SpO2 97% on 2 L NC. Patient received IV lasix about 15 min prior to SOB. Patient seen and examined at bedside. Pt c/o SOB even with oxygen on and not much improvement with nonrebreather on. Patient feels like he is breathing fast and has mild chest discomfort as well. Patient denies N/V, lightheadedness, dizziness, headache, abd pain.    T(C): 36.3 (01-26-18 @ 06:00), Max: 36.7 (01-25-18 @ 11:30)  HR: 117 (01-26-18 @ 06:00) (95 - 117)  BP: 117/53 (01-26-18 @ 06:00) (90/44 - 117/53)  RR: 20 (01-26-18 @ 06:00) (18 - 20)  SpO2: 95% (01-26-18 @ 06:00) (95% - 100%)    PHYSICAL EXAM:  GENERAL: A&Ox3, in acute respiratory distress with accessory muscle use (abdominal breathing)  NECK: Supple, +JVD  CHEST/LUNG: Poor inspiratory effort, decreased breath sounds B/L bases  HEART: S1, S2. Slightly tachycardic. Regular rhythm; +systolic murmur  ABDOMEN: Nondistended, Normoactive Bowel sounds x 4, Soft, Nontender  EXTREMITIES:  2+ Peripheral Pulses, +1 B/L LE edema  NEUROLOGY: non-focal    LABS:                        10.1   11.0  )-----------( 293      ( 26 Jan 2018 06:39 )             29.4     01-26    138  |  104  |  26<H>  ----------------------------<  86  3.9   |  24  |  1.91<H>    Ca    8.3<L>      26 Jan 2018 06:39    TPro  6.5  /  Alb  2.4<L>  /  TBili  0.5  /  DBili  x   /  AST  21  /  ALT  11  /  AlkPhos  73  01-25    PT/INR - ( 25 Jan 2018 03:17 )   PT: 11.8 sec;   INR: 1.08 ratio         PTT - ( 25 Jan 2018 03:17 )  PTT:38.5 sec  CARDIAC MARKERS ( 26 Jan 2018 06:39 )  x     / 0.24 ng/mL / 39 U/L / x     / x      CARDIAC MARKERS ( 25 Jan 2018 03:17 )  x     / 0.18 ng/mL / 31 U/L / x     / 2.8 ng/mL      RADIOLOGY & ADDITIONAL TESTS:    ASSESSMENT/PLAN:   72 year old man with PMH of ETOH abuse and DM-2; presented with fall, found to have culture negative aortic valve vegetation/endocarditis w/ NSTEMI, hospital course complicated by ELIAN secondary to cardiac catheterization. Pt now with SOB, increased WOB requiring Bipap.    1) SOB - Worsening Fluid overload   -EKG - unchanged from previous ekg, Sinus Tachy , RBBB (old), no new St-t wave changes noted  -F/u final CXR read - still with pulmonary edema  -ABG - mostly WNL - ph 7.47  -Troponin increased slightly 0.18 to 0.24, CK normal, likely demand ischemia  -BNP worsening from 05714 to 08358  -Duoneb x 1  -Patient placed on Bipap - repeat ABG for approx 1 hour post ordered, Patient reported feeling better shortly after being on bipap  -Discussed case with overnight hospitalist Dr. Keith - agrees with bipap, rec more IV lasix but to reach out to cardiology fellow following patient  -Discussed case with cardiology fellow Dr. Tenorio - Cardiolgy fellow came and assessed patient at bedside - agreed with Bipap and to hold additional IV lasix for now since patient received IV lasix 20 mg recently and has been responding with good urine output yesterday after the lasix. Rec possibly giving more IV lasix later today if not improving.   -Case signed out to day team covering 4 [facility] - can recall cardiology fellow if patient condition worsening    Kevin Taylor PA-C  Department of Medicine  #[phone]

## 2018-01-26 NOTE — PROGRESS NOTE ADULT - PROBLEM SELECTOR PLAN 3
Cr slightly increasing, did not normalize to prior baseline.   - c/w monitoring BMP  - Avoid nephrotoxins

## 2018-01-26 NOTE — CONSULT NOTE ADULT - SUBJECTIVE AND OBJECTIVE BOX
HISTORY OF PRESENT ILLNESS:   72M, DM2, ETOH, recent hospitalization for Afib RVR, Cx negative endocarditis.    Now readmitted for ADHF after outpt trial of abx.  TTE showed worstened AI. CCU consult for more intensive monitoring.       Allergies    No Known Allergies    Intolerances    	    MEDICATIONS:  aspirin  chewable 81 milliGRAM(s) Oral daily  enoxaparin Injectable 40 milliGRAM(s) SubCutaneous daily  furosemide   Injectable 20 milliGRAM(s) IV Push every 12 hours  metoprolol     tartrate 12.5 milliGRAM(s) Oral two times a day  ticagrelor 90 milliGRAM(s) Oral every 12 hours    cefTRIAXone   IVPB 2 Gram(s) IV Intermittent every 24 hours  vancomycin  IVPB 1000 milliGRAM(s) IV Intermittent every 24 hours      acetaminophen   Tablet. 650 milliGRAM(s) Oral every 6 hours PRN    docusate sodium 100 milliGRAM(s) Oral daily  pantoprazole    Tablet 40 milliGRAM(s) Oral before breakfast  polyethylene glycol 3350 17 Gram(s) Oral daily PRN  simethicone 80 milliGRAM(s) Chew every 8 hours    atorvastatin 80 milliGRAM(s) Oral at bedtime  dextrose 50% Injectable 12.5 Gram(s) IV Push once  dextrose 50% Injectable 25 Gram(s) IV Push once  dextrose 50% Injectable 25 Gram(s) IV Push once  dextrose Gel 1 Dose(s) Oral once PRN  finasteride 5 milliGRAM(s) Oral daily  glucagon  Injectable 1 milliGRAM(s) IntraMuscular once PRN  insulin glargine Injectable (LANTUS) 12 Unit(s) SubCutaneous at bedtime  insulin lispro (HumaLOG) corrective regimen sliding scale   SubCutaneous three times a day before meals  insulin lispro (HumaLOG) corrective regimen sliding scale   SubCutaneous at bedtime  insulin lispro Injectable (HumaLOG) 3 Unit(s) SubCutaneous three times a day with meals    benzocaine 15 mG/menthol 3.6 mG Lozenge 1 Lozenge Oral three times a day  dextrose 5%. 1000 milliLiter(s) IV Continuous <Continuous>  folic acid 1 milliGRAM(s) Oral daily  influenza   Vaccine 0.5 milliLiter(s) IntraMuscular once  multivitamin 1 Tablet(s) Oral daily  thiamine 100 milliGRAM(s) Oral daily      PAST MEDICAL & SURGICAL HISTORY:  High cholesterol  Diabetes: type 2  No significant past surgical history      FAMILY HISTORY:  Family history of type 2 diabetes mellitus (Sibling)  Family history of lung cancer (Sibling)      SOCIAL HISTORY:    [ ] Non-smoker  [ ] Smoker  [ ] Alcohol      REVIEW OF SYSTEMS:  General: no fatigue/malaise, weight loss/gain.  Skin: no rashes.  Ophthalmologic: no blurred vision, no loss of vision. 	  ENT: no sore throat, rhinorrhea, sinus congestion.  Respiratory: no SOB, cough or wheeze.  Gastrointestinal:  no N/V/D, no melena/hematemesis/hematochezia.  Genitourinary: no dysuria/hesitancy or hematuria.  Musculoskeletal: no myalgias or arthralgias.  Neurological: no changes in vision or hearing, no lightheadedness/dizziness, no syncope/near syncope	  Psychiatric: no unusual stress/anxiety.   Hematology/Lymphatics: no unusual bleeding, bruising and no lymphadenopathy.  Endocrine: no unusual thirst.   All others negative except as stated above and in HPI.    PHYSICAL EXAM:  T(C): 36.7 (01-26-18 @ 11:29), Max: 36.7 (01-25-18 @ 20:25)  HR: 100 (01-26-18 @ 11:29) (100 - 117)  BP: 93/50 (01-26-18 @ 11:29) (93/50 - 117/53)  RR: 20 (01-26-18 @ 11:29) (18 - 20)  SpO2: 99% (01-26-18 @ 11:29) (95% - 100%)  Wt(kg): --  I&O's Summary    25 Jan 2018 07:01  -  26 Jan 2018 07:00  --------------------------------------------------------  IN: 1500 mL / OUT: 1575 mL / NET: -75 mL    26 Jan 2018 07:01  -  26 Jan 2018 17:33  --------------------------------------------------------  IN: 360 mL / OUT: 800 mL / NET: -440 mL        Appearance: Normal	  HEENT:   Normal oral mucosa, PERRL, EOMI	  Lymphatic: No lymphadenopathy  Cardiovascular: Normal S1 S2, No JVD, No murmurs, No edema  Respiratory: Lungs clear to auscultation	  Psychiatry: A & O x 3, Mood & affect appropriate  Gastrointestinal:  Soft, Non-tender, + BS	  Skin: No rashes, No ecchymoses, No cyanosis	  Neurologic: Non-focal  Extremities: Normal range of motion, No clubbing, cyanosis or edema  Vascular: Peripheral pulses palpable 2+ bilaterally        LABS:	 	    CBC Full  -  ( 26 Jan 2018 06:39 )  WBC Count : 11.0 K/uL  Hemoglobin : 10.1 g/dL  Hematocrit : 29.4 %  Platelet Count - Automated : 293 K/uL  Mean Cell Volume : 97.0 fl  Mean Cell Hemoglobin : 33.2 pg  Mean Cell Hemoglobin Concentration : 34.3 gm/dL  Auto Neutrophil # : x  Auto Lymphocyte # : x  Auto Monocyte # : x  Auto Eosinophil # : x  Auto Basophil # : x  Auto Neutrophil % : x  Auto Lymphocyte % : x  Auto Monocyte % : x  Auto Eosinophil % : x  Auto Basophil % : x    01-26    138  |  104  |  26<H>  ----------------------------<  86  3.9   |  24  |  1.91<H>  01-25    136  |  102  |  24<H>  ----------------------------<  111<H>  3.7   |  22  |  1.79<H>    Ca    8.3<L>      26 Jan 2018 06:39  Ca    8.1<L>      25 Jan 2018 03:17    TPro  6.5  /  Alb  2.4<L>  /  TBili  0.5  /  DBili  x   /  AST  21  /  ALT  11  /  AlkPhos  73  01-25      proBNP: Serum Pro-Brain Natriuretic Peptide: 32280 pg/mL (01-26 @ 06:39)    Lipid Profile:   HgA1c:   TSH:       CARDIAC MARKERS:            TELEMETRY: 	    ECG:  	  RADIOLOGY:  OTHER: 	    PREVIOUS DIAGNOSTIC TESTING:    [ ] Echocardiogram: < from: Limited Transthoracic Echo (01.26.18 @ 10:25) >    Patient name: SAMANTHA CADENA  YOB: 1945   Age: 72 (M)   MR#: [id number]  Study Date: 1/26/2018  Location: [address] [place], [place] [id number]: [facility]  Study quality: Technically good  Referring Physician: Patrick Bloom MD  Blood Pressure: 92/54 mmHg  Height: 170 cm  Weight: 72 kg  BSA: 1.8 m2  Heart Rate: 101 mmHg  ------------------------------------------------------------------------  PROCEDURE: Limited transthoracic echocardiogram with 2-D.  M-Mode and spectral and color flow Doppler.  INDICATION: Endocarditis, valve unspecified (I38)  ------------------------------------------------------------------------  Observations:  Aortic Valve/Aorta: Calcified trileaflet aortic valve.  Echogenic structure seen on the aortic valve consistent  with vegetation.   Severe aortic regurgitation.  ------------------------------------------------------------------------  Conclusions:  1.  Calcified trileaflet aortic valve with decreased  opening. Echogenic structure seen on the aortic valve  consistent with vegetation.  It measures 1.1cm by 0.8 cm.  The vegatation is highly mobile traversing from the LVOT  into the aorta.   Severe aortic regurgitation.  ------------------------------------------------------------------------  Confirmed on  1/26/2018 - 12:42:57 by Shorty Lagos M.D.  --------------------------------------------------------------------    < end of copied text >    [ ]  Catheterization:  [ ] Stress Test:

## 2018-01-26 NOTE — PROGRESS NOTE ADULT - ASSESSMENT
72 year old man with PMH of ETOH abuse and DM-2; presented with fall, found to have culture negative aortic valve vegetation/endocarditis w/ NSTEMI, hospital course complicated by ELAIN secondary to cardiac catheterization, with course c/b acute decompensated CHF suspect secondary to worsening valvular disease.

## 2018-01-26 NOTE — CHART NOTE - NSCHARTNOTEFT_GEN_A_CORE
Patient seen and examined no acute distress  Cardiac surgery called for change in patient status  worsening AI:  ECHo reveals:  < from: Limited Transthoracic Echo (01.26.18 @ 10:25) >  Calcified trileaflet aortic valve with decreased  opening. Echogenic structure seen on the aortic valve  consistent with vegetation.  It measures 1.1cm by 0.8 cm.  The vegatation is highly mobile traversing from the LVOT  into the aorta.   Severe aortic regurgitation.  D/W Dr Mary recommending  JANES .    Fawn Gonzalez NP CTS  [phone]

## 2018-01-26 NOTE — PROGRESS NOTE ADULT - PROBLEM SELECTOR PLAN 1
Patient with evidence of acute diastolic CHF, concern for progressive valvular disease.  TTE reviewed, shows severe aortic stenosis with significant vegetation.  Case d/w cardiology and CT surgery NP.  Plan to review TTE with Dr. Salgado this afternoon.  Patient likely will need more urgent valvular surgery, possibly repeat JANES  - f/u cardiology and CT SX recs  - c/w lasix 20 mg IV BID, close BP monitoring  - Strict I/O's

## 2018-01-26 NOTE — PROGRESS NOTE ADULT - ATTENDING COMMENTS
72 year-old man T2DM, alcohol misuse brought in by family after having been found face down on the floor for approximately 2 days, hemodynamically unstable, atrial fibrillation with RVR (170s bpm) required pressor support in the setting of septic shock. Concluded to have culture negative aortic valve endocarditis. Left heart cath demonstrated proximal LAD disease (70% stenosis), 60% stenosis. Continue ASA, ticagrelor and atorvastatin.    Endocarditis of aortic valve. JANES with large vegetation (1.4 x 0.8) on aortic valve leaflet, appearing chronic, heavily calcified. Unlikely to embolize. Has PICC line, ongoing antibiotics, Vancomycin and Cephtriaxone, per ID     Decompensated again after week of improvement. Now hear prominent murmur of AI along with recurrence of heart failure, need repeat transthoracic echo being done today and depending on results consider repeat JANES. Treating recurrent CHF, chest x-ray consistent with pulmonary vascular congestion with IV furosemide 20 mg BID (blood pressures relatively low, thus agree with this lower dose. 72 year-old man T2DM, alcohol misuse brought in by family after having been found face down on the floor for approximately 2 days, hemodynamically unstable, atrial fibrillation with RVR (170s bpm) required pressor support in the setting of septic shock. Concluded to have culture negative aortic valve endocarditis. Left heart cath demonstrated proximal LAD disease (70% stenosis), 60% stenosis. Continue ASA, ticagrelor and atorvastatin.    Endocarditis of aortic valve. JANES with large vegetation (1.4 x 0.8) on aortic valve leaflet, appearing chronic, heavily calcified. Unlikely to embolize. Has PICC line, ongoing antibiotics, Vancomycin and Cephtriaxone, per ID     Decompensated again after week of improvement. Short of breath, orhtopnea, unable to sleep lying in bed. Now hear prominent murmur of AI. Repeat transthoracic echo done today. Have reviewed study and compared side by side with prior. Aortic valve may be more disrupted, pressure half time of aortic regurgitant jet much shorter. Need to strongly consider repeat JANES. Continue more aggressively treating recurrent CHF. Chest x-ray remains consistent with pulmonary vascular congestion and not distinctly improved from prior day. Treat with IV furosemide 20 mg BID as  blood pressures relatively low. Contacting CT Surgery to make aware.

## 2018-01-26 NOTE — PROGRESS NOTE ADULT - SUBJECTIVE AND OBJECTIVE BOX
Patient seen and examined at bedside.    Overnight Events: SOB mildly improved. No CP.    Review Of Systems:   CONSTITUTIONAL: No fever, chills, or fatigue  EYES: No eye pain, visual disturbances, or discharge  ENMT:  No difficulty hearing, tinnitus, vertigo; No sinus or throat pain  NECK: No pain or stiffness  RESPIRATORY: +SOB, cough  CARDIOVASCULAR: No chest pain, palpitations, dizziness, but has leg edema  GASTROINTESTINAL: No abdominal or epigastric pain. No nausea, vomiting, or hematemesis; No diarrhea or constipation. No melena or hematochezia.  GENITOURINARY: No dysuria, frequency, hematuria, or incontinence  NEUROLOGICAL: No headaches, memory loss, loss of strength, numbness, or tremors  SKIN: No itching, burning, rashes, or lesions   MUSCULOSKELETAL: No joint pain or swelling; No muscle, back, or extremity pain  PSYCHIATRIC: No depression, anxiety, mood swings, or difficulty sleeping                   Medications:  acetaminophen   Tablet. 650 milliGRAM(s) Oral every 6 hours PRN  aspirin  chewable 81 milliGRAM(s) Oral daily  atorvastatin 80 milliGRAM(s) Oral at bedtime  benzocaine 15 mG/menthol 3.6 mG Lozenge 1 Lozenge Oral three times a day  cefTRIAXone   IVPB 2 Gram(s) IV Intermittent every 24 hours  dextrose 5%. 1000 milliLiter(s) IV Continuous <Continuous>  dextrose 50% Injectable 12.5 Gram(s) IV Push once  dextrose 50% Injectable 25 Gram(s) IV Push once  dextrose 50% Injectable 25 Gram(s) IV Push once  dextrose Gel 1 Dose(s) Oral once PRN  docusate sodium 100 milliGRAM(s) Oral daily  enoxaparin Injectable 40 milliGRAM(s) SubCutaneous daily  finasteride 5 milliGRAM(s) Oral daily  folic acid 1 milliGRAM(s) Oral daily  furosemide   Injectable 20 milliGRAM(s) IV Push every 12 hours  glucagon  Injectable 1 milliGRAM(s) IntraMuscular once PRN  influenza   Vaccine 0.5 milliLiter(s) IntraMuscular once  insulin glargine Injectable (LANTUS) 12 Unit(s) SubCutaneous at bedtime  insulin lispro (HumaLOG) corrective regimen sliding scale   SubCutaneous three times a day before meals  insulin lispro (HumaLOG) corrective regimen sliding scale   SubCutaneous at bedtime  insulin lispro Injectable (HumaLOG) 3 Unit(s) SubCutaneous three times a day with meals  metoprolol     tartrate 12.5 milliGRAM(s) Oral two times a day  multivitamin 1 Tablet(s) Oral daily  pantoprazole    Tablet 40 milliGRAM(s) Oral before breakfast  polyethylene glycol 3350 17 Gram(s) Oral daily PRN  simethicone 80 milliGRAM(s) Chew every 8 hours  sodium chloride 0.9% lock flush 10 milliLiter(s) IV Push every 1 hour PRN  sodium chloride 0.9% lock flush 10 milliLiter(s) IV Push every 12 hours PRN  sodium chloride 0.9% lock flush 20 milliLiter(s) IV Push once  thiamine 100 milliGRAM(s) Oral daily  ticagrelor 90 milliGRAM(s) Oral every 12 hours  vancomycin  IVPB 1000 milliGRAM(s) IV Intermittent every 24 hours      PAST MEDICAL & SURGICAL HISTORY:  High cholesterol  Diabetes: type 2  No significant past surgical history      Vitals:  T(F): 98 (01-26), Max: 98.1 (01-25)  HR: 103 (01-26) (95 - 103)  BP: 101/44 (01-26) (90/44 - 106/45)  RR: 18 (01-26)  SpO2: 97% (01-26)  I&O's Summary    24 Jan 2018 07:01  -  25 Jan 2018 07:00  --------------------------------------------------------  IN: 960 mL / OUT: 1350 mL / NET: -390 mL    25 Jan 2018 07:01  -  26 Jan 2018 05:54  --------------------------------------------------------  IN: 1260 mL / OUT: 1325 mL / NET: -65 mL        Physical Exam:  Appearance:  Normal, NAD  Eyes: PERRL, EOMI  HENT: JVD to mid neck, MMM  Respiratory: CTAB  Cardiovascular: S1, S2, RRR, II/VI systolic murmur heard best at the base  Gastrointestinal: Soft, Non-tender, Non-distended, BS+  Musculoskeletal:  No clubbing, No joint deformity   Ext: 1+ BLE edema  Neurologic: Non-focal                          10.0   11.7  )-----------( 308      ( 25 Jan 2018 03:17 )             29.5     01-25    136  |  102  |  24<H>  ----------------------------<  111<H>  3.7   |  22  |  1.79<H>    Ca    8.1<L>      25 Jan 2018 03:17    TPro  6.5  /  Alb  2.4<L>  /  TBili  0.5  /  DBili  x   /  AST  21  /  ALT  11  /  AlkPhos  73  01-25    PT/INR - ( 25 Jan 2018 03:17 )   PT: 11.8 sec;   INR: 1.08 ratio         PTT - ( 25 Jan 2018 03:17 )  PTT:38.5 sec  CARDIAC MARKERS ( 25 Jan 2018 03:17 )  x     / 0.18 ng/mL / 31 U/L / x     / 2.8 ng/mL      Serum Pro-Brain Natriuretic Peptide: 69456 pg/mL (01-25 @ 03:17)    Interpretation of Telemetry:  SR 90s-100s      A/P:  72 year-old male with past medical history of T2DM, DLD, alcohol misuse brought in by family after having been found face down on the floor for approximately 2 days found in hemodynamically unstable A-fib with RVR (170s bpm) s/p multiple cardioversions and initially requiring pressor support in the setting of septic shock. Concluded to have culture negative aortic valve endocarditis.     Endocarditis of aortic valve. JANES with large vegetation (1.4 x 0.8) on aortic valve leaflet. Per CT surgery, vegetation appears chronic, heavily calcified. Unlikely to embolize. Has PICC line, ongoing antibiotics.  -Continuing Vancomycin and zosyn, per ID  -Given new murmur and HF, concern for worsening endocarditis,   -Assess with TTE     ADHF. Received IV lasix once yesterday with modest diruesis.  -IV furosemide 20 mg BID     CAD. [facility] with proximal LAD disease (70% stenosis), 60% stenosis of RCA  -No immediate plan for CABG  -Continue ASA, brillinta, and lipitor    ELIAN  -renal following    John Tenorio MD  Cardiology Fellow [id number] Patient seen and examined at bedside.    Overnight Events: SOB mildly improved. No CP.    Review Of Systems:   CONSTITUTIONAL: No fever, chills, or fatigue  EYES: No eye pain, visual disturbances, or discharge  ENMT:  No difficulty hearing, tinnitus, vertigo; No sinus or throat pain  NECK: No pain or stiffness  RESPIRATORY: +SOB, cough  CARDIOVASCULAR: No chest pain, palpitations, dizziness, but has leg edema  GASTROINTESTINAL: No abdominal or epigastric pain. No nausea, vomiting, or hematemesis; No diarrhea or constipation. No melena or hematochezia.  GENITOURINARY: No dysuria, frequency, hematuria, or incontinence  NEUROLOGICAL: No headaches, memory loss, loss of strength, numbness, or tremors  SKIN: No itching, burning, rashes, or lesions   MUSCULOSKELETAL: No joint pain or swelling; No muscle, back, or extremity pain  PSYCHIATRIC: No depression, anxiety, mood swings, or difficulty sleeping                   Medications:  acetaminophen   Tablet. 650 milliGRAM(s) Oral every 6 hours PRN  aspirin  chewable 81 milliGRAM(s) Oral daily  atorvastatin 80 milliGRAM(s) Oral at bedtime  benzocaine 15 mG/menthol 3.6 mG Lozenge 1 Lozenge Oral three times a day  cefTRIAXone   IVPB 2 Gram(s) IV Intermittent every 24 hours  dextrose 5%. 1000 milliLiter(s) IV Continuous <Continuous>  dextrose 50% Injectable 12.5 Gram(s) IV Push once  dextrose 50% Injectable 25 Gram(s) IV Push once  dextrose 50% Injectable 25 Gram(s) IV Push once  dextrose Gel 1 Dose(s) Oral once PRN  docusate sodium 100 milliGRAM(s) Oral daily  enoxaparin Injectable 40 milliGRAM(s) SubCutaneous daily  finasteride 5 milliGRAM(s) Oral daily  folic acid 1 milliGRAM(s) Oral daily  furosemide   Injectable 20 milliGRAM(s) IV Push every 12 hours  glucagon  Injectable 1 milliGRAM(s) IntraMuscular once PRN  influenza   Vaccine 0.5 milliLiter(s) IntraMuscular once  insulin glargine Injectable (LANTUS) 12 Unit(s) SubCutaneous at bedtime  insulin lispro (HumaLOG) corrective regimen sliding scale   SubCutaneous three times a day before meals  insulin lispro (HumaLOG) corrective regimen sliding scale   SubCutaneous at bedtime  insulin lispro Injectable (HumaLOG) 3 Unit(s) SubCutaneous three times a day with meals  metoprolol     tartrate 12.5 milliGRAM(s) Oral two times a day  multivitamin 1 Tablet(s) Oral daily  pantoprazole    Tablet 40 milliGRAM(s) Oral before breakfast  polyethylene glycol 3350 17 Gram(s) Oral daily PRN  simethicone 80 milliGRAM(s) Chew every 8 hours  sodium chloride 0.9% lock flush 10 milliLiter(s) IV Push every 1 hour PRN  sodium chloride 0.9% lock flush 10 milliLiter(s) IV Push every 12 hours PRN  sodium chloride 0.9% lock flush 20 milliLiter(s) IV Push once  thiamine 100 milliGRAM(s) Oral daily  ticagrelor 90 milliGRAM(s) Oral every 12 hours  vancomycin  IVPB 1000 milliGRAM(s) IV Intermittent every 24 hours      PAST MEDICAL & SURGICAL HISTORY:  High cholesterol  Diabetes: type 2  No significant past surgical history      Vitals:  T(F): 98 (01-26), Max: 98.1 (01-25)  HR: 103 (01-26) (95 - 103)  BP: 101/44 (01-26) (90/44 - 106/45)  RR: 18 (01-26)  SpO2: 97% (01-26)  I&O's Summary    24 Jan 2018 07:01  -  25 Jan 2018 07:00  --------------------------------------------------------  IN: 960 mL / OUT: 1350 mL / NET: -390 mL    25 Jan 2018 07:01  -  26 Jan 2018 05:54  --------------------------------------------------------  IN: 1260 mL / OUT: 1325 mL / NET: -65 mL        Physical Exam:  Appearance:  Normal, NAD  Eyes: PERRL, EOMI  HENT: JVD to mid neck, MMM  Respiratory: CTAB  Cardiovascular: S1, S2, RRR, II/VI systolic murmur heard best at the base  Gastrointestinal: Soft, Non-tender, Non-distended, BS+  Musculoskeletal:  No clubbing, No joint deformity   Ext: 1+ BLE edema  Neurologic: Non-focal    Chest x-ray: pulmonary vascualr congestion, pleural effusions, but appears improved compared to prior day.                        10.0   11.7  )-----------( 308      ( 25 Jan 2018 03:17 )             29.5     01-25    136  |  102  |  24<H>  ----------------------------<  111<H>  3.7   |  22  |  1.79<H>    Ca    8.1<L>      25 Jan 2018 03:17    TPro  6.5  /  Alb  2.4<L>  /  TBili  0.5  /  DBili  x   /  AST  21  /  ALT  11  /  AlkPhos  73  01-25    PT/INR - ( 25 Jan 2018 03:17 )   PT: 11.8 sec;   INR: 1.08 ratio         PTT - ( 25 Jan 2018 03:17 )  PTT:38.5 sec  CARDIAC MARKERS ( 25 Jan 2018 03:17 )  x     / 0.18 ng/mL / 31 U/L / x     / 2.8 ng/mL      Serum Pro-Brain Natriuretic Peptide: 21596 pg/mL (01-25 @ 03:17)    Interpretation of Telemetry:  SR 90s-100s      A/P:  72 year-old male with past medical history of T2DM, DLD, alcohol misuse brought in by family after having been found face down on the floor for approximately 2 days found in hemodynamically unstable A-fib with RVR (170s bpm) s/p multiple cardioversions and initially requiring pressor support in the setting of septic shock. Concluded to have culture negative aortic valve endocarditis.     Endocarditis of aortic valve. JANES with large vegetation (1.4 x 0.8) on aortic valve leaflet. Per CT surgery, vegetation appears chronic, heavily calcified. Unlikely to embolize. Has PICC line, ongoing antibiotics.  -Continuing Vancomycin and zosyn, per ID  -Given new murmur and HF, concern for worsening endocarditis,   -Assess with TTE     ADHF. Received IV lasix once yesterday with modest diruesis.  -IV furosemide 20 mg BID     CAD. [facility] with proximal LAD disease (70% stenosis), 60% stenosis of RCA  -No immediate plan for CABG  -Continue ASA, brillinta, and lipitor    ELIAN  -renal following    John Tenorio MD  Cardiology Fellow [id number] Patient seen and examined at bedside.    Overnight Events: SOB mildly improved. No CP.    Review Of Systems:   CONSTITUTIONAL: No fever, chills, or fatigue  EYES: No eye pain, visual disturbances, or discharge  ENMT:  No difficulty hearing, tinnitus, vertigo; No sinus or throat pain  NECK: No pain or stiffness  RESPIRATORY: +SOB, cough  CARDIOVASCULAR: No chest pain, palpitations, dizziness, but has leg edema  GASTROINTESTINAL: No abdominal or epigastric pain. No nausea, vomiting, or hematemesis; No diarrhea or constipation. No melena or hematochezia.  GENITOURINARY: No dysuria, frequency, hematuria, or incontinence  NEUROLOGICAL: No headaches, memory loss, loss of strength, numbness, or tremors  SKIN: No itching, burning, rashes, or lesions   MUSCULOSKELETAL: No joint pain or swelling; No muscle, back, or extremity pain  PSYCHIATRIC: No depression, anxiety, mood swings, or difficulty sleeping                   Medications:  acetaminophen   Tablet. 650 milliGRAM(s) Oral every 6 hours PRN  aspirin  chewable 81 milliGRAM(s) Oral daily  atorvastatin 80 milliGRAM(s) Oral at bedtime  benzocaine 15 mG/menthol 3.6 mG Lozenge 1 Lozenge Oral three times a day  cefTRIAXone   IVPB 2 Gram(s) IV Intermittent every 24 hours  dextrose 5%. 1000 milliLiter(s) IV Continuous <Continuous>  dextrose 50% Injectable 12.5 Gram(s) IV Push once  dextrose 50% Injectable 25 Gram(s) IV Push once  dextrose 50% Injectable 25 Gram(s) IV Push once  dextrose Gel 1 Dose(s) Oral once PRN  docusate sodium 100 milliGRAM(s) Oral daily  enoxaparin Injectable 40 milliGRAM(s) SubCutaneous daily  finasteride 5 milliGRAM(s) Oral daily  folic acid 1 milliGRAM(s) Oral daily  furosemide   Injectable 20 milliGRAM(s) IV Push every 12 hours  glucagon  Injectable 1 milliGRAM(s) IntraMuscular once PRN  influenza   Vaccine 0.5 milliLiter(s) IntraMuscular once  insulin glargine Injectable (LANTUS) 12 Unit(s) SubCutaneous at bedtime  insulin lispro (HumaLOG) corrective regimen sliding scale   SubCutaneous three times a day before meals  insulin lispro (HumaLOG) corrective regimen sliding scale   SubCutaneous at bedtime  insulin lispro Injectable (HumaLOG) 3 Unit(s) SubCutaneous three times a day with meals  metoprolol     tartrate 12.5 milliGRAM(s) Oral two times a day  multivitamin 1 Tablet(s) Oral daily  pantoprazole    Tablet 40 milliGRAM(s) Oral before breakfast  polyethylene glycol 3350 17 Gram(s) Oral daily PRN  simethicone 80 milliGRAM(s) Chew every 8 hours  sodium chloride 0.9% lock flush 10 milliLiter(s) IV Push every 1 hour PRN  sodium chloride 0.9% lock flush 10 milliLiter(s) IV Push every 12 hours PRN  sodium chloride 0.9% lock flush 20 milliLiter(s) IV Push once  thiamine 100 milliGRAM(s) Oral daily  ticagrelor 90 milliGRAM(s) Oral every 12 hours  vancomycin  IVPB 1000 milliGRAM(s) IV Intermittent every 24 hours      PAST MEDICAL & SURGICAL HISTORY:  High cholesterol  Diabetes: type 2  No significant past surgical history      Vitals:  T(F): 98 (01-26), Max: 98.1 (01-25)  HR: 103 (01-26) (95 - 103)  BP: 101/44 (01-26) (90/44 - 106/45)  RR: 18 (01-26)  SpO2: 97% (01-26)  I&O's Summary    24 Jan 2018 07:01  -  25 Jan 2018 07:00  --------------------------------------------------------  IN: 960 mL / OUT: 1350 mL / NET: -390 mL    25 Jan 2018 07:01  -  26 Jan 2018 05:54  --------------------------------------------------------  IN: 1260 mL / OUT: 1325 mL / NET: -65 mL        Physical Exam:  Appearance:  Normal, NAD  Eyes: PERRL, EOMI  HENT: JVD to mid neck, MMM  Respiratory: CTAB  Cardiovascular: S1, S2, RRR, II/VI systolic murmur heard best at the base, prominent diastolic blow along LSB.  Gastrointestinal: Soft, Non-tender, Non-distended, BS+  Musculoskeletal:  No clubbing, No joint deformity   Ext: 1+ BLE edema  Neurologic: Non-focal    Chest x-ray: pulmonary vascualr congestion, pleural effusions, but appears improved compared to prior day.                        10.0   11.7  )-----------( 308      ( 25 Jan 2018 03:17 )             29.5     01-25    136  |  102  |  24<H>  ----------------------------<  111<H>  3.7   |  22  |  1.79<H>    Ca    8.1<L>      25 Jan 2018 03:17    TPro  6.5  /  Alb  2.4<L>  /  TBili  0.5  /  DBili  x   /  AST  21  /  ALT  11  /  AlkPhos  73  01-25    PT/INR - ( 25 Jan 2018 03:17 )   PT: 11.8 sec;   INR: 1.08 ratio         PTT - ( 25 Jan 2018 03:17 )  PTT:38.5 sec  CARDIAC MARKERS ( 25 Jan 2018 03:17 )  x     / 0.18 ng/mL / 31 U/L / x     / 2.8 ng/mL      Serum Pro-Brain Natriuretic Peptide: 81221 pg/mL (01-25 @ 03:17)    Interpretation of Telemetry:  SR 90s-100s      A/P:  72 year-old male with past medical history of T2DM, DLD, alcohol misuse brought in by family after having been found face down on the floor for approximately 2 days found in hemodynamically unstable A-fib with RVR (170s bpm) s/p multiple cardioversions and initially requiring pressor support in the setting of septic shock. Concluded to have culture negative aortic valve endocarditis.     Endocarditis of aortic valve. JANES with large vegetation (1.4 x 0.8) on aortic valve leaflet. Per CT surgery, vegetation appears chronic, heavily calcified. Unlikely to embolize. Has PICC line, ongoing antibiotics.  -Continuing Vancomycin and zosyn, per ID  -Given new murmur and HF, concern for worsening endocarditis,   -Assess with TTE     ADHF. Received IV lasix once yesterday with modest diruesis.  -IV furosemide 20 mg BID     CAD. [facility] with proximal LAD disease (70% stenosis), 60% stenosis of RCA  -No immediate plan for CABG  -Continue ASA, brillinta, and lipitor    ELIAN  -renal following    John Tenorio MD  Cardiology Fellow [id number]

## 2018-01-26 NOTE — CHART NOTE - NSCHARTNOTEFT_GEN_A_CORE
CCU Accept Note    Transfer from: (  ) Medicine    (  ) Telemetry     (   ) RCU        (    ) Palliative         (   ) Stroke Unit          (   ) __________________    Accepting physican:    HPI:    SUBJECTIVE DATA:    OBJECTIVE DATA:    Vital Signs Last 24 Hrs  T(C): 36.7 (26 Jan 2018 11:29), Max: 36.7 (26 Jan 2018 04:39)  T(F): 98.1 (26 Jan 2018 11:29), Max: 98.1 (26 Jan 2018 08:53)  HR: 100 (26 Jan 2018 18:01) (100 - 117)  BP: 98/58 (26 Jan 2018 12:00) (93/50 - 117/53)  BP(mean): --  RR: 20 (26 Jan 2018 12:00) (18 - 20)  SpO2: 99% (26 Jan 2018 18:01) (95% - 100%)  I&O's Summary    25 Jan 2018 07:01  -  26 Jan 2018 07:00  --------------------------------------------------------  IN: 1500 mL / OUT: 1575 mL / NET: -75 mL    26 Jan 2018 07:01  -  26 Jan 2018 20:32  --------------------------------------------------------  IN: 360 mL / OUT: 800 mL / NET: -440 mL        Allergies:      MEDICATIONS  (STANDING):  aspirin  chewable 81 milliGRAM(s) Oral daily  atorvastatin 80 milliGRAM(s) Oral at bedtime  benzocaine 15 mG/menthol 3.6 mG Lozenge 1 Lozenge Oral three times a day  cefTRIAXone   IVPB 2 Gram(s) IV Intermittent every 24 hours  dextrose 5%. 1000 milliLiter(s) (50 mL/Hr) IV Continuous <Continuous>  dextrose 50% Injectable 12.5 Gram(s) IV Push once  dextrose 50% Injectable 25 Gram(s) IV Push once  dextrose 50% Injectable 25 Gram(s) IV Push once  docusate sodium 100 milliGRAM(s) Oral daily  enoxaparin Injectable 40 milliGRAM(s) SubCutaneous daily  finasteride 5 milliGRAM(s) Oral daily  folic acid 1 milliGRAM(s) Oral daily  furosemide   Injectable 20 milliGRAM(s) IV Push every 12 hours  influenza   Vaccine 0.5 milliLiter(s) IntraMuscular once  insulin glargine Injectable (LANTUS) 12 Unit(s) SubCutaneous at bedtime  insulin lispro (HumaLOG) corrective regimen sliding scale   SubCutaneous three times a day before meals  insulin lispro (HumaLOG) corrective regimen sliding scale   SubCutaneous at bedtime  insulin lispro Injectable (HumaLOG) 3 Unit(s) SubCutaneous three times a day with meals  metoprolol     tartrate 12.5 milliGRAM(s) Oral two times a day  multivitamin 1 Tablet(s) Oral daily  pantoprazole    Tablet 40 milliGRAM(s) Oral before breakfast  simethicone 80 milliGRAM(s) Chew every 8 hours  sodium chloride 0.9% lock flush 20 milliLiter(s) IV Push once  thiamine 100 milliGRAM(s) Oral daily  ticagrelor 90 milliGRAM(s) Oral every 12 hours  vancomycin  IVPB 1000 milliGRAM(s) IV Intermittent every 24 hours    MEDICATIONS  (PRN):  acetaminophen   Tablet. 650 milliGRAM(s) Oral every 6 hours PRN Moderate Pain (4 - 6)  dextrose Gel 1 Dose(s) Oral once PRN Blood Glucose LESS THAN 70 milliGRAM(s)/deciliter  glucagon  Injectable 1 milliGRAM(s) IntraMuscular once PRN Glucose LESS THAN 70 milligrams/deciliter  polyethylene glycol 3350 17 Gram(s) Oral daily PRN Constipation  sodium chloride 0.9% lock flush 10 milliLiter(s) IV Push every 1 hour PRN After each medication administration  sodium chloride 0.9% lock flush 10 milliLiter(s) IV Push every 12 hours PRN Lumen of catheter NOT used      LABS                                            10.1                  Neurophils% (auto):   x      (01-26 @ 06:39):    11.0 )-----------(293          Lymphocytes% (auto):  x                                             29.4                   Eosinphils% (auto):   x        Manual%: Neutrophils x    ; Lymphocytes x    ; Eosinophils x    ; Bands%: x    ; Blasts x                                    138    |  104    |  26                  Calcium: 8.3   / iCa: x      (01-26 @ 06:39)    ----------------------------<  86        Magnesium: x                                3.9     |  24     |  1.91             Phosphorous: x              EKG:  Telemetry:  Echo:  Cardiac Cath:  Stress Test:  Imaging    ASSESSMENT & PLAN: CCU Accept Note    Accepting Physician:    HPI: 71yo M with PMH of T2DM, EtOH Abuse admitted on 1/8 after being found down for about 2 days. Multiple recent falls at home due to physical instability, chronic alcohol use. In the ED EKG with inferior ST-elevations, hypotensive to 70/40 with NSTEMI and AF with RVR s/p failed DCCV then amio/dig loads. Initially managed in the CCU. JANES revealed aortic valve vegetation, started on vancomycin and zosyn -> now Vanc/CTX, w/ Bld Cx's NGTD. Given significant calcification of lesion, it was felt that likely chronic endocarditis and low likelihood of embolization, CT surgery was deferred with plan for f/u in 2-3 months. For NSTEMI, patient was started on medical management including DAPT now s/p cardiac cath on 1/12 which revealed LAD with 70% stenosis, no intervention. Patient was seen by CT surgery for evaluation of aortic valve vegetation. Patient was transferred to the floor, hemodynamically stable. Patient was continued on Abx and medical management of CAD/CHF. Patient with progressively worsening fluid status and worsening cardiac murmur concerning for progressive of endocarditis. Patient had TTE on 1/26 showing highly mobile vegetation traversing LVOT to aorta and worsening severe AI. Patient brought to CCU for further monitoring and need for urgent CT Sx.    SUBJECTIVE DATA: Patient reports continued SOB at rest and constant 2/10 chest pressure.     OBJECTIVE DATA:    Vital Signs Last 24 Hrs  T(C): 36.7 (26 Jan 2018 11:29), Max: 36.7 (26 Jan 2018 04:39)  T(F): 98.1 (26 Jan 2018 11:29), Max: 98.1 (26 Jan 2018 08:53)  HR: 100 (26 Jan 2018 18:01) (100 - 117)  BP: 98/58 (26 Jan 2018 12:00) (93/50 - 117/53)  BP(mean): --  RR: 20 (26 Jan 2018 12:00) (18 - 20)  SpO2: 99% (26 Jan 2018 18:01) (95% - 100%)  I&O's Summary    25 Jan 2018 07:01  -  26 Jan 2018 07:00  --------------------------------------------------------  IN: 1500 mL / OUT: 1575 mL / NET: -75 mL    26 Jan 2018 07:01  -  26 Jan 2018 20:32  --------------------------------------------------------  IN: 360 mL / OUT: 800 mL / NET: -440 mL        Allergies: NKDA      MEDICATIONS  (STANDING):  aspirin  chewable 81 milliGRAM(s) Oral daily  atorvastatin 80 milliGRAM(s) Oral at bedtime  benzocaine 15 mG/menthol 3.6 mG Lozenge 1 Lozenge Oral three times a day  cefTRIAXone   IVPB 2 Gram(s) IV Intermittent every 24 hours  docusate sodium 100 milliGRAM(s) Oral daily  enoxaparin Injectable 40 milliGRAM(s) SubCutaneous daily  finasteride 5 milliGRAM(s) Oral daily  folic acid 1 milliGRAM(s) Oral daily  furosemide   Injectable 20 milliGRAM(s) IV Push every 12 hours  insulin glargine Injectable (LANTUS) 12 Unit(s) SubCutaneous at bedtime  insulin lispro (HumaLOG) corrective regimen sliding scale   SubCutaneous three times a day before meals  insulin lispro (HumaLOG) corrective regimen sliding scale   SubCutaneous at bedtime  insulin lispro Injectable (HumaLOG) 3 Unit(s) SubCutaneous three times a day with meals  metoprolol     tartrate 12.5 milliGRAM(s) Oral two times a day  multivitamin 1 Tablet(s) Oral daily  pantoprazole    Tablet 40 milliGRAM(s) Oral before breakfast  simethicone 80 milliGRAM(s) Chew every 8 hours  sodium chloride 0.9% lock flush 20 milliLiter(s) IV Push once  thiamine 100 milliGRAM(s) Oral daily  ticagrelor 90 milliGRAM(s) Oral every 12 hours  vancomycin  IVPB 1000 milliGRAM(s) IV Intermittent every 24 hours    MEDICATIONS  (PRN):  acetaminophen   Tablet. 650 milliGRAM(s) Oral every 6 hours PRN Moderate Pain (4 - 6)  polyethylene glycol 3350 17 Gram(s) Oral daily PRN Constipation      LABS                                            10.1                  Neurophils% (auto):   x      (01-26 @ 06:39):    11.0 )-----------(293          Lymphocytes% (auto):  x                                             29.4                   Eosinphils% (auto):   x        Manual%: Neutrophils x    ; Lymphocytes x    ; Eosinophils x    ; Bands%: x    ; Blasts x                                    138    |  104    |  26                  Calcium: 8.3   / iCa: x      (01-26 @ 06:39)    ----------------------------<  86        Magnesium: x                                3.9     |  24     |  1.91             Phosphorous: x            ASSESSMENT & PLAN: CCU Accept Note    Accepting Physician:    HPI: 71yo M with PMH of T2DM, EtOH Abuse admitted on 1/8 after being found down for about 2 days. Multiple recent falls at home due to physical instability, chronic alcohol use. In the ED EKG with inferior ST-elevations, hypotensive to 70/40 with NSTEMI and AF with RVR s/p failed DCCV then amio/dig loads. Initially managed in the CCU. JANES revealed aortic valve vegetation, started on vancomycin and zosyn -> now Vanc/CTX, w/ Bld Cx's NGTD. Given significant calcification of lesion, it was felt that likely chronic endocarditis and low likelihood of embolization, CT surgery was deferred with plan for f/u in 2-3 months. For NSTEMI, patient was started on medical management including DAPT now s/p cardiac cath on 1/12 which revealed LAD with 70% stenosis, no intervention. Patient was seen by CT surgery for evaluation of aortic valve vegetation. Patient was transferred to the floor, hemodynamically stable. Patient was continued on Abx and medical management of CAD/CHF. Patient with progressively worsening fluid status and worsening cardiac murmur concerning for progressive of endocarditis. Patient had TTE on 1/26 showing highly mobile vegetation traversing LVOT to aorta and worsening severe AI. Patient brought to CCU for further monitoring and need for urgent CT Sx.    SUBJECTIVE DATA: Patient reports continued SOB at rest and constant 2/10 chest pressure.     OBJECTIVE DATA:    Vital Signs Last 24 Hrs  T(C): 36.7 (26 Jan 2018 11:29), Max: 36.7 (26 Jan 2018 04:39)  T(F): 98.1 (26 Jan 2018 11:29), Max: 98.1 (26 Jan 2018 08:53)  HR: 100 (26 Jan 2018 18:01) (100 - 117)  BP: 98/58 (26 Jan 2018 12:00) (93/50 - 117/53)  BP(mean): --  RR: 20 (26 Jan 2018 12:00) (18 - 20)  SpO2: 99% (26 Jan 2018 18:01) (95% - 100%)  I&O's Summary    25 Jan 2018 07:01  -  26 Jan 2018 07:00  --------------------------------------------------------  IN: 1500 mL / OUT: 1575 mL / NET: -75 mL    26 Jan 2018 07:01  -  26 Jan 2018 20:32  --------------------------------------------------------  IN: 360 mL / OUT: 800 mL / NET: -440 mL        Allergies: NKDA      MEDICATIONS  (STANDING):  aspirin  chewable 81 milliGRAM(s) Oral daily  atorvastatin 80 milliGRAM(s) Oral at bedtime  benzocaine 15 mG/menthol 3.6 mG Lozenge 1 Lozenge Oral three times a day  cefTRIAXone   IVPB 2 Gram(s) IV Intermittent every 24 hours  docusate sodium 100 milliGRAM(s) Oral daily  enoxaparin Injectable 40 milliGRAM(s) SubCutaneous daily  finasteride 5 milliGRAM(s) Oral daily  folic acid 1 milliGRAM(s) Oral daily  furosemide   Injectable 20 milliGRAM(s) IV Push every 12 hours  insulin glargine Injectable (LANTUS) 12 Unit(s) SubCutaneous at bedtime  insulin lispro (HumaLOG) corrective regimen sliding scale   SubCutaneous three times a day before meals  insulin lispro (HumaLOG) corrective regimen sliding scale   SubCutaneous at bedtime  insulin lispro Injectable (HumaLOG) 3 Unit(s) SubCutaneous three times a day with meals  metoprolol     tartrate 12.5 milliGRAM(s) Oral two times a day  multivitamin 1 Tablet(s) Oral daily  pantoprazole    Tablet 40 milliGRAM(s) Oral before breakfast  simethicone 80 milliGRAM(s) Chew every 8 hours  sodium chloride 0.9% lock flush 20 milliLiter(s) IV Push once  thiamine 100 milliGRAM(s) Oral daily  ticagrelor 90 milliGRAM(s) Oral every 12 hours  vancomycin  IVPB 1000 milliGRAM(s) IV Intermittent every 24 hours    MEDICATIONS  (PRN):  acetaminophen   Tablet. 650 milliGRAM(s) Oral every 6 hours PRN Moderate Pain (4 - 6)  polyethylene glycol 3350 17 Gram(s) Oral daily PRN Constipation      LABS                                            10.1                  Neurophils% (auto):   x      (01-26 @ 06:39):    11.0 )-----------(293          Lymphocytes% (auto):  x                                             29.4                   Eosinphils% (auto):   x        Manual%: Neutrophils x    ; Lymphocytes x    ; Eosinophils x    ; Bands%: x    ; Blasts x                                    138    |  104    |  26                  Calcium: 8.3   / iCa: x      (01-26 @ 06:39)    ----------------------------<  86        Magnesium: x                                3.9     |  24     |  1.91             Phosphorous: x            ASSESSMENT & PLAN:  72M w/ PMHx of HLD, T2DM, EtOH abuse presented after fall found to have new AFib, NSTEMI, and AV Endocarditis (on Vanc/CTX) with hospital course c/b ELIAN and ADHF in setting of worsening AI due to endocarditis. CCU Accept Note    Accepting Physician:    HPI: 73yo M with PMH of T2DM, EtOH Abuse admitted on 1/8 after being found down for about 2 days. Multiple recent falls at home due to physical instability, chronic alcohol use. In the ED EKG with inferior ST-elevations, hypotensive to 70/40 with NSTEMI and AF with RVR s/p failed DCCV then amio/dig loads. Initially managed in the CCU. JANES revealed aortic valve vegetation, started on vancomycin and zosyn -> now Vanc/CTX, w/ Bld Cx's NGTD. Given significant calcification of lesion, it was felt that likely chronic endocarditis and low likelihood of embolization, CT surgery was deferred with plan for f/u in 2-3 months. For NSTEMI, patient was started on medical management including DAPT now s/p cardiac cath on 1/12 which revealed LAD with 70% stenosis, no intervention. Patient was seen by CT surgery for evaluation of aortic valve vegetation. Patient was transferred to the floor, hemodynamically stable. Patient was continued on Abx and medical management of CAD/CHF. Patient with progressively worsening fluid status and worsening cardiac murmur concerning for progressive of endocarditis. Patient had TTE on 1/26 showing highly mobile vegetation traversing LVOT to aorta and worsening severe AI. Patient brought to CCU for further monitoring and need for urgent CT Sx.    SUBJECTIVE DATA: Patient reports continued SOB at rest and constant 2/10 chest pressure.     OBJECTIVE DATA:    Vital Signs Last 24 Hrs  T(C): 36.7 (26 Jan 2018 11:29), Max: 36.7 (26 Jan 2018 04:39)  T(F): 98.1 (26 Jan 2018 11:29), Max: 98.1 (26 Jan 2018 08:53)  HR: 100 (26 Jan 2018 18:01) (100 - 117)  BP: 98/58 (26 Jan 2018 12:00) (93/50 - 117/53)  BP(mean): --  RR: 20 (26 Jan 2018 12:00) (18 - 20)  SpO2: 99% (26 Jan 2018 18:01) (95% - 100%)  I&O's Summary    25 Jan 2018 07:01  -  26 Jan 2018 07:00  --------------------------------------------------------  IN: 1500 mL / OUT: 1575 mL / NET: -75 mL    26 Jan 2018 07:01  -  26 Jan 2018 20:32  --------------------------------------------------------  IN: 360 mL / OUT: 800 mL / NET: -440 mL        Allergies: NKDA      MEDICATIONS  (STANDING):  aspirin  chewable 81 milliGRAM(s) Oral daily  atorvastatin 80 milliGRAM(s) Oral at bedtime  benzocaine 15 mG/menthol 3.6 mG Lozenge 1 Lozenge Oral three times a day  cefTRIAXone   IVPB 2 Gram(s) IV Intermittent every 24 hours  docusate sodium 100 milliGRAM(s) Oral daily  enoxaparin Injectable 40 milliGRAM(s) SubCutaneous daily  finasteride 5 milliGRAM(s) Oral daily  folic acid 1 milliGRAM(s) Oral daily  furosemide   Injectable 20 milliGRAM(s) IV Push every 12 hours  insulin glargine Injectable (LANTUS) 12 Unit(s) SubCutaneous at bedtime  insulin lispro (HumaLOG) corrective regimen sliding scale   SubCutaneous three times a day before meals  insulin lispro (HumaLOG) corrective regimen sliding scale   SubCutaneous at bedtime  insulin lispro Injectable (HumaLOG) 3 Unit(s) SubCutaneous three times a day with meals  metoprolol     tartrate 12.5 milliGRAM(s) Oral two times a day  multivitamin 1 Tablet(s) Oral daily  pantoprazole    Tablet 40 milliGRAM(s) Oral before breakfast  simethicone 80 milliGRAM(s) Chew every 8 hours  sodium chloride 0.9% lock flush 20 milliLiter(s) IV Push once  thiamine 100 milliGRAM(s) Oral daily  ticagrelor 90 milliGRAM(s) Oral every 12 hours  vancomycin  IVPB 1000 milliGRAM(s) IV Intermittent every 24 hours    MEDICATIONS  (PRN):  acetaminophen   Tablet. 650 milliGRAM(s) Oral every 6 hours PRN Moderate Pain (4 - 6)  polyethylene glycol 3350 17 Gram(s) Oral daily PRN Constipation      LABS                                            10.1                  Neurophils% (auto):   x      (01-26 @ 06:39):    11.0 )-----------(293          Lymphocytes% (auto):  x                                             29.4                   Eosinphils% (auto):   x        Manual%: Neutrophils x    ; Lymphocytes x    ; Eosinophils x    ; Bands%: x    ; Blasts x                                    138    |  104    |  26                  Calcium: 8.3   / iCa: x      (01-26 @ 06:39)    ----------------------------<  86        Magnesium: x                                3.9     |  24     |  1.91             Phosphorous: x            ASSESSMENT & PLAN:  72M w/ PMHx of HLD, T2DM, EtOH abuse presented after fall found to have new AFib, NSTEMI, and AV Endocarditis (on Vanc/CTX) with hospital course c/b ELIAN and ADHF in setting of worsening AI due to endocarditis.    #Neuro: no active issues    #CV: ADHF, NSTEMI, Endocarditis  -c/w medical management of NSTEMI: ASA, Statin, BB. hold ACEI due to ELIAN and Brilinta prior to potential CT Sx  -c/w Lasix 20mg IV BID for diuresis as pressures tolerate  -relatively soft BPs, possibly exacerbated by severe AI. plan to afterload reduce with Hydralazine and Isordil  -c/w Vanc/CTX for endocarditis, plan for JANES tomorrow, and f/u CT Sx re: intervention    #Pulm: Acute Hypoxic Respiratory Failure 2/2 Pulmonary Edema from ADHF  -c/w supplemental O2 as needed, wean as tolerated  -f/u CXR    #GI: no active issues  -NPO after MN for JANES  -DASH/CC diet    #Renal/Fluid: multifactorial ELIAN  -initially thought to be contrast-induced now worsening with ADHF  -trend Cr, renally dose meds, minimize nephrotoxins     #ID: Endocarditis  -Culture negative  -c/w Vanc/CTX until 2/26/17 per ID  -CT Sx re-eval for urgent surgery    #Heme: Anemia  -H/H now stable, continue to monitor  -leukocytosis resolved    #Endo: T2Dm, poorly controlled on insulin  -halve Lantus dose to 6U as patient is NPO after MN  -c/w Humalog Premeal 3U, hold if NPO  -FS/ISS qac/qhs    #PPX: DVT PPX - HSQ    Devin Saucedo M.D.  Internal Medicine Resident, PGY-2  Pager: [phone]

## 2018-01-26 NOTE — PROGRESS NOTE ADULT - SUBJECTIVE AND OBJECTIVE BOX
Patient is a 72y old  Male who presents with a chief complaint of Fall (09 Jan 2018 00:52)        SUBJECTIVE / OVERNIGHT EVENTS: Patient seen and examined.  Continued SOB last night, briefly placed on BiPAP overnight.  He reports SOB with any exertion and continued LE edema with intermittent chest pain.    MEDICATIONS  (STANDING):  aspirin  chewable 81 milliGRAM(s) Oral daily  atorvastatin 80 milliGRAM(s) Oral at bedtime  benzocaine 15 mG/menthol 3.6 mG Lozenge 1 Lozenge Oral three times a day  cefTRIAXone   IVPB 2 Gram(s) IV Intermittent every 24 hours  dextrose 5%. 1000 milliLiter(s) (50 mL/Hr) IV Continuous <Continuous>  dextrose 50% Injectable 12.5 Gram(s) IV Push once  dextrose 50% Injectable 25 Gram(s) IV Push once  dextrose 50% Injectable 25 Gram(s) IV Push once  docusate sodium 100 milliGRAM(s) Oral daily  enoxaparin Injectable 40 milliGRAM(s) SubCutaneous daily  finasteride 5 milliGRAM(s) Oral daily  folic acid 1 milliGRAM(s) Oral daily  furosemide   Injectable 20 milliGRAM(s) IV Push every 12 hours  influenza   Vaccine 0.5 milliLiter(s) IntraMuscular once  insulin glargine Injectable (LANTUS) 12 Unit(s) SubCutaneous at bedtime  insulin lispro (HumaLOG) corrective regimen sliding scale   SubCutaneous three times a day before meals  insulin lispro (HumaLOG) corrective regimen sliding scale   SubCutaneous at bedtime  insulin lispro Injectable (HumaLOG) 3 Unit(s) SubCutaneous three times a day with meals  metoprolol     tartrate 12.5 milliGRAM(s) Oral two times a day  multivitamin 1 Tablet(s) Oral daily  pantoprazole    Tablet 40 milliGRAM(s) Oral before breakfast  simethicone 80 milliGRAM(s) Chew every 8 hours  sodium chloride 0.9% lock flush 20 milliLiter(s) IV Push once  thiamine 100 milliGRAM(s) Oral daily  ticagrelor 90 milliGRAM(s) Oral every 12 hours  vancomycin  IVPB 1000 milliGRAM(s) IV Intermittent every 24 hours    MEDICATIONS  (PRN):  acetaminophen   Tablet. 650 milliGRAM(s) Oral every 6 hours PRN Moderate Pain (4 - 6)  dextrose Gel 1 Dose(s) Oral once PRN Blood Glucose LESS THAN 70 milliGRAM(s)/deciliter  glucagon  Injectable 1 milliGRAM(s) IntraMuscular once PRN Glucose LESS THAN 70 milligrams/deciliter  polyethylene glycol 3350 17 Gram(s) Oral daily PRN Constipation  sodium chloride 0.9% lock flush 10 milliLiter(s) IV Push every 1 hour PRN After each medication administration  sodium chloride 0.9% lock flush 10 milliLiter(s) IV Push every 12 hours PRN Lumen of catheter NOT used          REVIEW OF SYSTEMS      General: No fevers, chills  	  Ophthalmologic: No change in vision    Respiratory and Thorax: +SOB, +cough  	  Cardiovascular: No chest pain, palpitations. + LE edema    Gastrointestinal: No abdominal pain, nausea, vomiting, or diarrhea    Genitourinary: No dysuria or polyuria    	  Vital Signs Last 24 Hrs  T(C): 36.7 (26 Jan 2018 11:29), Max: 36.7 (25 Jan 2018 20:25)  T(F): 98.1 (26 Jan 2018 11:29), Max: 98.1 (26 Jan 2018 08:53)  HR: 100 (26 Jan 2018 11:29) (100 - 117)  BP: 93/50 (26 Jan 2018 11:29) (93/47 - 117/53)  BP(mean): --  RR: 20 (26 Jan 2018 11:29) (18 - 20)  SpO2: 99% (26 Jan 2018 11:29) (95% - 100%)    I&O's Summary    25 Jan 2018 07:01  -  26 Jan 2018 07:00  --------------------------------------------------------  IN: 1500 mL / OUT: 1575 mL / NET: -75 mL    26 Jan 2018 07:01  -  26 Jan 2018 13:35  --------------------------------------------------------  IN: 360 mL / OUT: 0 mL / NET: 360 mL          CAPILLARY BLOOD GLUCOSE      POCT Blood Glucose.: 154 mg/dL (26 Jan 2018 12:20)  POCT Blood Glucose.: 96 mg/dL (26 Jan 2018 08:11)  POCT Blood Glucose.: 96 mg/dL (25 Jan 2018 21:14)  POCT Blood Glucose.: 94 mg/dL (25 Jan 2018 16:32)      PHYSICAL EXAM:      Constitutional: Well-appearing, mild respiratory distress, lying in bed, appears stated age    Eyes: EOMI, PERRLA, clear conjunctiva    ENMT: No pharyngeal erythema, mucus membranes moist    Neck: No JVD    Back: No spinal tenderness or kyphosis    Respiratory: b/l crackles at lower lung fields    Cardiovascular: Regular rate and rhythm, III/VI systolic murmur and diastolic murmur (new from earlier this week). 1+ pitting edema b/l     Gastrointestinal: Soft, non-tender, non-distended, bowel sounds positive all four quadrants    Extremities: no clubbing or cyanosis    Vascular: 2+ pulses radially and dorsalis pedis    Neurological: Alert and oriented to name, place, and date.  Cranial nerves II-XII intact.  No focal neurological deficits.  5/5 motor strength all four extremities    Skin: Warm and dry.  No rashes    Lymph Nodes: No cervical lymphadenopathy    Musculoskeletal: No joint swelling or erythema    Psychiatric: Pleasant affect    LABS  (01-26 @ 06:39)                      10.1  11.0 )-----------( 293                 29.4    Neutrophils = -- (--%)  Lymphocytes = -- (--%)  Eosinophils = -- (--%)  Basophils = -- (--%)  Monocytes = -- (--%)  Bands = --%    01-26    138  |  104  |  26<H>  ----------------------------<  86  3.9   |  24  |  1.91<H>    Ca    8.3<L>      26 Jan 2018 06:39    TPro  6.5  /  Alb  2.4<L>  /  TBili  0.5  /  DBili  x   /  AST  21  /  ALT  11  /  AlkPhos  73  01-25    ( 25 Jan 2018 03:17 )   PT: 11.8 sec;   INR: 1.08 ratio;       PTT:38.5 sec  CARDIAC MARKERS ( 26 Jan 2018 06:39 )  Trop 0.24 ng/mL<H> / CK 39 U/L / CKMB x       CARDIAC MARKERS ( 25 Jan 2018 03:17 )  Trop 0.18 ng/mL<H> / CK 31 U/L / CKMB x           Arterial Blood Gas:  01-26 @ 06:52  7.47/32/184/22/100/-.3  ABG lactate: --        RADIOLOGY & ADDITIONAL TESTS:    Imaging Personally Reviewed:  Consultant(s) Notes Reviewed:  [x]  Care Discussed with Consultants/Other Providers: [x] - cards

## 2018-01-26 NOTE — PROGRESS NOTE ADULT - ATTENDING COMMENTS
Case d/w NP (Mami), patient at bedside.  d/w cardiology fellow and CT surgery (Fawn).      Patrick Bloom M.D.  Hospitalist  Pager: [phone]

## 2018-01-27 DIAGNOSIS — I48.91 UNSPECIFIED ATRIAL FIBRILLATION: ICD-10-CM

## 2018-01-27 LAB
ALBUMIN SERPL ELPH-MCNC: 2.3 G/DL — LOW (ref 3.3–5)
ALP SERPL-CCNC: 66 U/L — SIGNIFICANT CHANGE UP (ref 40–120)
ALT FLD-CCNC: 12 U/L RC — SIGNIFICANT CHANGE UP (ref 10–45)
ANION GAP SERPL CALC-SCNC: 9 MMOL/L — SIGNIFICANT CHANGE UP (ref 5–17)
APTT BLD: 37.4 SEC — SIGNIFICANT CHANGE UP (ref 27.5–37.4)
AST SERPL-CCNC: 19 U/L — SIGNIFICANT CHANGE UP (ref 10–40)
BASOPHILS # BLD AUTO: 0 K/UL — SIGNIFICANT CHANGE UP (ref 0–0.2)
BASOPHILS NFR BLD AUTO: 0.2 % — SIGNIFICANT CHANGE UP (ref 0–2)
BILIRUB SERPL-MCNC: 0.4 MG/DL — SIGNIFICANT CHANGE UP (ref 0.2–1.2)
BUN SERPL-MCNC: 27 MG/DL — HIGH (ref 7–23)
CALCIUM SERPL-MCNC: 8.3 MG/DL — LOW (ref 8.4–10.5)
CHLORIDE SERPL-SCNC: 104 MMOL/L — SIGNIFICANT CHANGE UP (ref 96–108)
CO2 SERPL-SCNC: 25 MMOL/L — SIGNIFICANT CHANGE UP (ref 22–31)
CREAT ?TM UR-MCNC: 25 MG/DL — SIGNIFICANT CHANGE UP
CREAT SERPL-MCNC: 1.81 MG/DL — HIGH (ref 0.5–1.3)
EOSINOPHIL # BLD AUTO: 0.1 K/UL — SIGNIFICANT CHANGE UP (ref 0–0.5)
EOSINOPHIL NFR BLD AUTO: 1.1 % — SIGNIFICANT CHANGE UP (ref 0–6)
GLUCOSE BLDC GLUCOMTR-MCNC: 102 MG/DL — HIGH (ref 70–99)
GLUCOSE BLDC GLUCOMTR-MCNC: 109 MG/DL — HIGH (ref 70–99)
GLUCOSE BLDC GLUCOMTR-MCNC: 125 MG/DL — HIGH (ref 70–99)
GLUCOSE BLDC GLUCOMTR-MCNC: 143 MG/DL — HIGH (ref 70–99)
GLUCOSE SERPL-MCNC: 96 MG/DL — SIGNIFICANT CHANGE UP (ref 70–99)
HCT VFR BLD CALC: 28 % — LOW (ref 39–50)
HGB BLD-MCNC: 9.6 G/DL — LOW (ref 13–17)
INR BLD: 1.09 RATIO — SIGNIFICANT CHANGE UP (ref 0.88–1.16)
LACTATE SERPL-SCNC: 0.8 MMOL/L — SIGNIFICANT CHANGE UP (ref 0.7–2)
LYMPHOCYTES # BLD AUTO: 0.9 K/UL — LOW (ref 1–3.3)
LYMPHOCYTES # BLD AUTO: 8.6 % — LOW (ref 13–44)
MAGNESIUM SERPL-MCNC: 2 MG/DL — SIGNIFICANT CHANGE UP (ref 1.6–2.6)
MCHC RBC-ENTMCNC: 33.6 PG — SIGNIFICANT CHANGE UP (ref 27–34)
MCHC RBC-ENTMCNC: 34.3 GM/DL — SIGNIFICANT CHANGE UP (ref 32–36)
MCV RBC AUTO: 98 FL — SIGNIFICANT CHANGE UP (ref 80–100)
MONOCYTES # BLD AUTO: 0.6 K/UL — SIGNIFICANT CHANGE UP (ref 0–0.9)
MONOCYTES NFR BLD AUTO: 5.8 % — SIGNIFICANT CHANGE UP (ref 2–14)
NEUTROPHILS # BLD AUTO: 8.6 K/UL — HIGH (ref 1.8–7.4)
NEUTROPHILS NFR BLD AUTO: 84.4 % — HIGH (ref 43–77)
PHOSPHATE SERPL-MCNC: 3.8 MG/DL — SIGNIFICANT CHANGE UP (ref 2.5–4.5)
PLATELET # BLD AUTO: 243 K/UL — SIGNIFICANT CHANGE UP (ref 150–400)
POTASSIUM SERPL-MCNC: 4.1 MMOL/L — SIGNIFICANT CHANGE UP (ref 3.5–5.3)
POTASSIUM SERPL-SCNC: 4.1 MMOL/L — SIGNIFICANT CHANGE UP (ref 3.5–5.3)
PROT ?TM UR-MCNC: 5 MG/DL — SIGNIFICANT CHANGE UP (ref 0–12)
PROT SERPL-MCNC: 6.3 G/DL — SIGNIFICANT CHANGE UP (ref 6–8.3)
PROT/CREAT UR-RTO: 0.2 RATIO — SIGNIFICANT CHANGE UP (ref 0–0.2)
PROTHROM AB SERPL-ACNC: 11.9 SEC — SIGNIFICANT CHANGE UP (ref 9.8–12.7)
RBC # BLD: 2.86 M/UL — LOW (ref 4.2–5.8)
RBC # FLD: 13.8 % — SIGNIFICANT CHANGE UP (ref 10.3–14.5)
SODIUM SERPL-SCNC: 138 MMOL/L — SIGNIFICANT CHANGE UP (ref 135–145)
VANCOMYCIN TROUGH SERPL-MCNC: 19.1 UG/ML — SIGNIFICANT CHANGE UP (ref 10–20)
WBC # BLD: 10.2 K/UL — SIGNIFICANT CHANGE UP (ref 3.8–10.5)
WBC # FLD AUTO: 10.2 K/UL — SIGNIFICANT CHANGE UP (ref 3.8–10.5)

## 2018-01-27 PROCEDURE — 76376 3D RENDER W/INTRP POSTPROCES: CPT | Mod: 26

## 2018-01-27 PROCEDURE — 93320 DOPPLER ECHO COMPLETE: CPT | Mod: 26

## 2018-01-27 PROCEDURE — 93010 ELECTROCARDIOGRAM REPORT: CPT

## 2018-01-27 PROCEDURE — 93312 ECHO TRANSESOPHAGEAL: CPT | Mod: 26

## 2018-01-27 PROCEDURE — 93325 DOPPLER ECHO COLOR FLOW MAPG: CPT | Mod: 26

## 2018-01-27 PROCEDURE — 99232 SBSQ HOSP IP/OBS MODERATE 35: CPT | Mod: GC

## 2018-01-27 PROCEDURE — 99233 SBSQ HOSP IP/OBS HIGH 50: CPT | Mod: GC

## 2018-01-27 RX ORDER — HYDRALAZINE HCL 50 MG
25 TABLET ORAL EVERY 8 HOURS
Qty: 0 | Refills: 0 | Status: DISCONTINUED | OUTPATIENT
Start: 2018-01-27 | End: 2018-01-29

## 2018-01-27 RX ORDER — FUROSEMIDE 40 MG
40 TABLET ORAL ONCE
Qty: 0 | Refills: 0 | Status: COMPLETED | OUTPATIENT
Start: 2018-01-27 | End: 2018-01-27

## 2018-01-27 RX ORDER — METOPROLOL TARTRATE 50 MG
5 TABLET ORAL ONCE
Qty: 0 | Refills: 0 | Status: DISCONTINUED | OUTPATIENT
Start: 2018-01-27 | End: 2018-01-27

## 2018-01-27 RX ORDER — FUROSEMIDE 40 MG
40 TABLET ORAL EVERY 12 HOURS
Qty: 0 | Refills: 0 | Status: DISCONTINUED | OUTPATIENT
Start: 2018-01-27 | End: 2018-01-30

## 2018-01-27 RX ADMIN — Medication 25 MILLIGRAM(S): at 16:03

## 2018-01-27 RX ADMIN — Medication 12.5 MILLIGRAM(S): at 07:40

## 2018-01-27 RX ADMIN — Medication 1 TABLET(S): at 11:14

## 2018-01-27 RX ADMIN — Medication 25 MILLIGRAM(S): at 05:54

## 2018-01-27 RX ADMIN — INSULIN GLARGINE 6 UNIT(S): 100 INJECTION, SOLUTION SUBCUTANEOUS at 21:35

## 2018-01-27 RX ADMIN — Medication 12.5 MILLIGRAM(S): at 17:00

## 2018-01-27 RX ADMIN — ATORVASTATIN CALCIUM 80 MILLIGRAM(S): 80 TABLET, FILM COATED ORAL at 21:25

## 2018-01-27 RX ADMIN — ISOSORBIDE DINITRATE 10 MILLIGRAM(S): 5 TABLET ORAL at 17:00

## 2018-01-27 RX ADMIN — Medication 81 MILLIGRAM(S): at 11:14

## 2018-01-27 RX ADMIN — PANTOPRAZOLE SODIUM 40 MILLIGRAM(S): 20 TABLET, DELAYED RELEASE ORAL at 06:01

## 2018-01-27 RX ADMIN — Medication 40 MILLIGRAM(S): at 14:45

## 2018-01-27 RX ADMIN — Medication 100 MILLIGRAM(S): at 16:04

## 2018-01-27 RX ADMIN — HEPARIN SODIUM 5000 UNIT(S): 5000 INJECTION INTRAVENOUS; SUBCUTANEOUS at 05:54

## 2018-01-27 RX ADMIN — Medication 100 MILLIGRAM(S): at 11:14

## 2018-01-27 RX ADMIN — Medication 250 MILLIGRAM(S): at 12:51

## 2018-01-27 RX ADMIN — BENZOCAINE AND MENTHOL 1 LOZENGE: 5; 1 LIQUID ORAL at 05:54

## 2018-01-27 RX ADMIN — SODIUM CHLORIDE 10 MILLILITER(S): 9 INJECTION INTRAMUSCULAR; INTRAVENOUS; SUBCUTANEOUS at 18:02

## 2018-01-27 RX ADMIN — HEPARIN SODIUM 5000 UNIT(S): 5000 INJECTION INTRAVENOUS; SUBCUTANEOUS at 21:25

## 2018-01-27 RX ADMIN — HEPARIN SODIUM 5000 UNIT(S): 5000 INJECTION INTRAVENOUS; SUBCUTANEOUS at 16:03

## 2018-01-27 RX ADMIN — Medication 3 UNIT(S): at 17:08

## 2018-01-27 RX ADMIN — Medication 20 MILLIGRAM(S): at 05:54

## 2018-01-27 RX ADMIN — ISOSORBIDE DINITRATE 10 MILLIGRAM(S): 5 TABLET ORAL at 21:25

## 2018-01-27 RX ADMIN — SODIUM CHLORIDE 10 MILLILITER(S): 9 INJECTION INTRAMUSCULAR; INTRAVENOUS; SUBCUTANEOUS at 14:00

## 2018-01-27 RX ADMIN — CEFTRIAXONE 100 GRAM(S): 500 INJECTION, POWDER, FOR SOLUTION INTRAMUSCULAR; INTRAVENOUS at 12:51

## 2018-01-27 RX ADMIN — BENZOCAINE AND MENTHOL 1 LOZENGE: 5; 1 LIQUID ORAL at 21:25

## 2018-01-27 RX ADMIN — Medication 1 MILLIGRAM(S): at 11:14

## 2018-01-27 RX ADMIN — FINASTERIDE 5 MILLIGRAM(S): 5 TABLET, FILM COATED ORAL at 11:14

## 2018-01-27 RX ADMIN — Medication 25 MILLIGRAM(S): at 21:25

## 2018-01-27 RX ADMIN — ISOSORBIDE DINITRATE 10 MILLIGRAM(S): 5 TABLET ORAL at 05:54

## 2018-01-27 NOTE — PROGRESS NOTE ADULT - PROBLEM SELECTOR PLAN 2
c/w antibiotics Vancomycin & Ceftriaxone  Vancomycin level c/w antibiotics Vancomycin & Ceftriaxone  Vancomycin level  Worsening AI on TTE NPO for JANES today

## 2018-01-27 NOTE — CHART NOTE - NSCHARTNOTEFT_GEN_A_CORE
====================  SUMMARY:  ====================  73yo M with PMH of T2DM, EtOH Abuse admitted on 1/8 after being found down for about 2 days. Multiple recent falls at home due to physical instability, chronic alcohol use. In the ED EKG with inferior ST-elevations, hypotensive to 70/40 with NSTEMI and AF with RVR s/p failed DCCV then amio/dig loads. Initially managed in the CCU. JANES revealed aortic valve vegetation, started on vancomycin and zosyn -> now Vanc/CTX, w/ Bld Cx's NGTD. Given significant calcification of lesion, it was felt that likely chronic endocarditis and low likelihood of embolization, CT surgery was deferred with plan for f/u in 2-3 months. For NSTEMI, patient was started on medical management including DAPT now s/p cardiac cath on 1/12 which revealed LAD with 70% stenosis, no intervention. Patient was seen by CT surgery for evaluation of aortic valve vegetation. Patient was transferred to the floor, hemodynamically stable. Patient was continued on Abx and medical management of CAD/CHF. Patient with progressively worsening fluid status and worsening cardiac murmur concerning for progressive of endocarditis. Patient had TTE on 1/26 showing highly mobile vegetation traversing LVOT to aorta and worsening severe AI. Patient brought to CCU for further monitoring and need for urgent CT Sx.    ====================  NEW EVENTS:  ====================      ====================  VITALS:  ====================    ICU Vital Signs Last 24 Hrs  T(C): 36.7 (27 Jan 2018 23:00), Max: 36.7 (27 Jan 2018 07:00)  T(F): 98.1 (27 Jan 2018 23:00), Max: 98.1 (27 Jan 2018 07:00)  HR: 100 (27 Jan 2018 23:00) (92 - 166)  BP: 98/43 (27 Jan 2018 23:00) (82/33 - 149/52)  BP(mean): 58 (27 Jan 2018 23:00) (55 - 79)  ABP: --  ABP(mean): --  RR: 20 (27 Jan 2018 23:00) (16 - 24)  SpO2: 98% (27 Jan 2018 23:00) (92% - 100%)      I&O's Summary    26 Jan 2018 07:01  -  27 Jan 2018 07:00  --------------------------------------------------------  IN: 960 mL / OUT: 1450 mL / NET: -490 mL    27 Jan 2018 07:01  -  27 Jan 2018 23:06  --------------------------------------------------------  IN: 540 mL / OUT: 1150 mL / NET: -610 mL            ====================  LABS:  ====================                          9.6    10.2  )-----------( 243      ( 27 Jan 2018 05:21 )             28.0     01-27    138  |  104  |  27<H>  ----------------------------<  96  4.1   |  25  |  1.81<H>    Ca    8.3<L>      27 Jan 2018 05:21  Phos  3.8     01-27  Mg     2.0     01-27    TPro  6.3  /  Alb  2.3<L>  /  TBili  0.4  /  DBili  x   /  AST  19  /  ALT  12  /  AlkPhos  66  01-27    PT/INR - ( 27 Jan 2018 05:21 )   PT: 11.9 sec;   INR: 1.09 ratio         PTT - ( 27 Jan 2018 05:21 )  PTT:37.4 sec    ABG - ( 26 Jan 2018 06:52 )  pH: 7.47  /  pCO2: 32    /  pO2: 184   / HCO3: 22    / Base Excess: -.3   /  SaO2: 100                 ====================  PLAN:  ====================      Devin Saucedo M.D.  Medicine Resident, PGY-2  Pager: [phone] ====================  SUMMARY:  ====================  73yo M with PMH of T2DM, EtOH Abuse admitted on 1/8 after being found down for about 2 days. Multiple recent falls at home due to physical instability, chronic alcohol use. In the ED EKG with inferior ST-elevations, hypotensive to 70/40 with NSTEMI and AF with RVR s/p failed DCCV then amio/dig loads. Initially managed in the CCU. JANES revealed aortic valve vegetation, started on vancomycin and zosyn -> now Vanc/CTX, w/ Bld Cx's NGTD. Given significant calcification of lesion, it was felt that likely chronic endocarditis and low likelihood of embolization, CT surgery was deferred with plan for f/u in 2-3 months. For NSTEMI, patient was started on medical management including DAPT now s/p cardiac cath on 1/12 which revealed LAD with 70% stenosis, no intervention. Patient was seen by CT surgery for evaluation of aortic valve vegetation. Patient was transferred to the floor, hemodynamically stable. Patient was continued on Abx and medical management of CAD/CHF. Patient with progressively worsening fluid status and worsening cardiac murmur concerning for progressive of endocarditis. Patient had TTE on 1/26 showing highly mobile vegetation traversing LVOT to aorta and worsening severe AI. Patient brought to CCU for further monitoring and need for urgent CT Sx.    ====================  NEW EVENTS:  ====================  JANES showing worsening of aortic valve vegetation, aortic insufficency, and aortic stenosis. Patient feeling slightly more SOB, placed on BIPAP.    ====================  VITALS:  ====================    ICU Vital Signs Last 24 Hrs  T(C): 36.7 (27 Jan 2018 23:00), Max: 36.7 (27 Jan 2018 07:00)  T(F): 98.1 (27 Jan 2018 23:00), Max: 98.1 (27 Jan 2018 07:00)  HR: 100 (27 Jan 2018 23:00) (92 - 166)  BP: 98/43 (27 Jan 2018 23:00) (82/33 - 149/52)  BP(mean): 58 (27 Jan 2018 23:00) (55 - 79)  ABP: --  ABP(mean): --  RR: 20 (27 Jan 2018 23:00) (16 - 24)  SpO2: 98% (27 Jan 2018 23:00) (92% - 100%)      I&O's Summary    26 Jan 2018 07:01  -  27 Jan 2018 07:00  --------------------------------------------------------  IN: 960 mL / OUT: 1450 mL / NET: -490 mL    27 Jan 2018 07:01  -  27 Jan 2018 23:06  --------------------------------------------------------  IN: 540 mL / OUT: 1150 mL / NET: -610 mL      ====================  LABS:  ====================                          9.6    10.2  )-----------( 243      ( 27 Jan 2018 05:21 )             28.0     01-27    138  |  104  |  27<H>  ----------------------------<  96  4.1   |  25  |  1.81<H>    Ca    8.3<L>      27 Jan 2018 05:21  Phos  3.8     01-27  Mg     2.0     01-27    TPro  6.3  /  Alb  2.3<L>  /  TBili  0.4  /  DBili  x   /  AST  19  /  ALT  12  /  AlkPhos  66  01-27    PT/INR - ( 27 Jan 2018 05:21 )   PT: 11.9 sec;   INR: 1.09 ratio         PTT - ( 27 Jan 2018 05:21 )  PTT:37.4 sec    ABG - ( 26 Jan 2018 06:52 )  pH: 7.47  /  pCO2: 32    /  pO2: 184   / HCO3: 22    / Base Excess: -.3   /  SaO2: 100         ====================  PLAN:  ====================  #Endocarditis -s/p JANES showing worsening valvular pathology  -c/w Vanc/CTX, Bld Cx's remain negative  -f/u with CT Sx for OR planning    #ADHF -in setting of worsening endocarditis  -c/w Lasix 40mg IV BID for diuresis as pressures tolerate  -c/w Hydralazine/Isordil for afterload reduction  -BIPAP as needed, primarily for comfort    #NSTEMI  -c/w medical management: ASA, Statin, BB. hold ACEI due to ELIAN and Brilinta prior to potential CT Sx    #Multifactorial ELIAN  -initially thought to be contrast-induced now worsening with ADHF  -trend Cr, renally dose meds, minimize nephrotoxins     #T2DM - poorly controlled on insulin  -Lantus halved due to NPO status, return to prior dose of 6U as needed  -c/w Humalog Premeal 3U, hold if NPO  -FS/ISS qac/qhs      Devin Saucedo M.D.  Medicine Resident, PGY-2  Pager: [phone]

## 2018-01-27 NOTE — PROGRESS NOTE ADULT - ATTENDING COMMENTS
Patient with endocarditis and ARF post cath with slow recovery  1.  ARF--non oliguric, no HD requiring.  Would send limited serologic w/u for SBE related serologic abnormalities.  Best rx is treatment endocarditis.  Renal sono and or scan could also reveal emboli which would make surgical rx paramount.

## 2018-01-27 NOTE — PROGRESS NOTE ADULT - PROBLEM SELECTOR PLAN 1
Monitor I &O; daily weight Monitor I &O; daily weight; negative 490 / last 24hrs  c/w lopressor Hydralazine, Isordil  Increase lasix to 40 IV BID

## 2018-01-27 NOTE — PROGRESS NOTE ADULT - PROBLEM SELECTOR PLAN 1
Likely contrast induced nephropathy from cardiac cath done on 1/12. Creatinine first elevated in AM 1/14. Possible pre-renal contribution + vancomycin. Possible ATN may take some time to recover. Please send spot urine TP/Cr to rule out underlying infection related GN in the setting of endocarditis   Patient Scr remains stable at 1.8 today.  Monitor UOP, creatinine trend.    AVOID NSAIDs, ACE/ARBS, and nephrotoxins

## 2018-01-27 NOTE — PROGRESS NOTE ADULT - ATTENDING COMMENTS
Patient is seen and examined with fellow, NP and the CCU house-staff. I agree with the history, physical and the assessment and plan.  plan for JANES today to re-evlaute the AV  started on hydralazine for afterload reduction  c/w abx  increase lasix to 40 mg bid  f/u CTS

## 2018-01-27 NOTE — PROGRESS NOTE ADULT - SUBJECTIVE AND OBJECTIVE BOX
Admission date:  CHIEF COMPLAINT:  HPI:  Patient is a 72 year-old male with past medical history of T2DM, DLD, alcohol misuse brought in by family after having been found face down on the floor for approximately 2 days. Patient was currently residing alone on one floor of the house (wife away in [place]) while other family members including a son live on other floors within the same house. He was not checked upon for 2 days. Patient reports multiple recent episodes of falls at home due to physical instability". He reports chronic alcohol use, which family states ranges from 6-12 beers almost daily. He has been more lethargic lately, with forgetfulness and disorientationHe also endorses fever, chills, dysuria and urinary frequency. Patient denies SOB, chest pain, back pain, diarrhea, melena/hematochezia, recent travel, sick contact or surgery. Patient is originally from [place] and has been in the  for 9 years.     ED course: Initial EKG noted for IW ST-elevations. Patient was hypotensive to 70/40 with HR in 170bpm with Atrial fibrillation + RVR on EKG. Labs notable for WBC 26K, lac 5.2 and troponin 0.6. Given IV NS blus x 5L and multiple DCCV with temporary resumption of NSR in 80s bpm before return to atrial fibrillation. (2018 00:52)    INTERVAL HISTORY:    REVIEW OF SYSTEMS: Denies xxxxxx; all others negative    MEDICATIONS  (STANDING):  aspirin  chewable 81 milliGRAM(s) Oral daily  atorvastatin 80 milliGRAM(s) Oral at bedtime  benzocaine 15 mG/menthol 3.6 mG Lozenge 1 Lozenge Oral three times a day  cefTRIAXone   IVPB 2 Gram(s) IV Intermittent every 24 hours  docusate sodium 100 milliGRAM(s) Oral daily  finasteride 5 milliGRAM(s) Oral daily  folic acid 1 milliGRAM(s) Oral daily  furosemide   Injectable 20 milliGRAM(s) IV Push every 12 hours  heparin  Injectable 5000 Unit(s) SubCutaneous every 8 hours  hydrALAZINE 25 milliGRAM(s) Oral every 8 hours  insulin glargine Injectable (LANTUS) 6 Unit(s) SubCutaneous at bedtime  insulin lispro (HumaLOG) corrective regimen sliding scale   SubCutaneous three times a day before meals  insulin lispro (HumaLOG) corrective regimen sliding scale   SubCutaneous at bedtime  insulin lispro Injectable (HumaLOG) 3 Unit(s) SubCutaneous three times a day with meals  isosorbide   dinitrate Tablet (ISORDIL) 10 milliGRAM(s) Oral every 8 hours  metoprolol     tartrate 12.5 milliGRAM(s) Oral two times a day  multivitamin 1 Tablet(s) Oral daily  pantoprazole    Tablet 40 milliGRAM(s) Oral before breakfast  sodium chloride 0.9% lock flush 20 milliLiter(s) IV Push once  thiamine 100 milliGRAM(s) Oral daily  vancomycin  IVPB 1000 milliGRAM(s) IV Intermittent every 24 hours    MEDICATIONS  (PRN):  acetaminophen   Tablet. 650 milliGRAM(s) Oral every 6 hours PRN Moderate Pain (4 - 6)  acetaminophen   Tablet. 650 milliGRAM(s) Oral every 6 hours PRN Mild Pain (1 - 3)  polyethylene glycol 3350 17 Gram(s) Oral daily PRN Constipation  sodium chloride 0.9% lock flush 10 milliLiter(s) IV Push every 1 hour PRN After each medication administration  sodium chloride 0.9% lock flush 10 milliLiter(s) IV Push every 12 hours PRN Lumen of catheter NOT used      Objective:  ICU Vital Signs Last 24 Hrs  T(C): 36.5 (2018 03:00), Max: 36.7 (2018 08:53)  T(F): 97.7 (2018 03:00), Max: 98.1 (2018 08:53)  HR: 104 (2018 07:54) (94 - 110)  BP: 104/45 (2018 07:30) (91/40 - 120/47)  BP(mean): 64 (2018 07:30) (55 - 79)  ABP: --  ABP(mean): --  RR: 17 (2018 07:30) (16 - 22)  SpO2: 99% (2018 07:54) (95% - 100%)         @ 07:  -   @ 07:00  --------------------------------------------------------  IN: 960 mL / OUT: 1450 mL / NET: -490 mL      Daily     Daily Weight in k.1 (2018 05:30)    PHYSICAL EXAM:      Constitutional:    HEENT:    Respiratory:    Cardiovascular:    Gastrointestinal:    Genitourinary:    Extremities:    Vascular:    Neurological:    Skin:      TELEMETRY:     EKG:     IMAGING:    Labs:  ABG - ( 2018 06:52 )  pH: 7.47  /  pCO2: 32    /  pO2: 184   / HCO3: 22    / Base Excess: -.3   /  SaO2: 100                                     9.6    10.2  )-----------( 243      ( 2018 05:21 )             28.0         138  |  104  |  27<H>  ----------------------------<  96  4.1   |  25  |  1.81<H>    Ca    8.3<L>      2018 05:21  Phos  3.8       Mg     2.0         TPro  6.3  /  Alb  2.3<L>  /  TBili  0.4  /  DBili  x   /  AST  19  /  ALT  12  /  AlkPhos  66      LIVER FUNCTIONS - ( 2018 05:21 )  Alb: 2.3 g/dL / Pro: 6.3 g/dL / ALK PHOS: 66 U/L / ALT: 12 U/L RC / AST: 19 U/L / GGT: x           PT/INR - ( 2018 05:21 )   PT: 11.9 sec;   INR: 1.09 ratio         PTT - ( 2018 05:21 )  PTT:37.4 sec        HEALTH ISSUES - PROBLEM Dx:  Acute diastolic heart failure: Acute diastolic heart failure  Hypotension: Hypotension  ATN (acute tubular necrosis): ATN (acute tubular necrosis)  Acute infective endocarditis, due to unspecified organism: Acute infective endocarditis, due to unspecified organism  Gas pain: Gas pain  Prophylactic measure: Prophylactic measure  Leukocytosis, unspecified type: Leukocytosis, unspecified type  ELIAN (acute kidney injury): ELIAN (acute kidney injury)  Vegetation of heart valve: Vegetation of heart valve  Elevated lactic acid level: Elevated lactic acid level  Uncontrolled type 2 diabetes mellitus with hyperglycemia, without long-term current use of insulin: Uncontrolled type 2 diabetes mellitus with hyperglycemia, without long-term current use of insulin  Acute cystitis without hematuria: Acute cystitis without hematuria  Need for prophylactic measure: Need for prophylactic measure  Type 2 diabetes mellitus with hyperglycemia, unspecified long term insulin use status: Type 2 diabetes mellitus with hyperglycemia, unspecified long term insulin use status  Metabolic encephalopathy: Metabolic encephalopathy  Alcohol abuse: Alcohol abuse  Severe sepsis: Severe sepsis  NSTEMI (non-ST elevated myocardial infarction): NSTEMI (non-ST elevated myocardial infarction)  Atrial fibrillation with rapid ventricular response: Atrial fibrillation with rapid ventricular response Admission date: ; Readmitted CCU   CHIEF COMPLAINT:  HPI: Patient is a 72 year-old male with past medical history of T2DM, DLD, alcohol misuse brought in by family after having been found face down on the floor for approximately 2 days. Patient was currently residing alone on one floor of the house (wife away in [place]) while other family members including a son live on other floors within the same house. He was not checked upon for 2 days. Patient reports multiple recent episodes of falls at home due to physical instability". He reports chronic alcohol use, which family states ranges from 6-12 beers almost daily. He has been more lethargic lately, with forgetfulness and disorientation. He also endorses fever, chills, dysuria and urinary frequency. Patient denies SOB, chest pain, back pain, diarrhea, melena/hematochezia, recent travel, sick contact or surgery. Patient is originally from [place] and has been in the  for 9 years.     ED course: Initial EKG noted for IW ST-elevations. Patient was hypotensive to 70/40 with HR in 170bpm with Atrial fibrillation + RVR on EKG. Labs notable for WBC 26K, lac 5.2 and troponin 0.6. Given IV NS blus x 5L and multiple DCCV with temporary resumption of NSR in 80s bpm before return to atrial fibrillation. (2018 00:52)    INTERVAL HISTORY: Readmitted CCU yesterday evening for worsening AI  Resting comfortably in bed, no c/o     REVIEW OF SYSTEMS: Denies chest pain, SOB, palpitations, dizziness, BV; + MCLEOD; all others negative    MEDICATIONS  (STANDING):  aspirin  chewable 81 milliGRAM(s) Oral daily  atorvastatin 80 milliGRAM(s) Oral at bedtime  benzocaine 15 mG/menthol 3.6 mG Lozenge 1 Lozenge Oral three times a day  cefTRIAXone   IVPB 2 Gram(s) IV Intermittent every 24 hours  docusate sodium 100 milliGRAM(s) Oral daily  finasteride 5 milliGRAM(s) Oral daily  folic acid 1 milliGRAM(s) Oral daily  furosemide   Injectable 20 milliGRAM(s) IV Push every 12 hours  heparin  Injectable 5000 Unit(s) SubCutaneous every 8 hours  hydrALAZINE 25 milliGRAM(s) Oral every 8 hours  insulin glargine Injectable (LANTUS) 6 Unit(s) SubCutaneous at bedtime  insulin lispro (HumaLOG) corrective regimen sliding scale   SubCutaneous three times a day before meals  insulin lispro (HumaLOG) corrective regimen sliding scale   SubCutaneous at bedtime  insulin lispro Injectable (HumaLOG) 3 Unit(s) SubCutaneous three times a day with meals  isosorbide   dinitrate Tablet (ISORDIL) 10 milliGRAM(s) Oral every 8 hours  metoprolol     tartrate 12.5 milliGRAM(s) Oral two times a day  multivitamin 1 Tablet(s) Oral daily  pantoprazole    Tablet 40 milliGRAM(s) Oral before breakfast  sodium chloride 0.9% lock flush 20 milliLiter(s) IV Push once  thiamine 100 milliGRAM(s) Oral daily  vancomycin  IVPB 1000 milliGRAM(s) IV Intermittent every 24 hours    MEDICATIONS  (PRN):  acetaminophen   Tablet. 650 milliGRAM(s) Oral every 6 hours PRN Moderate Pain (4 - 6)  acetaminophen   Tablet. 650 milliGRAM(s) Oral every 6 hours PRN Mild Pain (1 - 3)  polyethylene glycol 3350 17 Gram(s) Oral daily PRN Constipation  sodium chloride 0.9% lock flush 10 milliLiter(s) IV Push every 1 hour PRN After each medication administration  sodium chloride 0.9% lock flush 10 milliLiter(s) IV Push every 12 hours PRN Lumen of catheter NOT used      Objective:  ICU Vital Signs Last 24 Hrs  T(C): 36.5 (2018 03:00), Max: 36.7 (2018 08:53)  T(F): 97.7 (2018 03:00), Max: 98.1 (2018 08:53)  HR: 104 (2018 07:54) (94 - 110)  BP: 104/45 (2018 07:30) (91/40 - 120/47)  BP(mean): 64 (2018 07:30) (55 - 79)  ABP: --  ABP(mean): --  RR: 17 (2018 07:30) (16 - 22)  SpO2: 99% (2018 07:54) (95% - 100%)         @ 07:01  -   @ 07:00  --------------------------------------------------------  IN: 960 mL / OUT: 1450 mL / NET: -490 mL      Daily     Daily Weight in k.1 (2018 05:30)    PHYSICAL EXAM:  Constitutional: NAD  HEENT: oral mucosa pink, moist  Respiratory: Regular unlabored, CTA with decreased in bilat bases with crackles  Cardiovascular: RRR, S1 S2 + murmur; no R/G; + LE edema  Gastrointestinal: soft ND/NT; + bowel sounds, NPO for JANES today  Genitourinary: voiding on own   Extremities: DAVIES equal strength; bilat 2+LE edema  Vascular: warm peripherally  Neurological: A & O x3 mood & affect appropriate  Skin: warm dry intact, no rash, cyanosis      TELEMETRY: sinus rhythm; overnight freq APCs    EKG: SR RBBB with occas PVC; HR 98    IMAGING:    Labs:  ABG - ( 2018 06:52 )  pH: 7.47  /  pCO2: 32    /  pO2: 184   / HCO3: 22    / Base Excess: -.3   /  SaO2: 100                                     9.6    10.2  )-----------( 243      ( 2018 05:21 )             28.0         138  |  104  |  27<H>  ----------------------------<  96  4.1   |  25  |  1.81<H>    Ca    8.3<L>      2018 05:21  Phos  3.8       Mg     2.0         TPro  6.3  /  Alb  2.3<L>  /  TBili  0.4  /  DBili  x   /  AST  19  /  ALT  12  /  AlkPhos  66      LIVER FUNCTIONS - ( 2018 05:21 )  Alb: 2.3 g/dL / Pro: 6.3 g/dL / ALK PHOS: 66 U/L / ALT: 12 U/L RC / AST: 19 U/L / GGT: x           PT/INR - ( 2018 05:21 )   PT: 11.9 sec;   INR: 1.09 ratio         PTT - ( 2018 05:21 )  PTT:37.4 sec        HEALTH ISSUES - PROBLEM Dx:  Acute diastolic heart failure: Acute diastolic heart failure  Hypotension: Hypotension  ATN (acute tubular necrosis): ATN (acute tubular necrosis)  Acute infective endocarditis, due to unspecified organism: Acute infective endocarditis, due to unspecified organism  Gas pain: Gas pain  Prophylactic measure: Prophylactic measure  Leukocytosis, unspecified type: Leukocytosis, unspecified type  ELIAN (acute kidney injury): ELIAN (acute kidney injury)  Vegetation of heart valve: Vegetation of heart valve  Elevated lactic acid level: Elevated lactic acid level  Uncontrolled type 2 diabetes mellitus with hyperglycemia, without long-term current use of insulin: Uncontrolled type 2 diabetes mellitus with hyperglycemia, without long-term current use of insulin  Acute cystitis without hematuria: Acute cystitis without hematuria  Need for prophylactic measure: Need for prophylactic measure  Type 2 diabetes mellitus with hyperglycemia, unspecified long term insulin use status: Type 2 diabetes mellitus with hyperglycemia, unspecified long term insulin use status  Metabolic encephalopathy: Metabolic encephalopathy  Alcohol abuse: Alcohol abuse  Severe sepsis: Severe sepsis  NSTEMI (non-ST elevated myocardial infarction): NSTEMI (non-ST elevated myocardial infarction)  Atrial fibrillation with rapid ventricular response: Atrial fibrillation with rapid ventricular response

## 2018-01-27 NOTE — PROGRESS NOTE ADULT - ASSESSMENT
71yo M with PMH of T2DM, EtOH Abuse admitted on 1/8 after being found down for about 2 days. Multiple recent falls at home due to physical instability, chronic alcohol use. In the ED EKG with inferior ST-elevations, hypotensive to 70/40 with NSTEMI and AF with RVR s/p failed DCCV then amio/dig loads. Initially managed in the CCU. JANES revealed aortic valve vegetation, started on vancomycin and zosyn -> now Vanc/CTX, w/ Bld Cx's NGTD. Given significant calcification of lesion, it was felt that likely chronic endocarditis and low likelihood of embolization, CT surgery was deferred with plan for f/u in 2-3 months. For NSTEMI, patient was started on medical management including DAPT now s/p cardiac cath on 1/12 which revealed LAD with 70% stenosis, no intervention. Patient was seen by CT surgery for evaluation of aortic valve vegetation. Patient was transferred to the floor, hemodynamically stable. Patient was continued on Abx and medical management of CAD/CHF. Patient with progressively worsening fluid status and worsening cardiac murmur concerning for progressive of endocarditis. Patient had TTE on 1/26 showing highly mobile vegetation traversing LVOT to aorta and worsening severe AI. Patient brought to CCU for further monitoring and need for urgent CT Sx.

## 2018-01-27 NOTE — PROGRESS NOTE ADULT - SUBJECTIVE AND OBJECTIVE BOX
[facility] DIVISION OF KIDNEY DISEASES AND HYPERTENSION -- FOLLOW UP NOTE  --------------------------------------------------------------------------------  Chief Complaint: ELIAN     24 hour events/subjective:  Patient moved to [place] overnight.     PAST HISTORY  --------------------------------------------------------------------------------  No significant changes to PMH, PSH, FHx, SHx, unless otherwise noted    ALLERGIES & MEDICATIONS  --------------------------------------------------------------------------------  Allergies    No Known Allergies    Intolerances      Standing Inpatient Medications  aspirin  chewable 81 milliGRAM(s) Oral daily  atorvastatin 80 milliGRAM(s) Oral at bedtime  benzocaine 15 mG/menthol 3.6 mG Lozenge 1 Lozenge Oral three times a day  cefTRIAXone   IVPB 2 Gram(s) IV Intermittent every 24 hours  docusate sodium 100 milliGRAM(s) Oral daily  finasteride 5 milliGRAM(s) Oral daily  folic acid 1 milliGRAM(s) Oral daily  furosemide   Injectable 40 milliGRAM(s) IV Push every 12 hours  heparin  Injectable 5000 Unit(s) SubCutaneous every 8 hours  hydrALAZINE 25 milliGRAM(s) Oral every 8 hours  insulin glargine Injectable (LANTUS) 6 Unit(s) SubCutaneous at bedtime  insulin lispro (HumaLOG) corrective regimen sliding scale   SubCutaneous three times a day before meals  insulin lispro (HumaLOG) corrective regimen sliding scale   SubCutaneous at bedtime  insulin lispro Injectable (HumaLOG) 3 Unit(s) SubCutaneous three times a day with meals  isosorbide   dinitrate Tablet (ISORDIL) 10 milliGRAM(s) Oral every 8 hours  metoprolol     tartrate 12.5 milliGRAM(s) Oral two times a day  multivitamin 1 Tablet(s) Oral daily  pantoprazole    Tablet 40 milliGRAM(s) Oral before breakfast  sodium chloride 0.9% lock flush 20 milliLiter(s) IV Push once  thiamine 100 milliGRAM(s) Oral daily  vancomycin  IVPB 1000 milliGRAM(s) IV Intermittent every 24 hours    PRN Inpatient Medications  acetaminophen   Tablet. 650 milliGRAM(s) Oral every 6 hours PRN  acetaminophen   Tablet. 650 milliGRAM(s) Oral every 6 hours PRN  polyethylene glycol 3350 17 Gram(s) Oral daily PRN  sodium chloride 0.9% lock flush 10 milliLiter(s) IV Push every 1 hour PRN  sodium chloride 0.9% lock flush 10 milliLiter(s) IV Push every 12 hours PRN      REVIEW OF SYSTEMS  --------------------------------------------------------------------------------  Gen: No weakness  Skin: No rashes  Head/Eyes/Ears/Mouth: No headache  Respiratory: No dyspnea, cough  CV: No chest pain, PND, orthopnea  GI: No abdominal pain, diarrhea  : No increased frequency, dysuria, hematuria, nocturia  MSK: No edema  Neuro: No dizziness/lightheadedness  Heme: No bleeding    All other systems were reviewed and are negative, except as noted.    VITALS/PHYSICAL EXAM  --------------------------------------------------------------------------------  T(C): 36.7 (01-27-18 @ 16:00), Max: 36.7 (01-26-18 @ 23:00)  HR: 106 (01-27-18 @ 18:00) (92 - 166)  BP: 91/41 (01-27-18 @ 18:00) (82/33 - 149/52)  RR: 16 (01-27-18 @ 18:00) (16 - 24)  SpO2: 93% (01-27-18 @ 18:00) (92% - 100%)  Wt(kg): --        01-26-18 @ 07:01  -  01-27-18 @ 07:00  --------------------------------------------------------  IN: 960 mL / OUT: 1450 mL / NET: -490 mL    01-27-18 @ 07:01  -  01-27-18 @ 18:14  --------------------------------------------------------  IN: 300 mL / OUT: 550 mL / NET: -250 mL    Physical Exam:  	Gen: NAD  	HEENT:  supple neck  	Pulm: CTA B/L  	CV: RRR, S1S2  	Abd: +BS, soft, nontender/nondistended  	UE: Warm, no asterixis  	LE: Warm, no edema  	Psych: Normal affect and mood  	Skin: Warm, without rashes    LABS/STUDIES  --------------------------------------------------------------------------------              9.6    10.2  >-----------<  243      [01-27-18 @ 05:21]              28.0     138  |  104  |  27  ----------------------------<  96      [01-27-18 @ 05:21]  4.1   |  25  |  1.81        Ca     8.3     [01-27-18 @ 05:21]      Mg     2.0     [01-27-18 @ 05:21]      Phos  3.8     [01-27-18 @ 05:21]    TPro  6.3  /  Alb  2.3  /  TBili  0.4  /  DBili  x   /  AST  19  /  ALT  12  /  AlkPhos  66  [01-27-18 @ 05:21]    PT/INR: PT 11.9 , INR 1.09       [01-27-18 @ 05:21]  PTT: 37.4       [01-27-18 @ 05:21]    Troponin 0.24      [01-26-18 @ 06:39]  CK 39      [01-26-18 @ 06:39]    Creatinine Trend:  SCr 1.81 [01-27 @ 05:21]  SCr 1.88 [01-26 @ 22:28]  SCr 1.91 [01-26 @ 06:39]  SCr 1.79 [01-25 @ 03:17]  SCr 1.66 [01-23 @ 07:26]    Urinalysis - [01-15-18 @ 09:30]      Color Colorless / Appearance Clear / SG 1.007 / pH 6.0      Gluc Negative / Ketone Negative  / Bili Negative / Urobili Negative       Blood Small / Protein Negative / Leuk Est Negative / Nitrite Negative      RBC 0-2 / WBC 0-2 / Hyaline  / Gran  / Sq Epi  / Non Sq Epi Occasional / Bacteria       HbA1c 9.1      [01-09-18 @ 03:39]  TSH 4.60      [01-09-18 @ 03:39]  Lipid: chol 103, , HDL 16, LDL 67      [01-09-18 @ 08:09]    HIV Nonreact      [01-09-18 @ 07:54]    Syphilis Screen (Treponema Pallidum Ab) Negative      [01-11-18 @ 19:58]

## 2018-01-28 LAB
ALBUMIN SERPL ELPH-MCNC: 2.5 G/DL — LOW (ref 3.3–5)
ALP SERPL-CCNC: 68 U/L — SIGNIFICANT CHANGE UP (ref 40–120)
ALT FLD-CCNC: 11 U/L RC — SIGNIFICANT CHANGE UP (ref 10–45)
ANION GAP SERPL CALC-SCNC: 13 MMOL/L — SIGNIFICANT CHANGE UP (ref 5–17)
APTT BLD: 65.3 SEC — HIGH (ref 27.5–37.4)
AST SERPL-CCNC: 20 U/L — SIGNIFICANT CHANGE UP (ref 10–40)
BASOPHILS # BLD AUTO: 0 K/UL — SIGNIFICANT CHANGE UP (ref 0–0.2)
BASOPHILS NFR BLD AUTO: 0.4 % — SIGNIFICANT CHANGE UP (ref 0–2)
BILIRUB SERPL-MCNC: 0.4 MG/DL — SIGNIFICANT CHANGE UP (ref 0.2–1.2)
BUN SERPL-MCNC: 30 MG/DL — HIGH (ref 7–23)
CALCIUM SERPL-MCNC: 8.4 MG/DL — SIGNIFICANT CHANGE UP (ref 8.4–10.5)
CHLORIDE SERPL-SCNC: 103 MMOL/L — SIGNIFICANT CHANGE UP (ref 96–108)
CO2 SERPL-SCNC: 25 MMOL/L — SIGNIFICANT CHANGE UP (ref 22–31)
CREAT SERPL-MCNC: 1.83 MG/DL — HIGH (ref 0.5–1.3)
EOSINOPHIL # BLD AUTO: 0.1 K/UL — SIGNIFICANT CHANGE UP (ref 0–0.5)
EOSINOPHIL NFR BLD AUTO: 0.7 % — SIGNIFICANT CHANGE UP (ref 0–6)
GLUCOSE BLDC GLUCOMTR-MCNC: 116 MG/DL — HIGH (ref 70–99)
GLUCOSE BLDC GLUCOMTR-MCNC: 129 MG/DL — HIGH (ref 70–99)
GLUCOSE BLDC GLUCOMTR-MCNC: 134 MG/DL — HIGH (ref 70–99)
GLUCOSE BLDC GLUCOMTR-MCNC: 91 MG/DL — SIGNIFICANT CHANGE UP (ref 70–99)
GLUCOSE SERPL-MCNC: 122 MG/DL — HIGH (ref 70–99)
HCT VFR BLD CALC: 28.3 % — LOW (ref 39–50)
HGB BLD-MCNC: 9.6 G/DL — LOW (ref 13–17)
INR BLD: 1.1 RATIO — SIGNIFICANT CHANGE UP (ref 0.88–1.16)
LYMPHOCYTES # BLD AUTO: 0.8 K/UL — LOW (ref 1–3.3)
LYMPHOCYTES # BLD AUTO: 8.4 % — LOW (ref 13–44)
MAGNESIUM SERPL-MCNC: 2 MG/DL — SIGNIFICANT CHANGE UP (ref 1.6–2.6)
MCHC RBC-ENTMCNC: 33 PG — SIGNIFICANT CHANGE UP (ref 27–34)
MCHC RBC-ENTMCNC: 33.9 GM/DL — SIGNIFICANT CHANGE UP (ref 32–36)
MCV RBC AUTO: 97.3 FL — SIGNIFICANT CHANGE UP (ref 80–100)
MONOCYTES # BLD AUTO: 0.5 K/UL — SIGNIFICANT CHANGE UP (ref 0–0.9)
MONOCYTES NFR BLD AUTO: 5 % — SIGNIFICANT CHANGE UP (ref 2–14)
NEUTROPHILS # BLD AUTO: 8.3 K/UL — HIGH (ref 1.8–7.4)
NEUTROPHILS NFR BLD AUTO: 85.6 % — HIGH (ref 43–77)
PHOSPHATE SERPL-MCNC: 4.2 MG/DL — SIGNIFICANT CHANGE UP (ref 2.5–4.5)
PLATELET # BLD AUTO: 263 K/UL — SIGNIFICANT CHANGE UP (ref 150–400)
POTASSIUM SERPL-MCNC: 4.2 MMOL/L — SIGNIFICANT CHANGE UP (ref 3.5–5.3)
POTASSIUM SERPL-SCNC: 4.2 MMOL/L — SIGNIFICANT CHANGE UP (ref 3.5–5.3)
PROT SERPL-MCNC: 6.3 G/DL — SIGNIFICANT CHANGE UP (ref 6–8.3)
PROTHROM AB SERPL-ACNC: 11.9 SEC — SIGNIFICANT CHANGE UP (ref 9.8–12.7)
RBC # BLD: 2.91 M/UL — LOW (ref 4.2–5.8)
RBC # FLD: 13.8 % — SIGNIFICANT CHANGE UP (ref 10.3–14.5)
SODIUM SERPL-SCNC: 141 MMOL/L — SIGNIFICANT CHANGE UP (ref 135–145)
VANCOMYCIN TROUGH SERPL-MCNC: 20.5 UG/ML — HIGH (ref 10–20)
WBC # BLD: 9.7 K/UL — SIGNIFICANT CHANGE UP (ref 3.8–10.5)
WBC # FLD AUTO: 9.7 K/UL — SIGNIFICANT CHANGE UP (ref 3.8–10.5)

## 2018-01-28 PROCEDURE — 71045 X-RAY EXAM CHEST 1 VIEW: CPT | Mod: 26

## 2018-01-28 PROCEDURE — 70450 CT HEAD/BRAIN W/O DYE: CPT | Mod: 26

## 2018-01-28 PROCEDURE — 99233 SBSQ HOSP IP/OBS HIGH 50: CPT | Mod: GC

## 2018-01-28 RX ADMIN — ISOSORBIDE DINITRATE 10 MILLIGRAM(S): 5 TABLET ORAL at 13:27

## 2018-01-28 RX ADMIN — BENZOCAINE AND MENTHOL 1 LOZENGE: 5; 1 LIQUID ORAL at 05:33

## 2018-01-28 RX ADMIN — ISOSORBIDE DINITRATE 10 MILLIGRAM(S): 5 TABLET ORAL at 21:29

## 2018-01-28 RX ADMIN — Medication 1 TABLET(S): at 11:22

## 2018-01-28 RX ADMIN — Medication 81 MILLIGRAM(S): at 11:22

## 2018-01-28 RX ADMIN — ISOSORBIDE DINITRATE 10 MILLIGRAM(S): 5 TABLET ORAL at 05:33

## 2018-01-28 RX ADMIN — FINASTERIDE 5 MILLIGRAM(S): 5 TABLET, FILM COATED ORAL at 11:22

## 2018-01-28 RX ADMIN — INSULIN GLARGINE 6 UNIT(S): 100 INJECTION, SOLUTION SUBCUTANEOUS at 21:30

## 2018-01-28 RX ADMIN — Medication 25 MILLIGRAM(S): at 05:33

## 2018-01-28 RX ADMIN — Medication 25 MILLIGRAM(S): at 21:29

## 2018-01-28 RX ADMIN — HEPARIN SODIUM 5000 UNIT(S): 5000 INJECTION INTRAVENOUS; SUBCUTANEOUS at 05:33

## 2018-01-28 RX ADMIN — BENZOCAINE AND MENTHOL 1 LOZENGE: 5; 1 LIQUID ORAL at 13:32

## 2018-01-28 RX ADMIN — Medication 1 MILLIGRAM(S): at 11:22

## 2018-01-28 RX ADMIN — CEFTRIAXONE 100 GRAM(S): 500 INJECTION, POWDER, FOR SOLUTION INTRAMUSCULAR; INTRAVENOUS at 13:27

## 2018-01-28 RX ADMIN — SODIUM CHLORIDE 10 MILLILITER(S): 9 INJECTION INTRAMUSCULAR; INTRAVENOUS; SUBCUTANEOUS at 05:30

## 2018-01-28 RX ADMIN — PANTOPRAZOLE SODIUM 40 MILLIGRAM(S): 20 TABLET, DELAYED RELEASE ORAL at 06:00

## 2018-01-28 RX ADMIN — Medication 3 UNIT(S): at 17:55

## 2018-01-28 RX ADMIN — Medication 100 MILLIGRAM(S): at 11:22

## 2018-01-28 RX ADMIN — SODIUM CHLORIDE 10 MILLILITER(S): 9 INJECTION INTRAMUSCULAR; INTRAVENOUS; SUBCUTANEOUS at 18:53

## 2018-01-28 RX ADMIN — Medication 25 MILLIGRAM(S): at 13:27

## 2018-01-28 RX ADMIN — HEPARIN SODIUM 5000 UNIT(S): 5000 INJECTION INTRAVENOUS; SUBCUTANEOUS at 13:32

## 2018-01-28 RX ADMIN — SODIUM CHLORIDE 10 MILLILITER(S): 9 INJECTION INTRAMUSCULAR; INTRAVENOUS; SUBCUTANEOUS at 17:00

## 2018-01-28 RX ADMIN — Medication 12.5 MILLIGRAM(S): at 17:12

## 2018-01-28 RX ADMIN — Medication 40 MILLIGRAM(S): at 17:11

## 2018-01-28 RX ADMIN — BENZOCAINE AND MENTHOL 1 LOZENGE: 5; 1 LIQUID ORAL at 21:29

## 2018-01-28 RX ADMIN — Medication 40 MILLIGRAM(S): at 05:33

## 2018-01-28 RX ADMIN — Medication 3 UNIT(S): at 12:43

## 2018-01-28 RX ADMIN — ATORVASTATIN CALCIUM 80 MILLIGRAM(S): 80 TABLET, FILM COATED ORAL at 21:29

## 2018-01-28 RX ADMIN — HEPARIN SODIUM 5000 UNIT(S): 5000 INJECTION INTRAVENOUS; SUBCUTANEOUS at 21:29

## 2018-01-28 NOTE — PROGRESS NOTE ADULT - PROBLEM SELECTOR PLAN 1
Monitor I &O; daily weight; negative 990 / last 24hrs  c/w lopressor Hydralazine, Isordil  Increase lasix to 40 IV BID

## 2018-01-28 NOTE — PROGRESS NOTE ADULT - PROBLEM SELECTOR PLAN 2
c/w antibiotics Vancomycin & Ceftriaxone  Vancomycin level  Worsening AI on TTE 1/27 JANES with worsening AV vegetation, severe AI and moderate AS

## 2018-01-28 NOTE — PROGRESS NOTE ADULT - ATTENDING COMMENTS
Patient is seen and examined with fellow, NP and the CCU house-staff. I agree with the history, physical and the assessment and plan.  JANES results noted  on hydralazine for afterload reduction  keep negative fluid balance - on IV lasix  renal function stable  f/u with CTS regarding further plans  c/w abx

## 2018-01-28 NOTE — PROGRESS NOTE ADULT - SUBJECTIVE AND OBJECTIVE BOX
Patient is a 72y old  Male who presents with a chief complaint of Fall (09 Jan 2018 00:52)    Event	  HPI:  Patient is a 72 year-old male with past medical history of T2DM, DLD, alcohol misuse brought in by family after having been found face down on the floor for approximately 2 days. Patient was currently residing alone on one floor of the house (wife away in [place]) while other family members including a son live on other floors within the same house. He was not checked upon for 2 days. Patient reports multiple recent episodes of falls at home due to physical instability". He reports chronic alcohol use, which family states ranges from 6-12 beers almost daily. He has been more lethargic lately, with forgetfulness and disorientationHe also endorses fever, chills, dysuria and urinary frequency. Patient denies SOB, chest pain, back pain, diarrhea, melena/hematochezia, recent travel, sick contact or surgery. Patient is originally from [place] and has been in the  for 9 years.     ED course: Initial EKG noted for IW ST-elevations. Patient was hypotensive to 70/40 with HR in 170bpm with Atrial fibrillation + RVR on EKG. Labs notable for WBC 26K, lac 5.2 and troponin 0.6. Given IV NS blus x 5L and multiple DCCV with temporary resumption of NSR in 80s bpm before return to atrial fibrillation. (09 Jan 2018 00:52)    MEDICATIONS  (STANDING):  aspirin  chewable 81 milliGRAM(s) Oral daily  atorvastatin 80 milliGRAM(s) Oral at bedtime  benzocaine 15 mG/menthol 3.6 mG Lozenge 1 Lozenge Oral three times a day  cefTRIAXone   IVPB 2 Gram(s) IV Intermittent every 24 hours  docusate sodium 100 milliGRAM(s) Oral daily  finasteride 5 milliGRAM(s) Oral daily  folic acid 1 milliGRAM(s) Oral daily  furosemide   Injectable 40 milliGRAM(s) IV Push every 12 hours  heparin  Injectable 5000 Unit(s) SubCutaneous every 8 hours  hydrALAZINE 25 milliGRAM(s) Oral every 8 hours  insulin glargine Injectable (LANTUS) 6 Unit(s) SubCutaneous at bedtime  insulin lispro (HumaLOG) corrective regimen sliding scale   SubCutaneous three times a day before meals  insulin lispro (HumaLOG) corrective regimen sliding scale   SubCutaneous at bedtime  insulin lispro Injectable (HumaLOG) 3 Unit(s) SubCutaneous three times a day with meals  isosorbide   dinitrate Tablet (ISORDIL) 10 milliGRAM(s) Oral every 8 hours  metoprolol     tartrate 12.5 milliGRAM(s) Oral two times a day  multivitamin 1 Tablet(s) Oral daily  pantoprazole    Tablet 40 milliGRAM(s) Oral before breakfast  sodium chloride 0.9% lock flush 20 milliLiter(s) IV Push once  thiamine 100 milliGRAM(s) Oral daily  vancomycin  IVPB 1000 milliGRAM(s) IV Intermittent every 24 hours    MEDICATIONS  (PRN):  acetaminophen   Tablet. 650 milliGRAM(s) Oral every 6 hours PRN Moderate Pain (4 - 6)  acetaminophen   Tablet. 650 milliGRAM(s) Oral every 6 hours PRN Mild Pain (1 - 3)  polyethylene glycol 3350 17 Gram(s) Oral daily PRN Constipation  sodium chloride 0.9% lock flush 10 milliLiter(s) IV Push every 1 hour PRN After each medication administration  sodium chloride 0.9% lock flush 10 milliLiter(s) IV Push every 12 hours PRN Lumen of catheter NOT used      REVIEW OF SYSTEMS:  Otherwise, 10 point ROS done and otherwise negative.    PHYSICAL EXAM:  Vital Signs Last 24 Hrs  T(C): 36.6 (28 Jan 2018 05:30), Max: 36.7 (27 Jan 2018 12:00)  T(F): 97.9 (28 Jan 2018 05:30), Max: 98.1 (27 Jan 2018 23:00)  HR: 112 (28 Jan 2018 06:00) (92 - 166)  BP: 110/48 (28 Jan 2018 06:00) (82/33 - 149/52)  BP(mean): 68 (28 Jan 2018 06:00) (58 - 78)  RR: 22 (28 Jan 2018 06:00) (16 - 24)  SpO2: 97% (28 Jan 2018 06:00) (92% - 100%)  I&O's Summary    27 Jan 2018 07:01  -  28 Jan 2018 07:00  --------------------------------------------------------  IN: 660 mL / OUT: 1650 mL / NET: -990 mL      PHYSICAL EXAM:  Constitutional: NAD  HEENT: oral mucosa pink, moist  Respiratory: Regular unlabored, CTA with decreased in bilat bases with crackles  Cardiovascular: RRR, S1 S2 + murmur; no R/G; + LE edema  Gastrointestinal: soft ND/NT; + bowel sounds, NPO for JANES today  Genitourinary: voiding on own   Extremities: DAVIES equal strength; bilat 2+LE edema  Vascular: warm peripherally  Neurological: A & O x3 mood & affect appropriate  Skin: warm dry intact, no rash, cyanosis      LABS:	 	                        9.6    9.7   )-----------( 263      ( 28 Jan 2018 04:22 )             28.3     Auto Eosinophil # 0.1   / Auto Eosinophil % 0.7   / Auto Neutrophil # 8.3   / Auto Neutrophil % 85.6  / BANDS % x                            9.6    10.2  )-----------( 243      ( 27 Jan 2018 05:21 )             28.0     Auto Eosinophil # 0.1   / Auto Eosinophil % 1.1   / Auto Neutrophil # 8.6   / Auto Neutrophil % 84.4  / BANDS % x                            9.9    10.4  )-----------( 287      ( 26 Jan 2018 22:28 )             29.7     Auto Eosinophil # 0.1   / Auto Eosinophil % 0.5   / Auto Neutrophil # 8.7   / Auto Neutrophil % 83.7  / BANDS % x        INR: 1.10 ratio (01-28 @ 04:22)  INR: 1.09 ratio (01-27 @ 05:21)  INR: 1.09 ratio (01-26 @ 22:28)    01-28    141  |  103  |  30<H>  ----------------------------<  122<H>  4.2   |  25  |  1.83<H>  01-27    138  |  104  |  27<H>  ----------------------------<  96  4.1   |  25  |  1.81<H>  01-26    139  |  104  |  29<H>  ----------------------------<  84  3.9   |  24  |  1.88<H>    Ca    8.4      28 Jan 2018 04:22  Mg     2.0     01-28  Phos  4.2     01-28  TPro  6.3  /  Alb  2.5<L>  /  TBili  0.4  /  DBili  x   /  AST  20  /  ALT  11  /  AlkPhos  68  01-28  TPro  6.3  /  Alb  2.3<L>  /  TBili  0.4  /  DBili  x   /  AST  19  /  ALT  12  /  AlkPhos  66  01-27  TPro  6.6  /  Alb  2.5<L>  /  TBili  0.4  /  DBili  x   /  AST  24  /  ALT  9<L>  /  AlkPhos  72  01-26    Lactate, Blood: 0.8 mmol/L (01-27 @ 05:22)      proBNP: Serum Pro-Brain Natriuretic Peptide: 12321 pg/mL (01-26 @ 06:39)  Serum Pro-Brain Natriuretic Peptide: 92636 pg/mL (01-25 @ 03:17)    Lipid Profile: 01-09 Chol 103 LDL 67 HDL 16<L> Trig 102  HgA1c: 9.1 % (01-09 @ 03:39)    TSH:     CARDIAC MARKERS:   26 Jan 2018 06:39 Troponin 0.24 ng/mL / Creatine Kinase 39 U/L /  CKMB 3.9 ng/mL / CPK Mass Assay % x       25 Jan 2018 03:17 Troponin 0.18 ng/mL / Creatine Kinase 31 U/L /  CKMB 2.8 ng/mL / CPK Mass Assay % x       20 Jan 2018 10:11 Troponin 0.28 ng/mL / Creatine Kinase 26 U/L /  CKMB 2.4 ng/mL / CPK Mass Assay % x            TELEMETRY: 	    ECG:  	  RADIOLOGY:  OTHER: 	    PREVIOUS DIAGNOSTIC TESTING:    [ ] Echocardiogram:  [ ]  Catheterization:  [ ] Stress Test:

## 2018-01-29 DIAGNOSIS — J81.0 ACUTE PULMONARY EDEMA: ICD-10-CM

## 2018-01-29 LAB
ALBUMIN SERPL ELPH-MCNC: 2.5 G/DL — LOW (ref 3.3–5)
ALP SERPL-CCNC: 70 U/L — SIGNIFICANT CHANGE UP (ref 40–120)
ALT FLD-CCNC: 10 U/L RC — SIGNIFICANT CHANGE UP (ref 10–45)
ANION GAP SERPL CALC-SCNC: 15 MMOL/L — SIGNIFICANT CHANGE UP (ref 5–17)
APTT BLD: 59.8 SEC — HIGH (ref 27.5–37.4)
AST SERPL-CCNC: 18 U/L — SIGNIFICANT CHANGE UP (ref 10–40)
BASOPHILS # BLD AUTO: 0 K/UL — SIGNIFICANT CHANGE UP (ref 0–0.2)
BASOPHILS NFR BLD AUTO: 0.4 % — SIGNIFICANT CHANGE UP (ref 0–2)
BILIRUB SERPL-MCNC: 0.4 MG/DL — SIGNIFICANT CHANGE UP (ref 0.2–1.2)
BUN SERPL-MCNC: 30 MG/DL — HIGH (ref 7–23)
CALCIUM SERPL-MCNC: 8.4 MG/DL — SIGNIFICANT CHANGE UP (ref 8.4–10.5)
CHLORIDE SERPL-SCNC: 102 MMOL/L — SIGNIFICANT CHANGE UP (ref 96–108)
CO2 SERPL-SCNC: 25 MMOL/L — SIGNIFICANT CHANGE UP (ref 22–31)
CREAT SERPL-MCNC: 1.89 MG/DL — HIGH (ref 0.5–1.3)
EOSINOPHIL # BLD AUTO: 0 K/UL — SIGNIFICANT CHANGE UP (ref 0–0.5)
EOSINOPHIL NFR BLD AUTO: 0.3 % — SIGNIFICANT CHANGE UP (ref 0–6)
GLUCOSE BLDC GLUCOMTR-MCNC: 122 MG/DL — HIGH (ref 70–99)
GLUCOSE BLDC GLUCOMTR-MCNC: 127 MG/DL — HIGH (ref 70–99)
GLUCOSE BLDC GLUCOMTR-MCNC: 137 MG/DL — HIGH (ref 70–99)
GLUCOSE BLDC GLUCOMTR-MCNC: 147 MG/DL — HIGH (ref 70–99)
GLUCOSE SERPL-MCNC: 135 MG/DL — HIGH (ref 70–99)
HCT VFR BLD CALC: 29.3 % — LOW (ref 39–50)
HGB BLD-MCNC: 10.1 G/DL — LOW (ref 13–17)
INR BLD: 1.03 RATIO — SIGNIFICANT CHANGE UP (ref 0.88–1.16)
LYMPHOCYTES # BLD AUTO: 0.7 K/UL — LOW (ref 1–3.3)
LYMPHOCYTES # BLD AUTO: 8.1 % — LOW (ref 13–44)
MAGNESIUM SERPL-MCNC: 2 MG/DL — SIGNIFICANT CHANGE UP (ref 1.6–2.6)
MCHC RBC-ENTMCNC: 33.5 PG — SIGNIFICANT CHANGE UP (ref 27–34)
MCHC RBC-ENTMCNC: 34.5 GM/DL — SIGNIFICANT CHANGE UP (ref 32–36)
MCV RBC AUTO: 97.2 FL — SIGNIFICANT CHANGE UP (ref 80–100)
MONOCYTES # BLD AUTO: 0.5 K/UL — SIGNIFICANT CHANGE UP (ref 0–0.9)
MONOCYTES NFR BLD AUTO: 5.2 % — SIGNIFICANT CHANGE UP (ref 2–14)
NEUTROPHILS # BLD AUTO: 8 K/UL — HIGH (ref 1.8–7.4)
NEUTROPHILS NFR BLD AUTO: 86 % — HIGH (ref 43–77)
PA ADP PRP-ACNC: 145 PRU — LOW (ref 194–417)
PHOSPHATE SERPL-MCNC: 3.9 MG/DL — SIGNIFICANT CHANGE UP (ref 2.5–4.5)
PLATELET # BLD AUTO: 286 K/UL — SIGNIFICANT CHANGE UP (ref 150–400)
POTASSIUM SERPL-MCNC: 4 MMOL/L — SIGNIFICANT CHANGE UP (ref 3.5–5.3)
POTASSIUM SERPL-SCNC: 4 MMOL/L — SIGNIFICANT CHANGE UP (ref 3.5–5.3)
PROT SERPL-MCNC: 6.8 G/DL — SIGNIFICANT CHANGE UP (ref 6–8.3)
PROTHROM AB SERPL-ACNC: 11.2 SEC — SIGNIFICANT CHANGE UP (ref 9.8–12.7)
RBC # BLD: 3.02 M/UL — LOW (ref 4.2–5.8)
RBC # FLD: 13.9 % — SIGNIFICANT CHANGE UP (ref 10.3–14.5)
SODIUM SERPL-SCNC: 142 MMOL/L — SIGNIFICANT CHANGE UP (ref 135–145)
VANCOMYCIN TROUGH SERPL-MCNC: 18.4 UG/ML — SIGNIFICANT CHANGE UP (ref 10–20)
WBC # BLD: 9.3 K/UL — SIGNIFICANT CHANGE UP (ref 3.8–10.5)
WBC # FLD AUTO: 9.3 K/UL — SIGNIFICANT CHANGE UP (ref 3.8–10.5)

## 2018-01-29 PROCEDURE — 93010 ELECTROCARDIOGRAM REPORT: CPT

## 2018-01-29 PROCEDURE — 99233 SBSQ HOSP IP/OBS HIGH 50: CPT | Mod: GC

## 2018-01-29 RX ORDER — ISOSORBIDE DINITRATE 5 MG/1
5 TABLET ORAL EVERY 8 HOURS
Qty: 0 | Refills: 0 | Status: DISCONTINUED | OUTPATIENT
Start: 2018-01-29 | End: 2018-01-30

## 2018-01-29 RX ORDER — VANCOMYCIN HCL 1 G
750 VIAL (EA) INTRAVENOUS EVERY 24 HOURS
Qty: 0 | Refills: 0 | Status: DISCONTINUED | OUTPATIENT
Start: 2018-01-29 | End: 2018-01-30

## 2018-01-29 RX ORDER — VANCOMYCIN HCL 1 G
750 VIAL (EA) INTRAVENOUS EVERY 24 HOURS
Qty: 0 | Refills: 0 | Status: DISCONTINUED | OUTPATIENT
Start: 2018-01-29 | End: 2018-01-29

## 2018-01-29 RX ADMIN — Medication 40 MILLIGRAM(S): at 17:14

## 2018-01-29 RX ADMIN — Medication 1 TABLET(S): at 12:32

## 2018-01-29 RX ADMIN — HEPARIN SODIUM 5000 UNIT(S): 5000 INJECTION INTRAVENOUS; SUBCUTANEOUS at 22:33

## 2018-01-29 RX ADMIN — ISOSORBIDE DINITRATE 10 MILLIGRAM(S): 5 TABLET ORAL at 14:46

## 2018-01-29 RX ADMIN — SODIUM CHLORIDE 10 MILLILITER(S): 9 INJECTION INTRAMUSCULAR; INTRAVENOUS; SUBCUTANEOUS at 18:03

## 2018-01-29 RX ADMIN — PANTOPRAZOLE SODIUM 40 MILLIGRAM(S): 20 TABLET, DELAYED RELEASE ORAL at 06:07

## 2018-01-29 RX ADMIN — SODIUM CHLORIDE 10 MILLILITER(S): 9 INJECTION INTRAMUSCULAR; INTRAVENOUS; SUBCUTANEOUS at 06:15

## 2018-01-29 RX ADMIN — Medication 100 MILLIGRAM(S): at 12:32

## 2018-01-29 RX ADMIN — Medication 650 MILLIGRAM(S): at 02:05

## 2018-01-29 RX ADMIN — Medication 100 MILLIGRAM(S): at 12:34

## 2018-01-29 RX ADMIN — Medication 40 MILLIGRAM(S): at 06:07

## 2018-01-29 RX ADMIN — ISOSORBIDE DINITRATE 10 MILLIGRAM(S): 5 TABLET ORAL at 06:07

## 2018-01-29 RX ADMIN — Medication 12.5 MILLIGRAM(S): at 17:14

## 2018-01-29 RX ADMIN — CEFTRIAXONE 100 GRAM(S): 500 INJECTION, POWDER, FOR SOLUTION INTRAMUSCULAR; INTRAVENOUS at 12:34

## 2018-01-29 RX ADMIN — Medication 650 MILLIGRAM(S): at 01:37

## 2018-01-29 RX ADMIN — Medication 81 MILLIGRAM(S): at 12:32

## 2018-01-29 RX ADMIN — Medication 150 MILLIGRAM(S): at 17:13

## 2018-01-29 RX ADMIN — HEPARIN SODIUM 5000 UNIT(S): 5000 INJECTION INTRAVENOUS; SUBCUTANEOUS at 14:46

## 2018-01-29 RX ADMIN — SODIUM CHLORIDE 10 MILLILITER(S): 9 INJECTION INTRAMUSCULAR; INTRAVENOUS; SUBCUTANEOUS at 12:00

## 2018-01-29 RX ADMIN — Medication 1 MILLIGRAM(S): at 12:32

## 2018-01-29 RX ADMIN — BENZOCAINE AND MENTHOL 1 LOZENGE: 5; 1 LIQUID ORAL at 06:07

## 2018-01-29 RX ADMIN — Medication 25 MILLIGRAM(S): at 06:07

## 2018-01-29 RX ADMIN — FINASTERIDE 5 MILLIGRAM(S): 5 TABLET, FILM COATED ORAL at 12:32

## 2018-01-29 RX ADMIN — Medication 25 MILLIGRAM(S): at 14:46

## 2018-01-29 RX ADMIN — HEPARIN SODIUM 5000 UNIT(S): 5000 INJECTION INTRAVENOUS; SUBCUTANEOUS at 06:07

## 2018-01-29 RX ADMIN — SODIUM CHLORIDE 10 MILLILITER(S): 9 INJECTION INTRAMUSCULAR; INTRAVENOUS; SUBCUTANEOUS at 04:00

## 2018-01-29 RX ADMIN — Medication 12.5 MILLIGRAM(S): at 06:07

## 2018-01-29 RX ADMIN — INSULIN GLARGINE 6 UNIT(S): 100 INJECTION, SOLUTION SUBCUTANEOUS at 22:46

## 2018-01-29 NOTE — CHART NOTE - NSCHARTNOTEFT_GEN_A_CORE
====================  CCU MIDNIGHT ROUNDS  ====================    SAMANTHA CADENA  [id number]    ====================  SUMMARY: HPI:  Patient is a 72 year-old male with past medical history of T2DM, DLD, alcohol misuse brought in by family after having been found face down on the floor for approximately 2 days. Patient was currently residing alone on one floor of the house (wife away in [place]) while other family members including a son live on other floors within the same house. He was not checked upon for 2 days. Patient reports multiple recent episodes of falls at home due to physical instability". He reports chronic alcohol use, which family states ranges from 6-12 beers almost daily. He has been more lethargic lately, with forgetfulness and disorientationHe also endorses fever, chills, dysuria and urinary frequency. Patient denies SOB, chest pain, back pain, diarrhea, melena/hematochezia, recent travel, sick contact or surgery. Patient is originally from [place] and has been in the  for 9 years.     ED course: Initial EKG noted for IW ST-elevations. Patient was hypotensive to 70/40 with HR in 170bpm with Atrial fibrillation + RVR on EKG. Labs notable for WBC 26K, lac 5.2 and troponin 0.6. Given IV NS blus x 5L and multiple DCCV with temporary resumption of NSR in 80s bpm before return to atrial fibrillation. (09 Jan 2018 00:52)    ====================        ====================  NEW EVENTS: Held Vanc dosing today (level was 20.5).  ====================        ====================  VITALS (Last 12 hrs):  ====================    T(C): 36.6 (01-28-18 @ 22:00), Max: 36.7 (01-28-18 @ 16:00)  HR: 102 (01-29-18 @ 00:00) (100 - 116)  BP: 92/44 (01-29-18 @ 00:00) (91/39 - 114/51)  BP(mean): 66 (01-29-18 @ 00:00) (53 - 77)  RR: 16 (01-29-18 @ 00:00) (16 - 26)  SpO2: 97% (01-29-18 @ 00:00) (96% - 100%)      TELEMETRY: sinus        I&O's Summary    27 Jan 2018 07:01  -  28 Jan 2018 07:00  --------------------------------------------------------  IN: 660 mL / OUT: 1650 mL / NET: -990 mL    28 Jan 2018 07:01  -  29 Jan 2018 00:16  --------------------------------------------------------  IN: 770 mL / OUT: 850 mL / NET: -80 mL        ====================  PLAN:  1. Endocarditis  -culture negative IE  -Worsening AI on JANES with worsening AV vegetation, severe AI and moderate AS  -on ceftriaxone and vanc by level (todays dose held)  -f/u CTS for OR timing (Dr. Mary)  -continue afterload reduction  2. ADHF  -in the setting of worsening endocarditis  -lasix 40 BID for diuresis  -hydralazine/idordil for afterload reduction  -BIPAP PRN for comfort, currently comfortable on RA and sating well  3. NSTEMI  -s/p cath 1/12 mLAD 70% with no intervention  -c/w ASA, statin, BB, holding brilinta for CTS planning  ====================    HEALTH ISSUES - PROBLEM Dx:  Afib: Afib  Acute diastolic heart failure: Acute diastolic heart failure  Hypotension: Hypotension  ATN (acute tubular necrosis): ATN (acute tubular necrosis)  Acute infective endocarditis, due to unspecified organism: Acute infective endocarditis, due to unspecified organism  Leukocytosis, unspecified type: Leukocytosis, unspecified type  ELIAN (acute kidney injury): ELIAN (acute kidney injury)  Vegetation of heart valve: Vegetation of heart valve  Elevated lactic acid level: Elevated lactic acid level  Uncontrolled type 2 diabetes mellitus   Type 2 diabetes mellitus with hyperglycemia, unspecified long term insulin use status: Type 2 diabetes mellitus with hyperglycemia, unspecified long term insulin use status  Severe sepsis: Severe sepsis  NSTEMI (non-ST elevated myocardial infarction): NSTEMI (non-ST elevated myocardial infarction)  Atrial fibrillation with rapid ventricular response: Atrial fibrillation with rapid ventricular response        [place], CCU PA #[id number]

## 2018-01-29 NOTE — PROGRESS NOTE ADULT - SUBJECTIVE AND OBJECTIVE BOX
[facility] DIVISION OF KIDNEY DISEASES AND HYPERTENSION -- FOLLOW UP NOTE  --------------------------------------------------------------------------------  Chief Complaint:  ELIAN    24 hour events/subjective:  Patient on bipap, reports heaviness in chest        PAST HISTORY  --------------------------------------------------------------------------------  No significant changes to PMH, PSH, FHx, SHx, unless otherwise noted    ALLERGIES & MEDICATIONS  --------------------------------------------------------------------------------  Allergies    No Known Allergies    Intolerances      Standing Inpatient Medications  aspirin  chewable 81 milliGRAM(s) Oral daily  atorvastatin 80 milliGRAM(s) Oral at bedtime  benzocaine 15 mG/menthol 3.6 mG Lozenge 1 Lozenge Oral three times a day  cefTRIAXone   IVPB 2 Gram(s) IV Intermittent every 24 hours  docusate sodium 100 milliGRAM(s) Oral daily  finasteride 5 milliGRAM(s) Oral daily  folic acid 1 milliGRAM(s) Oral daily  furosemide   Injectable 40 milliGRAM(s) IV Push every 12 hours  heparin  Injectable 5000 Unit(s) SubCutaneous every 8 hours  hydrALAZINE 25 milliGRAM(s) Oral every 8 hours  insulin glargine Injectable (LANTUS) 6 Unit(s) SubCutaneous at bedtime  insulin lispro (HumaLOG) corrective regimen sliding scale   SubCutaneous three times a day before meals  insulin lispro (HumaLOG) corrective regimen sliding scale   SubCutaneous at bedtime  insulin lispro Injectable (HumaLOG) 3 Unit(s) SubCutaneous three times a day with meals  isosorbide   dinitrate Tablet (ISORDIL) 10 milliGRAM(s) Oral every 8 hours  metoprolol     tartrate 12.5 milliGRAM(s) Oral two times a day  multivitamin 1 Tablet(s) Oral daily  pantoprazole    Tablet 40 milliGRAM(s) Oral before breakfast  sodium chloride 0.9% lock flush 20 milliLiter(s) IV Push once  thiamine 100 milliGRAM(s) Oral daily  vancomycin  IVPB 1000 milliGRAM(s) IV Intermittent every 24 hours    PRN Inpatient Medications  acetaminophen   Tablet. 650 milliGRAM(s) Oral every 6 hours PRN  acetaminophen   Tablet. 650 milliGRAM(s) Oral every 6 hours PRN  polyethylene glycol 3350 17 Gram(s) Oral daily PRN  sodium chloride 0.9% lock flush 10 milliLiter(s) IV Push every 1 hour PRN  sodium chloride 0.9% lock flush 10 milliLiter(s) IV Push every 12 hours PRN      REVIEW OF SYSTEMS  --------------------------------------------------------------------------------  Gen: No fevers/chills  Skin: No rashes  Head/Eyes/Ears/Mouth: No headache  Respiratory: No dyspnea  CV: +chest discomfort  GI: No abdominal pain  : No dysuria  MSK: No edema  Neuro: No dizziness/lightheadedness    VITALS/PHYSICAL EXAM  --------------------------------------------------------------------------------  T(C): 36.4 (01-29-18 @ 06:00), Max: 36.7 (01-28-18 @ 16:00)  HR: 102 (01-29-18 @ 11:17) (98 - 116)  BP: 95/42 (01-29-18 @ 10:53) (91/39 - 114/51)  RR: 19 (01-29-18 @ 10:53) (14 - 26)  SpO2: 96% (01-29-18 @ 11:17) (95% - 100%)  Wt(kg): --        01-28-18 @ 07:01  -  01-29-18 @ 07:00  --------------------------------------------------------  IN: 890 mL / OUT: 1050 mL / NET: -160 mL    01-29-18 @ 07:01  -  01-29-18 @ 11:59  --------------------------------------------------------  IN: 0 mL / OUT: 225 mL / NET: -225 mL      Physical Exam:  	Gen: NAD  	HEENT: +bipap  	Pulm: +bilateral rales to midlung  	CV: RRR, S1S2; no rub  	Abd: +BS, soft, nontender/nondistended  	: No suprapubic tenderness  	UE:  no edema  	LE:  no pitting edema  	Neuro: No focal deficits  	Psych: Normal affect and mood  	Skin: Warm    LABS/STUDIES  --------------------------------------------------------------------------------              10.1   9.3   >-----------<  286      [01-29-18 @ 04:38]              29.3     142  |  102  |  30  ----------------------------<  135      [01-29-18 @ 04:38]  4.0   |  25  |  1.89        Ca     8.4     [01-29-18 @ 04:38]      Mg     2.0     [01-29-18 @ 04:38]      Phos  3.9     [01-29-18 @ 04:38]    TPro  6.8  /  Alb  2.5  /  TBili  0.4  /  DBili  x   /  AST  18  /  ALT  10  /  AlkPhos  70  [01-29-18 @ 04:38]    PT/INR: PT 11.2 , INR 1.03       [01-29-18 @ 04:38]  PTT: 59.8       [01-29-18 @ 04:38]      Creatinine Trend:  SCr 1.89 [01-29 @ 04:38]  SCr 1.83 [01-28 @ 04:22]  SCr 1.81 [01-27 @ 05:21]  SCr 1.88 [01-26 @ 22:28]  SCr 1.91 [01-26 @ 06:39]

## 2018-01-29 NOTE — PROGRESS NOTE ADULT - SUBJECTIVE AND OBJECTIVE BOX
Note Incomplete  --- Pending Attending Rounds      Briefly:  Interval Events:   Subjective:     Physical:  ICU Vital Signs Last 24 Hrs  T(C): 36.4 (29 Jan 2018 06:00), Max: 36.7 (28 Jan 2018 16:00)  T(F): 97.6 (29 Jan 2018 06:00), Max: 98 (28 Jan 2018 16:00)  HR: 102 (29 Jan 2018 08:05) (98 - 116)  BP: 95/44 (29 Jan 2018 08:05) (91/39 - 114/51)  BP(mean): 59 (29 Jan 2018 08:05) (53 - 77)  ABP: --  ABP(mean): --  RR: 18 (29 Jan 2018 08:05) (14 - 26)  SpO2: 95% (29 Jan 2018 08:05) (95% - 100%)      Telemetry:   EKG:    Exam:      LABS:  CAPILLARY BLOOD GLUCOSE      POCT Blood Glucose.: 147 mg/dL (29 Jan 2018 08:12)  POCT Blood Glucose.: 91 mg/dL (28 Jan 2018 21:28)  POCT Blood Glucose.: 129 mg/dL (28 Jan 2018 17:13)  POCT Blood Glucose.: 116 mg/dL (28 Jan 2018 11:59)    I&O's Summary    28 Jan 2018 07:01  -  29 Jan 2018 07:00  --------------------------------------------------------  IN: 890 mL / OUT: 1050 mL / NET: -160 mL    29 Jan 2018 07:01  -  29 Jan 2018 09:26  --------------------------------------------------------  IN: 0 mL / OUT: 100 mL / NET: -100 mL                              10.1   9.3   )-----------( 286      ( 29 Jan 2018 04:38 )             29.3     WBC Trend: 9.3<--, 9.7<--, 10.2<--  01-29    142  |  102  |  30<H>  ----------------------------<  135<H>  4.0   |  25  |  1.89<H>    Ca    8.4      29 Jan 2018 04:38  Phos  3.9     01-29  Mg     2.0     01-29    TPro  6.8  /  Alb  2.5<L>  /  TBili  0.4  /  DBili  x   /  AST  18  /  ALT  10  /  AlkPhos  70  01-29    Creatinine Trend: 1.89<--, 1.83<--, 1.81<--, 1.88<--, 1.91<--, 1.79<--  PT/INR - ( 29 Jan 2018 04:38 )   PT: 11.2 sec;   INR: 1.03 ratio         PTT - ( 29 Jan 2018 04:38 )  PTT:59.8 sec          Imaging:        MEDICATIONS  (STANDING):  aspirin  chewable 81 milliGRAM(s) Oral daily  atorvastatin 80 milliGRAM(s) Oral at bedtime  benzocaine 15 mG/menthol 3.6 mG Lozenge 1 Lozenge Oral three times a day  cefTRIAXone   IVPB 2 Gram(s) IV Intermittent every 24 hours  docusate sodium 100 milliGRAM(s) Oral daily  finasteride 5 milliGRAM(s) Oral daily  folic acid 1 milliGRAM(s) Oral daily  furosemide   Injectable 40 milliGRAM(s) IV Push every 12 hours  heparin  Injectable 5000 Unit(s) SubCutaneous every 8 hours  hydrALAZINE 25 milliGRAM(s) Oral every 8 hours  insulin glargine Injectable (LANTUS) 6 Unit(s) SubCutaneous at bedtime  insulin lispro (HumaLOG) corrective regimen sliding scale   SubCutaneous three times a day before meals  insulin lispro (HumaLOG) corrective regimen sliding scale   SubCutaneous at bedtime  insulin lispro Injectable (HumaLOG) 3 Unit(s) SubCutaneous three times a day with meals  isosorbide   dinitrate Tablet (ISORDIL) 10 milliGRAM(s) Oral every 8 hours  metoprolol     tartrate 12.5 milliGRAM(s) Oral two times a day  multivitamin 1 Tablet(s) Oral daily  pantoprazole    Tablet 40 milliGRAM(s) Oral before breakfast  sodium chloride 0.9% lock flush 20 milliLiter(s) IV Push once  thiamine 100 milliGRAM(s) Oral daily  vancomycin  IVPB 1000 milliGRAM(s) IV Intermittent every 24 hours    MEDICATIONS  (PRN):  acetaminophen   Tablet. 650 milliGRAM(s) Oral every 6 hours PRN Moderate Pain (4 - 6)  acetaminophen   Tablet. 650 milliGRAM(s) Oral every 6 hours PRN Mild Pain (1 - 3)  polyethylene glycol 3350 17 Gram(s) Oral daily PRN Constipation  sodium chloride 0.9% lock flush 10 milliLiter(s) IV Push every 1 hour PRN After each medication administration  sodium chloride 0.9% lock flush 10 milliLiter(s) IV Push every 12 hours PRN Lumen of catheter NOT used      Consult Recommendations: Patient is a 72y old  Male who presents with a chief complaint of Fall (09 Jan 2018 00:52)    Event  No acute event overnight. Pt was on BIPAP overnight and weaned to 5L O2 in AM but placed backed on BIPAP for c/o SOB. Pt endorses CP, palpitation, N/V, f/c. 	  HPI:  Patient is a 72 year-old male with past medical history of T2DM, DLD, alcohol misuse brought in by family after having been found face down on the floor for approximately 2 days. Patient was currently residing alone on one floor of the house (wife away in [place]) while other family members including a son live on other floors within the same house. He was not checked upon for 2 days. Patient reports multiple recent episodes of falls at home due to physical instability". He reports chronic alcohol use, which family states ranges from 6-12 beers almost daily. He has been more lethargic lately, with forgetfulness and disorientationHe also endorses fever, chills, dysuria and urinary frequency. Patient denies SOB, chest pain, back pain, diarrhea, melena/hematochezia, recent travel, sick contact or surgery. Patient is originally from [place] and has been in the  for 9 years.     ED course: Initial EKG noted for IW ST-elevations. Patient was hypotensive to 70/40 with HR in 170bpm with Atrial fibrillation + RVR on EKG. Labs notable for WBC 26K, lac 5.2 and troponin 0.6. Given IV NS blus x 5L and multiple DCCV with temporary resumption of NSR in 80s bpm before return to atrial fibrillation. (09 Jan 2018 00:52)    MEDICATIONS  (STANDING):  aspirin  chewable 81 milliGRAM(s) Oral daily  atorvastatin 80 milliGRAM(s) Oral at bedtime  benzocaine 15 mG/menthol 3.6 mG Lozenge 1 Lozenge Oral three times a day  cefTRIAXone   IVPB 2 Gram(s) IV Intermittent every 24 hours  docusate sodium 100 milliGRAM(s) Oral daily  finasteride 5 milliGRAM(s) Oral daily  folic acid 1 milliGRAM(s) Oral daily  furosemide   Injectable 40 milliGRAM(s) IV Push every 12 hours  heparin  Injectable 5000 Unit(s) SubCutaneous every 8 hours  hydrALAZINE 25 milliGRAM(s) Oral every 8 hours  insulin glargine Injectable (LANTUS) 6 Unit(s) SubCutaneous at bedtime  insulin lispro (HumaLOG) corrective regimen sliding scale   SubCutaneous three times a day before meals  insulin lispro (HumaLOG) corrective regimen sliding scale   SubCutaneous at bedtime  insulin lispro Injectable (HumaLOG) 3 Unit(s) SubCutaneous three times a day with meals  isosorbide   dinitrate Tablet (ISORDIL) 10 milliGRAM(s) Oral every 8 hours  metoprolol     tartrate 12.5 milliGRAM(s) Oral two times a day  multivitamin 1 Tablet(s) Oral daily  pantoprazole    Tablet 40 milliGRAM(s) Oral before breakfast  sodium chloride 0.9% lock flush 20 milliLiter(s) IV Push once  thiamine 100 milliGRAM(s) Oral daily  vancomycin  IVPB 1000 milliGRAM(s) IV Intermittent every 24 hours    MEDICATIONS  (PRN):  acetaminophen   Tablet. 650 milliGRAM(s) Oral every 6 hours PRN Moderate Pain (4 - 6)  acetaminophen   Tablet. 650 milliGRAM(s) Oral every 6 hours PRN Mild Pain (1 - 3)  polyethylene glycol 3350 17 Gram(s) Oral daily PRN Constipation  sodium chloride 0.9% lock flush 10 milliLiter(s) IV Push every 1 hour PRN After each medication administration  sodium chloride 0.9% lock flush 10 milliLiter(s) IV Push every 12 hours PRN Lumen of catheter NOT used      REVIEW OF SYSTEMS:  Otherwise, 10 point ROS done and otherwise negative.    PHYSICAL EXAM:  Vital Signs Last 24 Hrs  T(C): 37.2 (29 Jan 2018 12:00), Max: 37.2 (29 Jan 2018 12:00)  T(F): 99 (29 Jan 2018 12:00), Max: 99 (29 Jan 2018 12:00)  HR: 104 (29 Jan 2018 12:00) (98 - 116)  BP: 104/45 (29 Jan 2018 12:00) (91/39 - 114/51)  BP(mean): 66 (29 Jan 2018 12:00) (53 - 77)  RR: 21 (29 Jan 2018 12:00) (14 - 26)  SpO2: 97% (29 Jan 2018 12:00) (95% - 100%)  I&O's Summary    28 Jan 2018 07:01  -  29 Jan 2018 07:00  --------------------------------------------------------  IN: 890 mL / OUT: 1050 mL / NET: -160 mL    29 Jan 2018 07:01  -  29 Jan 2018 12:56  --------------------------------------------------------  IN: 0 mL / OUT: 325 mL / NET: -325 mL        Appearance: Normal	  HEENT:   Normal oral mucosa, PERRL, EOMI	  Lymphatic: No lymphadenopathy  Cardiovascular: Normal S1 S2, No JVD, No murmurs, No edema  Respiratory: Lungs clear to auscultation	  Psychiatry: A & O x 3, Mood & affect appropriate  Gastrointestinal:  Soft, Non-tender, + BS	  Skin: No rashes, No ecchymoses, No cyanosis	  Neurologic: Non-focal  Extremities: Normal range of motion, No clubbing, cyanosis or edema  Vascular: Peripheral pulses palpable 2+ bilaterally    LABS:	 	                        10.1   9.3   )-----------( 286      ( 29 Jan 2018 04:38 )             29.3     Auto Eosinophil # 0.0   / Auto Eosinophil % 0.3   / Auto Neutrophil # 8.0   / Auto Neutrophil % 86.0  / BANDS % x                            9.6    9.7   )-----------( 263      ( 28 Jan 2018 04:22 )             28.3     Auto Eosinophil # 0.1   / Auto Eosinophil % 0.7   / Auto Neutrophil # 8.3   / Auto Neutrophil % 85.6  / BANDS % x        INR: 1.03 ratio (01-29 @ 04:38)  INR: 1.10 ratio (01-28 @ 04:22)  INR: 1.09 ratio (01-27 @ 05:21)    01-29    142  |  102  |  30<H>  ----------------------------<  135<H>  4.0   |  25  |  1.89<H>  01-28    141  |  103  |  30<H>  ----------------------------<  122<H>  4.2   |  25  |  1.83<H>    Ca    8.4      29 Jan 2018 04:38  Mg     2.0     01-29  Phos  3.9     01-29  TPro  6.8  /  Alb  2.5<L>  /  TBili  0.4  /  DBili  x   /  AST  18  /  ALT  10  /  AlkPhos  70  01-29  TPro  6.3  /  Alb  2.5<L>  /  TBili  0.4  /  DBili  x   /  AST  20  /  ALT  11  /  AlkPhos  68  01-28    Lactate, Blood: 0.8 mmol/L (01-27 @ 05:22)      proBNP: Serum Pro-Brain Natriuretic Peptide: 10133 pg/mL (01-26 @ 06:39)  Serum Pro-Brain Natriuretic Peptide: 58129 pg/mL (01-25 @ 03:17)    Lipid Profile: 01-09 Chol 103 LDL 67 HDL 16<L> Trig 102  HgA1c: 9.1 % (01-09 @ 03:39)    TSH:     CARDIAC MARKERS:   26 Jan 2018 06:39 Troponin 0.24 ng/mL / Creatine Kinase 39 U/L /  CKMB 3.9 ng/mL / CPK Mass Assay % x       25 Jan 2018 03:17 Troponin 0.18 ng/mL / Creatine Kinase 31 U/L /  CKMB 2.8 ng/mL / CPK Mass Assay % x       20 Jan 2018 10:11 Troponin 0.28 ng/mL / Creatine Kinase 26 U/L /  CKMB 2.4 ng/mL / CPK Mass Assay % x            TELEMETRY: 	    ECG:  	  RADIOLOGY:  OTHER: 	    PREVIOUS DIAGNOSTIC TESTING:    [ ] Echocardiogram:  [ ]  Catheterization:  [ ] Stress Test: Patient is a 72y old  Male who presents with a chief complaint of Fall (09 Jan 2018 00:52)    Event  No acute event overnight. Pt was on BIPAP overnight and weaned to 5L O2 in AM but placed backed on BIPAP for c/o SOB. Pt endorses CP, palpitation, N/V, f/c. 	  HPI:  Patient is a 72 year-old male with past medical history of T2DM, DLD, alcohol misuse brought in by family after having been found face down on the floor for approximately 2 days. Patient was currently residing alone on one floor of the house (wife away in [place]) while other family members including a son live on other floors within the same house. He was not checked upon for 2 days. Patient reports multiple recent episodes of falls at home due to physical instability". He reports chronic alcohol use, which family states ranges from 6-12 beers almost daily. He has been more lethargic lately, with forgetfulness and disorientationHe also endorses fever, chills, dysuria and urinary frequency. Patient denies SOB, chest pain, back pain, diarrhea, melena/hematochezia, recent travel, sick contact or surgery. Patient is originally from [place] and has been in the  for 9 years.     ED course: Initial EKG noted for IW ST-elevations. Patient was hypotensive to 70/40 with HR in 170bpm with Atrial fibrillation + RVR on EKG. Labs notable for WBC 26K, lac 5.2 and troponin 0.6. Given IV NS blus x 5L and multiple DCCV with temporary resumption of NSR in 80s bpm before return to atrial fibrillation. (09 Jan 2018 00:52)    MEDICATIONS  (STANDING):  aspirin  chewable 81 milliGRAM(s) Oral daily  atorvastatin 80 milliGRAM(s) Oral at bedtime  benzocaine 15 mG/menthol 3.6 mG Lozenge 1 Lozenge Oral three times a day  cefTRIAXone   IVPB 2 Gram(s) IV Intermittent every 24 hours  docusate sodium 100 milliGRAM(s) Oral daily  finasteride 5 milliGRAM(s) Oral daily  folic acid 1 milliGRAM(s) Oral daily  furosemide   Injectable 40 milliGRAM(s) IV Push every 12 hours  heparin  Injectable 5000 Unit(s) SubCutaneous every 8 hours  hydrALAZINE 25 milliGRAM(s) Oral every 8 hours  insulin glargine Injectable (LANTUS) 6 Unit(s) SubCutaneous at bedtime  insulin lispro (HumaLOG) corrective regimen sliding scale   SubCutaneous three times a day before meals  insulin lispro (HumaLOG) corrective regimen sliding scale   SubCutaneous at bedtime  insulin lispro Injectable (HumaLOG) 3 Unit(s) SubCutaneous three times a day with meals  isosorbide   dinitrate Tablet (ISORDIL) 10 milliGRAM(s) Oral every 8 hours  metoprolol     tartrate 12.5 milliGRAM(s) Oral two times a day  multivitamin 1 Tablet(s) Oral daily  pantoprazole    Tablet 40 milliGRAM(s) Oral before breakfast  sodium chloride 0.9% lock flush 20 milliLiter(s) IV Push once  thiamine 100 milliGRAM(s) Oral daily  vancomycin  IVPB 1000 milliGRAM(s) IV Intermittent every 24 hours    MEDICATIONS  (PRN):  acetaminophen   Tablet. 650 milliGRAM(s) Oral every 6 hours PRN Moderate Pain (4 - 6)  acetaminophen   Tablet. 650 milliGRAM(s) Oral every 6 hours PRN Mild Pain (1 - 3)  polyethylene glycol 3350 17 Gram(s) Oral daily PRN Constipation  sodium chloride 0.9% lock flush 10 milliLiter(s) IV Push every 1 hour PRN After each medication administration  sodium chloride 0.9% lock flush 10 milliLiter(s) IV Push every 12 hours PRN Lumen of catheter NOT used      REVIEW OF SYSTEMS:  Otherwise, 10 point ROS done and otherwise negative.    PHYSICAL EXAM:  Vital Signs Last 24 Hrs  T(C): 37.2 (29 Jan 2018 12:00), Max: 37.2 (29 Jan 2018 12:00)  T(F): 99 (29 Jan 2018 12:00), Max: 99 (29 Jan 2018 12:00)  HR: 104 (29 Jan 2018 12:00) (98 - 116)  BP: 104/45 (29 Jan 2018 12:00) (91/39 - 114/51)  BP(mean): 66 (29 Jan 2018 12:00) (53 - 77)  RR: 21 (29 Jan 2018 12:00) (14 - 26)  SpO2: 97% (29 Jan 2018 12:00) (95% - 100%)  I&O's Summary    28 Jan 2018 07:01  -  29 Jan 2018 07:00  --------------------------------------------------------  IN: 890 mL / OUT: 1050 mL / NET: -160 mL    29 Jan 2018 07:01  -  29 Jan 2018 12:56  --------------------------------------------------------  IN: 0 mL / OUT: 325 mL / NET: -325 mL        Appearance: Normal	  HEENT:   Normal oral mucosa, PERRL, EOMI	  Lymphatic: No lymphadenopathy  Cardiovascular: Normal S1 S2, diastolic murmur  Respiratory: decreased breath sound b/l basilar lung   Psychiatry: A & O x 3  Gastrointestinal:  Soft, Non-tender, + BS	  Skin: No rashes, No ecchymoses, No cyanosis	  Neurologic: Non-focal  Extremities: Normal range of motion, 1+LE pitting edema   Vascular: Peripheral pulses palpable 2+ bilaterally    LABS:	 	                        10.1   9.3   )-----------( 286      ( 29 Jan 2018 04:38 )             29.3     Auto Eosinophil # 0.0   / Auto Eosinophil % 0.3   / Auto Neutrophil # 8.0   / Auto Neutrophil % 86.0  / BANDS % x                            9.6    9.7   )-----------( 263      ( 28 Jan 2018 04:22 )             28.3     Auto Eosinophil # 0.1   / Auto Eosinophil % 0.7   / Auto Neutrophil # 8.3   / Auto Neutrophil % 85.6  / BANDS % x        INR: 1.03 ratio (01-29 @ 04:38)  INR: 1.10 ratio (01-28 @ 04:22)  INR: 1.09 ratio (01-27 @ 05:21)    01-29    142  |  102  |  30<H>  ----------------------------<  135<H>  4.0   |  25  |  1.89<H>  01-28    141  |  103  |  30<H>  ----------------------------<  122<H>  4.2   |  25  |  1.83<H>    Ca    8.4      29 Jan 2018 04:38  Mg     2.0     01-29  Phos  3.9     01-29  TPro  6.8  /  Alb  2.5<L>  /  TBili  0.4  /  DBili  x   /  AST  18  /  ALT  10  /  AlkPhos  70  01-29  TPro  6.3  /  Alb  2.5<L>  /  TBili  0.4  /  DBili  x   /  AST  20  /  ALT  11  /  AlkPhos  68  01-28    Lactate, Blood: 0.8 mmol/L (01-27 @ 05:22)      proBNP: Serum Pro-Brain Natriuretic Peptide: 05925 pg/mL (01-26 @ 06:39)  Serum Pro-Brain Natriuretic Peptide: 87526 pg/mL (01-25 @ 03:17)    Lipid Profile: 01-09 Chol 103 LDL 67 HDL 16<L> Trig 102  HgA1c: 9.1 % (01-09 @ 03:39)    TSH:     CARDIAC MARKERS:   26 Jan 2018 06:39 Troponin 0.24 ng/mL / Creatine Kinase 39 U/L /  CKMB 3.9 ng/mL / CPK Mass Assay % x       25 Jan 2018 03:17 Troponin 0.18 ng/mL / Creatine Kinase 31 U/L /  CKMB 2.8 ng/mL / CPK Mass Assay % x       20 Jan 2018 10:11 Troponin 0.28 ng/mL / Creatine Kinase 26 U/L /  CKMB 2.4 ng/mL / CPK Mass Assay % x            TELEMETRY: 	    ECG:  	  RADIOLOGY:  OTHER: 	    PREVIOUS DIAGNOSTIC TESTING:    [ ] Echocardiogram:  [ ]  Catheterization:  [ ] Stress Test: Patient is a 72y old  Male who presents with a chief complaint of Fall (09 Jan 2018 00:52)    Event  No acute event overnight. Pt was on BIPAP overnight and weaned to 5L O2 in AM but placed backed on BIPAP for c/o SOB. Pt endorses CP, palpitation, N/V, f/c. 	  HPI:  Patient is a 72 year-old male with past medical history of T2DM, DLD, alcohol misuse brought in by family after having been found face down on the floor for approximately 2 days. Patient was currently residing alone on one floor of the house (wife away in [place]) while other family members including a son live on other floors within the same house. He was not checked upon for 2 days. Patient reports multiple recent episodes of falls at home due to physical instability". He reports chronic alcohol use, which family states ranges from 6-12 beers almost daily. He has been more lethargic lately, with forgetfulness and disorientationHe also endorses fever, chills, dysuria and urinary frequency. Patient denies SOB, chest pain, back pain, diarrhea, melena/hematochezia, recent travel, sick contact or surgery. Patient is originally from [place] and has been in the  for 9 years.     ED course: Initial EKG noted for IW ST-elevations. Patient was hypotensive to 70/40 with HR in 170bpm with Atrial fibrillation + RVR on EKG. Labs notable for WBC 26K, lac 5.2 and troponin 0.6. Given IV NS blus x 5L and multiple DCCV with temporary resumption of NSR in 80s bpm before return to atrial fibrillation. (09 Jan 2018 00:52)    MEDICATIONS  (STANDING):  aspirin  chewable 81 milliGRAM(s) Oral daily  atorvastatin 80 milliGRAM(s) Oral at bedtime  benzocaine 15 mG/menthol 3.6 mG Lozenge 1 Lozenge Oral three times a day  cefTRIAXone   IVPB 2 Gram(s) IV Intermittent every 24 hours  docusate sodium 100 milliGRAM(s) Oral daily  finasteride 5 milliGRAM(s) Oral daily  folic acid 1 milliGRAM(s) Oral daily  furosemide   Injectable 40 milliGRAM(s) IV Push every 12 hours  heparin  Injectable 5000 Unit(s) SubCutaneous every 8 hours  hydrALAZINE 25 milliGRAM(s) Oral every 8 hours  insulin glargine Injectable (LANTUS) 6 Unit(s) SubCutaneous at bedtime  insulin lispro (HumaLOG) corrective regimen sliding scale   SubCutaneous three times a day before meals  insulin lispro (HumaLOG) corrective regimen sliding scale   SubCutaneous at bedtime  insulin lispro Injectable (HumaLOG) 3 Unit(s) SubCutaneous three times a day with meals  isosorbide   dinitrate Tablet (ISORDIL) 10 milliGRAM(s) Oral every 8 hours  metoprolol     tartrate 12.5 milliGRAM(s) Oral two times a day  multivitamin 1 Tablet(s) Oral daily  pantoprazole    Tablet 40 milliGRAM(s) Oral before breakfast  sodium chloride 0.9% lock flush 20 milliLiter(s) IV Push once  thiamine 100 milliGRAM(s) Oral daily  vancomycin  IVPB 1000 milliGRAM(s) IV Intermittent every 24 hours    MEDICATIONS  (PRN):  acetaminophen   Tablet. 650 milliGRAM(s) Oral every 6 hours PRN Moderate Pain (4 - 6)  acetaminophen   Tablet. 650 milliGRAM(s) Oral every 6 hours PRN Mild Pain (1 - 3)  polyethylene glycol 3350 17 Gram(s) Oral daily PRN Constipation  sodium chloride 0.9% lock flush 10 milliLiter(s) IV Push every 1 hour PRN After each medication administration  sodium chloride 0.9% lock flush 10 milliLiter(s) IV Push every 12 hours PRN Lumen of catheter NOT used      REVIEW OF SYSTEMS:  Otherwise, 10 point ROS done and otherwise negative.    PHYSICAL EXAM:  Vital Signs Last 24 Hrs  T(C): 37.2 (29 Jan 2018 12:00), Max: 37.2 (29 Jan 2018 12:00)  T(F): 99 (29 Jan 2018 12:00), Max: 99 (29 Jan 2018 12:00)  HR: 104 (29 Jan 2018 12:00) (98 - 116)  BP: 104/45 (29 Jan 2018 12:00) (91/39 - 114/51)  BP(mean): 66 (29 Jan 2018 12:00) (53 - 77)  RR: 21 (29 Jan 2018 12:00) (14 - 26)  SpO2: 97% (29 Jan 2018 12:00) (95% - 100%)  I&O's Summary    28 Jan 2018 07:01  -  29 Jan 2018 07:00  --------------------------------------------------------  IN: 890 mL / OUT: 1050 mL / NET: -160 mL    29 Jan 2018 07:01  -  29 Jan 2018 12:56  --------------------------------------------------------  IN: 0 mL / OUT: 325 mL / NET: -325 mL        Appearance: Normal	  HEENT:   Normal oral mucosa, PERRL, EOMI	  Lymphatic: No lymphadenopathy  Cardiovascular: Normal S1 S2, diastolic murmur  Respiratory: decreased breath sound b/l basilar lung   Psychiatry: A & O x 3  Gastrointestinal:  Soft, Non-tender, + BS	  Skin: No rashes, No ecchymoses, No cyanosis	  Neurologic: Non-focal  Extremities: Normal range of motion, 1+LE pitting edema   Vascular: Peripheral pulses palpable 2+ bilaterally    LABS:	 	                        10.1   9.3   )-----------( 286      ( 29 Jan 2018 04:38 )             29.3     Auto Eosinophil # 0.0   / Auto Eosinophil % 0.3   / Auto Neutrophil # 8.0   / Auto Neutrophil % 86.0  / BANDS % x                            9.6    9.7   )-----------( 263      ( 28 Jan 2018 04:22 )             28.3     Auto Eosinophil # 0.1   / Auto Eosinophil % 0.7   / Auto Neutrophil # 8.3   / Auto Neutrophil % 85.6  / BANDS % x        INR: 1.03 ratio (01-29 @ 04:38)  INR: 1.10 ratio (01-28 @ 04:22)  INR: 1.09 ratio (01-27 @ 05:21)    01-29    142  |  102  |  30<H>  ----------------------------<  135<H>  4.0   |  25  |  1.89<H>  01-28    141  |  103  |  30<H>  ----------------------------<  122<H>  4.2   |  25  |  1.83<H>    Ca    8.4      29 Jan 2018 04:38  Mg     2.0     01-29  Phos  3.9     01-29  TPro  6.8  /  Alb  2.5<L>  /  TBili  0.4  /  DBili  x   /  AST  18  /  ALT  10  /  AlkPhos  70  01-29  TPro  6.3  /  Alb  2.5<L>  /  TBili  0.4  /  DBili  x   /  AST  20  /  ALT  11  /  AlkPhos  68  01-28    Lactate, Blood: 0.8 mmol/L (01-27 @ 05:22)      proBNP: Serum Pro-Brain Natriuretic Peptide: 53778 pg/mL (01-26 @ 06:39)  Serum Pro-Brain Natriuretic Peptide: 16514 pg/mL (01-25 @ 03:17)    Lipid Profile: 01-09 Chol 103 LDL 67 HDL 16<L> Trig 102  HgA1c: 9.1 % (01-09 @ 03:39)    TSH:     CARDIAC MARKERS:   26 Jan 2018 06:39 Troponin 0.24 ng/mL / Creatine Kinase 39 U/L /  CKMB 3.9 ng/mL / CPK Mass Assay % x       25 Jan 2018 03:17 Troponin 0.18 ng/mL / Creatine Kinase 31 U/L /  CKMB 2.8 ng/mL / CPK Mass Assay % x       20 Jan 2018 10:11 Troponin 0.28 ng/mL / Creatine Kinase 26 U/L /  CKMB 2.4 ng/mL / CPK Mass Assay % x            TELEMETRY: 	    ECG: RBBB 	  RADIOLOGY:  OTHER: 	    PREVIOUS DIAGNOSTIC TESTING:    [ ] Echocardiogram:  [ ]  Catheterization:  [ ] Stress Test:

## 2018-01-29 NOTE — ADVANCED PRACTICE NURSE CONSULT - RECOMMEDATIONS
1.  Cavilon to gluteal cleft area daily and prn  2.  Allevyn gentle foam dressing to sacral/coccyx area daily to pad and protect the skin  3.  Continue with turning and positioning  4.  Continue with Nutritional support  5.  Would ensure if he goes to [facility] the Allevyn is in place during the OR procedure.  Remain available as requested by staff

## 2018-01-29 NOTE — PROGRESS NOTE ADULT - ASSESSMENT
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     Plan  #Neuro:  -stable, AAOx3  -CTH negative    #CV:  -NSTEMI s/p Cath 1/12 mLAD with no intervention, c/w ASA, lipitor, lopressor, holding Brilinta 2/2 possible CT surg  -CHF with hypervolemia clinically, c/w diuresis with lasix, c/w lopressor Hydralazine, Isordil  -monitor I & O, daily weights  -c/w vancomycin and ceftriaxone for culture negative endocarditis  -AI on TTE 1/27 JANES with worsening AV vegetation, severe AI and moderate AS  -plan for AVR by CTS for Wednesday    #Pulm  -BIPAP PRN for dyspnea     #Renal  -renal function stable  -continue to monitor BMP    #endocrine  -c/w lantus and humalog  -ISS, FS qhs and qac 72 M with PMH of DM2, ETOH, recent hospitalization for Afib RVR, 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     Plan  #Neuro:  -stable, AAOx3  -CTH negative    #CV:  -NSTEMI s/p Cath 1/12 mLAD with no intervention, c/w ASA, lipitor, lopressor, holding Brilinta 2/2 possible CT surg  -ADHF likely 2/2 worsening endocarditis, c/w diuresis with lasix, c/w lopressor Hydralazine, Isordil  -monitor I & O, daily weights  -c/w vancomycin and ceftriaxone for culture negative endocarditis  -AI on TTE 1/27 JANES with worsening AV vegetation, severe AI and moderate AS  -plan for AVR by CTS for Wednesday    #Pulm  -BIPAP PRN for dyspnea     #Renal  -renal function stable  -continue to monitor BMP    #endocrine  -c/w lantus and humalog  -ISS, FS qhs and qac

## 2018-01-29 NOTE — ADVANCED PRACTICE NURSE CONSULT - REASON FOR CONSULT
Requested by nursing staff to assess skin status on sacrum.  Patient is a 72 year-old male with past medical history of T2DM, DLD, alcohol misuse brought in by family after having been found face down on the floor for approximately 2 days. Patient was currently residing alone on one floor of the house (wife away in [place]) while other family members including a son live on other floors within the same house. He was not checked upon for 2 days. Patient reports multiple recent episodes of falls at home due to physical instability". He reports chronic alcohol use, which family states ranges from 6-12 beers almost daily. He has been more lethargic lately, with forgetfulness and disorientation. He also endorses fever, chills, dysuria and urinary frequency. Patient denies SOB, chest pain, back pain, diarrhea, melena/hematochezia, recent travel, sick contact or surgery. Patient is originally from [place] and has been in the  for 9 years. ED course: Initial EKG noted for W ST-elevations. Patient was hypotensive to 70/40 with HR in 170bpm with Atrial fibrillation + RVR on EKG. Labs notable for WBC 26K, lac 5.2 and troponin 0.6. Given IV NS bolus x 5L and multiple DCCV with temporary resumption of NSR in 80s bpm before return to atrial fibrillation. (09 Jan 2018 00:52)  Was transferred rom CCU to telemetry unit after monitoring but on 1/26 developed shortness of breath diagnosed with pulmonary edema and transferred back to the CCU.   JANES indicating worsening vegetation and being evaluated for CTS surgery this week.

## 2018-01-29 NOTE — PROGRESS NOTE ADULT - PROBLEM SELECTOR PLAN 2
Whole body volume status appears euvolemic on exam except for pulmonary exam and CXR which suggest significant pulmonary edema.  In setting of incompetent aortic valve, unclear if fluid can be mobilize  With now stable renal function, normal range serum bicarbonate would attempt more aggressive diuresis for optimization of aortic valve intervention  Consider increasing lasix to 60mg IV TID Whole body volume status appears euvolemic on exam except for pulmonary exam and CXR which suggest significant pulmonary edema.  In setting of incompetent aortic valve, unclear if fluid can be mobilized  With now stable renal function, normal range serum bicarbonate, Consider increasing lasix to 60mg IV TID.  Planned to undergo TAVR on Wed.   Monitor weight and UO

## 2018-01-29 NOTE — ADVANCED PRACTICE NURSE CONSULT - ASSESSMENT
Patient is on a [employer] low airloss surface and is being turned and positioned as per the nursing documentation.  He is able to assist but staff monitoring his movement.    Staff concerned about tissue opening in the gluteal cleft.  The superficial tissue loss is less than 2.0 cm  1.0 cm X 0 cm.  The wound base is clean and granular and there is no odor nor drainage noted at this time.  Wound edges are intact and periwound skin as well.  At this time will document as a moisture associated skin damage as the location and appearance are suggestive of same.  Patient is wearing oxygen and will be considered for cardiac surgery this week.  At this time will recommend continuing to apply Cavilon daily to site and apply an Allevyn gentle foam dressing over sacrum.  He is being followed by nutrition.

## 2018-01-29 NOTE — PROGRESS NOTE ADULT - ATTENDING COMMENTS
Patient is seen and examined with fellow, NP and the CCU house-staff. I agree with the history, physical and the assessment and plan.  plan for AVR on wednsday  CT head resuts noted

## 2018-01-29 NOTE — PROGRESS NOTE ADULT - PROBLEM SELECTOR PLAN 1
Likely contrast induced nephropathy from cardiac cath done on 1/12. Creatinine first elevated in AM 1/14. Possible pre-renal contribution + vancomycin. Possible ATN may take some time to recover.   Protein / cr of 0.2, unlikely glomerular injury.  Likely tubular injury as above.  Patient Scr remains stable  Monitor UOP, creatinine trend.    AVOID NSAIDs, ACE/ARBS, and nephrotoxins Likely contrast induced nephropathy from cardiac cath done on 1/12. Creatinine first elevated in AM 1/14. Possible pre-renal contribution + vancomycin. Possible ATN may take some time to recover.   Protein / cr of 0.2, unlikely glomerular injury.  Likely tubular injury as above.  Patient Scr remains stable, maintaining good urine output.  Monitor UOP, creatinine trend.    AVOID NSAIDs, ACE/ARBS, and nephrotoxins

## 2018-01-30 ENCOUNTER — RESULT REVIEW (OUTPATIENT)
Age: 73
End: 2018-01-30

## 2018-01-30 ENCOUNTER — APPOINTMENT (OUTPATIENT)
Dept: CARDIOTHORACIC SURGERY | Facility: HOSPITAL | Age: 73
End: 2018-01-30
Payer: MEDICARE

## 2018-01-30 PROBLEM — E11.9 TYPE 2 DIABETES MELLITUS WITHOUT COMPLICATIONS: Chronic | Status: ACTIVE | Noted: 2018-01-08

## 2018-01-30 PROBLEM — Z00.00 ENCOUNTER FOR PREVENTIVE HEALTH EXAMINATION: Status: ACTIVE | Noted: 2018-01-30

## 2018-01-30 PROBLEM — E78.00 PURE HYPERCHOLESTEROLEMIA, UNSPECIFIED: Chronic | Status: ACTIVE | Noted: 2018-01-08

## 2018-01-30 LAB
ALBUMIN SERPL ELPH-MCNC: 1.8 G/DL — LOW (ref 3.3–5)
ALBUMIN SERPL ELPH-MCNC: 2.9 G/DL — LOW (ref 3.3–5)
ALP SERPL-CCNC: 42 U/L — SIGNIFICANT CHANGE UP (ref 40–120)
ALP SERPL-CCNC: 83 U/L — SIGNIFICANT CHANGE UP (ref 40–120)
ALT FLD-CCNC: 12 U/L RC — SIGNIFICANT CHANGE UP (ref 10–45)
ALT FLD-CCNC: 255 U/L RC — HIGH (ref 10–45)
ANION GAP SERPL CALC-SCNC: 15 MMOL/L — SIGNIFICANT CHANGE UP (ref 5–17)
ANION GAP SERPL CALC-SCNC: 19 MMOL/L — HIGH (ref 5–17)
APTT BLD: 47.2 SEC — HIGH (ref 27.5–37.4)
APTT BLD: 75.1 SEC — HIGH (ref 27.5–37.4)
AST SERPL-CCNC: 23 U/L — SIGNIFICANT CHANGE UP (ref 10–40)
AST SERPL-CCNC: 568 U/L — HIGH (ref 10–40)
BASE EXCESS BLDV CALC-SCNC: -2.5 MMOL/L — LOW (ref -2–2)
BASOPHILS # BLD AUTO: 0 K/UL — SIGNIFICANT CHANGE UP (ref 0–0.2)
BASOPHILS # BLD AUTO: 0 K/UL — SIGNIFICANT CHANGE UP (ref 0–0.2)
BASOPHILS NFR BLD AUTO: 0.3 % — SIGNIFICANT CHANGE UP (ref 0–2)
BASOPHILS NFR BLD AUTO: 0.3 % — SIGNIFICANT CHANGE UP (ref 0–2)
BILIRUB SERPL-MCNC: 0.3 MG/DL — SIGNIFICANT CHANGE UP (ref 0.2–1.2)
BILIRUB SERPL-MCNC: 0.8 MG/DL — SIGNIFICANT CHANGE UP (ref 0.2–1.2)
BLD GP AB SCN SERPL QL: NEGATIVE — SIGNIFICANT CHANGE UP
BUN SERPL-MCNC: 29 MG/DL — HIGH (ref 7–23)
BUN SERPL-MCNC: 35 MG/DL — HIGH (ref 7–23)
CALCIUM SERPL-MCNC: 7.2 MG/DL — LOW (ref 8.4–10.5)
CALCIUM SERPL-MCNC: 8.7 MG/DL — SIGNIFICANT CHANGE UP (ref 8.4–10.5)
CHLORIDE SERPL-SCNC: 102 MMOL/L — SIGNIFICANT CHANGE UP (ref 96–108)
CHLORIDE SERPL-SCNC: 104 MMOL/L — SIGNIFICANT CHANGE UP (ref 96–108)
CK MB BLD-MCNC: 11.4 % — HIGH (ref 0–3.5)
CK MB CFR SERPL CALC: 16.6 NG/ML — HIGH (ref 0–6.7)
CK SERPL-CCNC: 145 U/L — SIGNIFICANT CHANGE UP (ref 30–200)
CO2 BLDV-SCNC: 25 MMOL/L — SIGNIFICANT CHANGE UP (ref 22–30)
CO2 SERPL-SCNC: 22 MMOL/L — SIGNIFICANT CHANGE UP (ref 22–31)
CO2 SERPL-SCNC: 25 MMOL/L — SIGNIFICANT CHANGE UP (ref 22–31)
CREAT SERPL-MCNC: 1.63 MG/DL — HIGH (ref 0.5–1.3)
CREAT SERPL-MCNC: 1.96 MG/DL — HIGH (ref 0.5–1.3)
EOSINOPHIL # BLD AUTO: 0 K/UL — SIGNIFICANT CHANGE UP (ref 0–0.5)
EOSINOPHIL # BLD AUTO: 0 K/UL — SIGNIFICANT CHANGE UP (ref 0–0.5)
EOSINOPHIL NFR BLD AUTO: 0.2 % — SIGNIFICANT CHANGE UP (ref 0–6)
EOSINOPHIL NFR BLD AUTO: 0.3 % — SIGNIFICANT CHANGE UP (ref 0–6)
FIBRINOGEN PPP-MCNC: 424 MG/DL — SIGNIFICANT CHANGE UP (ref 310–510)
GAS PNL BLDA: SIGNIFICANT CHANGE UP
GAS PNL BLDV: SIGNIFICANT CHANGE UP
GAS PNL BLDV: SIGNIFICANT CHANGE UP
GLUCOSE BLDC GLUCOMTR-MCNC: 130 MG/DL — HIGH (ref 70–99)
GLUCOSE BLDC GLUCOMTR-MCNC: 153 MG/DL — HIGH (ref 70–99)
GLUCOSE BLDC GLUCOMTR-MCNC: 180 MG/DL — HIGH (ref 70–99)
GLUCOSE SERPL-MCNC: 137 MG/DL — HIGH (ref 70–99)
GLUCOSE SERPL-MCNC: 198 MG/DL — HIGH (ref 70–99)
HCO3 BLDV-SCNC: 23 MMOL/L — SIGNIFICANT CHANGE UP (ref 21–29)
HCT VFR BLD CALC: 28.2 % — LOW (ref 39–50)
HCT VFR BLD CALC: 29.9 % — LOW (ref 39–50)
HGB BLD-MCNC: 10.1 G/DL — LOW (ref 13–17)
HGB BLD-MCNC: 9.8 G/DL — LOW (ref 13–17)
HOROWITZ INDEX BLDV+IHG-RTO: 50 — SIGNIFICANT CHANGE UP
INR BLD: 1.07 RATIO — SIGNIFICANT CHANGE UP (ref 0.88–1.16)
INR BLD: 1.54 RATIO — HIGH (ref 0.88–1.16)
LYMPHOCYTES # BLD AUTO: 0.7 K/UL — LOW (ref 1–3.3)
LYMPHOCYTES # BLD AUTO: 1.7 K/UL — SIGNIFICANT CHANGE UP (ref 1–3.3)
LYMPHOCYTES # BLD AUTO: 12.3 % — LOW (ref 13–44)
LYMPHOCYTES # BLD AUTO: 7.5 % — LOW (ref 13–44)
MCHC RBC-ENTMCNC: 31.8 PG — SIGNIFICANT CHANGE UP (ref 27–34)
MCHC RBC-ENTMCNC: 33.2 PG — SIGNIFICANT CHANGE UP (ref 27–34)
MCHC RBC-ENTMCNC: 33.9 GM/DL — SIGNIFICANT CHANGE UP (ref 32–36)
MCHC RBC-ENTMCNC: 34.8 GM/DL — SIGNIFICANT CHANGE UP (ref 32–36)
MCV RBC AUTO: 91.3 FL — SIGNIFICANT CHANGE UP (ref 80–100)
MCV RBC AUTO: 98 FL — SIGNIFICANT CHANGE UP (ref 80–100)
MONOCYTES # BLD AUTO: 0.5 K/UL — SIGNIFICANT CHANGE UP (ref 0–0.9)
MONOCYTES # BLD AUTO: 0.8 K/UL — SIGNIFICANT CHANGE UP (ref 0–0.9)
MONOCYTES NFR BLD AUTO: 5.1 % — SIGNIFICANT CHANGE UP (ref 2–14)
MONOCYTES NFR BLD AUTO: 5.7 % — SIGNIFICANT CHANGE UP (ref 2–14)
NEUTROPHILS # BLD AUTO: 11.3 K/UL — HIGH (ref 1.8–7.4)
NEUTROPHILS # BLD AUTO: 7.9 K/UL — HIGH (ref 1.8–7.4)
NEUTROPHILS NFR BLD AUTO: 81.4 % — HIGH (ref 43–77)
NEUTROPHILS NFR BLD AUTO: 86.9 % — HIGH (ref 43–77)
PA ADP PRP-ACNC: 191 PRU — LOW (ref 194–417)
PCO2 BLDV: 48 MMHG — SIGNIFICANT CHANGE UP (ref 35–50)
PH BLDV: 7.31 — LOW (ref 7.35–7.45)
PLATELET # BLD AUTO: 184 K/UL — SIGNIFICANT CHANGE UP (ref 150–400)
PLATELET # BLD AUTO: 292 K/UL — SIGNIFICANT CHANGE UP (ref 150–400)
PO2 BLDV: 43 MMHG — SIGNIFICANT CHANGE UP (ref 25–45)
POTASSIUM SERPL-MCNC: 4 MMOL/L — SIGNIFICANT CHANGE UP (ref 3.5–5.3)
POTASSIUM SERPL-MCNC: 4.3 MMOL/L — SIGNIFICANT CHANGE UP (ref 3.5–5.3)
POTASSIUM SERPL-SCNC: 4 MMOL/L — SIGNIFICANT CHANGE UP (ref 3.5–5.3)
POTASSIUM SERPL-SCNC: 4.3 MMOL/L — SIGNIFICANT CHANGE UP (ref 3.5–5.3)
PROT SERPL-MCNC: 3.6 G/DL — LOW (ref 6–8.3)
PROT SERPL-MCNC: 7.2 G/DL — SIGNIFICANT CHANGE UP (ref 6–8.3)
PROTHROM AB SERPL-ACNC: 11.6 SEC — SIGNIFICANT CHANGE UP (ref 9.8–12.7)
PROTHROM AB SERPL-ACNC: 16.9 SEC — HIGH (ref 9.8–12.7)
RBC # BLD: 3.05 M/UL — LOW (ref 4.2–5.8)
RBC # BLD: 3.09 M/UL — LOW (ref 4.2–5.8)
RBC # FLD: 14 % — SIGNIFICANT CHANGE UP (ref 10.3–14.5)
RBC # FLD: 14.1 % — SIGNIFICANT CHANGE UP (ref 10.3–14.5)
RH IG SCN BLD-IMP: POSITIVE — SIGNIFICANT CHANGE UP
SAO2 % BLDV: 74 % — SIGNIFICANT CHANGE UP (ref 67–88)
SODIUM SERPL-SCNC: 142 MMOL/L — SIGNIFICANT CHANGE UP (ref 135–145)
SODIUM SERPL-SCNC: 145 MMOL/L — SIGNIFICANT CHANGE UP (ref 135–145)
TROPONIN T SERPL-MCNC: 0.5 NG/ML — HIGH (ref 0–0.06)
WBC # BLD: 13.9 K/UL — HIGH (ref 3.8–10.5)
WBC # BLD: 9.1 K/UL — SIGNIFICANT CHANGE UP (ref 3.8–10.5)
WBC # FLD AUTO: 13.9 K/UL — HIGH (ref 3.8–10.5)
WBC # FLD AUTO: 9.1 K/UL — SIGNIFICANT CHANGE UP (ref 3.8–10.5)

## 2018-01-30 PROCEDURE — 33533 CABG ARTERIAL SINGLE: CPT | Mod: AS

## 2018-01-30 PROCEDURE — 99233 SBSQ HOSP IP/OBS HIGH 50: CPT

## 2018-01-30 PROCEDURE — 36620 INSERTION CATHETER ARTERY: CPT

## 2018-01-30 PROCEDURE — 93010 ELECTROCARDIOGRAM REPORT: CPT

## 2018-01-30 PROCEDURE — 71045 X-RAY EXAM CHEST 1 VIEW: CPT | Mod: 26,77,76

## 2018-01-30 PROCEDURE — 88311 DECALCIFY TISSUE: CPT | Mod: 26

## 2018-01-30 PROCEDURE — 88305 TISSUE EXAM BY PATHOLOGIST: CPT | Mod: 26

## 2018-01-30 PROCEDURE — 33533 CABG ARTERIAL SINGLE: CPT

## 2018-01-30 PROCEDURE — 99233 SBSQ HOSP IP/OBS HIGH 50: CPT | Mod: GC

## 2018-01-30 PROCEDURE — 71045 X-RAY EXAM CHEST 1 VIEW: CPT | Mod: 26

## 2018-01-30 RX ORDER — DEXTROSE 50 % IN WATER 50 %
25 SYRINGE (ML) INTRAVENOUS
Qty: 0 | Refills: 0 | Status: DISCONTINUED | OUTPATIENT
Start: 2018-01-30 | End: 2018-02-04

## 2018-01-30 RX ORDER — AMIODARONE HYDROCHLORIDE 400 MG/1
150 TABLET ORAL ONCE
Qty: 0 | Refills: 0 | Status: COMPLETED | OUTPATIENT
Start: 2018-01-30 | End: 2018-01-30

## 2018-01-30 RX ORDER — DOBUTAMINE HCL 250MG/20ML
3 VIAL (ML) INTRAVENOUS
Qty: 500 | Refills: 0 | Status: DISCONTINUED | OUTPATIENT
Start: 2018-01-30 | End: 2018-01-31

## 2018-01-30 RX ORDER — METOPROLOL TARTRATE 50 MG
2.5 TABLET ORAL ONCE
Qty: 0 | Refills: 0 | Status: COMPLETED | OUTPATIENT
Start: 2018-01-30 | End: 2018-01-30

## 2018-01-30 RX ORDER — EPINEPHRINE 0.3 MG/.3ML
0.01 INJECTION INTRAMUSCULAR; SUBCUTANEOUS
Qty: 4 | Refills: 0 | Status: DISCONTINUED | OUTPATIENT
Start: 2018-01-30 | End: 2018-01-31

## 2018-01-30 RX ORDER — MEPERIDINE HYDROCHLORIDE 50 MG/ML
25 INJECTION INTRAMUSCULAR; INTRAVENOUS; SUBCUTANEOUS ONCE
Qty: 0 | Refills: 0 | Status: DISCONTINUED | OUTPATIENT
Start: 2018-01-30 | End: 2018-01-31

## 2018-01-30 RX ORDER — POTASSIUM CHLORIDE 20 MEQ
10 PACKET (EA) ORAL
Qty: 0 | Refills: 0 | Status: COMPLETED | OUTPATIENT
Start: 2018-01-30 | End: 2018-01-30

## 2018-01-30 RX ORDER — ALBUMIN HUMAN 25 %
250 VIAL (ML) INTRAVENOUS ONCE
Qty: 0 | Refills: 0 | Status: COMPLETED | OUTPATIENT
Start: 2018-01-30 | End: 2018-01-30

## 2018-01-30 RX ORDER — POTASSIUM CHLORIDE 20 MEQ
10 PACKET (EA) ORAL ONCE
Qty: 0 | Refills: 0 | Status: COMPLETED | OUTPATIENT
Start: 2018-01-30 | End: 2018-01-31

## 2018-01-30 RX ORDER — PANTOPRAZOLE SODIUM 20 MG/1
40 TABLET, DELAYED RELEASE ORAL DAILY
Qty: 0 | Refills: 0 | Status: DISCONTINUED | OUTPATIENT
Start: 2018-01-30 | End: 2018-01-31

## 2018-01-30 RX ORDER — DEXTROSE 50 % IN WATER 50 %
50 SYRINGE (ML) INTRAVENOUS
Qty: 0 | Refills: 0 | Status: DISCONTINUED | OUTPATIENT
Start: 2018-01-30 | End: 2018-02-11

## 2018-01-30 RX ORDER — CEFUROXIME AXETIL 250 MG
1500 TABLET ORAL ONCE
Qty: 0 | Refills: 0 | Status: COMPLETED | OUTPATIENT
Start: 2018-01-30 | End: 2018-01-30

## 2018-01-30 RX ORDER — DOCUSATE SODIUM 100 MG
100 CAPSULE ORAL THREE TIMES A DAY
Qty: 0 | Refills: 0 | Status: DISCONTINUED | OUTPATIENT
Start: 2018-01-30 | End: 2018-02-01

## 2018-01-30 RX ORDER — VANCOMYCIN HCL 1 G
1000 VIAL (EA) INTRAVENOUS EVERY 24 HOURS
Qty: 0 | Refills: 0 | Status: DISCONTINUED | OUTPATIENT
Start: 2018-01-30 | End: 2018-02-05

## 2018-01-30 RX ORDER — CHLORHEXIDINE GLUCONATE 213 G/1000ML
15 SOLUTION TOPICAL ONCE
Qty: 0 | Refills: 0 | Status: DISCONTINUED | OUTPATIENT
Start: 2018-01-30 | End: 2018-01-30

## 2018-01-30 RX ORDER — PHENYLEPHRINE HYDROCHLORIDE 10 MG/ML
0.18 INJECTION INTRAVENOUS
Qty: 80 | Refills: 0 | Status: DISCONTINUED | OUTPATIENT
Start: 2018-01-30 | End: 2018-01-30

## 2018-01-30 RX ORDER — CHLORHEXIDINE GLUCONATE 213 G/1000ML
1 SOLUTION TOPICAL ONCE
Qty: 0 | Refills: 0 | Status: DISCONTINUED | OUTPATIENT
Start: 2018-01-30 | End: 2018-01-30

## 2018-01-30 RX ORDER — POTASSIUM CHLORIDE 20 MEQ
10 PACKET (EA) ORAL ONCE
Qty: 0 | Refills: 0 | Status: COMPLETED | OUTPATIENT
Start: 2018-01-30 | End: 2018-01-30

## 2018-01-30 RX ORDER — NOREPINEPHRINE BITARTRATE/D5W 8 MG/250ML
0.11 PLASTIC BAG, INJECTION (ML) INTRAVENOUS
Qty: 8 | Refills: 0 | Status: DISCONTINUED | OUTPATIENT
Start: 2018-01-30 | End: 2018-02-02

## 2018-01-30 RX ORDER — CALCIUM GLUCONATE 100 MG/ML
1 VIAL (ML) INTRAVENOUS ONCE
Qty: 0 | Refills: 0 | Status: COMPLETED | OUTPATIENT
Start: 2018-01-30 | End: 2018-01-30

## 2018-01-30 RX ORDER — SODIUM CHLORIDE 9 MG/ML
1000 INJECTION, SOLUTION INTRAVENOUS
Qty: 0 | Refills: 0 | Status: DISCONTINUED | OUTPATIENT
Start: 2018-01-30 | End: 2018-01-31

## 2018-01-30 RX ORDER — INSULIN HUMAN 100 [IU]/ML
2 INJECTION, SOLUTION SUBCUTANEOUS
Qty: 100 | Refills: 0 | Status: DISCONTINUED | OUTPATIENT
Start: 2018-01-30 | End: 2018-02-05

## 2018-01-30 RX ORDER — CEFEPIME 1 G/1
2000 INJECTION, POWDER, FOR SOLUTION INTRAMUSCULAR; INTRAVENOUS EVERY 12 HOURS
Qty: 0 | Refills: 0 | Status: DISCONTINUED | OUTPATIENT
Start: 2018-01-31 | End: 2018-01-31

## 2018-01-30 RX ORDER — ASPIRIN/CALCIUM CARB/MAGNESIUM 324 MG
325 TABLET ORAL DAILY
Qty: 0 | Refills: 0 | Status: DISCONTINUED | OUTPATIENT
Start: 2018-01-30 | End: 2018-02-01

## 2018-01-30 RX ORDER — CALCIUM GLUCONATE 100 MG/ML
2 VIAL (ML) INTRAVENOUS ONCE
Qty: 0 | Refills: 0 | Status: COMPLETED | OUTPATIENT
Start: 2018-01-30 | End: 2018-01-30

## 2018-01-30 RX ORDER — VASOPRESSIN 20 [USP'U]/ML
0.1 INJECTION INTRAVENOUS
Qty: 100 | Refills: 0 | Status: DISCONTINUED | OUTPATIENT
Start: 2018-01-30 | End: 2018-02-07

## 2018-01-30 RX ORDER — INSULIN HUMAN 100 [IU]/ML
1 INJECTION, SOLUTION SUBCUTANEOUS
Qty: 100 | Refills: 0 | Status: DISCONTINUED | OUTPATIENT
Start: 2018-01-30 | End: 2018-01-30

## 2018-01-30 RX ADMIN — Medication 250 MILLIGRAM(S): at 18:08

## 2018-01-30 RX ADMIN — VASOPRESSIN 6 UNIT(S)/MIN: 20 INJECTION INTRAVENOUS at 19:19

## 2018-01-30 RX ADMIN — Medication 2.5 MILLIGRAM(S): at 05:19

## 2018-01-30 RX ADMIN — Medication 125 MILLILITER(S): at 16:30

## 2018-01-30 RX ADMIN — Medication 500 MILLILITER(S): at 20:04

## 2018-01-30 RX ADMIN — HEPARIN SODIUM 5000 UNIT(S): 5000 INJECTION INTRAVENOUS; SUBCUTANEOUS at 05:19

## 2018-01-30 RX ADMIN — Medication 50 MILLIEQUIVALENT(S): at 23:26

## 2018-01-30 RX ADMIN — EPINEPHRINE 14.14 MICROGRAM(S)/KG/MIN: 0.3 INJECTION INTRAMUSCULAR; SUBCUTANEOUS at 19:20

## 2018-01-30 RX ADMIN — Medication 50 MILLIEQUIVALENT(S): at 16:20

## 2018-01-30 RX ADMIN — AMIODARONE HYDROCHLORIDE 600 MILLIGRAM(S): 400 TABLET ORAL at 04:45

## 2018-01-30 RX ADMIN — Medication 50 MILLIEQUIVALENT(S): at 16:00

## 2018-01-30 RX ADMIN — Medication 16.96 MICROGRAM(S)/KG/MIN: at 19:20

## 2018-01-30 RX ADMIN — Medication 125 MILLILITER(S): at 17:30

## 2018-01-30 RX ADMIN — PANTOPRAZOLE SODIUM 40 MILLIGRAM(S): 20 TABLET, DELAYED RELEASE ORAL at 17:54

## 2018-01-30 RX ADMIN — Medication 400 GRAM(S): at 16:30

## 2018-01-30 RX ADMIN — INSULIN HUMAN 2 UNIT(S)/HR: 100 INJECTION, SOLUTION SUBCUTANEOUS at 19:21

## 2018-01-30 RX ADMIN — Medication 2.5 MILLIGRAM(S): at 05:40

## 2018-01-30 RX ADMIN — Medication 500 MILLILITER(S): at 23:08

## 2018-01-30 RX ADMIN — Medication 110 MILLIGRAM(S): at 18:07

## 2018-01-30 RX ADMIN — Medication 1: at 08:26

## 2018-01-30 RX ADMIN — Medication 50 MILLIEQUIVALENT(S): at 17:00

## 2018-01-30 RX ADMIN — Medication 200 GRAM(S): at 23:26

## 2018-01-30 RX ADMIN — Medication 15 MICROGRAM(S)/KG/MIN: at 19:19

## 2018-01-30 RX ADMIN — Medication 50 MILLIEQUIVALENT(S): at 21:00

## 2018-01-30 RX ADMIN — Medication 40 MILLIGRAM(S): at 05:19

## 2018-01-30 RX ADMIN — Medication 50 MILLIEQUIVALENT(S): at 04:30

## 2018-01-30 RX ADMIN — Medication 125 MILLILITER(S): at 16:45

## 2018-01-30 RX ADMIN — Medication 125 MILLILITER(S): at 16:15

## 2018-01-30 NOTE — BRIEF OPERATIVE NOTE - COMMENTS
EBL not accurate due to cell saver     No qualified resident available for first assist EBL not accurate due to cell saver     No qualified resident available for first assist  Cross clamp time 113 minutes

## 2018-01-30 NOTE — BRIEF OPERATIVE NOTE - PROCEDURE
<<-----Click on this checkbox to enter Procedure Aortic valve replacement  01/30/2018  #   pericardial valve  Active  CGRADE Aortic valve replacement  01/30/2018  # 21  pericardial valve  Active  Nirmal Celestin

## 2018-01-30 NOTE — PROGRESS NOTE ADULT - SUBJECTIVE AND OBJECTIVE BOX
CC: F/U for Culture Neg Endocarditis    Called by CTS to re-evaluate patient. Patient had progression of valvular damage, and brought to OR for valve replacement. In OR, noted evidence of root abscess. Saw/spoke to patient. In CTU, intubated, non-verbal, sedated. Not able to respond to questions.    Allergies  No Known Allergies    ANTIMICROBIALS:  cefuroxime  IVPB 1500 once  doxycycline IVPB 100 once  doxycycline IVPB    vancomycin  IVPB 1000 every 24 hours    PE:    Vital Signs Last 24 Hrs  T(C): 36.4 (30 Jan 2018 08:45), Max: 36.4 (30 Jan 2018 08:45)  T(F): 97.6 (30 Jan 2018 08:45), Max: 97.6 (30 Jan 2018 08:45)  HR: 105 (30 Jan 2018 15:50) (92 - 144)  BP: 106/57 (30 Jan 2018 05:00) (93/43 - 111/54)  BP(mean): 77 (30 Jan 2018 05:00) (60 - 78)  RR: 25 (30 Jan 2018 08:45) (18 - 25)  SpO2: 96% (30 Jan 2018 15:50) (92% - 100%)    Gen: AOx0, sedated  CV: Midsternal wound, chest tubes in place  Resp: Intubated, no crackles/wheeze  Abd: Soft, nontender, +BS, NGT  Ext: No LE edema, no wounds    LABS:                        9.8    13.9  )-----------( 184      ( 30 Jan 2018 16:08 )             28.2     01-30    142  |  102  |  35<H>  ----------------------------<  137<H>  4.0   |  25  |  1.96<H>    Ca    8.7      30 Jan 2018 02:11  Phos  3.9     01-29  Mg     2.0     01-29    TPro  7.2  /  Alb  2.9<L>  /  TBili  0.3  /  DBili  x   /  AST  23  /  ALT  12  /  AlkPhos  83  01-30    MICROBIOLOGY:    .Blood Blood-Venous  01-13-18   No growth at 5 days.      .Blood Blood-Peripheral  01-13-18   No growth at 5 days.      .Blood Blood-Venous  01-09-18   No growth at 5 days.     .Blood Blood-Venous  01-09-18   No growth at 5 days.     .Urine Clean Catch (Midstream)  01-09-18   No growth     RADIOLOGY:    1/30 CXR:    Impression:    The heart is enlarged. Bilateral pleural effusion. Congestive heart   failure. A central line was placed on the left and the tip is in superior   vena cava. No pneumothorax.

## 2018-01-31 DIAGNOSIS — I38 ENDOCARDITIS, VALVE UNSPECIFIED: ICD-10-CM

## 2018-01-31 DIAGNOSIS — I35.1 NONRHEUMATIC AORTIC (VALVE) INSUFFICIENCY: ICD-10-CM

## 2018-01-31 PROBLEM — Z00.00 ENCOUNTER FOR PREVENTIVE HEALTH EXAMINATION: Noted: 2018-01-31

## 2018-01-31 LAB
ALBUMIN SERPL ELPH-MCNC: 3.3 G/DL — SIGNIFICANT CHANGE UP (ref 3.3–5)
ALP SERPL-CCNC: 43 U/L — SIGNIFICANT CHANGE UP (ref 40–120)
ALT FLD-CCNC: 178 U/L RC — HIGH (ref 10–45)
APTT BLD: 112.9 SEC — SIGNIFICANT CHANGE UP (ref 27.5–37.4)
APTT BLD: 72.5 SEC — HIGH (ref 27.5–37.4)
AST SERPL-CCNC: 556 U/L — HIGH (ref 10–40)
BASE EXCESS BLDV CALC-SCNC: 0.2 MMOL/L — SIGNIFICANT CHANGE UP (ref -2–2)
BASOPHILS # BLD AUTO: 0 K/UL — SIGNIFICANT CHANGE UP (ref 0–0.2)
BASOPHILS NFR BLD AUTO: 0.1 % — SIGNIFICANT CHANGE UP (ref 0–2)
BILIRUB SERPL-MCNC: 2.1 MG/DL — HIGH (ref 0.2–1.2)
BUN SERPL-MCNC: 31 MG/DL — HIGH (ref 7–23)
CALCIUM SERPL-MCNC: 8.4 MG/DL — SIGNIFICANT CHANGE UP (ref 8.4–10.5)
CHLORIDE SERPL-SCNC: 112 MMOL/L — HIGH (ref 96–108)
CO2 BLDV-SCNC: 27 MMOL/L — SIGNIFICANT CHANGE UP (ref 22–30)
CO2 SERPL-SCNC: 23 MMOL/L — SIGNIFICANT CHANGE UP (ref 22–31)
CREAT SERPL-MCNC: 1.79 MG/DL — HIGH (ref 0.5–1.3)
EOSINOPHIL # BLD AUTO: 0 K/UL — SIGNIFICANT CHANGE UP (ref 0–0.5)
EOSINOPHIL NFR BLD AUTO: 0.1 % — SIGNIFICANT CHANGE UP (ref 0–6)
GAS PNL BLDA: SIGNIFICANT CHANGE UP
GLUCOSE BLDC GLUCOMTR-MCNC: 106 MG/DL — HIGH (ref 70–99)
GLUCOSE BLDC GLUCOMTR-MCNC: 117 MG/DL — HIGH (ref 70–99)
GLUCOSE BLDC GLUCOMTR-MCNC: 121 MG/DL — HIGH (ref 70–99)
GLUCOSE BLDC GLUCOMTR-MCNC: 126 MG/DL — HIGH (ref 70–99)
GLUCOSE BLDC GLUCOMTR-MCNC: 127 MG/DL — HIGH (ref 70–99)
GLUCOSE BLDC GLUCOMTR-MCNC: 128 MG/DL — HIGH (ref 70–99)
GLUCOSE BLDC GLUCOMTR-MCNC: 129 MG/DL — HIGH (ref 70–99)
GLUCOSE BLDC GLUCOMTR-MCNC: 135 MG/DL — HIGH (ref 70–99)
GLUCOSE BLDC GLUCOMTR-MCNC: 139 MG/DL — HIGH (ref 70–99)
GLUCOSE BLDC GLUCOMTR-MCNC: 142 MG/DL — HIGH (ref 70–99)
GLUCOSE BLDC GLUCOMTR-MCNC: 152 MG/DL — HIGH (ref 70–99)
GLUCOSE BLDC GLUCOMTR-MCNC: 156 MG/DL — HIGH (ref 70–99)
GLUCOSE BLDC GLUCOMTR-MCNC: 68 MG/DL — LOW (ref 70–99)
GLUCOSE BLDC GLUCOMTR-MCNC: 93 MG/DL — SIGNIFICANT CHANGE UP (ref 70–99)
GLUCOSE BLDC GLUCOMTR-MCNC: 99 MG/DL — SIGNIFICANT CHANGE UP (ref 70–99)
GLUCOSE SERPL-MCNC: 135 MG/DL — HIGH (ref 70–99)
GRAM STN FLD: SIGNIFICANT CHANGE UP
HCO3 BLDV-SCNC: 26 MMOL/L — SIGNIFICANT CHANGE UP (ref 21–29)
HCT VFR BLD CALC: 27.1 % — LOW (ref 39–50)
HGB BLD-MCNC: 9.9 G/DL — LOW (ref 13–17)
HOROWITZ INDEX BLDV+IHG-RTO: 44 — SIGNIFICANT CHANGE UP
INR BLD: 1.38 RATIO — HIGH (ref 0.88–1.16)
INR BLD: 1.4 RATIO — HIGH (ref 0.88–1.16)
LYMPHOCYTES # BLD AUTO: 0.7 K/UL — LOW (ref 1–3.3)
LYMPHOCYTES # BLD AUTO: 6.8 % — LOW (ref 13–44)
MCHC RBC-ENTMCNC: 32.4 PG — SIGNIFICANT CHANGE UP (ref 27–34)
MCHC RBC-ENTMCNC: 36.4 GM/DL — HIGH (ref 32–36)
MCV RBC AUTO: 88.9 FL — SIGNIFICANT CHANGE UP (ref 80–100)
MONOCYTES # BLD AUTO: 0.5 K/UL — SIGNIFICANT CHANGE UP (ref 0–0.9)
MONOCYTES NFR BLD AUTO: 4.9 % — SIGNIFICANT CHANGE UP (ref 2–14)
NEUTROPHILS # BLD AUTO: 8.9 K/UL — HIGH (ref 1.8–7.4)
NEUTROPHILS NFR BLD AUTO: 88.1 % — HIGH (ref 43–77)
NIGHT BLUE STAIN TISS: SIGNIFICANT CHANGE UP
PCO2 BLDV: 50 MMHG — SIGNIFICANT CHANGE UP (ref 35–50)
PH BLDV: 7.33 — LOW (ref 7.35–7.45)
PLATELET # BLD AUTO: 119 K/UL — LOW (ref 150–400)
PO2 BLDV: 38 MMHG — SIGNIFICANT CHANGE UP (ref 25–45)
POTASSIUM SERPL-MCNC: 4.1 MMOL/L — SIGNIFICANT CHANGE UP (ref 3.5–5.3)
POTASSIUM SERPL-SCNC: 4.1 MMOL/L — SIGNIFICANT CHANGE UP (ref 3.5–5.3)
PROT SERPL-MCNC: 5 G/DL — LOW (ref 6–8.3)
PROTHROM AB SERPL-ACNC: 15.1 SEC — HIGH (ref 9.8–12.7)
PROTHROM AB SERPL-ACNC: 15.4 SEC — HIGH (ref 9.8–12.7)
RBC # BLD: 3.05 M/UL — LOW (ref 4.2–5.8)
RBC # FLD: 14.2 % — SIGNIFICANT CHANGE UP (ref 10.3–14.5)
SAO2 % BLDV: 68 % — SIGNIFICANT CHANGE UP (ref 67–88)
SODIUM SERPL-SCNC: 141 MMOL/L — SIGNIFICANT CHANGE UP (ref 135–145)
SPECIMEN SOURCE: SIGNIFICANT CHANGE UP
SPECIMEN SOURCE: SIGNIFICANT CHANGE UP
VANCOMYCIN TROUGH SERPL-MCNC: 16 UG/ML — SIGNIFICANT CHANGE UP (ref 10–20)
WBC # BLD: 10.1 K/UL — SIGNIFICANT CHANGE UP (ref 3.8–10.5)
WBC # FLD AUTO: 10.1 K/UL — SIGNIFICANT CHANGE UP (ref 3.8–10.5)

## 2018-01-31 PROCEDURE — 71045 X-RAY EXAM CHEST 1 VIEW: CPT | Mod: 26

## 2018-01-31 PROCEDURE — 99292 CRITICAL CARE ADDL 30 MIN: CPT

## 2018-01-31 PROCEDURE — 99233 SBSQ HOSP IP/OBS HIGH 50: CPT

## 2018-01-31 PROCEDURE — 93010 ELECTROCARDIOGRAM REPORT: CPT

## 2018-01-31 PROCEDURE — 99233 SBSQ HOSP IP/OBS HIGH 50: CPT | Mod: GC

## 2018-01-31 PROCEDURE — 99291 CRITICAL CARE FIRST HOUR: CPT

## 2018-01-31 RX ORDER — SODIUM CHLORIDE 9 MG/ML
1000 INJECTION INTRAMUSCULAR; INTRAVENOUS; SUBCUTANEOUS
Qty: 0 | Refills: 0 | Status: DISCONTINUED | OUTPATIENT
Start: 2018-01-31 | End: 2018-02-11

## 2018-01-31 RX ORDER — HYDROMORPHONE HYDROCHLORIDE 2 MG/ML
0.25 INJECTION INTRAMUSCULAR; INTRAVENOUS; SUBCUTANEOUS ONCE
Qty: 0 | Refills: 0 | Status: DISCONTINUED | OUTPATIENT
Start: 2018-01-31 | End: 2018-01-31

## 2018-01-31 RX ORDER — ALBUMIN HUMAN 25 %
250 VIAL (ML) INTRAVENOUS ONCE
Qty: 0 | Refills: 0 | Status: COMPLETED | OUTPATIENT
Start: 2018-01-31 | End: 2018-01-31

## 2018-01-31 RX ORDER — OXYCODONE AND ACETAMINOPHEN 5; 325 MG/1; MG/1
1 TABLET ORAL EVERY 4 HOURS
Qty: 0 | Refills: 0 | Status: DISCONTINUED | OUTPATIENT
Start: 2018-01-31 | End: 2018-02-01

## 2018-01-31 RX ORDER — CEFEPIME 1 G/1
2000 INJECTION, POWDER, FOR SOLUTION INTRAMUSCULAR; INTRAVENOUS EVERY 12 HOURS
Qty: 0 | Refills: 0 | Status: DISCONTINUED | OUTPATIENT
Start: 2018-01-31 | End: 2018-02-02

## 2018-01-31 RX ORDER — PANTOPRAZOLE SODIUM 20 MG/1
40 TABLET, DELAYED RELEASE ORAL
Qty: 0 | Refills: 0 | Status: DISCONTINUED | OUTPATIENT
Start: 2018-01-31 | End: 2018-01-31

## 2018-01-31 RX ORDER — CALCIUM GLUCONATE 100 MG/ML
1 VIAL (ML) INTRAVENOUS ONCE
Qty: 0 | Refills: 0 | Status: COMPLETED | OUTPATIENT
Start: 2018-01-31 | End: 2018-01-31

## 2018-01-31 RX ORDER — MAGNESIUM SULFATE 500 MG/ML
1 VIAL (ML) INJECTION ONCE
Qty: 0 | Refills: 0 | Status: COMPLETED | OUTPATIENT
Start: 2018-01-31 | End: 2018-01-31

## 2018-01-31 RX ORDER — PANTOPRAZOLE SODIUM 20 MG/1
40 TABLET, DELAYED RELEASE ORAL
Qty: 0 | Refills: 0 | Status: DISCONTINUED | OUTPATIENT
Start: 2018-01-31 | End: 2018-02-01

## 2018-01-31 RX ORDER — OXYCODONE AND ACETAMINOPHEN 5; 325 MG/1; MG/1
2 TABLET ORAL EVERY 6 HOURS
Qty: 0 | Refills: 0 | Status: DISCONTINUED | OUTPATIENT
Start: 2018-01-31 | End: 2018-02-01

## 2018-01-31 RX ORDER — DEXTROSE 50 % IN WATER 50 %
25 SYRINGE (ML) INTRAVENOUS ONCE
Qty: 0 | Refills: 0 | Status: COMPLETED | OUTPATIENT
Start: 2018-01-31 | End: 2018-01-31

## 2018-01-31 RX ORDER — HEPARIN SODIUM 5000 [USP'U]/ML
5000 INJECTION INTRAVENOUS; SUBCUTANEOUS EVERY 8 HOURS
Qty: 0 | Refills: 0 | Status: DISCONTINUED | OUTPATIENT
Start: 2018-01-31 | End: 2018-02-05

## 2018-01-31 RX ADMIN — OXYCODONE AND ACETAMINOPHEN 1 TABLET(S): 5; 325 TABLET ORAL at 10:52

## 2018-01-31 RX ADMIN — OXYCODONE AND ACETAMINOPHEN 1 TABLET(S): 5; 325 TABLET ORAL at 10:22

## 2018-01-31 RX ADMIN — Medication 100 GRAM(S): at 09:17

## 2018-01-31 RX ADMIN — OXYCODONE AND ACETAMINOPHEN 2 TABLET(S): 5; 325 TABLET ORAL at 14:29

## 2018-01-31 RX ADMIN — HYDROMORPHONE HYDROCHLORIDE 0.25 MILLIGRAM(S): 2 INJECTION INTRAMUSCULAR; INTRAVENOUS; SUBCUTANEOUS at 02:30

## 2018-01-31 RX ADMIN — HEPARIN SODIUM 5000 UNIT(S): 5000 INJECTION INTRAVENOUS; SUBCUTANEOUS at 13:58

## 2018-01-31 RX ADMIN — Medication 500 MILLILITER(S): at 08:45

## 2018-01-31 RX ADMIN — Medication 250 MILLILITER(S): at 22:45

## 2018-01-31 RX ADMIN — OXYCODONE AND ACETAMINOPHEN 2 TABLET(S): 5; 325 TABLET ORAL at 15:00

## 2018-01-31 RX ADMIN — Medication 50 MILLIEQUIVALENT(S): at 03:00

## 2018-01-31 RX ADMIN — Medication 25 MILLILITER(S): at 08:45

## 2018-01-31 RX ADMIN — HEPARIN SODIUM 5000 UNIT(S): 5000 INJECTION INTRAVENOUS; SUBCUTANEOUS at 22:05

## 2018-01-31 RX ADMIN — Medication 200 GRAM(S): at 03:46

## 2018-01-31 RX ADMIN — CEFEPIME 100 MILLIGRAM(S): 1 INJECTION, POWDER, FOR SOLUTION INTRAMUSCULAR; INTRAVENOUS at 17:32

## 2018-01-31 RX ADMIN — Medication 6.79 MICROGRAM(S)/KG/MIN: at 13:52

## 2018-01-31 RX ADMIN — Medication 500 MILLILITER(S): at 01:31

## 2018-01-31 RX ADMIN — PANTOPRAZOLE SODIUM 40 MILLIGRAM(S): 20 TABLET, DELAYED RELEASE ORAL at 13:58

## 2018-01-31 RX ADMIN — Medication 110 MILLIGRAM(S): at 18:14

## 2018-01-31 RX ADMIN — CEFEPIME 100 MILLIGRAM(S): 1 INJECTION, POWDER, FOR SOLUTION INTRAMUSCULAR; INTRAVENOUS at 05:54

## 2018-01-31 RX ADMIN — INSULIN HUMAN 2 UNIT(S)/HR: 100 INJECTION, SOLUTION SUBCUTANEOUS at 13:51

## 2018-01-31 RX ADMIN — Medication 100 MILLIGRAM(S): at 13:58

## 2018-01-31 RX ADMIN — Medication 15 MICROGRAM(S)/KG/MIN: at 13:52

## 2018-01-31 RX ADMIN — HYDROMORPHONE HYDROCHLORIDE 0.25 MILLIGRAM(S): 2 INJECTION INTRAMUSCULAR; INTRAVENOUS; SUBCUTANEOUS at 02:15

## 2018-01-31 RX ADMIN — Medication 325 MILLIGRAM(S): at 13:58

## 2018-01-31 RX ADMIN — Medication 250 MILLIGRAM(S): at 16:17

## 2018-01-31 RX ADMIN — Medication 110 MILLIGRAM(S): at 05:04

## 2018-01-31 RX ADMIN — VASOPRESSIN 6 UNIT(S)/MIN: 20 INJECTION INTRAVENOUS at 13:52

## 2018-01-31 NOTE — PROGRESS NOTE ADULT - PROBLEM SELECTOR PLAN 3
s/p Cath 1/12 mLAD with no intervention  --Continue ASA, Lipitor  --BB once stable off inotropes/vasopressors

## 2018-01-31 NOTE — PROGRESS NOTE ADULT - ASSESSMENT
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 He is now s/p AVR (tissue) POD 1.  --Remains intubated, on Epi, , Levo, Vaso support for BP, being volume resuscitated, lactate down trending, initially post-op with increased chest tube output rec'q Cyro x2, FFP x2, Feiba and PRBC x2, right pleural effusion s/p pigtail placement with 1.6L removed, woke up followed simple commands      Patient care discussed on morning interdisciplinary rounds with CTS/ICU team.   Contact: [phone]

## 2018-01-31 NOTE — PROGRESS NOTE ADULT - SUBJECTIVE AND OBJECTIVE BOX
Operation: AVR (tissue) POD #1    Narrative: 72y.o. male, intubated, remains on Epi, , Levo, Vaso support for BP, being volume resuscitated, lactate down trending, initially post-op with increased chest tube output rec'q Cyro x2, FFP x2, Feiba and PRBC x2, right pleural effusion s/p pigtail placement with 1.6L removed, woke up followed simple commands    Vital Signs Last 24 Hrs  T(C): 36 (2018 23:00), Max: 36.5 (2018 19:00)  T(F): 96.8 (2018 23:00), Max: 97.7 (2018 19:00)  HR: 107 (2018 00:00) (99 - 144)  BP: 106/57 (2018 05:00) (106/52 - 110/54)  BP(mean): 77 (2018 05:00) (75 - 77)  RR: 18 (2018 00:00) (11 - 26)  SpO2: 100% (2018 00:00) (94% - 100%)  CVP: 9  ScvO2:74%       @ 07: @ 07:00  --------------------------------------------------------  IN:    phenylephrine   Infusion: 35 mL    Solution: 150 mL  Total IN: 185 mL    OUT:    Voided: 1175 mL  Total OUT: 1175 mL    Total NET: -990 mL       @ 07: @ 00:34  --------------------------------------------------------  IN:    Albumin 5%  - 250 mL: 1500 mL    Cryoprecipitate: 150 mL    DOBUTamine Infusion: 152.1 mL    EPINEPHrine Infusion: 109.5 mL    FEIBA VH: 20 mL    FFP (Fresh Frozen Plasma): 250 mL    insulin Infusion: 28.5 mL    norepinephrine Infusion: 207.7 mL    Packed Red Blood Cells: 550 mL    sodium chloride 0.45%.: 90 mL    Solution: 850 mL    vasopressin Infusion: 54 mL  Total IN: 3961.8 mL    OUT:    Chest Tube: 600 mL    Chest Tube: 30 mL    Chest Tube: 1810 mL    Indwelling Catheter - Urethral: 1145 mL  Total OUT: 3585 mL    Total NET: 376.8 mL    LABS:                        9.8    13.9  )-----------( 184      ( 2018 16:08 )             28.2         145  |  104  |  29<H>  ----------------------------<  198<H>  4.3   |  22  |  1.63<H>    Ca    7.2<L>      2018 16:08  Phos  3.9       Mg     2.0         TPro  3.6<L>  /  Alb  1.8<L>  /  TBili  0.8  /  DBili  x   /  AST  568<H>  /  ALT  255<H>  /  AlkPhos  42      PT/INR - ( 2018 16:07 )   PT: 16.9 sec;   INR: 1.54 ratio         PTT - ( 2018 16:07 )  PTT:47.2 sec  ABG - ( 2018 23:18 )  pH: 7.42  /  pCO2: 36    /  pO2: 187   / HCO3: 23    / Base Excess: -.8   /  SaO2: 100         MEDICATIONS  (STANDING):  aspirin enteric coated 325 milliGRAM(s) Oral daily  cefepime  IVPB 2000 milliGRAM(s) IV Intermittent every 12 hours  DOBUTamine Infusion 7.5 MICROgram(s)/kG/Min (16.965 mL/Hr) IV Continuous <Continuous>  docusate sodium 100 milliGRAM(s) Oral three times a day  doxycycline IVPB 100 milliGRAM(s) IV Intermittent every 12 hours  doxycycline IVPB      EPINEPHrine     Infusion 0.05 MICROgram(s)/kG/Min (14.138 mL/Hr) IV Continuous <Continuous>  insulin Infusion 2 Unit(s)/Hr (2 mL/Hr) IV Continuous <Continuous>  norepinephrine Infusion 0.106 MICROgram(s)/kG/Min (15 mL/Hr) IV Continuous <Continuous>  pantoprazole  Injectable 40 milliGRAM(s) IV Push daily  potassium chloride  10 mEq/50 mL IVPB 10 milliEquivalent(s) IV Intermittent once  sodium chloride 0.45%. 1000 milliLiter(s) (10 mL/Hr) IV Continuous <Continuous>  vancomycin  IVPB 1000 milliGRAM(s) IV Intermittent every 24 hours  vasopressin Infusion 0.1 Unit(s)/Min (6 mL/Hr) IV Continuous <Continuous>    MEDICATIONS  (PRN):  meperidine     Injectable 25 milliGRAM(s) IV Push once PRN For Shivering      PHYSICAL EXAM:  General: Intubated, RASS -1, follows simple commands  Cardiovascular: S1, S2, RRR. Epicardial wires attached to EPM (VVI 50).   Lungs: Clear to auscultation, diminished at bilateral bases  Abdomen: Soft, Non-distended, non-tender, hypoactive bowel sounds  Extremities: Cool, well perfused, dopplerable distal pulses, no edema    Incision: Sternal dressing clean, dry, intact.   Lines: Right IJ intro, Left radial [place], PIV  Drains: Dimas catheter. Mediastinal and pleural chest tubes with sero-sang drainage, to low wall suction, no air leak.     RADIOLOGY & ADDITIONAL TESTS: AM CXR pending    ADDITIONAL DATA:   Does the patient have a history of CHF? Yes  -Pre-operative EF: 60%    Does the patient currently have heart failure: Yes a/w acute decompensated heart failure 2/2 severe AI; currently on dual inotrope/vasopressor support 2/2 vasodilation    Acute MI during admission or prior to transfer (within 8 weeks)? Yes    Extubation within 24 hours? Remains intubated at this time    Does the patient have a history of kidney disease? No  -Patient's baseline Creatinine: 0.83    Did the patient receive transfusion of blood and/or products? Yes Acute blood loss anemia (in OR: 5PRBC, 2Plts; post-op 2Cyro, 2FFP, Feiba and 2PRBC)    Anne-operative antibiotics discontinued within 48 hours of CTU admission? No; on Cefepime/Vanco/Doxycline for presumed endocarditis

## 2018-01-31 NOTE — PROGRESS NOTE ADULT - PROBLEM SELECTOR PLAN 1
s/p AVR (tissue)  --continue ASA for valve prophylaxis  --Holding beta-blocker (for Afib prophylaxis) given inotrope and vasopressor requirement at this time  --Wean vasopressors for MAP 65-85  --Plan to slowly wean Epi off as tolerated  --Continue to volume resuscitate for CVP goal 10-12, transfuse for symptomatic anemia  --Wean ventilator per protocol, plan for extubation when hemodynamically stable and criteria met  --Monitor chest tube output, management per MD Mary  --Prophylaxis: DVT-Lovenox, venodynes; GI-Protonix; VAP prophylaxis  --Pain management  --Glucose control with insulin infusion  (A1C 9.1)  --Resume appropriate home medications  --Once extubated PT; OOBTC, progressive ambulation

## 2018-01-31 NOTE — PROGRESS NOTE ADULT - PROBLEM SELECTOR PLAN 2
With now stable renal function, normal range serum bicarbonate  S/P chest tubes with significant drainage overnight, over 2 liters output  Otherwise clinically appears euvolemic  Can give additional diuretics if required per CTICU  Monitor weight and UO With now stable renal function, normal range serum bicarbonate  S/P chest tubes with significant drainage overnight, over 2 liters output  Otherwise clinically appears euvolemic  Monitor weight and UO

## 2018-01-31 NOTE — PROGRESS NOTE ADULT - SUBJECTIVE AND OBJECTIVE BOX
[facility] DIVISION OF KIDNEY DISEASES AND HYPERTENSION -- FOLLOW UP NOTE  --------------------------------------------------------------------------------  Chief Complaint:  ELIAN    24 hour events/subjective:  patient is s/p surgical AV replacement and CABG per verbal report.  Patient reports some mild chest pain.        PAST HISTORY  --------------------------------------------------------------------------------  No significant changes to PMH, PSH, FHx, SHx, unless otherwise noted    ALLERGIES & MEDICATIONS  --------------------------------------------------------------------------------  Allergies    No Known Allergies    Intolerances      Standing Inpatient Medications  aspirin enteric coated 325 milliGRAM(s) Oral daily  cefepime  IVPB 2000 milliGRAM(s) IV Intermittent every 12 hours  dextrose 50% Injectable 50 milliLiter(s) IV Push every 15 minutes  dextrose 50% Injectable 25 milliLiter(s) IV Push every 15 minutes  DOBUTamine Infusion 7.5 MICROgram(s)/kG/Min IV Continuous <Continuous>  docusate sodium 100 milliGRAM(s) Oral three times a day  doxycycline IVPB 100 milliGRAM(s) IV Intermittent every 12 hours  doxycycline IVPB      insulin Infusion 2 Unit(s)/Hr IV Continuous <Continuous>  norepinephrine Infusion 0.106 MICROgram(s)/kG/Min IV Continuous <Continuous>  pantoprazole  Injectable 40 milliGRAM(s) IV Push daily  sodium chloride 0.9%. 1000 milliLiter(s) IV Continuous <Continuous>  vancomycin  IVPB 1000 milliGRAM(s) IV Intermittent every 24 hours  vasopressin Infusion 0.1 Unit(s)/Min IV Continuous <Continuous>    PRN Inpatient Medications      REVIEW OF SYSTEMS  --------------------------------------------------------------------------------  Gen: No fevers/chills  Skin: No rashes  Head/Eyes/Ears/Mouth: No headache  Respiratory: Difficulty with deep breaths due to chest pain  CV: No chest pain  GI: No abdominal pain  : No dysuria  MSK: No edema  Neuro: No dizziness/lightheadedness    VITALS/PHYSICAL EXAM  --------------------------------------------------------------------------------  T(C): 36.5 (01-31-18 @ 07:00), Max: 36.5 (01-30-18 @ 19:00)  HR: 101 (01-31-18 @ 08:00) (99 - 117)  BP: --  RR: 14 (01-31-18 @ 08:00) (11 - 27)  SpO2: 100% (01-31-18 @ 08:00) (95% - 100%)  Wt(kg): --        01-30-18 @ 07:01  -  01-31-18 @ 07:00  --------------------------------------------------------  IN: 4883.2 mL / OUT: 4160 mL / NET: 723.2 mL    01-31-18 @ 07:01 - 01-31-18 @ 08:15  --------------------------------------------------------  IN: 40.3 mL / OUT: 150 mL / NET: -109.7 mL      Physical Exam:  	Gen: NAD  	HEENT: MMM, on oxygen mask  	Pulm: CTA B/L, no rales at bases  	CV: RRR, S1S2; no rub  	Abd: +BS, soft, nontender/nondistended  	: No suprapubic tenderness  	UE:  no edema  	LE:  minimal edema  	Neuro: No focal deficits  	Psych: Normal affect and mood  	Skin: Warm  	Vascular access:  No dialysis access    LABS/STUDIES  --------------------------------------------------------------------------------              9.9    10.1  >-----------<  119      [01-31-18 @ 01:22]              27.1     141  |  112  |  31  ----------------------------<  135      [01-31-18 @ 01:22]  4.1   |  23  |  1.79        Ca     8.4     [01-31-18 @ 01:22]    TPro  5.0  /  Alb  3.3  /  TBili  2.1  /  DBili  x   /  AST  556  /  ALT  178  /  AlkPhos  43  [01-31-18 @ 01:22]    PT/INR: PT 15.4 , INR 1.40       [01-31-18 @ 07:11]  PTT: 72.5       [01-31-18 @ 07:11]    Troponin 0.50      [01-30-18 @ 16:08]        [01-30-18 @ 16:08]    Creatinine Trend:  SCr 1.79 [01-31 @ 01:22]  SCr 1.63 [01-30 @ 16:08]  SCr 1.96 [01-30 @ 02:11]  SCr 1.89 [01-29 @ 04:38]  SCr 1.83 [01-28 @ 04:22]

## 2018-01-31 NOTE — PROGRESS NOTE ADULT - PROBLEM SELECTOR PLAN 2
Culture negative Endocarditis  --Continue abx per ID recs (MD Small): Cefepime, Doxycycline, Vanco  --Consider PICC removal and replacing closer to discharge

## 2018-01-31 NOTE — PROGRESS NOTE ADULT - SUBJECTIVE AND OBJECTIVE BOX
CRITICAL CARE ATTENDING - CTICU    MEDICATIONS  (STANDING):  aspirin enteric coated 325 milliGRAM(s) Oral daily  cefepime  IVPB 2000 milliGRAM(s) IV Intermittent every 12 hours  dextrose 50% Injectable 50 milliLiter(s) IV Push every 15 minutes  dextrose 50% Injectable 25 milliLiter(s) IV Push every 15 minutes  DOBUTamine Infusion 5 MICROgram(s)/kG/Min (11.31 mL/Hr) IV Continuous <Continuous>  docusate sodium 100 milliGRAM(s) Oral three times a day  doxycycline IVPB 100 milliGRAM(s) IV Intermittent every 12 hours  doxycycline IVPB      heparin  Injectable 5000 Unit(s) SubCutaneous every 8 hours  insulin Infusion 2 Unit(s)/Hr (2 mL/Hr) IV Continuous <Continuous>  norepinephrine Infusion 0.106 MICROgram(s)/kG/Min (15 mL/Hr) IV Continuous <Continuous>  pantoprazole    Tablet 40 milliGRAM(s) Oral before breakfast  sodium chloride 0.9%. 1000 milliLiter(s) (10 mL/Hr) IV Continuous <Continuous>  vancomycin  IVPB 1000 milliGRAM(s) IV Intermittent every 24 hours  vasopressin Infusion 0.1 Unit(s)/Min (6 mL/Hr) IV Continuous <Continuous>                                    9.9    10.1  )-----------( 119      ( 2018 01:22 )             27.1           141  |  112<H>  |  31<H>  ----------------------------<  135<H>  4.1   |  23  |  1.79<H>    Ca    8.4      2018 01:22    TPro  5.0<L>  /  Alb  3.3  /  TBili  2.1<H>  /  DBili  x   /  AST  556<H>  /  ALT  178<H>  /  AlkPhos  43        PT/INR - ( 2018 07:11 )   PT: 15.4 sec;   INR: 1.40 ratio         PTT - ( 2018 07:11 )  PTT:72.5 sec    Mode: CPAP with PS  FiO2: 40  PEEP: 5  PS: 5  MAP: 8  PIP: 11      Daily     Daily Weight in k.9 (2018 00:00)       @ 07:01  -   @ 07:00  --------------------------------------------------------  IN: 4883.2 mL / OUT: 4160 mL / NET: 723.2 mL     @ 07:01   @ 11:59  --------------------------------------------------------  IN: 489.6 mL / OUT: 375 mL / NET: 114.6 mL        Critically Ill patient  : [ ] preoperative ,   [ x] post operative    Requires :  [x ] Arterial Line   [x ] Central Line  [ ] PA catheter  [ ] IABP  [ ] ECMO  [ ] LVAD  [x ] Ventilator  [x ] pacemaker [ ] Impella.    Bedside evaluation , monitoring , treatment of hemodynamics , fluids , IVP/ IVCD meds.        Diagnosis:     POD 1 AVR/ CABG X 1 L    Cardiogenic Shock - resolving    respiratory failure     Ventilator Management:  [x ]AC-rest    [x ]CPAP-PS Wean    [ ]Trach Collar     [x ]Extubate    [ ] T-Piece  [ ]peep>5     fluid overload - anasarca    CHF- acute [x ]   chronic [ ]    systolic [x ]   diatolic [ ]          - Echo- EF -  AI   40's         [x ] RV dysfunction          - Cxr-cardiomegally, edema          - Clinical-  [x ]inotropes   [x ]pressors   [ ]diuresis   [ ]IABP   [ ]ECMO   [ ]LVAD   [ ]Respiratory Failure    Hemodynamic lability,instability. Requires IVCD [ x] vasopressors [ x] inotropes  [ ] vasodilator  [x ]IVSS fluid  to maintain MAP, perfusion, C.I.     May require Bipap for chf and work of breathing    Aortic valve endocarditis - severe, acute AI    Pre Op MI    Renal Failure     Temporary pacemaker (TPM) interrogation and setting.     Intra op / post op bleeding - acute blood loss anemia    Hypovolemia    DM - IVCD insulin    h/o ETOH use ? abuse ?  - f/u for withdrawal    Pulmonary Edema    High risk for reintubation                                        -                     Discussed with CT surgeon, Physician's Assistant - Nurse Practitioner- Critical care medicine team.   Dicussed at  AM / PM rounds.   Chart, labs , films reviewed.    Total Time: 60 min.

## 2018-01-31 NOTE — PROGRESS NOTE ADULT - ASSESSMENT
72 year-old male with past medical history of T2DM, DLD, alcohol misuse brought in by family after having been found face down on the floor for approximately 2 days. On treatment for UTI, but now JANES with Aortic Valve lesions. 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 Bartonella, Legionella, Q Fever serology negative; fungal BCX negative. Brucella pending. Unclear cause of NVE, culture gram stain now with GPC--pending. Discussed with microbiology. Aortic valve vegetation, leukocytosis, post operative state.  - Vancomycin 1g q 24 (Rising Cr this AM, check trough before next dose)  - Cefepime 2 g 12   - Doxycyline 100mg q 12  - F/U pending Brucella sero  - F/U OR culture; PCR to valve pending results of valve culture    Baldev Small MD  Pager [phone]  After 5pm and on weekends call [phone]

## 2018-01-31 NOTE — CHART NOTE - NSCHARTNOTEFT_GEN_A_CORE
Pt seen for malnutrition follow-up.   Hospital course noted, Pt admitted for s/p fall down for 2 day related to ETOH abuse. Pt found in Afib with RVR, s/p unsuccessful cardioversion, severe sepsis and NSTEMI. S/p NSTEMI with AV endocarditis, moderate/severe AI and AS. Pt s/p aortic valve replacement (1/30), extubated this morning (1/31).       Source: Patient [x ]    Family [ ]     other [x ]; RN     Diet : No current diet order. Pt extubated this morning. Plan for dysphagia screening prior to diet advancement.     Pt with limited engagement at time of interview. No current diet order, pending dysphagia screen. Unable to review diet education at this time.       Patient reports [x ] constipation; Last BM 1/26          Source for PO intake [ ] Patient [ ] family [ ] chart [ ] staff [ ] other    Admission Weight: 157.4 pounds   Current Weight: 169.5 pounds.   Wt. fluctuations noted throughout admission, likely due to fluid shifts.       Pertinent Medications: MEDICATIONS  (STANDING):  aspirin enteric coated 325 milliGRAM(s) Oral daily  cefepime  IVPB 2000 milliGRAM(s) IV Intermittent every 12 hours  dextrose 50% Injectable 50 milliLiter(s) IV Push every 15 minutes  dextrose 50% Injectable 25 milliLiter(s) IV Push every 15 minutes  DOBUTamine Infusion 7.5 MICROgram(s)/kG/Min (16.965 mL/Hr) IV Continuous <Continuous>  docusate sodium 100 milliGRAM(s) Oral three times a day  doxycycline IVPB 100 milliGRAM(s) IV Intermittent every 12 hours  doxycycline IVPB      insulin Infusion 2 Unit(s)/Hr (2 mL/Hr) IV Continuous <Continuous>  norepinephrine Infusion 0.106 MICROgram(s)/kG/Min (15 mL/Hr) IV Continuous <Continuous>  pantoprazole  Injectable 40 milliGRAM(s) IV Push daily  sodium chloride 0.9%. 1000 milliLiter(s) (10 mL/Hr) IV Continuous <Continuous>  vancomycin  IVPB 1000 milliGRAM(s) IV Intermittent every 24 hours  vasopressin Infusion 0.1 Unit(s)/Min (6 mL/Hr) IV Continuous <Continuous>    MEDICATIONS  (PRN):    Pertinent Labs:  01-31 Na141 mmol/L Glu 135 mg/dL<H> K+ 4.1 mmol/L Cr  1.79 mg/dL<H> BUN 31 mg/dL<H> Phos n/a    Plan of care BG: (1/31) , (1/30) 130-180       Skin: no pressure injuries noted   Edema: 1+ bilateral leg edema     Estimated Needs:   [x ] no change since previous assessment         Previous Nutrition Diagnosis:     [ x] Food & Nutrition Related Knowledge Deficit- unable to assess applicability at this time   [x ] Malnutrition- ongoing         New Nutrition Diagnosis:    [x ] Increased nutrient needs       Related to: Current medical condition      As evidenced by: Pt s/p atrial valve replacement      Interventions:     Recommend    [x ] Change Diet To:  Upon successful dysphagia screening, advance diet to consistent carbohydrate, low sodium     [x ] Nutrition Supplement  Upon successful diet advancement, recommending Glucerna 2x daily     [x ] Other:  1) Recommending further bowel regimen           Monitoring and Evaluation:     [ ] PO intake [x ] Tolerance to diet prescription [x ] weights [x ] follow up per protocol    [x ] other: RD to remain available. Paola Siddiqui, Dietetic Intern [phone] Pt seen for malnutrition follow-up.   Hospital course noted, Pt admitted for s/p fall down for 2 day related to ETOH abuse. Pt found in Afib with RVR, s/p unsuccessful cardioversion, severe sepsis and NSTEMI. S/p NSTEMI with AV endocarditis, moderate/severe AI and AS. Pt s/p aortic valve replacement (1/30), extubated this morning (1/31).       Source: Patient [x ]    Family [ ]     other [x ]; RN     Diet : No current diet order. Pt extubated this morning. Plan for dysphagia screening prior to diet advancement.     Pt with limited engagement at time of interview. No current diet order, pending dysphagia screen. Unable to review diet education at this time.       Patient reports [x ] constipation; Last BM 1/26          Source for PO intake [ ] Patient [ ] family [ ] chart [ ] staff [ ] other    Dosing Weight: 166.2 pounds    Current Weight: 169.5 pounds   Wt. fluctuations noted throughout admission, likely due to fluid shifts.       Pertinent Medications: MEDICATIONS  (STANDING):  aspirin enteric coated 325 milliGRAM(s) Oral daily  cefepime  IVPB 2000 milliGRAM(s) IV Intermittent every 12 hours  dextrose 50% Injectable 50 milliLiter(s) IV Push every 15 minutes  dextrose 50% Injectable 25 milliLiter(s) IV Push every 15 minutes  DOBUTamine Infusion 7.5 MICROgram(s)/kG/Min (16.965 mL/Hr) IV Continuous <Continuous>  docusate sodium 100 milliGRAM(s) Oral three times a day  doxycycline IVPB 100 milliGRAM(s) IV Intermittent every 12 hours  doxycycline IVPB      insulin Infusion 2 Unit(s)/Hr (2 mL/Hr) IV Continuous <Continuous>  norepinephrine Infusion 0.106 MICROgram(s)/kG/Min (15 mL/Hr) IV Continuous <Continuous>  pantoprazole  Injectable 40 milliGRAM(s) IV Push daily  sodium chloride 0.9%. 1000 milliLiter(s) (10 mL/Hr) IV Continuous <Continuous>  vancomycin  IVPB 1000 milliGRAM(s) IV Intermittent every 24 hours  vasopressin Infusion 0.1 Unit(s)/Min (6 mL/Hr) IV Continuous <Continuous>    MEDICATIONS  (PRN):    Pertinent Labs:  01-31 Na141 mmol/L Glu 135 mg/dL<H> K+ 4.1 mmol/L Cr  1.79 mg/dL<H> BUN 31 mg/dL<H> Phos n/a    Plan of care BG: (1/31) , (1/30) 130-180       Skin: no pressure injuries noted   Edema: 1+ bilateral leg edema     Estimated Needs:   [x ] no change since previous assessment         Previous Nutrition Diagnosis:     [ x] Food & Nutrition Related Knowledge Deficit- unable to assess applicability at this time   [x ] Malnutrition- ongoing         New Nutrition Diagnosis:    [x ] Increased nutrient needs       Related to: Current medical condition      As evidenced by: Pt s/p atrial valve replacement      Interventions:     Recommend    [x ] Change Diet To:  Upon successful dysphagia screening, advance diet to consistent carbohydrate, low sodium     [x ] Nutrition Supplement  Upon successful diet advancement, recommending Glucerna 2x daily     [x ] Other:  1) Recommending further bowel regimen           Monitoring and Evaluation:     [ ] PO intake [x ] Tolerance to diet prescription [x ] weights [x ] follow up per protocol    [x ] other: RD to remain available. Paola Siddiqui, Dietetic Intern [phone] Pt seen for malnutrition follow-up.   Hospital course noted, Pt admitted for s/p fall down for 2 day related to ETOH abuse. Pt found in Afib with RVR, s/p unsuccessful cardioversion, severe sepsis and NSTEMI. S/p NSTEMI with AV endocarditis, moderate/severe AI and AS. Pt s/p aortic valve replacement (1/30), extubated this morning (1/31).       Source: Patient [x ]    Family [ ]     other [x ]; chart review, RN     Diet : No current diet order. Pt extubated this morning. Plan for dysphagia screening prior to diet advancement.     Pt with limited engagement at time of interview. No current diet order, pending dysphagia screen. Unable to review diet education at this time.       Patient reports [x ] constipation; Last BM 1/26          Source for PO intake [ ] Patient [ ] family [ ] chart [ ] staff [ ] other    Dosing Weight: 166.2 pounds    Current Weight: 169.5 pounds   Wt. fluctuations noted throughout admission, likely due to fluid shifts.       Pertinent Medications: MEDICATIONS  (STANDING):  aspirin enteric coated 325 milliGRAM(s) Oral daily  cefepime  IVPB 2000 milliGRAM(s) IV Intermittent every 12 hours  dextrose 50% Injectable 50 milliLiter(s) IV Push every 15 minutes  dextrose 50% Injectable 25 milliLiter(s) IV Push every 15 minutes  DOBUTamine Infusion 7.5 MICROgram(s)/kG/Min (16.965 mL/Hr) IV Continuous <Continuous>  docusate sodium 100 milliGRAM(s) Oral three times a day  doxycycline IVPB 100 milliGRAM(s) IV Intermittent every 12 hours  doxycycline IVPB      insulin Infusion 2 Unit(s)/Hr (2 mL/Hr) IV Continuous <Continuous>  norepinephrine Infusion 0.106 MICROgram(s)/kG/Min (15 mL/Hr) IV Continuous <Continuous>  pantoprazole  Injectable 40 milliGRAM(s) IV Push daily  sodium chloride 0.9%. 1000 milliLiter(s) (10 mL/Hr) IV Continuous <Continuous>  vancomycin  IVPB 1000 milliGRAM(s) IV Intermittent every 24 hours  vasopressin Infusion 0.1 Unit(s)/Min (6 mL/Hr) IV Continuous <Continuous>    MEDICATIONS  (PRN):    Pertinent Labs:  01-31 Na141 mmol/L Glu 135 mg/dL<H> K+ 4.1 mmol/L Cr  1.79 mg/dL<H> BUN 31 mg/dL<H> Phos n/a    Plan of care BG: (1/31) , (1/30) 130-180       Skin: no pressure injuries noted   Edema: 1+ bilateral leg edema     Estimated Needs:   [x ] recalculated protein needs:   1.2-1.4 gm protein/kg of 75.5 kg= 90.6-105.7 gm protein/day        Previous Nutrition Diagnosis:     [ x] Food & Nutrition Related Knowledge Deficit- unable to assess applicability at this time   [x ] Malnutrition- ongoing         New Nutrition Diagnosis:    [x ] Increased nutrient needs       Related to: Increased demand for nutrients to promote post-op healing      As evidenced by: Pt s/p atrial valve replacement via sternotomy      Interventions:     Recommend    [x ] Change Diet To:  Upon successful dysphagia screening, advance diet to consistent carbohydrate, low sodium, as tolerated.     [x ] Nutrition Supplement  Upon successful diet advancement, recommending Glucerna 2x daily     [x ] Other:  1) Consider additional bowel regimen   2) Upon diet advancement, encourage po intake and provide food preferences within diet restrictions as feasible           Monitoring and Evaluation:     [ ] PO intake [x ] Tolerance to diet prescription [x ] weights [x ] follow up per protocol    [x ] other: RD to remain available. Paola Siddiqui, Dietetic Intern [phone] Pt seen for malnutrition follow-up.   Hospital course noted, Pt admitted for s/p fall down for 2 day related to ETOH abuse. Pt found in Afib with RVR, s/p unsuccessful cardioversion, severe sepsis and NSTEMI. S/p NSTEMI with AV endocarditis, moderate/severe AI and AS. Pt s/p aortic valve replacement (1/30), extubated this morning (1/31).       Source: Patient [x ]    Family [ ]     other [x ]; chart review, RN     Diet : No current diet order. Pt extubated this morning. Plan for dysphagia screening prior to diet advancement.     Pt with limited engagement at time of interview. No current diet order, pending dysphagia screen. Unable to review diet education at this time.       Patient reports [x ] constipation; Last BM 1/26          Source for PO intake [ ] Patient [ ] family [ ] chart [ ] staff [ ] other    Dosing Weight: 166.2 pounds    Current Weight: 169.5 pounds   Wt. fluctuations noted throughout admission, likely due to fluid shifts.       Pertinent Medications: MEDICATIONS  (STANDING):  aspirin enteric coated 325 milliGRAM(s) Oral daily  cefepime  IVPB 2000 milliGRAM(s) IV Intermittent every 12 hours  dextrose 50% Injectable 50 milliLiter(s) IV Push every 15 minutes  dextrose 50% Injectable 25 milliLiter(s) IV Push every 15 minutes  DOBUTamine Infusion 7.5 MICROgram(s)/kG/Min (16.965 mL/Hr) IV Continuous <Continuous>  docusate sodium 100 milliGRAM(s) Oral three times a day  doxycycline IVPB 100 milliGRAM(s) IV Intermittent every 12 hours  doxycycline IVPB      insulin Infusion 2 Unit(s)/Hr (2 mL/Hr) IV Continuous <Continuous>  norepinephrine Infusion 0.106 MICROgram(s)/kG/Min (15 mL/Hr) IV Continuous <Continuous>  pantoprazole  Injectable 40 milliGRAM(s) IV Push daily  sodium chloride 0.9%. 1000 milliLiter(s) (10 mL/Hr) IV Continuous <Continuous>  vancomycin  IVPB 1000 milliGRAM(s) IV Intermittent every 24 hours  vasopressin Infusion 0.1 Unit(s)/Min (6 mL/Hr) IV Continuous <Continuous>    MEDICATIONS  (PRN):    Pertinent Labs:  01-31 Na141 mmol/L Glu 135 mg/dL<H> K+ 4.1 mmol/L Cr  1.79 mg/dL<H> BUN 31 mg/dL<H> Phos n/a    Plan of care BG: (1/31) , (1/30) 130-180       Skin: no pressure injuries noted   Edema: 1+ bilateral leg edema     Estimated Needs:   [x ] recalculated protein needs:   1.2-1.4 gm protein/kg of 75.5 kg= 90.6-105.7 gm protein/day        Previous Nutrition Diagnosis:     [ x] Food & Nutrition Related Knowledge Deficit- unable to assess applicability at this time   [x ] Malnutrition- ongoing         New Nutrition Diagnosis:    [x ] Increased nutrient needs       Related to: Increased demand for nutrients to promote post-op healing      As evidenced by: Pt s/p atrial valve replacement via sternotomy      Interventions:     Recommend    [x ] Change Diet To:  Upon successful dysphagia screening, advance diet to consistent carbohydrate, low sodium, as tolerated.     [x ] Nutrition Supplement  Upon successful diet advancement, recommending Glucerna 2x daily     [x ] Other:  1) Consider additional bowel regimen   2) Upon diet advancement, encourage po intake and provide food preferences within diet restrictions as feasible  3) Will follow up with diet education as appropriate              Monitoring and Evaluation:     [ ] PO intake [x ] Tolerance to diet prescription [x ] weights [x ] follow up per protocol    [x ] other: RD to remain available. Paola Siddiqui, Dietetic Intern [phone]

## 2018-01-31 NOTE — AIRWAY REMOVAL NOTE  ADULT & PEDS - ARTIFICAL AIRWAY REMOVAL COMMENTS
Written order for extubation verified. The patient was identified by full name and birth date compared to the identification band. Present during the procedure was Tom Leung RN.

## 2018-01-31 NOTE — PROGRESS NOTE ADULT - SUBJECTIVE AND OBJECTIVE BOX
CC: F/U for NVE    Saw/spoke to patient. Patient extubated. Fatigued. On pressors.    Allergies  No Known Allergies    ANTIMICROBIALS:  cefepime  IVPB 2000 every 12 hours  doxycycline IVPB 100 every 12 hours  doxycycline IVPB    vancomycin  IVPB 1000 every 24 hours    PE:    Vital Signs Last 24 Hrs  T(C): 36.5 (31 Jan 2018 07:00), Max: 36.5 (30 Jan 2018 19:00)  T(F): 97.7 (31 Jan 2018 07:00), Max: 97.7 (30 Jan 2018 19:00)  HR: 96 (31 Jan 2018 10:30) (96 - 117)  BP: --  BP(mean): --  RR: 16 (31 Jan 2018 10:30) (11 - 27)  SpO2: 100% (31 Jan 2018 10:30) (95% - 100%)    Gen: AOx1, NAD, fatigued  CV: S1+S2 normal, no murmurs, nontachycardic, midsternal incision, bandaged, pericardial drains in place  Resp: Clear bilat, no resp distress, no crackles/wheezes, extubated  Abd: Soft, nontender, +BS  Ext: No LE edema, no wounds    LABS:                        9.9    10.1  )-----------( 119      ( 31 Jan 2018 01:22 )             27.1     01-31    141  |  112<H>  |  31<H>  ----------------------------<  135<H>  4.1   |  23  |  1.79<H>    Ca    8.4      31 Jan 2018 01:22    TPro  5.0<L>  /  Alb  3.3  /  TBili  2.1<H>  /  DBili  x   /  AST  556<H>  /  ALT  178<H>  /  AlkPhos  43  01-31    MICROBIOLOGY:    .Tissue aoritc valve leaflet  01-31-18   Testing in progress  --    Rare polymorphonuclear leukocytes seen per low power field  Moderate Gram Positive Cocci in Pairs and Chains seen per oil power field    .Blood Blood-Venous  01-13-18   No growth at 5 days.     .Blood Blood-Peripheral  01-13-18   No growth at 5 days.      .Blood Blood-Venous  01-09-18   No growth at 5 days.     .Blood Blood-Venous  01-09-18   No growth at 5 days.      .Urine Clean Catch (Midstream)  01-09-18   No growth     RADIOLOGY:    1/31 CXR:    FINDINGS:  Chest radiograph 1/30/2018 at 3:23 PM:  Median sternotomy and aortic valve replacement.  Endotracheal tube with tip within the mid trachea. Right IJ central line   with tip overlying the SVC. Left-sided PICC with tip in the SVC. Thoracic   drains in place. Left-sided chest tube.  There is a large right pleural effusion  Small left pleural effusion.   The cardiomediastinal silhouette is not well evaluated on this single AP   view.  Degenerative changes of the spine.    Chest radiograph 1/30/2018 at 5:48 PM:  Interval placement of a right pigtail chest tube with reduction of most   of the pleural fluid.  Additional unchanged lines and tubes as above.  Persistent left pleural effusion.    Chest radiograph 1/31/2018 at 4:00 AM:  Unchanged lines and tubes above.

## 2018-01-31 NOTE — PROGRESS NOTE ADULT - ASSESSMENT
Patient is a 71 y/o man with history of ETOH abuse who presents with fall and found with endocarditis, developed ELIAN in-hospital shortly after undergoing cardiac catheterization. Patient is a 71 y/o man with history of ETOH abuse who presents with fall and found with endocarditis, developed ELIAN in-hospital shortly after undergoing cardiac catheterization, s/p urgent AVR and CABG on 1/30/18

## 2018-01-31 NOTE — PROGRESS NOTE ADULT - PROBLEM SELECTOR PLAN 1
Likely contrast induced nephropathy from cardiac cath done on 1/12. Creatinine first elevated in AM 1/14. Possible pre-renal contribution + vancomycin. Possible ATN may take some time to recover.   Patient Scr remains stable, adequate urine output over last 24 hours  Monitor UOP, creatinine trend.    AVOID NSAIDs, ACE/ARBS, and nephrotoxins Likely contrast induced nephropathy from cardiac cath done on 1/12. Creatinine first elevated in AM 1/14. Possible pre-renal contribution + vancomycin + endocarditis related. Possible ATN.   Patient Scr remains stable after CABG and AVR performed yesterday, adequate urine output over last 24 hours  Monitor UOP, creatinine trend.    AVOID NSAIDs, ACE/ARBS, and nephrotoxins

## 2018-01-31 NOTE — PROGRESS NOTE ADULT - SUBJECTIVE AND OBJECTIVE BOX
SAMANTHA CADENA  MRN#:  [id number]    The patient is a 72y Male with PMH of  T2DM, alcohol abuse, brought in by family after having been found face down on the floor for approximately 2 days, found to have culture negative endocarditis and is now s/p AVR (T)/C1V 1/30 who was seen, evaluated, & examined with the CTICU staff on rounds, overnight and during the early morning hours with a multidisciplinary care plan formulated & implemented.  All available clinical, laboratory, radiographic, pharmacologic, and electrocardiographic data were reviewed & analyzed.      The patient was in the CTICU in critical condition secondary to persistent cardiopulmonary dysfunction, hemodynamically significant anemia/hypovolemia-shock, persistent cardiovascular dysfunction, acidosis, & hyperglycemia.      Respiratory status required full ventilatory support with subsequent wean to extubation, the following of ABG’s with A-line monitoring, continuous pulse oximetry monitoring, and endotracheal suctioning for support & to evaluate for & prevent further decompensation secondary to persistent cardiopulmonary dysfunction.     Invasive hemodynamic monitoring with a central venous and arterial catheter were required for the following of continuous central venous and MAP/BP monitoring to ensure adequate cardiovascular support and to evaluate for & help prevent decompensation while receiving intermittent volume expansion, blood transfusions, an IV Levophed drip, an IV Vasopressin drip, an IV Epinephrine drip, and an IV Dobutamine drip secondary to hemodynamically significant anemia/hypovolemia-shock, cardiovascular dysfunction, and acute postoperative blood loss anemia.    Metabolic stability, uncontrolled type 2 diabetes-hyperglycemia, & infection prophylaxis required an IV regular Insulin drip & the following of serial glucose levels to help achieve & maintain euglycemia.      He is on treatment with IV Cefepime, Doxycycline and Vancomycin for culture negative endocarditis.    Patient required more than the usual postoperative critical care management and I provided 45 minutes of non-continuous care to the patient.  Discussed at length with the CTICU staff and helped coordinate care.

## 2018-02-01 LAB
ALBUMIN SERPL ELPH-MCNC: 2.9 G/DL — LOW (ref 3.3–5)
ALBUMIN SERPL ELPH-MCNC: 3.2 G/DL — LOW (ref 3.3–5)
ALBUMIN SERPL ELPH-MCNC: 3.2 G/DL — LOW (ref 3.3–5)
ALP SERPL-CCNC: 58 U/L — SIGNIFICANT CHANGE UP (ref 40–120)
ALP SERPL-CCNC: 62 U/L — SIGNIFICANT CHANGE UP (ref 40–120)
ALP SERPL-CCNC: 62 U/L — SIGNIFICANT CHANGE UP (ref 40–120)
ALT FLD-CCNC: 210 U/L RC — HIGH (ref 10–45)
ALT FLD-CCNC: 281 U/L RC — HIGH (ref 10–45)
ALT FLD-CCNC: 405 U/L RC — HIGH (ref 10–45)
AMYLASE P1 CFR SERPL: 41 U/L — SIGNIFICANT CHANGE UP (ref 25–125)
ANION GAP SERPL CALC-SCNC: 19 MMOL/L — HIGH (ref 5–17)
APTT BLD: 54.7 SEC — HIGH (ref 27.5–37.4)
APTT BLD: 60.7 SEC — HIGH (ref 27.5–37.4)
APTT BLD: 64.1 SEC — HIGH (ref 27.5–37.4)
AST SERPL-CCNC: 312 U/L — HIGH (ref 10–40)
AST SERPL-CCNC: 440 U/L — HIGH (ref 10–40)
AST SERPL-CCNC: 710 U/L — HIGH (ref 10–40)
BASE EXCESS BLDMV CALC-SCNC: -1.5 MMOL/L — SIGNIFICANT CHANGE UP (ref -3–3)
BASE EXCESS BLDMV CALC-SCNC: -1.8 MMOL/L — SIGNIFICANT CHANGE UP (ref -3–3)
BASE EXCESS BLDMV CALC-SCNC: -1.9 MMOL/L — SIGNIFICANT CHANGE UP (ref -3–3)
BASE EXCESS BLDMV CALC-SCNC: -2.3 MMOL/L — SIGNIFICANT CHANGE UP (ref -3–3)
BASE EXCESS BLDV CALC-SCNC: -1.9 MMOL/L — SIGNIFICANT CHANGE UP (ref -2–2)
BASE EXCESS BLDV CALC-SCNC: -9.8 MMOL/L — LOW (ref -2–2)
BASE EXCESS BLDV CALC-SCNC: -9.9 MMOL/L — LOW (ref -2–2)
BASOPHILS # BLD AUTO: 0 K/UL — SIGNIFICANT CHANGE UP (ref 0–0.2)
BILIRUB SERPL-MCNC: 2.4 MG/DL — HIGH (ref 0.2–1.2)
BILIRUB SERPL-MCNC: 3.6 MG/DL — HIGH (ref 0.2–1.2)
BILIRUB SERPL-MCNC: 3.6 MG/DL — HIGH (ref 0.2–1.2)
BUN SERPL-MCNC: 36 MG/DL — HIGH (ref 7–23)
BUN SERPL-MCNC: 39 MG/DL — HIGH (ref 7–23)
BUN SERPL-MCNC: 42 MG/DL — HIGH (ref 7–23)
C DIFF GDH STL QL: NEGATIVE — SIGNIFICANT CHANGE UP
C DIFF GDH STL QL: SIGNIFICANT CHANGE UP
CALCIUM SERPL-MCNC: 7.6 MG/DL — LOW (ref 8.4–10.5)
CALCIUM SERPL-MCNC: 7.9 MG/DL — LOW (ref 8.4–10.5)
CALCIUM SERPL-MCNC: 8.3 MG/DL — LOW (ref 8.4–10.5)
CHLORIDE SERPL-SCNC: 103 MMOL/L — SIGNIFICANT CHANGE UP (ref 96–108)
CHLORIDE SERPL-SCNC: 103 MMOL/L — SIGNIFICANT CHANGE UP (ref 96–108)
CHLORIDE SERPL-SCNC: 104 MMOL/L — SIGNIFICANT CHANGE UP (ref 96–108)
CK MB BLD-MCNC: 4.9 % — HIGH (ref 0–3.5)
CK MB CFR SERPL CALC: 7.7 NG/ML — HIGH (ref 0–6.7)
CK SERPL-CCNC: 158 U/L — SIGNIFICANT CHANGE UP (ref 30–200)
CO2 BLDMV-SCNC: 24 MMOL/L — SIGNIFICANT CHANGE UP (ref 21–29)
CO2 BLDV-SCNC: 17 MMOL/L — LOW (ref 22–30)
CO2 BLDV-SCNC: 18 MMOL/L — LOW (ref 22–30)
CO2 BLDV-SCNC: 24 MMOL/L — SIGNIFICANT CHANGE UP (ref 22–30)
CO2 SERPL-SCNC: 16 MMOL/L — LOW (ref 22–31)
CO2 SERPL-SCNC: 19 MMOL/L — LOW (ref 22–31)
CO2 SERPL-SCNC: 21 MMOL/L — LOW (ref 22–31)
CREAT SERPL-MCNC: 1.86 MG/DL — HIGH (ref 0.5–1.3)
CREAT SERPL-MCNC: 2.05 MG/DL — HIGH (ref 0.5–1.3)
CREAT SERPL-MCNC: 2.1 MG/DL — HIGH (ref 0.5–1.3)
EOSINOPHIL # BLD AUTO: 0 K/UL — SIGNIFICANT CHANGE UP (ref 0–0.5)
GAS PNL BLDA: SIGNIFICANT CHANGE UP
GAS PNL BLDMV: SIGNIFICANT CHANGE UP
GLUCOSE BLDC GLUCOMTR-MCNC: 156 MG/DL — HIGH (ref 70–99)
GLUCOSE BLDC GLUCOMTR-MCNC: 90 MG/DL — SIGNIFICANT CHANGE UP (ref 70–99)
GLUCOSE BLDC GLUCOMTR-MCNC: 99 MG/DL — SIGNIFICANT CHANGE UP (ref 70–99)
GLUCOSE SERPL-MCNC: 160 MG/DL — HIGH (ref 70–99)
GLUCOSE SERPL-MCNC: 218 MG/DL — HIGH (ref 70–99)
GLUCOSE SERPL-MCNC: 277 MG/DL — HIGH (ref 70–99)
HCO3 BLDMV-SCNC: 23 MMOL/L — SIGNIFICANT CHANGE UP (ref 20–28)
HCO3 BLDV-SCNC: 16 MMOL/L — LOW (ref 21–29)
HCO3 BLDV-SCNC: 16 MMOL/L — LOW (ref 21–29)
HCO3 BLDV-SCNC: 23 MMOL/L — SIGNIFICANT CHANGE UP (ref 21–29)
HCT VFR BLD CALC: 22.8 % — LOW (ref 39–50)
HCT VFR BLD CALC: 24.9 % — LOW (ref 39–50)
HCT VFR BLD CALC: 25.4 % — LOW (ref 39–50)
HGB BLD-MCNC: 8.2 G/DL — LOW (ref 13–17)
HGB BLD-MCNC: 8.8 G/DL — LOW (ref 13–17)
HGB BLD-MCNC: 9.2 G/DL — LOW (ref 13–17)
HOROWITZ INDEX BLDMV+IHG-RTO: 50 — SIGNIFICANT CHANGE UP
HOROWITZ INDEX BLDV+IHG-RTO: 100 — SIGNIFICANT CHANGE UP
HOROWITZ INDEX BLDV+IHG-RTO: 50 — SIGNIFICANT CHANGE UP
HOROWITZ INDEX BLDV+IHG-RTO: 50 — SIGNIFICANT CHANGE UP
INR BLD: 1.38 RATIO — HIGH (ref 0.88–1.16)
INR BLD: 1.39 RATIO — HIGH (ref 0.88–1.16)
INR BLD: 1.43 RATIO — HIGH (ref 0.88–1.16)
LIDOCAIN IGE QN: 22 U/L — SIGNIFICANT CHANGE UP (ref 7–60)
LYMPHOCYTES # BLD AUTO: 0.4 K/UL — LOW (ref 1–3.3)
LYMPHOCYTES # BLD AUTO: 3 % — LOW (ref 13–44)
MAGNESIUM SERPL-MCNC: 3.1 MG/DL — HIGH (ref 1.6–2.6)
MCHC RBC-ENTMCNC: 32.5 PG — SIGNIFICANT CHANGE UP (ref 27–34)
MCHC RBC-ENTMCNC: 32.8 PG — SIGNIFICANT CHANGE UP (ref 27–34)
MCHC RBC-ENTMCNC: 33.2 PG — SIGNIFICANT CHANGE UP (ref 27–34)
MCHC RBC-ENTMCNC: 35.2 GM/DL — SIGNIFICANT CHANGE UP (ref 32–36)
MCHC RBC-ENTMCNC: 36.1 GM/DL — HIGH (ref 32–36)
MCHC RBC-ENTMCNC: 36.4 GM/DL — HIGH (ref 32–36)
MCV RBC AUTO: 90.9 FL — SIGNIFICANT CHANGE UP (ref 80–100)
MCV RBC AUTO: 91.1 FL — SIGNIFICANT CHANGE UP (ref 80–100)
MCV RBC AUTO: 92.2 FL — SIGNIFICANT CHANGE UP (ref 80–100)
MONOCYTES # BLD AUTO: 0.6 K/UL — SIGNIFICANT CHANGE UP (ref 0–0.9)
MONOCYTES NFR BLD AUTO: 5 % — SIGNIFICANT CHANGE UP (ref 2–14)
NEUTROPHILS # BLD AUTO: 11.6 K/UL — HIGH (ref 1.8–7.4)
NEUTROPHILS NFR BLD AUTO: 86 % — HIGH (ref 43–77)
NT-PROBNP SERPL-SCNC: HIGH PG/ML (ref 0–300)
O2 CT VFR BLD CALC: 31 MMHG — SIGNIFICANT CHANGE UP (ref 30–65)
O2 CT VFR BLD CALC: 33 MMHG — SIGNIFICANT CHANGE UP (ref 30–65)
O2 CT VFR BLD CALC: 35 MMHG — SIGNIFICANT CHANGE UP (ref 30–65)
O2 CT VFR BLD CALC: 40 MMHG — SIGNIFICANT CHANGE UP (ref 30–65)
PCO2 BLDMV: 42 MMHG — SIGNIFICANT CHANGE UP (ref 30–65)
PCO2 BLDMV: 43 MMHG — SIGNIFICANT CHANGE UP (ref 30–65)
PCO2 BLDMV: 43 MMHG — SIGNIFICANT CHANGE UP (ref 30–65)
PCO2 BLDMV: 45 MMHG — SIGNIFICANT CHANGE UP (ref 30–65)
PCO2 BLDV: 37 MMHG — SIGNIFICANT CHANGE UP (ref 35–50)
PCO2 BLDV: 40 MMHG — SIGNIFICANT CHANGE UP (ref 35–50)
PCO2 BLDV: 43 MMHG — SIGNIFICANT CHANGE UP (ref 35–50)
PH BLDMV: 7.33 — SIGNIFICANT CHANGE UP (ref 7.32–7.45)
PH BLDMV: 7.35 — SIGNIFICANT CHANGE UP (ref 7.32–7.45)
PH BLDMV: 7.35 — SIGNIFICANT CHANGE UP (ref 7.32–7.45)
PH BLDMV: 7.36 — SIGNIFICANT CHANGE UP (ref 7.32–7.45)
PH BLDV: 7.23 — LOW (ref 7.35–7.45)
PH BLDV: 7.26 — LOW (ref 7.35–7.45)
PH BLDV: 7.35 — SIGNIFICANT CHANGE UP (ref 7.35–7.45)
PHOSPHATE SERPL-MCNC: 5.8 MG/DL — HIGH (ref 2.5–4.5)
PLATELET # BLD AUTO: 108 K/UL — LOW (ref 150–400)
PLATELET # BLD AUTO: 111 K/UL — LOW (ref 150–400)
PLATELET # BLD AUTO: 90 K/UL — LOW (ref 150–400)
PO2 BLDV: 32 MMHG — SIGNIFICANT CHANGE UP (ref 25–45)
PO2 BLDV: 38 MMHG — SIGNIFICANT CHANGE UP (ref 25–45)
PO2 BLDV: 56 MMHG — HIGH (ref 25–45)
POTASSIUM SERPL-MCNC: 3.9 MMOL/L — SIGNIFICANT CHANGE UP (ref 3.5–5.3)
POTASSIUM SERPL-MCNC: 4 MMOL/L — SIGNIFICANT CHANGE UP (ref 3.5–5.3)
POTASSIUM SERPL-MCNC: 4 MMOL/L — SIGNIFICANT CHANGE UP (ref 3.5–5.3)
POTASSIUM SERPL-SCNC: 3.9 MMOL/L — SIGNIFICANT CHANGE UP (ref 3.5–5.3)
POTASSIUM SERPL-SCNC: 4 MMOL/L — SIGNIFICANT CHANGE UP (ref 3.5–5.3)
POTASSIUM SERPL-SCNC: 4 MMOL/L — SIGNIFICANT CHANGE UP (ref 3.5–5.3)
PROT SERPL-MCNC: 4.8 G/DL — LOW (ref 6–8.3)
PROT SERPL-MCNC: 5.1 G/DL — LOW (ref 6–8.3)
PROT SERPL-MCNC: 5.3 G/DL — LOW (ref 6–8.3)
PROTHROM AB SERPL-ACNC: 15 SEC — HIGH (ref 9.8–12.7)
PROTHROM AB SERPL-ACNC: 15.2 SEC — HIGH (ref 9.8–12.7)
PROTHROM AB SERPL-ACNC: 15.6 SEC — HIGH (ref 9.8–12.7)
RBC # BLD: 2.51 M/UL — LOW (ref 4.2–5.8)
RBC # BLD: 2.7 M/UL — LOW (ref 4.2–5.8)
RBC # BLD: 2.78 M/UL — LOW (ref 4.2–5.8)
RBC # FLD: 14.2 % — SIGNIFICANT CHANGE UP (ref 10.3–14.5)
RBC # FLD: 14.5 % — SIGNIFICANT CHANGE UP (ref 10.3–14.5)
RBC # FLD: 15 % — HIGH (ref 10.3–14.5)
SAO2 % BLDMV: 52 % — LOW (ref 60–90)
SAO2 % BLDMV: 56 % — LOW (ref 60–90)
SAO2 % BLDMV: 61 % — SIGNIFICANT CHANGE UP (ref 60–90)
SAO2 % BLDMV: 68 % — SIGNIFICANT CHANGE UP (ref 60–90)
SAO2 % BLDV: 49 % — LOW (ref 67–88)
SAO2 % BLDV: 64 % — LOW (ref 67–88)
SAO2 % BLDV: 82 % — SIGNIFICANT CHANGE UP (ref 67–88)
SODIUM SERPL-SCNC: 141 MMOL/L — SIGNIFICANT CHANGE UP (ref 135–145)
SODIUM SERPL-SCNC: 141 MMOL/L — SIGNIFICANT CHANGE UP (ref 135–145)
SODIUM SERPL-SCNC: 143 MMOL/L — SIGNIFICANT CHANGE UP (ref 135–145)
T3 SERPL-MCNC: 44 NG/DL — LOW (ref 80–200)
T4 AB SER-ACNC: 3.9 UG/DL — LOW (ref 4.6–12)
TROPONIN T SERPL-MCNC: 0.3 NG/ML — HIGH (ref 0–0.06)
TSH SERPL-MCNC: 11.21 UIU/ML — HIGH (ref 0.27–4.2)
VANCOMYCIN TROUGH SERPL-MCNC: 19.7 UG/ML — SIGNIFICANT CHANGE UP (ref 10–20)
VANCOMYCIN TROUGH SERPL-MCNC: 20.2 UG/ML — HIGH (ref 10–20)
WBC # BLD: 12.7 K/UL — HIGH (ref 3.8–10.5)
WBC # BLD: 13.5 K/UL — HIGH (ref 3.8–10.5)
WBC # BLD: 14.9 K/UL — HIGH (ref 3.8–10.5)
WBC # FLD AUTO: 12.7 K/UL — HIGH (ref 3.8–10.5)
WBC # FLD AUTO: 13.5 K/UL — HIGH (ref 3.8–10.5)
WBC # FLD AUTO: 14.9 K/UL — HIGH (ref 3.8–10.5)

## 2018-02-01 PROCEDURE — 99292 CRITICAL CARE ADDL 30 MIN: CPT

## 2018-02-01 PROCEDURE — 93010 ELECTROCARDIOGRAM REPORT: CPT | Mod: 76

## 2018-02-01 PROCEDURE — 36556 INSERT NON-TUNNEL CV CATH: CPT | Mod: 58

## 2018-02-01 PROCEDURE — 99233 SBSQ HOSP IP/OBS HIGH 50: CPT

## 2018-02-01 PROCEDURE — 93306 TTE W/DOPPLER COMPLETE: CPT | Mod: 26

## 2018-02-01 PROCEDURE — 99233 SBSQ HOSP IP/OBS HIGH 50: CPT | Mod: GC

## 2018-02-01 PROCEDURE — 99291 CRITICAL CARE FIRST HOUR: CPT

## 2018-02-01 PROCEDURE — 71045 X-RAY EXAM CHEST 1 VIEW: CPT | Mod: 26,76

## 2018-02-01 RX ORDER — HYDROCORTISONE 20 MG
100 TABLET ORAL ONCE
Qty: 0 | Refills: 0 | Status: COMPLETED | OUTPATIENT
Start: 2018-02-01 | End: 2018-02-01

## 2018-02-01 RX ORDER — ALBUMIN HUMAN 25 %
250 VIAL (ML) INTRAVENOUS
Qty: 0 | Refills: 0 | Status: COMPLETED | OUTPATIENT
Start: 2018-02-01 | End: 2018-02-01

## 2018-02-01 RX ORDER — POTASSIUM CHLORIDE 20 MEQ
10 PACKET (EA) ORAL ONCE
Qty: 0 | Refills: 0 | Status: COMPLETED | OUTPATIENT
Start: 2018-02-01 | End: 2018-02-01

## 2018-02-01 RX ORDER — AMIODARONE HYDROCHLORIDE 400 MG/1
150 TABLET ORAL ONCE
Qty: 0 | Refills: 0 | Status: COMPLETED | OUTPATIENT
Start: 2018-02-01 | End: 2018-02-01

## 2018-02-01 RX ORDER — LEVOTHYROXINE SODIUM 125 MCG
50 TABLET ORAL
Qty: 0 | Refills: 0 | Status: DISCONTINUED | OUTPATIENT
Start: 2018-02-01 | End: 2018-02-10

## 2018-02-01 RX ORDER — HYDROMORPHONE HYDROCHLORIDE 2 MG/ML
0.5 INJECTION INTRAMUSCULAR; INTRAVENOUS; SUBCUTANEOUS ONCE
Qty: 0 | Refills: 0 | Status: DISCONTINUED | OUTPATIENT
Start: 2018-02-01 | End: 2018-02-01

## 2018-02-01 RX ORDER — DOBUTAMINE HCL 250MG/20ML
2.5 VIAL (ML) INTRAVENOUS
Qty: 500 | Refills: 0 | Status: DISCONTINUED | OUTPATIENT
Start: 2018-02-01 | End: 2018-02-01

## 2018-02-01 RX ORDER — ALBUMIN HUMAN 25 %
250 VIAL (ML) INTRAVENOUS ONCE
Qty: 0 | Refills: 0 | Status: COMPLETED | OUTPATIENT
Start: 2018-02-01 | End: 2018-02-01

## 2018-02-01 RX ORDER — VANCOMYCIN HCL 1 G
500 VIAL (EA) INTRAVENOUS EVERY 6 HOURS
Qty: 0 | Refills: 0 | Status: DISCONTINUED | OUTPATIENT
Start: 2018-02-01 | End: 2018-02-01

## 2018-02-01 RX ORDER — CALCIUM GLUCONATE 100 MG/ML
1 VIAL (ML) INTRAVENOUS ONCE
Qty: 0 | Refills: 0 | Status: COMPLETED | OUTPATIENT
Start: 2018-02-01 | End: 2018-02-01

## 2018-02-01 RX ORDER — FUROSEMIDE 40 MG
2.5 TABLET ORAL
Qty: 500 | Refills: 0 | Status: DISCONTINUED | OUTPATIENT
Start: 2018-02-01 | End: 2018-02-04

## 2018-02-01 RX ORDER — MAGNESIUM SULFATE 500 MG/ML
1 VIAL (ML) INJECTION ONCE
Qty: 0 | Refills: 0 | Status: COMPLETED | OUTPATIENT
Start: 2018-02-01 | End: 2018-02-01

## 2018-02-01 RX ORDER — METRONIDAZOLE 500 MG
500 TABLET ORAL EVERY 12 HOURS
Qty: 0 | Refills: 0 | Status: DISCONTINUED | OUTPATIENT
Start: 2018-02-01 | End: 2018-02-02

## 2018-02-01 RX ORDER — MICAFUNGIN SODIUM 100 MG/1
100 INJECTION, POWDER, LYOPHILIZED, FOR SOLUTION INTRAVENOUS ONCE
Qty: 0 | Refills: 0 | Status: COMPLETED | OUTPATIENT
Start: 2018-02-01 | End: 2018-02-01

## 2018-02-01 RX ORDER — DOBUTAMINE HCL 250MG/20ML
0.5 VIAL (ML) INTRAVENOUS
Qty: 500 | Refills: 0 | Status: DISCONTINUED | OUTPATIENT
Start: 2018-02-01 | End: 2018-02-06

## 2018-02-01 RX ORDER — SODIUM BICARBONATE 1 MEQ/ML
50 SYRINGE (ML) INTRAVENOUS ONCE
Qty: 0 | Refills: 0 | Status: COMPLETED | OUTPATIENT
Start: 2018-02-01 | End: 2018-02-01

## 2018-02-01 RX ORDER — FUROSEMIDE 40 MG
80 TABLET ORAL ONCE
Qty: 0 | Refills: 0 | Status: COMPLETED | OUTPATIENT
Start: 2018-02-01 | End: 2018-02-01

## 2018-02-01 RX ORDER — METRONIDAZOLE 500 MG
500 TABLET ORAL EVERY 8 HOURS
Qty: 0 | Refills: 0 | Status: DISCONTINUED | OUTPATIENT
Start: 2018-02-01 | End: 2018-02-01

## 2018-02-01 RX ORDER — POTASSIUM CHLORIDE 20 MEQ
40 PACKET (EA) ORAL ONCE
Qty: 0 | Refills: 0 | Status: COMPLETED | OUTPATIENT
Start: 2018-02-01 | End: 2018-02-01

## 2018-02-01 RX ORDER — MICAFUNGIN SODIUM 100 MG/1
INJECTION, POWDER, LYOPHILIZED, FOR SOLUTION INTRAVENOUS
Qty: 0 | Refills: 0 | Status: DISCONTINUED | OUTPATIENT
Start: 2018-02-01 | End: 2018-02-02

## 2018-02-01 RX ORDER — MAGNESIUM SULFATE 500 MG/ML
2 VIAL (ML) INJECTION ONCE
Qty: 0 | Refills: 0 | Status: DISCONTINUED | OUTPATIENT
Start: 2018-02-01 | End: 2018-02-01

## 2018-02-01 RX ORDER — AMIODARONE HYDROCHLORIDE 400 MG/1
0.99 TABLET ORAL
Qty: 900 | Refills: 0 | Status: DISCONTINUED | OUTPATIENT
Start: 2018-02-01 | End: 2018-02-01

## 2018-02-01 RX ORDER — POTASSIUM CHLORIDE 20 MEQ
10 PACKET (EA) ORAL
Qty: 0 | Refills: 0 | Status: COMPLETED | OUTPATIENT
Start: 2018-02-01 | End: 2018-02-01

## 2018-02-01 RX ORDER — PHENYLEPHRINE HYDROCHLORIDE 10 MG/ML
0.1 INJECTION INTRAVENOUS
Qty: 80 | Refills: 0 | Status: DISCONTINUED | OUTPATIENT
Start: 2018-02-01 | End: 2018-02-01

## 2018-02-01 RX ORDER — PANTOPRAZOLE SODIUM 20 MG/1
40 TABLET, DELAYED RELEASE ORAL DAILY
Qty: 0 | Refills: 0 | Status: DISCONTINUED | OUTPATIENT
Start: 2018-02-01 | End: 2018-02-11

## 2018-02-01 RX ORDER — CALCIUM GLUCONATE 100 MG/ML
1 VIAL (ML) INTRAVENOUS ONCE
Qty: 0 | Refills: 0 | Status: DISCONTINUED | OUTPATIENT
Start: 2018-02-01 | End: 2018-02-01

## 2018-02-01 RX ORDER — VANCOMYCIN HCL 1 G
500 VIAL (EA) INTRAVENOUS ONCE
Qty: 0 | Refills: 0 | Status: COMPLETED | OUTPATIENT
Start: 2018-02-01 | End: 2018-02-01

## 2018-02-01 RX ORDER — PROPOFOL 10 MG/ML
20 INJECTION, EMULSION INTRAVENOUS
Qty: 500 | Refills: 0 | Status: DISCONTINUED | OUTPATIENT
Start: 2018-02-01 | End: 2018-02-05

## 2018-02-01 RX ORDER — MICAFUNGIN SODIUM 100 MG/1
100 INJECTION, POWDER, LYOPHILIZED, FOR SOLUTION INTRAVENOUS EVERY 24 HOURS
Qty: 0 | Refills: 0 | Status: DISCONTINUED | OUTPATIENT
Start: 2018-02-02 | End: 2018-02-02

## 2018-02-01 RX ORDER — ASPIRIN/CALCIUM CARB/MAGNESIUM 324 MG
325 TABLET ORAL DAILY
Qty: 0 | Refills: 0 | Status: DISCONTINUED | OUTPATIENT
Start: 2018-02-01 | End: 2018-02-11

## 2018-02-01 RX ADMIN — VASOPRESSIN 6 UNIT(S)/MIN: 20 INJECTION INTRAVENOUS at 09:00

## 2018-02-01 RX ADMIN — Medication 50 MILLIEQUIVALENT(S): at 09:00

## 2018-02-01 RX ADMIN — Medication 200 GRAM(S): at 18:13

## 2018-02-01 RX ADMIN — PROPOFOL 9.05 MICROGRAM(S)/KG/MIN: 10 INJECTION, EMULSION INTRAVENOUS at 09:20

## 2018-02-01 RX ADMIN — Medication 500 MILLILITER(S): at 17:00

## 2018-02-01 RX ADMIN — Medication 50 MILLIEQUIVALENT(S): at 13:38

## 2018-02-01 RX ADMIN — Medication 50 MILLIEQUIVALENT(S): at 05:34

## 2018-02-01 RX ADMIN — Medication 50 MILLIEQUIVALENT(S): at 09:16

## 2018-02-01 RX ADMIN — PROPOFOL 9.05 MICROGRAM(S)/KG/MIN: 10 INJECTION, EMULSION INTRAVENOUS at 09:18

## 2018-02-01 RX ADMIN — Medication 110 MILLIGRAM(S): at 06:52

## 2018-02-01 RX ADMIN — HYDROMORPHONE HYDROCHLORIDE 0.5 MILLIGRAM(S): 2 INJECTION INTRAMUSCULAR; INTRAVENOUS; SUBCUTANEOUS at 17:45

## 2018-02-01 RX ADMIN — Medication 250 MILLIGRAM(S): at 18:30

## 2018-02-01 RX ADMIN — HEPARIN SODIUM 5000 UNIT(S): 5000 INJECTION INTRAVENOUS; SUBCUTANEOUS at 13:31

## 2018-02-01 RX ADMIN — AMIODARONE HYDROCHLORIDE 600 MILLIGRAM(S): 400 TABLET ORAL at 05:30

## 2018-02-01 RX ADMIN — INSULIN HUMAN 2 UNIT(S)/HR: 100 INJECTION, SOLUTION SUBCUTANEOUS at 13:13

## 2018-02-01 RX ADMIN — Medication 40 MILLIEQUIVALENT(S): at 19:29

## 2018-02-01 RX ADMIN — Medication 80 MILLIGRAM(S): at 09:59

## 2018-02-01 RX ADMIN — MICAFUNGIN SODIUM 105 MILLIGRAM(S): 100 INJECTION, POWDER, LYOPHILIZED, FOR SOLUTION INTRAVENOUS at 18:30

## 2018-02-01 RX ADMIN — HYDROMORPHONE HYDROCHLORIDE 0.5 MILLIGRAM(S): 2 INJECTION INTRAMUSCULAR; INTRAVENOUS; SUBCUTANEOUS at 08:30

## 2018-02-01 RX ADMIN — Medication 50 MILLIEQUIVALENT(S): at 23:15

## 2018-02-01 RX ADMIN — Medication 10 MG/HR: at 13:00

## 2018-02-01 RX ADMIN — AMIODARONE HYDROCHLORIDE 600 MILLIGRAM(S): 400 TABLET ORAL at 11:30

## 2018-02-01 RX ADMIN — AMIODARONE HYDROCHLORIDE 33 MG/MIN: 400 TABLET ORAL at 13:00

## 2018-02-01 RX ADMIN — Medication 100 GRAM(S): at 06:00

## 2018-02-01 RX ADMIN — AMIODARONE HYDROCHLORIDE 600 MILLIGRAM(S): 400 TABLET ORAL at 05:00

## 2018-02-01 RX ADMIN — HEPARIN SODIUM 5000 UNIT(S): 5000 INJECTION INTRAVENOUS; SUBCUTANEOUS at 22:17

## 2018-02-01 RX ADMIN — CEFEPIME 100 MILLIGRAM(S): 1 INJECTION, POWDER, FOR SOLUTION INTRAMUSCULAR; INTRAVENOUS at 18:25

## 2018-02-01 RX ADMIN — Medication 50 MILLIEQUIVALENT(S): at 14:51

## 2018-02-01 RX ADMIN — Medication 50 MILLIEQUIVALENT(S): at 13:12

## 2018-02-01 RX ADMIN — HYDROMORPHONE HYDROCHLORIDE 0.5 MILLIGRAM(S): 2 INJECTION INTRAMUSCULAR; INTRAVENOUS; SUBCUTANEOUS at 08:45

## 2018-02-01 RX ADMIN — Medication 50 MICROGRAM(S): at 14:53

## 2018-02-01 RX ADMIN — Medication 325 MILLIGRAM(S): at 13:31

## 2018-02-01 RX ADMIN — Medication 250 MILLILITER(S): at 04:30

## 2018-02-01 RX ADMIN — PANTOPRAZOLE SODIUM 40 MILLIGRAM(S): 20 TABLET, DELAYED RELEASE ORAL at 13:31

## 2018-02-01 RX ADMIN — Medication 50 MILLIEQUIVALENT(S): at 05:00

## 2018-02-01 RX ADMIN — Medication 500 MILLIGRAM(S): at 09:16

## 2018-02-01 RX ADMIN — Medication 11.31 MICROGRAM(S)/KG/MIN: at 11:35

## 2018-02-01 RX ADMIN — Medication 100 MILLIGRAM(S): at 18:45

## 2018-02-01 RX ADMIN — Medication 15 MICROGRAM(S)/KG/MIN: at 09:20

## 2018-02-01 RX ADMIN — HYDROMORPHONE HYDROCHLORIDE 0.5 MILLIGRAM(S): 2 INJECTION INTRAMUSCULAR; INTRAVENOUS; SUBCUTANEOUS at 17:30

## 2018-02-01 RX ADMIN — CEFEPIME 100 MILLIGRAM(S): 1 INJECTION, POWDER, FOR SOLUTION INTRAMUSCULAR; INTRAVENOUS at 04:45

## 2018-02-01 RX ADMIN — Medication 110 MILLIGRAM(S): at 18:30

## 2018-02-01 RX ADMIN — AMIODARONE HYDROCHLORIDE 600 MILLIGRAM(S): 400 TABLET ORAL at 11:15

## 2018-02-01 RX ADMIN — Medication 100 MILLIGRAM(S): at 18:13

## 2018-02-01 NOTE — PROGRESS NOTE ADULT - SUBJECTIVE AND OBJECTIVE BOX
CC: F/U for Endocarditis    Saw/spoke to patient. No fevers, no chills. Intubated this AM with respiratory distress. On pressors, prior to intubation persistent AMS. Diarrhea.    Allergies  No Known Allergies    ANTIMICROBIALS:  cefepime  IVPB 2000 every 12 hours  doxycycline IVPB 100 every 12 hours  doxycycline IVPB    vancomycin    Solution 500 every 6 hours  vancomycin  IVPB 1000 every 24 hours    PE:    Vital Signs Last 24 Hrs  T(C): 35.4 (01 Feb 2018 10:00), Max: 37.2 (31 Jan 2018 19:00)  T(F): 95.7 (01 Feb 2018 10:00), Max: 99 (31 Jan 2018 19:00)  HR: 82 (01 Feb 2018 10:30) (59 - 144)  BP: --  BP(mean): --  RR: 16 (01 Feb 2018 10:30) (13 - 47)  SpO2: 100% (01 Feb 2018 10:30) (80% - 100%)    Gen: AOx0-1, NAD, non-toxic  CV: S1+S2 normal, no murmurs, nontachycardic, pericardial drains  Resp: Intubated, no crackles, no wheeze  Abd: Soft, nontender, +BS  Ext: No LE edema, no wounds  Lines: R IJ CVC    LABS:                        8.8    12.7  )-----------( 111      ( 01 Feb 2018 09:01 )             24.9     02-01    141  |  103  |  39<H>  ----------------------------<  218<H>  4.0   |  16<L>  |  2.05<H>    Ca    7.9<L>      01 Feb 2018 09:01  Phos  5.8     02-01  Mg     3.1     02-01    TPro  5.1<L>  /  Alb  3.2<L>  /  TBili  3.6<H>  /  DBili  x   /  AST  440<H>  /  ALT  281<H>  /  AlkPhos  62  02-01    MICROBIOLOGY:  Vancomycin Level, Trough: 20.2 ug/mL (02-01-18 @ 05:01)  Vancomycin Level, Trough: 16.0 ug/mL (01-31-18 @ 15:29)    .Tissue aoritc valve leaflet  01-31-18   Testing in progress  --    Rare polymorphonuclear leukocytes seen per low power field  Moderate Gram Positive Cocci in Pairs and Chains seen per oil power field    .Tissue Other, aortic valve leaflet  01-31-18 AFB Neg    .Blood Blood-Venous  01-13-18   No growth at 5 days.     .Blood Blood-Peripheral  01-13-18   No growth at 5 days.      .Blood Blood-Venous  01-09-18   No growth at 5 days.      .Blood Blood-Venous  01-09-18   No growth at 5 days.      .Urine Clean Catch (Midstream)  01-09-18   No growth     RADIOLOGY:    2/1 CXR:    Impression:    The heart is enlarged. Small left pleural effusion. Left lower lobe   pneumonia and/or atelectasis. Pulmonary vascular congestion. Endotracheal   tube is in good position. NG tube is in the stomach. The remaining life   supporting devices are in good position unchanged when compared to   previous study done the same date at 3:31 AM. Status post sternotomy. No   pneumothorax.

## 2018-02-01 NOTE — PROGRESS NOTE ADULT - ASSESSMENT
72 year-old male with past medical history of T2DM, DLD, alcohol misuse brought in by family after having been found face down on the floor for approximately 2 days. On treatment for UTI, but now JANES with Aortic Valve lesions. 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 Bartonella, Legionella, Q Fever serology negative; fungal BCX negative. Brucella pending. Unclear cause of NVE, culture  with [facility]--pending. Having diarrhea, team started vancomycin PO. Aortic valve vegetation, leukocytosis, post operative state.  - Vancomycin 1g q 24 (Trough within range yesterday, continue same dose; monitor Cr/levels)  - Cefepime 2 g 12   - Doxycyline 100mg q 12  - PO Vanco for now, check C diff  - F/U pending Brucella sero  - F/U OR culture; PCR to valve pending results of valve culture    Baldev Small MD  Pager [phone]  After 5pm and on weekends call [phone]

## 2018-02-01 NOTE — PROGRESS NOTE ADULT - PROBLEM SELECTOR PLAN 2
S/P chest tubes with continued significant drainage overnight, decreased from yesterday  Otherwise clinically appears euvolemic  Monitor weight and UO S/P chest tubes with continued significant drainage overnight, decreased from yesterday  Monitor weight and UO  Can use diuretics as needed.

## 2018-02-01 NOTE — PROGRESS NOTE ADULT - SUBJECTIVE AND OBJECTIVE BOX
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  All available clinical, laboratory, radiographic, pharmacologic, and electrocardiographic data were reviewed & analyzed.      The patient was in the CTICU in critical condition secondary to cardiogenic shock, hemodynamically significant anemia/hypovolemia-shock, persistent respiratory failure, vasogenic shock, acute renal failure, acidosis, & hyperglycemia.      Respiratory status required full ventilatory support, the following of ABG’s with A-line monitoring, continuous pulse oximetry monitoring, and endotracheal suctioning for support & to evaluate for & prevent further decompensation secondary to persistent cardiopulmonary dysfunction.     Invasive hemodynamic monitoring with a pulmonary artery and arterial catheter were required for the following of continuous central venous, pulmonary artery pressure and MAP/BP monitoring to ensure adequate cardiovascular support and to evaluate for & help prevent decompensation while receiving intermittent volume expansion, blood transfusions, an IV Levophed drip, an IV Vasopressin drip, an IV Dobutamine drip, an IV Lasix drip and an IABP at 1:1 secondary to hemodynamically significant anemia/hypovolemia-shock, cardiogenic and septic shock, acute renal failure and lactic acidosis.    Metabolic stability, uncontrolled type 2 diabetes-hyperglycemia, & infection prophylaxis required an IV regular Insulin drip & the following of serial glucose levels to help achieve & maintain euglycemia.      He is on treatment with IV Cefepime, Doxycycline and Vancomycin for culture negative endocarditis. IV Micafungin and IV Flagyl added empirically.    Patient required more than the usual postoperative critical care management and I provided 65 minutes of non-continuous care to the patient.  Discussed at length with the CTICU staff and helped coordinate care. SAMANTHA CADENA  MRN#:  [id number]    The patient is a 72y Male with PMH of  T2DM, alcohol abuse, brought in by family after having been found face down on the floor for approximately 2 days, found to have culture negative endocarditis and is now s/p AVR (T)/C1V 1/30 who was seen, evaluated, & examined with the CTICU staff on rounds and during the evening hours with a multidisciplinary care plan formulated & implemented.  All available clinical, laboratory, radiographic, pharmacologic, and electrocardiographic data were reviewed & analyzed.      The patient was in the CTICU in critical condition secondary to cardiogenic shock, hemodynamically significant anemia/hypovolemia-shock, persistent respiratory failure, vasogenic shock, acute renal failure, acidosis, & hyperglycemia.      Respiratory status required full ventilatory support, the following of ABG’s with A-line monitoring, continuous pulse oximetry monitoring, and endotracheal suctioning for support & to evaluate for & prevent further decompensation secondary to persistent cardiopulmonary dysfunction.     Invasive hemodynamic monitoring with a pulmonary artery and arterial catheter were required for the following of continuous central venous, pulmonary artery pressure and MAP/BP monitoring to ensure adequate cardiovascular support and to evaluate for & help prevent decompensation while receiving intermittent volume expansion, blood transfusions, an IV Levophed drip, an IV Vasopressin drip, an IV Dobutamine drip, an IV Lasix drip and an IABP at 1:1 secondary to hemodynamically significant anemia/hypovolemia-shock, cardiogenic and septic shock, acute renal failure and lactic acidosis.    Metabolic stability, uncontrolled type 2 diabetes-hyperglycemia, & infection prophylaxis required an IV regular Insulin drip & the following of serial glucose levels to help achieve & maintain euglycemia.      He is on treatment with IV Cefepime, Doxycycline and Vancomycin for culture negative endocarditis. IV Micafungin and IV Flagyl added empirically.    Patient required more than the usual postoperative critical care management and I provided 65 minutes of non-continuous care to the patient.  Discussed at length with the CTICU staff and helped coordinate care.

## 2018-02-01 NOTE — PROGRESS NOTE ADULT - PROBLEM SELECTOR PLAN 1
Likely contrast induced nephropathy from cardiac cath done on 1/12. Creatinine first elevated in AM 1/14. Possible pre-renal contribution + vancomycin + endocarditis related. Possible ATN.   Patient Scr remains stable after CABG and AVR performed on 1/30  Non-oliguric at this time.  Monitoring closely for evidence for re-injury of kidneys given worsening clinical status  Monitor UOP, creatinine trend.    AVOID NSAIDs, ACE/ARBS, and nephrotoxins Likely contrast induced nephropathy from cardiac cath done on 1/12. Creatinine first elevated in AM 1/14. Possible pre-renal contribution + vancomycin + endocarditis related. Possible ATN.   Patient Scr remains stable after CABG and AVR performed on 1/30  Non-oliguric at this time.  Monitoring closely for evidence for re-injury of kidneys given worsening clinical status  Monitor UOP, creatinine trend.    AVOID NSAIDs, ACE/ARBS, and nephrotoxins, dose medications for eGFR < 15  No acute HD indication at this time

## 2018-02-01 NOTE — CHART NOTE - NSCHARTNOTEFT_GEN_A_CORE
IABP insertion    2/1 /18 0040    Indication; Cardiogenic shock S?P AVR (T), C1 L    IABP inserted via left Femoral artery with the supervision of Dr Ac Mary. 1:1 argumentation. CXR reviewed IABP  in good position    Complications; None IABP insertion    2/1 /18 9160    Indication; Cardiogenic shock S/P AVR (T), C1 L    IABP inserted via left Femoral artery with the supervision of Dr Ac Mary. 1:1 argumentation. CXR reviewed IABP  in good position    Complications; None

## 2018-02-01 NOTE — PROGRESS NOTE ADULT - SUBJECTIVE AND OBJECTIVE BOX
SAMANTHA CADENA   MRN#: [id number]     The patient is a 72y Male who was seen, evaluated, & examined with the CTICU staff on rounds and later in the day with a multidisciplinary care plan formulated & implemented.  All available clinical, laboratory, radiographic, pharmacologic, and electrocardiographic data were reviewed & analyzed.      The patient was in the CTICU in critical condition secondary to acute hypoxic respiratory failure-progressive cardiopulmonary dysfunction, cardiogenic shock-cardiovascular dysfunction, hemodynamically significant anemia/hypovolemia-shock, hyperlactatemia-acidosis, acute on chronic postoperative kidney injury, stress hyperglycemia, and Gram positive cocci aortic valve endocarditis.        Respiratory failure required reintubation full ventilatory support, the following of ABG’s with A-line monitoring, continuous pulse oximetry monitoring, multiple IV bolus doses of Sodium bicarbonate, and an IV Propofol infusion, and intermittent IV Dilaudid for support & to evaluate for & prevent further decompensation secondary to progressive cardiopulmonary dysfunction, cardiogenic shock-cardiovascular dysfunction, and hyperlactatemia-metabolic acidosis.     Invasive hemodynamic monitoring with an A-line was required for the following of continuous MAP/BP monitoring to ensure adequate cardiovascular support and to evaluate for & help prevent decompensation while receiving intermittent volume expansion, blood transfusions, an IV Levophed drip, an IV Vasopressin drip, an IV Dobutamine drip, and IV Sodium bicarbonate secondary to cardiogenic shock-cardiovascular dysfunction, hemodynamically significant anemia/hypovolemia-shock, hyperlactatemia-acidosis, acute postoperative blood loss anemia, & acute on chronic postoperative kidney injury-failure.       Metabolic stability, stress hyperglycemia, & infection prophylaxis required an IV regular Insulin drip & the following of serial glucose levels to help achieve & maintain euglycemia.      Patient required more than the usual postoperative critical care management and I provided 110 minutes of non-continuous care to the patient.  Discussed at length with the CTICU staff and helped coordinate care.

## 2018-02-01 NOTE — PROGRESS NOTE ADULT - SUBJECTIVE AND OBJECTIVE BOX
Anesthesia called for emergency intubation.  Resp distress, CPAP, etomidate 18 mg and bag mask ventilation with 100% FiO2 being performed.  DL x __1 with MAC 3 blade, grade _1__ view, __7.5_ cuffed ETT passed without trauma, + ETCO2 via EasyCap, + BBS, taped at _23___ cm, teeth intact, no complications.  hemodynamically stable, sat 100, cxr per ctu team Anesthesia called for emergency intubation.  Resp distress, CPAP, etomidate 18 mg and bag mask ventilation with 100% FiO2 being performed.  DL x __1 with MAC 3 blade, grade _1__ view, __7.5_ cuffed ETT passed without trauma, + ETCO2 via EasyCap, + BBS, taped at _23___ cm, teeth intact, no complications., cuff up  hemodynamically stable, sat 100, cxr per ctu team

## 2018-02-01 NOTE — PROVIDER CONTACT NOTE (CHANGE IN STATUS NOTIFICATION) - RECOMMENDATIONS
pt currently intubated/sedated on , levo, vaso, lasix, insulin, propofol gtts with IABP in place. UO remains inadequate. maintain stability of patient, may need initiation of CVVHDF via HD Cath.

## 2018-02-01 NOTE — PROGRESS NOTE ADULT - SUBJECTIVE AND OBJECTIVE BOX
[facility] DIVISION OF KIDNEY DISEASES AND HYPERTENSION -- FOLLOW UP NOTE  --------------------------------------------------------------------------------  Chief Complaint:  Patient is a 71 y/o man with history of ETOH abuse who presents with fall and found with endocarditis with compromise of aortic valve, developed ELIAN in-hospital shortly after undergoing cardiac catheterization, s/p urgent AVR and CABG on 1/30/18.  Patient was intubated overnight for respiratory decompensation.    24 hour events/subjective:        PAST HISTORY  --------------------------------------------------------------------------------  No significant changes to PMH, PSH, FHx, SHx, unless otherwise noted    ALLERGIES & MEDICATIONS  --------------------------------------------------------------------------------  Allergies    No Known Allergies    Intolerances      Standing Inpatient Medications  aspirin 325 milliGRAM(s) Oral daily  cefepime  IVPB 2000 milliGRAM(s) IV Intermittent every 12 hours  dextrose 50% Injectable 50 milliLiter(s) IV Push every 15 minutes  dextrose 50% Injectable 25 milliLiter(s) IV Push every 15 minutes  DOBUTamine Infusion 7.5 MICROgram(s)/kG/Min IV Continuous <Continuous>  doxycycline IVPB 100 milliGRAM(s) IV Intermittent every 12 hours  doxycycline IVPB      heparin  Injectable 5000 Unit(s) SubCutaneous every 8 hours  HYDROmorphone  Injectable 0.5 milliGRAM(s) IV Push once  insulin Infusion 2 Unit(s)/Hr IV Continuous <Continuous>  norepinephrine Infusion 0.106 MICROgram(s)/kG/Min IV Continuous <Continuous>  pantoprazole    Tablet 40 milliGRAM(s) Oral before breakfast  propofol Infusion 20 MICROgram(s)/kG/Min IV Continuous <Continuous>  sodium bicarbonate  Injectable 50 milliEquivalent(s) IV Push once  sodium bicarbonate  Injectable 50 milliEquivalent(s) IV Push once  sodium bicarbonate  Injectable 50 milliEquivalent(s) IV Push once  sodium chloride 0.9%. 1000 milliLiter(s) IV Continuous <Continuous>  vancomycin    Solution 500 milliGRAM(s) Oral once  vancomycin  IVPB 1000 milliGRAM(s) IV Intermittent every 24 hours  vasopressin Infusion 0.1 Unit(s)/Min IV Continuous <Continuous>    PRN Inpatient Medications  oxyCODONE    5 mG/acetaminophen 325 mG 1 Tablet(s) Oral every 4 hours PRN  oxyCODONE    5 mG/acetaminophen 325 mG 2 Tablet(s) Oral every 6 hours PRN      REVIEW OF SYSTEMS  --------------------------------------------------------------------------------  Patient is intubated, sedated and unable to provide history.    VITALS/PHYSICAL EXAM  --------------------------------------------------------------------------------  T(C): 36 (02-01-18 @ 05:00), Max: 37.2 (01-31-18 @ 19:00)  HR: 70 (02-01-18 @ 08:45) (59 - 144)  BP: --  RR: 13 (02-01-18 @ 08:45) (13 - 47)  SpO2: 100% (02-01-18 @ 08:45) (80% - 100%)  Wt(kg): --        01-31-18 @ 07:01  -  02-01-18 @ 07:00  --------------------------------------------------------  IN: 3044.6 mL / OUT: 2200 mL / NET: 844.6 mL    02-01-18 @ 07:01  -  02-01-18 @ 09:07  --------------------------------------------------------  IN: 56 mL / OUT: 180 mL / NET: -124 mL      Physical Exam:  	Gen: NAD  	HEENT: +intubated  	Pulm: +bilateral ventilator breath sounds audible  	CV: RRR, S1S2; no rub  	Abd: +BS, soft, nontender/nondistended  	: No suprapubic tenderness  	UE:  no edema  	LE:  1+ pitting edema  	Neuro: nonverbal, sedated  	Skin: Warm  	Vascular access:  right femoral nontunneled dialysis catheter nonerythematous    LABS/STUDIES  --------------------------------------------------------------------------------              8.2    14.9  >-----------<  108      [02-01-18 @ 00:48]              22.8     141  |  104  |  36  ----------------------------<  160      [02-01-18 @ 00:48]  4.0   |  19  |  1.86        Ca     8.3     [02-01-18 @ 00:48]    TPro  5.3  /  Alb  3.2  /  TBili  2.4  /  DBili  x   /  AST  312  /  ALT  210  /  AlkPhos  58  [02-01-18 @ 00:48]    PT/INR: PT 15.6 , INR 1.43       [02-01-18 @ 00:48]  PTT: 64.1       [02-01-18 @ 00:48]    Troponin 0.50      [01-30-18 @ 16:08]        [01-30-18 @ 16:08]    Creatinine Trend:  SCr 1.86 [02-01 @ 00:48]  SCr 1.79 [01-31 @ 01:22]  SCr 1.63 [01-30 @ 16:08]  SCr 1.96 [01-30 @ 02:11]  SCr 1.89 [01-29 @ 04:38] [facility] DIVISION OF KIDNEY DISEASES AND HYPERTENSION -- FOLLOW UP NOTE  -------------------------------------------------------------------------------    HPI:  Patient is a 73 y/o man with history of ETOH abuse who presents with fall and found with endocarditis with compromise of aortic valve, developed ELIAN in-hospital shortly after undergoing cardiac catheterization, s/p urgent AVR and CABG on 1/30/18.  Patient was intubated overnight for respiratory decompensation.    PAST HISTORY  --------------------------------------------------------------------------------  No significant changes to PMH, PSH, FHx, SHx, unless otherwise noted    ALLERGIES & MEDICATIONS  --------------------------------------------------------------------------------  Allergies    No Known Allergies    Intolerances    Standing Inpatient Medications  aspirin 325 milliGRAM(s) Oral daily  cefepime  IVPB 2000 milliGRAM(s) IV Intermittent every 12 hours  dextrose 50% Injectable 50 milliLiter(s) IV Push every 15 minutes  dextrose 50% Injectable 25 milliLiter(s) IV Push every 15 minutes  DOBUTamine Infusion 7.5 MICROgram(s)/kG/Min IV Continuous <Continuous>  doxycycline IVPB 100 milliGRAM(s) IV Intermittent every 12 hours  doxycycline IVPB      heparin  Injectable 5000 Unit(s) SubCutaneous every 8 hours  HYDROmorphone  Injectable 0.5 milliGRAM(s) IV Push once  insulin Infusion 2 Unit(s)/Hr IV Continuous <Continuous>  norepinephrine Infusion 0.106 MICROgram(s)/kG/Min IV Continuous <Continuous>  pantoprazole    Tablet 40 milliGRAM(s) Oral before breakfast  propofol Infusion 20 MICROgram(s)/kG/Min IV Continuous <Continuous>  sodium bicarbonate  Injectable 50 milliEquivalent(s) IV Push once  sodium bicarbonate  Injectable 50 milliEquivalent(s) IV Push once  sodium bicarbonate  Injectable 50 milliEquivalent(s) IV Push once  sodium chloride 0.9%. 1000 milliLiter(s) IV Continuous <Continuous>  vancomycin    Solution 500 milliGRAM(s) Oral once  vancomycin  IVPB 1000 milliGRAM(s) IV Intermittent every 24 hours  vasopressin Infusion 0.1 Unit(s)/Min IV Continuous <Continuous>    PRN Inpatient Medications  oxyCODONE    5 mG/acetaminophen 325 mG 1 Tablet(s) Oral every 4 hours PRN  oxyCODONE    5 mG/acetaminophen 325 mG 2 Tablet(s) Oral every 6 hours PRN      REVIEW OF SYSTEMS  --------------------------------------------------------------------------------  Patient is intubated, sedated and unable to provide history.    VITALS/PHYSICAL EXAM  --------------------------------------------------------------------------------  T(C): 36 (02-01-18 @ 05:00), Max: 37.2 (01-31-18 @ 19:00)  HR: 70 (02-01-18 @ 08:45) (59 - 144)  BP: 113/47  RR: 13 (02-01-18 @ 08:45) (13 - 47)  SpO2: 100% (02-01-18 @ 08:45) (80% - 100%)  Wt(kg): --        01-31-18 @ 07:01  -  02-01-18 @ 07:00  --------------------------------------------------------  IN: 3044.6 mL / OUT: 2200 mL / NET: 844.6 mL    02-01-18 @ 07:01  -  02-01-18 @ 09:07  --------------------------------------------------------  IN: 56 mL / OUT: 180 mL / NET: -124 mL      Physical Exam:  	Gen: NAD  	HEENT: +intubated  	Pulm: +bilateral ventilator breath sounds audible  	CV: RRR, S1S2; no rub  	Abd: +BS, soft, nontender/nondistended  	: kayli  	LE:  1+ pitting edema  	Neuro: sedated  	Skin: Warm    LABS/STUDIES  --------------------------------------------------------------------------------              8.2    14.9  >-----------<  108      [02-01-18 @ 00:48]              22.8     141  |  104  |  36  ----------------------------<  160      [02-01-18 @ 00:48]  4.0   |  19  |  1.86        Ca     8.3     [02-01-18 @ 00:48]    TPro  5.3  /  Alb  3.2  /  TBili  2.4  /  DBili  x   /  AST  312  /  ALT  210  /  AlkPhos  58  [02-01-18 @ 00:48]    PT/INR: PT 15.6 , INR 1.43       [02-01-18 @ 00:48]  PTT: 64.1       [02-01-18 @ 00:48]    Troponin 0.50      [01-30-18 @ 16:08]        [01-30-18 @ 16:08]    Creatinine Trend:  SCr 1.86 [02-01 @ 00:48]  SCr 1.79 [01-31 @ 01:22]  SCr 1.63 [01-30 @ 16:08]  SCr 1.96 [01-30 @ 02:11]  SCr 1.89 [01-29 @ 04:38]

## 2018-02-01 NOTE — PROGRESS NOTE ADULT - ASSESSMENT
Patient is a 73 y/o man with history of ETOH abuse who presents with fall and found with endocarditis, developed ELIAN in-hospital shortly after undergoing cardiac catheterization, s/p urgent AVR and CABG on 1/30/18 with clinical decompensation on 2/1 requiring intubation and multiple vasopressor support.

## 2018-02-01 NOTE — PROVIDER CONTACT NOTE (CHANGE IN STATUS NOTIFICATION) - ASSESSMENT
pt intubated at start of shift, rapidly declining. episode of unstable rapid afib requiring sync cardioversion, UO declined, IABP placed at bedside, swan floated at bedside, HD cath placed for possible CVV initiation, hypothermic.

## 2018-02-01 NOTE — PROGRESS NOTE ADULT - SUBJECTIVE AND OBJECTIVE BOX
Date 2/1/2016  Time; 1600    Under sterile conditions and with proper draping of the patient, a pulmonary artery catheter was floated through the introducer catheter in the ____________ vein. With the ballon inflated with 1.5ml of air, the PA catheter was advanced while monitoring the pressure tracing from the central venous system to the right ventricle and to the pulmonary artery. Wedge tracing was observed at _____ cm. The balloon was deflated, PA pressure tracing was noted again. The position of the catheter was verified by CXR. The initial readings are:  -CVP= 9    mmHg  -PA sys/oreilly=     mmHg    -C.O. =3.2      lit/min  -C.I. =   1.6   lit/min/m^2  -Complications:NONE    I certify that a time out took place prior to prep and drape, verbally confirming correct patient identity, correct site and side.

## 2018-02-01 NOTE — PROVIDER CONTACT NOTE (CHANGE IN STATUS NOTIFICATION) - SITUATION
pt s/p AVR, C1L 1/30. pt received on BIPAP, acidotic and elevated lactate on initial ABG, mental status declining. pt intubated at 0815. pt continued rapidly declining throughout shift.

## 2018-02-02 LAB
ALBUMIN SERPL ELPH-MCNC: 2.7 G/DL — LOW (ref 3.3–5)
ALBUMIN SERPL ELPH-MCNC: 2.9 G/DL — LOW (ref 3.3–5)
ALP SERPL-CCNC: 65 U/L — SIGNIFICANT CHANGE UP (ref 40–120)
ALP SERPL-CCNC: 69 U/L — SIGNIFICANT CHANGE UP (ref 40–120)
ALT FLD-CCNC: 351 U/L RC — HIGH (ref 10–45)
ALT FLD-CCNC: 385 U/L RC — HIGH (ref 10–45)
ANION GAP SERPL CALC-SCNC: 15 MMOL/L — SIGNIFICANT CHANGE UP (ref 5–17)
ANION GAP SERPL CALC-SCNC: 16 MMOL/L — SIGNIFICANT CHANGE UP (ref 5–17)
ANION GAP SERPL CALC-SCNC: 16 MMOL/L — SIGNIFICANT CHANGE UP (ref 5–17)
APPEARANCE UR: CLEAR — SIGNIFICANT CHANGE UP
APTT BLD: 57.9 SEC — HIGH (ref 27.5–37.4)
APTT BLD: 65.4 SEC — HIGH (ref 27.5–37.4)
AST SERPL-CCNC: 501 U/L — HIGH (ref 10–40)
AST SERPL-CCNC: 650 U/L — HIGH (ref 10–40)
BASE EXCESS BLDMV CALC-SCNC: -1.2 MMOL/L — SIGNIFICANT CHANGE UP (ref -3–3)
BASE EXCESS BLDMV CALC-SCNC: -1.9 MMOL/L — SIGNIFICANT CHANGE UP (ref -3–3)
BASE EXCESS BLDMV CALC-SCNC: -2 MMOL/L — SIGNIFICANT CHANGE UP (ref -3–3)
BASE EXCESS BLDMV CALC-SCNC: -2.3 MMOL/L — SIGNIFICANT CHANGE UP (ref -3–3)
BASE EXCESS BLDMV CALC-SCNC: -3.4 MMOL/L — LOW (ref -3–3)
BASE EXCESS BLDMV CALC-SCNC: -4.1 MMOL/L — LOW (ref -3–3)
BASE EXCESS BLDMV CALC-SCNC: -4.2 MMOL/L — LOW (ref -3–3)
BASE EXCESS BLDMV CALC-SCNC: -4.3 MMOL/L — LOW (ref -3–3)
BASE EXCESS BLDMV CALC-SCNC: -4.4 MMOL/L — LOW (ref -3–3)
BASOPHILS # BLD AUTO: 0 K/UL — SIGNIFICANT CHANGE UP (ref 0–0.2)
BILIRUB SERPL-MCNC: 3.4 MG/DL — HIGH (ref 0.2–1.2)
BILIRUB SERPL-MCNC: 3.4 MG/DL — HIGH (ref 0.2–1.2)
BILIRUB UR-MCNC: NEGATIVE — SIGNIFICANT CHANGE UP
BUN SERPL-MCNC: 43 MG/DL — HIGH (ref 7–23)
BUN SERPL-MCNC: 45 MG/DL — HIGH (ref 7–23)
BUN SERPL-MCNC: 46 MG/DL — HIGH (ref 7–23)
CALCIUM SERPL-MCNC: 7.4 MG/DL — LOW (ref 8.4–10.5)
CALCIUM SERPL-MCNC: 7.5 MG/DL — LOW (ref 8.4–10.5)
CALCIUM SERPL-MCNC: 7.6 MG/DL — LOW (ref 8.4–10.5)
CHLORIDE SERPL-SCNC: 104 MMOL/L — SIGNIFICANT CHANGE UP (ref 96–108)
CHLORIDE SERPL-SCNC: 104 MMOL/L — SIGNIFICANT CHANGE UP (ref 96–108)
CHLORIDE SERPL-SCNC: 105 MMOL/L — SIGNIFICANT CHANGE UP (ref 96–108)
CO2 BLDMV-SCNC: 21 MMOL/L — SIGNIFICANT CHANGE UP (ref 21–29)
CO2 BLDMV-SCNC: 22 MMOL/L — SIGNIFICANT CHANGE UP (ref 21–29)
CO2 BLDMV-SCNC: 22 MMOL/L — SIGNIFICANT CHANGE UP (ref 21–29)
CO2 BLDMV-SCNC: 23 MMOL/L — SIGNIFICANT CHANGE UP (ref 21–29)
CO2 BLDMV-SCNC: 24 MMOL/L — SIGNIFICANT CHANGE UP (ref 21–29)
CO2 SERPL-SCNC: 20 MMOL/L — LOW (ref 22–31)
CO2 SERPL-SCNC: 21 MMOL/L — LOW (ref 22–31)
CO2 SERPL-SCNC: 21 MMOL/L — LOW (ref 22–31)
COLOR SPEC: YELLOW — SIGNIFICANT CHANGE UP
CREAT SERPL-MCNC: 2.4 MG/DL — HIGH (ref 0.5–1.3)
CREAT SERPL-MCNC: 2.47 MG/DL — HIGH (ref 0.5–1.3)
CREAT SERPL-MCNC: 2.73 MG/DL — HIGH (ref 0.5–1.3)
DIFF PNL FLD: ABNORMAL
EOSINOPHIL # BLD AUTO: 0 K/UL — SIGNIFICANT CHANGE UP (ref 0–0.5)
EOSINOPHIL NFR BLD AUTO: 1 % — SIGNIFICANT CHANGE UP (ref 0–6)
GAS PNL BLDA: SIGNIFICANT CHANGE UP
GAS PNL BLDMV: SIGNIFICANT CHANGE UP
GLUCOSE BLDC GLUCOMTR-MCNC: 102 MG/DL — HIGH (ref 70–99)
GLUCOSE BLDC GLUCOMTR-MCNC: 103 MG/DL — HIGH (ref 70–99)
GLUCOSE BLDC GLUCOMTR-MCNC: 123 MG/DL — HIGH (ref 70–99)
GLUCOSE BLDC GLUCOMTR-MCNC: 132 MG/DL — HIGH (ref 70–99)
GLUCOSE BLDC GLUCOMTR-MCNC: 144 MG/DL — HIGH (ref 70–99)
GLUCOSE BLDC GLUCOMTR-MCNC: 149 MG/DL — HIGH (ref 70–99)
GLUCOSE BLDC GLUCOMTR-MCNC: 154 MG/DL — HIGH (ref 70–99)
GLUCOSE BLDC GLUCOMTR-MCNC: 95 MG/DL — SIGNIFICANT CHANGE UP (ref 70–99)
GLUCOSE BLDC GLUCOMTR-MCNC: 95 MG/DL — SIGNIFICANT CHANGE UP (ref 70–99)
GLUCOSE BLDC GLUCOMTR-MCNC: 96 MG/DL — SIGNIFICANT CHANGE UP (ref 70–99)
GLUCOSE SERPL-MCNC: 117 MG/DL — HIGH (ref 70–99)
GLUCOSE SERPL-MCNC: 153 MG/DL — HIGH (ref 70–99)
GLUCOSE SERPL-MCNC: 191 MG/DL — HIGH (ref 70–99)
GLUCOSE UR QL: NEGATIVE — SIGNIFICANT CHANGE UP
GRAM STN FLD: SIGNIFICANT CHANGE UP
HCO3 BLDMV-SCNC: 20 MMOL/L — SIGNIFICANT CHANGE UP (ref 20–28)
HCO3 BLDMV-SCNC: 21 MMOL/L — SIGNIFICANT CHANGE UP (ref 20–28)
HCO3 BLDMV-SCNC: 22 MMOL/L — SIGNIFICANT CHANGE UP (ref 20–28)
HCO3 BLDMV-SCNC: 23 MMOL/L — SIGNIFICANT CHANGE UP (ref 20–28)
HCT VFR BLD CALC: 29.5 % — LOW (ref 39–50)
HCT VFR BLD CALC: 29.9 % — LOW (ref 39–50)
HGB BLD-MCNC: 10.6 G/DL — LOW (ref 13–17)
HGB BLD-MCNC: 10.8 G/DL — LOW (ref 13–17)
HOROWITZ INDEX BLDMV+IHG-RTO: 40 — SIGNIFICANT CHANGE UP
HOROWITZ INDEX BLDMV+IHG-RTO: 50 — SIGNIFICANT CHANGE UP
HOROWITZ INDEX BLDMV+IHG-RTO: 50 — SIGNIFICANT CHANGE UP
INR BLD: 1.2 RATIO — HIGH (ref 0.88–1.16)
INR BLD: 1.29 RATIO — HIGH (ref 0.88–1.16)
KETONES UR-MCNC: NEGATIVE — SIGNIFICANT CHANGE UP
LEUKOCYTE ESTERASE UR-ACNC: ABNORMAL
LYMPHOCYTES # BLD AUTO: 0.9 K/UL — LOW (ref 1–3.3)
LYMPHOCYTES # BLD AUTO: 3 % — LOW (ref 13–44)
MCHC RBC-ENTMCNC: 32.6 PG — SIGNIFICANT CHANGE UP (ref 27–34)
MCHC RBC-ENTMCNC: 33 PG — SIGNIFICANT CHANGE UP (ref 27–34)
MCHC RBC-ENTMCNC: 35.5 GM/DL — SIGNIFICANT CHANGE UP (ref 32–36)
MCHC RBC-ENTMCNC: 36.5 GM/DL — HIGH (ref 32–36)
MCV RBC AUTO: 90.5 FL — SIGNIFICANT CHANGE UP (ref 80–100)
MCV RBC AUTO: 91.9 FL — SIGNIFICANT CHANGE UP (ref 80–100)
MONOCYTES # BLD AUTO: 0.8 K/UL — SIGNIFICANT CHANGE UP (ref 0–0.9)
MONOCYTES NFR BLD AUTO: 5 % — SIGNIFICANT CHANGE UP (ref 2–14)
NEUTROPHILS # BLD AUTO: 9.5 K/UL — HIGH (ref 1.8–7.4)
NEUTROPHILS NFR BLD AUTO: 85 % — HIGH (ref 43–77)
NITRITE UR-MCNC: NEGATIVE — SIGNIFICANT CHANGE UP
O2 CT VFR BLD CALC: 35 MMHG — SIGNIFICANT CHANGE UP (ref 30–65)
O2 CT VFR BLD CALC: 36 MMHG — SIGNIFICANT CHANGE UP (ref 30–65)
O2 CT VFR BLD CALC: 38 MMHG — SIGNIFICANT CHANGE UP (ref 30–65)
O2 CT VFR BLD CALC: 42 MMHG — SIGNIFICANT CHANGE UP (ref 30–65)
O2 CT VFR BLD CALC: 43 MMHG — SIGNIFICANT CHANGE UP (ref 30–65)
O2 CT VFR BLD CALC: 43 MMHG — SIGNIFICANT CHANGE UP (ref 30–65)
O2 CT VFR BLD CALC: 44 MMHG — SIGNIFICANT CHANGE UP (ref 30–65)
PCO2 BLDMV: 35 MMHG — SIGNIFICANT CHANGE UP (ref 30–65)
PCO2 BLDMV: 36 MMHG — SIGNIFICANT CHANGE UP (ref 30–65)
PCO2 BLDMV: 36 MMHG — SIGNIFICANT CHANGE UP (ref 30–65)
PCO2 BLDMV: 37 MMHG — SIGNIFICANT CHANGE UP (ref 30–65)
PCO2 BLDMV: 37 MMHG — SIGNIFICANT CHANGE UP (ref 30–65)
PCO2 BLDMV: 38 MMHG — SIGNIFICANT CHANGE UP (ref 30–65)
PCO2 BLDMV: 39 MMHG — SIGNIFICANT CHANGE UP (ref 30–65)
PCO2 BLDMV: 39 MMHG — SIGNIFICANT CHANGE UP (ref 30–65)
PCO2 BLDMV: 40 MMHG — SIGNIFICANT CHANGE UP (ref 30–65)
PH BLDMV: 7.36 — SIGNIFICANT CHANGE UP (ref 7.32–7.45)
PH BLDMV: 7.37 — SIGNIFICANT CHANGE UP (ref 7.32–7.45)
PH BLDMV: 7.38 — SIGNIFICANT CHANGE UP (ref 7.32–7.45)
PH UR: 5.5 — SIGNIFICANT CHANGE UP (ref 5–8)
PLATELET # BLD AUTO: 64 K/UL — LOW (ref 150–400)
PLATELET # BLD AUTO: 78 K/UL — LOW (ref 150–400)
POTASSIUM SERPL-MCNC: 3.2 MMOL/L — LOW (ref 3.5–5.3)
POTASSIUM SERPL-MCNC: 3.8 MMOL/L — SIGNIFICANT CHANGE UP (ref 3.5–5.3)
POTASSIUM SERPL-MCNC: 3.9 MMOL/L — SIGNIFICANT CHANGE UP (ref 3.5–5.3)
POTASSIUM SERPL-SCNC: 3.2 MMOL/L — LOW (ref 3.5–5.3)
POTASSIUM SERPL-SCNC: 3.8 MMOL/L — SIGNIFICANT CHANGE UP (ref 3.5–5.3)
POTASSIUM SERPL-SCNC: 3.9 MMOL/L — SIGNIFICANT CHANGE UP (ref 3.5–5.3)
PROT SERPL-MCNC: 4.6 G/DL — LOW (ref 6–8.3)
PROT SERPL-MCNC: 4.8 G/DL — LOW (ref 6–8.3)
PROT UR-MCNC: NEGATIVE — SIGNIFICANT CHANGE UP
PROTHROM AB SERPL-ACNC: 13.1 SEC — HIGH (ref 9.8–12.7)
PROTHROM AB SERPL-ACNC: 14 SEC — HIGH (ref 9.8–12.7)
RBC # BLD: 3.25 M/UL — LOW (ref 4.2–5.8)
RBC # BLD: 3.26 M/UL — LOW (ref 4.2–5.8)
RBC # FLD: 14.3 % — SIGNIFICANT CHANGE UP (ref 10.3–14.5)
RBC # FLD: 14.7 % — HIGH (ref 10.3–14.5)
SAO2 % BLDMV: 64 % — SIGNIFICANT CHANGE UP (ref 60–90)
SAO2 % BLDMV: 65 % — SIGNIFICANT CHANGE UP (ref 60–90)
SAO2 % BLDMV: 68 % — SIGNIFICANT CHANGE UP (ref 60–90)
SAO2 % BLDMV: 69 % — SIGNIFICANT CHANGE UP (ref 60–90)
SAO2 % BLDMV: 69 % — SIGNIFICANT CHANGE UP (ref 60–90)
SAO2 % BLDMV: 74 % — SIGNIFICANT CHANGE UP (ref 60–90)
SAO2 % BLDMV: 75 % — SIGNIFICANT CHANGE UP (ref 60–90)
SAO2 % BLDMV: 76 % — SIGNIFICANT CHANGE UP (ref 60–90)
SAO2 % BLDMV: 76 % — SIGNIFICANT CHANGE UP (ref 60–90)
SODIUM SERPL-SCNC: 140 MMOL/L — SIGNIFICANT CHANGE UP (ref 135–145)
SODIUM SERPL-SCNC: 141 MMOL/L — SIGNIFICANT CHANGE UP (ref 135–145)
SODIUM SERPL-SCNC: 141 MMOL/L — SIGNIFICANT CHANGE UP (ref 135–145)
SP GR SPEC: 1.01 — SIGNIFICANT CHANGE UP (ref 1.01–1.02)
SPECIMEN SOURCE: SIGNIFICANT CHANGE UP
SURGICAL PATHOLOGY STUDY: SIGNIFICANT CHANGE UP
UROBILINOGEN FLD QL: NEGATIVE — SIGNIFICANT CHANGE UP
VANCOMYCIN TROUGH SERPL-MCNC: 25.9 UG/ML — CRITICAL HIGH (ref 10–20)
WBC # BLD: 11.2 K/UL — HIGH (ref 3.8–10.5)
WBC # BLD: 13.6 K/UL — HIGH (ref 3.8–10.5)
WBC # FLD AUTO: 11.2 K/UL — HIGH (ref 3.8–10.5)
WBC # FLD AUTO: 13.6 K/UL — HIGH (ref 3.8–10.5)

## 2018-02-02 PROCEDURE — 71045 X-RAY EXAM CHEST 1 VIEW: CPT | Mod: 26

## 2018-02-02 PROCEDURE — 99292 CRITICAL CARE ADDL 30 MIN: CPT

## 2018-02-02 PROCEDURE — 71045 X-RAY EXAM CHEST 1 VIEW: CPT | Mod: 26,77,59

## 2018-02-02 PROCEDURE — 99232 SBSQ HOSP IP/OBS MODERATE 35: CPT

## 2018-02-02 PROCEDURE — 99291 CRITICAL CARE FIRST HOUR: CPT

## 2018-02-02 PROCEDURE — 99233 SBSQ HOSP IP/OBS HIGH 50: CPT | Mod: GC

## 2018-02-02 RX ORDER — DEXTROSE 50 % IN WATER 50 %
25 SYRINGE (ML) INTRAVENOUS ONCE
Qty: 0 | Refills: 0 | Status: DISCONTINUED | OUTPATIENT
Start: 2018-02-02 | End: 2018-02-04

## 2018-02-02 RX ORDER — POTASSIUM CHLORIDE 20 MEQ
10 PACKET (EA) ORAL ONCE
Qty: 0 | Refills: 0 | Status: COMPLETED | OUTPATIENT
Start: 2018-02-02 | End: 2018-02-02

## 2018-02-02 RX ORDER — POTASSIUM CHLORIDE 20 MEQ
10 PACKET (EA) ORAL
Qty: 0 | Refills: 0 | Status: COMPLETED | OUTPATIENT
Start: 2018-02-02 | End: 2018-02-02

## 2018-02-02 RX ORDER — MAGNESIUM SULFATE 500 MG/ML
2 VIAL (ML) INJECTION ONCE
Qty: 0 | Refills: 0 | Status: COMPLETED | OUTPATIENT
Start: 2018-02-02 | End: 2018-02-02

## 2018-02-02 RX ORDER — DEXTROSE 50 % IN WATER 50 %
1 SYRINGE (ML) INTRAVENOUS ONCE
Qty: 0 | Refills: 0 | Status: DISCONTINUED | OUTPATIENT
Start: 2018-02-02 | End: 2018-02-04

## 2018-02-02 RX ORDER — DEXTROSE 50 % IN WATER 50 %
25 SYRINGE (ML) INTRAVENOUS ONCE
Qty: 0 | Refills: 0 | Status: DISCONTINUED | OUTPATIENT
Start: 2018-02-02 | End: 2018-02-11

## 2018-02-02 RX ORDER — SODIUM CHLORIDE 9 MG/ML
1000 INJECTION, SOLUTION INTRAVENOUS
Qty: 0 | Refills: 0 | Status: DISCONTINUED | OUTPATIENT
Start: 2018-02-02 | End: 2018-02-11

## 2018-02-02 RX ORDER — METRONIDAZOLE 500 MG
500 TABLET ORAL
Qty: 0 | Refills: 0 | Status: DISCONTINUED | OUTPATIENT
Start: 2018-02-02 | End: 2018-02-03

## 2018-02-02 RX ORDER — HYDROMORPHONE HYDROCHLORIDE 2 MG/ML
0.5 INJECTION INTRAMUSCULAR; INTRAVENOUS; SUBCUTANEOUS ONCE
Qty: 0 | Refills: 0 | Status: DISCONTINUED | OUTPATIENT
Start: 2018-02-02 | End: 2018-02-02

## 2018-02-02 RX ORDER — GLUCAGON INJECTION, SOLUTION 0.5 MG/.1ML
1 INJECTION, SOLUTION SUBCUTANEOUS ONCE
Qty: 0 | Refills: 0 | Status: DISCONTINUED | OUTPATIENT
Start: 2018-02-02 | End: 2018-02-04

## 2018-02-02 RX ORDER — INSULIN LISPRO 100/ML
VIAL (ML) SUBCUTANEOUS AT BEDTIME
Qty: 0 | Refills: 0 | Status: DISCONTINUED | OUTPATIENT
Start: 2018-02-02 | End: 2018-02-02

## 2018-02-02 RX ORDER — AMIODARONE HYDROCHLORIDE 400 MG/1
150 TABLET ORAL ONCE
Qty: 0 | Refills: 0 | Status: COMPLETED | OUTPATIENT
Start: 2018-02-02 | End: 2018-02-02

## 2018-02-02 RX ORDER — POTASSIUM CHLORIDE 20 MEQ
40 PACKET (EA) ORAL ONCE
Qty: 0 | Refills: 0 | Status: COMPLETED | OUTPATIENT
Start: 2018-02-02 | End: 2018-02-02

## 2018-02-02 RX ORDER — MICAFUNGIN SODIUM 100 MG/1
100 INJECTION, POWDER, LYOPHILIZED, FOR SOLUTION INTRAVENOUS
Qty: 0 | Refills: 0 | Status: DISCONTINUED | OUTPATIENT
Start: 2018-02-02 | End: 2018-02-03

## 2018-02-02 RX ORDER — INSULIN LISPRO 100/ML
VIAL (ML) SUBCUTANEOUS
Qty: 0 | Refills: 0 | Status: DISCONTINUED | OUTPATIENT
Start: 2018-02-02 | End: 2018-02-02

## 2018-02-02 RX ORDER — DEXTROSE 50 % IN WATER 50 %
12.5 SYRINGE (ML) INTRAVENOUS ONCE
Qty: 0 | Refills: 0 | Status: DISCONTINUED | OUTPATIENT
Start: 2018-02-02 | End: 2018-02-04

## 2018-02-02 RX ORDER — NOREPINEPHRINE BITARTRATE/D5W 8 MG/250ML
0.09 PLASTIC BAG, INJECTION (ML) INTRAVENOUS
Qty: 16 | Refills: 0 | Status: DISCONTINUED | OUTPATIENT
Start: 2018-02-02 | End: 2018-02-05

## 2018-02-02 RX ORDER — CEFEPIME 1 G/1
2000 INJECTION, POWDER, FOR SOLUTION INTRAMUSCULAR; INTRAVENOUS
Qty: 0 | Refills: 0 | Status: DISCONTINUED | OUTPATIENT
Start: 2018-02-02 | End: 2018-02-02

## 2018-02-02 RX ORDER — CEFEPIME 1 G/1
2000 INJECTION, POWDER, FOR SOLUTION INTRAMUSCULAR; INTRAVENOUS
Qty: 0 | Refills: 0 | Status: DISCONTINUED | OUTPATIENT
Start: 2018-02-02 | End: 2018-02-03

## 2018-02-02 RX ORDER — INSULIN LISPRO 100/ML
VIAL (ML) SUBCUTANEOUS EVERY 6 HOURS
Qty: 0 | Refills: 0 | Status: DISCONTINUED | OUTPATIENT
Start: 2018-02-02 | End: 2018-02-06

## 2018-02-02 RX ADMIN — HEPARIN SODIUM 5000 UNIT(S): 5000 INJECTION INTRAVENOUS; SUBCUTANEOUS at 05:43

## 2018-02-02 RX ADMIN — Medication 50 MILLIEQUIVALENT(S): at 05:30

## 2018-02-02 RX ADMIN — Medication 15 MICROGRAM(S)/KG/MIN: at 04:51

## 2018-02-02 RX ADMIN — Medication 50 MILLIEQUIVALENT(S): at 15:30

## 2018-02-02 RX ADMIN — PROPOFOL 9.05 MICROGRAM(S)/KG/MIN: 10 INJECTION, EMULSION INTRAVENOUS at 04:52

## 2018-02-02 RX ADMIN — AMIODARONE HYDROCHLORIDE 600 MILLIGRAM(S): 400 TABLET ORAL at 08:15

## 2018-02-02 RX ADMIN — CEFEPIME 100 MILLIGRAM(S): 1 INJECTION, POWDER, FOR SOLUTION INTRAMUSCULAR; INTRAVENOUS at 05:43

## 2018-02-02 RX ADMIN — Medication 100 MILLIGRAM(S): at 05:42

## 2018-02-02 RX ADMIN — HEPARIN SODIUM 5000 UNIT(S): 5000 INJECTION INTRAVENOUS; SUBCUTANEOUS at 22:10

## 2018-02-02 RX ADMIN — Medication 50 MILLIEQUIVALENT(S): at 20:00

## 2018-02-02 RX ADMIN — Medication 50 MILLIEQUIVALENT(S): at 08:45

## 2018-02-02 RX ADMIN — Medication 325 MILLIGRAM(S): at 11:42

## 2018-02-02 RX ADMIN — Medication 22.62 MICROGRAM(S)/KG/MIN: at 17:48

## 2018-02-02 RX ADMIN — PANTOPRAZOLE SODIUM 40 MILLIGRAM(S): 20 TABLET, DELAYED RELEASE ORAL at 11:33

## 2018-02-02 RX ADMIN — VASOPRESSIN 6 UNIT(S)/MIN: 20 INJECTION INTRAVENOUS at 04:51

## 2018-02-02 RX ADMIN — Medication 50 MILLIEQUIVALENT(S): at 17:22

## 2018-02-02 RX ADMIN — Medication 100 MILLIGRAM(S): at 11:42

## 2018-02-02 RX ADMIN — Medication 22.62 MICROGRAM(S)/KG/MIN: at 04:50

## 2018-02-02 RX ADMIN — Medication 10 MG/HR: at 04:50

## 2018-02-02 RX ADMIN — CEFEPIME 100 MILLIGRAM(S): 1 INJECTION, POWDER, FOR SOLUTION INTRAMUSCULAR; INTRAVENOUS at 22:11

## 2018-02-02 RX ADMIN — INSULIN HUMAN 2 UNIT(S)/HR: 100 INJECTION, SOLUTION SUBCUTANEOUS at 04:51

## 2018-02-02 RX ADMIN — HYDROMORPHONE HYDROCHLORIDE 0.5 MILLIGRAM(S): 2 INJECTION INTRAMUSCULAR; INTRAVENOUS; SUBCUTANEOUS at 11:33

## 2018-02-02 RX ADMIN — Medication 50 MILLIEQUIVALENT(S): at 18:00

## 2018-02-02 RX ADMIN — Medication 50 MILLIEQUIVALENT(S): at 09:25

## 2018-02-02 RX ADMIN — HYDROMORPHONE HYDROCHLORIDE 0.5 MILLIGRAM(S): 2 INJECTION INTRAMUSCULAR; INTRAVENOUS; SUBCUTANEOUS at 11:45

## 2018-02-02 RX ADMIN — SODIUM CHLORIDE 10 MILLILITER(S): 9 INJECTION INTRAMUSCULAR; INTRAVENOUS; SUBCUTANEOUS at 04:52

## 2018-02-02 RX ADMIN — Medication 50 MILLIEQUIVALENT(S): at 19:00

## 2018-02-02 RX ADMIN — VASOPRESSIN 6 UNIT(S)/MIN: 20 INJECTION INTRAVENOUS at 17:48

## 2018-02-02 RX ADMIN — HEPARIN SODIUM 5000 UNIT(S): 5000 INJECTION INTRAVENOUS; SUBCUTANEOUS at 13:51

## 2018-02-02 RX ADMIN — Medication 50 MILLIEQUIVALENT(S): at 14:00

## 2018-02-02 RX ADMIN — Medication 50 MILLIEQUIVALENT(S): at 13:10

## 2018-02-02 RX ADMIN — Medication 50 MILLIEQUIVALENT(S): at 11:25

## 2018-02-02 RX ADMIN — Medication 6.01 MICROGRAM(S)/KG/MIN: at 10:57

## 2018-02-02 RX ADMIN — Medication 50 MILLIEQUIVALENT(S): at 08:15

## 2018-02-02 RX ADMIN — Medication 40 MILLIEQUIVALENT(S): at 11:04

## 2018-02-02 RX ADMIN — Medication 40 MILLIEQUIVALENT(S): at 18:00

## 2018-02-02 RX ADMIN — MICAFUNGIN SODIUM 105 MILLIGRAM(S): 100 INJECTION, POWDER, LYOPHILIZED, FOR SOLUTION INTRAVENOUS at 13:51

## 2018-02-02 RX ADMIN — Medication 50 MICROGRAM(S): at 13:51

## 2018-02-02 RX ADMIN — Medication 50 GRAM(S): at 13:49

## 2018-02-02 RX ADMIN — Medication 50 MILLIEQUIVALENT(S): at 16:48

## 2018-02-02 RX ADMIN — Medication 50 GRAM(S): at 08:15

## 2018-02-02 RX ADMIN — Medication 110 MILLIGRAM(S): at 17:49

## 2018-02-02 RX ADMIN — Medication 110 MILLIGRAM(S): at 05:43

## 2018-02-02 RX ADMIN — Medication 50 MILLIEQUIVALENT(S): at 19:30

## 2018-02-02 NOTE — PROGRESS NOTE ADULT - SUBJECTIVE AND OBJECTIVE BOX
CC: F/U for NVE    Saw/spoke to patient. No fevers, no chills. Still intubated. HD cath placed, balloon pump placed. Pressors unchanged.    Allergies  No Known Allergies    ANTIMICROBIALS:  cefepime  IVPB 2000 <User Schedule>  doxycycline IVPB 100 every 12 hours  doxycycline IVPB    metroNIDAZOLE  IVPB 500 <User Schedule>  micafungin IVPB 100 <User Schedule>  vancomycin  IVPB 1000 every 24 hours    PE:    Vital Signs Last 24 Hrs  T(C): 36.1 (02 Feb 2018 08:00), Max: 36.9 (01 Feb 2018 23:00)  T(F): 97 (02 Feb 2018 08:00), Max: 98.4 (01 Feb 2018 23:00)  HR: 70 (02 Feb 2018 11:45) (0 - 123)  BP: --  BP(mean): --  RR: 16 (02 Feb 2018 11:45) (0 - 38)  SpO2: 100% (02 Feb 2018 11:45) (98% - 100%)    Gen: AOx0-1, NAD, non-toxic  CV: S1+S2 normal, no murmurs, nontachycardic, pericardial tubes  Resp: Intubated, no crackles, no wheezes  Abd: Soft, nontender, +BS  Ext: No LE edema, no wounds  Lines: RIJ, LIJ, R Fem, L Fem central lines    LABS:                        10.8   13.6  )-----------( 78       ( 02 Feb 2018 01:36 )             29.5     02-02    141  |  105  |  45<H>  ----------------------------<  153<H>  3.2<L>   |  21<L>  |  2.47<H>    Ca    7.4<L>      02 Feb 2018 08:31  Phos  5.8     02-01  Mg     3.1     02-01    TPro  4.6<L>  /  Alb  2.7<L>  /  TBili  3.4<H>  /  DBili  x   /  AST  501<H>  /  ALT  351<H>  /  AlkPhos  69  02-02    MICROBIOLOGY:  Vancomycin Level, Trough: 19.7 ug/mL (02-01-18 @ 14:44)    .Tissue aoritc valve leaflet  01-31-18   Testing in progress  --    Rare polymorphonuclear leukocytes seen per low power field  Moderate Gram Positive Cocci in Pairs and Chains seen per oil power field    .Tissue Other, aortic valve leaflet  01-31-18 AFB Neg    .Blood Blood-Venous  01-13-18   No growth at 5 days.     .Blood Blood-Peripheral  01-13-18   No growth at 5 days.     .Blood Blood-Venous  01-09-18   No growth at 5 days.     .Blood Blood-Venous  01-09-18   No growth at 5 days.      .Urine Clean Catch (Midstream)  01-09-18   No growth      Clostridium difficile GDH Toxins A&amp;B, EIA:   Negative (02-01-18 @ 06:56)    RADIOLOGY:    CXR 2/2:    IMPRESSION:  No focal consolidation. Unable to comment on previously   described left pleural effusion/atelectasis versus infection due to   omission of that area on this image.  Enteric tube with tip in stomach.  Lines and tubes as described above.

## 2018-02-02 NOTE — PROGRESS NOTE ADULT - ASSESSMENT
Patient is a 71 y/o man with history of ETOH abuse who presents with fall and found with endocarditis, developed ELIAN in-hospital shortly after undergoing cardiac catheterization, s/p urgent AVR and CABG on 1/30/18 with clinical decompensation on 2/1 requiring intubation and multiple vasopressor support.  Urine output initially decreased, consideration for CVVHD made with left fem shiley placement but urine output subsequently improved. Patient is a 71 y/o man with history of ETOH abuse who presents with fall and found with endocarditis, developed ELIAN in-hospital shortly after undergoing cardiac catheterization, s/p urgent AVR and CABG on 1/30/18 with clinical decompensation on 2/1 requiring intubation and multiple vasopressor support.  Urine output initially decreased, consideration for CVVHD made with fem shiley placement but urine output subsequently improved.

## 2018-02-02 NOTE — PROGRESS NOTE ADULT - SUBJECTIVE AND OBJECTIVE BOX
SAMANTHA CADENA   MRN#: [id number]     The patient is a 72y Male who was seen, evaluated, & examined with the CTICU staff on rounds and later in the day with a multidisciplinary care plan formulated & implemented.  All available clinical, laboratory, radiographic, pharmacologic, and electrocardiographic data were reviewed & analyzed.      The patient was in the CTICU in critical condition secondary to acute hypoxic respiratory failure-progressive cardiopulmonary dysfunction, cardiogenic shock-cardiovascular dysfunction, hemodynamically significant anemia/hypovolemia-shock, hyperlactatemia-acidosis, acute on chronic postoperative kidney injury, stress hyperglycemia, and Gram positive cocci aortic valve endocarditis.        Respiratory failure required reintubation full ventilatory support, the following of ABG’s with A-line monitoring, continuous pulse oximetry monitoring, multiple IV bolus doses of Sodium bicarbonate, and an IV Propofol infusion, and intermittent IV Dilaudid for support & to evaluate for & prevent further decompensation secondary to progressive cardiopulmonary dysfunction, cardiogenic shock-cardiovascular dysfunction, and hyperlactatemia-metabolic acidosis.     Invasive hemodynamic monitoring with an A-line was required for the following of continuous MAP/BP monitoring to ensure adequate cardiovascular support and to evaluate for & help prevent decompensation while receiving intermittent volume expansion, blood transfusions, an IV Levophed drip, an IV Vasopressin drip, and an IV Dobutamine drip, and IV Sodium bicarbonate secondary to cardiogenic shock-cardiovascular dysfunction, hemodynamically significant anemia/hypovolemia-shock, hyperlactatemia-acidosis, acute postoperative blood loss anemia, & acute on chronic postoperative kidney injury-failure.       Metabolic stability, stress hyperglycemia, & infection prophylaxis required an IV regular Insulin drip & the following of serial glucose levels to help achieve & maintain euglycemia.      Patient required more than the usual postoperative critical care management and I provided 110 minutes of non-continuous care to the patient.  Discussed at length with the CTICU staff and helped coordinate care.

## 2018-02-02 NOTE — PROGRESS NOTE ADULT - SUBJECTIVE AND OBJECTIVE BOX
SAMANTHA CADENA  MRN#:  [id number]    The patient is a 72y Male with PMH of  T2DM, alcohol abuse, brought in by family after having been found face down on the floor for approximately 2 days, found to have culture negative endocarditis and is now s/p AVR (T)/C1V 1/30 who was seen, evaluated, & examined with the CTICU staff on rounds, overnight and during the early morning hours with a multidisciplinary care plan formulated & implemented.  All available clinical, laboratory, radiographic, pharmacologic, and electrocardiographic data were reviewed & analyzed.      The patient was in the CTICU in critical condition secondary to cardiogenic shock, hemodynamically significant anemia/hypovolemia-shock, persistent respiratory failure, vasogenic shock, acute renal failure, acidosis, & hyperglycemia.      Respiratory status required full ventilatory support, the following of ABG’s with A-line monitoring, continuous pulse oximetry monitoring, and endotracheal suctioning for support & to evaluate for & prevent further decompensation secondary to persistent cardiopulmonary dysfunction.     Invasive hemodynamic monitoring with a pulmonary artery and arterial catheter were required for the following of continuous central venous, pulmonary artery pressure and MAP/BP monitoring to ensure adequate cardiovascular support and to evaluate for & help prevent decompensation while receiving intermittent volume expansion, blood transfusions, an IV Levophed drip, an IV Vasopressin drip, an IV Dobutamine drip, an IV Lasix drip and an IABP at 1:1 secondary to hemodynamically significant anemia/hypovolemia-shock, cardiogenic and septic shock, acute renal failure and lactic acidosis.    Metabolic stability, uncontrolled type 2 diabetes-hyperglycemia, & infection prophylaxis required an IV regular Insulin drip & the following of serial glucose levels to help achieve & maintain euglycemia.      He is on treatment with IV Cefepime, Doxycycline and Vancomycin for culture negative endocarditis. IV Micafungin and IV Flagyl added empirically.    Patient required more than the usual postoperative critical care management and I provided 50 minutes of non-continuous care to the patient.  Discussed at length with the CTICU staff and helped coordinate care.

## 2018-02-02 NOTE — PROGRESS NOTE ADULT - PROBLEM SELECTOR PLAN 2
Continue chest tube management per CTICU.  Monitor weight and UO.  Can use diuretics as needed. Agree with diuretics lasix gtt with goal to keep patient net negative.

## 2018-02-02 NOTE — PROGRESS NOTE ADULT - PROBLEM SELECTOR PLAN 1
Likely contrast induced nephropathy from cardiac cath done on 1/12. Creatinine first elevated in AM 1/14. Possible pre-renal contribution + vancomycin + endocarditis related. Possible ATN.  Creatinine stable at this time with excellent urine output without diuretics.  Monitoring closely for evidence for re-injury of kidneys given worsening clinical status.  Monitor UOP, creatinine trend.  AVOID NSAIDs, ACE/ARBS, and nephrotoxins, dose medications for eGFR < 15.  No acute HD indication at this time Likely contrast induced nephropathy from cardiac cath done on 1/12. Creatinine first elevated in AM 1/14. Possible pre-renal contribution + vancomycin + endocarditis related. Possible ATN.  Creatinine stable at this time with excellent urine output with diuretics.    Monitor UOP, creatinine trend.  AVOID NSAIDs, ACE/ARBS, and nephrotoxins, dose medications for eGFR < 15.  No acute HD indication at this time

## 2018-02-02 NOTE — PROGRESS NOTE ADULT - ASSESSMENT
72 year-old male with past medical history of T2DM, DLD, alcohol misuse brought in by family after having been found face down on the floor for approximately 2 days. On treatment for UTI, but now JANES with Aortic Valve lesions. 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 Bartonella, Legionella, Q Fever serology negative; fungal BCX negative. Brucella pending. Unclear cause of NVE, culture  with GPC--pending. Having diarrhea, C diff negative. LFTs rising, working ELIAN, aortic balloon pump, pressors. Critically ill. Aortic valve vegetation, leukocytosis, post operative state.  - Vancomycin by level--worsening ELIAN, possible for CVVH  - Cefepime 2 g 12  - Doxycyline 100mg q 12  - Flagyl added for anaerobic coverage for abd  - F/U pending Brucella sero  - F/U OR culture; PCR to valve if non growing culture  - Will need to readjust abx if planning for CVVH/worsening Cr    Baldev Small MD  Pager [phone]  After 5pm and on weekends call [phone]

## 2018-02-02 NOTE — PROGRESS NOTE ADULT - SUBJECTIVE AND OBJECTIVE BOX
[facility] DIVISION OF KIDNEY DISEASES AND HYPERTENSION -- FOLLOW UP NOTE  --------------------------------------------------------------------------------  Chief Complaint:  Patient is a 71 y/o man with history of ETOH abuse who presents with fall and found with endocarditis, developed ELIAN in-hospital shortly after undergoing cardiac catheterization, s/p urgent AVR and CABG on 1/30/18 with clinical decompensation on 2/1 requiring intubation and multiple vasopressor support.  Urine output initially decreased, consideration for CVVHD made with left fem shiley placement but urine output subsequently improved.    24 hour events/subjective:  Patient with increasing urine output this AM up to 200 cc / hour.  Still intubated and sedated.        PAST HISTORY  --------------------------------------------------------------------------------  No significant changes to PMH, PSH, FHx, SHx, unless otherwise noted    ALLERGIES & MEDICATIONS  --------------------------------------------------------------------------------  Allergies    No Known Allergies    Intolerances      Standing Inpatient Medications  amiodarone IVPB 150 milliGRAM(s) IV Intermittent once  aspirin 325 milliGRAM(s) Oral daily  cefepime  IVPB 2000 milliGRAM(s) IV Intermittent every 12 hours  dextrose 50% Injectable 50 milliLiter(s) IV Push every 15 minutes  dextrose 50% Injectable 25 milliLiter(s) IV Push every 15 minutes  DOBUTamine Infusion 10 MICROgram(s)/kG/Min IV Continuous <Continuous>  doxycycline IVPB 100 milliGRAM(s) IV Intermittent every 12 hours  doxycycline IVPB      furosemide Infusion 20 mG/Hr IV Continuous <Continuous>  heparin  Injectable 5000 Unit(s) SubCutaneous every 8 hours  insulin Infusion 2 Unit(s)/Hr IV Continuous <Continuous>  levothyroxine Injectable 50 MICROGram(s) IV Push <User Schedule>  magnesium sulfate  IVPB 2 Gram(s) IV Intermittent once  metroNIDAZOLE  IVPB 500 milliGRAM(s) IV Intermittent every 12 hours  micafungin IVPB      micafungin IVPB 100 milliGRAM(s) IV Intermittent every 24 hours  norepinephrine Infusion 0.106 MICROgram(s)/kG/Min IV Continuous <Continuous>  pantoprazole  Injectable 40 milliGRAM(s) IV Push daily  potassium chloride   Solution 40 milliEquivalent(s) Oral once  potassium chloride  10 mEq/50 mL IVPB 10 milliEquivalent(s) IV Intermittent once  potassium chloride  10 mEq/50 mL IVPB 10 milliEquivalent(s) IV Intermittent once  potassium chloride  10 mEq/50 mL IVPB 10 milliEquivalent(s) IV Intermittent once  propofol Infusion 20 MICROgram(s)/kG/Min IV Continuous <Continuous>  sodium chloride 0.9%. 1000 milliLiter(s) IV Continuous <Continuous>  vancomycin  IVPB 1000 milliGRAM(s) IV Intermittent every 24 hours  vasopressin Infusion 0.1 Unit(s)/Min IV Continuous <Continuous>    PRN Inpatient Medications      REVIEW OF SYSTEMS  --------------------------------------------------------------------------------  Intubated and sedated and unable to provide history    VITALS/PHYSICAL EXAM  --------------------------------------------------------------------------------  T(C): 36.1 (02-02-18 @ 08:00), Max: 36.9 (02-01-18 @ 23:00)  HR: 74 (02-02-18 @ 09:00) (0 - 129)  BP: --  RR: 16 (02-02-18 @ 09:00) (0 - 38)  SpO2: 100% (02-02-18 @ 09:00) (98% - 100%)  Wt(kg): --        02-01-18 @ 07:01  -  02-02-18 @ 07:00  --------------------------------------------------------  IN: 4416.8 mL / OUT: 2295 mL / NET: 2121.8 mL    02-02-18 @ 07:01  -  02-02-18 @ 09:17  --------------------------------------------------------  IN: 432.2 mL / OUT: 365 mL / NET: 67.2 mL      Physical Exam:  	Gen: NAD  	HEENT: intubated  	Pulm: bilateral breath sounds audible  	CV: RRR, S1S2; no rub  	Abd: +BS, soft, nontender/nondistended  	: No suprapubic tenderness  	UE:  no edema  	LE:  +mild nonpitting edema  	Neuro: sedated  	Skin: Warm  	Vascular access:  +left femoral shiley non-erythematous    LABS/STUDIES  --------------------------------------------------------------------------------              10.8   13.6  >-----------<  78       [02-02-18 @ 01:36]              29.5     141  |  105  |  45  ----------------------------<  153      [02-02-18 @ 08:31]  3.2   |  21  |  2.47        Ca     7.4     [02-02-18 @ 08:31]      Mg     3.1     [02-01-18 @ 09:01]      Phos  5.8     [02-01-18 @ 09:01]    TPro  4.6  /  Alb  2.7  /  TBili  3.4  /  DBili  x   /  AST  501  /  ALT  351  /  AlkPhos  69  [02-02-18 @ 08:31]    PT/INR: PT 14.0 , INR 1.29       [02-02-18 @ 01:36]  PTT: 57.9       [02-02-18 @ 01:36]    Troponin 0.30      [02-01-18 @ 09:01]        [02-01-18 @ 09:01]    Creatinine Trend:  SCr 2.47 [02-02 @ 08:31]  SCr 2.40 [02-02 @ 01:36]  SCr 2.10 [02-01 @ 14:44]  SCr 2.05 [02-01 @ 09:01]  SCr 1.86 [02-01 @ 00:48] [facility] DIVISION OF KIDNEY DISEASES AND HYPERTENSION -- FOLLOW UP NOTE  --------------------------------------------------------------------------------  Chief Complaint:  Patient is a 71 y/o man with history of ETOH abuse who presents with fall and found with endocarditis, developed ELIAN in-hospital shortly after undergoing cardiac catheterization, s/p urgent AVR and CABG on 1/30/18 with clinical decompensation on 2/1 requiring intubation and multiple vasopressor support.  Urine output initially decreased, consideration for CVVHD made with left fem shiley placement but urine output subsequently improved.    24 hour events/subjective:  Patient with increasing urine output this AM up to 200 cc / hour.  Still intubated and sedated.        PAST HISTORY  --------------------------------------------------------------------------------  No significant changes to PMH, PSH, FHx, SHx, unless otherwise noted    ALLERGIES & MEDICATIONS  --------------------------------------------------------------------------------  Allergies    No Known Allergies    Intolerances      Standing Inpatient Medications  amiodarone IVPB 150 milliGRAM(s) IV Intermittent once  aspirin 325 milliGRAM(s) Oral daily  cefepime  IVPB 2000 milliGRAM(s) IV Intermittent every 12 hours  dextrose 50% Injectable 50 milliLiter(s) IV Push every 15 minutes  dextrose 50% Injectable 25 milliLiter(s) IV Push every 15 minutes  DOBUTamine Infusion 10 MICROgram(s)/kG/Min IV Continuous <Continuous>  doxycycline IVPB 100 milliGRAM(s) IV Intermittent every 12 hours  doxycycline IVPB      furosemide Infusion 20 mG/Hr IV Continuous <Continuous>  heparin  Injectable 5000 Unit(s) SubCutaneous every 8 hours  insulin Infusion 2 Unit(s)/Hr IV Continuous <Continuous>  levothyroxine Injectable 50 MICROGram(s) IV Push <User Schedule>  magnesium sulfate  IVPB 2 Gram(s) IV Intermittent once  metroNIDAZOLE  IVPB 500 milliGRAM(s) IV Intermittent every 12 hours  micafungin IVPB      micafungin IVPB 100 milliGRAM(s) IV Intermittent every 24 hours  norepinephrine Infusion 0.106 MICROgram(s)/kG/Min IV Continuous <Continuous>  pantoprazole  Injectable 40 milliGRAM(s) IV Push daily  potassium chloride   Solution 40 milliEquivalent(s) Oral once  potassium chloride  10 mEq/50 mL IVPB 10 milliEquivalent(s) IV Intermittent once  potassium chloride  10 mEq/50 mL IVPB 10 milliEquivalent(s) IV Intermittent once  potassium chloride  10 mEq/50 mL IVPB 10 milliEquivalent(s) IV Intermittent once  propofol Infusion 20 MICROgram(s)/kG/Min IV Continuous <Continuous>  sodium chloride 0.9%. 1000 milliLiter(s) IV Continuous <Continuous>  vancomycin  IVPB 1000 milliGRAM(s) IV Intermittent every 24 hours  vasopressin Infusion 0.1 Unit(s)/Min IV Continuous <Continuous>    PRN Inpatient Medications      REVIEW OF SYSTEMS  --------------------------------------------------------------------------------  Intubated and sedated and unable to provide history    VITALS/PHYSICAL EXAM  --------------------------------------------------------------------------------  T(C): 36.1 (02-02-18 @ 08:00), Max: 36.9 (02-01-18 @ 23:00)  HR: 74 (02-02-18 @ 09:00) (0 - 129)  BP: 98/38  RR: 16 (02-02-18 @ 09:00) (0 - 38)  SpO2: 100% (02-02-18 @ 09:00) (98% - 100%)  Wt(kg): --        02-01-18 @ 07:01  -  02-02-18 @ 07:00  --------------------------------------------------------  IN: 4416.8 mL / OUT: 2295 mL / NET: 2121.8 mL    02-02-18 @ 07:01  -  02-02-18 @ 09:17  --------------------------------------------------------  IN: 432.2 mL / OUT: 365 mL / NET: 67.2 mL      Physical Exam:  	Gen: NAD  	HEENT: intubated  	Pulm: bilateral breath sounds audible  	CV: RRR, S1S2; no rub  	Abd: +BS, soft, nontender/nondistended  	: No suprapubic tenderness  	UE:  no edema  	LE:  +mild nonpitting edema  	Neuro: sedated  	Skin: Warm  	Vascular access:  +left femoral shiley non-erythematous    LABS/STUDIES  --------------------------------------------------------------------------------              10.8   13.6  >-----------<  78       [02-02-18 @ 01:36]              29.5     141  |  105  |  45  ----------------------------<  153      [02-02-18 @ 08:31]  3.2   |  21  |  2.47        Ca     7.4     [02-02-18 @ 08:31]      Mg     3.1     [02-01-18 @ 09:01]      Phos  5.8     [02-01-18 @ 09:01]    TPro  4.6  /  Alb  2.7  /  TBili  3.4  /  DBili  x   /  AST  501  /  ALT  351  /  AlkPhos  69  [02-02-18 @ 08:31]    PT/INR: PT 14.0 , INR 1.29       [02-02-18 @ 01:36]  PTT: 57.9       [02-02-18 @ 01:36]    Troponin 0.30      [02-01-18 @ 09:01]        [02-01-18 @ 09:01]    Creatinine Trend:  SCr 2.47 [02-02 @ 08:31]  SCr 2.40 [02-02 @ 01:36]  SCr 2.10 [02-01 @ 14:44]  SCr 2.05 [02-01 @ 09:01]  SCr 1.86 [02-01 @ 00:48] [facility] DIVISION OF KIDNEY DISEASES AND HYPERTENSION -- FOLLOW UP NOTE  --------------------------------------------------------------------------------  Chief Complaint:  Patient is a 71 y/o man with history of ETOH abuse who presents with fall and found with endocarditis, developed ELIAN in-hospital shortly after undergoing cardiac catheterization, s/p urgent AVR and CABG on 1/30/18 with clinical decompensation on 2/1 requiring intubation and multiple vasopressor support.  Urine output initially decreased, consideration for CVVHD made with left fem shiley placement but urine output subsequently improved.    24 hour events/subjective:  Patient with increasing urine output this AM up to 200 cc / hour.  Still intubated and sedated.        PAST HISTORY  --------------------------------------------------------------------------------  No significant changes to PMH, PSH, FHx, SHx, unless otherwise noted    ALLERGIES & MEDICATIONS  --------------------------------------------------------------------------------  Allergies    No Known Allergies    Intolerances      Standing Inpatient Medications  amiodarone IVPB 150 milliGRAM(s) IV Intermittent once  aspirin 325 milliGRAM(s) Oral daily  cefepime  IVPB 2000 milliGRAM(s) IV Intermittent every 12 hours  dextrose 50% Injectable 50 milliLiter(s) IV Push every 15 minutes  dextrose 50% Injectable 25 milliLiter(s) IV Push every 15 minutes  DOBUTamine Infusion 10 MICROgram(s)/kG/Min IV Continuous <Continuous>  doxycycline IVPB 100 milliGRAM(s) IV Intermittent every 12 hours  doxycycline IVPB      furosemide Infusion 20 mG/Hr IV Continuous <Continuous>  heparin  Injectable 5000 Unit(s) SubCutaneous every 8 hours  insulin Infusion 2 Unit(s)/Hr IV Continuous <Continuous>  levothyroxine Injectable 50 MICROGram(s) IV Push <User Schedule>  magnesium sulfate  IVPB 2 Gram(s) IV Intermittent once  metroNIDAZOLE  IVPB 500 milliGRAM(s) IV Intermittent every 12 hours  micafungin IVPB      micafungin IVPB 100 milliGRAM(s) IV Intermittent every 24 hours  norepinephrine Infusion 0.106 MICROgram(s)/kG/Min IV Continuous <Continuous>  pantoprazole  Injectable 40 milliGRAM(s) IV Push daily  potassium chloride   Solution 40 milliEquivalent(s) Oral once  potassium chloride  10 mEq/50 mL IVPB 10 milliEquivalent(s) IV Intermittent once  potassium chloride  10 mEq/50 mL IVPB 10 milliEquivalent(s) IV Intermittent once  potassium chloride  10 mEq/50 mL IVPB 10 milliEquivalent(s) IV Intermittent once  propofol Infusion 20 MICROgram(s)/kG/Min IV Continuous <Continuous>  sodium chloride 0.9%. 1000 milliLiter(s) IV Continuous <Continuous>  vancomycin  IVPB 1000 milliGRAM(s) IV Intermittent every 24 hours  vasopressin Infusion 0.1 Unit(s)/Min IV Continuous <Continuous>    PRN Inpatient Medications      REVIEW OF SYSTEMS  --------------------------------------------------------------------------------  Intubated and sedated and unable to provide history    VITALS/PHYSICAL EXAM  --------------------------------------------------------------------------------  T(C): 36.1 (02-02-18 @ 08:00), Max: 36.9 (02-01-18 @ 23:00)  HR: 74 (02-02-18 @ 09:00) (0 - 129)  BP: 98/38  RR: 16 (02-02-18 @ 09:00) (0 - 38)  SpO2: 100% (02-02-18 @ 09:00) (98% - 100%)  Wt(kg): --        02-01-18 @ 07:01  -  02-02-18 @ 07:00  --------------------------------------------------------  IN: 4416.8 mL / OUT: 2295 mL / NET: 2121.8 mL    02-02-18 @ 07:01  -  02-02-18 @ 09:17  --------------------------------------------------------  IN: 432.2 mL / OUT: 365 mL / NET: 67.2 mL      Physical Exam:  	Gen: NAD  	HEENT: intubated  	Pulm: bilateral breath sounds audible  	CV: RRR, S1S2; no rub  	Abd: +BS, soft, nontender/nondistended  	: kayli  	UE:  no edema  	LE:  +mild nonpitting edema  	Neuro: sedated  	Skin: Warm  	Vascular access:  +femoral magdielley non-erythematous    LABS/STUDIES  --------------------------------------------------------------------------------              10.8   13.6  >-----------<  78       [02-02-18 @ 01:36]              29.5     141  |  105  |  45  ----------------------------<  153      [02-02-18 @ 08:31]  3.2   |  21  |  2.47        Ca     7.4     [02-02-18 @ 08:31]      Mg     3.1     [02-01-18 @ 09:01]      Phos  5.8     [02-01-18 @ 09:01]    TPro  4.6  /  Alb  2.7  /  TBili  3.4  /  DBili  x   /  AST  501  /  ALT  351  /  AlkPhos  69  [02-02-18 @ 08:31]    PT/INR: PT 14.0 , INR 1.29       [02-02-18 @ 01:36]  PTT: 57.9       [02-02-18 @ 01:36]    Troponin 0.30      [02-01-18 @ 09:01]        [02-01-18 @ 09:01]    Creatinine Trend:  SCr 2.47 [02-02 @ 08:31]  SCr 2.40 [02-02 @ 01:36]  SCr 2.10 [02-01 @ 14:44]  SCr 2.05 [02-01 @ 09:01]  SCr 1.86 [02-01 @ 00:48]

## 2018-02-03 LAB
ANION GAP SERPL CALC-SCNC: 14 MMOL/L — SIGNIFICANT CHANGE UP (ref 5–17)
APTT BLD: 57.2 SEC — HIGH (ref 27.5–37.4)
BASE EXCESS BLDMV CALC-SCNC: -4.1 MMOL/L — LOW (ref -3–3)
BASE EXCESS BLDMV CALC-SCNC: -4.8 MMOL/L — LOW (ref -3–3)
BASE EXCESS BLDMV CALC-SCNC: -4.9 MMOL/L — LOW (ref -3–3)
BLD GP AB SCN SERPL QL: NEGATIVE — SIGNIFICANT CHANGE UP
BUN SERPL-MCNC: 48 MG/DL — HIGH (ref 7–23)
CALCIUM SERPL-MCNC: 7.4 MG/DL — LOW (ref 8.4–10.5)
CHLORIDE SERPL-SCNC: 107 MMOL/L — SIGNIFICANT CHANGE UP (ref 96–108)
CO2 BLDMV-SCNC: 21 MMOL/L — SIGNIFICANT CHANGE UP (ref 21–29)
CO2 BLDMV-SCNC: 21 MMOL/L — SIGNIFICANT CHANGE UP (ref 21–29)
CO2 BLDMV-SCNC: 22 MMOL/L — SIGNIFICANT CHANGE UP (ref 21–29)
CO2 SERPL-SCNC: 18 MMOL/L — LOW (ref 22–31)
CREAT SERPL-MCNC: 2.74 MG/DL — HIGH (ref 0.5–1.3)
GAS PNL BLDA: SIGNIFICANT CHANGE UP
GAS PNL BLDMV: SIGNIFICANT CHANGE UP
GLUCOSE SERPL-MCNC: 264 MG/DL — HIGH (ref 70–99)
HCO3 BLDMV-SCNC: 20 MMOL/L — SIGNIFICANT CHANGE UP (ref 20–28)
HCO3 BLDMV-SCNC: 20 MMOL/L — SIGNIFICANT CHANGE UP (ref 20–28)
HCO3 BLDMV-SCNC: 21 MMOL/L — SIGNIFICANT CHANGE UP (ref 20–28)
HCT VFR BLD CALC: 30.7 % — LOW (ref 39–50)
HGB BLD-MCNC: 10.7 G/DL — LOW (ref 13–17)
HOROWITZ INDEX BLDMV+IHG-RTO: 40 — SIGNIFICANT CHANGE UP
INR BLD: 1.13 RATIO — SIGNIFICANT CHANGE UP (ref 0.88–1.16)
MCHC RBC-ENTMCNC: 32.1 PG — SIGNIFICANT CHANGE UP (ref 27–34)
MCHC RBC-ENTMCNC: 34.8 GM/DL — SIGNIFICANT CHANGE UP (ref 32–36)
MCV RBC AUTO: 92.4 FL — SIGNIFICANT CHANGE UP (ref 80–100)
O2 CT VFR BLD CALC: 39 MMHG — SIGNIFICANT CHANGE UP (ref 30–65)
O2 CT VFR BLD CALC: 39 MMHG — SIGNIFICANT CHANGE UP (ref 30–65)
O2 CT VFR BLD CALC: 42 MMHG — SIGNIFICANT CHANGE UP (ref 30–65)
PCO2 BLDMV: 38 MMHG — SIGNIFICANT CHANGE UP (ref 30–65)
PCO2 BLDMV: 39 MMHG — SIGNIFICANT CHANGE UP (ref 30–65)
PCO2 BLDMV: 42 MMHG — SIGNIFICANT CHANGE UP (ref 30–65)
PH BLDMV: 7.32 — SIGNIFICANT CHANGE UP (ref 7.32–7.45)
PH BLDMV: 7.33 — SIGNIFICANT CHANGE UP (ref 7.32–7.45)
PH BLDMV: 7.34 — SIGNIFICANT CHANGE UP (ref 7.32–7.45)
PLATELET # BLD AUTO: 54 K/UL — LOW (ref 150–400)
POTASSIUM SERPL-MCNC: 4 MMOL/L — SIGNIFICANT CHANGE UP (ref 3.5–5.3)
POTASSIUM SERPL-SCNC: 4 MMOL/L — SIGNIFICANT CHANGE UP (ref 3.5–5.3)
PROTHROM AB SERPL-ACNC: 12.4 SEC — SIGNIFICANT CHANGE UP (ref 9.8–12.7)
RBC # BLD: 3.33 M/UL — LOW (ref 4.2–5.8)
RBC # FLD: 15.1 % — HIGH (ref 10.3–14.5)
RH IG SCN BLD-IMP: POSITIVE — SIGNIFICANT CHANGE UP
SAO2 % BLDMV: 68 % — SIGNIFICANT CHANGE UP (ref 60–90)
SAO2 % BLDMV: 69 % — SIGNIFICANT CHANGE UP (ref 60–90)
SAO2 % BLDMV: 75 % — SIGNIFICANT CHANGE UP (ref 60–90)
SODIUM SERPL-SCNC: 139 MMOL/L — SIGNIFICANT CHANGE UP (ref 135–145)
VANCOMYCIN TROUGH SERPL-MCNC: 21.5 UG/ML — HIGH (ref 10–20)
WBC # BLD: 10.5 K/UL — SIGNIFICANT CHANGE UP (ref 3.8–10.5)
WBC # FLD AUTO: 10.5 K/UL — SIGNIFICANT CHANGE UP (ref 3.8–10.5)

## 2018-02-03 PROCEDURE — 99232 SBSQ HOSP IP/OBS MODERATE 35: CPT

## 2018-02-03 PROCEDURE — 99292 CRITICAL CARE ADDL 30 MIN: CPT

## 2018-02-03 PROCEDURE — 99291 CRITICAL CARE FIRST HOUR: CPT

## 2018-02-03 PROCEDURE — 71045 X-RAY EXAM CHEST 1 VIEW: CPT | Mod: 26,76

## 2018-02-03 PROCEDURE — 99233 SBSQ HOSP IP/OBS HIGH 50: CPT

## 2018-02-03 RX ORDER — POTASSIUM CHLORIDE 20 MEQ
40 PACKET (EA) ORAL ONCE
Qty: 0 | Refills: 0 | Status: COMPLETED | OUTPATIENT
Start: 2018-02-03 | End: 2018-02-03

## 2018-02-03 RX ORDER — GENTAMICIN SULFATE 40 MG/ML
VIAL (ML) INJECTION
Qty: 0 | Refills: 0 | Status: DISCONTINUED | OUTPATIENT
Start: 2018-02-03 | End: 2018-02-11

## 2018-02-03 RX ORDER — POTASSIUM CHLORIDE 20 MEQ
60 PACKET (EA) ORAL ONCE
Qty: 0 | Refills: 0 | Status: COMPLETED | OUTPATIENT
Start: 2018-02-03 | End: 2018-02-03

## 2018-02-03 RX ORDER — GENTAMICIN SULFATE 40 MG/ML
40 VIAL (ML) INJECTION ONCE
Qty: 0 | Refills: 0 | Status: COMPLETED | OUTPATIENT
Start: 2018-02-03 | End: 2018-02-03

## 2018-02-03 RX ORDER — CEFTRIAXONE 500 MG/1
INJECTION, POWDER, FOR SOLUTION INTRAMUSCULAR; INTRAVENOUS
Qty: 0 | Refills: 0 | Status: DISCONTINUED | OUTPATIENT
Start: 2018-02-03 | End: 2018-02-10

## 2018-02-03 RX ORDER — CEFTRIAXONE 500 MG/1
2 INJECTION, POWDER, FOR SOLUTION INTRAMUSCULAR; INTRAVENOUS EVERY 24 HOURS
Qty: 0 | Refills: 0 | Status: DISCONTINUED | OUTPATIENT
Start: 2018-02-04 | End: 2018-02-10

## 2018-02-03 RX ORDER — CEFTRIAXONE 500 MG/1
2 INJECTION, POWDER, FOR SOLUTION INTRAMUSCULAR; INTRAVENOUS ONCE
Qty: 0 | Refills: 0 | Status: COMPLETED | OUTPATIENT
Start: 2018-02-03 | End: 2018-02-03

## 2018-02-03 RX ORDER — POTASSIUM CHLORIDE 20 MEQ
10 PACKET (EA) ORAL
Qty: 0 | Refills: 0 | Status: COMPLETED | OUTPATIENT
Start: 2018-02-03 | End: 2018-02-03

## 2018-02-03 RX ORDER — MAGNESIUM SULFATE 500 MG/ML
2 VIAL (ML) INJECTION ONCE
Qty: 0 | Refills: 0 | Status: COMPLETED | OUTPATIENT
Start: 2018-02-03 | End: 2018-02-03

## 2018-02-03 RX ORDER — GENTAMICIN SULFATE 40 MG/ML
40 VIAL (ML) INJECTION EVERY 12 HOURS
Qty: 0 | Refills: 0 | Status: DISCONTINUED | OUTPATIENT
Start: 2018-02-03 | End: 2018-02-11

## 2018-02-03 RX ORDER — POTASSIUM CHLORIDE 20 MEQ
20 PACKET (EA) ORAL ONCE
Qty: 0 | Refills: 0 | Status: COMPLETED | OUTPATIENT
Start: 2018-02-03 | End: 2018-02-03

## 2018-02-03 RX ADMIN — Medication 50 GRAM(S): at 03:15

## 2018-02-03 RX ADMIN — VASOPRESSIN 6 UNIT(S)/MIN: 20 INJECTION INTRAVENOUS at 00:33

## 2018-02-03 RX ADMIN — Medication 6: at 05:22

## 2018-02-03 RX ADMIN — Medication 10 MG/HR: at 00:32

## 2018-02-03 RX ADMIN — Medication 50 MILLIEQUIVALENT(S): at 01:15

## 2018-02-03 RX ADMIN — Medication 6.01 MICROGRAM(S)/KG/MIN: at 00:32

## 2018-02-03 RX ADMIN — Medication 110 MILLIGRAM(S): at 05:23

## 2018-02-03 RX ADMIN — Medication 100 MILLIGRAM(S): at 01:06

## 2018-02-03 RX ADMIN — Medication 60 MILLIEQUIVALENT(S): at 12:19

## 2018-02-03 RX ADMIN — Medication 250 MILLIGRAM(S): at 16:39

## 2018-02-03 RX ADMIN — Medication 2: at 13:06

## 2018-02-03 RX ADMIN — PROPOFOL 9.05 MICROGRAM(S)/KG/MIN: 10 INJECTION, EMULSION INTRAVENOUS at 00:33

## 2018-02-03 RX ADMIN — Medication 200 MILLIGRAM(S): at 18:02

## 2018-02-03 RX ADMIN — PROPOFOL 9.05 MICROGRAM(S)/KG/MIN: 10 INJECTION, EMULSION INTRAVENOUS at 18:00

## 2018-02-03 RX ADMIN — Medication 2: at 00:29

## 2018-02-03 RX ADMIN — Medication 50 MICROGRAM(S): at 16:38

## 2018-02-03 RX ADMIN — Medication 325 MILLIGRAM(S): at 16:38

## 2018-02-03 RX ADMIN — Medication 50 MILLIEQUIVALENT(S): at 00:15

## 2018-02-03 RX ADMIN — Medication 2: at 18:10

## 2018-02-03 RX ADMIN — CEFTRIAXONE 100 GRAM(S): 500 INJECTION, POWDER, FOR SOLUTION INTRAMUSCULAR; INTRAVENOUS at 10:25

## 2018-02-03 RX ADMIN — Medication 60 MILLIEQUIVALENT(S): at 16:38

## 2018-02-03 RX ADMIN — Medication 7.5 MG/HR: at 18:00

## 2018-02-03 RX ADMIN — Medication 50 MILLIEQUIVALENT(S): at 00:45

## 2018-02-03 RX ADMIN — PANTOPRAZOLE SODIUM 40 MILLIGRAM(S): 20 TABLET, DELAYED RELEASE ORAL at 13:06

## 2018-02-03 RX ADMIN — Medication 40 MILLIEQUIVALENT(S): at 09:47

## 2018-02-03 RX ADMIN — Medication 20 MILLIEQUIVALENT(S): at 06:37

## 2018-02-03 RX ADMIN — VASOPRESSIN 6 UNIT(S)/MIN: 20 INJECTION INTRAVENOUS at 17:51

## 2018-02-03 RX ADMIN — Medication 6.01 MICROGRAM(S)/KG/MIN: at 18:00

## 2018-02-03 RX ADMIN — HEPARIN SODIUM 5000 UNIT(S): 5000 INJECTION INTRAVENOUS; SUBCUTANEOUS at 05:20

## 2018-02-03 RX ADMIN — Medication 13.57 MICROGRAM(S)/KG/MIN: at 00:32

## 2018-02-03 RX ADMIN — Medication 13.57 MICROGRAM(S)/KG/MIN: at 18:00

## 2018-02-03 RX ADMIN — Medication 200 MILLIGRAM(S): at 11:04

## 2018-02-03 NOTE — PROGRESS NOTE ADULT - SUBJECTIVE AND OBJECTIVE BOX
[facility] DIVISION OF KIDNEY DISEASES AND HYPERTENSION -- FOLLOW UP NOTE  --------------------------------------------------------------------------------  Chief Complaint: ELIAN    24 hour events/subjective: None        PAST HISTORY  --------------------------------------------------------------------------------  No significant changes to PMH, PSH, FHx, SHx, unless otherwise noted    ALLERGIES & MEDICATIONS  --------------------------------------------------------------------------------  Allergies    No Known Allergies    Intolerances      Standing Inpatient Medications  aspirin 325 milliGRAM(s) Oral daily  cefepime  IVPB 2000 milliGRAM(s) IV Intermittent <User Schedule>  dextrose 5%. 1000 milliLiter(s) IV Continuous <Continuous>  dextrose 50% Injectable 12.5 Gram(s) IV Push once  dextrose 50% Injectable 25 Gram(s) IV Push once  dextrose 50% Injectable 25 Gram(s) IV Push once  dextrose 50% Injectable 50 milliLiter(s) IV Push every 15 minutes  dextrose 50% Injectable 25 milliLiter(s) IV Push every 15 minutes  DOBUTamine Infusion 6 MICROgram(s)/kG/Min IV Continuous <Continuous>  doxycycline IVPB 100 milliGRAM(s) IV Intermittent every 12 hours  doxycycline IVPB      furosemide Infusion 20 mG/Hr IV Continuous <Continuous>  heparin  Injectable 5000 Unit(s) SubCutaneous every 8 hours  insulin Infusion 2 Unit(s)/Hr IV Continuous <Continuous>  insulin lispro (HumaLOG) corrective regimen sliding scale   SubCutaneous every 6 hours  levothyroxine Injectable 50 MICROGram(s) IV Push <User Schedule>  metroNIDAZOLE  IVPB 500 milliGRAM(s) IV Intermittent <User Schedule>  micafungin IVPB 100 milliGRAM(s) IV Intermittent <User Schedule>  norepinephrine Infusion 0.085 MICROgram(s)/kG/Min IV Continuous <Continuous>  pantoprazole  Injectable 40 milliGRAM(s) IV Push daily  propofol Infusion 20 MICROgram(s)/kG/Min IV Continuous <Continuous>  sodium chloride 0.9%. 1000 milliLiter(s) IV Continuous <Continuous>  vancomycin  IVPB 1000 milliGRAM(s) IV Intermittent every 24 hours  vasopressin Infusion 0.1 Unit(s)/Min IV Continuous <Continuous>    PRN Inpatient Medications  dextrose Gel 1 Dose(s) Oral once PRN  glucagon  Injectable 1 milliGRAM(s) IntraMuscular once PRN      REVIEW OF SYSTEMS  --------------------------------------------------------------------------------   unable to obtain     All other systems were reviewed and are negative, except as noted.    VITALS/PHYSICAL EXAM  --------------------------------------------------------------------------------  T(C): 36.2 (02-03-18 @ 08:00), Max: 37 (02-02-18 @ 17:00)  HR: 64 (02-03-18 @ 08:15) (63 - 113)  BP: --  RR: 16 (02-03-18 @ 08:15) (0 - 25)  SpO2: 100% (02-03-18 @ 08:15) (97% - 100%)  Wt(kg): --        02-02-18 @ 07:01  -  02-03-18 @ 07:00  --------------------------------------------------------  IN: 3575.8 mL / OUT: 4110 mL / NET: -534.2 mL    02-03-18 @ 07:01  -  02-03-18 @ 08:17  --------------------------------------------------------  IN: 83.6 mL / OUT: 330 mL / NET: -246.4 mL      Physical Exam:              HEENT: intubated  	Pulm: coarse BS present    	CV: RRR, S1S2; no rub  	Abd: +BS, soft, nontender/nondistended  	: kayli  	UE:  no edema  	LE:  +trace edema  	Neuro: sedated  	Skin: Warm  	   	  LABS/STUDIES  --------------------------------------------------------------------------------              10.7   10.5  >-----------<  54       [02-03-18 @ 05:16]              30.7     139  |  107  |  48  ----------------------------<  264      [02-03-18 @ 05:16]  4.0   |  18  |  2.74        Ca     7.4     [02-03-18 @ 05:16]      Mg     3.1     [02-01-18 @ 09:01]      Phos  5.8     [02-01-18 @ 09:01]    TPro  4.6  /  Alb  2.7  /  TBili  3.4  /  DBili  x   /  AST  501  /  ALT  351  /  AlkPhos  69  [02-02-18 @ 08:31]    PT/INR: PT 12.4 , INR 1.13       [02-03-18 @ 05:16]  PTT: 57.2       [02-03-18 @ 05:16]    Troponin 0.30      [02-01-18 @ 09:01]        [02-01-18 @ 09:01]    Creatinine Trend:  SCr 2.74 [02-03 @ 05:16]  SCr 2.73 [02-02 @ 22:12]  SCr 2.47 [02-02 @ 08:31]  SCr 2.40 [02-02 @ 01:36]  SCr 2.10 [02-01 @ 14:44]    Urinalysis - [02-02-18 @ 11:50]      Color Yellow / Appearance Clear / SG 1.010 / pH 5.5      Gluc Negative / Ketone Negative  / Bili Negative / Urobili Negative       Blood Small / Protein Negative / Leuk Est Trace / Nitrite Negative      RBC 2-5 / WBC 2-5 / Hyaline  / Gran  / Sq Epi  / Non Sq Epi  / Bacteria Few    Urine Creatinine 25      [01-27-18 @ 20:56]  Urine Protein 5      [01-27-18 @ 20:56]    HbA1c 9.1      [01-09-18 @ 03:39]  TSH 11.21      [02-01-18 @ 12:43]  Lipid: chol 103, , HDL 16, LDL 67      [01-09-18 @ 08:09]    HIV Nonreact      [01-09-18 @ 07:54]    Syphilis Screen (Treponema Pallidum Ab) Negative      [01-11-18 @ 19:58]

## 2018-02-03 NOTE — PROGRESS NOTE ADULT - PROBLEM SELECTOR PLAN 1
Likely contrast induced nephropathy from cardiac cath/Toxic/Septic  ATN      -Renal function stable with  excellent urine output on lasix drip   -Continue to monitor   -Maintain on pressor support  -AVOID NSAIDs, ACE/ARBS, and nephrotoxins, dose medications for eGFR < 15.  --No acute HD indication at this time  -Check Vancomycin level

## 2018-02-03 NOTE — PROGRESS NOTE ADULT - SUBJECTIVE AND OBJECTIVE BOX
infectious diseases progress note:    Patient is a 72y old  Male who presents with a chief complaint of Fall (2018 00:52)        Atrial fibrillation             Allergies    No Known Allergies    Intolerances        ANTIBIOTICS/RELEVANT:  antimicrobials  cefTRIAXone   IVPB      gentamicin   IVPB      vancomycin  IVPB 1000 milliGRAM(s) IV Intermittent every 24 hours    immunologic:    OTHER:  aspirin 325 milliGRAM(s) Oral daily  dextrose 5%. 1000 milliLiter(s) IV Continuous <Continuous>  dextrose 50% Injectable 12.5 Gram(s) IV Push once  dextrose 50% Injectable 25 Gram(s) IV Push once  dextrose 50% Injectable 25 Gram(s) IV Push once  dextrose 50% Injectable 50 milliLiter(s) IV Push every 15 minutes  dextrose 50% Injectable 25 milliLiter(s) IV Push every 15 minutes  dextrose Gel 1 Dose(s) Oral once PRN  DOBUTamine Infusion 6 MICROgram(s)/kG/Min IV Continuous <Continuous>  furosemide Infusion 20 mG/Hr IV Continuous <Continuous>  glucagon  Injectable 1 milliGRAM(s) IntraMuscular once PRN  heparin  Injectable 5000 Unit(s) SubCutaneous every 8 hours  insulin Infusion 2 Unit(s)/Hr IV Continuous <Continuous>  insulin lispro (HumaLOG) corrective regimen sliding scale   SubCutaneous every 6 hours  levothyroxine Injectable 50 MICROGram(s) IV Push <User Schedule>  norepinephrine Infusion 0.085 MICROgram(s)/kG/Min IV Continuous <Continuous>  pantoprazole  Injectable 40 milliGRAM(s) IV Push daily  potassium chloride   Solution 40 milliEquivalent(s) Oral once  propofol Infusion 20 MICROgram(s)/kG/Min IV Continuous <Continuous>  sodium chloride 0.9%. 1000 milliLiter(s) IV Continuous <Continuous>  vasopressin Infusion 0.1 Unit(s)/Min IV Continuous <Continuous>      Objective:  Vital Signs Last 24 Hrs  T(C): 36.2 (2018 08:00), Max: 37 (2018 17:00)  T(F): 97.2 (2018 08:00), Max: 98.6 (2018 17:00)  HR: 65 (2018 09:30) (63 - 80)  BP: --  BP(mean): --  RR: 16 (2018 09:30) (0 - 25)  SpO2: 100% (2018 09:30) (97% - 100%)       Eyes:MYLA, EOMI  Ear/Nose/Throat: no oral lesion, no sinus tenderness on percussion	  Neck:no JVD, no lymphadenopathy, supple  Respiratory: CTA matt  Cardiovascular: S1S2 RRR, no murmurs  Gastrointestinal:soft, (+) BS, no HSM  Extremities possible emolus distal toe on left foot         LABS:                        10.7   10.5  )-----------( 54       ( 2018 05:16 )             30.7         139  |  107  |  48<H>  ----------------------------<  264<H>  4.0   |  18<L>  |  2.74<H>    Ca    7.4<L>      2018 05:16    TPro  4.6<L>  /  Alb  2.7<L>  /  TBili  3.4<H>  /  DBili  x   /  AST  501<H>  /  ALT  351<H>  /  AlkPhos  69      PT/INR - ( 2018 05:16 )   PT: 12.4 sec;   INR: 1.13 ratio         PTT - ( 2018 05:16 )  PTT:57.2 sec  Urinalysis Basic - ( 2018 11:50 )    Color: Yellow / Appearance: Clear / S.010 / pH: x  Gluc: x / Ketone: Negative  / Bili: Negative / Urobili: Negative   Blood: x / Protein: Negative / Nitrite: Negative   Leuk Esterase: Trace / RBC: 2-5 /HPF / WBC 2-5 /HPF   Sq Epi: x / Non Sq Epi: x / Bacteria: Few /HPF          MICROBIOLOGY:    RECENT CULTURES:   @ 16:35 .Sputum Sputum       Few polymorphonuclear leukocytes per low power field  Rare Squamous Epithelial Cells per low power field  Moderate Gram Negative Rods per oil power field  Rare Gram positive cocci in pairs per oil power field  Rare Yeast like cells per oil power field              @ 16:46 .Blood Blood                No growth to date.     @ 01:01 .Tissue aoritc valve leaflet       Rare polymorphonuclear leukocytes seen per low power field  Moderate Gram Positive Cocci in Pairs and Chains seen per oil power field           Testing in progress     @ 00:52 .Tissue Other, aortic valve leaflet                        RESPIRATORY CULTURES:      Clostridium difficile GDH Toxins A&amp;B, EIA:   Negative ( @ 06:56)          RADIOLOGY & ADDITIONAL STUDIES:        Pager [phone]  After 5 pm/weekends or if no response :[phone]

## 2018-02-03 NOTE — PROGRESS NOTE ADULT - ASSESSMENT
72 year-old male with past medical history of T2DM, DLD, alcohol misuse brought in by family after having been found face down on the floor for approximately 2 days. On treatment for UTI, but now JANES with Aortic Valve lesions. 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 Bartonella, Legionella, Q Fever serology negative; fungal BCX negative. Brucella pending. Unclear cause of NVE, culture  with GPC--pending. Having diarrhea, C diff negative. LFTs rising, working ELIAN, aortic balloon pump, pressors. Critically ill. Aortic valve vegetation, leukocytosis, post operative state.  -    discussed with colleagues and [facility]  organism is fastidious gram positive coccus   micro having trouble getting it to grow   not likely if it were enterococcus  more likely nuirt def strep that needs combination therapy     can dc doxy flagyl  and change to ceftriaxone and genta ( low dose despite risks of renal toxicity).

## 2018-02-03 NOTE — PROGRESS NOTE ADULT - SUBJECTIVE AND OBJECTIVE BOX
SAMANTHA CADENA   MRN#: [id number]     The patient is a 72y Male who was seen, evaluated, & examined with the CTICU staff on rounds and later in the day with a multidisciplinary care plan formulated & implemented.  All available clinical, laboratory, radiographic, pharmacologic, and electrocardiographic data were reviewed & analyzed.      The patient was in the CTICU in critical condition secondary to acute hypoxic respiratory failure-progressive cardiopulmonary dysfunction, cardiogenic shock-cardiovascular dysfunction, hemodynamically significant anemia/hypovolemia-shock, hyperlactatemia-acidosis, acute on chronic postoperative kidney injury, stress hyperglycemia, and Gram positive cocci aortic valve endocarditis.        Respiratory failure required reintubation full ventilatory support, the following of ABG’s with A-line monitoring, continuous pulse oximetry monitoring, and an IV Propofol infusion, and intermittent IV Dilaudid for support & to evaluate for & prevent further decompensation secondary to progressive cardiopulmonary dysfunction, cardiogenic shock-cardiovascular dysfunction, and Gram positive cocci AV endocarditis.     Invasive hemodynamic monitoring with an A-line was required for the following of continuous MAP/BP monitoring to ensure adequate cardiovascular support and to evaluate for & help prevent decompensation while receiving an IABP, an IV Dobutamine drip, and an IV Lasix drip, and enteral Potassium chloride secondary to cardiogenic shock-cardiovascular dysfunction, resolved hyperlactatemia-acidosis, & acute on chronic postoperative kidney injury-failure.       Metabolic stability, stress hyperglycemia, & infection prophylaxis required an IV regular Insulin drip & the following of serial glucose levels to help achieve & maintain euglycemia.      Patient required more than the usual postoperative critical care management and I provided 80 minutes of non-continuous care to the patient.  Discussed at length with the CTICU staff and helped coordinate care. SAMANTHA CADENA   MRN#: [id number]     The patient is a 72y Male who was seen, evaluated, & examined with the CTICU staff on rounds and later in the day with a multidisciplinary care plan formulated & implemented.  All available clinical, laboratory, radiographic, pharmacologic, and electrocardiographic data were reviewed & analyzed.      The patient was in the CTICU in critical condition secondary to acute hypoxic respiratory failure-progressive cardiopulmonary dysfunction, cardiogenic shock-cardiovascular dysfunction, hemodynamically significant anemia/hypovolemia-shock, hyperlactatemia-acidosis, acute on chronic postoperative kidney injury, stress hyperglycemia, and Gram positive cocci aortic valve endocarditis.        Respiratory failure required reintubation full ventilatory support, the following of ABG’s with A-line monitoring, continuous pulse oximetry monitoring, and an IV Propofol infusion for support & to evaluate for & prevent further decompensation secondary to progressive cardiopulmonary dysfunction, cardiogenic shock-cardiovascular dysfunction, and Gram positive cocci AV endocarditis.     Invasive hemodynamic monitoring with an A-line was required for the following of continuous MAP/BP monitoring to ensure adequate cardiovascular support and to evaluate for & help prevent decompensation while receiving an IABP, an IV Dobutamine drip, and an IV Lasix drip, and enteral Potassium chloride secondary to cardiogenic shock-cardiovascular dysfunction, resolved hyperlactatemia-acidosis, & acute on chronic postoperative kidney injury-failure.       Metabolic stability, stress hyperglycemia, & infection prophylaxis required an IV regular Insulin drip & the following of serial glucose levels to help achieve & maintain euglycemia.      Patient required more than the usual postoperative critical care management and I provided 80 minutes of non-continuous care to the patient.  Discussed at length with the CTICU staff and helped coordinate care.

## 2018-02-04 LAB
ALBUMIN SERPL ELPH-MCNC: 2.9 G/DL — LOW (ref 3.3–5)
ALP SERPL-CCNC: 145 U/L — HIGH (ref 40–120)
ALT FLD-CCNC: 236 U/L RC — HIGH (ref 10–45)
ANION GAP SERPL CALC-SCNC: 14 MMOL/L — SIGNIFICANT CHANGE UP (ref 5–17)
APTT BLD: 43.4 SEC — HIGH (ref 27.5–37.4)
AST SERPL-CCNC: 159 U/L — HIGH (ref 10–40)
BASE EXCESS BLDMV CALC-SCNC: -2.7 MMOL/L — SIGNIFICANT CHANGE UP (ref -3–3)
BASE EXCESS BLDMV CALC-SCNC: -3.4 MMOL/L — LOW (ref -3–3)
BASE EXCESS BLDMV CALC-SCNC: -3.5 MMOL/L — LOW (ref -3–3)
BASE EXCESS BLDMV CALC-SCNC: -4.2 MMOL/L — LOW (ref -3–3)
BILIRUB SERPL-MCNC: 3.8 MG/DL — HIGH (ref 0.2–1.2)
BUN SERPL-MCNC: 49 MG/DL — HIGH (ref 7–23)
CALCIUM SERPL-MCNC: 7.8 MG/DL — LOW (ref 8.4–10.5)
CHLORIDE SERPL-SCNC: 109 MMOL/L — HIGH (ref 96–108)
CO2 BLDMV-SCNC: 22 MMOL/L — SIGNIFICANT CHANGE UP (ref 21–29)
CO2 BLDMV-SCNC: 23 MMOL/L — SIGNIFICANT CHANGE UP (ref 21–29)
CO2 SERPL-SCNC: 20 MMOL/L — LOW (ref 22–31)
COHGB MFR BLDMV: 1.1 % — SIGNIFICANT CHANGE UP (ref 0–1.5)
CREAT SERPL-MCNC: 2.45 MG/DL — HIGH (ref 0.5–1.3)
CULTURE RESULTS: SIGNIFICANT CHANGE UP
GAS PNL BLDA: SIGNIFICANT CHANGE UP
GAS PNL BLDMV: SIGNIFICANT CHANGE UP
GENTAMICIN TROUGH SERPL-MCNC: 0.7 UG/ML — SIGNIFICANT CHANGE UP (ref 0–2)
GLUCOSE BLDC GLUCOMTR-MCNC: 102 MG/DL — HIGH (ref 70–99)
GLUCOSE BLDC GLUCOMTR-MCNC: 142 MG/DL — HIGH (ref 70–99)
GLUCOSE BLDC GLUCOMTR-MCNC: 168 MG/DL — HIGH (ref 70–99)
GLUCOSE BLDC GLUCOMTR-MCNC: 282 MG/DL — HIGH (ref 70–99)
GLUCOSE SERPL-MCNC: 231 MG/DL — HIGH (ref 70–99)
HCO3 BLDMV-SCNC: 21 MMOL/L — SIGNIFICANT CHANGE UP (ref 20–28)
HCO3 BLDMV-SCNC: 21 MMOL/L — SIGNIFICANT CHANGE UP (ref 20–28)
HCO3 BLDMV-SCNC: 22 MMOL/L — SIGNIFICANT CHANGE UP (ref 20–28)
HCO3 BLDMV-SCNC: 22 MMOL/L — SIGNIFICANT CHANGE UP (ref 20–28)
HCT VFR BLD CALC: 29.6 % — LOW (ref 39–50)
HGB BLD-MCNC: 10.7 G/DL — LOW (ref 13–17)
HGB FLD-MCNC: 9.5 G/DL — LOW (ref 13.1–17.1)
HOROWITZ INDEX BLDMV+IHG-RTO: 40 — SIGNIFICANT CHANGE UP
INR BLD: 1.05 RATIO — SIGNIFICANT CHANGE UP (ref 0.88–1.16)
MCHC RBC-ENTMCNC: 33.6 PG — SIGNIFICANT CHANGE UP (ref 27–34)
MCHC RBC-ENTMCNC: 36.1 GM/DL — HIGH (ref 32–36)
MCV RBC AUTO: 93.2 FL — SIGNIFICANT CHANGE UP (ref 80–100)
METHGB MFR BLDMV: 1.3 % — SIGNIFICANT CHANGE UP (ref 0–1.5)
NT-PROBNP SERPL-SCNC: HIGH PG/ML (ref 0–300)
O2 CT VFR BLD CALC: 37 MMHG — SIGNIFICANT CHANGE UP (ref 30–65)
O2 CT VFR BLD CALC: 38 MMHG — SIGNIFICANT CHANGE UP (ref 30–65)
O2 CT VFR BLD CALC: 41 MMHG — SIGNIFICANT CHANGE UP (ref 30–65)
O2 CT VFR BLD CALC: 42 MMHG — SIGNIFICANT CHANGE UP (ref 30–65)
O2 CT VFR BLDMV CALC: 9 ML/DL — LOW (ref 18–22)
OXYHGB MFR BLDMV: 65 % — SIGNIFICANT CHANGE UP
PCO2 BLDMV: 40 MMHG — SIGNIFICANT CHANGE UP (ref 30–65)
PCO2 BLDMV: 40 MMHG — SIGNIFICANT CHANGE UP (ref 30–65)
PCO2 BLDMV: 41 MMHG — SIGNIFICANT CHANGE UP (ref 30–65)
PCO2 BLDMV: 41 MMHG — SIGNIFICANT CHANGE UP (ref 30–65)
PH BLDMV: 7.34 — SIGNIFICANT CHANGE UP (ref 7.32–7.45)
PH BLDMV: 7.35 — SIGNIFICANT CHANGE UP (ref 7.32–7.45)
PLATELET # BLD AUTO: 82 K/UL — LOW (ref 150–400)
POTASSIUM SERPL-MCNC: 4 MMOL/L — SIGNIFICANT CHANGE UP (ref 3.5–5.3)
POTASSIUM SERPL-SCNC: 4 MMOL/L — SIGNIFICANT CHANGE UP (ref 3.5–5.3)
PROT SERPL-MCNC: 5.4 G/DL — LOW (ref 6–8.3)
PROTHROM AB SERPL-ACNC: 11.3 SEC — SIGNIFICANT CHANGE UP (ref 9.8–12.7)
RBC # BLD: 3.18 M/UL — LOW (ref 4.2–5.8)
RBC # FLD: 15.3 % — HIGH (ref 10.3–14.5)
SAO2 % BLDMV: 67 % — HIGH (ref 18–22)
SAO2 % BLDMV: 70 % — SIGNIFICANT CHANGE UP (ref 60–90)
SAO2 % BLDMV: 73 % — SIGNIFICANT CHANGE UP (ref 60–90)
SAO2 % BLDMV: 74 % — SIGNIFICANT CHANGE UP (ref 60–90)
SODIUM SERPL-SCNC: 143 MMOL/L — SIGNIFICANT CHANGE UP (ref 135–145)
SPECIMEN SOURCE: SIGNIFICANT CHANGE UP
VANCOMYCIN TROUGH SERPL-MCNC: 21.2 UG/ML — HIGH (ref 10–20)
WBC # BLD: 11.9 K/UL — HIGH (ref 3.8–10.5)
WBC # FLD AUTO: 11.9 K/UL — HIGH (ref 3.8–10.5)

## 2018-02-04 PROCEDURE — 99291 CRITICAL CARE FIRST HOUR: CPT

## 2018-02-04 PROCEDURE — 99233 SBSQ HOSP IP/OBS HIGH 50: CPT | Mod: GC

## 2018-02-04 PROCEDURE — 71045 X-RAY EXAM CHEST 1 VIEW: CPT | Mod: 26,76,59

## 2018-02-04 PROCEDURE — 71045 X-RAY EXAM CHEST 1 VIEW: CPT | Mod: 26,77,59

## 2018-02-04 RX ORDER — POTASSIUM CHLORIDE 20 MEQ
60 PACKET (EA) ORAL ONCE
Qty: 0 | Refills: 0 | Status: COMPLETED | OUTPATIENT
Start: 2018-02-04 | End: 2018-02-04

## 2018-02-04 RX ORDER — POTASSIUM CHLORIDE 20 MEQ
20 PACKET (EA) ORAL ONCE
Qty: 0 | Refills: 0 | Status: COMPLETED | OUTPATIENT
Start: 2018-02-04 | End: 2018-02-04

## 2018-02-04 RX ORDER — POTASSIUM CHLORIDE 20 MEQ
10 PACKET (EA) ORAL
Qty: 0 | Refills: 0 | Status: COMPLETED | OUTPATIENT
Start: 2018-02-04 | End: 2018-02-04

## 2018-02-04 RX ORDER — MAGNESIUM SULFATE 500 MG/ML
2 VIAL (ML) INJECTION ONCE
Qty: 0 | Refills: 0 | Status: COMPLETED | OUTPATIENT
Start: 2018-02-04 | End: 2018-02-04

## 2018-02-04 RX ORDER — AMIODARONE HYDROCHLORIDE 400 MG/1
0.51 TABLET ORAL
Qty: 900 | Refills: 0 | Status: DISCONTINUED | OUTPATIENT
Start: 2018-02-05 | End: 2018-02-06

## 2018-02-04 RX ORDER — MAGNESIUM SULFATE 500 MG/ML
1 VIAL (ML) INJECTION ONCE
Qty: 0 | Refills: 0 | Status: COMPLETED | OUTPATIENT
Start: 2018-02-04 | End: 2018-02-04

## 2018-02-04 RX ORDER — FENTANYL CITRATE 50 UG/ML
50 INJECTION INTRAVENOUS ONCE
Qty: 0 | Refills: 0 | Status: DISCONTINUED | OUTPATIENT
Start: 2018-02-04 | End: 2018-02-04

## 2018-02-04 RX ORDER — HYDROMORPHONE HYDROCHLORIDE 2 MG/ML
0.5 INJECTION INTRAMUSCULAR; INTRAVENOUS; SUBCUTANEOUS ONCE
Qty: 0 | Refills: 0 | Status: DISCONTINUED | OUTPATIENT
Start: 2018-02-04 | End: 2018-02-04

## 2018-02-04 RX ORDER — DEXMEDETOMIDINE HYDROCHLORIDE IN 0.9% SODIUM CHLORIDE 4 UG/ML
0.5 INJECTION INTRAVENOUS
Qty: 200 | Refills: 0 | Status: DISCONTINUED | OUTPATIENT
Start: 2018-02-04 | End: 2018-02-06

## 2018-02-04 RX ORDER — AMIODARONE HYDROCHLORIDE 400 MG/1
1 TABLET ORAL
Qty: 900 | Refills: 0 | Status: DISCONTINUED | OUTPATIENT
Start: 2018-02-04 | End: 2018-02-05

## 2018-02-04 RX ORDER — AMIODARONE HYDROCHLORIDE 400 MG/1
150 TABLET ORAL ONCE
Qty: 0 | Refills: 0 | Status: COMPLETED | OUTPATIENT
Start: 2018-02-04 | End: 2018-02-04

## 2018-02-04 RX ORDER — ALBUMIN HUMAN 25 %
250 VIAL (ML) INTRAVENOUS ONCE
Qty: 0 | Refills: 0 | Status: COMPLETED | OUTPATIENT
Start: 2018-02-04 | End: 2018-02-04

## 2018-02-04 RX ADMIN — HEPARIN SODIUM 5000 UNIT(S): 5000 INJECTION INTRAVENOUS; SUBCUTANEOUS at 05:52

## 2018-02-04 RX ADMIN — Medication 325 MILLIGRAM(S): at 11:44

## 2018-02-04 RX ADMIN — Medication 4: at 00:18

## 2018-02-04 RX ADMIN — FENTANYL CITRATE 50 MICROGRAM(S): 50 INJECTION INTRAVENOUS at 14:00

## 2018-02-04 RX ADMIN — Medication 50 GRAM(S): at 23:22

## 2018-02-04 RX ADMIN — FENTANYL CITRATE 50 MICROGRAM(S): 50 INJECTION INTRAVENOUS at 14:15

## 2018-02-04 RX ADMIN — Medication 50 MILLIEQUIVALENT(S): at 23:00

## 2018-02-04 RX ADMIN — AMIODARONE HYDROCHLORIDE 600 MILLIGRAM(S): 400 TABLET ORAL at 23:21

## 2018-02-04 RX ADMIN — Medication 200 MILLIGRAM(S): at 19:45

## 2018-02-04 RX ADMIN — DEXMEDETOMIDINE HYDROCHLORIDE IN 0.9% SODIUM CHLORIDE 9.43 MICROGRAM(S)/KG/HR: 4 INJECTION INTRAVENOUS at 17:19

## 2018-02-04 RX ADMIN — Medication 6.01 MICROGRAM(S)/KG/MIN: at 14:09

## 2018-02-04 RX ADMIN — Medication 1.25 MG/HR: at 14:08

## 2018-02-04 RX ADMIN — PROPOFOL 9.05 MICROGRAM(S)/KG/MIN: 10 INJECTION, EMULSION INTRAVENOUS at 03:14

## 2018-02-04 RX ADMIN — Medication 4: at 05:51

## 2018-02-04 RX ADMIN — Medication 125 MILLILITER(S): at 03:14

## 2018-02-04 RX ADMIN — Medication 60 MILLIEQUIVALENT(S): at 11:00

## 2018-02-04 RX ADMIN — Medication 6.01 MICROGRAM(S)/KG/MIN: at 03:13

## 2018-02-04 RX ADMIN — Medication 6.79 MICROGRAM(S)/KG/MIN: at 03:13

## 2018-02-04 RX ADMIN — Medication 50 MICROGRAM(S): at 15:09

## 2018-02-04 RX ADMIN — CEFTRIAXONE 100 GRAM(S): 500 INJECTION, POWDER, FOR SOLUTION INTRAMUSCULAR; INTRAVENOUS at 10:30

## 2018-02-04 RX ADMIN — VASOPRESSIN 6 UNIT(S)/MIN: 20 INJECTION INTRAVENOUS at 14:09

## 2018-02-04 RX ADMIN — INSULIN HUMAN 2 UNIT(S)/HR: 100 INJECTION, SOLUTION SUBCUTANEOUS at 17:18

## 2018-02-04 RX ADMIN — Medication 6: at 12:45

## 2018-02-04 RX ADMIN — HEPARIN SODIUM 5000 UNIT(S): 5000 INJECTION INTRAVENOUS; SUBCUTANEOUS at 23:21

## 2018-02-04 RX ADMIN — Medication 200 MILLIGRAM(S): at 05:51

## 2018-02-04 RX ADMIN — VASOPRESSIN 6 UNIT(S)/MIN: 20 INJECTION INTRAVENOUS at 03:13

## 2018-02-04 RX ADMIN — Medication 50 MILLIEQUIVALENT(S): at 22:00

## 2018-02-04 RX ADMIN — AMIODARONE HYDROCHLORIDE 600 MILLIGRAM(S): 400 TABLET ORAL at 12:40

## 2018-02-04 RX ADMIN — PROPOFOL 9.05 MICROGRAM(S)/KG/MIN: 10 INJECTION, EMULSION INTRAVENOUS at 14:09

## 2018-02-04 RX ADMIN — Medication 50 GRAM(S): at 14:10

## 2018-02-04 RX ADMIN — Medication 20 MILLIEQUIVALENT(S): at 19:31

## 2018-02-04 RX ADMIN — HEPARIN SODIUM 5000 UNIT(S): 5000 INJECTION INTRAVENOUS; SUBCUTANEOUS at 15:10

## 2018-02-04 RX ADMIN — DEXMEDETOMIDINE HYDROCHLORIDE IN 0.9% SODIUM CHLORIDE 9.43 MICROGRAM(S)/KG/HR: 4 INJECTION INTRAVENOUS at 13:40

## 2018-02-04 RX ADMIN — Medication 50 MILLIEQUIVALENT(S): at 23:22

## 2018-02-04 RX ADMIN — Medication 6.79 MICROGRAM(S)/KG/MIN: at 14:07

## 2018-02-04 RX ADMIN — PANTOPRAZOLE SODIUM 40 MILLIGRAM(S): 20 TABLET, DELAYED RELEASE ORAL at 11:43

## 2018-02-04 RX ADMIN — Medication 5 MG/HR: at 03:13

## 2018-02-04 RX ADMIN — Medication 40 MILLIEQUIVALENT(S): at 00:18

## 2018-02-04 NOTE — PROGRESS NOTE ADULT - ASSESSMENT
Patient is a 71 y/o man with history of ETOH abuse who presents with fall and found with endocarditis, developed ELIAN in-hospital shortly after undergoing cardiac catheterization, s/p urgent AVR and CABG on 1/30/18 with clinical decompensation on 2/1 requiring intubation and multiple vasopressor support.

## 2018-02-04 NOTE — PROGRESS NOTE ADULT - SUBJECTIVE AND OBJECTIVE BOX
[facility] DIVISION OF KIDNEY DISEASES AND HYPERTENSION -- FOLLOW UP NOTE  --------------------------------------------------------------------------------  Grant Shirleyahim     --------------------------------------------------------------------------------  Chief Complaint:silvia    24 hour events/subjective:  no acute events noted      PAST HISTORY  --------------------------------------------------------------------------------  No significant changes to PMH, PSH, FHx, SHx, unless otherwise noted    ALLERGIES & MEDICATIONS  --------------------------------------------------------------------------------  Allergies    No Known Allergies    Intolerances      Standing Inpatient Medications  aspirin 325 milliGRAM(s) Oral daily  cefTRIAXone   IVPB      cefTRIAXone   IVPB 2 Gram(s) IV Intermittent every 24 hours  dextrose 5%. 1000 milliLiter(s) IV Continuous <Continuous>  dextrose 50% Injectable 25 Gram(s) IV Push once  dextrose 50% Injectable 50 milliLiter(s) IV Push every 15 minutes  DOBUTamine Infusion 3 MICROgram(s)/kG/Min IV Continuous <Continuous>  furosemide Infusion 2.5 mG/Hr IV Continuous <Continuous>  gentamicin   IVPB      gentamicin   IVPB 40 milliGRAM(s) IV Intermittent every 12 hours  heparin  Injectable 5000 Unit(s) SubCutaneous every 8 hours  insulin Infusion 2 Unit(s)/Hr IV Continuous <Continuous>  insulin lispro (HumaLOG) corrective regimen sliding scale   SubCutaneous every 6 hours  levothyroxine Injectable 50 MICROGram(s) IV Push <User Schedule>  norepinephrine Infusion 0.085 MICROgram(s)/kG/Min IV Continuous <Continuous>  pantoprazole  Injectable 40 milliGRAM(s) IV Push daily  propofol Infusion 20 MICROgram(s)/kG/Min IV Continuous <Continuous>  sodium chloride 0.9%. 1000 milliLiter(s) IV Continuous <Continuous>  vancomycin  IVPB 1000 milliGRAM(s) IV Intermittent every 24 hours  vasopressin Infusion 0.1 Unit(s)/Min IV Continuous <Continuous>    PRN Inpatient Medications      REVIEW OF SYSTEMS  --------------------------------------------------------------------------------  Review Of Systems:  Constitutional: [ ] Fever [ ] Chills [ ] Fatigue [ ] Weight change   HEENT: [ ] Blurred vision [ ] Eye Pain [ ] Headache [ ] Runny nose [ ] Sore Throat   Respiratory: [ ] Cough [ ] Wheezing [ ] Shortness of breath  Cardiovascular: [ ] Chest Pain [ ] Palpitations [ ] MCLEOD [ ] PND [ ] Orthopnea  Gastrointestinal: [ ] Abdominal Pain [ ] Diarrhea [ ] Constipation [ ] Hemorrhoids [ ] Nausea [ ] Vomiting  Genitourinary: [ ] Nocturia [ ] Dysuria [ ] Incontinence  Extremities: [ ] Swelling [ ] Joint Pain  Neurologic: [ ] Focal deficit [ ] Paresthesias [ ] Syncope  Lymphatic: [ ] Swelling [ ] Lymphadenopathy   Skin: [ ] Rash [ ] Ecchymoses [ ] Wounds [ ] Lesions  Psychiatry: [ ] Depression [ ] Suicidal/Homicidal Ideation [ ] Anxiety [ ] Sleep Disturbances  [ ] 10 point review of systems is otherwise negative except as mentioned above       [ x]Unable to obtain    All other systems were reviewed and are negative, except as noted.    VITALS/PHYSICAL EXAM  --------------------------------------------------------------------------------  T(C): 36.2 (18 @ 03:00), Max: 36.3 (18 @ 11:00)  HR: 72 (18 @ 07:00) (64 - 75)  BP: --  RR: 16 (18 @ 07:00) (10 - 28)  SpO2: 100% (18 @ 07:00) (100% - 100%)  Wt(kg): --      Daily     Daily Weight in k.5 (2018 01:00)  I&O's Summary    2018 07:01  -  2018 07:00  --------------------------------------------------------  IN: 2868.4 mL / OUT: 6825 mL / NET: -3956.6 mL          18 @ 07:01  -  18 @ 07:00  --------------------------------------------------------  IN: 2868.4 mL / OUT: 6825 mL / NET: -3956.6 mL        Physical Exam:              Gen: NAD   	HEENT: anicteric  	Pulm: CTA B/L FiO2: 40, PEEP: 5  	CV: RRR  	Back: No dependent edema  	Abd: soft, nontender, nondistended  	:  kayli  	LE: Warm, trace edema  	Neuro: no asterixis  	Skin: Warm, without rashes  	Vascular access: none    LABS/STUDIES  --------------------------------------------------------------------------------              10.7   11.9  >-----------<  82       [18 02:27]              29.6     143  |  109  |  49  ----------------------------<  231      [18 02:27]  4.0   |  20  |  2.45        Ca     7.8     [18 02:27]    TPro  5.4  /  Alb  2.9  /  TBili  3.8  /  DBili  x   /  AST  159  /  ALT  236  /  AlkPhos  145  [18 02:27]    PT/INR: PT 11.3 , INR 1.05       [18 02:27]  PTT: 43.4       [18 02:27]      Creatinine Trend:  SCr 2.45 [:27]  SCr 2.74 [ 05:16]  SCr 2.73 [ 22:12]  SCr 2.47 [ 08:31]  SCr 2.40 [ 01:36]    Urinalysis - [18 @ 11:50]      Color Yellow / Appearance Clear / SG 1.010 / pH 5.5      Gluc Negative / Ketone Negative  / Bili Negative / Urobili Negative       Blood Small / Protein Negative / Leuk Est Trace / Nitrite Negative      RBC 2-5 / WBC 2-5 / Hyaline  / Gran  / Sq Epi  / Non Sq Epi  / Bacteria Few      HbA1c 9.1      [18 @ 03:39]  TSH 11.21      [18 @ 12:43]  Lipid: chol 103, , HDL 16, LDL 67      [18 @ 08:09]    HIV Nonreact      [18 @ 07:54]    Syphilis Screen (Treponema Pallidum Ab) Negative      [18 @ 19:58]      Radiology  --------------------------------------------------------------------------------    --------------------------------------------------------------------------------  Grant Yancey   [phone] [facility] DIVISION OF KIDNEY DISEASES AND HYPERTENSION -- FOLLOW UP NOTE  --------------------------------------------------------------------------------  Grant Yancey     --------------------------------------------------------------------------------  Chief Complaint:silvia    24 hour events/subjective:  no acute events noted  Started on low dose gentamycin      PAST HISTORY  --------------------------------------------------------------------------------  No significant changes to PMH, PSH, FHx, SHx, unless otherwise noted    ALLERGIES & MEDICATIONS  --------------------------------------------------------------------------------  Allergies    No Known Allergies    Intolerances      Standing Inpatient Medications  aspirin 325 milliGRAM(s) Oral daily  cefTRIAXone   IVPB      cefTRIAXone   IVPB 2 Gram(s) IV Intermittent every 24 hours  dextrose 5%. 1000 milliLiter(s) IV Continuous <Continuous>  dextrose 50% Injectable 25 Gram(s) IV Push once  dextrose 50% Injectable 50 milliLiter(s) IV Push every 15 minutes  DOBUTamine Infusion 3 MICROgram(s)/kG/Min IV Continuous <Continuous>  furosemide Infusion 2.5 mG/Hr IV Continuous <Continuous>  gentamicin   IVPB      gentamicin   IVPB 40 milliGRAM(s) IV Intermittent every 12 hours  heparin  Injectable 5000 Unit(s) SubCutaneous every 8 hours  insulin Infusion 2 Unit(s)/Hr IV Continuous <Continuous>  insulin lispro (HumaLOG) corrective regimen sliding scale   SubCutaneous every 6 hours  levothyroxine Injectable 50 MICROGram(s) IV Push <User Schedule>  norepinephrine Infusion 0.085 MICROgram(s)/kG/Min IV Continuous <Continuous>  pantoprazole  Injectable 40 milliGRAM(s) IV Push daily  propofol Infusion 20 MICROgram(s)/kG/Min IV Continuous <Continuous>  sodium chloride 0.9%. 1000 milliLiter(s) IV Continuous <Continuous>  vancomycin  IVPB 1000 milliGRAM(s) IV Intermittent every 24 hours  vasopressin Infusion 0.1 Unit(s)/Min IV Continuous <Continuous>    PRN Inpatient Medications      REVIEW OF SYSTEMS  --------------------------------------------------------------------------------  Review Of Systems:  Constitutional: [ ] Fever [ ] Chills [ ] Fatigue [ ] Weight change   HEENT: [ ] Blurred vision [ ] Eye Pain [ ] Headache [ ] Runny nose [ ] Sore Throat   Respiratory: [ ] Cough [ ] Wheezing [ ] Shortness of breath  Cardiovascular: [ ] Chest Pain [ ] Palpitations [ ] MCLEOD [ ] PND [ ] Orthopnea  Gastrointestinal: [ ] Abdominal Pain [ ] Diarrhea [ ] Constipation [ ] Hemorrhoids [ ] Nausea [ ] Vomiting  Genitourinary: [ ] Nocturia [ ] Dysuria [ ] Incontinence  Extremities: [ ] Swelling [ ] Joint Pain  Neurologic: [ ] Focal deficit [ ] Paresthesias [ ] Syncope  Lymphatic: [ ] Swelling [ ] Lymphadenopathy   Skin: [ ] Rash [ ] Ecchymoses [ ] Wounds [ ] Lesions  Psychiatry: [ ] Depression [ ] Suicidal/Homicidal Ideation [ ] Anxiety [ ] Sleep Disturbances  [ ] 10 point review of systems is otherwise negative except as mentioned above       [ x]Unable to obtain    All other systems were reviewed and are negative, except as noted.    VITALS/PHYSICAL EXAM  --------------------------------------------------------------------------------  T(C): 36.2 (18 @ 03:00), Max: 36.3 (18 @ 11:00)  HR: 72 (18 @ 07:00) (64 - 75)  BP: --  RR: 16 (18 @ 07:00) (10 - 28)  SpO2: 100% (18 @ 07:00) (100% - 100%)  Wt(kg): --      Daily     Daily Weight in k.5 (2018 01:00)  I&O's Summary    2018 07:01  -  2018 07:00  --------------------------------------------------------  IN: 2868.4 mL / OUT: 6825 mL / NET: -3956.6 mL          18 @ 07:01  -  18 @ 07:00  --------------------------------------------------------  IN: 2868.4 mL / OUT: 6825 mL / NET: -3956.6 mL        Physical Exam:              Gen: NAD   	HEENT: anicteric  	Pulm: CTA B/L FiO2: 40, PEEP: 5  	CV: RRR  	Back: No dependent edema  	Abd: soft, nontender, nondistended  	:  kayli  	LE: Warm, trace edema  	Neuro: no asterixis  	Skin: Warm, without rashes  	Vascular access: none    LABS/STUDIES  --------------------------------------------------------------------------------              10.7   11.9  >-----------<  82       [18 02:27]              29.6     143  |  109  |  49  ----------------------------<  231      [18 02:27]  4.0   |  20  |  2.45        Ca     7.8     [18 02:27]    TPro  5.4  /  Alb  2.9  /  TBili  3.8  /  DBili  x   /  AST  159  /  ALT  236  /  AlkPhos  145  [18 02:27]    PT/INR: PT 11.3 , INR 1.05       [18 02:27]  PTT: 43.4       [18 02:27]      Creatinine Trend:  SCr 2.45 [:27]  SCr 2.74 [ 05:16]  SCr 2.73 [ 22:12]  SCr 2.47 [ 08:31]  SCr 2.40 [ 01:36]    Urinalysis - [18 @ 11:50]      Color Yellow / Appearance Clear / SG 1.010 / pH 5.5      Gluc Negative / Ketone Negative  / Bili Negative / Urobili Negative       Blood Small / Protein Negative / Leuk Est Trace / Nitrite Negative      RBC 2-5 / WBC 2-5 / Hyaline  / Gran  / Sq Epi  / Non Sq Epi  / Bacteria Few      HbA1c 9.1      [18 @ 03:39]  TSH 11.21      [18 @ 12:43]  Lipid: chol 103, , HDL 16, LDL 67      [18 @ 08:09]    HIV Nonreact      [18 @ 07:54]    Syphilis Screen (Treponema Pallidum Ab) Negative      [18 @ 19:58]      Radiology  --------------------------------------------------------------------------------    --------------------------------------------------------------------------------  Grant Yancey

## 2018-02-04 NOTE — PROGRESS NOTE ADULT - PROBLEM SELECTOR PLAN 1
Likely contrast induced nephropathy from cardiac cath/Toxic/Septic  ATN      -Renal function stable with  excellent urine output on  and lasix   -Continue to monitor weight, cvp uop and renal panel.  weight decreasing, BNP decreasing.   -AVOID NSAIDs, ACE/ARBS, and nephrotoxins, dose medications for eGFR < 15.  --No acute HD indication at this time  -Check Vancomycin level and renally adjust level Likely contrast induced nephropathy from cardiac cath/Toxic/Septic  ATN      -Renal function improving  with  excellent urine output on  and lasix   -Continue to monitor weight, cvp uop and renal panel.  -weight decreasing, BNP decreasing.   -AVOID NSAIDs, ACE/ARBS, and nephrotoxins, dose medications for eGFR < 15.  --No acute HD indication at this time  -Check Vancomycin level and renally adjust level  -Check Gentamycin level

## 2018-02-04 NOTE — PROGRESS NOTE ADULT - SUBJECTIVE AND OBJECTIVE BOX
SAMANTHA CADENA   MRN#: [id number]     The patient is a 72y Male who was seen, evaluated, & examined with the CTICU staff on rounds and later in the day with a multidisciplinary care plan formulated & implemented.  All available clinical, laboratory, radiographic, pharmacologic, and electrocardiographic data were reviewed & analyzed.      The patient was in the CTICU in critical condition secondary to acute hypoxic respiratory failure-progressive cardiopulmonary dysfunction, cardiogenic shock-cardiovascular dysfunction, hemodynamically significant anemia/hypovolemia-shock, hyperlactatemia-acidosis, acute on chronic postoperative kidney injury, stress hyperglycemia, and Gram positive cocci aortic valve endocarditis.        Respiratory failure required reintubation with full ventilatory support, the following of ABG’s with A-line monitoring, continuous pulse oximetry monitoring, and an IV Propofol infusion for support & to evaluate for & prevent further decompensation secondary to progressive cardiopulmonary dysfunction, cardiogenic shock-cardiovascular dysfunction, and Gram positive cocci AV endocarditis. Wean to extubate today.     Invasive hemodynamic monitoring with an A-line was required for the following of continuous MAP/BP monitoring to ensure adequate cardiovascular support and to evaluate for & help prevent decompensation while receiving an IABP, an IV Dobutamine drip, IV Vasopressin drip, an IV Norepinephrine drip,  and an IV Lasix drip, and enteral Potassium chloride secondary to cardiogenic shock-cardiovascular dysfunction, resolved hyperlactatemia-acidosis, & acute on chronic postoperative kidney injury-failure. Keep O>I.       Metabolic stability, stress hyperglycemia, & infection prophylaxis required an IV regular Insulin drip & the following of serial glucose levels to help achieve & maintain euglycemia.      Cont enteral tube feeds for nutritional support.     Patient required more than the usual postoperative critical care management and I provided 70  minutes of non-continuous care to the patient.  Discussed at length with the CTICU staff and helped coordinate care.

## 2018-02-04 NOTE — PROGRESS NOTE ADULT - PROBLEM SELECTOR PLAN 2
Diuresis with lasix drip to keep net negative Diuresis with lasix drip to keep net negative  Agree with decreasing lasix drip dose

## 2018-02-05 LAB
ALBUMIN SERPL ELPH-MCNC: 2.8 G/DL — LOW (ref 3.3–5)
ALP SERPL-CCNC: 198 U/L — HIGH (ref 40–120)
ALT FLD-CCNC: 173 U/L RC — HIGH (ref 10–45)
ANION GAP SERPL CALC-SCNC: 14 MMOL/L — SIGNIFICANT CHANGE UP (ref 5–17)
APTT BLD: 66.2 SEC — HIGH (ref 27.5–37.4)
AST SERPL-CCNC: 100 U/L — HIGH (ref 10–40)
BILIRUB SERPL-MCNC: 3.3 MG/DL — HIGH (ref 0.2–1.2)
BUN SERPL-MCNC: 45 MG/DL — HIGH (ref 7–23)
C DIFF GDH STL QL: NEGATIVE — SIGNIFICANT CHANGE UP
C DIFF GDH STL QL: SIGNIFICANT CHANGE UP
CALCIUM SERPL-MCNC: 8.1 MG/DL — LOW (ref 8.4–10.5)
CHLORIDE SERPL-SCNC: 109 MMOL/L — HIGH (ref 96–108)
CO2 SERPL-SCNC: 19 MMOL/L — LOW (ref 22–31)
CREAT SERPL-MCNC: 1.96 MG/DL — HIGH (ref 0.5–1.3)
GAS PNL BLDA: SIGNIFICANT CHANGE UP
GLUCOSE BLDC GLUCOMTR-MCNC: 110 MG/DL — HIGH (ref 70–99)
GLUCOSE BLDC GLUCOMTR-MCNC: 111 MG/DL — HIGH (ref 70–99)
GLUCOSE BLDC GLUCOMTR-MCNC: 122 MG/DL — HIGH (ref 70–99)
GLUCOSE BLDC GLUCOMTR-MCNC: 122 MG/DL — HIGH (ref 70–99)
GLUCOSE BLDC GLUCOMTR-MCNC: 123 MG/DL — HIGH (ref 70–99)
GLUCOSE BLDC GLUCOMTR-MCNC: 125 MG/DL — HIGH (ref 70–99)
GLUCOSE BLDC GLUCOMTR-MCNC: 127 MG/DL — HIGH (ref 70–99)
GLUCOSE BLDC GLUCOMTR-MCNC: 136 MG/DL — HIGH (ref 70–99)
GLUCOSE BLDC GLUCOMTR-MCNC: 143 MG/DL — HIGH (ref 70–99)
GLUCOSE BLDC GLUCOMTR-MCNC: 143 MG/DL — HIGH (ref 70–99)
GLUCOSE BLDC GLUCOMTR-MCNC: 147 MG/DL — HIGH (ref 70–99)
GLUCOSE BLDC GLUCOMTR-MCNC: 151 MG/DL — HIGH (ref 70–99)
GLUCOSE BLDC GLUCOMTR-MCNC: 161 MG/DL — HIGH (ref 70–99)
GLUCOSE BLDC GLUCOMTR-MCNC: 172 MG/DL — HIGH (ref 70–99)
GLUCOSE BLDC GLUCOMTR-MCNC: 176 MG/DL — HIGH (ref 70–99)
GLUCOSE BLDC GLUCOMTR-MCNC: 192 MG/DL — HIGH (ref 70–99)
GLUCOSE BLDC GLUCOMTR-MCNC: 64 MG/DL — LOW (ref 70–99)
GLUCOSE BLDC GLUCOMTR-MCNC: 65 MG/DL — LOW (ref 70–99)
GLUCOSE BLDC GLUCOMTR-MCNC: 94 MG/DL — SIGNIFICANT CHANGE UP (ref 70–99)
GLUCOSE SERPL-MCNC: 161 MG/DL — HIGH (ref 70–99)
HCT VFR BLD CALC: 28.9 % — LOW (ref 39–50)
HGB BLD-MCNC: 10.1 G/DL — LOW (ref 13–17)
INR BLD: 1.06 RATIO — SIGNIFICANT CHANGE UP (ref 0.88–1.16)
MCHC RBC-ENTMCNC: 32.7 PG — SIGNIFICANT CHANGE UP (ref 27–34)
MCHC RBC-ENTMCNC: 34.9 GM/DL — SIGNIFICANT CHANGE UP (ref 32–36)
MCV RBC AUTO: 93.4 FL — SIGNIFICANT CHANGE UP (ref 80–100)
NT-PROBNP SERPL-SCNC: HIGH PG/ML (ref 0–300)
PF4 HEPARIN CMPLX AB SER-ACNC: NEGATIVE — SIGNIFICANT CHANGE UP
PF4 HEPARIN CMPLX AB SERPL QL IA: 0.35 ABS — SIGNIFICANT CHANGE UP
PHOSPHATE SERPL-MCNC: 2.2 MG/DL — LOW (ref 2.5–4.5)
PLATELET # BLD AUTO: 68 K/UL — LOW (ref 150–400)
POTASSIUM SERPL-MCNC: 3.6 MMOL/L — SIGNIFICANT CHANGE UP (ref 3.5–5.3)
POTASSIUM SERPL-SCNC: 3.6 MMOL/L — SIGNIFICANT CHANGE UP (ref 3.5–5.3)
PROT SERPL-MCNC: 5.6 G/DL — LOW (ref 6–8.3)
PROTHROM AB SERPL-ACNC: 11.6 SEC — SIGNIFICANT CHANGE UP (ref 9.8–12.7)
RBC # BLD: 3.09 M/UL — LOW (ref 4.2–5.8)
RBC # FLD: 15.4 % — HIGH (ref 10.3–14.5)
SODIUM SERPL-SCNC: 142 MMOL/L — SIGNIFICANT CHANGE UP (ref 135–145)
WBC # BLD: 18.7 K/UL — HIGH (ref 3.8–10.5)
WBC # FLD AUTO: 18.7 K/UL — HIGH (ref 3.8–10.5)

## 2018-02-05 PROCEDURE — 99291 CRITICAL CARE FIRST HOUR: CPT

## 2018-02-05 PROCEDURE — 36556 INSERT NON-TUNNEL CV CATH: CPT

## 2018-02-05 PROCEDURE — 76705 ECHO EXAM OF ABDOMEN: CPT | Mod: 26,RT

## 2018-02-05 PROCEDURE — 99233 SBSQ HOSP IP/OBS HIGH 50: CPT | Mod: GC

## 2018-02-05 PROCEDURE — 36620 INSERTION CATHETER ARTERY: CPT

## 2018-02-05 RX ORDER — POTASSIUM CHLORIDE 20 MEQ
10 PACKET (EA) ORAL
Qty: 0 | Refills: 0 | Status: COMPLETED | OUTPATIENT
Start: 2018-02-05 | End: 2018-02-05

## 2018-02-05 RX ORDER — AMIODARONE HYDROCHLORIDE 400 MG/1
150 TABLET ORAL ONCE
Qty: 0 | Refills: 0 | Status: COMPLETED | OUTPATIENT
Start: 2018-02-05 | End: 2018-02-05

## 2018-02-05 RX ORDER — NOREPINEPHRINE BITARTRATE/D5W 8 MG/250ML
0.02 PLASTIC BAG, INJECTION (ML) INTRAVENOUS
Qty: 8 | Refills: 0 | Status: DISCONTINUED | OUTPATIENT
Start: 2018-02-05 | End: 2018-02-06

## 2018-02-05 RX ORDER — INSULIN HUMAN 100 [IU]/ML
1 INJECTION, SOLUTION SUBCUTANEOUS
Qty: 100 | Refills: 0 | Status: DISCONTINUED | OUTPATIENT
Start: 2018-02-05 | End: 2018-02-06

## 2018-02-05 RX ORDER — VANCOMYCIN HCL 1 G
125 VIAL (EA) INTRAVENOUS EVERY 6 HOURS
Qty: 0 | Refills: 0 | Status: DISCONTINUED | OUTPATIENT
Start: 2018-02-05 | End: 2018-02-05

## 2018-02-05 RX ORDER — SODIUM BICARBONATE 1 MEQ/ML
50 SYRINGE (ML) INTRAVENOUS ONCE
Qty: 0 | Refills: 0 | Status: COMPLETED | OUTPATIENT
Start: 2018-02-05 | End: 2018-02-05

## 2018-02-05 RX ORDER — MAGNESIUM SULFATE 500 MG/ML
1 VIAL (ML) INJECTION ONCE
Qty: 0 | Refills: 0 | Status: COMPLETED | OUTPATIENT
Start: 2018-02-05 | End: 2018-02-05

## 2018-02-05 RX ORDER — DEXTROSE 50 % IN WATER 50 %
25 SYRINGE (ML) INTRAVENOUS ONCE
Qty: 0 | Refills: 0 | Status: COMPLETED | OUTPATIENT
Start: 2018-02-05 | End: 2018-02-05

## 2018-02-05 RX ORDER — ALBUMIN HUMAN 25 %
250 VIAL (ML) INTRAVENOUS ONCE
Qty: 0 | Refills: 0 | Status: COMPLETED | OUTPATIENT
Start: 2018-02-05 | End: 2018-02-05

## 2018-02-05 RX ORDER — POTASSIUM CHLORIDE 20 MEQ
10 PACKET (EA) ORAL ONCE
Qty: 0 | Refills: 0 | Status: COMPLETED | OUTPATIENT
Start: 2018-02-05 | End: 2018-02-05

## 2018-02-05 RX ORDER — ACETAMINOPHEN 500 MG
1000 TABLET ORAL ONCE
Qty: 0 | Refills: 0 | Status: COMPLETED | OUTPATIENT
Start: 2018-02-05 | End: 2018-02-05

## 2018-02-05 RX ORDER — DIGOXIN 250 MCG
0.5 TABLET ORAL ONCE
Qty: 0 | Refills: 0 | Status: COMPLETED | OUTPATIENT
Start: 2018-02-05 | End: 2018-02-05

## 2018-02-05 RX ORDER — DIGOXIN 250 MCG
0.25 TABLET ORAL EVERY 6 HOURS
Qty: 0 | Refills: 0 | Status: COMPLETED | OUTPATIENT
Start: 2018-02-05 | End: 2018-02-06

## 2018-02-05 RX ORDER — DIGOXIN 250 MCG
0.12 TABLET ORAL EVERY OTHER DAY
Qty: 0 | Refills: 0 | Status: DISCONTINUED | OUTPATIENT
Start: 2018-02-07 | End: 2018-02-11

## 2018-02-05 RX ORDER — VANCOMYCIN HCL 1 G
750 VIAL (EA) INTRAVENOUS
Qty: 0 | Refills: 0 | Status: DISCONTINUED | OUTPATIENT
Start: 2018-02-05 | End: 2018-02-06

## 2018-02-05 RX ADMIN — VASOPRESSIN 6 UNIT(S)/MIN: 20 INJECTION INTRAVENOUS at 08:40

## 2018-02-05 RX ADMIN — Medication 200 MILLIGRAM(S): at 05:13

## 2018-02-05 RX ADMIN — Medication 50 MILLIEQUIVALENT(S): at 03:10

## 2018-02-05 RX ADMIN — AMIODARONE HYDROCHLORIDE 600 MILLIGRAM(S): 400 TABLET ORAL at 13:00

## 2018-02-05 RX ADMIN — Medication 1000 MILLIGRAM(S): at 15:30

## 2018-02-05 RX ADMIN — DEXMEDETOMIDINE HYDROCHLORIDE IN 0.9% SODIUM CHLORIDE 9.43 MICROGRAM(S)/KG/HR: 4 INJECTION INTRAVENOUS at 09:39

## 2018-02-05 RX ADMIN — Medication 50 MILLIEQUIVALENT(S): at 16:00

## 2018-02-05 RX ADMIN — Medication 50 MILLIEQUIVALENT(S): at 01:40

## 2018-02-05 RX ADMIN — Medication 500 MILLILITER(S): at 15:00

## 2018-02-05 RX ADMIN — Medication 50 MILLIEQUIVALENT(S): at 11:13

## 2018-02-05 RX ADMIN — Medication 200 MILLIGRAM(S): at 18:22

## 2018-02-05 RX ADMIN — Medication 50 MICROGRAM(S): at 13:46

## 2018-02-05 RX ADMIN — Medication 62.5 MILLIMOLE(S): at 05:13

## 2018-02-05 RX ADMIN — Medication 125 MILLIGRAM(S): at 03:35

## 2018-02-05 RX ADMIN — Medication 2.26 MICROGRAM(S)/KG/MIN: at 18:37

## 2018-02-05 RX ADMIN — Medication 4.52 MICROGRAM(S)/KG/MIN: at 05:15

## 2018-02-05 RX ADMIN — Medication 0.5 MILLIGRAM(S): at 13:47

## 2018-02-05 RX ADMIN — Medication 50 MILLIEQUIVALENT(S): at 23:10

## 2018-02-05 RX ADMIN — PROPOFOL 9.05 MICROGRAM(S)/KG/MIN: 10 INJECTION, EMULSION INTRAVENOUS at 05:14

## 2018-02-05 RX ADMIN — VASOPRESSIN 6 UNIT(S)/MIN: 20 INJECTION INTRAVENOUS at 05:14

## 2018-02-05 RX ADMIN — Medication 50 MILLIEQUIVALENT(S): at 15:00

## 2018-02-05 RX ADMIN — Medication 50 MILLIEQUIVALENT(S): at 17:00

## 2018-02-05 RX ADMIN — Medication 50 MILLIEQUIVALENT(S): at 19:47

## 2018-02-05 RX ADMIN — AMIODARONE HYDROCHLORIDE 17 MG/MIN: 400 TABLET ORAL at 08:40

## 2018-02-05 RX ADMIN — INSULIN HUMAN 2 UNIT(S)/HR: 100 INJECTION, SOLUTION SUBCUTANEOUS at 05:14

## 2018-02-05 RX ADMIN — Medication 50 MILLIEQUIVALENT(S): at 02:37

## 2018-02-05 RX ADMIN — Medication 400 MILLIGRAM(S): at 15:00

## 2018-02-05 RX ADMIN — Medication 4.52 MICROGRAM(S)/KG/MIN: at 08:38

## 2018-02-05 RX ADMIN — Medication 100 GRAM(S): at 13:08

## 2018-02-05 RX ADMIN — Medication 50 MILLIEQUIVALENT(S): at 08:43

## 2018-02-05 RX ADMIN — Medication 150 MILLIGRAM(S): at 17:13

## 2018-02-05 RX ADMIN — Medication 50 MILLIEQUIVALENT(S): at 11:00

## 2018-02-05 RX ADMIN — Medication 2: at 11:29

## 2018-02-05 RX ADMIN — Medication 0.25 MILLIGRAM(S): at 18:23

## 2018-02-05 RX ADMIN — Medication 50 MILLIEQUIVALENT(S): at 12:16

## 2018-02-05 RX ADMIN — Medication 6.01 MICROGRAM(S)/KG/MIN: at 08:39

## 2018-02-05 RX ADMIN — Medication 50 MILLIEQUIVALENT(S): at 20:39

## 2018-02-05 RX ADMIN — Medication 100 GRAM(S): at 21:31

## 2018-02-05 RX ADMIN — PANTOPRAZOLE SODIUM 40 MILLIGRAM(S): 20 TABLET, DELAYED RELEASE ORAL at 13:08

## 2018-02-05 RX ADMIN — Medication 25 GRAM(S): at 07:45

## 2018-02-05 RX ADMIN — AMIODARONE HYDROCHLORIDE 17 MG/MIN: 400 TABLET ORAL at 05:15

## 2018-02-05 RX ADMIN — CEFTRIAXONE 100 GRAM(S): 500 INJECTION, POWDER, FOR SOLUTION INTRAMUSCULAR; INTRAVENOUS at 09:38

## 2018-02-05 RX ADMIN — Medication 325 MILLIGRAM(S): at 13:08

## 2018-02-05 RX ADMIN — Medication 50 MILLIEQUIVALENT(S): at 07:00

## 2018-02-05 RX ADMIN — Medication 6.01 MICROGRAM(S)/KG/MIN: at 05:15

## 2018-02-05 RX ADMIN — Medication 50 MILLIEQUIVALENT(S): at 06:00

## 2018-02-05 NOTE — PROGRESS NOTE ADULT - SUBJECTIVE AND OBJECTIVE BOX
SAMANTHA CADENA   MRN#: [id number]     Patient is a 72 year-old male with past medical history of T2DM, DLD, alcohol misuse brought in by family after having been found face down on the floor for approximately 2 days found in hemodynamically unstable A-fib with RVR (170s bpm) s/p multiple cardioversions and requiring vasopressin for pressor support. S/P AVR(T) on 1/30/18. The patient  was seen, evaluated, & examined with the CTICU staff on rounds and later in the day with a multidisciplinary care plan formulated & implemented.  All available clinical, laboratory, radiographic, pharmacologic, and electrocardiographic data were reviewed & analyzed.      The patient was in the CTICU in critical condition secondary to acute hypoxic respiratory failure-progressive cardiopulmonary dysfunction, cardiogenic shock-cardiovascular dysfunction, hemodynamically significant anemia/hypovolemia-shock, hyperlactatemia-acidosis, acute on chronic postoperative kidney injury, stress hyperglycemia, and Gram positive cocci aortic valve endocarditis.        Respiratory failure required reintubation with full ventilatory support, the following of ABG’s with A-line monitoring, continuous pulse oximetry monitoring, and an IV Propofol infusion for support & to evaluate for & prevent further decompensation secondary to progressive cardiopulmonary dysfunction, cardiogenic shock-cardiovascular dysfunction, and Gram positive cocci AV endocarditis. Extubated today, w BiPAP stand by. Monitor ABG.     Invasive hemodynamic monitoring with an A-line was required for the following of continuous MAP/BP monitoring to ensure adequate cardiovascular support and to evaluate for & help prevent decompensation while receiving an IABP, an IV Dobutamine drip, IV Vasopressin drip, an IV Norepinephrine drip,  and an IV Lasix drip, and enteral Potassium chloride secondary to cardiogenic shock-cardiovascular dysfunction, resolved hyperlactatemia-acidosis, & acute on chronic postoperative kidney injury-failure. Keep O>I.       Metabolic stability, stress hyperglycemia, & infection prophylaxis required an IV regular Insulin drip & the following of serial glucose levels to help achieve & maintain euglycemia. Cont IV abxs for AV endocarditis. Monitor Fever/leukocytosis.     Continue  DVT/GI prophylaxis     Continue enteral tube feeds for nutritional support.     Patient required more than the usual postoperative critical care management and I provided  80  minutes of non-continuous care to the patient.  Discussed at length with the CTICU staff and helped coordinate care.

## 2018-02-05 NOTE — PROGRESS NOTE ADULT - ASSESSMENT
72 year-old male with past medical history of T2DM, DLD, alcohol misuse brought in by family after having been found face down on the floor for approximately 2 days. On treatment for UTI, but now JANES with Aortic Valve lesions. 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 Bartonella, Legionella, Q Fever serology negative; fungal BCX negative. Brucella pending. Unclear cause of NVE, culture  with GPC--pending. Having diarrhea, C diff negative. LFTs rising, working ELIAN, aortic balloon pump, pressors. Critically ill. Aortic valve vegetation, leukocytosis, post operative state.  -    discussed with colleagues and [facility]  organism is fastidious gram positive coccus   micro having trouble getting it to grow   not likely if it were enterococcus  more likely nuirt def strep that needs combination therapy       doing better  extubated afebrile   no change in therapy

## 2018-02-05 NOTE — PROGRESS NOTE ADULT - SUBJECTIVE AND OBJECTIVE BOX
infectious diseases progress note:    Patient is a 72y old  Male who presents with a chief complaint of Fall (09 Jan 2018 00:52)        Atrial fibrillation        R  Allergies    No Known Allergies    Intolerances        ANTIBIOTICS/RELEVANT:  antimicrobials  cefTRIAXone   IVPB      cefTRIAXone   IVPB 2 Gram(s) IV Intermittent every 24 hours  gentamicin   IVPB      gentamicin   IVPB 40 milliGRAM(s) IV Intermittent every 12 hours  vancomycin  IVPB 750 milliGRAM(s) IV Intermittent <User Schedule>    immunologic:    OTHER:  amiodarone Infusion 0.51 mG/Min IV Continuous <Continuous>  aspirin 325 milliGRAM(s) Oral daily  dexmedetomidine Infusion 0.5 MICROgram(s)/kG/Hr IV Continuous <Continuous>  dextrose 5%. 1000 milliLiter(s) IV Continuous <Continuous>  dextrose 50% Injectable 25 Gram(s) IV Push once  dextrose 50% Injectable 50 milliLiter(s) IV Push every 15 minutes  DOBUTamine Infusion 2 MICROgram(s)/kG/Min IV Continuous <Continuous>  insulin lispro (HumaLOG) corrective regimen sliding scale   SubCutaneous every 6 hours  levothyroxine Injectable 50 MICROGram(s) IV Push <User Schedule>  norepinephrine Infusion 0.085 MICROgram(s)/kG/Min IV Continuous <Continuous>  pantoprazole  Injectable 40 milliGRAM(s) IV Push daily  propofol Infusion 20 MICROgram(s)/kG/Min IV Continuous <Continuous>  sodium chloride 0.9%. 1000 milliLiter(s) IV Continuous <Continuous>  vasopressin Infusion 0.1 Unit(s)/Min IV Continuous <Continuous>      Objective:  Vital Signs Last 24 Hrs  T(C): 37 (05 Feb 2018 08:00), Max: 37.5 (04 Feb 2018 16:00)  T(F): 98.6 (05 Feb 2018 08:00), Max: 99.5 (04 Feb 2018 16:00)  HR: 89 (05 Feb 2018 11:15) (66 - 133)  BP: --  BP(mean): --  RR: 25 (05 Feb 2018 11:15) (10 - 46)  SpO2: 100% (05 Feb 2018 11:15) (94% - 100%)    PHYSICAL EXAM:     Eyes:MYLA, EOMI  Ear/Nose/Throat: no oral lesion, no sinus tenderness on percussion	  Neck:no JVD, no lymphadenopathy, supple  Respiratory: CTA matt  Cardiovascular: S1S2 RRR, no murmurs  Gastrointestinal:soft, (+) BS, no HSM  Extremities:no  edema         LABS:                        10.1   18.7  )-----------( 68       ( 05 Feb 2018 01:17 )             28.9     02-05    142  |  109<H>  |  45<H>  ----------------------------<  161<H>  3.6   |  19<L>  |  1.96<H>    Ca    8.1<L>      05 Feb 2018 01:17  Phos  2.2     02-05    TPro  5.6<L>  /  Alb  2.8<L>  /  TBili  3.3<H>  /  DBili  x   /  AST  100<H>  /  ALT  173<H>  /  AlkPhos  198<H>  02-05    PT/INR - ( 05 Feb 2018 01:17 )   PT: 11.6 sec;   INR: 1.06 ratio         PTT - ( 05 Feb 2018 01:17 )  PTT:66.2 sec        MICROBIOLOGY:    RECENT CULTURES:  02-02 @ 16:35 .Sputum Sputum       Few polymorphonuclear leukocytes per low power field  Rare Squamous Epithelial Cells per low power field  Moderate Gram Negative Rods per oil power field  Rare Gram positive cocci in pairs per oil power field  Rare Yeast like cells per oil power field           Normal Respiratory Soni present    02-02 @ 13:46 .Blood Blood-Venous                No growth to date.    02-01 @ 16:46 .Blood Blood                No growth to date.    01-31 @ 01:01 .Tissue aoritc valve leaflet       Rare polymorphonuclear leukocytes seen per low power field  Moderate Gram Positive Cocci in Pairs and Chains seen per oil power field           Testing in progress    01-31 @ 00:52 .Tissue Other, aortic valve leaflet                        RESPIRATORY CULTURES:      Clostridium difficile GDH Toxins A&amp;B, EIA:   Negative (02-05 @ 01:18)  Clostridium difficile GDH Toxins A&amp;B, EIA:   Negative (02-01 @ 06:56)          RADIOLOGY & ADDITIONAL STUDIES:        Pager [phone]  After 5 pm/weekends or if no response :[phone]

## 2018-02-05 NOTE — PROGRESS NOTE ADULT - SUBJECTIVE AND OBJECTIVE BOX
[facility] Division of Kidney Diseases & Hypertension  FOLLOW UP NOTE  [phone]--------------------------------------------------------------------------------  Chief Complaint: Atrial fibrillation    71 y/o man with history of ETOH abuse who presents with fall and found with endocarditis, developed ELIAN in-hospital shortly after undergoing cardiac catheterization, s/p urgent AVR and CABG on 1/30/18 with clinical decompensation on 2/1 requiring intubation and multiple vasopressor support.       24 hour events/subjective: Patient seen and examined today in CTICU. No acute events noted.         PAST HISTORY  --------------------------------------------------------------------------------  No significant changes to PMH, PSH, FHx, SHx, unless otherwise noted    ALLERGIES & MEDICATIONS  --------------------------------------------------------------------------------  Allergies    No Known Allergies    Intolerances      Standing Inpatient Medications  amiodarone Infusion 0.51 mG/Min IV Continuous <Continuous>  aspirin 325 milliGRAM(s) Oral daily  cefTRIAXone   IVPB      cefTRIAXone   IVPB 2 Gram(s) IV Intermittent every 24 hours  dexmedetomidine Infusion 0.5 MICROgram(s)/kG/Hr IV Continuous <Continuous>  dextrose 5%. 1000 milliLiter(s) IV Continuous <Continuous>  dextrose 50% Injectable 25 Gram(s) IV Push once  dextrose 50% Injectable 50 milliLiter(s) IV Push every 15 minutes  DOBUTamine Infusion 2 MICROgram(s)/kG/Min IV Continuous <Continuous>  gentamicin   IVPB      gentamicin   IVPB 40 milliGRAM(s) IV Intermittent every 12 hours  insulin lispro (HumaLOG) corrective regimen sliding scale   SubCutaneous every 6 hours  levothyroxine Injectable 50 MICROGram(s) IV Push <User Schedule>  norepinephrine Infusion 0.085 MICROgram(s)/kG/Min IV Continuous <Continuous>  pantoprazole  Injectable 40 milliGRAM(s) IV Push daily  propofol Infusion 20 MICROgram(s)/kG/Min IV Continuous <Continuous>  sodium chloride 0.9%. 1000 milliLiter(s) IV Continuous <Continuous>  vancomycin  IVPB 750 milliGRAM(s) IV Intermittent <User Schedule>  vasopressin Infusion 0.1 Unit(s)/Min IV Continuous <Continuous>    PRN Inpatient Medications      REVIEW OF SYSTEMS  --------------------------------------------------------------------------------  Unable to obtain.     VITALS/PHYSICAL EXAM  --------------------------------------------------------------------------------  T(C): 37 (02-05-18 @ 08:00), Max: 37.5 (02-04-18 @ 16:00)  HR: 89 (02-05-18 @ 11:15) (66 - 133)  BP: --  RR: 25 (02-05-18 @ 11:15) (10 - 46)  SpO2: 100% (02-05-18 @ 11:15) (94% - 100%)  Wt(kg): --        02-04-18 @ 07:01  -  02-05-18 @ 07:00  --------------------------------------------------------  IN: 2290.1 mL / OUT: 4691 mL / NET: -2400.9 mL    02-05-18 @ 07:01  -  02-05-18 @ 12:43  --------------------------------------------------------  IN: 398 mL / OUT: 410 mL / NET: -12 mL      Physical Exam:  	Gen: NAD  	HEENT: intubated  	Pulm: bilateral breath sounds audible  	CV: RRR, S1S2; no rub  	Abd: +BS, soft, nontender/nondistended  	: kayli  	UE:  no edema  	LE:  +mild nonpitting edema  	Neuro: sedated  	Skin: Warm  	Vascular access:  +femoral shiley non-erythematous    LABS/STUDIES  --------------------------------------------------------------------------------              10.1   18.7  >-----------<  68       [02-05-18 @ 01:17]              28.9     142  |  109  |  45  ----------------------------<  161      [02-05-18 @ 01:17]  3.6   |  19  |  1.96        Ca     8.1     [02-05-18 @ 01:17]      Phos  2.2     [02-05-18 @ 01:17]    TPro  5.6  /  Alb  2.8  /  TBili  3.3  /  DBili  x   /  AST  100  /  ALT  173  /  AlkPhos  198  [02-05-18 @ 01:17]    PT/INR: PT 11.6 , INR 1.06       [02-05-18 @ 01:17]  PTT: 66.2       [02-05-18 @ 01:17]      Creatinine Trend:  SCr 1.96 [02-05 @ 01:17]  SCr 2.45 [02-04 @ 02:27]  SCr 2.74 [02-03 @ 05:16]  SCr 2.73 [02-02 @ 22:12]  SCr 2.47 [02-02 @ 08:31]    Urinalysis - [02-02-18 @ 11:50]      Color Yellow / Appearance Clear / SG 1.010 / pH 5.5      Gluc Negative / Ketone Negative  / Bili Negative / Urobili Negative       Blood Small / Protein Negative / Leuk Est Trace / Nitrite Negative      RBC 2-5 / WBC 2-5 / Hyaline  / Gran  / Sq Epi  / Non Sq Epi  / Bacteria Few      TSH 11.21      [02-01-18 @ 12:43] [facility] Division of Kidney Diseases & Hypertension  FOLLOW UP NOTE  [phone]--------------------------------------------------------------------------------  Chief Complaint: Atrial fibrillation    71 y/o man with history of ETOH abuse who presents with fall and found with endocarditis, developed ELIAN in-hospital shortly after undergoing cardiac catheterization, s/p urgent AVR and CABG on 1/30/18 with clinical decompensation on 2/1 requiring intubation and multiple vasopressor support.     24 hour events/subjective: Patient seen and examined today in CTICU    PAST HISTORY  --------------------------------------------------------------------------------  No significant changes to PMH, PSH, FHx, SHx, unless otherwise noted    ALLERGIES & MEDICATIONS  --------------------------------------------------------------------------------  Allergies    No Known Allergies    Intolerances      Standing Inpatient Medications  amiodarone Infusion 0.51 mG/Min IV Continuous <Continuous>  aspirin 325 milliGRAM(s) Oral daily  cefTRIAXone   IVPB      cefTRIAXone   IVPB 2 Gram(s) IV Intermittent every 24 hours  dexmedetomidine Infusion 0.5 MICROgram(s)/kG/Hr IV Continuous <Continuous>  dextrose 5%. 1000 milliLiter(s) IV Continuous <Continuous>  dextrose 50% Injectable 25 Gram(s) IV Push once  dextrose 50% Injectable 50 milliLiter(s) IV Push every 15 minutes  DOBUTamine Infusion 2 MICROgram(s)/kG/Min IV Continuous <Continuous>  gentamicin   IVPB      gentamicin   IVPB 40 milliGRAM(s) IV Intermittent every 12 hours  insulin lispro (HumaLOG) corrective regimen sliding scale   SubCutaneous every 6 hours  levothyroxine Injectable 50 MICROGram(s) IV Push <User Schedule>  norepinephrine Infusion 0.085 MICROgram(s)/kG/Min IV Continuous <Continuous>  pantoprazole  Injectable 40 milliGRAM(s) IV Push daily  propofol Infusion 20 MICROgram(s)/kG/Min IV Continuous <Continuous>  sodium chloride 0.9%. 1000 milliLiter(s) IV Continuous <Continuous>  vancomycin  IVPB 750 milliGRAM(s) IV Intermittent <User Schedule>  vasopressin Infusion 0.1 Unit(s)/Min IV Continuous <Continuous>    PRN Inpatient Medications      REVIEW OF SYSTEMS  --------------------------------------------------------------------------------  Unable to obtain.     VITALS/PHYSICAL EXAM  --------------------------------------------------------------------------------  T(C): 37 (02-05-18 @ 08:00), Max: 37.5 (02-04-18 @ 16:00)  HR: 89 (02-05-18 @ 11:15) (66 - 133)  BP: 131/53  RR: 25 (02-05-18 @ 11:15) (10 - 46)  SpO2: 100% (02-05-18 @ 11:15) (94% - 100%)  Wt(kg): --        02-04-18 @ 07:01  -  02-05-18 @ 07:00  --------------------------------------------------------  IN: 2290.1 mL / OUT: 4691 mL / NET: -2400.9 mL    02-05-18 @ 07:01  -  02-05-18 @ 12:43  --------------------------------------------------------  IN: 398 mL / OUT: 410 mL / NET: -12 mL      Physical Exam:  	Gen: NAD  	HEENT: intubated  	Pulm: bilateral breath sounds audible  	CV: RRR, S1S2; no rub  	Abd: +BS, soft, nontender/nondistended  	: kayli  	UE:  no edema  	LE:  +mild nonpitting edema  	Neuro: sedated  	Skin: Warm    LABS/STUDIES  --------------------------------------------------------------------------------              10.1   18.7  >-----------<  68       [02-05-18 @ 01:17]              28.9     142  |  109  |  45  ----------------------------<  161      [02-05-18 @ 01:17]  3.6   |  19  |  1.96        Ca     8.1     [02-05-18 @ 01:17]      Phos  2.2     [02-05-18 @ 01:17]    TPro  5.6  /  Alb  2.8  /  TBili  3.3  /  DBili  x   /  AST  100  /  ALT  173  /  AlkPhos  198  [02-05-18 @ 01:17]    PT/INR: PT 11.6 , INR 1.06       [02-05-18 @ 01:17]  PTT: 66.2       [02-05-18 @ 01:17]      Creatinine Trend:  SCr 1.96 [02-05 @ 01:17]  SCr 2.45 [02-04 @ 02:27]  SCr 2.74 [02-03 @ 05:16]  SCr 2.73 [02-02 @ 22:12]  SCr 2.47 [02-02 @ 08:31]    Urinalysis - [02-02-18 @ 11:50]      Color Yellow / Appearance Clear / SG 1.010 / pH 5.5      Gluc Negative / Ketone Negative  / Bili Negative / Urobili Negative       Blood Small / Protein Negative / Leuk Est Trace / Nitrite Negative      RBC 2-5 / WBC 2-5 / Hyaline  / Gran  / Sq Epi  / Non Sq Epi  / Bacteria Few      TSH 11.21      [02-01-18 @ 12:43]

## 2018-02-05 NOTE — PROGRESS NOTE ADULT - PROBLEM SELECTOR PLAN 1
Likely contrast induced nephropathy from cardiac cath/Toxic/Septic  ATN. Scr. remains elevated but improved to 1.96 today. Patient is non oliguric. Plan is to continue monitoring Scr., electrolytes, and urine output. No plan for HD at this time. Avoid nephrotoxins. Likely contrast induced nephropathy from cardiac cath/Toxic/Septic  ATN. Scr. remains elevated but improved to 1.96 today. Patient is non oliguric. Plan is to continue monitoring Scr., electrolytes, and urine output. No plan for HD at this time. Avoid nephrotoxins.  Monitor gentamicin level.

## 2018-02-06 LAB
ALBUMIN SERPL ELPH-MCNC: 2.7 G/DL — LOW (ref 3.3–5)
ALP SERPL-CCNC: 226 U/L — HIGH (ref 40–120)
ALT FLD-CCNC: 129 U/L RC — HIGH (ref 10–45)
ANION GAP SERPL CALC-SCNC: 12 MMOL/L — SIGNIFICANT CHANGE UP (ref 5–17)
APPEARANCE UR: ABNORMAL
APTT BLD: 42.4 SEC — HIGH (ref 27.5–37.4)
AST SERPL-CCNC: 70 U/L — HIGH (ref 10–40)
BACTERIA # UR AUTO: ABNORMAL /HPF
BILIRUB SERPL-MCNC: 2.2 MG/DL — HIGH (ref 0.2–1.2)
BILIRUB UR-MCNC: NEGATIVE — SIGNIFICANT CHANGE UP
BUN SERPL-MCNC: 40 MG/DL — HIGH (ref 7–23)
CALCIUM SERPL-MCNC: 7.8 MG/DL — LOW (ref 8.4–10.5)
CHLORIDE SERPL-SCNC: 111 MMOL/L — HIGH (ref 96–108)
CO2 SERPL-SCNC: 20 MMOL/L — LOW (ref 22–31)
COLOR SPEC: YELLOW — SIGNIFICANT CHANGE UP
CREAT SERPL-MCNC: 1.72 MG/DL — HIGH (ref 0.5–1.3)
CULTURE RESULTS: SIGNIFICANT CHANGE UP
DIFF PNL FLD: ABNORMAL
EPI CELLS # UR: SIGNIFICANT CHANGE UP /HPF
GAS PNL BLDA: SIGNIFICANT CHANGE UP
GLUCOSE BLDC GLUCOMTR-MCNC: 106 MG/DL — HIGH (ref 70–99)
GLUCOSE BLDC GLUCOMTR-MCNC: 109 MG/DL — HIGH (ref 70–99)
GLUCOSE BLDC GLUCOMTR-MCNC: 115 MG/DL — HIGH (ref 70–99)
GLUCOSE BLDC GLUCOMTR-MCNC: 127 MG/DL — HIGH (ref 70–99)
GLUCOSE BLDC GLUCOMTR-MCNC: 237 MG/DL — HIGH (ref 70–99)
GLUCOSE BLDC GLUCOMTR-MCNC: 69 MG/DL — LOW (ref 70–99)
GLUCOSE SERPL-MCNC: 145 MG/DL — HIGH (ref 70–99)
GLUCOSE UR QL: NEGATIVE — SIGNIFICANT CHANGE UP
HCT VFR BLD CALC: 25 % — LOW (ref 39–50)
HGB BLD-MCNC: 8.8 G/DL — LOW (ref 13–17)
INR BLD: 1.04 RATIO — SIGNIFICANT CHANGE UP (ref 0.88–1.16)
KETONES UR-MCNC: NEGATIVE — SIGNIFICANT CHANGE UP
LEUKOCYTE ESTERASE UR-ACNC: ABNORMAL
MCHC RBC-ENTMCNC: 33.1 PG — SIGNIFICANT CHANGE UP (ref 27–34)
MCHC RBC-ENTMCNC: 35.2 GM/DL — SIGNIFICANT CHANGE UP (ref 32–36)
MCV RBC AUTO: 94 FL — SIGNIFICANT CHANGE UP (ref 80–100)
NITRITE UR-MCNC: NEGATIVE — SIGNIFICANT CHANGE UP
NT-PROBNP SERPL-SCNC: HIGH PG/ML (ref 0–300)
PH UR: 6 — SIGNIFICANT CHANGE UP (ref 5–8)
PHOSPHATE SERPL-MCNC: 1.9 MG/DL — LOW (ref 2.5–4.5)
PLATELET # BLD AUTO: 75 K/UL — LOW (ref 150–400)
POTASSIUM SERPL-MCNC: 4.2 MMOL/L — SIGNIFICANT CHANGE UP (ref 3.5–5.3)
POTASSIUM SERPL-SCNC: 4.2 MMOL/L — SIGNIFICANT CHANGE UP (ref 3.5–5.3)
PROT SERPL-MCNC: 5.4 G/DL — LOW (ref 6–8.3)
PROT UR-MCNC: 100 MG/DL
PROTHROM AB SERPL-ACNC: 11.2 SEC — SIGNIFICANT CHANGE UP (ref 9.8–12.7)
RBC # BLD: 2.66 M/UL — LOW (ref 4.2–5.8)
RBC # FLD: 15.5 % — HIGH (ref 10.3–14.5)
RBC CASTS # UR COMP ASSIST: ABNORMAL /HPF (ref 0–2)
SODIUM SERPL-SCNC: 143 MMOL/L — SIGNIFICANT CHANGE UP (ref 135–145)
SP GR SPEC: 1.01 — SIGNIFICANT CHANGE UP (ref 1.01–1.02)
SPECIMEN SOURCE: SIGNIFICANT CHANGE UP
UROBILINOGEN FLD QL: NEGATIVE — SIGNIFICANT CHANGE UP
VANCOMYCIN TROUGH SERPL-MCNC: 16.8 UG/ML — SIGNIFICANT CHANGE UP (ref 10–20)
WBC # BLD: 17.3 K/UL — HIGH (ref 3.8–10.5)
WBC # FLD AUTO: 17.3 K/UL — HIGH (ref 3.8–10.5)
WBC UR QL: ABNORMAL /HPF (ref 0–5)

## 2018-02-06 PROCEDURE — 71045 X-RAY EXAM CHEST 1 VIEW: CPT | Mod: 26,76

## 2018-02-06 PROCEDURE — 99232 SBSQ HOSP IP/OBS MODERATE 35: CPT

## 2018-02-06 RX ORDER — POTASSIUM CHLORIDE 20 MEQ
40 PACKET (EA) ORAL
Qty: 0 | Refills: 0 | Status: COMPLETED | OUTPATIENT
Start: 2018-02-06 | End: 2018-02-06

## 2018-02-06 RX ORDER — FENTANYL CITRATE 50 UG/ML
25 INJECTION INTRAVENOUS ONCE
Qty: 0 | Refills: 0 | Status: DISCONTINUED | OUTPATIENT
Start: 2018-02-06 | End: 2018-02-06

## 2018-02-06 RX ORDER — INSULIN LISPRO 100/ML
VIAL (ML) SUBCUTANEOUS EVERY 4 HOURS
Qty: 0 | Refills: 0 | Status: DISCONTINUED | OUTPATIENT
Start: 2018-02-06 | End: 2018-02-11

## 2018-02-06 RX ORDER — HUMAN INSULIN 100 [IU]/ML
5 INJECTION, SUSPENSION SUBCUTANEOUS
Qty: 0 | Refills: 0 | Status: DISCONTINUED | OUTPATIENT
Start: 2018-02-06 | End: 2018-02-06

## 2018-02-06 RX ORDER — MAGNESIUM SULFATE 500 MG/ML
1 VIAL (ML) INJECTION ONCE
Qty: 0 | Refills: 0 | Status: COMPLETED | OUTPATIENT
Start: 2018-02-06 | End: 2018-02-06

## 2018-02-06 RX ORDER — HUMAN INSULIN 100 [IU]/ML
5 INJECTION, SUSPENSION SUBCUTANEOUS DAILY
Qty: 0 | Refills: 0 | Status: DISCONTINUED | OUTPATIENT
Start: 2018-02-06 | End: 2018-02-06

## 2018-02-06 RX ORDER — AMIODARONE HYDROCHLORIDE 400 MG/1
400 TABLET ORAL EVERY 8 HOURS
Qty: 0 | Refills: 0 | Status: COMPLETED | OUTPATIENT
Start: 2018-02-06 | End: 2018-02-08

## 2018-02-06 RX ORDER — POTASSIUM CHLORIDE 20 MEQ
40 PACKET (EA) ORAL ONCE
Qty: 0 | Refills: 0 | Status: COMPLETED | OUTPATIENT
Start: 2018-02-06 | End: 2018-02-06

## 2018-02-06 RX ORDER — AMIODARONE HYDROCHLORIDE 400 MG/1
0.5 TABLET ORAL
Qty: 900 | Refills: 0 | Status: DISCONTINUED | OUTPATIENT
Start: 2018-02-06 | End: 2018-02-07

## 2018-02-06 RX ORDER — HEPARIN SODIUM 5000 [USP'U]/ML
5000 INJECTION INTRAVENOUS; SUBCUTANEOUS EVERY 8 HOURS
Qty: 0 | Refills: 0 | Status: DISCONTINUED | OUTPATIENT
Start: 2018-02-06 | End: 2018-02-10

## 2018-02-06 RX ORDER — INSULIN LISPRO 100/ML
VIAL (ML) SUBCUTANEOUS
Qty: 0 | Refills: 0 | Status: DISCONTINUED | OUTPATIENT
Start: 2018-02-06 | End: 2018-02-06

## 2018-02-06 RX ORDER — AMIODARONE HYDROCHLORIDE 400 MG/1
200 TABLET ORAL DAILY
Qty: 0 | Refills: 0 | Status: COMPLETED | OUTPATIENT
Start: 2018-02-09 | End: 2019-01-08

## 2018-02-06 RX ORDER — AMIODARONE HYDROCHLORIDE 400 MG/1
150 TABLET ORAL ONCE
Qty: 0 | Refills: 0 | Status: COMPLETED | OUTPATIENT
Start: 2018-02-06 | End: 2018-02-06

## 2018-02-06 RX ORDER — LOPERAMIDE HCL 2 MG
2 TABLET ORAL ONCE
Qty: 0 | Refills: 0 | Status: COMPLETED | OUTPATIENT
Start: 2018-02-06 | End: 2018-02-06

## 2018-02-06 RX ORDER — MAGNESIUM SULFATE 500 MG/ML
2 VIAL (ML) INJECTION ONCE
Qty: 0 | Refills: 0 | Status: COMPLETED | OUTPATIENT
Start: 2018-02-06 | End: 2018-02-06

## 2018-02-06 RX ADMIN — Medication 0.25 MILLIGRAM(S): at 12:45

## 2018-02-06 RX ADMIN — Medication 50 MICROGRAM(S): at 13:59

## 2018-02-06 RX ADMIN — AMIODARONE HYDROCHLORIDE 400 MILLIGRAM(S): 400 TABLET ORAL at 13:59

## 2018-02-06 RX ADMIN — AMIODARONE HYDROCHLORIDE 400 MILLIGRAM(S): 400 TABLET ORAL at 21:38

## 2018-02-06 RX ADMIN — CEFTRIAXONE 100 GRAM(S): 500 INJECTION, POWDER, FOR SOLUTION INTRAMUSCULAR; INTRAVENOUS at 09:34

## 2018-02-06 RX ADMIN — HEPARIN SODIUM 5000 UNIT(S): 5000 INJECTION INTRAVENOUS; SUBCUTANEOUS at 05:06

## 2018-02-06 RX ADMIN — Medication 200 MILLIGRAM(S): at 17:16

## 2018-02-06 RX ADMIN — Medication 12: at 10:07

## 2018-02-06 RX ADMIN — Medication 40 MILLIEQUIVALENT(S): at 05:03

## 2018-02-06 RX ADMIN — PANTOPRAZOLE SODIUM 40 MILLIGRAM(S): 20 TABLET, DELAYED RELEASE ORAL at 12:45

## 2018-02-06 RX ADMIN — Medication 5: at 21:51

## 2018-02-06 RX ADMIN — Medication 62.5 MILLIMOLE(S): at 06:52

## 2018-02-06 RX ADMIN — Medication 200 MILLIGRAM(S): at 05:06

## 2018-02-06 RX ADMIN — Medication 325 MILLIGRAM(S): at 12:45

## 2018-02-06 RX ADMIN — VASOPRESSIN 6 UNIT(S)/MIN: 20 INJECTION INTRAVENOUS at 19:55

## 2018-02-06 RX ADMIN — Medication 40 MILLIEQUIVALENT(S): at 01:28

## 2018-02-06 RX ADMIN — Medication 2 MILLIGRAM(S): at 17:54

## 2018-02-06 RX ADMIN — AMIODARONE HYDROCHLORIDE 400 MILLIGRAM(S): 400 TABLET ORAL at 05:10

## 2018-02-06 RX ADMIN — Medication 50 GRAM(S): at 17:54

## 2018-02-06 RX ADMIN — AMIODARONE HYDROCHLORIDE 600 MILLIGRAM(S): 400 TABLET ORAL at 17:54

## 2018-02-06 RX ADMIN — Medication 0.25 MILLIGRAM(S): at 05:05

## 2018-02-06 RX ADMIN — AMIODARONE HYDROCHLORIDE 16.67 MG/MIN: 400 TABLET ORAL at 19:55

## 2018-02-06 RX ADMIN — Medication 50 GRAM(S): at 01:15

## 2018-02-06 RX ADMIN — Medication 0.25 MILLIGRAM(S): at 01:00

## 2018-02-06 RX ADMIN — Medication 2 MILLIGRAM(S): at 09:35

## 2018-02-06 RX ADMIN — HEPARIN SODIUM 5000 UNIT(S): 5000 INJECTION INTRAVENOUS; SUBCUTANEOUS at 21:38

## 2018-02-06 RX ADMIN — HEPARIN SODIUM 5000 UNIT(S): 5000 INJECTION INTRAVENOUS; SUBCUTANEOUS at 14:00

## 2018-02-06 RX ADMIN — Medication 40 MILLIEQUIVALENT(S): at 14:29

## 2018-02-06 RX ADMIN — Medication 40 MILLIEQUIVALENT(S): at 18:19

## 2018-02-06 NOTE — PROVIDER CONTACT NOTE (OTHER) - BACKGROUND
Dx: Afib. Recent history of symptomatic Afib - hypotension - s/p pressor use.
1/30 AVR C1L   2/1 IABP placed for Cardiogenic Shock d/c'ed on 2/3  2/5 extubated
1/30 AVR, C1L   2/1 IABP for cardiogenic shock   2/5 extubated
Admitting dx afib with RVR s/p unsuccessful cardioversions, endocarditis on AV 6 weeks ABX  symptomatic afib hypotension s/p pressor use  ELIAN, NSTEMI-CAD, alcohol abuse s/p CIWA
Dx: Afib with RVR - s/p unsuccessful cardioversion. ALEXANDRU +. Endocarditis on AV - 6 weeks of ceftriaxone and vanco by dose. Symptomatic Afib (now NSR). ELIAN. NSTEMI.
Pt Dx: Afib with RVR. Now NSR on Tele.
SR/Sfib HLD ELIAN DM NSTEMI ETOH
vegetation on aortic valve, endocarditis, afib
1/30 AVR, C1L

## 2018-02-06 NOTE — PROVIDER CONTACT NOTE (OTHER) - DATE AND TIME:
05-Feb-2018 12:15
06-Feb-2018 11:00
13-Jan-2018 10:33
20-Jan-2018 01:12
21-Jan-2018 18:16
25-Jan-2018 01:40
26-Jan-2018 05:45
06-Feb-2018 17:45
23-Jan-2018 05:30

## 2018-02-06 NOTE — PHYSICAL THERAPY INITIAL EVALUATION ADULT - ADDITIONAL COMMENTS
PTA pt lived in pvt home 1st floor, son lives upstairs works FT, 4 steps to enter +HR. Pt reports having increased difficulty lately pulling himself out of bed/up out of chair and losing his balance.
PTA pt lived in pvt home 1st floor, son lives upstairs works FT, 4 steps to enter +HR. Pt reports having increased difficulty lately pulling himself out of bed/up out of chair and losing his balance. (taken from previous PT eval 1/12/18)

## 2018-02-06 NOTE — PROGRESS NOTE ADULT - SUBJECTIVE AND OBJECTIVE BOX
[facility] Division of Kidney Diseases & Hypertension  FOLLOW UP NOTE  --------------------------------------------------------------------------------    HPI: 73 y/o man with history of ETOH abuse who presents with fall and found with endocarditis, developed ELIAN in-hospital shortly after undergoing cardiac catheterization, s/p urgent AVR and CABG on 1/30/18 with clinical decompensation on 2/1 requiring intubation and multiple vasopressor support.  Patient now extubated.  He is not speaking but communicated with his hands / head.  Not in pain but is suggesting he feels difficulty breathing.      PAST HISTORY  --------------------------------------------------------------------------------  No significant changes to PMH, PSH, FHx, SHx, unless otherwise noted    ALLERGIES & MEDICATIONS  --------------------------------------------------------------------------------  Allergies    No Known Allergies    Intolerances      Standing Inpatient Medications  amiodarone    Tablet 400 milliGRAM(s) Oral every 8 hours  aspirin 325 milliGRAM(s) Oral daily  cefTRIAXone   IVPB      cefTRIAXone   IVPB 2 Gram(s) IV Intermittent every 24 hours  dextrose 5%. 1000 milliLiter(s) IV Continuous <Continuous>  dextrose 50% Injectable 25 Gram(s) IV Push once  dextrose 50% Injectable 50 milliLiter(s) IV Push every 15 minutes  digoxin  Injectable 0.25 milliGRAM(s) IV Push every 6 hours  gentamicin   IVPB      gentamicin   IVPB 40 milliGRAM(s) IV Intermittent every 12 hours  heparin  Injectable 5000 Unit(s) SubCutaneous every 8 hours  insulin lispro (HumaLOG) corrective regimen sliding scale   SubCutaneous every 4 hours  levothyroxine Injectable 50 MICROGram(s) IV Push <User Schedule>  pantoprazole  Injectable 40 milliGRAM(s) IV Push daily  sodium chloride 0.9%. 1000 milliLiter(s) IV Continuous <Continuous>  vasopressin Infusion 0.1 Unit(s)/Min IV Continuous <Continuous>    PRN Inpatient Medications      REVIEW OF SYSTEMS  --------------------------------------------------------------------------------  as above    VITALS/PHYSICAL EXAM  --------------------------------------------------------------------------------  T(C): 37.1 (02-06-18 @ 08:00), Max: 37.2 (02-05-18 @ 12:00)  HR: 90 (02-06-18 @ 10:00) (71 - 129)  BP: 117/47  RR: 17 (02-06-18 @ 10:00) (14 - 52)  SpO2: 100% (02-06-18 @ 10:00) (89% - 100%)  Wt(kg): --        02-05-18 @ 07:01  -  02-06-18 @ 07:00  --------------------------------------------------------  IN: 3097.7 mL / OUT: 3020 mL / NET: 77.7 mL    02-06-18 @ 07:01  -  02-06-18 @ 10:19  --------------------------------------------------------  IN: 132 mL / OUT: 190 mL / NET: -58 mL      Physical Exam:    	Gen: NAD  	HEENT: dry membranes on nasal cannula  	Pulm: coarse bilateral breath sounds  	CV: S1S2 no rub appreciated  	Abd: soft non distended  	: kayli  	Neuro: follows commands   	Skin: Warm    LABS/STUDIES  --------------------------------------------------------------------------------              8.8    17.3  >-----------<  75       [02-06-18 @ 01:28]              25.0     143  |  111  |  40  ----------------------------<  145      [02-06-18 @ 01:28]  4.2   |  20  |  1.72        Ca     7.8     [02-06-18 @ 01:28]      Phos  1.9     [02-06-18 @ 01:28]    TPro  5.4  /  Alb  2.7  /  TBili  2.2  /  DBili  x   /  AST  70  /  ALT  129  /  AlkPhos  226  [02-06-18 @ 01:28]    PT/INR: PT 11.2 , INR 1.04       [02-06-18 @ 01:28]  PTT: 42.4       [02-06-18 @ 01:28]      Creatinine Trend:  SCr 1.72 [02-06 @ 01:28]  SCr 1.96 [02-05 @ 01:17]  SCr 2.45 [02-04 @ 02:27]  SCr 2.74 [02-03 @ 05:16]  SCr 2.73 [02-02 @ 22:12]    Urinalysis - [02-06-18 @ 08:35]      Color Yellow / Appearance SL Turbid / SG 1.015 / pH 6.0      Gluc Negative / Ketone Negative  / Bili Negative / Urobili Negative       Blood Moderate / Protein 100 / Leuk Est Trace / Nitrite Negative      RBC 10-25 / WBC 10-25 / Hyaline  / Gran  / Sq Epi  / Non Sq Epi Occasional / Bacteria Moderate      TSH 11.21      [02-01-18 @ 12:43]

## 2018-02-06 NOTE — PROVIDER CONTACT NOTE (OTHER) - RECOMMENDATIONS
Reschedule morning dose of metoprolol 12.5 mg to 9am and followup at 6:30 with repeat BP as per NP request.
Administer EKG, ABG, Simethicone, albuterol and 2L NC as per PA request. CXR ordered and morning labs drawn as per PA request.
DRAW ABG, administer Albuterol, nonrebreather, and ekg as per PA order (Added cardiac enzymes to morning labs).
NP made aware, EKG, eval at bedside.
To continue to observe
amio bolus
consider holding vancomycin dose, reconsider decreasing dose of standing vancomycin
continue to monitor for clots in milan catheter tubing and UO
stat ABG; amio 150mg bolus

## 2018-02-06 NOTE — PHYSICAL THERAPY INITIAL EVALUATION ADULT - IMPAIRMENTS FOUND, PT EVAL
aerobic capacity/endurance/gait, locomotion, and balance/muscle strength
muscle strength/posture/aerobic capacity/endurance/gait, locomotion, and balance

## 2018-02-06 NOTE — PHYSICAL THERAPY INITIAL EVALUATION ADULT - IMPAIRMENTS CONTRIBUTING TO GAIT DEVIATIONS, PT EVAL
decreased strength/impaired balance/impaired postural control
decreased strength/impaired postural control

## 2018-02-06 NOTE — CHART NOTE - NSCHARTNOTEFT_GEN_A_CORE
Chart reviewed, events noted. Pt seen for nutrition follow up and d/w team during CTU rounds. Pt receiving Nepro at 40ml/hr, provides 1728kcal and 78gm prot (25kcal/kg and 1.1gm prot/kg per lowest wt 70.1kg). Per RN pt with diarrhea and needing replacement of potassium and phosphorus. Per d/w team plan to change enteral nutrition formula. Noted with BM x3 thus far today, rectal tube placed with 300ml output but plan to remove per RN. Pt with BM x4 yesterday.     Admission Weight: 75.4kg  Lowest Weight: 70.7kg (2/5)  Current Weight: 70.7kg  Weight fluctuations noted, likely due to fluid shifts. Pt with 2+generalized and bilateral foot/leg edema.    Pertinent Medications: MEDICATIONS  (STANDING):  amiodarone    Tablet 400 milliGRAM(s) Oral every 8 hours  aspirin 325 milliGRAM(s) Oral daily  cefTRIAXone   IVPB      cefTRIAXone   IVPB 2 Gram(s) IV Intermittent every 24 hours  dextrose 5%. 1000 milliLiter(s) (50 mL/Hr) IV Continuous <Continuous>  dextrose 50% Injectable 25 Gram(s) IV Push once  dextrose 50% Injectable 50 milliLiter(s) IV Push every 15 minutes  digoxin  Injectable 0.25 milliGRAM(s) IV Push every 6 hours  gentamicin   IVPB      gentamicin   IVPB 40 milliGRAM(s) IV Intermittent every 12 hours  heparin  Injectable 5000 Unit(s) SubCutaneous every 8 hours  insulin lispro (HumaLOG) corrective regimen sliding scale   SubCutaneous every 4 hours  levothyroxine Injectable 50 MICROGram(s) IV Push <User Schedule>  pantoprazole  Injectable 40 milliGRAM(s) IV Push daily  sodium chloride 0.9%. 1000 milliLiter(s) (10 mL/Hr) IV Continuous <Continuous>  vancomycin  IVPB 750 milliGRAM(s) IV Intermittent <User Schedule>  vasopressin Infusion 0.1 Unit(s)/Min (6 mL/Hr) IV Continuous <Continuous>    MEDICATIONS  (PRN):    Pertinent Labs:  02-06 Na143 mmol/L Glu 145 mg/dL<H> K+ 4.2 mmol/L Cr  1.72 mg/dL<H> BUN 40 mg/dL<H> Phos 1.9 mg/dL<L> Alb 2.7 g/dL<L> PAB n/a     Point of Care Testing BG: (2/6), (1/5).    Skin: No pressure ulcers noted.    Estimated Needs:   [ x ] no change since previous assessment  [ ] recalculated:       Previous Nutrition Diagnosis:   Malnutrition and Increased Nutrient Needs ongoing, being addressed with enteral nutrition support.  Food & Nutrition Related Knowledge Deficit not applicable at this time.       New Nutrition Diagnosis: [ x ] not applicable      Interventions:   Recommend change enteral formula to Vital AF to promote GI tolerance, advance to goal rate 60ml/hr as tolerated to provide 1728kcal and 108gm prot (25kcal/kg and 1.5gm prot/kg per lowest wt 70.1kg).   D/w team.         Monitoring and Evaluation:     [ ] PO intake [ x ] Tolerance to diet prescription [ x ] weights [ x ] follow up per protocol    [ ] other:

## 2018-02-06 NOTE — PROGRESS NOTE ADULT - SUBJECTIVE AND OBJECTIVE BOX
infectious diseases progress note:    Patient is a 72y old  Male who presents with a chief complaint of Fall (2018 00:52)        Atrial fibrillation             Allergies    No Known Allergies    Intolerances        ANTIBIOTICS/RELEVANT:  antimicrobials  cefTRIAXone   IVPB      cefTRIAXone   IVPB 2 Gram(s) IV Intermittent every 24 hours  gentamicin   IVPB      gentamicin   IVPB 40 milliGRAM(s) IV Intermittent every 12 hours    immunologic:    OTHER:  amiodarone    Tablet 400 milliGRAM(s) Oral every 8 hours  aspirin 325 milliGRAM(s) Oral daily  dextrose 5%. 1000 milliLiter(s) IV Continuous <Continuous>  dextrose 50% Injectable 25 Gram(s) IV Push once  dextrose 50% Injectable 50 milliLiter(s) IV Push every 15 minutes  digoxin  Injectable 0.25 milliGRAM(s) IV Push every 6 hours  heparin  Injectable 5000 Unit(s) SubCutaneous every 8 hours  insulin lispro (HumaLOG) corrective regimen sliding scale   SubCutaneous every 4 hours  levothyroxine Injectable 50 MICROGram(s) IV Push <User Schedule>  pantoprazole  Injectable 40 milliGRAM(s) IV Push daily  sodium chloride 0.9%. 1000 milliLiter(s) IV Continuous <Continuous>  vasopressin Infusion 0.1 Unit(s)/Min IV Continuous <Continuous>      Objective:  Vital Signs Last 24 Hrs  T(C): 37.1 (2018 08:00), Max: 37.2 (2018 12:00)  T(F): 98.8 (2018 08:00), Max: 99 (2018 12:00)  HR: 90 (2018 10:00) (71 - 129)  BP: --  BP(mean): --  RR: 17 (2018 10:00) (14 - 52)  SpO2: 100% (2018 10:00) (89% - 100%)    PHYSICAL EXAM:   --no acute distress  Eyes:MYLA, EOMI  Ear/Nose/Throat: no oral lesion, no sinus tenderness on percussion	  Neck:no JVD, no lymphadenopathy, supple  Respiratory: CTA matt  Cardiovascular: S1S2 RRR, no murmurs  Gastrointestinal:soft, (+) BS, no HSM  Extremities:no e/e/c        LABS:                        8.8    17.3  )-----------( 75       ( 2018 01:28 )             25.0     02-06    143  |  111<H>  |  40<H>  ----------------------------<  145<H>  4.2   |  20<L>  |  1.72<H>    Ca    7.8<L>      2018 01:28  Phos  1.9         TPro  5.4<L>  /  Alb  2.7<L>  /  TBili  2.2<H>  /  DBili  x   /  AST  70<H>  /  ALT  129<H>  /  AlkPhos  226<H>      PT/INR - ( 2018 01:28 )   PT: 11.2 sec;   INR: 1.04 ratio         PTT - ( 2018 01:28 )  PTT:42.4 sec  Urinalysis Basic - ( 2018 08:35 )    Color: Yellow / Appearance: SL Turbid / S.015 / pH: x  Gluc: x / Ketone: Negative  / Bili: Negative / Urobili: Negative   Blood: x / Protein: 100 mg/dL / Nitrite: Negative   Leuk Esterase: Trace / RBC: 10-25 /HPF / WBC 10-25 /HPF   Sq Epi: x / Non Sq Epi: Occasional /HPF / Bacteria: Moderate /HPF          MICROBIOLOGY:    RECENT CULTURES:   @ 06:31 .Blood Blood-Peripheral                No growth to date.     @ 16:35 .Sputum Sputum       Few polymorphonuclear leukocytes per low power field  Rare Squamous Epithelial Cells per low power field  Moderate Gram Negative Rods per oil power field  Rare Gram positive cocci in pairs per oil power field  Rare Yeast like cells per oil power field           Normal Respiratory Soni present     @ 13:46 .Blood Blood-Venous                No growth to date.     @ 16:46 .Blood Blood                No growth to date.     @ 01:01 .Tissue aoritc valve leaflet       Rare polymorphonuclear leukocytes seen per low power field  Moderate Gram Positive Cocci in Pairs and Chains seen per oil power field           Testing in progress     @ 00:52 .Tissue Other, aortic valve leaflet                        RESPIRATORY CULTURES:      Clostridium difficile GDH Toxins A&amp;B, EIA:   Negative ( @ 01:18)  Clostridium difficile [facility]DH Toxins A&amp;B, EIA:   Negative ( @ 06:56)          RADIOLOGY & ADDITIONAL STUDIES:        Pager [phone]  After 5 pm/weekends or if no response :[phone]

## 2018-02-06 NOTE — PHYSICAL THERAPY INITIAL EVALUATION ADULT - PATIENT/FAMILY AGREES WITH PLAN
pt agrees after being edu on benefits of skilled PT prior to D/C home due to safety concern/yes
Subacute rehab/yes

## 2018-02-06 NOTE — PHYSICAL THERAPY INITIAL EVALUATION ADULT - GAIT DEVIATIONS NOTED, PT EVAL
decreased stride length/decreased weight-shifting ability/increased time in double stance/decreased cristino
decreased cristino/decreased step length/decreased stride length

## 2018-02-06 NOTE — PHYSICAL THERAPY INITIAL EVALUATION ADULT - DIAGNOSIS, PT EVAL
Decreased functional mobility d/t impaired balance, strength and endurance.
deconditioning, decreased strength, decreased endurance

## 2018-02-06 NOTE — PROVIDER CONTACT NOTE (OTHER) - SITUATION
Hypotension, Low BP 88/40 manual
As per orders, milan catheter was changed  after UA was sent to lab; urine culture sent from new milan. Upon insertion hematuria was noted in milan tubing.
Difficulty breathing and patient states experiencing "chest pain."
Patient complained of increasing shortness of breath.
Patient states discomfort while taking deep breaths , awoken from sleep. 3/10 numeric scale.
Pt converted from NSR to Rapid Afib
Pt observed with a slight episode of Epistaxis
vancomycin trough 23.8, held as per Dr. Mcnally
rapid afib on monitor 's BP stable currently off vaso

## 2018-02-06 NOTE — PROVIDER CONTACT NOTE (OTHER) - ACTION/TREATMENT ORDERED:
stat ABG; amio 150mg bolus; 2g magnesium as per orders
Medication rescheduled. Repeat BP 90/45, no dizziness, no lightheadedness, no s/s of hypotension. Day RN and NP notified.
Medications given. CXR results confirmed. Pt on 2 L nasal cannula. VSS. Day RN notified.
Medications given. VSS. Day RN notified. Labs sent. EKG complete. Respiratory started pt on BiPAP as per PA request.
NP made aware, EKG, Tylenol 650 PO for management. Will continue to monitor status and maintain safety.
NP made aware, no new orders continue to observe
amio bolus 150mg, mgsulfate 1gm, supplement K
continue to monitor for clots in milan catheter tubing and UO
vancomycin 1250mg dose hold for 10am dose

## 2018-02-06 NOTE — PHYSICAL THERAPY INITIAL EVALUATION ADULT - PERTINENT HX OF CURRENT PROBLEM, REHAB EVAL
Pt is a 72 year-old male with past medical history of T2DM, DLD, alcohol misuse brought in by family after having been found face down on the floor for approximately 2 days. Plan for cardiac cath and pre-op for IE. +CT chest Mild pulmonary edema with trace bilateral pleural effusions and adjacent atelectasis. CT head neg, abd neg mesentary ischemia. +TTE Calcified trileaflet aortic valve with decreased opening. Mild-moderate aortic regurgitation.
73 y/o man with history of ETOH abuse who presents with fall and found with endocarditis, developed ELIAN in-hospital shortly after undergoing cardiac catheterization, s/p urgent AVR and CABG on 1/30/18 with clinical decompensation on 2/1 requiring intubation and multiple vasopressor support. chest x ray: The heart is enlarged. Left lower lobe pneumonia and/or atelectasis. The right lung is clear.

## 2018-02-06 NOTE — PHYSICAL THERAPY INITIAL EVALUATION ADULT - PLANNED THERAPY INTERVENTIONS, PT EVAL
gait training/bed mobility training/strengthening/postural re-education/transfer training
gait training/bed mobility training/transfer training

## 2018-02-06 NOTE — PROVIDER CONTACT NOTE (OTHER) - NAME OF MD/NP/PA/DO NOTIFIED:
AURELIANO Atwood and Dr Macedo
AURELIANO Zavala
AURELIANO Zavala
All BEDOYA
Dr. Mcnally
Syed Guona
Zainab Shelley NP
All BEDOYA
AURELIANO Zavala

## 2018-02-06 NOTE — PROGRESS NOTE ADULT - ASSESSMENT
72 year-old male with past medical history of T2DM, DLD, alcohol misuse brought in by family after having been found face down on the floor for approximately 2 days. On treatment for UTI, but now JANES with Aortic Valve lesions. 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 Bartonella, Legionella, Q Fever serology negative; fungal BCX negative. Brucella pending. Unclear cause of NVE, culture  with GPC--pending. Having diarrhea, C diff negative. LFTs rising, working ELIAN, aortic balloon pump, pressors. Critically ill. Aortic valve vegetation, leukocytosis, post operative state.  -    discussed with colleagues and [facility]  organism is fastidious gram positive coccus   micro having trouble getting it to grow   not likely if it were enterococcus  more likely nuirt def strep that needs combination therapy       doing better  extubated afebrile   no change in therapy   no need for IV vanco  will need PICC line

## 2018-02-06 NOTE — PHYSICAL THERAPY INITIAL EVALUATION ADULT - GENERAL OBSERVATIONS, REHAB EVAL
Pt seated, NAD, +ICU monitoring, appropriate for PT as per CCU team.
received in chair, alert, calm, 5LNC, external pacer, B veno, A line, IV

## 2018-02-06 NOTE — PROVIDER CONTACT NOTE (OTHER) - REASON
Epistaxis
Pleuritic discomfort
Rapid Afib
SOB and Chest pain
Shortness of breath mild chest pain.
milan catheter changed; hematuria upon insertion
vancomycin trough 23.8
rapid afib
Hypotension, Low BP 88/40 manual

## 2018-02-06 NOTE — PROGRESS NOTE ADULT - PROBLEM SELECTOR PLAN 1
Likely contrast induced nephropathy from cardiac cath/Toxic/Septic  ATN. Scr. remains elevated but improved to today. Patient is non oliguric. Plan is to continue monitoring Scr., electrolytes, and urine output. No plan for HD at this time. Avoid nephrotoxins.  Monitor gentamicin level.

## 2018-02-06 NOTE — PROVIDER CONTACT NOTE (OTHER) - ASSESSMENT
Hypotension, Low BP 88/40 manual. No dizziness / lightheadedness.
Difficulty breathing and patient states experiencing "chest pain." VSS.
Hematuria upon insertion of new milan catheter, no clots noted and UO 50-100mL/hr
Patient complained of increasing shortness of breath and some chest pain. O2 sat remained above 94 despite shortness of breath. Became better with relaxation techniques
VSS On tele without events. No sob, no palpitations, denies cp. denies dizziness,  tissue notes with blood stained with diana dim sized amount x 2
cc of pleuritic discomfort not pain 3/10 numeric scale. awoken from sleep  VS- see flowsheet, o2 saturation in RA >95% RR 16-20.  On tele without events. No sob, no palpitations, denies cp. denies dizziness,
mechelle on monitor, requiring vasopressor and inotropic support, on amio gtt 0.5 mg/hr, ABG sent and K of 3.9 being supplemented
pt afebrile, pt denies pain, SR on monitor, VSS
rapid afib on monitor 's BP stable currently off vaso; post op rapid afib patient currently being PO amio loaded; 400mg PO amio q8hrs; ABG pending

## 2018-02-07 ENCOUNTER — RECORD ABSTRACTING (OUTPATIENT)
Age: 73
End: 2018-02-07

## 2018-02-07 DIAGNOSIS — Z78.9 OTHER SPECIFIED HEALTH STATUS: ICD-10-CM

## 2018-02-07 DIAGNOSIS — R21 RASH AND OTHER NONSPECIFIC SKIN ERUPTION: ICD-10-CM

## 2018-02-07 DIAGNOSIS — J38.3 OTHER DISEASES OF VOCAL CORDS: ICD-10-CM

## 2018-02-07 DIAGNOSIS — E11.65 TYPE 2 DIABETES MELLITUS WITH HYPERGLYCEMIA: ICD-10-CM

## 2018-02-07 DIAGNOSIS — E78.00 PURE HYPERCHOLESTEROLEMIA, UNSPECIFIED: ICD-10-CM

## 2018-02-07 DIAGNOSIS — E55.9 VITAMIN D DEFICIENCY, UNSPECIFIED: ICD-10-CM

## 2018-02-07 LAB
ALBUMIN SERPL ELPH-MCNC: 2.3 G/DL — LOW (ref 3.3–5)
ALP SERPL-CCNC: 243 U/L — HIGH (ref 40–120)
ALT FLD-CCNC: 104 U/L RC — HIGH (ref 10–45)
ANION GAP SERPL CALC-SCNC: 11 MMOL/L — SIGNIFICANT CHANGE UP (ref 5–17)
APTT BLD: 64.1 SEC — HIGH (ref 27.5–37.4)
APTT BLD: 75.9 SEC — HIGH (ref 27.5–37.4)
AST SERPL-CCNC: 69 U/L — HIGH (ref 10–40)
BILIRUB SERPL-MCNC: 1.6 MG/DL — HIGH (ref 0.2–1.2)
BUN SERPL-MCNC: 36 MG/DL — HIGH (ref 7–23)
CALCIUM SERPL-MCNC: 7.8 MG/DL — LOW (ref 8.4–10.5)
CHLORIDE SERPL-SCNC: 116 MMOL/L — HIGH (ref 96–108)
CO2 SERPL-SCNC: 20 MMOL/L — LOW (ref 22–31)
CREAT SERPL-MCNC: 1.46 MG/DL — HIGH (ref 0.5–1.3)
CULTURE RESULTS: NO GROWTH — SIGNIFICANT CHANGE UP
CULTURE RESULTS: SIGNIFICANT CHANGE UP
CULTURE RESULTS: SIGNIFICANT CHANGE UP
GAS PNL BLDA: SIGNIFICANT CHANGE UP
GLUCOSE BLDC GLUCOMTR-MCNC: 170 MG/DL — HIGH (ref 70–99)
GLUCOSE BLDC GLUCOMTR-MCNC: 91 MG/DL — SIGNIFICANT CHANGE UP (ref 70–99)
GLUCOSE SERPL-MCNC: 105 MG/DL — HIGH (ref 70–99)
HCT VFR BLD CALC: 29.9 % — LOW (ref 39–50)
HGB BLD-MCNC: 10 G/DL — LOW (ref 13–17)
INR BLD: 0.96 RATIO — SIGNIFICANT CHANGE UP (ref 0.88–1.16)
MCHC RBC-ENTMCNC: 31.4 PG — SIGNIFICANT CHANGE UP (ref 27–34)
MCHC RBC-ENTMCNC: 33.4 GM/DL — SIGNIFICANT CHANGE UP (ref 32–36)
MCV RBC AUTO: 94 FL — SIGNIFICANT CHANGE UP (ref 80–100)
PHOSPHATE SERPL-MCNC: 2.4 MG/DL — LOW (ref 2.5–4.5)
PLATELET # BLD AUTO: 94 K/UL — LOW (ref 150–400)
POTASSIUM SERPL-MCNC: 3.4 MMOL/L — LOW (ref 3.5–5.3)
POTASSIUM SERPL-SCNC: 3.4 MMOL/L — LOW (ref 3.5–5.3)
PROT SERPL-MCNC: 5.5 G/DL — LOW (ref 6–8.3)
PROTHROM AB SERPL-ACNC: 10.4 SEC — SIGNIFICANT CHANGE UP (ref 9.8–12.7)
RBC # BLD: 3.19 M/UL — LOW (ref 4.2–5.8)
RBC # FLD: 15.2 % — HIGH (ref 10.3–14.5)
SODIUM SERPL-SCNC: 147 MMOL/L — HIGH (ref 135–145)
SPECIMEN SOURCE: SIGNIFICANT CHANGE UP
WBC # BLD: 14.7 K/UL — HIGH (ref 3.8–10.5)
WBC # FLD AUTO: 14.7 K/UL — HIGH (ref 3.8–10.5)

## 2018-02-07 PROCEDURE — 93306 TTE W/DOPPLER COMPLETE: CPT | Mod: 26

## 2018-02-07 PROCEDURE — 99232 SBSQ HOSP IP/OBS MODERATE 35: CPT

## 2018-02-07 PROCEDURE — 99233 SBSQ HOSP IP/OBS HIGH 50: CPT

## 2018-02-07 PROCEDURE — 71045 X-RAY EXAM CHEST 1 VIEW: CPT | Mod: 26,76

## 2018-02-07 PROCEDURE — 99291 CRITICAL CARE FIRST HOUR: CPT

## 2018-02-07 RX ORDER — ALBUMIN HUMAN 25 %
250 VIAL (ML) INTRAVENOUS ONCE
Qty: 0 | Refills: 0 | Status: COMPLETED | OUTPATIENT
Start: 2018-02-07 | End: 2018-02-07

## 2018-02-07 RX ORDER — AMIODARONE HYDROCHLORIDE 400 MG/1
200 TABLET ORAL DAILY
Qty: 0 | Refills: 0 | Status: DISCONTINUED | OUTPATIENT
Start: 2018-02-09 | End: 2018-02-11

## 2018-02-07 RX ORDER — VASOPRESSIN 20 [USP'U]/ML
0.03 INJECTION INTRAVENOUS
Qty: 100 | Refills: 0 | Status: DISCONTINUED | OUTPATIENT
Start: 2018-02-07 | End: 2018-02-09

## 2018-02-07 RX ORDER — POTASSIUM CHLORIDE 20 MEQ
40 PACKET (EA) ORAL ONCE
Qty: 0 | Refills: 0 | Status: COMPLETED | OUTPATIENT
Start: 2018-02-07 | End: 2018-02-07

## 2018-02-07 RX ORDER — POTASSIUM CHLORIDE 20 MEQ
10 PACKET (EA) ORAL
Qty: 0 | Refills: 0 | Status: COMPLETED | OUTPATIENT
Start: 2018-02-07 | End: 2018-02-07

## 2018-02-07 RX ORDER — VASOPRESSIN 20 [USP'U]/ML
0.05 INJECTION INTRAVENOUS
Qty: 100 | Refills: 0 | Status: DISCONTINUED | OUTPATIENT
Start: 2018-02-07 | End: 2018-02-07

## 2018-02-07 RX ORDER — POTASSIUM CHLORIDE 20 MEQ
20 PACKET (EA) ORAL ONCE
Qty: 0 | Refills: 0 | Status: COMPLETED | OUTPATIENT
Start: 2018-02-07 | End: 2018-02-07

## 2018-02-07 RX ADMIN — Medication 20 MILLIEQUIVALENT(S): at 10:00

## 2018-02-07 RX ADMIN — PANTOPRAZOLE SODIUM 40 MILLIGRAM(S): 20 TABLET, DELAYED RELEASE ORAL at 11:33

## 2018-02-07 RX ADMIN — Medication 125 MILLILITER(S): at 18:17

## 2018-02-07 RX ADMIN — Medication 0.12 MILLIGRAM(S): at 11:33

## 2018-02-07 RX ADMIN — Medication 5: at 10:08

## 2018-02-07 RX ADMIN — Medication 40 MILLIEQUIVALENT(S): at 15:24

## 2018-02-07 RX ADMIN — Medication 5: at 05:19

## 2018-02-07 RX ADMIN — AMIODARONE HYDROCHLORIDE 400 MILLIGRAM(S): 400 TABLET ORAL at 21:09

## 2018-02-07 RX ADMIN — VASOPRESSIN 3 UNIT(S)/MIN: 20 INJECTION INTRAVENOUS at 19:00

## 2018-02-07 RX ADMIN — VASOPRESSIN 2 UNIT(S)/MIN: 20 INJECTION INTRAVENOUS at 23:40

## 2018-02-07 RX ADMIN — Medication 325 MILLIGRAM(S): at 11:33

## 2018-02-07 RX ADMIN — Medication 200 MILLIGRAM(S): at 05:16

## 2018-02-07 RX ADMIN — Medication 5: at 21:08

## 2018-02-07 RX ADMIN — Medication 125 MILLILITER(S): at 16:00

## 2018-02-07 RX ADMIN — Medication 50 MILLIEQUIVALENT(S): at 05:20

## 2018-02-07 RX ADMIN — Medication 2: at 17:55

## 2018-02-07 RX ADMIN — HEPARIN SODIUM 5000 UNIT(S): 5000 INJECTION INTRAVENOUS; SUBCUTANEOUS at 05:16

## 2018-02-07 RX ADMIN — SODIUM CHLORIDE 10 MILLILITER(S): 9 INJECTION INTRAMUSCULAR; INTRAVENOUS; SUBCUTANEOUS at 19:00

## 2018-02-07 RX ADMIN — CEFTRIAXONE 100 GRAM(S): 500 INJECTION, POWDER, FOR SOLUTION INTRAMUSCULAR; INTRAVENOUS at 10:17

## 2018-02-07 RX ADMIN — AMIODARONE HYDROCHLORIDE 400 MILLIGRAM(S): 400 TABLET ORAL at 13:55

## 2018-02-07 RX ADMIN — Medication 62.5 MILLIMOLE(S): at 05:15

## 2018-02-07 RX ADMIN — Medication 50 MILLIEQUIVALENT(S): at 06:26

## 2018-02-07 RX ADMIN — HEPARIN SODIUM 5000 UNIT(S): 5000 INJECTION INTRAVENOUS; SUBCUTANEOUS at 13:55

## 2018-02-07 RX ADMIN — Medication 50 MICROGRAM(S): at 13:56

## 2018-02-07 RX ADMIN — Medication 125 MILLILITER(S): at 10:00

## 2018-02-07 RX ADMIN — Medication 200 MILLIGRAM(S): at 17:55

## 2018-02-07 RX ADMIN — HEPARIN SODIUM 5000 UNIT(S): 5000 INJECTION INTRAVENOUS; SUBCUTANEOUS at 21:09

## 2018-02-07 RX ADMIN — Medication 2: at 14:10

## 2018-02-07 RX ADMIN — Medication 50 MILLIEQUIVALENT(S): at 07:32

## 2018-02-07 RX ADMIN — AMIODARONE HYDROCHLORIDE 400 MILLIGRAM(S): 400 TABLET ORAL at 05:18

## 2018-02-07 NOTE — SWALLOW BEDSIDE ASSESSMENT ADULT - SWALLOW EVAL: DIAGNOSIS
Patient presents with impaired management of secretions, and oral and suspected pharyngeal dysphagia characterized by baseline pooling of secretions, suspected premature spillover, delayed pharyngeal swallow trigger, repeat swallow suggestive of pharyngeal retention, and s/s of laryngeal penetration and/or aspiration with Patient presents with impaired management of secretions, and oral and suspected pharyngeal dysphagia characterized by baseline pooling of secretions, suspected premature spillover, delayed pharyngeal swallow trigger, repeat swallow suggestive of pharyngeal retention, and s/s of laryngeal penetration and/or aspiration with ice chips.

## 2018-02-07 NOTE — CONSULT NOTE ADULT - PROBLEM SELECTOR RECOMMENDATION 9
- decadron 4mg q8hrs for 24 hours   - will re-scope pt in one week  - continue with speech and swallow recommendations.

## 2018-02-07 NOTE — PROGRESS NOTE ADULT - PROBLEM SELECTOR PLAN 1
Likely contrast induced nephropathy from cardiac cath/Toxic/Septic  ATN. Scr. remains high but improving daily. Patient is non oliguric. Plan is to continue monitoring Scr., electrolytes, and urine output. No plan for HD at this time. Avoid nephrotoxins.  Monitor gentamicin level.

## 2018-02-07 NOTE — CHART NOTE - NSCHARTNOTEFT_GEN_A_CORE
MEDICAL NECESSITY:    I have asked IV team to place a PIC line for venous access.    Cannot contact family for consent and patient has medical need for IV access.    PLAN: place PIC line as medical Necessity

## 2018-02-07 NOTE — PROGRESS NOTE ADULT - SUBJECTIVE AND OBJECTIVE BOX
[facility] DIVISION OF KIDNEY DISEASES AND HYPERTENSION -- FOLLOW UP NOTE  --------------------------------------------------------------------------------  Chief Complaint:    24 hour events/subjective:  UO stable      PAST HISTORY  --------------------------------------------------------------------------------  No significant changes to PMH, PSH, FHx, SHx, unless otherwise noted    ALLERGIES & MEDICATIONS  --------------------------------------------------------------------------------  Allergies    No Known Allergies    Intolerances      Standing Inpatient Medications  albumin human  5% IVPB 250 milliLiter(s) IV Intermittent once  amiodarone    Tablet 400 milliGRAM(s) Oral every 8 hours  amiodarone Infusion 0.5 mG/Min IV Continuous <Continuous>  aspirin 325 milliGRAM(s) Oral daily  cefTRIAXone   IVPB      cefTRIAXone   IVPB 2 Gram(s) IV Intermittent every 24 hours  dextrose 5%. 1000 milliLiter(s) IV Continuous <Continuous>  dextrose 50% Injectable 25 Gram(s) IV Push once  dextrose 50% Injectable 50 milliLiter(s) IV Push every 15 minutes  digoxin     Tablet 0.125 milliGRAM(s) Oral every other day  gentamicin   IVPB      gentamicin   IVPB 40 milliGRAM(s) IV Intermittent every 12 hours  heparin  Injectable 5000 Unit(s) SubCutaneous every 8 hours  insulin lispro (HumaLOG) corrective regimen sliding scale   SubCutaneous every 4 hours  levothyroxine Injectable 50 MICROGram(s) IV Push <User Schedule>  pantoprazole  Injectable 40 milliGRAM(s) IV Push daily  sodium chloride 0.9%. 1000 milliLiter(s) IV Continuous <Continuous>  vasopressin Infusion 0.1 Unit(s)/Min IV Continuous <Continuous>    PRN Inpatient Medications      REVIEW OF SYSTEMS  --------------------------------------------------------------------------------  unable to do    VITALS/PHYSICAL EXAM  --------------------------------------------------------------------------------  T(C): 36.8 (02-07-18 @ 08:00), Max: 37.3 (02-06-18 @ 14:00)  HR: 72 (02-07-18 @ 09:00) (0 - 132)  BP: --  RR: 17 (02-07-18 @ 09:00) (12 - 38)  SpO2: 100% (02-07-18 @ 09:00) (87% - 100%)  Wt(kg): --        02-06-18 @ 07:01  -  02-07-18 @ 07:00  --------------------------------------------------------  IN: 1956.4 mL / OUT: 2420 mL / NET: -463.6 mL    02-07-18 @ 07:01  -  02-07-18 @ 09:56  --------------------------------------------------------  IN: 667.6 mL / OUT: 210 mL / NET: 457.6 mL      Physical Exam:  	Gen: NAD, tacnypnic  	HEENT: PERRL, supple neck, clear oropharynx  	Pulm: CTA B/L  	CV: RRR, S1S2; no rub  	Abd: +BS, soft, nontender/nondistended              LE: no edema/trace  	Neuro: no asterexis  	    LABS/STUDIES  --------------------------------------------------------------------------------              10.0   14.7  >-----------<  94       [02-07-18 @ 01:55]              29.9     147  |  116  |  36  ----------------------------<  105      [02-07-18 @ 01:55]  3.4   |  20  |  1.46        Ca     7.8     [02-07-18 @ 01:55]      Phos  2.4     [02-07-18 @ 01:55]    TPro  5.5  /  Alb  2.3  /  TBili  1.6  /  DBili  x   /  AST  69  /  ALT  104  /  AlkPhos  243  [02-07-18 @ 01:55]    PT/INR: PT 10.4 , INR 0.96       [02-07-18 @ 01:55]  PTT: 64.1       [02-07-18 @ 05:27]      Creatinine Trend:  SCr 1.46 [02-07 @ 01:55]  SCr 1.72 [02-06 @ 01:28]  SCr 1.96 [02-05 @ 01:17]  SCr 2.45 [02-04 @ 02:27]  SCr 2.74 [02-03 @ 05:16]    Urinalysis - [02-06-18 @ 08:35]      Color Yellow / Appearance SL Turbid / SG 1.015 / pH 6.0      Gluc Negative / Ketone Negative  / Bili Negative / Urobili Negative       Blood Moderate / Protein 100 / Leuk Est Trace / Nitrite Negative      RBC 10-25 / WBC 10-25 / Hyaline  / Gran  / Sq Epi  / Non Sq Epi Occasional / Bacteria Moderate      HbA1c 9.1      [01-09-18 @ 03:39]  TSH 11.21      [02-01-18 @ 12:43]  Lipid: chol 103, , HDL 16, LDL 67      [01-09-18 @ 08:09]    HIV Nonreact      [01-09-18 @ 07:54]    Syphilis Screen (Treponema Pallidum Ab) Negative      [01-11-18 @ 19:58]

## 2018-02-07 NOTE — CONSULT NOTE ADULT - SUBJECTIVE AND OBJECTIVE BOX
CC: hoarseness and failed S&S eval    HPI: Patient is a 72y old  Male who we are asked to evaluate the patient for hoarseness and failed S&S eval. Pt has significant past medical history of T2DM, DLD, alcohol misuse brought in by family after having been found face down on the floor for approximately 2 days. During admission pt treated for UTI, found to have Aortic Valve lesion and valvular failure despite antibiotics on JANES s/p AVR. Pt failed extubation x1 now with hoarseness and failed speech and swallow eval. Pt seen resting in bed comfortably, difficulty understanding pt history.     PAST MEDICAL & SURGICAL HISTORY:  High cholesterol  Diabetes: type 2  No significant past surgical history    Allergies    No Known Allergies    Intolerances      MEDICATIONS  (STANDING):  amiodarone    Tablet 400 milliGRAM(s) Oral every 8 hours  aspirin 325 milliGRAM(s) Oral daily  cefTRIAXone   IVPB      cefTRIAXone   IVPB 2 Gram(s) IV Intermittent every 24 hours  dextrose 5%. 1000 milliLiter(s) (50 mL/Hr) IV Continuous <Continuous>  dextrose 50% Injectable 25 Gram(s) IV Push once  dextrose 50% Injectable 50 milliLiter(s) IV Push every 15 minutes  digoxin     Tablet 0.125 milliGRAM(s) Oral every other day  gentamicin   IVPB      gentamicin   IVPB 40 milliGRAM(s) IV Intermittent every 12 hours  heparin  Injectable 5000 Unit(s) SubCutaneous every 8 hours  insulin lispro (HumaLOG) corrective regimen sliding scale   SubCutaneous every 4 hours  levothyroxine Injectable 50 MICROGram(s) IV Push <User Schedule>  pantoprazole  Injectable 40 milliGRAM(s) IV Push daily  sodium chloride 0.9%. 1000 milliLiter(s) (10 mL/Hr) IV Continuous <Continuous>    MEDICATIONS  (PRN):      Social History: no tobacco, +etoh     ROS: ENT, GI, , CV, Pulm, Neuro, Psych, MS, Heme, Endo, Constitutional; all negative except as noted in HPI    Vital Signs Last 24 Hrs  T(C): 35.9 (07 Feb 2018 19:45), Max: 37.1 (07 Feb 2018 00:00)  T(F): 96.6 (07 Feb 2018 19:45), Max: 98.8 (07 Feb 2018 00:00)  HR: 73 (07 Feb 2018 20:45) (0 - 124)  BP: --  BP(mean): --  RR: 13 (07 Feb 2018 20:45) (12 - 33)  SpO2: 99% (07 Feb 2018 20:45) (94% - 100%)                          10.0   14.7  )-----------( 94       ( 07 Feb 2018 01:55 )             29.9    02-07    147<H>  |  116<H>  |  36<H>  ----------------------------<  105<H>  3.4<L>   |  20<L>  |  1.46<H>    Ca    7.8<L>      07 Feb 2018 01:55  Phos  2.4     02-07    TPro  5.5<L>  /  Alb  2.3<L>  /  TBili  1.6<H>  /  DBili  x   /  AST  69<H>  /  ALT  104<H>  /  AlkPhos  243<H>  02-07   PT/INR - ( 07 Feb 2018 01:55 )   PT: 10.4 sec;   INR: 0.96 ratio         PTT - ( 07 Feb 2018 05:27 )  PTT:64.1 sec    PHYSICAL EXAM:  Gen: NAD, well-developed  Head: Normocephalic, Atraumatic  Face: no edema/erythema/fluctuance  Eyes: no scleral injection  Nose: NG tube in right nare. Nares bilaterally patent, no discharge  Mouth: No Stridor / Drooling / Trismus.  Mucosa moist, tongue/uvula midline, oropharynx clear  Neck: Flat, supple, no lymphadenopathy, trachea midline, no masses  Resp: breathing easily, no stridor  CV: no peripheral edema/cyanosis    Fiberoptic Indirect laryngoscopy:  (With Scope #2) + right VC paresis medially with glottic gap, pooling of secretions and b/l posterior intubation granulomas. No bleeding in nasal pharynx or Oral pharynx.  No foreign body.  No glottic / supraglottic swelling.  Vocal cords mobile in intact b/l.  Airway patent.

## 2018-02-07 NOTE — SWALLOW BEDSIDE ASSESSMENT ADULT - SLP GENERAL OBSERVATIONS
Patient encountered supine in bed, +NGT, +O2 via NC. Pt asleep on contact, easily roused to light tactile stim. Per RNs pt s/p multiple procedures and exams on this date. Pt's speech garbled; overall intelligibility initially fair, however improved following provision of oral care. Pt A&O grossly x3, able to make basic wants and needs known and follow 1-step directives for purpose of evaluation, intermittently benefitting from visual models. Pt primarily responsive communicator. Pt's SpO2 and RR remained WNL throughout assessment. Patient encountered supine in bed, +NGT, +O2 via NC. Pt asleep on contact, easily roused to light tactile stim. Per RNs pt s/p multiple procedures and exams on this date. Pt's speech garbled; overall intelligibility initially fair, however improved following provision of oral care. Pt A&O grossly x3, able to make basic wants and needs known and follow 1-step directives for purpose of evaluation, intermittently benefitting from visual models. Pt primarily responsive communicator. Vocal quality hoarse. Pt's SpO2 and RR remained WNL throughout assessment. Patient encountered supine in bed, +NGT, +O2 via NC. Pt asleep on contact, easily roused to light tactile stim. Per RNs pt s/p multiple procedures and exams on this date. Pt's speech garbled; overall intelligibility initially fair, however improved following provision of oral care. Pt A&O grossly x3, able to make basic wants and needs known and follow 1-step directives for purpose of evaluation, intermittently benefitting from visual models. Pt primarily responsive communicator. Pt hypophonic, and with hoarse vocal quality. Pt's SpO2 and RR remained WNL throughout assessment.

## 2018-02-07 NOTE — PROGRESS NOTE ADULT - ASSESSMENT
72 year-old male with past medical history of T2DM, DLD, alcohol misuse brought in by family after having been found face down on the floor for approximately 2 days. On treatment for UTI, but now JANES with Aortic Valve lesions. 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 Bartonella, Legionella, Q Fever serology negative; fungal BCX negative. Brucella pending. Unclear cause of NVE, culture  with GPC--pending. Having diarrhea, C diff negative. LFTs rising, working ELIAN, aortic balloon pump, pressors. Critically ill. Aortic valve vegetation, leukocytosis, post operative state.  -    discussed with colleagues and [facility]  organism is fastidious gram positive coccus   micro having trouble getting it to grow   not likely if it were enterococcus  more likely vit  b 6  def strep that needs combination therapy       doing better  extubated afebrile   no change in therapy      discussed with Dr. Mary  renal function stable on low dose genta

## 2018-02-07 NOTE — CONSULT NOTE ADULT - ASSESSMENT
72y s/p AVR with hoarseness and failed S&S with right VC paresis medially with glottic gap, pooling of secretions and b/l posterior intubation granulomas on FOE

## 2018-02-07 NOTE — SWALLOW BEDSIDE ASSESSMENT ADULT - ASR SWALLOW RECOMMEND DIAG
Defer objective assess Defer objective assessment at this time as not likely to change clinical management

## 2018-02-07 NOTE — PROGRESS NOTE ADULT - SUBJECTIVE AND OBJECTIVE BOX
infectious diseases progress note:    Patient is a 72y old  Male who presents with a chief complaint of Fall (2018 00:52)        Atrial fibrillation        ROS:  CONSTITUTIONAL:  Negative fever or chills, feels well, good appetite  EYES:  Negative  blurry vision or double vision  CARDIOVASCULAR:  Negative for chest pain or palpitations  RESPIRATORY:  Negative for cough, wheezing, or SOB   GASTROINTESTINAL:  Negative for nausea, vomiting, diarrhea, constipation, or abdominal pain  GENITOURINARY:  Negative frequency, urgency or dysuria  NEUROLOGIC:  No headache, confusion, dizziness, lightheadedness    Allergies    No Known Allergies    Intolerances        ANTIBIOTICS/RELEVANT:  antimicrobials  cefTRIAXone   IVPB      cefTRIAXone   IVPB 2 Gram(s) IV Intermittent every 24 hours  gentamicin   IVPB      gentamicin   IVPB 40 milliGRAM(s) IV Intermittent every 12 hours    immunologic:    OTHER:  albumin human  5% IVPB 250 milliLiter(s) IV Intermittent once  amiodarone    Tablet 400 milliGRAM(s) Oral every 8 hours  amiodarone Infusion 0.5 mG/Min IV Continuous <Continuous>  aspirin 325 milliGRAM(s) Oral daily  dextrose 5%. 1000 milliLiter(s) IV Continuous <Continuous>  dextrose 50% Injectable 25 Gram(s) IV Push once  dextrose 50% Injectable 50 milliLiter(s) IV Push every 15 minutes  digoxin     Tablet 0.125 milliGRAM(s) Oral every other day  heparin  Injectable 5000 Unit(s) SubCutaneous every 8 hours  insulin lispro (HumaLOG) corrective regimen sliding scale   SubCutaneous every 4 hours  levothyroxine Injectable 50 MICROGram(s) IV Push <User Schedule>  pantoprazole  Injectable 40 milliGRAM(s) IV Push daily  sodium chloride 0.9%. 1000 milliLiter(s) IV Continuous <Continuous>  vasopressin Infusion 0.1 Unit(s)/Min IV Continuous <Continuous>      Objective:  Vital Signs Last 24 Hrs  T(C): 36.8 (2018 08:00), Max: 37.3 (2018 14:00)  T(F): 98.2 (2018 08:00), Max: 99.1 (2018 14:00)  HR: 72 (2018 09:00) (0 - 132)  BP: --  BP(mean): --  RR: 17 (2018 09:00) (12 - 38)  SpO2: 100% (2018 09:00) (87% - 100%)    PHYSICAL EXAM:   lethargic but responds moves all ext   Eyes:MYLA, EOMI  Ear/Nose/Throat: no oral lesion, no sinus tenderness on percussion	  Neck:no JVD, no lymphadenopathy, supple  Respiratory: CTA matt  Cardiovascular: S1S2 RRR, no murmurs  Gastrointestinal:soft, (+) BS, no HSM  Extremities:no e/e/c        LABS:                        10.0   14.7  )-----------( 94       ( 2018 01:55 )             29.9         147<H>  |  116<H>  |  36<H>  ----------------------------<  105<H>  3.4<L>   |  20<L>  |  1.46<H>    Ca    7.8<L>      2018 01:55  Phos  2.4         TPro  5.5<L>  /  Alb  2.3<L>  /  TBili  1.6<H>  /  DBili  x   /  AST  69<H>  /  ALT  104<H>  /  AlkPhos  243<H>      PT/INR - ( 2018 01:55 )   PT: 10.4 sec;   INR: 0.96 ratio         PTT - ( 2018 05:27 )  PTT:64.1 sec  Urinalysis Basic - ( 2018 08:35 )    Color: Yellow / Appearance: SL Turbid / S.015 / pH: x  Gluc: x / Ketone: Negative  / Bili: Negative / Urobili: Negative   Blood: x / Protein: 100 mg/dL / Nitrite: Negative   Leuk Esterase: Trace / RBC: 10-25 /HPF / WBC 10-25 /HPF   Sq Epi: x / Non Sq Epi: Occasional /HPF / Bacteria: Moderate /HPF          MICROBIOLOGY:    RECENT CULTURES:   @ 06:31 .Blood Blood-Peripheral                No growth to date.     @ 16:35 .Sputum Sputum       Few polymorphonuclear leukocytes per low power field  Rare Squamous Epithelial Cells per low power field  Moderate Gram Negative Rods per oil power field  Rare Gram positive cocci in pairs per oil power field  Rare Yeast like cells per oil power field           Normal Respiratory Soni present     @ 13:46 .Blood Blood-Venous                No growth to date.     @ 16:46 .Blood Blood                No growth at 5 days.          RESPIRATORY CULTURES:      Clostridium difficile [facility] Toxins A&amp;B, EIA:   Negative ( @ 01:18)  Clostridium difficile [facility] Toxins A&amp;B, EIA:   Negative ( @ 06:56)          RADIOLOGY & ADDITIONAL STUDIES:        Pager [phone]  After 5 pm/weekends or if no response :[phone]

## 2018-02-07 NOTE — SWALLOW BEDSIDE ASSESSMENT ADULT - ASR SWALLOW REFERRAL
Consider neuro consult given L facial asymmetry Consider neuro consult given L facial asymmetry. Consider ENT consult for laryngeal exam if vocal quality does not improve.

## 2018-02-07 NOTE — PROGRESS NOTE ADULT - SUBJECTIVE AND OBJECTIVE BOX
SAMANTHA CADENA  MRN#:  [id number]    The patient is a 72y Male with PMH of  T2DM, alcohol abuse, brought in by family after having been found face down on the floor for approximately 2 days, found to have culture negative endocarditis and is now s/p AVR (T)/C1V 1/30 who was seen, evaluated, & examined with the CTICU staff on rounds, overnight and during the early morning hours with a multidisciplinary care plan formulated & implemented.  All available clinical, laboratory, radiographic, pharmacologic, and electrocardiographic data were reviewed & analyzed.      The patient was in the CTICU in critical condition secondary to resolving septic and cardiogenic shock, chronic heart failure, culture negative endocarditis, paroxysmal atrial fibrillation, and resolving acute renal failure.    Respiratory status required supplemental oxygen, mobilization and assistance with pulmonary toilet, close monitoring of breathing pattern and respiratory rate, the following of ABG’s with A-line monitoring, continuous pulse oximetry monitoring,  for support & to evaluate for & prevent further decompensation secondary to persistent cardiopulmonary dysfunction.     Invasive hemodynamic monitoring with an arterial catheter were required for the following of continuous MAP/BP monitoring to ensure adequate cardiovascular support and to evaluate for & help prevent decompensation while receiving an oral amiodarone load and IV Digoxin after discontinuing IV amiodarone drip and IV vasopressin drip for paroxysmal atrial fibrillation, resolving cardiogenic and septic shock, and acute renal failure.    Metabolic stability, uncontrolled type 2 diabetes-hyperglycemia, & infection prophylaxis required an IV regular Insulin sliding scale & the following of serial glucose levels to help achieve & maintain euglycemia.      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     He is tolerating enteral feedings at goal rate.    Patient required more than the usual postoperative critical care management and I provided 45 minutes of non-continuous care to the patient.  Discussed at length with the CTICU staff and helped coordinate care.

## 2018-02-08 DIAGNOSIS — E87.0 HYPEROSMOLALITY AND HYPERNATREMIA: ICD-10-CM

## 2018-02-08 LAB
ALBUMIN SERPL ELPH-MCNC: 2.7 G/DL — LOW (ref 3.3–5)
ALP SERPL-CCNC: 238 U/L — HIGH (ref 40–120)
ALT FLD-CCNC: 68 U/L RC — HIGH (ref 10–45)
ANION GAP SERPL CALC-SCNC: 10 MMOL/L — SIGNIFICANT CHANGE UP (ref 5–17)
APTT 50/50 2HOUR INCUB: 47.8 SEC — HIGH (ref 27.5–37.4)
APTT BLD: 36.5 SEC — SIGNIFICANT CHANGE UP (ref 27.5–37.4)
APTT BLD: 73.4 SEC — HIGH (ref 27.5–37.4)
APTT BLD: 81.2 SEC — HIGH (ref 27.5–37.4)
AST SERPL-CCNC: 58 U/L — HIGH (ref 10–40)
BILIRUB SERPL-MCNC: 1.5 MG/DL — HIGH (ref 0.2–1.2)
BLD GP AB SCN SERPL QL: NEGATIVE — SIGNIFICANT CHANGE UP
BUN SERPL-MCNC: 46 MG/DL — HIGH (ref 7–23)
CALCIUM SERPL-MCNC: 7.9 MG/DL — LOW (ref 8.4–10.5)
CHLORIDE SERPL-SCNC: 121 MMOL/L — HIGH (ref 96–108)
CO2 SERPL-SCNC: 20 MMOL/L — LOW (ref 22–31)
CREAT SERPL-MCNC: 1.43 MG/DL — HIGH (ref 0.5–1.3)
FIBRINOGEN PPP-MCNC: 411 MG/DL — SIGNIFICANT CHANGE UP (ref 310–510)
GAS PNL BLDA: SIGNIFICANT CHANGE UP
GLUCOSE BLDC GLUCOMTR-MCNC: 160 MG/DL — HIGH (ref 70–99)
GLUCOSE BLDC GLUCOMTR-MCNC: 178 MG/DL — HIGH (ref 70–99)
GLUCOSE BLDC GLUCOMTR-MCNC: 187 MG/DL — HIGH (ref 70–99)
GLUCOSE BLDC GLUCOMTR-MCNC: 188 MG/DL — HIGH (ref 70–99)
GLUCOSE BLDC GLUCOMTR-MCNC: 222 MG/DL — HIGH (ref 70–99)
GLUCOSE SERPL-MCNC: 218 MG/DL — HIGH (ref 70–99)
HCT VFR BLD CALC: 23.6 % — LOW (ref 39–50)
HCT VFR BLD CALC: 23.9 % — LOW (ref 39–50)
HGB BLD-MCNC: 8.2 G/DL — LOW (ref 13–17)
HGB BLD-MCNC: 8.2 G/DL — LOW (ref 13–17)
INR BLD: 1.01 RATIO — SIGNIFICANT CHANGE UP (ref 0.88–1.16)
INR BLD: 1.09 RATIO — SIGNIFICANT CHANGE UP (ref 0.88–1.16)
MCHC RBC-ENTMCNC: 32.5 PG — SIGNIFICANT CHANGE UP (ref 27–34)
MCHC RBC-ENTMCNC: 32.5 PG — SIGNIFICANT CHANGE UP (ref 27–34)
MCHC RBC-ENTMCNC: 34.1 GM/DL — SIGNIFICANT CHANGE UP (ref 32–36)
MCHC RBC-ENTMCNC: 34.5 GM/DL — SIGNIFICANT CHANGE UP (ref 32–36)
MCV RBC AUTO: 94.1 FL — SIGNIFICANT CHANGE UP (ref 80–100)
MCV RBC AUTO: 95.1 FL — SIGNIFICANT CHANGE UP (ref 80–100)
PAT CTL 2H: 40.7 SEC — HIGH (ref 27.5–37.4)
PLATELET # BLD AUTO: 118 K/UL — LOW (ref 150–400)
PLATELET # BLD AUTO: 119 K/UL — LOW (ref 150–400)
POTASSIUM SERPL-MCNC: 3.9 MMOL/L — SIGNIFICANT CHANGE UP (ref 3.5–5.3)
POTASSIUM SERPL-SCNC: 3.9 MMOL/L — SIGNIFICANT CHANGE UP (ref 3.5–5.3)
PROT SERPL-MCNC: 5.4 G/DL — LOW (ref 6–8.3)
PROTHROM AB SERPL-ACNC: 10.9 SEC — SIGNIFICANT CHANGE UP (ref 9.8–12.7)
PROTHROM AB SERPL-ACNC: 11.9 SEC — SIGNIFICANT CHANGE UP (ref 9.8–12.7)
RBC # BLD: 2.51 M/UL — LOW (ref 4.2–5.8)
RBC # BLD: 2.51 M/UL — LOW (ref 4.2–5.8)
RBC # FLD: 15.6 % — HIGH (ref 10.3–14.5)
RBC # FLD: 15.9 % — HIGH (ref 10.3–14.5)
REPTILASE TIME: 18.2 SEC — SIGNIFICANT CHANGE UP (ref 15–20.5)
RH IG SCN BLD-IMP: POSITIVE — SIGNIFICANT CHANGE UP
SODIUM SERPL-SCNC: 151 MMOL/L — HIGH (ref 135–145)
THROMBIN TIME: 44.2 SECS — HIGH (ref 16–25)
WBC # BLD: 14.3 K/UL — HIGH (ref 3.8–10.5)
WBC # BLD: 14.5 K/UL — HIGH (ref 3.8–10.5)
WBC # FLD AUTO: 14.3 K/UL — HIGH (ref 3.8–10.5)
WBC # FLD AUTO: 14.5 K/UL — HIGH (ref 3.8–10.5)

## 2018-02-08 PROCEDURE — 99232 SBSQ HOSP IP/OBS MODERATE 35: CPT

## 2018-02-08 PROCEDURE — 71045 X-RAY EXAM CHEST 1 VIEW: CPT | Mod: 26

## 2018-02-08 PROCEDURE — 93926 LOWER EXTREMITY STUDY: CPT | Mod: 26,LT

## 2018-02-08 PROCEDURE — 99233 SBSQ HOSP IP/OBS HIGH 50: CPT | Mod: GC

## 2018-02-08 PROCEDURE — 99222 1ST HOSP IP/OBS MODERATE 55: CPT | Mod: GC

## 2018-02-08 RX ORDER — DEXAMETHASONE 0.5 MG/5ML
4 ELIXIR ORAL EVERY 8 HOURS
Qty: 0 | Refills: 0 | Status: COMPLETED | OUTPATIENT
Start: 2018-02-08 | End: 2018-02-08

## 2018-02-08 RX ORDER — POTASSIUM CHLORIDE 20 MEQ
20 PACKET (EA) ORAL ONCE
Qty: 0 | Refills: 0 | Status: COMPLETED | OUTPATIENT
Start: 2018-02-08 | End: 2018-02-08

## 2018-02-08 RX ORDER — MUPIROCIN 20 MG/G
1 OINTMENT TOPICAL
Qty: 0 | Refills: 0 | Status: DISCONTINUED | OUTPATIENT
Start: 2018-02-08 | End: 2018-02-08

## 2018-02-08 RX ADMIN — Medication 8: at 17:56

## 2018-02-08 RX ADMIN — Medication 200 MILLIGRAM(S): at 17:55

## 2018-02-08 RX ADMIN — Medication 325 MILLIGRAM(S): at 11:50

## 2018-02-08 RX ADMIN — AMIODARONE HYDROCHLORIDE 400 MILLIGRAM(S): 400 TABLET ORAL at 05:19

## 2018-02-08 RX ADMIN — Medication 50 MICROGRAM(S): at 13:34

## 2018-02-08 RX ADMIN — AMIODARONE HYDROCHLORIDE 400 MILLIGRAM(S): 400 TABLET ORAL at 22:17

## 2018-02-08 RX ADMIN — Medication 4 MILLIGRAM(S): at 22:17

## 2018-02-08 RX ADMIN — Medication 5: at 13:34

## 2018-02-08 RX ADMIN — HEPARIN SODIUM 5000 UNIT(S): 5000 INJECTION INTRAVENOUS; SUBCUTANEOUS at 13:34

## 2018-02-08 RX ADMIN — AMIODARONE HYDROCHLORIDE 400 MILLIGRAM(S): 400 TABLET ORAL at 13:33

## 2018-02-08 RX ADMIN — Medication 12: at 22:26

## 2018-02-08 RX ADMIN — Medication 20 MILLIEQUIVALENT(S): at 05:50

## 2018-02-08 RX ADMIN — Medication 12: at 11:24

## 2018-02-08 RX ADMIN — Medication 5: at 06:55

## 2018-02-08 RX ADMIN — Medication 4 MILLIGRAM(S): at 08:28

## 2018-02-08 RX ADMIN — Medication 200 MILLIGRAM(S): at 05:12

## 2018-02-08 RX ADMIN — CEFTRIAXONE 100 GRAM(S): 500 INJECTION, POWDER, FOR SOLUTION INTRAMUSCULAR; INTRAVENOUS at 09:05

## 2018-02-08 RX ADMIN — HEPARIN SODIUM 5000 UNIT(S): 5000 INJECTION INTRAVENOUS; SUBCUTANEOUS at 22:17

## 2018-02-08 RX ADMIN — Medication 8: at 02:58

## 2018-02-08 RX ADMIN — Medication 4 MILLIGRAM(S): at 13:33

## 2018-02-08 RX ADMIN — HEPARIN SODIUM 5000 UNIT(S): 5000 INJECTION INTRAVENOUS; SUBCUTANEOUS at 05:50

## 2018-02-08 RX ADMIN — PANTOPRAZOLE SODIUM 40 MILLIGRAM(S): 20 TABLET, DELAYED RELEASE ORAL at 11:50

## 2018-02-08 NOTE — PROGRESS NOTE ADULT - SUBJECTIVE AND OBJECTIVE BOX
infectious diseases progress note:    Patient is a 72y old  Male who presents with a chief complaint of Fall (09 Jan 2018 00:52)        Atrial fibrillation        R     Allergies    No Known Allergies    Intolerances        ANTIBIOTICS/RELEVANT:  antimicrobials  cefTRIAXone   IVPB      cefTRIAXone   IVPB 2 Gram(s) IV Intermittent every 24 hours  gentamicin   IVPB      gentamicin   IVPB 40 milliGRAM(s) IV Intermittent every 12 hours    immunologic:    OTHER:  amiodarone    Tablet 400 milliGRAM(s) Oral every 8 hours  aspirin 325 milliGRAM(s) Oral daily  dexamethasone     Tablet 4 milliGRAM(s) Oral every 8 hours  dextrose 5%. 1000 milliLiter(s) IV Continuous <Continuous>  dextrose 50% Injectable 25 Gram(s) IV Push once  dextrose 50% Injectable 50 milliLiter(s) IV Push every 15 minutes  digoxin     Tablet 0.125 milliGRAM(s) Oral every other day  heparin  Injectable 5000 Unit(s) SubCutaneous every 8 hours  insulin lispro (HumaLOG) corrective regimen sliding scale   SubCutaneous every 4 hours  levothyroxine Injectable 50 MICROGram(s) IV Push <User Schedule>  pantoprazole  Injectable 40 milliGRAM(s) IV Push daily  sodium chloride 0.9%. 1000 milliLiter(s) IV Continuous <Continuous>  vasopressin Infusion 0.033 Unit(s)/Min IV Continuous <Continuous>      Objective:  Vital Signs Last 24 Hrs  T(C): 35.8 (08 Feb 2018 06:55), Max: 36.1 (07 Feb 2018 12:00)  T(F): 96.5 (08 Feb 2018 06:55), Max: 97 (07 Feb 2018 12:00)  HR: 83 (08 Feb 2018 07:00) (64 - 89)  BP: --  BP(mean): --  RR: 16 (08 Feb 2018 07:00) (12 - 33)  SpO2: 100% (08 Feb 2018 07:00) (94% - 100%)    PHYSICAL EXAM:     Eyes:MYLA, EOMI  Ear/Nose/Throat: no oral lesion, no sinus tenderness on percussion	  Neck:no JVD, no lymphadenopathy, supple  Respiratory: CTA matt  Cardiovascular: S1S2 RRR, no murmurs  Gastrointestinal:soft, (+) BS, no HSM  Extremities:no e/e/c        LABS:                        8.2    14.5  )-----------( 119      ( 08 Feb 2018 05:02 )             23.9     02-08    151<H>  |  121<H>  |  46<H>  ----------------------------<  218<H>  3.9   |  20<L>  |  1.43<H>    Ca    7.9<L>      08 Feb 2018 03:16  Phos  2.4     02-07    TPro  5.4<L>  /  Alb  2.7<L>  /  TBili  1.5<H>  /  DBili  x   /  AST  58<H>  /  ALT  68<H>  /  AlkPhos  238<H>  02-08    PT/INR - ( 08 Feb 2018 03:16 )   PT: 10.9 sec;   INR: 1.01 ratio         PTT - ( 08 Feb 2018 03:16 )  PTT:73.4 sec        MICROBIOLOGY:    RECENT CULTURES:  02-06 @ 14:07 .Urine Catheterized                No growth    02-05 @ 06:31 .Blood Blood-Peripheral                No growth to date.    02-02 @ 16:35 .Sputum Sputum       Few polymorphonuclear leukocytes per low power field  Rare Squamous Epithelial Cells per low power field  Moderate Gram Negative Rods per oil power field  Rare Gram positive cocci in pairs per oil power field  Rare Yeast like cells per oil power field           Normal Respiratory Soni present    02-02 @ 13:46 .Blood Blood-Venous                No growth at 5 days.    02-01 @ 16:46 .Blood Blood                No growth at 5 days.          RESPIRATORY CULTURES:      Clostridium difficile [facility] Toxins A&amp;B, EIA:   Negative (02-05 @ 01:18)          RADIOLOGY & ADDITIONAL STUDIES:        Pager [phone]  After 5 pm/weekends or if no response :[phone]

## 2018-02-08 NOTE — PROGRESS NOTE ADULT - SUBJECTIVE AND OBJECTIVE BOX
[facility] Division of Kidney Diseases & Hypertension  FOLLOW UP NOTE  --------------------------------------------------------------------------------    HPI: 71 y/o man with history of ETOH abuse who presents with fall and found with endocarditis, developed ELIAN in-hospital shortly after undergoing cardiac catheterization, s/p urgent AVR and CABG on 1/30/18 with clinical decompensation on 2/1 requiring intubation and multiple vasopressor support.  Patient now extubated.  He is not speaking but communicated with his hands / head.  Not in pain but is complaining of pain in his belly.  Dimas was removed now has [place] catheter.      PAST HISTORY  --------------------------------------------------------------------------------  No significant changes to PMH, PSH, FHx, SHx, unless otherwise noted    ALLERGIES & MEDICATIONS  --------------------------------------------------------------------------------  Allergies    No Known Allergies    Intolerances      Standing Inpatient Medications  amiodarone    Tablet 400 milliGRAM(s) Oral every 8 hours  aspirin 325 milliGRAM(s) Oral daily  cefTRIAXone   IVPB      cefTRIAXone   IVPB 2 Gram(s) IV Intermittent every 24 hours  dexamethasone     Tablet 4 milliGRAM(s) Oral every 8 hours  dextrose 5%. 1000 milliLiter(s) IV Continuous <Continuous>  dextrose 50% Injectable 25 Gram(s) IV Push once  dextrose 50% Injectable 50 milliLiter(s) IV Push every 15 minutes  digoxin     Tablet 0.125 milliGRAM(s) Oral every other day  gentamicin   IVPB      gentamicin   IVPB 40 milliGRAM(s) IV Intermittent every 12 hours  heparin  Injectable 5000 Unit(s) SubCutaneous every 8 hours  insulin lispro (HumaLOG) corrective regimen sliding scale   SubCutaneous every 4 hours  levothyroxine Injectable 50 MICROGram(s) IV Push <User Schedule>  pantoprazole  Injectable 40 milliGRAM(s) IV Push daily  sodium chloride 0.9%. 1000 milliLiter(s) IV Continuous <Continuous>  vasopressin Infusion 0.033 Unit(s)/Min IV Continuous <Continuous>    PRN Inpatient Medications      REVIEW OF SYSTEMS  --------------------------------------------------------------------------------  limited ROS  VITALS/PHYSICAL EXAM  --------------------------------------------------------------------------------  T(C): 35.8 (02-08-18 @ 06:55), Max: 36.1 (02-07-18 @ 12:00)  HR: 83 (02-08-18 @ 07:00) (64 - 89)  BP: 117/45  RR: 16 (02-08-18 @ 07:00) (12 - 33)  SpO2: 100% (02-08-18 @ 07:00) (94% - 100%)  Wt(kg): --        02-07-18 @ 07:01  -  02-08-18 @ 07:00  --------------------------------------------------------  IN: 3131.7 mL / OUT: 1495 mL / NET: 1636.7 mL    02-08-18 @ 07:01  -  02-08-18 @ 10:07  --------------------------------------------------------  IN: 130.5 mL / OUT: 0 mL / NET: 130.5 mL      Physical Exam:  	Gen: NAD ill appearing  	HEENT: NG tube  	Pulm: coarse breath sounds  	CV: RRR, S1S2; no rub sternotomy incision noted  	Abd: +BS, soft, nontender/nondistended              LE: no edema  	Neuro: follows exam                  LABS/STUDIES  --------------------------------------------------------------------------------              8.2    14.5  >-----------<  119      [02-08-18 @ 05:02]              23.9     151  |  121  |  46  ----------------------------<  218      [02-08-18 @ 03:16]  3.9   |  20  |  1.43        Ca     7.9     [02-08-18 @ 03:16]      Phos  2.4     [02-07-18 @ 01:55]    TPro  5.4  /  Alb  2.7  /  TBili  1.5  /  DBili  x   /  AST  58  /  ALT  68  /  AlkPhos  238  [02-08-18 @ 03:16]    PT/INR: PT 10.9 , INR 1.01       [02-08-18 @ 03:16]  PTT: 73.4       [02-08-18 @ 03:16]      Creatinine Trend:  SCr 1.43 [02-08 @ 03:16]  SCr 1.46 [02-07 @ 01:55]  SCr 1.72 [02-06 @ 01:28]  SCr 1.96 [02-05 @ 01:17]  SCr 2.45 [02-04 @ 02:27]    Urinalysis - [02-06-18 @ 08:35]      Color Yellow / Appearance SL Turbid / SG 1.015 / pH 6.0      Gluc Negative / Ketone Negative  / Bili Negative / Urobili Negative       Blood Moderate / Protein 100 / Leuk Est Trace / Nitrite Negative      RBC 10-25 / WBC 10-25 / Hyaline  / Gran  / Sq Epi  / Non Sq Epi Occasional / Bacteria Moderate      TSH 11.21      [02-01-18 @ 12:43]

## 2018-02-08 NOTE — CONSULT NOTE ADULT - SUBJECTIVE AND OBJECTIVE BOX
Vascular Surgery Consult Note (Nickolas)    72 M presented with endocarditis and underwent an AVR on 1/30/2018. Patient had an IABP placed in the L femoral artery on 2/1/2018, which was removed on 2/3/2018. Patient now noticed to have a L groin hematoma. A Duplex revealed two femoral pseudoaneurysms.    ROS: Unable to obtain  PMH/PSH: endocarditis s/p AVR, DM, HLD  Social: Drinks alcohol daily  Family: Not significant    T(C): , Max: 36.6 (02-08-18 @ 12:00)  HR: 84  BP: 123/51  RR: 20  SpO2: 99%    Gen: NAD  Lungs: Non-labored breathing  Heart: S1, S2  Abdomen: Soft, ND, NTP  Extremities: Moves all extremities  Vascular: Palpable femoral and AT pulses bilaterally    02-08-18    WBC: 14.5  Hgb: 8.2  Hct: 23.9  Plt: 119    Na: 151  K: 3.9  Cl: 121  HCO3: 20  BUN: 46  Cr: 1.43  Glu: 218    Ca: 7.9    Protein: 5.4  Albumin: 2.7  Total bilirubin: 1.5  AST: 58  ALT: 68    INR: 1.09    Arterial Duplex of bilateral lower extremities: There are two pseudoaneurysms in series extending from the proximal left deep femoral artery. The pseudoaneurysm closest to the proximal left deep femoral artery measures 3.1 cm x 2.2 cm. The second pseudoaneurysm in series with the first is lobulated and measures 4.8 cm x 1 cm. Both pseudoaneurysms are patent. The neck of the pseudoaneurysm extending from the proximal left deep femoral artery measures 1.1 cm in length. The neck between the two pseudoaneurysms in series measures 6 mm in length. To and fro waveforms are obtained at both necks.

## 2018-02-08 NOTE — CONSULT NOTE ADULT - SUBJECTIVE AND OBJECTIVE BOX
PATIENT: SAMANTHA CADENA   AGE: 71yo   GENDER: Male  WEIGHT:   ALLERGIES: No Known Allergies    HEMATOLOGY CONSULT NOTE    HPI:  The patient is a 72y Male with PMH of T2DM, alcohol abuse, brought in by family after having been found face down on the floor for approximately 2 days, found to have culture negative endocarditis (on Ceftriaxone, Gentamicin) and is now s/p AVR (T)/C1V 1/30, in the CTICU in critical condition secondary to resolving septic and cardiogenic shock (has A-line), chronic heart failure, culture negative endocarditis, paroxysmal atrial fibrillation (on digoxin, vasopressin), and resolving acute renal failure. Now found to have left femoral pseudoaneurysm on bedside u/s.     Hematology consult called for prolonged aPTT of unclear etiology. Pt is currently on VTE PPx dose SubQ heparin, was not previously on heparin gtt or other A/C. No personal or family hx of bleeding disorder.       MEDICATIONS  amiodarone    Tablet 400 milliGRAM(s) Oral every 8 hours  aspirin 325 milliGRAM(s) Oral daily  cefTRIAXone   IVPB      cefTRIAXone   IVPB 2 Gram(s) IV Intermittent every 24 hours  dexamethasone     Tablet 4 milliGRAM(s) Oral every 8 hours  dextrose 5%. 1000 milliLiter(s) (50 mL/Hr) IV Continuous <Continuous>  dextrose 50% Injectable 25 Gram(s) IV Push once  dextrose 50% Injectable 50 milliLiter(s) IV Push every 15 minutes  digoxin     Tablet 0.125 milliGRAM(s) Oral every other day  gentamicin   IVPB      gentamicin   IVPB 40 milliGRAM(s) IV Intermittent every 12 hours  heparin  Injectable 5000 Unit(s) SubCutaneous every 8 hours  insulin lispro (HumaLOG) corrective regimen sliding scale   SubCutaneous every 4 hours  levothyroxine Injectable 50 MICROGram(s) IV Push <User Schedule>  pantoprazole  Injectable 40 milliGRAM(s) IV Push daily  sodium chloride 0.9%. 1000 milliLiter(s) (10 mL/Hr) IV Continuous <Continuous>  vasopressin Infusion 0.033 Unit(s)/Min (2 mL/Hr) IV Continuous <Continuous>      VITAL SIGNS (last 24 hrs):  T(C): 35.8 (02-08-18 @ 06:55), Max: 36.1 (02-07-18 @ 12:00)  T(F): 96.5 (02-08-18 @ 06:55), Max: 97 (02-07-18 @ 12:00)  HR: 85 (02-08-18 @ 10:00) (64 - 89)  BP: --  BP(mean): --  RR: 16 (02-08-18 @ 10:00) (12 - 33)  SpO2: 99% (02-08-18 @ 11:00) (94% - 100%)    PHYSICAL EXAM:        LABS:                         8.2    14.5  >-----< 119           ( 02-08-18 @ 05:02 )             23.9       151  |  121  |   46  ----------------------< 218    (02-08-18 @ 03:16)     3.9  |  20  |  1.43    Anion Gap: 10    Ca   7.9   (02-08-18 @ 03:16)  Mg   x    (02-08-18 @ 03:16)  Phos x    (02-08-18 @ 03:16)       TP 7.9     |  AST 2.7    -------------------------     Alb x     |  ALT x               (02-08-18 @ 03:16)  -------------------------     T-bili 2.7  |  AlkPh 238    D-bili x       COAGULATION STUDIES:     aPTT  73.4 sec    (02-08-18 @ 03:16)     PT     10.9 sec    (02-08-18 @ 03:16)     INR    1.01 ratio         (02-08-18 @ 03:16), COAGULATION STUDIES:     aPTT  64.1 sec    (02-07-18 @ 05:27)     PT     x        (02-07-18 @ 05:27)     INR    x             (02-07-18 @ 05:27), COAGULATION STUDIES:     aPTT  75.9 sec    (02-07-18 @ 01:55)     PT     10.4 sec    (02-07-18 @ 01:55)     INR    0.96 ratio         (02-07-18 @ 01:55)   ABG - ( 08 Feb 2018 03:23 )  pH: 7.43  /  pCO2: 32    /  pO2: 87    / HCO3: 20    / Base Excess: -3.1  /  SaO2: 98                CAPILLARY BLOOD GLUCOSE  POCT Blood Glucose.: 222 mg/dL (02-08-18 @ 11:22)  POCT Blood Glucose.: 160 mg/dL (02-08-18 @ 06:53)  POCT Blood Glucose.: 188 mg/dL (02-08-18 @ 02:53)  POCT Blood Glucose.: 170 mg/dL (02-07-18 @ 21:05)      I/O's SUMMARY:    02-07-18 @ 07:01  -  02-08-18 @ 07:00  --------------------------------------------------------  IN: 3131.7 mL / OUT: 1495 mL / NET: 1636.7 mL    02-08-18 @ 07:01  -  02-08-18 @ 11:55  --------------------------------------------------------  IN: 311 mL / OUT: 700 mL / NET: -389 mL        MICROBIOLOGY:    Culture - Urine (collected 02-06-18 @ 14:07)  Source: .Urine Catheterized  Final Report (02-07-18 @ 10:10):    No growth        RADIOLOGY & ADDITIONAL TESTS: PATIENT: SAMANTHA CADENA   AGE: 73yo   GENDER: Male  WEIGHT:   ALLERGIES: No Known Allergies    HEMATOLOGY CONSULT NOTE    HPI:  The patient is a 72y Male with PMH of T2DM, alcohol abuse, brought in by family after having been found face down on the floor for approximately 2 days, suspected NSTEMI s/p C, found to have culture negative endocarditis with large AV vegetation (on Ceftriaxone, Gentamicin) now s/p AVR (T)/C1V 1/30, in the CTICU in critical condition secondary to resolving septic and cardiogenic shock (on vasopressin, has A-line), chronic heart failure, culture negative endocarditis, paroxysmal atrial fibrillation (on digoxin), and resolving acute renal failure. Now found to have left femoral pseudoaneurysm on bedside u/s.     Hematology consulted for prolonged aPTT of unclear etiology. Pt is awake and alert, speaks English and Telugu, difficult to understand, however able to ascertain that pt has no personal or family hx of bleeding disorder. Pt p/w normal aPTT, which prolonged appropriately 2/2 heparin gtt x48 hrs for suspected NSTEMI (1/10), then normalized after gtt discontinued. aPTT began prolonging again on 1/16 while on only VTE PPx dose SubQ heparin and has remained prolonged since. Pt still on HSQ without s/s of bleeding.       MEDICATIONS  amiodarone    Tablet 400 milliGRAM(s) Oral every 8 hours  aspirin 325 milliGRAM(s) Oral daily  cefTRIAXone   IVPB      cefTRIAXone   IVPB 2 Gram(s) IV Intermittent every 24 hours  dexamethasone     Tablet 4 milliGRAM(s) Oral every 8 hours  dextrose 5%. 1000 milliLiter(s) (50 mL/Hr) IV Continuous <Continuous>  dextrose 50% Injectable 25 Gram(s) IV Push once  dextrose 50% Injectable 50 milliLiter(s) IV Push every 15 minutes  digoxin     Tablet 0.125 milliGRAM(s) Oral every other day  gentamicin   IVPB      gentamicin   IVPB 40 milliGRAM(s) IV Intermittent every 12 hours  heparin  Injectable 5000 Unit(s) SubCutaneous every 8 hours  insulin lispro (HumaLOG) corrective regimen sliding scale   SubCutaneous every 4 hours  levothyroxine Injectable 50 MICROGram(s) IV Push <User Schedule>  pantoprazole  Injectable 40 milliGRAM(s) IV Push daily  sodium chloride 0.9%. 1000 milliLiter(s) (10 mL/Hr) IV Continuous <Continuous>  vasopressin Infusion 0.033 Unit(s)/Min (2 mL/Hr) IV Continuous <Continuous>      VITAL SIGNS (last 24 hrs):  T(C): 35.8 (02-08-18 @ 06:55), Max: 36.1 (02-07-18 @ 12:00)  T(F): 96.5 (02-08-18 @ 06:55), Max: 97 (02-07-18 @ 12:00)  HR: 85 (02-08-18 @ 10:00) (64 - 89)  BP: --  BP(mean): --  RR: 16 (02-08-18 @ 10:00) (12 - 33)  SpO2: 99% (02-08-18 @ 11:00) (94% - 100%)    PHYSICAL EXAM:  GENERAL: ill-appearing, in NAD  HEENT:  AT/NC; EOMI, PERRLA, conjunctiva and sclera clear; hearing grossly intact; NGT  CHEST/LUNG: CTAB, no wheezes, ronchi, rales; mid-sternal dressing c/d/i  HEART: Normal rate, regular rhythm; No murmurs, rubs, or gallops; 2+pitting edema in B/L LEs  ABDOMEN: soft, NTTP, nondistended, normoactive BS  MSK/EXTREMITIES:  Grossly normal strength, movement, tone, and ROM; Palpable peripheral pulses; No clubbing or cyanosis  NEUROLOGY: Non focal   SKIN: Healing ecchymoses on arms and abdomen, dressing on R groin over femoral a. c/d/i, R IJ intro, L radial A-line, PIV No rashes or lesions      General: Intubated, RASS -1, follows simple commands  Cardiovascular: S1, S2, RRR. Epicardial wires attached to EPM (VVI 50).   Lungs: Clear to auscultation, diminished at bilateral bases  Abdomen: Soft, Non-distended, non-tender, hypoactive bowel sounds  Extremities: Cool, well perfused, dopplerable distal pulses, no edema    Incision: Sternal dressing clean, dry, intact.   Lines: Right IJ intro, Left radial [place], PIV  Drains: Dimas catheter. Mediastinal and pleural chest tubes with sero-sang drainage, to low wall suction, no air leak.         LABS:                         8.2    14.5  >-----< 119           ( 02-08-18 @ 05:02 )             23.9       151  |  121  |   46  ----------------------< 218    (02-08-18 @ 03:16)     3.9  |  20  |  1.43    Anion Gap: 10    Ca   7.9   (02-08-18 @ 03:16)  Mg   x    (02-08-18 @ 03:16)  Phos x    (02-08-18 @ 03:16)       TP 7.9     |  AST 2.7    -------------------------     Alb x     |  ALT x               (02-08-18 @ 03:16)  -------------------------     T-bili 2.7  |  AlkPh 238    D-bili x       COAGULATION STUDIES:     aPTT  73.4 sec    (02-08-18 @ 03:16)     PT     10.9 sec    (02-08-18 @ 03:16)     INR    1.01 ratio         (02-08-18 @ 03:16), COAGULATION STUDIES:     aPTT  64.1 sec    (02-07-18 @ 05:27)     PT     x        (02-07-18 @ 05:27)     INR    x             (02-07-18 @ 05:27), COAGULATION STUDIES:     aPTT  75.9 sec    (02-07-18 @ 01:55)     PT     10.4 sec    (02-07-18 @ 01:55)     INR    0.96 ratio         (02-07-18 @ 01:55)   ABG - ( 08 Feb 2018 03:23 )  pH: 7.43  /  pCO2: 32    /  pO2: 87    / HCO3: 20    / Base Excess: -3.1  /  SaO2: 98                CAPILLARY BLOOD GLUCOSE  POCT Blood Glucose.: 222 mg/dL (02-08-18 @ 11:22)  POCT Blood Glucose.: 160 mg/dL (02-08-18 @ 06:53)  POCT Blood Glucose.: 188 mg/dL (02-08-18 @ 02:53)  POCT Blood Glucose.: 170 mg/dL (02-07-18 @ 21:05)      I/O's SUMMARY:    02-07-18 @ 07:01  -  02-08-18 @ 07:00  --------------------------------------------------------  IN: 3131.7 mL / OUT: 1495 mL / NET: 1636.7 mL    02-08-18 @ 07:01  -  02-08-18 @ 11:55  --------------------------------------------------------  IN: 311 mL / OUT: 700 mL / NET: -389 mL        MICROBIOLOGY:    Culture - Urine (collected 02-06-18 @ 14:07)  Source: .Urine Catheterized  Final Report (02-07-18 @ 10:10):    No growth        RADIOLOGY & ADDITIONAL TESTS: PATIENT: SAMANTHA CADENA   AGE: 71yo   GENDER: Male  ALLERGIES: No Known Allergies    HEMATOLOGY CONSULT NOTE    HPI:  The patient is a 72y Male with PMH of T2DM, alcohol abuse, brought in by family after having been found face down on the floor for approximately 2 days, suspected NSTEMI s/p C, found to have culture negative endocarditis with large AV vegetation (on Ceftriaxone, Gentamicin) now s/p AVR (T)/C1V 1/30, in the CTICU in critical condition secondary to resolving septic and cardiogenic shock (on vasopressin, has A-line), chronic heart failure, culture negative endocarditis, paroxysmal atrial fibrillation (on digoxin), and resolving acute renal failure. Now found to have left femoral pseudoaneurysm on bedside u/s.     Hematology consulted for prolonged aPTT of unclear etiology. Pt is awake and alert, speaks English and [place], difficult to understand, however able to ascertain that pt has no personal or family hx of bleeding disorder. Pt p/w normal aPTT, which prolonged appropriately 2/2 heparin gtt x48 hrs for suspected NSTEMI (1/10), then normalized after gtt discontinued. aPTT began prolonging again on 1/16 while on only VTE PPx dose SubQ heparin and has remained prolonged since. Pt still on HSQ without s/s of bleeding.       MEDICATIONS  amiodarone    Tablet 400 milliGRAM(s) Oral every 8 hours  aspirin 325 milliGRAM(s) Oral daily  cefTRIAXone   IVPB      cefTRIAXone   IVPB 2 Gram(s) IV Intermittent every 24 hours  dexamethasone     Tablet 4 milliGRAM(s) Oral every 8 hours  dextrose 5%. 1000 milliLiter(s) (50 mL/Hr) IV Continuous <Continuous>  dextrose 50% Injectable 25 Gram(s) IV Push once  dextrose 50% Injectable 50 milliLiter(s) IV Push every 15 minutes  digoxin     Tablet 0.125 milliGRAM(s) Oral every other day  gentamicin   IVPB      gentamicin   IVPB 40 milliGRAM(s) IV Intermittent every 12 hours  heparin  Injectable 5000 Unit(s) SubCutaneous every 8 hours  insulin lispro (HumaLOG) corrective regimen sliding scale   SubCutaneous every 4 hours  levothyroxine Injectable 50 MICROGram(s) IV Push <User Schedule>  pantoprazole  Injectable 40 milliGRAM(s) IV Push daily  sodium chloride 0.9%. 1000 milliLiter(s) (10 mL/Hr) IV Continuous <Continuous>  vasopressin Infusion 0.033 Unit(s)/Min (2 mL/Hr) IV Continuous <Continuous>      VITAL SIGNS (last 24 hrs):  T(C): 35.8 (02-08-18 @ 06:55), Max: 36.1 (02-07-18 @ 12:00)  T(F): 96.5 (02-08-18 @ 06:55), Max: 97 (02-07-18 @ 12:00)  HR: 85 (02-08-18 @ 10:00) (64 - 89)  BP: --  BP(mean): --  RR: 16 (02-08-18 @ 10:00) (12 - 33)  SpO2: 99% (02-08-18 @ 11:00) (94% - 100%)    PHYSICAL EXAM:  GENERAL: ill-appearing, in NAD  HEENT:  AT/NC; EOMI, PERRLA, conjunctiva and sclera clear; hearing grossly intact; NGT  CHEST/LUNG: CTAB, no wheezes, ronchi, rales; mid-sternal dressing c/d/i  HEART: Normal rate, regular rhythm; No murmurs, rubs, or gallops; 2+pitting edema in B/L LEs  ABDOMEN: soft, NTTP, nondistended, normoactive BS  MSK/EXTREMITIES:  Grossly normal strength, movement, tone, and ROM; Palpable peripheral pulses; No clubbing or cyanosis  NEUROLOGY: Non focal   SKIN: Healing ecchymoses on arms and abdomen, dressing on R groin over femoral a. c/d/i, R IJ intro, L radial A-line, PIV No rashes or lesions      General: Intubated, RASS -1, follows simple commands  Cardiovascular: S1, S2, RRR. Epicardial wires attached to EPM (VVI 50).   Lungs: Clear to auscultation, diminished at bilateral bases  Abdomen: Soft, Non-distended, non-tender, hypoactive bowel sounds  Extremities: Cool, well perfused, dopplerable distal pulses, no edema    Incision: Sternal dressing clean, dry, intact.   Lines: Right IJ intro, Left radial [place], PIV  Drains: Dimas catheter. Mediastinal and pleural chest tubes with sero-sang drainage, to low wall suction, no air leak.         LABS:                         8.2    14.5  >-----< 119           ( 02-08-18 @ 05:02 )             23.9       151  |  121  |   46  ----------------------< 218    (02-08-18 @ 03:16)     3.9  |  20  |  1.43    Anion Gap: 10    Ca   7.9   (02-08-18 @ 03:16)  Mg   x    (02-08-18 @ 03:16)  Phos x    (02-08-18 @ 03:16)       TP 7.9     |  AST 2.7    -------------------------     Alb x     |  ALT x               (02-08-18 @ 03:16)  -------------------------     T-bili 2.7  |  AlkPh 238    D-bili x       COAGULATION STUDIES:     aPTT  73.4 sec    (02-08-18 @ 03:16)     PT     10.9 sec    (02-08-18 @ 03:16)     INR    1.01 ratio         (02-08-18 @ 03:16), COAGULATION STUDIES:     aPTT  64.1 sec    (02-07-18 @ 05:27)     PT     x        (02-07-18 @ 05:27)     INR    x             (02-07-18 @ 05:27), COAGULATION STUDIES:     aPTT  75.9 sec    (02-07-18 @ 01:55)     PT     10.4 sec    (02-07-18 @ 01:55)     INR    0.96 ratio         (02-07-18 @ 01:55)   ABG - ( 08 Feb 2018 03:23 )  pH: 7.43  /  pCO2: 32    /  pO2: 87    / HCO3: 20    / Base Excess: -3.1  /  SaO2: 98                CAPILLARY BLOOD GLUCOSE  POCT Blood Glucose.: 222 mg/dL (02-08-18 @ 11:22)  POCT Blood Glucose.: 160 mg/dL (02-08-18 @ 06:53)  POCT Blood Glucose.: 188 mg/dL (02-08-18 @ 02:53)  POCT Blood Glucose.: 170 mg/dL (02-07-18 @ 21:05)      I/O's SUMMARY:    02-07-18 @ 07:01  -  02-08-18 @ 07:00  --------------------------------------------------------  IN: 3131.7 mL / OUT: 1495 mL / NET: 1636.7 mL    02-08-18 @ 07:01  -  02-08-18 @ 11:55  --------------------------------------------------------  IN: 311 mL / OUT: 700 mL / NET: -389 mL        MICROBIOLOGY:    Culture - Urine (collected 02-06-18 @ 14:07)  Source: .Urine Catheterized  Final Report (02-07-18 @ 10:10):    No growth        RADIOLOGY & ADDITIONAL TESTS:

## 2018-02-08 NOTE — PROGRESS NOTE ADULT - PROBLEM SELECTOR PLAN 1
Likely contrast induced nephropathy from cardiac cath/Toxic/Septic  ATN. Scr. remains high but improving daily. Patient is non oliguric. Plan is to continue monitoring Scr., electrolytes, and urine output. No plan for HD at this time. Avoid nephrotoxins.

## 2018-02-08 NOTE — CONSULT NOTE ADULT - ATTENDING COMMENTS
Pt s/p falls, found to have culture negative endocarditis and ELIAN s/p cath.  Has +1 LE edema  Likely ELIAN from contrast  Unlikely secondary to endocarditis or antibiotics as UA is bland  Monitor renal function, can challenge with 1 L of saline as long as pulm status remains stable.
Patient seen and agree with Dr. Summers's note. Briefly, patient is 71yo M in the CTICU because of septic and cardiogenic shock (on pressors) with culture negative endocarditis (on Ceftriaxone, Gentamicin) s/p Aortic Valve Replacement on 1/30 because of vegetation, PAF (on digoxin), chronic heart failure, and resolving acute renal failure. Now found to have left femoral pseudoaneurysm on bedside ultrasound.     Hematology consulted for prolonged aPTT of unclear etiology. No personal or family hx of bleeding disorder. Pt presented with normal aPTT, which prolonged appropriately while on heparin gtt x48 hrs for suspected NSTEMI (1/10), then normalized after gtt discontinued. aPTT began prolonging again on 1/16 while on VTE prophylactic dose of SubQ heparin only and has remained prolonged since.  Currently on SQ heparin without s/s of bleeding. s/p multiple transfusions. Prolonged aPTT likely 2/2 heparin contamination vs. less likely acquired factor deficiency.    Plan:  1. If thrombin time is prolonged but with normal reptilase time, this suggests heparin contamination. Agree with mixing studies as above.

## 2018-02-08 NOTE — PROGRESS NOTE ADULT - PROBLEM SELECTOR PLAN 2
Likely in setting of impaired urinary concentrating ability given recovering ATN.  Recommend free water and monitor serum sodium.

## 2018-02-08 NOTE — CONSULT NOTE ADULT - ASSESSMENT
72 M presents with endocarditis and now s/p AVR on 1/30. On 2/1, patient had an IABP in L femoral artery, which has since been removed. Patient now noted to have 2 L femoral pseudoaneurysms.    PRELIMINARY NOTE 72 M presents with endocarditis and now s/p AVR on 1/30. On 2/1, patient had an IABP in L femoral artery, which has since been removed. Patient now noted to have 2 L femoral pseudoaneurysms.  -Will plan for ultrasound-guided thrombin injection for tomorrow, 2/9/2018  -D/w fellow  IRIS Chavez  [id number]

## 2018-02-08 NOTE — CONSULT NOTE ADULT - ASSESSMENT
71yo M in the CTICU 2/2 septic and cardiogenic shock (on vasopressin) with culture negative endocarditis (on Ceftriaxone, Gentamicin) s/p AVR (T)/C1V on 1/30 2/2 AV vegetation, PAF (on digoxin), chronic heart failure, and resolving acute renal failure. Now found to have left femoral pseudoaneurysm on bedside u/s.     Hematology consulted for prolonged aPTT of unclear etiology. No personal or family hx of bleeding disorder. Pt p/w normal aPTT, which prolonged appropriately 2/2 heparin gtt x48 hrs for suspected NSTEMI (1/10), then normalized after gtt discontinued. aPTT began prolonging again on 1/16 while on VTE PPx dose SubQ heparin only and has remained prolonged since. Pt still on HSQ without s/s of bleeding.     RECOMMENDATIONS:  - Please send mixing study:  ·	If normal, will need to send serum factor levels and replace deficient factors.   ·	If still prolonged, will need to send Thrombin Time and Reptilase.    Discussed with fellow.      Christiano Summers MD  PGY-1 | Internal Medicine  [phone] / [phone] 71yo M in the CTICU 2/2 septic and cardiogenic shock (on vasopressin) with culture negative endocarditis (on Ceftriaxone, Gentamicin) s/p AVR (T)/C1V on 1/30 2/2 AV vegetation, PAF (on digoxin), chronic heart failure, and resolving acute renal failure. Now found to have left femoral pseudoaneurysm on bedside u/s.     Hematology consulted for prolonged aPTT of unclear etiology. No personal or family hx of bleeding disorder. Pt p/w normal aPTT, which prolonged appropriately 2/2 heparin gtt x48 hrs for suspected NSTEMI (1/10), then normalized after gtt discontinued. aPTT began prolonging again on 1/16 while on VTE PPx dose SubQ heparin only and has remained prolonged since. s/p multiple transfusions. Pt currently on HSQ without s/s of bleeding.     RECOMMENDATIONS:  - Please send mixing study:  ·	If normal, will need to send serum factor levels and replace deficient factors.   ·	If still prolonged, will need to send Thrombin Time and Reptilase.    Discussed with fellow.      Christiano Summers MD  PGY-1 | Internal Medicine  [phone] / [phone] 71yo M in the CTICU 2/2 septic and cardiogenic shock (on vasopressin) with culture negative endocarditis (on Ceftriaxone, Gentamicin) s/p AVR (T)/C1V on 1/30 2/2 AV vegetation, PAF (on digoxin), chronic heart failure, and resolving acute renal failure. Now found to have left femoral pseudoaneurysm on bedside u/s.     Hematology consulted for prolonged aPTT of unclear etiology. No personal or family hx of bleeding disorder. Pt p/w normal aPTT, which prolonged appropriately 2/2 heparin gtt x48 hrs for suspected NSTEMI (1/10), then normalized after gtt discontinued. aPTT began prolonging again on 1/16 while on VTE PPx dose SubQ heparin only and has remained prolonged since, currently on HSQ without s/s of bleeding. s/p multiple transfusions. Prolonged aPTT possibly 2/2 acquired factor deficiency vs. heparin use.    RECOMMENDATIONS:  - Please send mixing study:  ·	If normal, will need to send serum factor levels and replace deficient factors.   ·	If still prolonged, will need to send Thrombin Time and Reptilase.    Discussed with fellow.      Christiano Summers MD  PGY-1 | Internal Medicine  [phone] / [phone] 71yo M in the CTICU 2/2 septic and cardiogenic shock (on vasopressin) with culture negative endocarditis (on Ceftriaxone, Gentamicin) s/p AVR (T)/C1V on 1/30 2/2 AV vegetation, PAF (on digoxin), chronic heart failure, and resolving acute renal failure. Now found to have left femoral pseudoaneurysm on bedside u/s.     Hematology consulted for prolonged aPTT of unclear etiology. No personal or family hx of bleeding disorder. Pt p/w normal aPTT, which prolonged appropriately 2/2 heparin gtt x48 hrs for suspected NSTEMI (1/10), then normalized after gtt discontinued. aPTT began prolonging again on 1/16 while on VTE PPx dose SubQ heparin only and has remained prolonged since, currently on HSQ without s/s of bleeding. s/p multiple transfusions. Prolonged aPTT possibly 2/2 heparin contamination vs. acquired factor deficiency.    RECOMMENDATIONS:  - Please send mixing study:  ·	If normal, will need to send serum factor levels and replace deficient factors.   ·	If still prolonged, will need to send Thrombin Time and Reptilase.    Discussed with fellow.      Christiano Summers MD  PGY-1 | Internal Medicine  [phone] / [phone] 73yo M in the CTICU 2/2 septic and cardiogenic shock (on vasopressin) with culture negative endocarditis (on Ceftriaxone, Gentamicin) s/p AVR (T)/C1V on 1/30 2/2 AV vegetation, PAF (on digoxin), chronic heart failure, and resolving acute renal failure. Now found to have left femoral pseudoaneurysm on bedside u/s.     Hematology consulted for prolonged aPTT of unclear etiology. No personal or family hx of bleeding disorder. Pt p/w normal aPTT, which prolonged appropriately 2/2 heparin gtt x48 hrs for suspected NSTEMI (1/10), then normalized after gtt discontinued. aPTT began prolonging again on 1/16 while on VTE PPx dose SubQ heparin only and has remained prolonged since, currently on HSQ without s/s of bleeding. s/p multiple transfusions. Prolonged aPTT likely 2/2 heparin contamination vs. less likely acquired factor deficiency.    RECOMMENDATIONS:  - Please send mixing study; Thrombin Time; Reptilase; and Factors VIII, IX, XI.  (Please send all studies together in order to obtain timely results.)  ·	If mixing study PTT is normal, etiology is factor deficiency, and will need to replace deficient factors, per serum factor levels.   ·	If mixing study PTT is still prolonged, etiology likely heparin contamination, and will confirm with Thrombin Time and Reptilase.    Discussed with attending.      Christiano Summers MD  PGY-1 | Internal Medicine  [phone] / [phone]

## 2018-02-09 LAB
ALBUMIN SERPL ELPH-MCNC: 2.8 G/DL — LOW (ref 3.3–5)
ALP SERPL-CCNC: 242 U/L — HIGH (ref 40–120)
ALT FLD-CCNC: 69 U/L RC — HIGH (ref 10–45)
ANION GAP SERPL CALC-SCNC: 10 MMOL/L — SIGNIFICANT CHANGE UP (ref 5–17)
APTT BLD: 59.7 SEC — HIGH (ref 27.5–37.4)
AST SERPL-CCNC: 57 U/L — HIGH (ref 10–40)
BILIRUB SERPL-MCNC: 1.2 MG/DL — SIGNIFICANT CHANGE UP (ref 0.2–1.2)
BUN SERPL-MCNC: 56 MG/DL — HIGH (ref 7–23)
C DIFF GDH STL QL: NEGATIVE — SIGNIFICANT CHANGE UP
C DIFF GDH STL QL: SIGNIFICANT CHANGE UP
CALCIUM SERPL-MCNC: 8.2 MG/DL — LOW (ref 8.4–10.5)
CHLORIDE SERPL-SCNC: 125 MMOL/L — HIGH (ref 96–108)
CO2 SERPL-SCNC: 19 MMOL/L — LOW (ref 22–31)
CREAT SERPL-MCNC: 1.46 MG/DL — HIGH (ref 0.5–1.3)
FACT IX PPP CHRO-ACNC: 90 % — SIGNIFICANT CHANGE UP (ref 80–165)
FACT VIII ACT/NOR PPP: 289 % — HIGH (ref 60–125)
FACT XII ACT/NOR PPP: 50 % — LOW (ref 70–145)
GAS PNL BLDA: SIGNIFICANT CHANGE UP
GLUCOSE BLDC GLUCOMTR-MCNC: 123 MG/DL — HIGH (ref 70–99)
GLUCOSE BLDC GLUCOMTR-MCNC: 141 MG/DL — HIGH (ref 70–99)
GLUCOSE BLDC GLUCOMTR-MCNC: 171 MG/DL — HIGH (ref 70–99)
GLUCOSE BLDC GLUCOMTR-MCNC: 180 MG/DL — HIGH (ref 70–99)
GLUCOSE SERPL-MCNC: 243 MG/DL — HIGH (ref 70–99)
HCT VFR BLD CALC: 25.5 % — LOW (ref 39–50)
HGB BLD-MCNC: 8.9 G/DL — LOW (ref 13–17)
INR BLD: 0.94 RATIO — SIGNIFICANT CHANGE UP (ref 0.88–1.16)
MCHC RBC-ENTMCNC: 32.2 PG — SIGNIFICANT CHANGE UP (ref 27–34)
MCHC RBC-ENTMCNC: 34.7 GM/DL — SIGNIFICANT CHANGE UP (ref 32–36)
MCV RBC AUTO: 92.6 FL — SIGNIFICANT CHANGE UP (ref 80–100)
PLATELET # BLD AUTO: 145 K/UL — LOW (ref 150–400)
POTASSIUM SERPL-MCNC: 4 MMOL/L — SIGNIFICANT CHANGE UP (ref 3.5–5.3)
POTASSIUM SERPL-SCNC: 4 MMOL/L — SIGNIFICANT CHANGE UP (ref 3.5–5.3)
PROT SERPL-MCNC: 5.5 G/DL — LOW (ref 6–8.3)
PROTHROM AB SERPL-ACNC: 10.2 SEC — SIGNIFICANT CHANGE UP (ref 9.8–12.7)
RBC # BLD: 2.76 M/UL — LOW (ref 4.2–5.8)
RBC # FLD: 16.1 % — HIGH (ref 10.3–14.5)
SODIUM SERPL-SCNC: 154 MMOL/L — HIGH (ref 135–145)
WBC # BLD: 18 K/UL — HIGH (ref 3.8–10.5)
WBC # FLD AUTO: 18 K/UL — HIGH (ref 3.8–10.5)

## 2018-02-09 PROCEDURE — 99232 SBSQ HOSP IP/OBS MODERATE 35: CPT | Mod: GC

## 2018-02-09 PROCEDURE — 99291 CRITICAL CARE FIRST HOUR: CPT

## 2018-02-09 PROCEDURE — 71045 X-RAY EXAM CHEST 1 VIEW: CPT | Mod: 26

## 2018-02-09 PROCEDURE — 71045 X-RAY EXAM CHEST 1 VIEW: CPT | Mod: 26,77

## 2018-02-09 PROCEDURE — 99232 SBSQ HOSP IP/OBS MODERATE 35: CPT

## 2018-02-09 PROCEDURE — 76942 ECHO GUIDE FOR BIOPSY: CPT | Mod: 26

## 2018-02-09 RX ORDER — VANCOMYCIN HCL 1 G
125 VIAL (EA) INTRAVENOUS EVERY 6 HOURS
Qty: 0 | Refills: 0 | Status: DISCONTINUED | OUTPATIENT
Start: 2018-02-09 | End: 2018-02-09

## 2018-02-09 RX ORDER — POTASSIUM CHLORIDE 20 MEQ
20 PACKET (EA) ORAL ONCE
Qty: 0 | Refills: 0 | Status: COMPLETED | OUTPATIENT
Start: 2018-02-09 | End: 2018-02-09

## 2018-02-09 RX ADMIN — Medication 325 MILLIGRAM(S): at 12:23

## 2018-02-09 RX ADMIN — Medication 50 MICROGRAM(S): at 16:01

## 2018-02-09 RX ADMIN — Medication 10: at 02:05

## 2018-02-09 RX ADMIN — Medication 125 MILLIGRAM(S): at 12:23

## 2018-02-09 RX ADMIN — Medication 0.12 MILLIGRAM(S): at 12:23

## 2018-02-09 RX ADMIN — Medication 5: at 18:25

## 2018-02-09 RX ADMIN — Medication 200 MILLIGRAM(S): at 05:55

## 2018-02-09 RX ADMIN — HEPARIN SODIUM 5000 UNIT(S): 5000 INJECTION INTRAVENOUS; SUBCUTANEOUS at 21:59

## 2018-02-09 RX ADMIN — Medication 5: at 10:44

## 2018-02-09 RX ADMIN — CEFTRIAXONE 100 GRAM(S): 500 INJECTION, POWDER, FOR SOLUTION INTRAMUSCULAR; INTRAVENOUS at 09:01

## 2018-02-09 RX ADMIN — HEPARIN SODIUM 5000 UNIT(S): 5000 INJECTION INTRAVENOUS; SUBCUTANEOUS at 05:55

## 2018-02-09 RX ADMIN — Medication 200 MILLIGRAM(S): at 18:25

## 2018-02-09 RX ADMIN — PANTOPRAZOLE SODIUM 40 MILLIGRAM(S): 20 TABLET, DELAYED RELEASE ORAL at 12:23

## 2018-02-09 RX ADMIN — AMIODARONE HYDROCHLORIDE 200 MILLIGRAM(S): 400 TABLET ORAL at 05:55

## 2018-02-09 RX ADMIN — HEPARIN SODIUM 5000 UNIT(S): 5000 INJECTION INTRAVENOUS; SUBCUTANEOUS at 16:01

## 2018-02-09 RX ADMIN — Medication 2: at 16:02

## 2018-02-09 RX ADMIN — Medication 20 MILLIEQUIVALENT(S): at 21:58

## 2018-02-09 RX ADMIN — Medication 2: at 21:58

## 2018-02-09 RX ADMIN — Medication 5: at 06:36

## 2018-02-09 NOTE — PROGRESS NOTE ADULT - SUBJECTIVE AND OBJECTIVE BOX
patient seen pseudo aneurysm appeared to arise from the profunda on the left.  Pseudo was injected with resolution of pseudo aneurysm.  Plan will be for repeat duplex in the am.

## 2018-02-09 NOTE — PROGRESS NOTE ADULT - PROBLEM SELECTOR PLAN 2
Likely in setting of impaired urinary concentrating ability given recovering ATN. Consider changing fluids to 1/2 NS and monitor serum sodium. Likely in setting of impaired urinary concentrating ability given recovering ATN as well as diarrhea. Consider changing fluids to 1/2 NS and monitor serum sodium to correct free water deficit.

## 2018-02-09 NOTE — PROGRESS NOTE ADULT - SUBJECTIVE AND OBJECTIVE BOX
SAMANTHA CADENA  MRN#:  [id number]    The patient is a 72y Male with PMH of  T2DM, alcohol abuse, brought in by family after having been found face down on the floor for approximately 2 days, found to have culture negative endocarditis and is now s/p AVR (T)/C1V 1/30 who was seen, evaluated, & examined with the CTICU staff on rounds, overnight and during the early morning hours with a multidisciplinary care plan formulated & implemented.  All available clinical, laboratory, radiographic, pharmacologic, and electrocardiographic data were reviewed & analyzed.      The patient was in the CTICU in critical condition secondary to resolving septic and cardiogenic shock, chronic heart failure, culture negative endocarditis, paroxysmal atrial fibrillation, and resolving acute renal failure.    Respiratory status required supplemental oxygen, mobilization and assistance with pulmonary toilet, close monitoring of breathing pattern and respiratory rate, the following of ABG’s with A-line monitoring, continuous pulse oximetry monitoring,  for support & to evaluate for & prevent further decompensation secondary to persistent cardiopulmonary dysfunction due to atelectasis and his deconditioned state.    Invasive hemodynamic monitoring with an arterial catheter were required for the following of continuous MAP/BP monitoring to ensure adequate cardiovascular support and to evaluate for & help prevent decompensation while receiving an oral amiodarone load and IV Digoxin after discontinuing IV amiodarone drip and IV vasopressin drip for paroxysmal atrial fibrillation, resolving cardiogenic and septic shock, and acute renal failure.    Metabolic stability, uncontrolled type 2 diabetes-hyperglycemia, & infection prophylaxis required an IV regular Insulin sliding scale & the following of serial glucose levels to help achieve & maintain euglycemia.      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     Patient underwent thrombin injection today for his two left femoral pseudoaneurysms.    He is tolerating enteral feedings at goal rate.    Patient required more than the usual postoperative critical care management and I provided 30 minutes of non-continuous care to the patient.  Discussed at length with the CTICU staff and helped coordinate care.

## 2018-02-09 NOTE — PROGRESS NOTE ADULT - ASSESSMENT
72 year-old male with past medical history of T2DM, DLD, alcohol misuse brought in by family after having been found face down on the floor for approximately 2 days. On treatment for UTI, but now JANES with Aortic Valve lesions. 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 Bartonella, Legionella, Q Fever serology negative; fungal BCX negative. Brucella pending. Unclear cause of NVE, culture  with GPC--pending. Having diarrhea, C diff negative. LFTs rising, working ELIAN, aortic balloon pump, pressors. Critically ill. Aortic valve vegetation, leukocytosis, post operative state.  -    discussed with colleagues and [facility]  organism is fastidious gram positive coccus   micro having trouble getting it to grow   not likely if it were enterococcus  more likely vit  b 6  def strep that needs combination therapy   asked micro to send to [facility] for PCR    doing better  extubated afebrile   no change in therapy       renal function stable but WBC up and liquid stools  to be sent for dcdiff.  has stage decubitus but it is not infected

## 2018-02-09 NOTE — PROGRESS NOTE ADULT - ASSESSMENT
72M with 2 L femoral pseudoaneurysms  - Will plan for thrombin injection this PM    CHARLIE Mayorga MD PGY 2   [id number]

## 2018-02-09 NOTE — PROGRESS NOTE ADULT - SUBJECTIVE AND OBJECTIVE BOX
Vascular Surgery    Pt seen and examined at bedside. Feeling well, denies pain in LLE.    Vital Signs Last 24 Hrs  T(C): 34.6 (02-09-18 @ 08:00), Max: 37.1 (02-08-18 @ 17:00)  T(F): 94.2 (02-09-18 @ 08:00), Max: 98.8 (02-08-18 @ 17:00)  HR: 84 (02-09-18 @ 09:00) (84 - 93)  BP: --  BP(mean): --  RR: 34 (02-09-18 @ 09:00) (16 - 36)  SpO2: 100% (02-09-18 @ 09:00) (98% - 100%)  I&O's Detail    08 Feb 2018 07:01  -  09 Feb 2018 07:00  --------------------------------------------------------  IN:    Enteral Tube Flush: 120 mL    Free Water: 500 mL    sodium chloride 0.9%.: 20 mL    Solution: 50 mL    Solution: 100 mL    vasopressin Infusion: 1 mL    Vital HN: 1380 mL  Total IN: 2171 mL    OUT:    Rectal Tube: 800 mL    Voided: 1200 mL  Total OUT: 2000 mL    Total NET: 171 mL      09 Feb 2018 07:01  -  09 Feb 2018 11:44  --------------------------------------------------------  IN:    sodium chloride 0.9%.: 20 mL    Vital HN: 120 mL  Total IN: 140 mL    OUT:  Total OUT: 0 mL    Total NET: 140 mL    PE  Gen: NAD  LLE with large mass in groin, mildly tender to palpation, palpable AT pulse                            8.9    18.0  )-----------( 145      ( 09 Feb 2018 01:15 )             25.5     02-09    154<H>  |  125<H>  |  56<H>  ----------------------------<  243<H>  4.0   |  19<L>  |  1.46<H>    Ca    8.2<L>      09 Feb 2018 01:15    TPro  5.5<L>  /  Alb  2.8<L>  /  TBili  1.2  /  DBili  x   /  AST  57<H>  /  ALT  69<H>  /  AlkPhos  242<H>  02-09    PT/INR - ( 09 Feb 2018 01:15 )   PT: 10.2 sec;   INR: 0.94 ratio         PTT - ( 09 Feb 2018 01:15 )  PTT:59.7 sec  CAPILLARY BLOOD GLUCOSE  166 (09 Feb 2018 06:00)  214 (09 Feb 2018 01:00)  250 (08 Feb 2018 22:00)      POCT Blood Glucose.: 180 mg/dL (09 Feb 2018 10:39)  POCT Blood Glucose.: 187 mg/dL (08 Feb 2018 17:52)  POCT Blood Glucose.: 178 mg/dL (08 Feb 2018 13:28)      MEDICATIONS  (STANDING):  amiodarone    Tablet 200 milliGRAM(s) Oral daily  aspirin 325 milliGRAM(s) Oral daily  cefTRIAXone   IVPB      cefTRIAXone   IVPB 2 Gram(s) IV Intermittent every 24 hours  dextrose 5%. 1000 milliLiter(s) (50 mL/Hr) IV Continuous <Continuous>  dextrose 50% Injectable 25 Gram(s) IV Push once  dextrose 50% Injectable 50 milliLiter(s) IV Push every 15 minutes  digoxin     Tablet 0.125 milliGRAM(s) Oral every other day  gentamicin   IVPB      gentamicin   IVPB 40 milliGRAM(s) IV Intermittent every 12 hours  heparin  Injectable 5000 Unit(s) SubCutaneous every 8 hours  insulin lispro (HumaLOG) corrective regimen sliding scale   SubCutaneous every 4 hours  levothyroxine Injectable 50 MICROGram(s) IV Push <User Schedule>  pantoprazole  Injectable 40 milliGRAM(s) IV Push daily  sodium chloride 0.9%. 1000 milliLiter(s) (10 mL/Hr) IV Continuous <Continuous>  vancomycin    Solution 125 milliGRAM(s) Oral every 6 hours    MEDICATIONS  (PRN):

## 2018-02-09 NOTE — PROGRESS NOTE ADULT - SUBJECTIVE AND OBJECTIVE BOX
[facility] Division of Kidney Diseases & Hypertension  FOLLOW UP NOTE  [phone]--------------------------------------------------------------------------------    HPI: 73 y/o man with history of ETOH abuse who presents with fall and found with endocarditis, developed ELIAN in-hospital shortly after undergoing cardiac catheterization, s/p urgent AVR and CABG on 1/30/18 with clinical decompensation on 2/1 requiring intubation and multiple vasopressor support.  Patient now extubated. Patient able to communicate by nodding; denies complains of SOB, CP, abdominal pain.     PAST HISTORY  --------------------------------------------------------------------------------  No significant changes to PMH, PSH, FHx, SHx, unless otherwise noted    ALLERGIES & MEDICATIONS  --------------------------------------------------------------------------------  Allergies    No Known Allergies    Intolerances      Standing Inpatient Medications  amiodarone    Tablet 200 milliGRAM(s) Oral daily  aspirin 325 milliGRAM(s) Oral daily  cefTRIAXone   IVPB      cefTRIAXone   IVPB 2 Gram(s) IV Intermittent every 24 hours  dextrose 5%. 1000 milliLiter(s) IV Continuous <Continuous>  dextrose 50% Injectable 25 Gram(s) IV Push once  dextrose 50% Injectable 50 milliLiter(s) IV Push every 15 minutes  digoxin     Tablet 0.125 milliGRAM(s) Oral every other day  gentamicin   IVPB      gentamicin   IVPB 40 milliGRAM(s) IV Intermittent every 12 hours  heparin  Injectable 5000 Unit(s) SubCutaneous every 8 hours  insulin lispro (HumaLOG) corrective regimen sliding scale   SubCutaneous every 4 hours  levothyroxine Injectable 50 MICROGram(s) IV Push <User Schedule>  pantoprazole  Injectable 40 milliGRAM(s) IV Push daily  sodium chloride 0.9%. 1000 milliLiter(s) IV Continuous <Continuous>    PRN Inpatient Medications      REVIEW OF SYSTEMS  --------------------------------------------------------------------------------  CV: no chest pain  Resp: no dyspnea  GI: no abdominal pain    VITALS/PHYSICAL EXAM  --------------------------------------------------------------------------------  T(C): 34.6 (02-09-18 @ 08:00), Max: 37.1 (02-08-18 @ 17:00)  HR: 84 (02-09-18 @ 09:00) (84 - 93)  BP: --  RR: 34 (02-09-18 @ 09:00) (16 - 36)  SpO2: 100% (02-09-18 @ 09:00) (98% - 100%)  Wt(kg): --        02-08-18 @ 07:01  -  02-09-18 @ 07:00  --------------------------------------------------------  IN: 2171 mL / OUT: 2000 mL / NET: 171 mL    02-09-18 @ 07:01  -  02-09-18 @ 10:19  --------------------------------------------------------  IN: 140 mL / OUT: 0 mL / NET: 140 mL      Physical Exam:  	Gen: NAD ill appearing  	HEENT: NG tube  	Pulm: coarse breath sounds  	CV: RRR, S1S2; no rub sternotomy incision noted  	Abd: +BS, soft, nontender/nondistended              LE: no edema    LABS/STUDIES  --------------------------------------------------------------------------------              8.9    18.0  >-----------<  145      [02-09-18 @ 01:15]              25.5     154  |  125  |  56  ----------------------------<  243      [02-09-18 @ 01:15]  4.0   |  19  |  1.46        Ca     8.2     [02-09-18 @ 01:15]    TPro  5.5  /  Alb  2.8  /  TBili  1.2  /  DBili  x   /  AST  57  /  ALT  69  /  AlkPhos  242  [02-09-18 @ 01:15]    PT/INR: PT 10.2 , INR 0.94       [02-09-18 @ 01:15]  PTT: 59.7       [02-09-18 @ 01:15]      Creatinine Trend:  SCr 1.46 [02-09 @ 01:15]  SCr 1.43 [02-08 @ 03:16]  SCr 1.46 [02-07 @ 01:55]  SCr 1.72 [02-06 @ 01:28]  SCr 1.96 [02-05 @ 01:17]    Urinalysis - [02-06-18 @ 08:35]      Color Yellow / Appearance SL Turbid / SG 1.015 / pH 6.0      Gluc Negative / Ketone Negative  / Bili Negative / Urobili Negative       Blood Moderate / Protein 100 / Leuk Est Trace / Nitrite Negative      RBC 10-25 / WBC 10-25 / Hyaline  / Gran  / Sq Epi  / Non Sq Epi Occasional / Bacteria Moderate [facility] Division of Kidney Diseases & Hypertension  FOLLOW UP NOTE  [phone]--------------------------------------------------------------------------------    HPI: 73 y/o man with history of ETOH abuse who presents with fall and found with endocarditis, developed ELIAN in-hospital shortly after undergoing cardiac catheterization, s/p urgent AVR and CABG on 1/30/18 with clinical decompensation on 2/1 requiring intubation and multiple vasopressor support.  Patient now extubated. Patient able to communicate by nodding; denies complains of SOB, CP, abdominal pain.     PAST HISTORY  --------------------------------------------------------------------------------  No significant changes to PMH, PSH, FHx, SHx, unless otherwise noted    ALLERGIES & MEDICATIONS  --------------------------------------------------------------------------------  Allergies    No Known Allergies    Intolerances      Standing Inpatient Medications  amiodarone    Tablet 200 milliGRAM(s) Oral daily  aspirin 325 milliGRAM(s) Oral daily  cefTRIAXone   IVPB      cefTRIAXone   IVPB 2 Gram(s) IV Intermittent every 24 hours  dextrose 5%. 1000 milliLiter(s) IV Continuous <Continuous>  dextrose 50% Injectable 25 Gram(s) IV Push once  dextrose 50% Injectable 50 milliLiter(s) IV Push every 15 minutes  digoxin     Tablet 0.125 milliGRAM(s) Oral every other day  gentamicin   IVPB      gentamicin   IVPB 40 milliGRAM(s) IV Intermittent every 12 hours  heparin  Injectable 5000 Unit(s) SubCutaneous every 8 hours  insulin lispro (HumaLOG) corrective regimen sliding scale   SubCutaneous every 4 hours  levothyroxine Injectable 50 MICROGram(s) IV Push <User Schedule>  pantoprazole  Injectable 40 milliGRAM(s) IV Push daily  sodium chloride 0.9%. 1000 milliLiter(s) IV Continuous <Continuous>    PRN Inpatient Medications      REVIEW OF SYSTEMS  --------------------------------------------------------------------------------  CV: no chest pain  Resp: no dyspnea  GI: no abdominal pain    VITALS/PHYSICAL EXAM  --------------------------------------------------------------------------------  T(C): 34.6 (02-09-18 @ 08:00), Max: 37.1 (02-08-18 @ 17:00)  HR: 84 (02-09-18 @ 09:00) (84 - 93)  BP: 105/43  RR: 34 (02-09-18 @ 09:00) (16 - 36)  SpO2: 100% (02-09-18 @ 09:00) (98% - 100%)  Wt(kg): --        02-08-18 @ 07:01  -  02-09-18 @ 07:00  --------------------------------------------------------  IN: 2171 mL / OUT: 2000 mL / NET: 171 mL    02-09-18 @ 07:01  -  02-09-18 @ 10:19  --------------------------------------------------------  IN: 140 mL / OUT: 0 mL / NET: 140 mL      Physical Exam:  	Gen: NAD ill appearing  	HEENT: NG tube  	Pulm: coarse breath sounds  	CV: RRR, S1S2; no rub sternotomy incision noted  	Abd: +BS, soft, nontender/nondistended              LE: no edema    LABS/STUDIES  --------------------------------------------------------------------------------              8.9    18.0  >-----------<  145      [02-09-18 @ 01:15]              25.5     154  |  125  |  56  ----------------------------<  243      [02-09-18 @ 01:15]  4.0   |  19  |  1.46        Ca     8.2     [02-09-18 @ 01:15]    TPro  5.5  /  Alb  2.8  /  TBili  1.2  /  DBili  x   /  AST  57  /  ALT  69  /  AlkPhos  242  [02-09-18 @ 01:15]    PT/INR: PT 10.2 , INR 0.94       [02-09-18 @ 01:15]  PTT: 59.7       [02-09-18 @ 01:15]      Creatinine Trend:  SCr 1.46 [02-09 @ 01:15]  SCr 1.43 [02-08 @ 03:16]  SCr 1.46 [02-07 @ 01:55]  SCr 1.72 [02-06 @ 01:28]  SCr 1.96 [02-05 @ 01:17]    Urinalysis - [02-06-18 @ 08:35]      Color Yellow / Appearance SL Turbid / SG 1.015 / pH 6.0      Gluc Negative / Ketone Negative  / Bili Negative / Urobili Negative       Blood Moderate / Protein 100 / Leuk Est Trace / Nitrite Negative      RBC 10-25 / WBC 10-25 / Hyaline  / Gran  / Sq Epi  / Non Sq Epi Occasional / Bacteria Moderate

## 2018-02-09 NOTE — CHART NOTE - NSCHARTNOTEFT_GEN_A_CORE
Vascular Surgery Post-procedure note     S: Patient underwent thrombin injection of L profunda pseudoaneurysm, tolerated procedure without  issue.     O:T(C): 35.9 (02-09-18 @ 19:00), Max: 35.9 (02-09-18 @ 19:00)  HR: 83 (02-09-18 @ 20:00) (79 - 84)  BP: --  RR: 20 (02-09-18 @ 20:00) (17 - 20)  SpO2: 100% (02-09-18 @ 20:00) (100% - 100%)  Wt(kg): --                        8.9    18.0  )-----------( 145      ( 09 Feb 2018 01:15 )             25.5        02-09    154<H>  |  125<H>  |  56<H>  ----------------------------<  243<H>  4.0   |  19<L>  |  1.46<H>    Ca    8.2<L>      09 Feb 2018 01:15      Gen: NAD  Left groin with small area of eschar, underlying firmness. No palpable pulse.           Assessment/Plan:  72y Male s/p Aortic valve replacement, now s/p thrombin injection of profunda pseudoaneurysm    - Monitor groin site  - Care per CTU primary    C Haresh PGY1  [id number]

## 2018-02-09 NOTE — SWALLOW BEDSIDE ASSESSMENT ADULT - SWALLOW EVAL: DIAGNOSIS
Attempted to see patient for bedside swallowing evaluation. Patient off floor at Ultrasound at this time. This service will f/u later this date, schedule permitting.

## 2018-02-09 NOTE — PROGRESS NOTE ADULT - PROBLEM SELECTOR PLAN 1
Likely contrast induced nephropathy from cardiac cath/Toxic/Septic  ATN. Scr. remains high but improving daily. Patient is non oliguric. Plan is to continue monitoring Scr., electrolytes, and urine output. No plan for HD at this time. Avoid nephrotoxins. Likely contrast induced nephropathy from cardiac cath/Toxic/Septic  ATN. Scr. remains high but improving daily. Patient is non oliguric. Plan is to continue monitoring Scr., electrolytes, and urine output. No plan for HD at this time. Avoid nephrotoxins.  Persistent elevation in creatinine above baseline.  Check gentamicin and vanco levels.  Check c3 / c4 and recheck ua.

## 2018-02-09 NOTE — PROGRESS NOTE ADULT - SUBJECTIVE AND OBJECTIVE BOX
infectious diseases progress note:    Patient is a 72y old  Male who presents with a chief complaint of Fall (09 Jan 2018 00:52)        Atrial fibrillation          Allergies    No Known Allergies    Intolerances        ANTIBIOTICS/RELEVANT:  antimicrobials  cefTRIAXone   IVPB      cefTRIAXone   IVPB 2 Gram(s) IV Intermittent every 24 hours  gentamicin   IVPB      gentamicin   IVPB 40 milliGRAM(s) IV Intermittent every 12 hours    immunologic:    OTHER:  amiodarone    Tablet 200 milliGRAM(s) Oral daily  aspirin 325 milliGRAM(s) Oral daily  dextrose 5%. 1000 milliLiter(s) IV Continuous <Continuous>  dextrose 50% Injectable 25 Gram(s) IV Push once  dextrose 50% Injectable 50 milliLiter(s) IV Push every 15 minutes  digoxin     Tablet 0.125 milliGRAM(s) Oral every other day  heparin  Injectable 5000 Unit(s) SubCutaneous every 8 hours  insulin lispro (HumaLOG) corrective regimen sliding scale   SubCutaneous every 4 hours  levothyroxine Injectable 50 MICROGram(s) IV Push <User Schedule>  pantoprazole  Injectable 40 milliGRAM(s) IV Push daily  sodium chloride 0.9%. 1000 milliLiter(s) IV Continuous <Continuous>      Objective:  Vital Signs Last 24 Hrs  T(C): 34.6 (09 Feb 2018 08:00), Max: 37.1 (08 Feb 2018 17:00)  T(F): 94.2 (09 Feb 2018 08:00), Max: 98.8 (08 Feb 2018 17:00)  HR: 84 (09 Feb 2018 09:00) (84 - 93)  BP: --  BP(mean): --  RR: 34 (09 Feb 2018 09:00) (16 - 36)  SpO2: 100% (09 Feb 2018 09:00) (98% - 100%)    PHYSICAL EXAM:  , well nourished--no acute distress  Eyes:MYLA, EOMI  Ear/Nose/Throat: no oral lesion, no sinus tenderness on percussion	  Neck:no JVD, no lymphadenopathy, supple  Respiratory: CTA matt  Cardiovascular: S1S2 RRR, no murmurs  Gastrointestinal:soft, (+) BS, no HSM  Extremities:no  decubitus stage 2  not infected         LABS:                        8.9    18.0  )-----------( 145      ( 09 Feb 2018 01:15 )             25.5     02-09    154<H>  |  125<H>  |  56<H>  ----------------------------<  243<H>  4.0   |  19<L>  |  1.46<H>    Ca    8.2<L>      09 Feb 2018 01:15    TPro  5.5<L>  /  Alb  2.8<L>  /  TBili  1.2  /  DBili  x   /  AST  57<H>  /  ALT  69<H>  /  AlkPhos  242<H>  02-09    PT/INR - ( 09 Feb 2018 01:15 )   PT: 10.2 sec;   INR: 0.94 ratio         PTT - ( 09 Feb 2018 01:15 )  PTT:59.7 sec        MICROBIOLOGY:    RECENT CULTURES:  02-06 @ 14:07 .Urine Catheterized                No growth    02-05 @ 06:31 .Blood Blood-Peripheral                No growth to date.    02-02 @ 16:35 .Sputum Sputum       Few polymorphonuclear leukocytes per low power field  Rare Squamous Epithelial Cells per low power field  Moderate Gram Negative Rods per oil power field  Rare Gram positive cocci in pairs per oil power field  Rare Yeast like cells per oil power field           Normal Respiratory Soni present    02-02 @ 13:46 .Blood Blood-Venous                No growth at 5 days.          RESPIRATORY CULTURES:      Clostridium difficile [facility] A&amp;[facility], EIA:   Negative (02-05 @ 01:18)          RADIOLOGY & ADDITIONAL STUDIES:        Pager [phone]  After 5 pm/weekends or if no response :[phone]

## 2018-02-10 LAB
ALBUMIN SERPL ELPH-MCNC: 2.7 G/DL — LOW (ref 3.3–5)
ALBUMIN SERPL ELPH-MCNC: 3 G/DL — LOW (ref 3.3–5)
ALP SERPL-CCNC: 224 U/L — HIGH (ref 40–120)
ALP SERPL-CCNC: 240 U/L — HIGH (ref 40–120)
ALT FLD-CCNC: 72 U/L RC — HIGH (ref 10–45)
ALT FLD-CCNC: 77 U/L RC — HIGH (ref 10–45)
ANION GAP SERPL CALC-SCNC: 14 MMOL/L — SIGNIFICANT CHANGE UP (ref 5–17)
ANION GAP SERPL CALC-SCNC: 20 MMOL/L — HIGH (ref 5–17)
APTT BLD: 54.6 SEC — HIGH (ref 27.5–37.4)
AST SERPL-CCNC: 61 U/L — HIGH (ref 10–40)
AST SERPL-CCNC: 62 U/L — HIGH (ref 10–40)
BILIRUB SERPL-MCNC: 1.1 MG/DL — SIGNIFICANT CHANGE UP (ref 0.2–1.2)
BILIRUB SERPL-MCNC: 1.3 MG/DL — HIGH (ref 0.2–1.2)
BUN SERPL-MCNC: 78 MG/DL — HIGH (ref 7–23)
BUN SERPL-MCNC: 80 MG/DL — HIGH (ref 7–23)
CALCIUM SERPL-MCNC: 8.1 MG/DL — LOW (ref 8.4–10.5)
CALCIUM SERPL-MCNC: 8.2 MG/DL — LOW (ref 8.4–10.5)
CHLORIDE SERPL-SCNC: 118 MMOL/L — HIGH (ref 96–108)
CHLORIDE SERPL-SCNC: 122 MMOL/L — HIGH (ref 96–108)
CO2 SERPL-SCNC: 15 MMOL/L — LOW (ref 22–31)
CO2 SERPL-SCNC: 17 MMOL/L — LOW (ref 22–31)
CREAT SERPL-MCNC: 1.96 MG/DL — HIGH (ref 0.5–1.3)
CREAT SERPL-MCNC: 2.47 MG/DL — HIGH (ref 0.5–1.3)
CULTURE RESULTS: SIGNIFICANT CHANGE UP
GAS PNL BLDA: SIGNIFICANT CHANGE UP
GAS PNL BLDA: SIGNIFICANT CHANGE UP
GENTAMICIN TROUGH SERPL-MCNC: 3.7 UG/ML — CRITICAL HIGH (ref 0–2)
GLUCOSE BLDC GLUCOMTR-MCNC: 192 MG/DL — HIGH (ref 70–99)
GLUCOSE BLDC GLUCOMTR-MCNC: 227 MG/DL — HIGH (ref 70–99)
GLUCOSE BLDC GLUCOMTR-MCNC: 68 MG/DL — LOW (ref 70–99)
GLUCOSE BLDC GLUCOMTR-MCNC: 78 MG/DL — SIGNIFICANT CHANGE UP (ref 70–99)
GLUCOSE BLDC GLUCOMTR-MCNC: 78 MG/DL — SIGNIFICANT CHANGE UP (ref 70–99)
GLUCOSE SERPL-MCNC: 190 MG/DL — HIGH (ref 70–99)
GLUCOSE SERPL-MCNC: 88 MG/DL — SIGNIFICANT CHANGE UP (ref 70–99)
HCT VFR BLD CALC: 22.9 % — LOW (ref 39–50)
HCT VFR BLD CALC: 26.4 % — LOW (ref 39–50)
HCT VFR BLD CALC: 29.4 % — LOW (ref 39–50)
HGB BLD-MCNC: 7.8 G/DL — LOW (ref 13–17)
HGB BLD-MCNC: 9.1 G/DL — LOW (ref 13–17)
HGB BLD-MCNC: 9.9 G/DL — LOW (ref 13–17)
HIV 1+2 AB+HIV1 P24 AG SERPL QL IA: SIGNIFICANT CHANGE UP
INR BLD: 1.05 RATIO — SIGNIFICANT CHANGE UP (ref 0.88–1.16)
MCHC RBC-ENTMCNC: 30.9 PG — SIGNIFICANT CHANGE UP (ref 27–34)
MCHC RBC-ENTMCNC: 31.1 PG — SIGNIFICANT CHANGE UP (ref 27–34)
MCHC RBC-ENTMCNC: 32.2 PG — SIGNIFICANT CHANGE UP (ref 27–34)
MCHC RBC-ENTMCNC: 33.8 GM/DL — SIGNIFICANT CHANGE UP (ref 32–36)
MCHC RBC-ENTMCNC: 34.1 GM/DL — SIGNIFICANT CHANGE UP (ref 32–36)
MCHC RBC-ENTMCNC: 34.3 GM/DL — SIGNIFICANT CHANGE UP (ref 32–36)
MCV RBC AUTO: 90.8 FL — SIGNIFICANT CHANGE UP (ref 80–100)
MCV RBC AUTO: 91.4 FL — SIGNIFICANT CHANGE UP (ref 80–100)
MCV RBC AUTO: 94.3 FL — SIGNIFICANT CHANGE UP (ref 80–100)
PLATELET # BLD AUTO: 173 K/UL — SIGNIFICANT CHANGE UP (ref 150–400)
PLATELET # BLD AUTO: 188 K/UL — SIGNIFICANT CHANGE UP (ref 150–400)
PLATELET # BLD AUTO: 200 K/UL — SIGNIFICANT CHANGE UP (ref 150–400)
POTASSIUM SERPL-MCNC: 4.2 MMOL/L — SIGNIFICANT CHANGE UP (ref 3.5–5.3)
POTASSIUM SERPL-MCNC: 4.3 MMOL/L — SIGNIFICANT CHANGE UP (ref 3.5–5.3)
POTASSIUM SERPL-SCNC: 4.2 MMOL/L — SIGNIFICANT CHANGE UP (ref 3.5–5.3)
POTASSIUM SERPL-SCNC: 4.3 MMOL/L — SIGNIFICANT CHANGE UP (ref 3.5–5.3)
PROT SERPL-MCNC: 5.5 G/DL — LOW (ref 6–8.3)
PROT SERPL-MCNC: 6.3 G/DL — SIGNIFICANT CHANGE UP (ref 6–8.3)
PROTHROM AB SERPL-ACNC: 11.4 SEC — SIGNIFICANT CHANGE UP (ref 9.8–12.7)
RBC # BLD: 2.43 M/UL — LOW (ref 4.2–5.8)
RBC # BLD: 2.91 M/UL — LOW (ref 4.2–5.8)
RBC # BLD: 3.22 M/UL — LOW (ref 4.2–5.8)
RBC # FLD: 15.5 % — HIGH (ref 10.3–14.5)
RBC # FLD: 15.9 % — HIGH (ref 10.3–14.5)
RBC # FLD: 16.7 % — HIGH (ref 10.3–14.5)
SODIUM SERPL-SCNC: 153 MMOL/L — HIGH (ref 135–145)
SODIUM SERPL-SCNC: 153 MMOL/L — HIGH (ref 135–145)
SPECIMEN SOURCE: SIGNIFICANT CHANGE UP
T3 SERPL-MCNC: 52 NG/DL — LOW (ref 80–200)
T4 AB SER-ACNC: 7.5 UG/DL — SIGNIFICANT CHANGE UP (ref 4.6–12)
TSH SERPL-MCNC: 12.56 UIU/ML — HIGH (ref 0.27–4.2)
WBC # BLD: 21 K/UL — HIGH (ref 3.8–10.5)
WBC # BLD: 24 K/UL — HIGH (ref 3.8–10.5)
WBC # BLD: 26.8 K/UL — HIGH (ref 3.8–10.5)
WBC # FLD AUTO: 21 K/UL — HIGH (ref 3.8–10.5)
WBC # FLD AUTO: 24 K/UL — HIGH (ref 3.8–10.5)
WBC # FLD AUTO: 26.8 K/UL — HIGH (ref 3.8–10.5)

## 2018-02-10 PROCEDURE — 93926 LOWER EXTREMITY STUDY: CPT | Mod: 26,LT

## 2018-02-10 PROCEDURE — 71045 X-RAY EXAM CHEST 1 VIEW: CPT | Mod: 26

## 2018-02-10 PROCEDURE — 74176 CT ABD & PELVIS W/O CONTRAST: CPT | Mod: 26

## 2018-02-10 PROCEDURE — 71250 CT THORAX DX C-: CPT | Mod: 26

## 2018-02-10 PROCEDURE — 99233 SBSQ HOSP IP/OBS HIGH 50: CPT | Mod: GC

## 2018-02-10 PROCEDURE — 99233 SBSQ HOSP IP/OBS HIGH 50: CPT

## 2018-02-10 PROCEDURE — 93306 TTE W/DOPPLER COMPLETE: CPT | Mod: 26

## 2018-02-10 RX ORDER — VANCOMYCIN HCL 1 G
500 VIAL (EA) INTRAVENOUS ONCE
Qty: 0 | Refills: 0 | Status: COMPLETED | OUTPATIENT
Start: 2018-02-10 | End: 2018-02-10

## 2018-02-10 RX ORDER — MICAFUNGIN SODIUM 100 MG/1
100 INJECTION, POWDER, LYOPHILIZED, FOR SOLUTION INTRAVENOUS EVERY 24 HOURS
Qty: 0 | Refills: 0 | Status: DISCONTINUED | OUTPATIENT
Start: 2018-02-11 | End: 2018-02-11

## 2018-02-10 RX ORDER — ALBUMIN HUMAN 25 %
250 VIAL (ML) INTRAVENOUS ONCE
Qty: 0 | Refills: 0 | Status: COMPLETED | OUTPATIENT
Start: 2018-02-10 | End: 2018-02-10

## 2018-02-10 RX ORDER — MICAFUNGIN SODIUM 100 MG/1
INJECTION, POWDER, LYOPHILIZED, FOR SOLUTION INTRAVENOUS
Qty: 0 | Refills: 0 | Status: DISCONTINUED | OUTPATIENT
Start: 2018-02-10 | End: 2018-02-11

## 2018-02-10 RX ORDER — MEROPENEM 1 G/30ML
500 INJECTION INTRAVENOUS EVERY 12 HOURS
Qty: 0 | Refills: 0 | Status: DISCONTINUED | OUTPATIENT
Start: 2018-02-10 | End: 2018-02-11

## 2018-02-10 RX ORDER — MICAFUNGIN SODIUM 100 MG/1
100 INJECTION, POWDER, LYOPHILIZED, FOR SOLUTION INTRAVENOUS ONCE
Qty: 0 | Refills: 0 | Status: COMPLETED | OUTPATIENT
Start: 2018-02-10 | End: 2018-02-10

## 2018-02-10 RX ORDER — AMIODARONE HYDROCHLORIDE 400 MG/1
150 TABLET ORAL ONCE
Qty: 0 | Refills: 0 | Status: COMPLETED | OUTPATIENT
Start: 2018-02-10 | End: 2018-02-10

## 2018-02-10 RX ORDER — LEVOTHYROXINE SODIUM 125 MCG
75 TABLET ORAL
Qty: 0 | Refills: 0 | Status: DISCONTINUED | OUTPATIENT
Start: 2018-02-10 | End: 2018-02-11

## 2018-02-10 RX ADMIN — MICAFUNGIN SODIUM 105 MILLIGRAM(S): 100 INJECTION, POWDER, LYOPHILIZED, FOR SOLUTION INTRAVENOUS at 14:59

## 2018-02-10 RX ADMIN — Medication 10: at 14:48

## 2018-02-10 RX ADMIN — Medication 8: at 05:44

## 2018-02-10 RX ADMIN — Medication 75 MICROGRAM(S): at 15:00

## 2018-02-10 RX ADMIN — PANTOPRAZOLE SODIUM 40 MILLIGRAM(S): 20 TABLET, DELAYED RELEASE ORAL at 12:29

## 2018-02-10 RX ADMIN — Medication 8: at 02:00

## 2018-02-10 RX ADMIN — AMIODARONE HYDROCHLORIDE 600 MILLIGRAM(S): 400 TABLET ORAL at 19:13

## 2018-02-10 RX ADMIN — Medication 200 MILLIGRAM(S): at 05:47

## 2018-02-10 RX ADMIN — Medication 12: at 11:01

## 2018-02-10 RX ADMIN — CEFTRIAXONE 100 GRAM(S): 500 INJECTION, POWDER, FOR SOLUTION INTRAMUSCULAR; INTRAVENOUS at 08:55

## 2018-02-10 RX ADMIN — Medication 100 MILLIGRAM(S): at 12:29

## 2018-02-10 RX ADMIN — MEROPENEM 100 MILLIGRAM(S): 1 INJECTION INTRAVENOUS at 14:30

## 2018-02-10 RX ADMIN — AMIODARONE HYDROCHLORIDE 200 MILLIGRAM(S): 400 TABLET ORAL at 05:45

## 2018-02-10 RX ADMIN — Medication 500 MILLILITER(S): at 14:47

## 2018-02-10 RX ADMIN — Medication 325 MILLIGRAM(S): at 12:29

## 2018-02-10 RX ADMIN — HEPARIN SODIUM 5000 UNIT(S): 5000 INJECTION INTRAVENOUS; SUBCUTANEOUS at 05:45

## 2018-02-10 NOTE — PROGRESS NOTE ADULT - ATTENDING COMMENTS
ELIAN which has worsened today to 1.9  He is on gent and would request a gent level  ALso he is free water deplete so can add free water replacement   Trend Cr and urine output  Maintain hemodynamic support

## 2018-02-10 NOTE — PROGRESS NOTE ADULT - PROBLEM SELECTOR PLAN 1
Likely contrast induced nephropathy from cardiac cath/Toxic/Septic  ATN. Scr. has worsened to 1.96 today. Patient is non oliguric. Plan is to continue monitoring Scr., electrolytes, and urine output. Avoid nephrotoxins. Check gentamicin and vanco levels.

## 2018-02-10 NOTE — PROGRESS NOTE ADULT - SUBJECTIVE AND OBJECTIVE BOX
INFECTIOUS DISEASES FOLLOW UP--Benoit Lester MD  Pager [phone]    This is a follow up note for this  72y Male with  infective endocarditis.  Contacted to see today by CTS for concerning of worsening status with hypothermia and elevated WBC count (rising)  patient without new complaints---sitting in chair---off vent.      Further ROS:  CARDIOVASCULAR:  No chest pain or palpitations--no pain at sternum  RESPIRATORY:  No dyspnea at rest  GASTROINTESTINAL:  rectal tube    Allergies  No Known Allergies    ANTIBIOTICS/RELEVANT:  antimicrobials  rocephin  gentamicin   IVPB      gentamicin   IVPB 40 milliGRAM(s) IV Intermittent every 12 hours    OTHER:  amiodarone    Tablet 200 milliGRAM(s) Oral daily  aspirin 325 milliGRAM(s) Oral daily  dextrose 5%. 1000 milliLiter(s) IV Continuous <Continuous>  dextrose 50% Injectable 25 Gram(s) IV Push once  dextrose 50% Injectable 50 milliLiter(s) IV Push every 15 minutes  digoxin     Tablet 0.125 milliGRAM(s) Oral every other day  heparin  Injectable 5000 Unit(s) SubCutaneous every 8 hours  insulin lispro (HumaLOG) corrective regimen sliding scale   SubCutaneous every 4 hours  levothyroxine Injectable 50 MICROGram(s) IV Push <User Schedule>  pantoprazole  Injectable 40 milliGRAM(s) IV Push daily  sodium chloride 0.9%. 1000 milliLiter(s) IV Continuous <Continuous>    Objective:  Vital Signs Last 24 Hrs  T(C): 36.4 (10 Feb 2018 10:00), Max: 36.5 (10 Feb 2018 03:00)  T(F): 97.6 (10 Feb 2018 10:00), Max: 97.7 (10 Feb 2018 03:00)  HR: 76 (10 Feb 2018 11:00) (75 - 87)  BP: 109/57 (10 Feb 2018 11:00) (79/47 - 109/57)  BP(mean): 80 (10 Feb 2018 11:00) (58 - 80)  RR: 23 (10 Feb 2018 11:00) (17 - 38)  SpO2: 100% (10 Feb 2018 11:00) (95% - 100%)    PHYSICAL EXAM:  Constitutional:no acute distress but very frail  L arm picc--exit site not tender  Respiratory: clear BL  Cardiovascular: P9T4---ycrtmwu no click  Gastrointestinal:soft, (+) BS, min distnesion  Extremities: cool distal exts  No Lymphadenopathy    LABS:                        7.8    21.0  )-----------( 188      ( 10 Feb 2018 01:35 )             22.9     02-10    153<H>  |  122<H>  |  78<H>  ----------------------------<  190<H>  4.3   |  17<L>  |  1.96<H>    Ca    8.1<L>      10 Feb 2018 01:35    TPro  5.5<L>  /  Alb  2.7<L>  /  TBili  1.1  /  DBili  x   /  AST  62<H>  /  ALT  72<H>  /  AlkPhos  240<H>  02-10    PT/INR - ( 10 Feb 2018 01:35 )   PT: 11.4 sec;   INR: 1.05 ratio         PTT - ( 10 Feb 2018 01:35 )  PTT:54.6 sec      MICROBIOLOGY:  BC pending    RADIOLOGY & ADDITIONAL STUDIES:  < from: Xray Chest 1 View- PORTABLE-Routine (02.09.18 @ 22:24) >    IMPRESSION:  Lines and tubes as above.  Small to moderate left-sided pleural effusion with adjacent atelectasis.      < end of copied text >

## 2018-02-10 NOTE — PROGRESS NOTE ADULT - PROBLEM SELECTOR PLAN 2
Likely in setting of impaired urinary concentrating ability given recovering ATN as well as diarrhea. Consider changing fluids to 1/2 NS and monitor serum sodium to correct free water deficit.

## 2018-02-10 NOTE — PROGRESS NOTE ADULT - SUBJECTIVE AND OBJECTIVE BOX
[facility] Division of Kidney Diseases & Hypertension  FOLLOW UP NOTE  [phone]--------------------------------------------------------------------------------    HPI: 71 y/o man with history of ETOH abuse who presents with fall and found with endocarditis, developed ELIAN in-hospital shortly after undergoing cardiac catheterization, s/p urgent AVR and CABG on 1/30/18 with clinical decompensation on 2/1 requiring intubation and multiple vasopressor support.  Patient now extubated. Patient able to communicate by nodding; denies complains of SOB, CP, abdominal pain.       PAST HISTORY  --------------------------------------------------------------------------------  No significant changes to PMH, PSH, FHx, SHx, unless otherwise noted    ALLERGIES & MEDICATIONS  --------------------------------------------------------------------------------  Allergies    No Known Allergies    Intolerances      Standing Inpatient Medications  amiodarone    Tablet 200 milliGRAM(s) Oral daily  aspirin 325 milliGRAM(s) Oral daily  cefTRIAXone   IVPB      cefTRIAXone   IVPB 2 Gram(s) IV Intermittent every 24 hours  dextrose 5%. 1000 milliLiter(s) IV Continuous <Continuous>  dextrose 50% Injectable 25 Gram(s) IV Push once  dextrose 50% Injectable 50 milliLiter(s) IV Push every 15 minutes  digoxin     Tablet 0.125 milliGRAM(s) Oral every other day  gentamicin   IVPB      gentamicin   IVPB 40 milliGRAM(s) IV Intermittent every 12 hours  heparin  Injectable 5000 Unit(s) SubCutaneous every 8 hours  insulin lispro (HumaLOG) corrective regimen sliding scale   SubCutaneous every 4 hours  levothyroxine Injectable 50 MICROGram(s) IV Push <User Schedule>  pantoprazole  Injectable 40 milliGRAM(s) IV Push daily  sodium chloride 0.9%. 1000 milliLiter(s) IV Continuous <Continuous>    PRN Inpatient Medications      REVIEW OF SYSTEMS  --------------------------------------------------------------------------------  CV: no chest pain  Resp: no dyspnea  GI: no abdominal pain      VITALS/PHYSICAL EXAM  --------------------------------------------------------------------------------  T(C): 35 (02-10-18 @ 08:00), Max: 36.5 (02-10-18 @ 03:00)  HR: 76 (02-10-18 @ 09:00) (75 - 87)  BP: 106/51 (02-10-18 @ 09:00) (79/47 - 106/51)  RR: 25 (02-10-18 @ 09:00) (17 - 38)  SpO2: 100% (02-10-18 @ 08:00) (95% - 100%)  Wt(kg): --        02-09-18 @ 07:01  -  02-10-18 @ 07:00  --------------------------------------------------------  IN: 2540 mL / OUT: 760 mL / NET: 1780 mL    02-10-18 @ 07:01  -  02-10-18 @ 10:19  --------------------------------------------------------  IN: 390 mL / OUT: 0 mL / NET: 390 mL      Physical Exam:  	             Gen: NAD ill appearing  	HEENT: NG tube  	Pulm: coarse breath sounds  	CV: RRR, S1S2; no rub sternotomy incision noted  	Abd: +BS, soft, nontender/nondistended              LE: no edema    LABS/STUDIES  --------------------------------------------------------------------------------              7.8    21.0  >-----------<  188      [02-10-18 @ 01:35]              22.9     153  |  122  |  78  ----------------------------<  190      [02-10-18 @ 01:35]  4.3   |  17  |  1.96        Ca     8.1     [02-10-18 @ 01:35]    TPro  5.5  /  Alb  2.7  /  TBili  1.1  /  DBili  x   /  AST  62  /  ALT  72  /  AlkPhos  240  [02-10-18 @ 01:35]    PT/INR: PT 11.4 , INR 1.05       [02-10-18 @ 01:35]  PTT: 54.6       [02-10-18 @ 01:35]      Creatinine Trend:  SCr 1.96 [02-10 @ 01:35]  SCr 1.46 [02-09 @ 01:15]  SCr 1.43 [02-08 @ 03:16]  SCr 1.46 [02-07 @ 01:55]  SCr 1.72 [02-06 @ 01:28]    Urinalysis - [02-06-18 @ 08:35]      Color Yellow / Appearance SL Turbid / SG 1.015 / pH 6.0      Gluc Negative / Ketone Negative  / Bili Negative / Urobili Negative       Blood Moderate / Protein 100 / Leuk Est Trace / Nitrite Negative      RBC 10-25 / WBC 10-25 / Hyaline  / Gran  / Sq Epi  / Non Sq Epi Occasional / Bacteria Moderate

## 2018-02-10 NOTE — PROGRESS NOTE ADULT - ASSESSMENT
Imp:  Elevated WBC count.  Hypothermia  on ceftriaxone and gent.  CTS and unit team 'concerned'.  Blood cultures pending.    Rx:  As we await the results of BC we can adjust his coverage---If nothing grows--we can de escalate by Mon 2/12.    For now:  continue genta same dose  dc ceftriaxone  add meropenem 500 q12h  vanco 500 mg iv once  mycamine 100 mg daily (long course, recent decadron, lines, yeast colonization)    I have d/w CTS attg and CTU team.

## 2018-02-11 LAB
ALBUMIN SERPL ELPH-MCNC: 3.2 G/DL — LOW (ref 3.3–5)
ALBUMIN SERPL ELPH-MCNC: 3.3 G/DL — SIGNIFICANT CHANGE UP (ref 3.3–5)
ALP SERPL-CCNC: 149 U/L — HIGH (ref 40–120)
ALP SERPL-CCNC: 176 U/L — HIGH (ref 40–120)
ALT FLD-CCNC: 52 U/L RC — HIGH (ref 10–45)
ALT FLD-CCNC: 68 U/L RC — HIGH (ref 10–45)
ANION GAP SERPL CALC-SCNC: 17 MMOL/L — SIGNIFICANT CHANGE UP (ref 5–17)
ANION GAP SERPL CALC-SCNC: 18 MMOL/L — HIGH (ref 5–17)
APTT BLD: 44.4 SEC — HIGH (ref 27.5–37.4)
AST SERPL-CCNC: 46 U/L — HIGH (ref 10–40)
AST SERPL-CCNC: 50 U/L — HIGH (ref 10–40)
BILIRUB SERPL-MCNC: 1.5 MG/DL — HIGH (ref 0.2–1.2)
BILIRUB SERPL-MCNC: 1.8 MG/DL — HIGH (ref 0.2–1.2)
BUN SERPL-MCNC: 80 MG/DL — HIGH (ref 7–23)
BUN SERPL-MCNC: 88 MG/DL — HIGH (ref 7–23)
CALCIUM SERPL-MCNC: 7.9 MG/DL — LOW (ref 8.4–10.5)
CALCIUM SERPL-MCNC: 8.2 MG/DL — LOW (ref 8.4–10.5)
CHLORIDE SERPL-SCNC: 119 MMOL/L — HIGH (ref 96–108)
CHLORIDE SERPL-SCNC: 120 MMOL/L — HIGH (ref 96–108)
CO2 SERPL-SCNC: 15 MMOL/L — LOW (ref 22–31)
CO2 SERPL-SCNC: 19 MMOL/L — LOW (ref 22–31)
CREAT SERPL-MCNC: 2.5 MG/DL — HIGH (ref 0.5–1.3)
CREAT SERPL-MCNC: 2.55 MG/DL — HIGH (ref 0.5–1.3)
GAS PNL BLDA: SIGNIFICANT CHANGE UP
GENTAMICIN PEAK SERPL-MCNC: 2 UG/ML — LOW (ref 5–10)
GLUCOSE BLDC GLUCOMTR-MCNC: 124 MG/DL — HIGH (ref 70–99)
GLUCOSE SERPL-MCNC: 110 MG/DL — HIGH (ref 70–99)
GLUCOSE SERPL-MCNC: 159 MG/DL — HIGH (ref 70–99)
HCT VFR BLD CALC: 23.8 % — LOW (ref 39–50)
HCT VFR BLD CALC: 27 % — LOW (ref 39–50)
HGB BLD-MCNC: 8 G/DL — LOW (ref 13–17)
HGB BLD-MCNC: 9.4 G/DL — LOW (ref 13–17)
INR BLD: 1.08 RATIO — SIGNIFICANT CHANGE UP (ref 0.88–1.16)
INR BLD: 1.12 RATIO — SIGNIFICANT CHANGE UP (ref 0.88–1.16)
MCHC RBC-ENTMCNC: 30.8 PG — SIGNIFICANT CHANGE UP (ref 27–34)
MCHC RBC-ENTMCNC: 32 PG — SIGNIFICANT CHANGE UP (ref 27–34)
MCHC RBC-ENTMCNC: 33.5 GM/DL — SIGNIFICANT CHANGE UP (ref 32–36)
MCHC RBC-ENTMCNC: 35 GM/DL — SIGNIFICANT CHANGE UP (ref 32–36)
MCV RBC AUTO: 91.4 FL — SIGNIFICANT CHANGE UP (ref 80–100)
MCV RBC AUTO: 91.9 FL — SIGNIFICANT CHANGE UP (ref 80–100)
NT-PROBNP SERPL-SCNC: 9638 PG/ML — HIGH (ref 0–300)
PLATELET # BLD AUTO: 153 K/UL — SIGNIFICANT CHANGE UP (ref 150–400)
PLATELET # BLD AUTO: 173 K/UL — SIGNIFICANT CHANGE UP (ref 150–400)
POTASSIUM SERPL-MCNC: 4.2 MMOL/L — SIGNIFICANT CHANGE UP (ref 3.5–5.3)
POTASSIUM SERPL-MCNC: 4.3 MMOL/L — SIGNIFICANT CHANGE UP (ref 3.5–5.3)
POTASSIUM SERPL-SCNC: 4.2 MMOL/L — SIGNIFICANT CHANGE UP (ref 3.5–5.3)
POTASSIUM SERPL-SCNC: 4.3 MMOL/L — SIGNIFICANT CHANGE UP (ref 3.5–5.3)
PROCALCITONIN SERPL-MCNC: 0.96 NG/ML — HIGH (ref 0–0.04)
PROT SERPL-MCNC: 5.8 G/DL — LOW (ref 6–8.3)
PROT SERPL-MCNC: 6.3 G/DL — SIGNIFICANT CHANGE UP (ref 6–8.3)
PROTHROM AB SERPL-ACNC: 11.8 SEC — SIGNIFICANT CHANGE UP (ref 9.8–12.7)
PROTHROM AB SERPL-ACNC: 12.1 SEC — SIGNIFICANT CHANGE UP (ref 9.8–12.7)
RBC # BLD: 2.59 M/UL — LOW (ref 4.2–5.8)
RBC # BLD: 2.95 M/UL — LOW (ref 4.2–5.8)
RBC # FLD: 15.3 % — HIGH (ref 10.3–14.5)
RBC # FLD: 15.7 % — HIGH (ref 10.3–14.5)
SODIUM SERPL-SCNC: 152 MMOL/L — HIGH (ref 135–145)
SODIUM SERPL-SCNC: 156 MMOL/L — HIGH (ref 135–145)
WBC # BLD: 22.7 K/UL — HIGH (ref 3.8–10.5)
WBC # BLD: 26.4 K/UL — HIGH (ref 3.8–10.5)
WBC # FLD AUTO: 22.7 K/UL — HIGH (ref 3.8–10.5)
WBC # FLD AUTO: 26.4 K/UL — HIGH (ref 3.8–10.5)

## 2018-02-11 PROCEDURE — 93010 ELECTROCARDIOGRAM REPORT: CPT

## 2018-02-11 PROCEDURE — 71045 X-RAY EXAM CHEST 1 VIEW: CPT | Mod: 26,76

## 2018-02-11 PROCEDURE — 35820 EXPLORE CHEST VESSELS: CPT | Mod: 78

## 2018-02-11 PROCEDURE — 99291 CRITICAL CARE FIRST HOUR: CPT

## 2018-02-11 PROCEDURE — 99233 SBSQ HOSP IP/OBS HIGH 50: CPT | Mod: GC

## 2018-02-11 RX ORDER — PANTOPRAZOLE SODIUM 20 MG/1
40 TABLET, DELAYED RELEASE ORAL DAILY
Qty: 0 | Refills: 0 | Status: DISCONTINUED | OUTPATIENT
Start: 2018-02-11 | End: 2018-02-14

## 2018-02-11 RX ORDER — POTASSIUM CHLORIDE 20 MEQ
10 PACKET (EA) ORAL
Qty: 0 | Refills: 0 | Status: DISCONTINUED | OUTPATIENT
Start: 2018-02-11 | End: 2018-02-14

## 2018-02-11 RX ORDER — HYDROMORPHONE HYDROCHLORIDE 2 MG/ML
0.5 INJECTION INTRAMUSCULAR; INTRAVENOUS; SUBCUTANEOUS ONCE
Qty: 0 | Refills: 0 | Status: DISCONTINUED | OUTPATIENT
Start: 2018-02-11 | End: 2018-02-11

## 2018-02-11 RX ORDER — DIGOXIN 250 MCG
0.12 TABLET ORAL EVERY OTHER DAY
Qty: 0 | Refills: 0 | Status: DISCONTINUED | OUTPATIENT
Start: 2018-02-11 | End: 2018-02-11

## 2018-02-11 RX ORDER — FENTANYL CITRATE 50 UG/ML
25 INJECTION INTRAVENOUS ONCE
Qty: 0 | Refills: 0 | Status: DISCONTINUED | OUTPATIENT
Start: 2018-02-11 | End: 2018-02-11

## 2018-02-11 RX ORDER — GENTAMICIN SULFATE 40 MG/ML
40 VIAL (ML) INJECTION EVERY 12 HOURS
Qty: 0 | Refills: 0 | Status: COMPLETED | OUTPATIENT
Start: 2018-02-11 | End: 2018-02-11

## 2018-02-11 RX ORDER — AMIODARONE HYDROCHLORIDE 400 MG/1
200 TABLET ORAL DAILY
Qty: 0 | Refills: 0 | Status: DISCONTINUED | OUTPATIENT
Start: 2018-02-12 | End: 2018-02-14

## 2018-02-11 RX ORDER — INSULIN LISPRO 100/ML
VIAL (ML) SUBCUTANEOUS EVERY 4 HOURS
Qty: 0 | Refills: 0 | Status: DISCONTINUED | OUTPATIENT
Start: 2018-02-11 | End: 2018-02-14

## 2018-02-11 RX ORDER — MEROPENEM 1 G/30ML
500 INJECTION INTRAVENOUS EVERY 12 HOURS
Qty: 0 | Refills: 0 | Status: DISCONTINUED | OUTPATIENT
Start: 2018-02-11 | End: 2018-02-14

## 2018-02-11 RX ORDER — POTASSIUM CHLORIDE 20 MEQ
10 PACKET (EA) ORAL ONCE
Qty: 0 | Refills: 0 | Status: DISCONTINUED | OUTPATIENT
Start: 2018-02-11 | End: 2018-02-14

## 2018-02-11 RX ORDER — LEVOTHYROXINE SODIUM 125 MCG
75 TABLET ORAL
Qty: 0 | Refills: 0 | Status: DISCONTINUED | OUTPATIENT
Start: 2018-02-11 | End: 2018-02-14

## 2018-02-11 RX ORDER — SODIUM BICARBONATE 1 MEQ/ML
50 SYRINGE (ML) INTRAVENOUS ONCE
Qty: 0 | Refills: 0 | Status: COMPLETED | OUTPATIENT
Start: 2018-02-11 | End: 2018-02-11

## 2018-02-11 RX ORDER — ASPIRIN/CALCIUM CARB/MAGNESIUM 324 MG
325 TABLET ORAL DAILY
Qty: 0 | Refills: 0 | Status: DISCONTINUED | OUTPATIENT
Start: 2018-02-11 | End: 2018-02-11

## 2018-02-11 RX ORDER — MICAFUNGIN SODIUM 100 MG/1
100 INJECTION, POWDER, LYOPHILIZED, FOR SOLUTION INTRAVENOUS EVERY 24 HOURS
Qty: 0 | Refills: 0 | Status: DISCONTINUED | OUTPATIENT
Start: 2018-02-11 | End: 2018-02-13

## 2018-02-11 RX ORDER — AMIODARONE HYDROCHLORIDE 400 MG/1
200 TABLET ORAL DAILY
Qty: 0 | Refills: 0 | Status: DISCONTINUED | OUTPATIENT
Start: 2018-02-11 | End: 2018-02-11

## 2018-02-11 RX ORDER — ASPIRIN/CALCIUM CARB/MAGNESIUM 324 MG
325 TABLET ORAL DAILY
Qty: 0 | Refills: 0 | Status: DISCONTINUED | OUTPATIENT
Start: 2018-02-12 | End: 2018-02-14

## 2018-02-11 RX ADMIN — Medication 50 MILLIEQUIVALENT(S): at 07:01

## 2018-02-11 RX ADMIN — PANTOPRAZOLE SODIUM 40 MILLIGRAM(S): 20 TABLET, DELAYED RELEASE ORAL at 13:12

## 2018-02-11 RX ADMIN — FENTANYL CITRATE 25 MICROGRAM(S): 50 INJECTION INTRAVENOUS at 17:38

## 2018-02-11 RX ADMIN — FENTANYL CITRATE 25 MICROGRAM(S): 50 INJECTION INTRAVENOUS at 22:20

## 2018-02-11 RX ADMIN — MICAFUNGIN SODIUM 105 MILLIGRAM(S): 100 INJECTION, POWDER, LYOPHILIZED, FOR SOLUTION INTRAVENOUS at 14:00

## 2018-02-11 RX ADMIN — FENTANYL CITRATE 25 MICROGRAM(S): 50 INJECTION INTRAVENOUS at 22:05

## 2018-02-11 RX ADMIN — Medication 75 MICROGRAM(S): at 14:00

## 2018-02-11 RX ADMIN — MEROPENEM 100 MILLIGRAM(S): 1 INJECTION INTRAVENOUS at 01:18

## 2018-02-11 RX ADMIN — AMIODARONE HYDROCHLORIDE 200 MILLIGRAM(S): 400 TABLET ORAL at 06:51

## 2018-02-11 RX ADMIN — Medication 200 MILLIGRAM(S): at 16:17

## 2018-02-11 RX ADMIN — Medication 2: at 22:49

## 2018-02-11 RX ADMIN — Medication 2: at 14:01

## 2018-02-11 RX ADMIN — HYDROMORPHONE HYDROCHLORIDE 0.5 MILLIGRAM(S): 2 INJECTION INTRAMUSCULAR; INTRAVENOUS; SUBCUTANEOUS at 11:15

## 2018-02-11 RX ADMIN — MEROPENEM 100 MILLIGRAM(S): 1 INJECTION INTRAVENOUS at 13:12

## 2018-02-11 RX ADMIN — HYDROMORPHONE HYDROCHLORIDE 0.5 MILLIGRAM(S): 2 INJECTION INTRAMUSCULAR; INTRAVENOUS; SUBCUTANEOUS at 11:00

## 2018-02-11 RX ADMIN — FENTANYL CITRATE 25 MICROGRAM(S): 50 INJECTION INTRAVENOUS at 17:45

## 2018-02-11 RX ADMIN — HYDROMORPHONE HYDROCHLORIDE 0.5 MILLIGRAM(S): 2 INJECTION INTRAMUSCULAR; INTRAVENOUS; SUBCUTANEOUS at 14:00

## 2018-02-11 RX ADMIN — Medication 2: at 17:42

## 2018-02-11 RX ADMIN — HYDROMORPHONE HYDROCHLORIDE 0.5 MILLIGRAM(S): 2 INJECTION INTRAMUSCULAR; INTRAVENOUS; SUBCUTANEOUS at 14:15

## 2018-02-11 NOTE — PROGRESS NOTE ADULT - SUBJECTIVE AND OBJECTIVE BOX
SAMANTHA CADENA   MRN#: [id number]     The patient is a 72y Male who was seen, evaluated, & examined with the CTICU staff on rounds and later in the day with a multidisciplinary care plan formulated & implemented.  All available clinical, laboratory, radiographic, pharmacologic, and electrocardiographic data were reviewed & analyzed.      The patient was in the CTICU in critical condition secondary to large anterior mediational hematoma, acute respiratory failure-persistent cardiopulmonary dysfunction, hemodynamically significant anemia/hypovolemia-shock, Factor XI deficiency,  acute on chronic postoperative kidney injury, hypernatronemia and Gram positive cocci aortic valve endocarditis.        Respiratory failure required reintubation-full ventilatory support, the following of ABG’s with A-line monitoring, continuous pulse oximetry monitoring, and IV antibiotics for support & to evaluate for & prevent further decompensation secondary to large anterior mediational hematoma requiring RTOR, cardiopulmonary dysfunction, and Gram positive cocci AV endocarditis.     Invasive hemodynamic monitoring with an A-line was required for the following of continuous MAP/BP monitoring to ensure adequate cardiovascular support and to evaluate for & help prevent decompensation S/P RTOR for evacuation of large anterior mediational hematoma-possible infection.    Patient required more than the usual postoperative critical care management and I provided 30 minutes of non-continuous care to the patient.  Discussed at length with the CTICU staff and helped coordinate care.

## 2018-02-11 NOTE — PROGRESS NOTE ADULT - ASSESSMENT
Patient is a 73 y/o man with history of ETOH abuse who presents with fall and found with endocarditis, developed ELIAN in-hospital shortly after undergoing cardiac catheterization, s/p urgent AVR and CABG on 1/30/18 with clinical decompensation on 2/1 requiring intubation and multiple vasopressor support. Patient is a 71 y/o man with history of ETOH abuse who presents with fall and found with endocarditis, developed ELIAN in-hospital shortly after undergoing cardiac catheterization, s/p urgent AVR and CABG on 1/30/18 with clinical decompensation on 2/1 requiring intubation and multiple vasopressor support.   Undergoing pericardial effusion drainage

## 2018-02-11 NOTE — PROGRESS NOTE ADULT - ATTENDING COMMENTS
ELIAN Cr 2's but hopefully plateau. Urine output acceptable  Went to OR for pericardial drain and exploration  Hypernatremia: he is free water deplete so can add free water replacement   Trend Cr and urine output  Maintain hemodynamic support

## 2018-02-11 NOTE — PROGRESS NOTE ADULT - SUBJECTIVE AND OBJECTIVE BOX
[facility] Division of Kidney Diseases & Hypertension  FOLLOW UP NOTE  [phone]--------------------------------------------------------------------------------    73 y/o man with history of ETOH abuse who presents with fall and found with endocarditis, developed ELIAN in-hospital shortly after undergoing cardiac catheterization, s/p urgent AVR and CABG on 1/30/18 with clinical decompensation on 2/1 requiring intubation and multiple vasopressor support.  Patient now extubated. Patient able to communicate by nodding. Patient was noted to have hypothermia and elevated WBC over weekend. Echocardiogram was done which showed echodense area over pericardiac space consistent with abscess/hematoma. Patient to go to OR today for drainage of pericardium. Currently patient lying in bed with no complaints.       PAST HISTORY  --------------------------------------------------------------------------------  No significant changes to PMH, PSH, FHx, SHx, unless otherwise noted    ALLERGIES & MEDICATIONS  --------------------------------------------------------------------------------  Allergies    No Known Allergies    Intolerances      Standing Inpatient Medications  insulin lispro (HumaLOG) corrective regimen sliding scale   SubCutaneous every 4 hours  levothyroxine Injectable 75 MICROGram(s) IV Push <User Schedule>  meropenem  IVPB 500 milliGRAM(s) IV Intermittent every 12 hours  micafungin IVPB 100 milliGRAM(s) IV Intermittent every 24 hours  pantoprazole  Injectable 40 milliGRAM(s) IV Push daily  potassium chloride  10 mEq/50 mL IVPB 10 milliEquivalent(s) IV Intermittent every 1 hour  potassium chloride  10 mEq/50 mL IVPB 10 milliEquivalent(s) IV Intermittent every 1 hour  potassium chloride  10 mEq/50 mL IVPB 10 milliEquivalent(s) IV Intermittent once    PRN Inpatient Medications      REVIEW OF SYSTEMS  --------------------------------------------------------------------------------    Respiratory: No dyspnea  CV: No chest pain  GI: No abdominal pain  MSK: no edema      All other systems were reviewed and are negative, except as noted.    VITALS/PHYSICAL EXAM  --------------------------------------------------------------------------------  T(C): 36.3 (02-11-18 @ 13:00), Max: 37.1 (02-10-18 @ 16:00)  HR: 64 (02-11-18 @ 13:50) (62 - 110)  BP: --  RR: 16 (02-11-18 @ 13:00) (8 - 43)  SpO2: 100% (02-11-18 @ 13:50) (97% - 100%)  Wt(kg): --        02-10-18 @ 07:01  -  02-11-18 @ 07:00  --------------------------------------------------------  IN: 4113 mL / OUT: 2125 mL / NET: 1988 mL    02-11-18 @ 07:01  -  02-11-18 @ 14:04  --------------------------------------------------------  IN: 400 mL / OUT: 870 mL / NET: -470 mL      Physical Exam:    	Gen: NAD ill appearing  	HEENT: NG tube  	Pulm: coarse breath sounds  	CV: RRR, S1S2; no rub sternotomy incision noted  	Abd: +BS, soft, nontender/nondistended              LE: no edema    LABS/STUDIES  --------------------------------------------------------------------------------              8.0    22.7  >-----------<  153      [02-11-18 @ 10:41]              23.8     156  |  120  |  88  ----------------------------<  159      [02-11-18 @ 10:41]  4.3   |  19  |  2.50        Ca     7.9     [02-11-18 @ 10:41]    TPro  5.8  /  Alb  3.2  /  TBili  1.5  /  DBili  x   /  AST  46  /  ALT  52  /  AlkPhos  149  [02-11-18 @ 10:41]    PT/INR: PT 12.1 , INR 1.12       [02-11-18 @ 10:41]  PTT: 44.4       [02-11-18 @ 02:48]      Creatinine Trend:  SCr 2.50 [02-11 @ 10:41]  SCr 2.55 [02-11 @ 02:48]  SCr 2.53 [02-10 @ 23:11]  SCr 2.47 [02-10 @ 17:03]  SCr 1.96 [02-10 @ 01:35]    Urinalysis - [02-06-18 @ 08:35]      Color Yellow / Appearance SL Turbid / SG 1.015 / pH 6.0      Gluc Negative / Ketone Negative  / Bili Negative / Urobili Negative       Blood Moderate / Protein 100 / Leuk Est Trace / Nitrite Negative      RBC 10-25 / WBC 10-25 / Hyaline  / Gran  / Sq Epi  / Non Sq Epi Occasional / Bacteria Moderate      TSH 12.56      [02-10-18 @ 12:11]    HIV Nonreact      [02-10-18 @ 12:11]

## 2018-02-11 NOTE — PROGRESS NOTE ADULT - PROBLEM SELECTOR PLAN 1
Likely contrast induced nephropathy from cardiac cath/Toxic/Septic  ATN. Scr. has worsened to 2.47 and is now stable at 2.53. Patient is non oliguric.  Plan is to continue monitoring Scr., electrolytes, and urine output. Avoid nephrotoxins. Check gentamicin and vanco levels. Likely contrast induced nephropathy from cardiac cath/Toxic/Septic  ATN. Scr. has worsened to 2.47 and is now stable at 2.53. Patient is non oliguric.  Plan is to continue monitoring Scr., electrolytes, and urine output. Avoid nephrotoxins.

## 2018-02-11 NOTE — BRIEF OPERATIVE NOTE - PRE-OP DX
Aortic valve endocarditis  01/30/2018    Active  Nirmal Celestin  Pericardial effusion  02/11/2018    Active  Mckenzie Burt

## 2018-02-11 NOTE — BRIEF OPERATIVE NOTE - PROCEDURE
<<-----Click on this checkbox to enter Procedure Exploration of mediastinum  02/11/2018  evacuation of pericardial effusion  Active  [id number]

## 2018-02-11 NOTE — PROGRESS NOTE ADULT - PROBLEM SELECTOR PLAN 2
Likely in setting of impaired urinary concentrating ability given recovering ATN as well as diarrhea. Consider changing fluids to 1/2 NS and monitor serum sodium to correct free water deficit. Can increase enteral tube hydration as well for free water replacement.

## 2018-02-12 DIAGNOSIS — D68.9 COAGULATION DEFECT, UNSPECIFIED: ICD-10-CM

## 2018-02-12 LAB
ALBUMIN SERPL ELPH-MCNC: 2.8 G/DL — LOW (ref 3.3–5)
ALP SERPL-CCNC: 136 U/L — HIGH (ref 40–120)
ALT FLD-CCNC: 47 U/L RC — HIGH (ref 10–45)
ANION GAP SERPL CALC-SCNC: 17 MMOL/L — SIGNIFICANT CHANGE UP (ref 5–17)
APTT BLD: 33.9 SEC — SIGNIFICANT CHANGE UP (ref 27.5–37.4)
AST SERPL-CCNC: 41 U/L — HIGH (ref 10–40)
BILIRUB SERPL-MCNC: 1.4 MG/DL — HIGH (ref 0.2–1.2)
BUN SERPL-MCNC: 82 MG/DL — HIGH (ref 7–23)
CALCIUM SERPL-MCNC: 8.1 MG/DL — LOW (ref 8.4–10.5)
CHLORIDE SERPL-SCNC: 123 MMOL/L — HIGH (ref 96–108)
CO2 SERPL-SCNC: 18 MMOL/L — LOW (ref 22–31)
CREAT SERPL-MCNC: 2.41 MG/DL — HIGH (ref 0.5–1.3)
FACT XII ACT/NOR PPP: 85 % — SIGNIFICANT CHANGE UP (ref 70–145)
GAS PNL BLDA: SIGNIFICANT CHANGE UP
GLUCOSE BLDC GLUCOMTR-MCNC: 123 MG/DL — HIGH (ref 70–99)
GLUCOSE BLDC GLUCOMTR-MCNC: 123 MG/DL — HIGH (ref 70–99)
GLUCOSE BLDC GLUCOMTR-MCNC: 166 MG/DL — HIGH (ref 70–99)
GLUCOSE SERPL-MCNC: 116 MG/DL — HIGH (ref 70–99)
HCT VFR BLD CALC: 31.6 % — LOW (ref 39–50)
HGB BLD-MCNC: 10.9 G/DL — LOW (ref 13–17)
MCHC RBC-ENTMCNC: 31.5 PG — SIGNIFICANT CHANGE UP (ref 27–34)
MCHC RBC-ENTMCNC: 34.5 GM/DL — SIGNIFICANT CHANGE UP (ref 32–36)
MCV RBC AUTO: 91.2 FL — SIGNIFICANT CHANGE UP (ref 80–100)
PLATELET # BLD AUTO: 163 K/UL — SIGNIFICANT CHANGE UP (ref 150–400)
POTASSIUM SERPL-MCNC: 4.2 MMOL/L — SIGNIFICANT CHANGE UP (ref 3.5–5.3)
POTASSIUM SERPL-SCNC: 4.2 MMOL/L — SIGNIFICANT CHANGE UP (ref 3.5–5.3)
PROT SERPL-MCNC: 5.8 G/DL — LOW (ref 6–8.3)
RBC # BLD: 3.46 M/UL — LOW (ref 4.2–5.8)
RBC # FLD: 14.8 % — HIGH (ref 10.3–14.5)
SODIUM SERPL-SCNC: 158 MMOL/L — HIGH (ref 135–145)
WBC # BLD: 21.6 K/UL — HIGH (ref 3.8–10.5)
WBC # FLD AUTO: 21.6 K/UL — HIGH (ref 3.8–10.5)

## 2018-02-12 PROCEDURE — 99231 SBSQ HOSP IP/OBS SF/LOW 25: CPT | Mod: GC

## 2018-02-12 PROCEDURE — 71045 X-RAY EXAM CHEST 1 VIEW: CPT | Mod: 26,59

## 2018-02-12 PROCEDURE — 99233 SBSQ HOSP IP/OBS HIGH 50: CPT | Mod: GC

## 2018-02-12 PROCEDURE — 99291 CRITICAL CARE FIRST HOUR: CPT

## 2018-02-12 RX ORDER — POTASSIUM CHLORIDE 20 MEQ
20 PACKET (EA) ORAL ONCE
Qty: 0 | Refills: 0 | Status: COMPLETED | OUTPATIENT
Start: 2018-02-12 | End: 2018-02-12

## 2018-02-12 RX ORDER — LOPERAMIDE HCL 2 MG
4 TABLET ORAL EVERY 6 HOURS
Qty: 0 | Refills: 0 | Status: DISCONTINUED | OUTPATIENT
Start: 2018-02-12 | End: 2018-02-14

## 2018-02-12 RX ORDER — POTASSIUM CHLORIDE 20 MEQ
10 PACKET (EA) ORAL ONCE
Qty: 0 | Refills: 0 | Status: COMPLETED | OUTPATIENT
Start: 2018-02-12 | End: 2018-02-12

## 2018-02-12 RX ORDER — GENTAMICIN SULFATE 40 MG/ML
40 VIAL (ML) INJECTION
Qty: 0 | Refills: 0 | Status: DISCONTINUED | OUTPATIENT
Start: 2018-02-12 | End: 2018-02-14

## 2018-02-12 RX ORDER — FENTANYL CITRATE 50 UG/ML
25 INJECTION INTRAVENOUS ONCE
Qty: 0 | Refills: 0 | Status: DISCONTINUED | OUTPATIENT
Start: 2018-02-12 | End: 2018-02-12

## 2018-02-12 RX ORDER — ALBUMIN HUMAN 25 %
250 VIAL (ML) INTRAVENOUS
Qty: 0 | Refills: 0 | Status: COMPLETED | OUTPATIENT
Start: 2018-02-12 | End: 2018-02-12

## 2018-02-12 RX ORDER — METOPROLOL TARTRATE 50 MG
25 TABLET ORAL EVERY 12 HOURS
Qty: 0 | Refills: 0 | Status: DISCONTINUED | OUTPATIENT
Start: 2018-02-12 | End: 2018-02-14

## 2018-02-12 RX ORDER — LOPERAMIDE HCL 2 MG
6 TABLET ORAL ONCE
Qty: 0 | Refills: 0 | Status: COMPLETED | OUTPATIENT
Start: 2018-02-12 | End: 2018-02-12

## 2018-02-12 RX ADMIN — Medication 5: at 14:10

## 2018-02-12 RX ADMIN — Medication 4 MILLIGRAM(S): at 17:08

## 2018-02-12 RX ADMIN — MICAFUNGIN SODIUM 105 MILLIGRAM(S): 100 INJECTION, POWDER, LYOPHILIZED, FOR SOLUTION INTRAVENOUS at 13:25

## 2018-02-12 RX ADMIN — Medication 4 MILLIGRAM(S): at 23:15

## 2018-02-12 RX ADMIN — Medication 20 MILLIEQUIVALENT(S): at 17:06

## 2018-02-12 RX ADMIN — Medication 100 MILLIEQUIVALENT(S): at 00:22

## 2018-02-12 RX ADMIN — Medication 2: at 17:06

## 2018-02-12 RX ADMIN — Medication 25 MILLIGRAM(S): at 06:45

## 2018-02-12 RX ADMIN — PANTOPRAZOLE SODIUM 40 MILLIGRAM(S): 20 TABLET, DELAYED RELEASE ORAL at 12:10

## 2018-02-12 RX ADMIN — Medication 12: at 23:13

## 2018-02-12 RX ADMIN — Medication 4 MILLIGRAM(S): at 12:10

## 2018-02-12 RX ADMIN — Medication 2: at 10:17

## 2018-02-12 RX ADMIN — Medication 6 MILLIGRAM(S): at 10:27

## 2018-02-12 RX ADMIN — FENTANYL CITRATE 25 MICROGRAM(S): 50 INJECTION INTRAVENOUS at 22:45

## 2018-02-12 RX ADMIN — MEROPENEM 100 MILLIGRAM(S): 1 INJECTION INTRAVENOUS at 12:14

## 2018-02-12 RX ADMIN — Medication 75 MICROGRAM(S): at 14:10

## 2018-02-12 RX ADMIN — FENTANYL CITRATE 25 MICROGRAM(S): 50 INJECTION INTRAVENOUS at 04:05

## 2018-02-12 RX ADMIN — Medication 250 MILLILITER(S): at 16:00

## 2018-02-12 RX ADMIN — Medication 2: at 07:00

## 2018-02-12 RX ADMIN — AMIODARONE HYDROCHLORIDE 200 MILLIGRAM(S): 400 TABLET ORAL at 06:11

## 2018-02-12 RX ADMIN — FENTANYL CITRATE 25 MICROGRAM(S): 50 INJECTION INTRAVENOUS at 22:30

## 2018-02-12 RX ADMIN — FENTANYL CITRATE 25 MICROGRAM(S): 50 INJECTION INTRAVENOUS at 04:20

## 2018-02-12 RX ADMIN — Medication 25 MILLIGRAM(S): at 17:08

## 2018-02-12 RX ADMIN — MEROPENEM 100 MILLIGRAM(S): 1 INJECTION INTRAVENOUS at 01:53

## 2018-02-12 RX ADMIN — Medication 20 MILLIEQUIVALENT(S): at 16:15

## 2018-02-12 RX ADMIN — Medication 325 MILLIGRAM(S): at 12:09

## 2018-02-12 RX ADMIN — Medication 200 MILLIGRAM(S): at 10:27

## 2018-02-12 RX ADMIN — Medication 250 MILLILITER(S): at 15:19

## 2018-02-12 NOTE — CONSULT NOTE ADULT - ASSESSMENT
71y/o male with sternal wound s/p avr complicated by erik-cardial hematoma    - Will tentatively plan for OR Wed.   - Concern for presence of mediastinal chest tubes in setting of planned omental flap for sternal coverage; D/W Dr. Saunders. Will reassess pt in AM.      -Alberto Pandya, PGY-4  p [id number]

## 2018-02-12 NOTE — PROGRESS NOTE ADULT - SUBJECTIVE AND OBJECTIVE BOX
[facility] Division of Kidney Diseases & Hypertension  FOLLOW UP NOTE  [phone]--------------------------------------------------------------------------------    73 y/o man with history of ETOH abuse who presents with fall and found with endocarditis, developed ELIAN in-hospital shortly after undergoing cardiac catheterization, s/p urgent AVR and CABG on 1/30/18 with clinical decompensation on 2/1 requiring intubation and multiple vasopressor support.  Patient now extubated. Patient able to communicate by nodding. Patient was noted to have hypothermia and elevated WBC over weekend. Echocardiogram was done which showed echodense area over pericardiac space consistent with abscess/hematoma. Patient had drainage of pericardial hematoma/abscess yesterday. Currently patient is sitting in chair with no complaints.     PAST HISTORY  --------------------------------------------------------------------------------  No significant changes to PMH, PSH, FHx, SHx, unless otherwise noted    ALLERGIES & MEDICATIONS  --------------------------------------------------------------------------------  Allergies    No Known Allergies    Intolerances      Standing Inpatient Medications  amiodarone    Tablet 200 milliGRAM(s) Oral daily  aspirin 325 milliGRAM(s) Oral daily  gentamicin   IVPB 40 milliGRAM(s) IV Intermittent <User Schedule>  insulin lispro (HumaLOG) corrective regimen sliding scale   SubCutaneous every 4 hours  levothyroxine Injectable 75 MICROGram(s) IV Push <User Schedule>  loperamide Solution 4 milliGRAM(s) Oral every 6 hours  meropenem  IVPB 500 milliGRAM(s) IV Intermittent every 12 hours  metoprolol     tartrate 25 milliGRAM(s) Oral every 12 hours  micafungin IVPB 100 milliGRAM(s) IV Intermittent every 24 hours  pantoprazole  Injectable 40 milliGRAM(s) IV Push daily  potassium chloride  10 mEq/50 mL IVPB 10 milliEquivalent(s) IV Intermittent every 1 hour  potassium chloride  10 mEq/50 mL IVPB 10 milliEquivalent(s) IV Intermittent every 1 hour  potassium chloride  10 mEq/50 mL IVPB 10 milliEquivalent(s) IV Intermittent once    PRN Inpatient Medications      REVIEW OF SYSTEMS  --------------------------------------------------------------------------------  Respiratory: No dyspnea  CV: No chest pain  GI: no abdominal pain    VITALS/PHYSICAL EXAM  --------------------------------------------------------------------------------  T(C): 36.4 (02-12-18 @ 08:00), Max: 36.4 (02-12-18 @ 08:00)  HR: 68 (02-12-18 @ 10:00) (62 - 75)  BP: 118/44  RR: 20 (02-12-18 @ 10:00) (9 - 25)  SpO2: 100% (02-12-18 @ 10:00) (96% - 100%)  Wt(kg): --        02-11-18 @ 07:01  -  02-12-18 @ 07:00  --------------------------------------------------------  IN: 720 mL / OUT: 3230 mL / NET: -2510 mL    02-12-18 @ 07:01  -  02-12-18 @ 10:35  --------------------------------------------------------  IN: 410 mL / OUT: 490 mL / NET: -80 mL      Physical Exam:    	Gen: NAD ill appearing  	HEENT: NG tube  	Pulm: coarse breath sounds  	CV: RRR, S1S2;  rub sternotomy noted; wound vac present over chest   	Abd: +BS, soft, nontender/nondistended              LE: no edema    LABS/STUDIES  --------------------------------------------------------------------------------              10.9   21.6  >-----------<  163      [02-12-18 @ 03:32]              31.6     158  |  123  |  82  ----------------------------<  116      [02-12-18 @ 03:32]  4.2   |  18  |  2.41        Ca     8.1     [02-12-18 @ 03:32]    TPro  5.8  /  Alb  2.8  /  TBili  1.4  /  DBili  x   /  AST  41  /  ALT  47  /  AlkPhos  136  [02-12-18 @ 03:32]    PT/INR: PT 12.1 , INR 1.12       [02-11-18 @ 10:41]  PTT: 44.4       [02-11-18 @ 02:48]      Creatinine Trend:  SCr 2.41 [02-12 @ 03:32]  SCr 2.50 [02-11 @ 10:41]  SCr 2.55 [02-11 @ 02:48]  SCr 2.53 [02-10 @ 23:11]  SCr 2.47 [02-10 @ 17:03]    Urinalysis - [02-06-18 @ 08:35]      Color Yellow / Appearance SL Turbid / SG 1.015 / pH 6.0      Gluc Negative / Ketone Negative  / Bili Negative / Urobili Negative       Blood Moderate / Protein 100 / Leuk Est Trace / Nitrite Negative      RBC 10-25 / WBC 10-25 / Hyaline  / Gran  / Sq Epi  / Non Sq Epi Occasional / Bacteria Moderate      TSH 12.56      [02-10-18 @ 12:11]    HIV Nonreact      [02-10-18 @ 12:11]

## 2018-02-12 NOTE — PROGRESS NOTE ADULT - ATTENDING COMMENTS
71 yo male with septic and cardiogenic shock noted to have coagulopathy on labs corrected on repeat testing. Possible lab error vs med effect (heparin)  - heparin gtt held, coags corrected c/w med effect  - monitor Hb and signs of bleeding  - cont management as per primary team  - no contraindication for surgery as planned from heme/onc standpoint.

## 2018-02-12 NOTE — PROGRESS NOTE ADULT - SUBJECTIVE AND OBJECTIVE BOX
INTERVAL HPI/OVERNIGHT EVENTS:  Patient S&E at bedside. No o/n events, patient resting comfortably. No complaints at this time. Patient denies fever, chills, dizziness, weakness, CP, palpitations, SOB, cough, N/V/D/C, dysuria, changes in bowel movements, LE edema.    VITAL SIGNS:  T(F): 97.7 (02-12-18 @ 16:00)  HR: 68 (02-12-18 @ 18:00)  BP: --  RR: 20 (02-12-18 @ 18:00)  SpO2: 100% (02-12-18 @ 18:00)  Wt(kg): --    PHYSICAL EXAM:    Constitutional: NAD  Eyes: EOMI, sclera non-icteric  Neck: supple, no masses, no JVD  Respiratory: CTA b/l, good air entry b/l  Cardiovascular: RRR, no M/R/G  Gastrointestinal: soft, NTND, no masses palpable, + BS, no hepatosplenomegaly  Extremities: no c/c/e  Neurological: AAOx3      MEDICATIONS  (STANDING):  amiodarone    Tablet 200 milliGRAM(s) Oral daily  aspirin 325 milliGRAM(s) Oral daily  gentamicin   IVPB 40 milliGRAM(s) IV Intermittent <User Schedule>  insulin lispro (HumaLOG) corrective regimen sliding scale   SubCutaneous every 4 hours  levothyroxine Injectable 75 MICROGram(s) IV Push <User Schedule>  loperamide Solution 4 milliGRAM(s) Oral every 6 hours  meropenem  IVPB 500 milliGRAM(s) IV Intermittent every 12 hours  metoprolol     tartrate 25 milliGRAM(s) Oral every 12 hours  micafungin IVPB 100 milliGRAM(s) IV Intermittent every 24 hours  pantoprazole  Injectable 40 milliGRAM(s) IV Push daily  potassium chloride  10 mEq/50 mL IVPB 10 milliEquivalent(s) IV Intermittent every 1 hour  potassium chloride  10 mEq/50 mL IVPB 10 milliEquivalent(s) IV Intermittent every 1 hour  potassium chloride  10 mEq/50 mL IVPB 10 milliEquivalent(s) IV Intermittent once    MEDICATIONS  (PRN):      Allergies    No Known Allergies    Intolerances        LABS:                        10.9   21.6  )-----------( 163      ( 12 Feb 2018 03:32 )             31.6     02-12    158<H>  |  123<H>  |  82<H>  ----------------------------<  116<H>  4.2   |  18<L>  |  2.41<H>    Ca    8.1<L>      12 Feb 2018 03:32    TPro  5.8<L>  /  Alb  2.8<L>  /  TBili  1.4<H>  /  DBili  x   /  AST  41<H>  /  ALT  47<H>  /  AlkPhos  136<H>  02-12    PT/INR - ( 11 Feb 2018 10:41 )   PT: 12.1 sec;   INR: 1.12 ratio         PTT - ( 12 Feb 2018 11:24 )  PTT:33.9 sec      RADIOLOGY & ADDITIONAL TESTS:  Studies reviewed.

## 2018-02-12 NOTE — PROGRESS NOTE ADULT - PROBLEM SELECTOR PLAN 1
Likely contrast induced nephropathy from cardiac cath/Toxic/Septic  ATN. Scr. had worsened to 2.53 and improved to 2.41 today.  Patient is non oliguric.  Plan is to continue monitoring Scr., electrolytes, and urine output. Monitor gentamicin and vancomycin trough levels. Avoid nephrotoxins.

## 2018-02-12 NOTE — PROGRESS NOTE ADULT - ASSESSMENT
73yo M in the CTICU 2/2 septic and cardiogenic shock (on vasopressin) with culture negative endocarditis (on Ceftriaxone, Gentamicin) s/p AVR (T)/C1V on 1/30 2/2 AV vegetation, PAF (on digoxin), chronic heart failure, and resolving acute renal failure. Now found to have left femoral pseudoaneurysm on bedside u/s.     Hematology consulted for prolonged aPTT of unclear etiology. No personal or family hx of bleeding disorder. Pt p/w normal aPTT, which prolonged appropriately 2/2 heparin gtt x48 hrs for suspected NSTEMI (1/10), then normalized after gtt discontinued. aPTT began prolonging again on 1/16 while on VTE PPx dose SubQ heparin only and has remained prolonged since, currently on HSQ without s/s of bleeding. s/p multiple transfusions. Prolonged aPTT likely 2/2 heparin contamination vs. less likely acquired factor deficiency.

## 2018-02-12 NOTE — PROGRESS NOTE ADULT - ASSESSMENT
72 year-old male with past medical history of T2DM, DLD, alcohol misuse brought in by family after having been found face down on the floor for approximately 2 days. On treatment for UTI, but now JANES with Aortic Valve lesions. 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 Bartonella, Legionella, Q Fever serology negative; fungal BCX negative. Brucella pending. Unclear cause of NVE, culture  with [facility]--pending. Having diarrhea, C diff negative. LFTs rising, working ELIAN, aortic balloon pump, pressors. Critically ill. Aortic valve vegetation, leukocytosis, post operative state.  -    discussed with colleagues and [facility]  organism is fastidious gram positive coccus   micro having trouble getting it to grow   not likely if it were enterococcus  more likely vit  b 6  def strep that needs combination therapy   asked micro to send to [place] for PCR    doing better  extubated afebrile     ab broadened over weekend  cultrues all negative to date and seems back to baseline    hold genta with rising creat.  will hopefully deescalate tomorrow

## 2018-02-12 NOTE — PROGRESS NOTE ADULT - SUBJECTIVE AND OBJECTIVE BOX
SAMANTHA CADENA   MRN#: [id number]     The patient is a 72y Male who was seen, evaluated, & examined with the CTICU staff on rounds and later in the day with a multidisciplinary care plan formulated & implemented.  All available clinical, laboratory, radiographic, pharmacologic, and electrocardiographic data were reviewed & analyzed.      The patient was in the CTICU in critical condition secondary to large anterior mediational hematoma, acute respiratory failure-persistent cardiopulmonary dysfunction, hemodynamically significant anemia/hypovolemia-shock, Factor XI deficiency,  acute on chronic postoperative kidney injury, hypernatronemia and Gram positive cocci aortic valve endocarditis.        Respiratory failure required reintubation-full ventilatory support, the following of ABG’s with A-line monitoring, continuous pulse oximetry monitoring, and IV antibiotics for support & to evaluate for & prevent further decompensation secondary to large anterior mediational hematoma requiring RTOR, cardiopulmonary dysfunction, and Gram positive cocci AV endocarditis.     Invasive hemodynamic monitoring with an A-line was required for the following of continuous MAP/BP monitoring to ensure adequate cardiovascular support and to evaluate for & help prevent decompensation S/P RTOR for evacuation of large anterior mediational hematoma-possible infection.    Patient required more than the usual postoperative critical care management and I provided 30 minutes of non-continuous care to the patient.  Discussed at length with the CTICU staff and helped coordinate care.

## 2018-02-12 NOTE — PROGRESS NOTE ADULT - PROBLEM SELECTOR PLAN 2
Likely in setting of impaired urinary concentrating ability given recovering ATN. Serum sodium is 158 today. Continue with free water supplementation. If sodium does not improve, consider adding hypotonic IVF. Monitor serum sodium.

## 2018-02-12 NOTE — PROGRESS NOTE ADULT - SUBJECTIVE AND OBJECTIVE BOX
CC: Pseudoaneurysm  HPI: 72 M presented with endocarditis and underwent an AVR on 2018. Patient had an IABP placed in the L femoral artery on 2018, which was removed on 2/3/2018. Patient now noticed to have a L groin hematoma. A Duplex revealed two femoral pseudoaneurysms.  24/Overnight events: Patient seen and examined at bedside. Patient is now s/p L pseudoanuersm thrombin injection. Will follow up repeat duplex.           Objective:  Vital Signs Last 24 Hrs  T(C): 36.1 (2018 04:00), Max: 36.3 (2018 13:00)  T(F): 97 (2018 04:00), Max: 97.3 (2018 13:00)  HR: 74 (2018 06:00) (62 - 95)  BP: --  BP(mean): --  RR: 10 (2018 06:00) (8 - 33)  SpO2: 100% (2018 06:00) (97% - 100%)    Physical Exam:  General: NAD  Respiratory:  Cardiovascular:  Pulse: resolving L groin mass, mildly tender, palpable AT pulse     MEDICATIONS  (STANDING):  amiodarone    Tablet 200 milliGRAM(s) Oral daily  aspirin 325 milliGRAM(s) Oral daily  insulin lispro (HumaLOG) corrective regimen sliding scale   SubCutaneous every 4 hours  levothyroxine Injectable 75 MICROGram(s) IV Push <User Schedule>  meropenem  IVPB 500 milliGRAM(s) IV Intermittent every 12 hours  metoprolol     tartrate 25 milliGRAM(s) Oral every 12 hours  micafungin IVPB 100 milliGRAM(s) IV Intermittent every 24 hours  pantoprazole  Injectable 40 milliGRAM(s) IV Push daily  potassium chloride  10 mEq/50 mL IVPB 10 milliEquivalent(s) IV Intermittent every 1 hour  potassium chloride  10 mEq/50 mL IVPB 10 milliEquivalent(s) IV Intermittent every 1 hour  potassium chloride  10 mEq/50 mL IVPB 10 milliEquivalent(s) IV Intermittent once    MEDICATIONS  (PRN):    I&O's Detail    10 Feb 2018 07:01  -  2018 07:00  --------------------------------------------------------  IN:    Albumin 5%  - 250 mL: 500 mL    Enteral Tube Flush: 793 mL    Free Water: 1500 mL    Packed Red Blood Cells: 550 mL    sodium chloride 0.9%: 240 mL    Solution: 350 mL    Vital HN: 180 mL  Total IN: 4113 mL    OUT:    Incontinent per Condom Catheter: 200 mL    Indwelling Catheter - Urethral: 725 mL    Rectal Tube: 1200 mL  Total OUT: 2125 mL    Total NET: 1988 mL      2018 07:01  -  2018 06:28  --------------------------------------------------------  IN:    Packed Red Blood Cells: 250 mL    Solution: 350 mL    Vital HN: 120 mL  Total IN: 720 mL    OUT:    Chest Tube: 340 mL    Chest Tube: 840 mL    Indwelling Catheter - Urethral: 1895 mL  Total OUT: 3075 mL    Total NET: -2355 mL        Daily     Daily Weight in k.4 (2018 05:00)    LABS:                        10.9   21.6  )-----------( 163      ( 2018 03:32 )             31.6     02-12    158<H>  |  123<H>  |  82<H>  ----------------------------<  116<H>  4.2   |  18<L>  |  2.41<H>    Ca    8.1<L>      2018 03:32    TPro  5.8<L>  /  Alb  2.8<L>  /  TBili  1.4<H>  /  DBili  x   /  AST  41<H>  /  ALT  47<H>  /  AlkPhos  136<H>  02-12    PT/INR - ( 2018 10:41 )   PT: 12.1 sec;   INR: 1.12 ratio         PTT - ( 2018 02:48 )  PTT:44.4 sec      RADIOLOGY & ADDITIONAL STUDIES:

## 2018-02-12 NOTE — CONSULT NOTE ADULT - SUBJECTIVE AND OBJECTIVE BOX
HPI:  71y/o male underwent AVR for endocarditis; hospital course complicated by pericardial hematoma. Pt currently s/p drainage of pericardial hematoma & sternal wound vac placement. Questionable purulence seen during wound vac placement. Pt still has mediastinal chest tube x2 in place.    PMH  High cholesterol  Diabetes    PSH  No significant past surgical history    MEDS  MEDICATIONS  (STANDING):  amiodarone    Tablet 200 milliGRAM(s) Oral daily  aspirin 325 milliGRAM(s) Oral daily  gentamicin   IVPB 40 milliGRAM(s) IV Intermittent <User Schedule>  insulin lispro (HumaLOG) corrective regimen sliding scale   SubCutaneous every 4 hours  levothyroxine Injectable 75 MICROGram(s) IV Push <User Schedule>  loperamide Solution 4 milliGRAM(s) Oral every 6 hours  meropenem  IVPB 500 milliGRAM(s) IV Intermittent every 12 hours  metoprolol     tartrate 25 milliGRAM(s) Oral every 12 hours  micafungin IVPB 100 milliGRAM(s) IV Intermittent every 24 hours  pantoprazole  Injectable 40 milliGRAM(s) IV Push daily  potassium chloride  10 mEq/50 mL IVPB 10 milliEquivalent(s) IV Intermittent every 1 hour  potassium chloride  10 mEq/50 mL IVPB 10 milliEquivalent(s) IV Intermittent every 1 hour  potassium chloride  10 mEq/50 mL IVPB 10 milliEquivalent(s) IV Intermittent once      Allergies  No Known Allergies      Physical Exam  T(C): 36.6 (02-12-18 @ 20:00), Max: 36.6 (02-12-18 @ 20:00)  HR: 68 (02-12-18 @ 23:00) (64 - 75)  BP: --  RR: 25 (02-12-18 @ 23:00) (9 - 25)  SpO2: 100% (02-12-18 @ 22:00) (96% - 100%)  Wt(kg): --  Tmax: T(C): , Max: 36.6 (02-12-18 @ 20:00)  Wt(kg): --    Gen: NAD  HEENT: normocephalic, atraumatic, no scleral icterus  CV: RRR  Chest: Wound vac in place, no erythema.   Pulm: CTA B/L  Abd: Soft, ND, NTP, no rebound, no guarding, no palpable organomegaly/masses  Ext: warm, no edema, palp dp/pt    02-11-18  -  02-12-18  --------------------------------------------------------  IN:    Packed Red Blood Cells: 250 mL    Solution: 350 mL    Vital HN: 120 mL  Total IN: 720 mL    OUT:    Chest Tube: 870 mL    Chest Tube: 340 mL    Indwelling Catheter - Urethral: 2020 mL  Total OUT: 3230 mL    Total NET: -2510 mL      02-12-18  -  02-12-18  --------------------------------------------------------  IN:    Albumin 5%  - 250 mL: 500 mL    Enteral Tube Flush: 333 mL    Free Water: 500 mL    Solution: 200 mL    Vital HN: 820 mL  Total IN: 2353 mL    OUT:    Chest Tube: 230 mL    Chest Tube: 60 mL    Indwelling Catheter - Urethral: 1025 mL    Rectal Tube: 200 mL  Total OUT: 1515 mL    Total NET: 838 mL          Labs:                        10.9   21.6  )-----------( 163      ( 12 Feb 2018 03:32 )             31.6     02-12    158<H>  |  123<H>  |  82<H>  ----------------------------<  116<H>  4.2   |  18<L>  |  2.41<H>    Ca    8.1<L>      12 Feb 2018 03:32    TPro  5.8<L>  /  Alb  2.8<L>  /  TBili  1.4<H>  /  DBili  x   /  AST  41<H>  /  ALT  47<H>  /  AlkPhos  136<H>  02-12    PT/INR - ( 11 Feb 2018 10:41 )   PT: 12.1 sec;   INR: 1.12 ratio         PTT - ( 12 Feb 2018 11:24 )  PTT:33.9 sec    ABG - ( 12 Feb 2018 22:35 )  pH: 7.40  /  pCO2: 32    /  pO2: 122   / HCO3: 20    / Base Excess: -4.0  /  SaO2: 99

## 2018-02-12 NOTE — PROGRESS NOTE ADULT - SUBJECTIVE AND OBJECTIVE BOX
infectious diseases progress note:    Patient is a 72y old  Male who presents with a chief complaint of Fall (09 Jan 2018 00:52)        Atrial fibrillation            Allergies    No Known Allergies    Intolerances        ANTIBIOTICS/RELEVANT:  antimicrobials  meropenem  IVPB 500 milliGRAM(s) IV Intermittent every 12 hours  micafungin IVPB 100 milliGRAM(s) IV Intermittent every 24 hours    immunologic:    OTHER:  amiodarone    Tablet 200 milliGRAM(s) Oral daily  aspirin 325 milliGRAM(s) Oral daily  insulin lispro (HumaLOG) corrective regimen sliding scale   SubCutaneous every 4 hours  levothyroxine Injectable 75 MICROGram(s) IV Push <User Schedule>  metoprolol     tartrate 25 milliGRAM(s) Oral every 12 hours  pantoprazole  Injectable 40 milliGRAM(s) IV Push daily  potassium chloride  10 mEq/50 mL IVPB 10 milliEquivalent(s) IV Intermittent every 1 hour  potassium chloride  10 mEq/50 mL IVPB 10 milliEquivalent(s) IV Intermittent every 1 hour  potassium chloride  10 mEq/50 mL IVPB 10 milliEquivalent(s) IV Intermittent once      Objective:  Vital Signs Last 24 Hrs  T(C): 36.4 (12 Feb 2018 08:00), Max: 36.4 (12 Feb 2018 08:00)  T(F): 97.6 (12 Feb 2018 08:00), Max: 97.6 (12 Feb 2018 08:00)  HR: 67 (12 Feb 2018 08:00) (62 - 75)  BP: --  BP(mean): --  RR: 15 (12 Feb 2018 08:00) (8 - 25)  SpO2: 96% (12 Feb 2018 08:00) (96% - 100%)    PHYSICAL EXAM:   --no acute distress  Eyes:MYLA, EOMI  Ear/Nose/Throat: no oral lesion, no sinus tenderness on percussion	  Neck:no JVD, no lymphadenopathy, supple  Respiratory: CTA matt  vac over wound   Cardiovascular: S1S2 RRR, no murmurs  Gastrointestinal:soft, (+) BS, no HSM  Extremities:no  edema        LABS:                        10.9   21.6  )-----------( 163      ( 12 Feb 2018 03:32 )             31.6     02-12    158<H>  |  123<H>  |  82<H>  ----------------------------<  116<H>  4.2   |  18<L>  |  2.41<H>    Ca    8.1<L>      12 Feb 2018 03:32    TPro  5.8<L>  /  Alb  2.8<L>  /  TBili  1.4<H>  /  DBili  x   /  AST  41<H>  /  ALT  47<H>  /  AlkPhos  136<H>  02-12    PT/INR - ( 11 Feb 2018 10:41 )   PT: 12.1 sec;   INR: 1.12 ratio         PTT - ( 11 Feb 2018 02:48 )  PTT:44.4 sec        MICROBIOLOGY:    RECENT CULTURES:  02-11 @ 06:42 .Blood Blood-Venous                No growth to date.    02-09 @ 22:27 .Blood Blood                No growth to date.    02-09 @ 20:47 .Blood Blood                No growth to date.    02-06 @ 14:07 .Urine Catheterized                No growth          RESPIRATORY CULTURES:      Clostridium difficile [facility], EIA:   Negative (02-09 @ 11:51)          RADIOLOGY & ADDITIONAL STUDIES:        Pager [phone]  After 5 pm/weekends or if no response :[phone]

## 2018-02-12 NOTE — PROGRESS NOTE ADULT - ASSESSMENT
Pt is 72M s/p AVR for culture-negative endocarditis, c/b pericardial hematoma s/p drainage and vac placement 2/11.  Vac to be changed in the OR this Wednesday.      -plastic surgery to be available on Wednesday concurrently with general surgery (CARMINE Saunders) for possible omental flap    [id number] Pt is 72M s/p AVR for culture-negative endocarditis, c/b pericardial hematoma s/p drainage and vac placement 2/11.  Vac to be changed in the OR this Wednesday.      -will f/u most recent cultures  -plastic surgery to be available on Wednesday concurrently with general surgery (CARMINE Saunders) for possible omental flap    [id number] Pt is 72M s/p AVR for culture-negative endocarditis, c/b pericardial hematoma s/p drainage and vac placement 2/11.  Vac to be changed in the OR this Wednesday.      -will f/u most recent cultures  -plastic surgery to be available on Wednesday concurrently with general surgery (CARMINE Saunders) for possible omental flap  -case and plan discussed with attending, Dr. Salvador    [id number]

## 2018-02-12 NOTE — PROGRESS NOTE ADULT - PROBLEM SELECTOR PLAN 1
PTT normal today  Factor XI normal today  will not need FFP prior to procedure  possible heparin contamination

## 2018-02-12 NOTE — PROGRESS NOTE ADULT - SUBJECTIVE AND OBJECTIVE BOX
Plastic Surgery Progress Note    S: Patient seen and examined.  Complicated hospital course involving AVR for endocarditis, pericardial hematoma drained yesterday with vac placement.  During the procedure, questionable purulence was noted and cultures were sent.  All previous blood, tissue, and urine cultures sent this admission have been negative.  Pt denies previous abdominal surgery.  Of note, pt is malnourished, likely related to alcohol abuse.     Vital Signs Last 24 Hrs  T(C): 36.4 (12 Feb 2018 08:00), Max: 36.4 (12 Feb 2018 08:00)  T(F): 97.6 (12 Feb 2018 08:00), Max: 97.6 (12 Feb 2018 08:00)  HR: 68 (12 Feb 2018 10:00) (62 - 75)  BP: --  BP(mean): --  RR: 20 (12 Feb 2018 10:00) (9 - 25)  SpO2: 100% (12 Feb 2018 10:00) (96% - 100%)  I&O's Detail    11 Feb 2018 07:01  -  12 Feb 2018 07:00  --------------------------------------------------------  IN:    Packed Red Blood Cells: 250 mL    Solution: 350 mL    Vital HN: 120 mL  Total IN: 720 mL    OUT:    Chest Tube: 870 mL    Chest Tube: 340 mL    Indwelling Catheter - Urethral: 2020 mL  Total OUT: 3230 mL    Total NET: -2510 mL      12 Feb 2018 07:01  -  12 Feb 2018 11:39  --------------------------------------------------------  IN:    Enteral Tube Flush: 60 mL    Free Water: 250 mL    Solution: 100 mL    Vital HN: 160 mL  Total IN: 570 mL    OUT:    Chest Tube: 190 mL    Chest Tube: 60 mL    Indwelling Catheter - Urethral: 375 mL  Total OUT: 625 mL    Total NET: -55 mL          Physical Exam:  General: Awake and alert.  Chest: vac in place holding suction, serosanguinous output, CT x 2 in place exiting the midline.    Abd: soft, no previous surgical scars noted.

## 2018-02-12 NOTE — PROGRESS NOTE ADULT - ASSESSMENT
The patient is a 72M with 2 L femoral pseudoaneurysms, now s/p thrombin injection    - f/u official read on duplex  - no acute vascular surgery intervention indicated       Jackson Ojeda PGY2  [phone] The patient is a 72M with 2 L femoral pseudoaneurysms, now s/p thrombin injection    - will need repeat thrombin injection to pseudoaneurysm; will coordinate with US to do so today  - no other vascular surgical intervention indicated besides above      Jackson Ojeda PGY2  [phone]

## 2018-02-13 LAB
ALBUMIN SERPL ELPH-MCNC: 2.6 G/DL — LOW (ref 3.3–5)
ALP SERPL-CCNC: 103 U/L — SIGNIFICANT CHANGE UP (ref 40–120)
ALT FLD-CCNC: 38 U/L RC — SIGNIFICANT CHANGE UP (ref 10–45)
ANION GAP SERPL CALC-SCNC: 16 MMOL/L — SIGNIFICANT CHANGE UP (ref 5–17)
APTT BLD: 36.4 SEC — SIGNIFICANT CHANGE UP (ref 27.5–37.4)
AST SERPL-CCNC: 33 U/L — SIGNIFICANT CHANGE UP (ref 10–40)
BILIRUB SERPL-MCNC: 1 MG/DL — SIGNIFICANT CHANGE UP (ref 0.2–1.2)
BLD GP AB SCN SERPL QL: NEGATIVE — SIGNIFICANT CHANGE UP
BUN SERPL-MCNC: 76 MG/DL — HIGH (ref 7–23)
CALCIUM SERPL-MCNC: 8 MG/DL — LOW (ref 8.4–10.5)
CHLORIDE SERPL-SCNC: 126 MMOL/L — HIGH (ref 96–108)
CO2 SERPL-SCNC: 17 MMOL/L — LOW (ref 22–31)
CREAT SERPL-MCNC: 2.28 MG/DL — HIGH (ref 0.5–1.3)
GAS PNL BLDA: SIGNIFICANT CHANGE UP
GLUCOSE BLDC GLUCOMTR-MCNC: 157 MG/DL — HIGH (ref 70–99)
GLUCOSE BLDC GLUCOMTR-MCNC: 181 MG/DL — HIGH (ref 70–99)
GLUCOSE SERPL-MCNC: 90 MG/DL — SIGNIFICANT CHANGE UP (ref 70–99)
GRAM STN FLD: SIGNIFICANT CHANGE UP
HCT VFR BLD CALC: 28.8 % — LOW (ref 39–50)
HGB BLD-MCNC: 9.8 G/DL — LOW (ref 13–17)
INR BLD: 1.05 RATIO — SIGNIFICANT CHANGE UP (ref 0.88–1.16)
MCHC RBC-ENTMCNC: 31.4 PG — SIGNIFICANT CHANGE UP (ref 27–34)
MCHC RBC-ENTMCNC: 33.8 GM/DL — SIGNIFICANT CHANGE UP (ref 32–36)
MCV RBC AUTO: 92.7 FL — SIGNIFICANT CHANGE UP (ref 80–100)
PLATELET # BLD AUTO: 143 K/UL — LOW (ref 150–400)
POTASSIUM SERPL-MCNC: 3.8 MMOL/L — SIGNIFICANT CHANGE UP (ref 3.5–5.3)
POTASSIUM SERPL-SCNC: 3.8 MMOL/L — SIGNIFICANT CHANGE UP (ref 3.5–5.3)
PROT SERPL-MCNC: 5.1 G/DL — LOW (ref 6–8.3)
PROTHROM AB SERPL-ACNC: 11.3 SEC — SIGNIFICANT CHANGE UP (ref 9.8–12.7)
RBC # BLD: 3.11 M/UL — LOW (ref 4.2–5.8)
RBC # FLD: 15.3 % — HIGH (ref 10.3–14.5)
RH IG SCN BLD-IMP: POSITIVE — SIGNIFICANT CHANGE UP
SODIUM SERPL-SCNC: 159 MMOL/L — HIGH (ref 135–145)
VANCOMYCIN FLD-MCNC: 7.7 UG/ML — SIGNIFICANT CHANGE UP
WBC # BLD: 16.7 K/UL — HIGH (ref 3.8–10.5)
WBC # FLD AUTO: 16.7 K/UL — HIGH (ref 3.8–10.5)

## 2018-02-13 PROCEDURE — 76942 ECHO GUIDE FOR BIOPSY: CPT | Mod: 26

## 2018-02-13 PROCEDURE — 71045 X-RAY EXAM CHEST 1 VIEW: CPT | Mod: 26

## 2018-02-13 PROCEDURE — 99233 SBSQ HOSP IP/OBS HIGH 50: CPT | Mod: GC

## 2018-02-13 PROCEDURE — 99291 CRITICAL CARE FIRST HOUR: CPT

## 2018-02-13 PROCEDURE — 99232 SBSQ HOSP IP/OBS MODERATE 35: CPT

## 2018-02-13 RX ORDER — CHLORHEXIDINE GLUCONATE 213 G/1000ML
5 SOLUTION TOPICAL ONCE
Qty: 0 | Refills: 0 | Status: COMPLETED | OUTPATIENT
Start: 2018-02-13 | End: 2018-02-14

## 2018-02-13 RX ORDER — POTASSIUM CHLORIDE 20 MEQ
20 PACKET (EA) ORAL ONCE
Qty: 0 | Refills: 0 | Status: COMPLETED | OUTPATIENT
Start: 2018-02-13 | End: 2018-02-13

## 2018-02-13 RX ORDER — POTASSIUM CHLORIDE 20 MEQ
40 PACKET (EA) ORAL ONCE
Qty: 0 | Refills: 0 | Status: COMPLETED | OUTPATIENT
Start: 2018-02-13 | End: 2018-02-13

## 2018-02-13 RX ORDER — FENTANYL CITRATE 50 UG/ML
25 INJECTION INTRAVENOUS ONCE
Qty: 0 | Refills: 0 | Status: DISCONTINUED | OUTPATIENT
Start: 2018-02-13 | End: 2018-02-13

## 2018-02-13 RX ORDER — CHLORHEXIDINE GLUCONATE 213 G/1000ML
1 SOLUTION TOPICAL ONCE
Qty: 0 | Refills: 0 | Status: COMPLETED | OUTPATIENT
Start: 2018-02-13 | End: 2018-02-13

## 2018-02-13 RX ORDER — SODIUM CHLORIDE 9 MG/ML
1000 INJECTION, SOLUTION INTRAVENOUS
Qty: 0 | Refills: 0 | Status: DISCONTINUED | OUTPATIENT
Start: 2018-02-13 | End: 2018-02-14

## 2018-02-13 RX ORDER — ACETAMINOPHEN 500 MG
650 TABLET ORAL EVERY 6 HOURS
Qty: 0 | Refills: 0 | Status: DISCONTINUED | OUTPATIENT
Start: 2018-02-13 | End: 2018-02-14

## 2018-02-13 RX ADMIN — FENTANYL CITRATE 25 MICROGRAM(S): 50 INJECTION INTRAVENOUS at 08:50

## 2018-02-13 RX ADMIN — Medication 75 MICROGRAM(S): at 14:20

## 2018-02-13 RX ADMIN — Medication 8: at 06:14

## 2018-02-13 RX ADMIN — SODIUM CHLORIDE 100 MILLILITER(S): 9 INJECTION, SOLUTION INTRAVENOUS at 09:08

## 2018-02-13 RX ADMIN — Medication 40 MILLIEQUIVALENT(S): at 02:34

## 2018-02-13 RX ADMIN — Medication 5: at 21:18

## 2018-02-13 RX ADMIN — Medication 5: at 17:26

## 2018-02-13 RX ADMIN — FENTANYL CITRATE 25 MICROGRAM(S): 50 INJECTION INTRAVENOUS at 21:15

## 2018-02-13 RX ADMIN — PANTOPRAZOLE SODIUM 40 MILLIGRAM(S): 20 TABLET, DELAYED RELEASE ORAL at 11:01

## 2018-02-13 RX ADMIN — Medication 4 MILLIGRAM(S): at 17:27

## 2018-02-13 RX ADMIN — Medication 4 MILLIGRAM(S): at 05:35

## 2018-02-13 RX ADMIN — Medication 2: at 10:13

## 2018-02-13 RX ADMIN — Medication 20 MILLIEQUIVALENT(S): at 17:26

## 2018-02-13 RX ADMIN — Medication 4 MILLIGRAM(S): at 23:02

## 2018-02-13 RX ADMIN — MEROPENEM 100 MILLIGRAM(S): 1 INJECTION INTRAVENOUS at 01:55

## 2018-02-13 RX ADMIN — CHLORHEXIDINE GLUCONATE 1 APPLICATION(S): 213 SOLUTION TOPICAL at 21:00

## 2018-02-13 RX ADMIN — Medication 200 MILLIGRAM(S): at 10:13

## 2018-02-13 RX ADMIN — Medication 650 MILLIGRAM(S): at 18:30

## 2018-02-13 RX ADMIN — FENTANYL CITRATE 25 MICROGRAM(S): 50 INJECTION INTRAVENOUS at 09:05

## 2018-02-13 RX ADMIN — Medication 650 MILLIGRAM(S): at 12:30

## 2018-02-13 RX ADMIN — AMIODARONE HYDROCHLORIDE 200 MILLIGRAM(S): 400 TABLET ORAL at 05:34

## 2018-02-13 RX ADMIN — Medication 325 MILLIGRAM(S): at 11:01

## 2018-02-13 RX ADMIN — Medication 4 MILLIGRAM(S): at 14:06

## 2018-02-13 RX ADMIN — Medication 650 MILLIGRAM(S): at 19:00

## 2018-02-13 RX ADMIN — MEROPENEM 100 MILLIGRAM(S): 1 INJECTION INTRAVENOUS at 14:06

## 2018-02-13 RX ADMIN — Medication 8: at 14:19

## 2018-02-13 RX ADMIN — FENTANYL CITRATE 25 MICROGRAM(S): 50 INJECTION INTRAVENOUS at 21:00

## 2018-02-13 RX ADMIN — Medication 650 MILLIGRAM(S): at 12:00

## 2018-02-13 RX ADMIN — Medication 25 MILLIGRAM(S): at 05:35

## 2018-02-13 RX ADMIN — Medication 25 MILLIGRAM(S): at 17:28

## 2018-02-13 NOTE — PROGRESS NOTE ADULT - SUBJECTIVE AND OBJECTIVE BOX
SAMANTHA CADENA  MRN#:  [id number]    The patient is a 72y Male with PMH of  T2DM, alcohol abuse, brought in by family after having been found face down on the floor for approximately 2 days, found to have culture negative endocarditis and is now s/p AVR (T)/C1V 1/30 who was seen, evaluated, & examined with the CTICU staff on rounds, overnight and during the early morning hours with a multidisciplinary care plan formulated & implemented.  All available clinical, laboratory, radiographic, pharmacologic, and electrocardiographic data were reviewed & analyzed.      The patient was in the CTICU in critical condition secondary to resolving septic and cardiogenic shock, chronic heart failure, culture negative endocarditis, paroxysmal atrial fibrillation, resolving acute renal failure, with recent return to the OR for drainage of a pericardial effusion.    Respiratory status required supplemental oxygen, mobilization and assistance with pulmonary toilet, close monitoring of breathing pattern and respiratory rate, the following of ABG’s with A-line monitoring, continuous pulse oximetry monitoring,  for support & to evaluate for & prevent further decompensation secondary to persistent cardiopulmonary dysfunction due to recent intubation for drainage of pericardial effusion, atelectasis and his deconditioned state.    Invasive hemodynamic monitoring with a central venous and arterial catheter were required for the following of continuous central venous and MAP/BP monitoring to ensure adequate cardiovascular support and to evaluate for & help prevent decompensation while receiving an oral amiodarone load and IV Digoxin after discontinuing IV amiodarone drip and IV vasopressin drip for paroxysmal atrial fibrillation, resolving cardiogenic and septic shock, and acute renal failure.    Metabolic stability, uncontrolled type 2 diabetes-hyperglycemia, & infection prophylaxis required an IV regular Insulin sliding scale & the following of serial glucose levels to help achieve & maintain euglycemia.      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     Patient underwent thrombin injection today for his two left femoral pseudoaneurysms.    He is tolerating enteral feedings at goal rate.    Patient required more than the usual postoperative critical care management and I provided 30 minutes of non-continuous care to the patient.  Discussed at length with the CTICU staff and helped coordinate care. SAMANTHA CADENA  MRN#:  [id number]    The patient is a 72y Male with PMH of  T2DM, alcohol abuse, brought in by family after having been found face down on the floor for approximately 2 days, found to have culture negative endocarditis and is now s/p AVR (T)/C1V 1/30 who was seen, evaluated, & examined with the CTICU staff on rounds, overnight and during the early morning hours with a multidisciplinary care plan formulated & implemented.  All available clinical, laboratory, radiographic, pharmacologic, and electrocardiographic data were reviewed & analyzed.      The patient was in the CTICU in critical condition secondary to resolving septic and cardiogenic shock, chronic heart failure, culture negative endocarditis, paroxysmal atrial fibrillation, acute on chronic renal failure, with recent return to the OR for drainage of a pericardial effusion.    Respiratory status required supplemental oxygen, mobilization and assistance with pulmonary toilet, close monitoring of breathing pattern and respiratory rate, the following of ABG’s with A-line monitoring, continuous pulse oximetry monitoring, for support & to evaluate for & prevent further decompensation secondary to persistent cardiopulmonary dysfunction due to recent intubation for drainage of pericardial effusion, atelectasis and his deconditioned state.    Invasive hemodynamic monitoring with a central venous and arterial catheter were required for the following of continuous central venous and MAP/BP monitoring to ensure adequate cardiovascular support and to evaluate for & help prevent decompensation for his resolving cardiogenic and septic shock and while receiving oral amiodarone and lopressor for his atrial fibrillation and hydration for acute on chronic renal failure s/p pericardial drainage. He is on enteral free water and IV D5W at 100 cc/hr added despite diabetes, due to his elevated sodium of 159 and intravascular volume depletion.    Metabolic stability, uncontrolled type 2 diabetes-hyperglycemia, & infection prophylaxis required an IV regular Insulin sliding scale & the following of serial glucose levels to help achieve & maintain euglycemia.      He is on treatment with IV Meropenem and Gentamicin for culture negative endocarditis.     Patient underwent a repeat thrombin injection today for one of his two left femoral pseudoaneurysms.    He is tolerating enteral feedings at goal rate, feedings held for procedure today.    Patient required more than the usual postoperative critical care management and I provided 35 minutes of non-continuous care to the patient.  Discussed at length with the CTICU staff and helped coordinate care.

## 2018-02-13 NOTE — CHART NOTE - NSCHARTNOTEFT_GEN_A_CORE
Source: Patient [ ]    Family [ ]     other [ x ] RN, Comprehensive chart review     Per d/w RN, pt tolerating Vital AF at goal rate 60ml/hr. Noted with rectal tube, output of 200ml [id number]. Pt receiving imodium, per RN has improved diarrhea. Noted also receiving 250ml free water every 6 hours.     Chart reviewed, events noted. Pt seen by speech language pathologist on 2/7 with recommendations for NPO, per ENT with vocal cord granuloma, s/p evacuation of pericardial effusion on 2/11, with left femoral pseudoaneurysms s/p thrombin injection x2, noted with sternal wound and plan for omental flap.    Diet : NPO with tube feeding   Enteral /Parenteral Nutrition: Vital AF, goal rate 60ml/hr provides 1168ml free water, 1728kcal and 108gm prot (25kcal/kg and 1.5gm prot/kg per lowest wt 70.1kg).    Pt receiving 2168ml free water between free water boluses and enteral formula.     Admission Weight: 75.4kg  Lowest Weight: 70.1kg (2/5)  Current Weight: 74.4kg  Weight fluctuations noted, likely due to fluid shifts. Pt with 1+generalized edema.     Pertinent Medications: MEDICATIONS  (STANDING):  amiodarone    Tablet 200 milliGRAM(s) Oral daily  aspirin 325 milliGRAM(s) Oral daily  chlorhexidine 0.12% Liquid 5 milliLiter(s) Swish and Spit once  chlorhexidine 4% Liquid 1 Application(s) Topical once  dextrose 5%. 1000 milliLiter(s) (100 mL/Hr) IV Continuous <Continuous>  gentamicin   IVPB 40 milliGRAM(s) IV Intermittent <User Schedule>  insulin lispro (HumaLOG) corrective regimen sliding scale   SubCutaneous every 4 hours  levothyroxine Injectable 75 MICROGram(s) IV Push <User Schedule>  loperamide Solution 4 milliGRAM(s) Oral every 6 hours  meropenem  IVPB 500 milliGRAM(s) IV Intermittent every 12 hours  metoprolol     tartrate 25 milliGRAM(s) Oral every 12 hours  pantoprazole  Injectable 40 milliGRAM(s) IV Push daily  potassium chloride  10 mEq/50 mL IVPB 10 milliEquivalent(s) IV Intermittent every 1 hour  potassium chloride  10 mEq/50 mL IVPB 10 milliEquivalent(s) IV Intermittent every 1 hour  potassium chloride  10 mEq/50 mL IVPB 10 milliEquivalent(s) IV Intermittent once    MEDICATIONS  (PRN):  acetaminophen    Suspension. 650 milliGRAM(s) Oral every 6 hours PRN Mild Pain (1 - 3)    Pertinent Labs:  02-13 Na159 mmol/L<H> Glu 90 mg/dL K+ 3.8 mmol/L Cr  2.28 mg/dL<H> BUN 76 mg/dL<H> Phos n/a   Alb 2.6 g/dL<L> PAB n/a     Point of Care Testing BG: (2/13)136,188,95, (2/12)123-222    Skin: No pressure ulcers noted, pt with sternal wound.    Estimated Needs:   [ x ] no change since previous assessment  [ ] recalculated:       Previous Nutrition Diagnosis:   Malnutrition and Increased Nutrient Needs ongoing, being addressed with enteral nutrition support.  Food & Nutrition Related Knowledge Deficit not applicable at this time.    New Nutrition Diagnosis: [ x ] not applicable      Interventions:   Recommend continue Vital AF at goal rate 60ml/hr as tolerated.   Noted with elevated sodium, additional free water as per team.        Monitoring and Evaluation:     [ ] PO intake [ x ] Tolerance to diet prescription [ x ] weights [ x ] follow up per protocol    [ ] other:

## 2018-02-13 NOTE — PROGRESS NOTE ADULT - PROBLEM SELECTOR PLAN 2
Likely in setting of impaired urinary concentrating ability given recovering ATN. Serum sodium remains elevated at 159 today. Total free water deficit is 5.1 L. Patient started on D5W with 100 cc/hr. Continue with free water supplementation as well. Monitor serum sodium.

## 2018-02-13 NOTE — PROGRESS NOTE ADULT - SUBJECTIVE AND OBJECTIVE BOX
CC: Pseudoaneurysm  HPI: 72 M presented with endocarditis and underwent an AVR on 2018. Patient had an IABP placed in the L femoral artery on 2018, which was removed on 2/3/2018. Patient now noticed to have a L groin hematoma. A Duplex revealed two femoral pseudoaneurysms.  24/Overnight events: Patient seen and examined at bedside. Patient is now s/p L pseudoanuersm thrombin injection x2 with remaining pseudoaneursym that will require additional thrombin injection. The patient went to the OR  and was not deemed appropriate for transport off unit to undergo the ultrasound. Vascular surgery will coordinate with CTICU to transport down to ultrasound; patient looks good this morning, sitting up in the chair, non-labored breathing, looks amenable for transport to ultrasound and thrombin injection. Patient was also consulted by Plastic surgery and general surgery for possible omental flap for sternal wound closure. Otherwise, no acute events overnight.       Objective:  Vital Signs Last 24 Hrs  T(C): 37 (2018 04:00), Max: 37 (2018 04:00)  T(F): 98.6 (2018 04:00), Max: 98.6 (2018 04:00)  HR: 69 (2018 06:00) (64 - 74)  BP: 131/64 (2018 06:00) (131/64 - 131/64)  BP(mean): 91 (2018 06:00) (91 - 91)  RR: 25 (2018 06:00) (10 - 25)  SpO2: 100% (2018 06:00) (96% - 100%)    Physical Exam:  General: NAD  Respiratory: non-labored breathing   Pulse: resolving L groin mass, incision looks clean and dry with staples in place, mildly tender, palpable AT pulse       MEDICATIONS  (STANDING):  amiodarone    Tablet 200 milliGRAM(s) Oral daily  aspirin 325 milliGRAM(s) Oral daily  gentamicin   IVPB 40 milliGRAM(s) IV Intermittent <User Schedule>  insulin lispro (HumaLOG) corrective regimen sliding scale   SubCutaneous every 4 hours  levothyroxine Injectable 75 MICROGram(s) IV Push <User Schedule>  loperamide Solution 4 milliGRAM(s) Oral every 6 hours  meropenem  IVPB 500 milliGRAM(s) IV Intermittent every 12 hours  metoprolol     tartrate 25 milliGRAM(s) Oral every 12 hours  micafungin IVPB 100 milliGRAM(s) IV Intermittent every 24 hours  pantoprazole  Injectable 40 milliGRAM(s) IV Push daily  potassium chloride  10 mEq/50 mL IVPB 10 milliEquivalent(s) IV Intermittent every 1 hour  potassium chloride  10 mEq/50 mL IVPB 10 milliEquivalent(s) IV Intermittent every 1 hour  potassium chloride  10 mEq/50 mL IVPB 10 milliEquivalent(s) IV Intermittent once    MEDICATIONS  (PRN):    I&O's Detail    2018 07:01  -  2018 07:00  --------------------------------------------------------  IN:    Packed Red Blood Cells: 250 mL    Solution: 350 mL    Vital HN: 120 mL  Total IN: 720 mL    OUT:    Chest Tube: 870 mL    Chest Tube: 340 mL    Indwelling Catheter - Urethral: 2020 mL  Total OUT: 3230 mL    Total NET: -2510 mL      2018 07:01  -  2018 06:27  --------------------------------------------------------  IN:    Albumin 5%  - 250 mL: 500 mL    Enteral Tube Flush: 333 mL    Free Water: 500 mL    Solution: 250 mL    Vital HN: 1300 mL  Total IN: 2883 mL    OUT:    Chest Tube: 60 mL    Chest Tube: 350 mL    Indwelling Catheter - Urethral: 1735 mL    Rectal Tube: 200 mL  Total OUT: 2345 mL    Total NET: 538 mL        Daily     Daily Weight in k.4 (2018 00:00)    LABS:                        9.8    16.7  )-----------( 143      ( 2018 02:35 )             28.8     02-13    159<H>  |  126<H>  |  76<H>  ----------------------------<  90  3.8   |  17<L>  |  2.28<H>    Ca    8.0<L>      2018 02:35    TPro  5.1<L>  /  Alb  2.6<L>  /  TBili  1.0  /  DBili  x   /  AST  33  /  ALT  38  /  AlkPhos  103  02-13    PT/INR - ( 2018 10:41 )   PT: 12.1 sec;   INR: 1.12 ratio         PTT - ( 2018 11:24 )  PTT:33.9 sec

## 2018-02-13 NOTE — PROGRESS NOTE ADULT - ASSESSMENT
The patient is a 72M with 2 L femoral pseudoaneurysms, now s/p thrombin injection x2    - will need repeat thrombin injection into pseudoaneurysm, patient looks comfortable sitting up in chair without labored breathing; will coordinate with US and CTICU to do so today  - no other vascular surgical intervention indicated besides above      Jackson Ojeda PGY2  [phone]

## 2018-02-13 NOTE — CHART NOTE - NSCHARTNOTEFT_GEN_A_CORE
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 Latest factor level 85%, sufficient for hemostasis.   Page with questions.     Su Flo  Hematology Fellow  [phone]

## 2018-02-13 NOTE — PROGRESS NOTE ADULT - SUBJECTIVE AND OBJECTIVE BOX
[facility] Division of Kidney Diseases & Hypertension  FOLLOW UP NOTE  [phone]--------------------------------------------------------------------------------    71 y/o man with history of ETOH abuse who presents with fall and found with endocarditis, developed ELIAN in-hospital shortly after undergoing cardiac catheterization, s/p urgent AVR and CABG on 1/30/18 with clinical decompensation on 2/1 requiring intubation and multiple vasopressor support.  Patient now extubated. Patient able to communicate by nodding. Patient was noted to have hypothermia and elevated WBC over weekend. Echocardiogram was done which showed echodense area over pericardiac space consistent with abscess/hematoma. Patient had drainage of pericardial hematoma/abscess yesterday. Currently patient is sitting in chair with no complaints.       PAST HISTORY  --------------------------------------------------------------------------------  No significant changes to PMH, PSH, FHx, SHx, unless otherwise noted    ALLERGIES & MEDICATIONS  --------------------------------------------------------------------------------  Allergies    No Known Allergies    Intolerances      Standing Inpatient Medications  amiodarone    Tablet 200 milliGRAM(s) Oral daily  aspirin 325 milliGRAM(s) Oral daily  dextrose 5%. 1000 milliLiter(s) IV Continuous <Continuous>  gentamicin   IVPB 40 milliGRAM(s) IV Intermittent <User Schedule>  insulin lispro (HumaLOG) corrective regimen sliding scale   SubCutaneous every 4 hours  levothyroxine Injectable 75 MICROGram(s) IV Push <User Schedule>  loperamide Solution 4 milliGRAM(s) Oral every 6 hours  meropenem  IVPB 500 milliGRAM(s) IV Intermittent every 12 hours  metoprolol     tartrate 25 milliGRAM(s) Oral every 12 hours  pantoprazole  Injectable 40 milliGRAM(s) IV Push daily  potassium chloride  10 mEq/50 mL IVPB 10 milliEquivalent(s) IV Intermittent every 1 hour  potassium chloride  10 mEq/50 mL IVPB 10 milliEquivalent(s) IV Intermittent every 1 hour  potassium chloride  10 mEq/50 mL IVPB 10 milliEquivalent(s) IV Intermittent once    PRN Inpatient Medications  acetaminophen    Suspension. 650 milliGRAM(s) Oral every 6 hours PRN      REVIEW OF SYSTEMS  --------------------------------------------------------------------------------  Respiratory: No dyspnea  CV: No chest pain  GI: no abdominal pain    VITALS/PHYSICAL EXAM  --------------------------------------------------------------------------------  T(C): 36.4 (02-13-18 @ 08:00), Max: 37 (02-13-18 @ 04:00)  HR: 63 (02-13-18 @ 09:00) (63 - 74)  BP: 131/64 (02-13-18 @ 06:00) (131/64 - 131/64)  RR: 15 (02-13-18 @ 09:00) (10 - 25)  SpO2: 100% (02-13-18 @ 09:00) (99% - 100%)  Wt(kg): --        02-12-18 @ 07:01  -  02-13-18 @ 07:00  --------------------------------------------------------  IN: 3003 mL / OUT: 2445 mL / NET: 558 mL    02-13-18 @ 07:01  -  02-13-18 @ 09:44  --------------------------------------------------------  IN: 120 mL / OUT: 375 mL / NET: -255 mL      Physical Exam:               Gen: NAD ill appearing  	HEENT: NG tube  	Pulm: coarse breath sounds  	CV: RRR, S1S2;  rub sternotomy noted; wound vac present over chest   	Abd: +BS, soft, nontender/nondistended              LE: no edema    LABS/STUDIES  --------------------------------------------------------------------------------              9.8    16.7  >-----------<  143      [02-13-18 @ 02:35]              28.8     159  |  126  |  76  ----------------------------<  90      [02-13-18 @ 02:35]  3.8   |  17  |  2.28        Ca     8.0     [02-13-18 @ 02:35]    TPro  5.1  /  Alb  2.6  /  TBili  1.0  /  DBili  x   /  AST  33  /  ALT  38  /  AlkPhos  103  [02-13-18 @ 02:35]    PT/INR: PT 12.1 , INR 1.12       [02-11-18 @ 10:41]  PTT: 33.9       [02-12-18 @ 11:24]      Creatinine Trend:  SCr 2.28 [02-13 @ 02:35]  SCr 2.41 [02-12 @ 03:32]  SCr 2.50 [02-11 @ 10:41]  SCr 2.55 [02-11 @ 02:48]  SCr 2.53 [02-10 @ 23:11]        TSH 12.56      [02-10-18 @ 12:11]    HIV Nonreact      [02-10-18 @ 12:11] [facility] Division of Kidney Diseases & Hypertension  FOLLOW UP NOTE  [phone]--------------------------------------------------------------------------------    71 y/o man with history of ETOH abuse who presents with fall and found with endocarditis, developed ELIAN in-hospital shortly after undergoing cardiac catheterization, s/p urgent AVR and CABG on 1/30/18 with clinical decompensation on 2/1 requiring intubation and multiple vasopressor support.  Patient now extubated. Patient able to communicate by nodding. Patient was noted to have hypothermia and elevated WBC over weekend. Echocardiogram was done which showed echodense area over pericardiac space consistent with abscess/hematoma. Patient had drainage of pericardial hematoma/abscess yesterday. Currently patient is sitting in chair with no complaints.       PAST HISTORY  --------------------------------------------------------------------------------  No significant changes to PMH, PSH, FHx, SHx, unless otherwise noted    ALLERGIES & MEDICATIONS  --------------------------------------------------------------------------------  Allergies    No Known Allergies    Intolerances      Standing Inpatient Medications  amiodarone    Tablet 200 milliGRAM(s) Oral daily  aspirin 325 milliGRAM(s) Oral daily  dextrose 5%. 1000 milliLiter(s) IV Continuous <Continuous>  gentamicin   IVPB 40 milliGRAM(s) IV Intermittent <User Schedule>  insulin lispro (HumaLOG) corrective regimen sliding scale   SubCutaneous every 4 hours  levothyroxine Injectable 75 MICROGram(s) IV Push <User Schedule>  loperamide Solution 4 milliGRAM(s) Oral every 6 hours  meropenem  IVPB 500 milliGRAM(s) IV Intermittent every 12 hours  metoprolol     tartrate 25 milliGRAM(s) Oral every 12 hours  pantoprazole  Injectable 40 milliGRAM(s) IV Push daily  potassium chloride  10 mEq/50 mL IVPB 10 milliEquivalent(s) IV Intermittent every 1 hour  potassium chloride  10 mEq/50 mL IVPB 10 milliEquivalent(s) IV Intermittent every 1 hour  potassium chloride  10 mEq/50 mL IVPB 10 milliEquivalent(s) IV Intermittent once    PRN Inpatient Medications  acetaminophen    Suspension. 650 milliGRAM(s) Oral every 6 hours PRN      REVIEW OF SYSTEMS  --------------------------------------------------------------------------------  Respiratory: No dyspnea  CV: No chest pain  GI: no abdominal pain    VITALS/PHYSICAL EXAM  --------------------------------------------------------------------------------  T(C): 36.4 (02-13-18 @ 08:00), Max: 37 (02-13-18 @ 04:00)  HR: 63 (02-13-18 @ 09:00) (63 - 74)  BP: 131/64 (02-13-18 @ 06:00) (131/64 - 131/64)  RR: 15 (02-13-18 @ 09:00) (10 - 25)  SpO2: 100% (02-13-18 @ 09:00) (99% - 100%)  Wt(kg): --        02-12-18 @ 07:01  -  02-13-18 @ 07:00  --------------------------------------------------------  IN: 3003 mL / OUT: 2445 mL / NET: 558 mL    02-13-18 @ 07:01  -  02-13-18 @ 09:44  --------------------------------------------------------  IN: 120 mL / OUT: 375 mL / NET: -255 mL      Physical Exam:               Gen: NAD ill appearing  	HEENT: NG tube  	Pulm: coarse breath sounds wound vac noted  	CV: RRR, S1S2;  rub sternotomy noted; wound vac present over chest   	Abd: +BS, soft, nontender/nondistended              LE: no edema              : kayli    LABS/STUDIES  --------------------------------------------------------------------------------              9.8    16.7  >-----------<  143      [02-13-18 @ 02:35]              28.8     159  |  126  |  76  ----------------------------<  90      [02-13-18 @ 02:35]  3.8   |  17  |  2.28        Ca     8.0     [02-13-18 @ 02:35]    TPro  5.1  /  Alb  2.6  /  TBili  1.0  /  DBili  x   /  AST  33  /  ALT  38  /  AlkPhos  103  [02-13-18 @ 02:35]    PT/INR: PT 12.1 , INR 1.12       [02-11-18 @ 10:41]  PTT: 33.9       [02-12-18 @ 11:24]      Creatinine Trend:  SCr 2.28 [02-13 @ 02:35]  SCr 2.41 [02-12 @ 03:32]  SCr 2.50 [02-11 @ 10:41]  SCr 2.55 [02-11 @ 02:48]  SCr 2.53 [02-10 @ 23:11]        TSH 12.56      [02-10-18 @ 12:11]    HIV Nonreact      [02-10-18 @ 12:11]

## 2018-02-13 NOTE — PROGRESS NOTE ADULT - SUBJECTIVE AND OBJECTIVE BOX
infectious diseases progress note:    Patient is a 72y old  Male who presents with a chief complaint of Fall (09 Jan 2018 00:52)        Atrial fibrillation        Allergies    No Known Allergies    Intolerances        ANTIBIOTICS/RELEVANT:  antimicrobials  gentamicin   IVPB 40 milliGRAM(s) IV Intermittent <User Schedule>  meropenem  IVPB 500 milliGRAM(s) IV Intermittent every 12 hours    immunologic:    OTHER:  amiodarone    Tablet 200 milliGRAM(s) Oral daily  aspirin 325 milliGRAM(s) Oral daily  insulin lispro (HumaLOG) corrective regimen sliding scale   SubCutaneous every 4 hours  levothyroxine Injectable 75 MICROGram(s) IV Push <User Schedule>  loperamide Solution 4 milliGRAM(s) Oral every 6 hours  metoprolol     tartrate 25 milliGRAM(s) Oral every 12 hours  pantoprazole  Injectable 40 milliGRAM(s) IV Push daily  potassium chloride  10 mEq/50 mL IVPB 10 milliEquivalent(s) IV Intermittent every 1 hour  potassium chloride  10 mEq/50 mL IVPB 10 milliEquivalent(s) IV Intermittent every 1 hour  potassium chloride  10 mEq/50 mL IVPB 10 milliEquivalent(s) IV Intermittent once      Objective:  Vital Signs Last 24 Hrs  T(C): 36.4 (13 Feb 2018 08:00), Max: 37 (13 Feb 2018 04:00)  T(F): 97.5 (13 Feb 2018 08:00), Max: 98.6 (13 Feb 2018 04:00)  HR: 66 (13 Feb 2018 07:00) (64 - 74)  BP: 131/64 (13 Feb 2018 06:00) (131/64 - 131/64)  BP(mean): 91 (13 Feb 2018 06:00) (91 - 91)  RR: 20 (13 Feb 2018 07:00) (10 - 25)  SpO2: 99% (13 Feb 2018 07:00) (99% - 100%)    PHYSICAL EXAM:     Eyes:MYLA, EOMI  Ear/Nose/Throat: no oral lesion, no sinus tenderness on percussion	  Neck:no JVD, no lymphadenopathy, supple  Respiratory: CTA matt  Cardiovascular: S1S2 RRR, no murmurs vac over   Gastrointestinal:soft, (+) BS, no HSM soft  Extremities:no e/e/c        LABS:                        9.8    16.7  )-----------( 143      ( 13 Feb 2018 02:35 )             28.8     02-13    159<H>  |  126<H>  |  76<H>  ----------------------------<  90  3.8   |  17<L>  |  2.28<H>    Ca    8.0<L>      13 Feb 2018 02:35    TPro  5.1<L>  /  Alb  2.6<L>  /  TBili  1.0  /  DBili  x   /  AST  33  /  ALT  38  /  AlkPhos  103  02-13    PT/INR - ( 11 Feb 2018 10:41 )   PT: 12.1 sec;   INR: 1.12 ratio         PTT - ( 12 Feb 2018 11:24 )  PTT:33.9 sec        MICROBIOLOGY:    RECENT CULTURES:  02-11 @ 16:38 .Other Other, mediastinum #2                Testing in progress    02-11 @ 06:42 .Blood Blood-Venous                No growth to date.    02-09 @ 22:27 .Blood Blood                No growth to date.    02-09 @ 20:47 .Blood Blood                No growth to date.    02-06 @ 14:07 .Urine Catheterized                No growth          RESPIRATORY CULTURES:      Clostridium difficile [facility] Toxins A&amp;B, EIA:   Negative (02-09 @ 11:51)          RADIOLOGY & ADDITIONAL STUDIES:        Pager [phone]  After 5 pm/weekends or if no response :[phone]

## 2018-02-13 NOTE — PROGRESS NOTE ADULT - ASSESSMENT
72 year-old male with past medical history of T2DM, DLD, alcohol misuse brought in by family after having been found face down on the floor for approximately 2 days. On treatment for UTI, but now JANES with Aortic Valve lesions. 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 Bartonella, Legionella, Q Fever serology negative; fungal BCX negative. Brucella pending. Unclear cause of NVE, culture  with [facility]--pending. Having diarrhea, C diff negative. LFTs rising, working ELIAN, aortic balloon pump, pressors. Critically ill. Aortic valve vegetation, leukocytosis, post operative state.  -    discussed with colleagues and [facility]  organism is fastidious gram positive coccus   micro having trouble getting it to grow   not likely if it were enterococcus  more likely vit  b 6  def strep that needs combination therapy   asked micro to send to [place] for PCR    doing better  extubated afebrile     ab broadened over weekend  cultrues all negative to date and seems back to baseline     genta  restarted but only 40 mg  with rising creat.   will dc micofungin   discussed with Dr. Mary in detail and plastics has hematoma in mediastinum but no evidence of sternal wd infection so it will probably be closed

## 2018-02-13 NOTE — PROGRESS NOTE ADULT - SUBJECTIVE AND OBJECTIVE BOX
Brief Procedure Note:  Procedure: US Guided direct thrombin injection of left femoral pseudoaneurym    Time out and informed consent completed  Duplex US performed by Dr Ross. Bilobed pseudoaneurysm noted arising from the left profunda femoris  500U of thrombin injected into pseudoaneurysm under realtime 2D/Color US, with thrombosis of pseudoaneurysm  Completion Duplex US revealed patent SFA, DFA and CFA    Plan:  Repeat Left groin arterial duplex tomorrow   Will follow

## 2018-02-13 NOTE — PROGRESS NOTE ADULT - SUBJECTIVE AND OBJECTIVE BOX
PRE OPERATIVE NOTE    Pre-op Diagnosis: sternal wound  Procedure: Laparoscopic omental flap harvest  Surgeon: Nicky                          9.8    16.7  )-----------( 143      ( 2018 02:35 )             28.8     02    159<H>  |  126<H>  |  76<H>  ----------------------------<  90  3.8   |  17<L>  |  2.28<H>    Ca    8.0<L>      2018 02:35    TPro  5.1<L>  /  Alb  2.6<L>  /  TBili  1.0  /  DBili  x   /  AST  33  /  ALT  38  /  AlkPhos  103  13    LIVER FUNCTIONS - ( 2018 02:35 )  Alb: 2.6 g/dL / Pro: 5.1 g/dL / ALK PHOS: 103 U/L / ALT: 38 U/L RC / AST: 33 U/L / GGT: x           PTT - ( 2018 11:24 )  PTT:33.9 sec  Urinalysis Basic - ( 2018 08:35 )    Color: Yellow / Appearance: SL Turbid / S.015 / pH: x  Gluc: x / Ketone: Negative  / Bili: Negative / Urobili: Negative   Blood: x / Protein: 100 mg/dL / Nitrite: Negative   Leuk Esterase: Trace / RBC: 10-25 /HPF / WBC 10-25 /HPF   Sq Epi: x / Non Sq Epi: Occasional /HPF / Bacteria: Moderate /HPF      CAPILLARY BLOOD GLUCOSE  136 (2018 10:00)      POCT Blood Glucose.: 166 mg/dL (2018 14:08)      Type & Screen: ordered  CXR:  < from: Xray Chest 1 View AP/PA (18 @ 03:43) >  Impression:    The heart is enlarged. Bilateral pleural effusion. Left lower lobe   pneumonia and/or atelectasis. Mild pulmonary vascular congestion. All   life supporting devices are in good position and unchanged when compared   to previous study done 2018.        < end of copied text >    EKG:  < from: 12 Lead ECG (18 @ 10:52) >  Ventricular Rate 65 BPM    Atrial Rate 54 BPM    QRS Duration 64 ms     ms    QTc 407 ms    R Axis 59 degrees    T Axis -9 degrees    Diagnosis Line JUNCTIONAL RHYTHM  LOW VOLTAGE QRS    < end of copied text >    Echocardiogram:   < from: Transthoracic Echocardiogram (02.10.18 @ 14:51) >  Conclusions:  ***Limited study to evaluate for pericardial effusion.  1. There is a circumferential echo-free area around the  heart consistent with a small-moderate pericardial  effusion. There is a large echodense area within the  pericardial space abutting the liver, which likely is  consistent with hematoma and/or abscess (measuring 4.5cm).  Echodensity is seen over the RV free wall and appears to be  restricting the motion of the right heart. IVC not well  seen.  *** Compared with echocardiogram of 2018, the RV free  wall was poorly seen therefore accurate comparison is not  possible. Findings discussed with AURELIANO Rodriguez and images  reviewed with Dr. Dey at 410 PM on 2/10/18.  ------------------------------------------------------------------------  Confirmed on  2/10/2018 - 16:14:41 by Eh Echevarria M.D.  ------------------------------------------------------------------------    < end of copied text >      A/P: 72y Male planned for above procedure  NPO past midnight, except medications  IVF  Pain/nausea control  AM labs  Consent in chart  amiodarone    Tablet  gentamicin   IVPB  meropenem  IVPB  metoprolol     tartrate PRE OPERATIVE NOTE    Pre-op Diagnosis: sternal wound  Procedure: Laparoscopic omental flap harvest  Surgeon: Nicky                          9.8    16.7  )-----------( 143      ( 2018 02:35 )             28.8     02    159<H>  |  126<H>  |  76<H>  ----------------------------<  90  3.8   |  17<L>  |  2.28<H>    Ca    8.0<L>      2018 02:35    TPro  5.1<L>  /  Alb  2.6<L>  /  TBili  1.0  /  DBili  x   /  AST  33  /  ALT  38  /  AlkPhos  103  13    LIVER FUNCTIONS - ( 2018 02:35 )  Alb: 2.6 g/dL / Pro: 5.1 g/dL / ALK PHOS: 103 U/L / ALT: 38 U/L RC / AST: 33 U/L / GGT: x           PTT - ( 2018 11:24 )  PTT:33.9 sec  Urinalysis Basic - ( 2018 08:35 )    Color: Yellow / Appearance: SL Turbid / S.015 / pH: x  Gluc: x / Ketone: Negative  / Bili: Negative / Urobili: Negative   Blood: x / Protein: 100 mg/dL / Nitrite: Negative   Leuk Esterase: Trace / RBC: 10-25 /HPF / WBC 10-25 /HPF   Sq Epi: x / Non Sq Epi: Occasional /HPF / Bacteria: Moderate /HPF      CAPILLARY BLOOD GLUCOSE  136 (2018 10:00)      POCT Blood Glucose.: 166 mg/dL (2018 14:08)      Type & Screen: ordered  CXR:  < from: Xray Chest 1 View AP/PA (18 @ 03:43) >  Impression:    The heart is enlarged. Bilateral pleural effusion. Left lower lobe   pneumonia and/or atelectasis. Mild pulmonary vascular congestion. All   life supporting devices are in good position and unchanged when compared   to previous study done 2018.        < end of copied text >    EKG:  < from: 12 Lead ECG (18 @ 10:52) >  Ventricular Rate 65 BPM    Atrial Rate 54 BPM    QRS Duration 64 ms     ms    QTc 407 ms    R Axis 59 degrees    T Axis -9 degrees    Diagnosis Line JUNCTIONAL RHYTHM  LOW VOLTAGE QRS    < end of copied text >    Echocardiogram:   < from: Transthoracic Echocardiogram (02.10.18 @ 14:51) >  Conclusions:  ***Limited study to evaluate for pericardial effusion.  1. There is a circumferential echo-free area around the  heart consistent with a small-moderate pericardial  effusion. There is a large echodense area within the  pericardial space abutting the liver, which likely is  consistent with hematoma and/or abscess (measuring 4.5cm).  Echodensity is seen over the RV free wall and appears to be  restricting the motion of the right heart. IVC not well  seen.  *** Compared with echocardiogram of 2018, the RV free  wall was poorly seen therefore accurate comparison is not  possible. Findings discussed with AURELIANO Rodriguez and images  reviewed with Dr. Dey at 410 PM on 2/10/18.  ------------------------------------------------------------------------  Confirmed on  2/10/2018 - 16:14:41 by Eh Echevarria M.D.  ------------------------------------------------------------------------    < end of copied text >      A/P: 72y Male planned for above procedure  NPO past midnight, except medications; Please hold tube feeds at midnight  IVF  Pain/nausea control  AM labs  Consent in chart  amiodarone    Tablet  gentamicin   IVPB  meropenem  IVPB  metoprolol     tartrate PRE OPERATIVE NOTE    Pre-op Diagnosis: sternal wound  Procedure: Laparoscopic omental flap harvest  Surgeon: Nicky                          9.8    16.7  )-----------( 143      ( 2018 02:35 )             28.8     02    159<H>  |  126<H>  |  76<H>  ----------------------------<  90  3.8   |  17<L>  |  2.28<H>    Ca    8.0<L>      2018 02:35    TPro  5.1<L>  /  Alb  2.6<L>  /  TBili  1.0  /  DBili  x   /  AST  33  /  ALT  38  /  AlkPhos  103  13    LIVER FUNCTIONS - ( 2018 02:35 )  Alb: 2.6 g/dL / Pro: 5.1 g/dL / ALK PHOS: 103 U/L / ALT: 38 U/L RC / AST: 33 U/L / GGT: x           PTT - ( 2018 11:24 )  PTT:33.9 sec  Urinalysis Basic - ( 2018 08:35 )    Color: Yellow / Appearance: SL Turbid / S.015 / pH: x  Gluc: x / Ketone: Negative  / Bili: Negative / Urobili: Negative   Blood: x / Protein: 100 mg/dL / Nitrite: Negative   Leuk Esterase: Trace / RBC: 10-25 /HPF / WBC 10-25 /HPF   Sq Epi: x / Non Sq Epi: Occasional /HPF / Bacteria: Moderate /HPF      CAPILLARY BLOOD GLUCOSE  136 (2018 10:00)      POCT Blood Glucose.: 166 mg/dL (2018 14:08)      Type & Screen: ordered  CXR:  < from: Xray Chest 1 View AP/PA (18 @ 03:43) >  Impression:    The heart is enlarged. Bilateral pleural effusion. Left lower lobe   pneumonia and/or atelectasis. Mild pulmonary vascular congestion. All   life supporting devices are in good position and unchanged when compared   to previous study done 2018.        < end of copied text >    EKG:  < from: 12 Lead ECG (18 @ 10:52) >  Ventricular Rate 65 BPM    Atrial Rate 54 BPM    QRS Duration 64 ms     ms    QTc 407 ms    R Axis 59 degrees    T Axis -9 degrees    Diagnosis Line JUNCTIONAL RHYTHM  LOW VOLTAGE QRS    < end of copied text >    Echocardiogram:   < from: Transthoracic Echocardiogram (02.10.18 @ 14:51) >  Conclusions:  ***Limited study to evaluate for pericardial effusion.  1. There is a circumferential echo-free area around the  heart consistent with a small-moderate pericardial  effusion. There is a large echodense area within the  pericardial space abutting the liver, which likely is  consistent with hematoma and/or abscess (measuring 4.5cm).  Echodensity is seen over the RV free wall and appears to be  restricting the motion of the right heart. IVC not well  seen.  *** Compared with echocardiogram of 2018, the RV free  wall was poorly seen therefore accurate comparison is not  possible. Findings discussed with AURELIANO Rodriguez and images  reviewed with Dr. Dey at 410 PM on 2/10/18.  ------------------------------------------------------------------------  Confirmed on  2/10/2018 - 16:14:41 by Eh Echevarria M.D.  ------------------------------------------------------------------------    < end of copied text >      A/P: 72y Male planned for above procedure  NPO past midnight, except medications; Please hold tube feeds at midnight  Please correct hypernatremia of 160  Please diurese for b/l pulm effusions  IVF  Pain/nausea control  AM labs  Consent in chart  amiodarone    Tablet  gentamicin   IVPB  meropenem  IVPB  metoprolol     tartrate PRE OPERATIVE NOTE    Pre-op Diagnosis: sternal wound  Procedure: Laparoscopic omental flap harvest  Surgeon: DORENE Saunders                          9.8    16.7  )-----------( 143      ( 2018 02:35 )             28.8         159<H>  |  126<H>  |  76<H>  ----------------------------<  90  3.8   |  17<L>  |  2.28<H>    Ca    8.0<L>      2018 02:35    TPro  5.1<L>  /  Alb  2.6<L>  /  TBili  1.0  /  DBili  x   /  AST  33  /  ALT  38  /  AlkPhos  103  13    LIVER FUNCTIONS - ( 2018 02:35 )  Alb: 2.6 g/dL / Pro: 5.1 g/dL / ALK PHOS: 103 U/L / ALT: 38 U/L RC / AST: 33 U/L / GGT: x           PTT - ( 2018 11:24 )  PTT:33.9 sec  Urinalysis Basic - ( 2018 08:35 )    Color: Yellow / Appearance: SL Turbid / S.015 / pH: x  Gluc: x / Ketone: Negative  / Bili: Negative / Urobili: Negative   Blood: x / Protein: 100 mg/dL / Nitrite: Negative   Leuk Esterase: Trace / RBC: 10-25 /HPF / WBC 10-25 /HPF   Sq Epi: x / Non Sq Epi: Occasional /HPF / Bacteria: Moderate /HPF      CAPILLARY BLOOD GLUCOSE  136 (2018 10:00)      POCT Blood Glucose.: 166 mg/dL (2018 14:08)      Type & Screen: ordered  CXR:  < from: Xray Chest 1 View AP/PA (18 @ 03:43) >  Impression:    The heart is enlarged. Bilateral pleural effusion. Left lower lobe   pneumonia and/or atelectasis. Mild pulmonary vascular congestion. All   life supporting devices are in good position and unchanged when compared   to previous study done 2018.        < end of copied text >    EKG:  < from: 12 Lead ECG (18 @ 10:52) >  Ventricular Rate 65 BPM    Atrial Rate 54 BPM    QRS Duration 64 ms     ms    QTc 407 ms    R Axis 59 degrees    T Axis -9 degrees    Diagnosis Line JUNCTIONAL RHYTHM  LOW VOLTAGE QRS    < end of copied text >    Echocardiogram:   < from: Transthoracic Echocardiogram (02.10.18 @ 14:51) >  Conclusions:  ***Limited study to evaluate for pericardial effusion.  1. There is a circumferential echo-free area around the  heart consistent with a small-moderate pericardial  effusion. There is a large echodense area within the  pericardial space abutting the liver, which likely is  consistent with hematoma and/or abscess (measuring 4.5cm).  Echodensity is seen over the RV free wall and appears to be  restricting the motion of the right heart. IVC not well  seen.  *** Compared with echocardiogram of 2018, the RV free  wall was poorly seen therefore accurate comparison is not  possible. Findings discussed with AURELIANO Rodriguez and images  reviewed with Dr. Dey at 410 PM on 2/10/18.  ------------------------------------------------------------------------  Confirmed on  2/10/2018 - 16:14:41 by Eh Echevarria M.D.  ------------------------------------------------------------------------    < end of copied text >      A/P: 72y Male planned for above procedure  NPO past midnight, except medications; Please hold tube feeds at midnight  Please correct hypernatremia of 160  Please diurese for b/l pulm effusions  IVF  Pain/nausea control  AM labs  Consent in chart  amiodarone    Tablet  gentamicin   IVPB  meropenem  IVPB  metoprolol     tartrate

## 2018-02-13 NOTE — PROGRESS NOTE ADULT - PROBLEM SELECTOR PLAN 1
Likely contrast induced nephropathy from cardiac cath/Toxic/Septic  ATN. Scr. has improved to 2.28 today.  Patient is non oliguric.  Plan is to continue monitoring Scr., electrolytes, and urine output. Monitor gentamicin and vancomycin trough levels. Avoid nephrotoxins.

## 2018-02-14 ENCOUNTER — APPOINTMENT (OUTPATIENT)
Dept: CARDIOTHORACIC SURGERY | Facility: HOSPITAL | Age: 73
End: 2018-02-14

## 2018-02-14 ENCOUNTER — TRANSCRIPTION ENCOUNTER (OUTPATIENT)
Age: 73
End: 2018-02-14

## 2018-02-14 LAB
ALBUMIN SERPL ELPH-MCNC: 2.3 G/DL — LOW (ref 3.3–5)
ALBUMIN SERPL ELPH-MCNC: 2.4 G/DL — LOW (ref 3.3–5)
ALP SERPL-CCNC: 108 U/L — SIGNIFICANT CHANGE UP (ref 40–120)
ALP SERPL-CCNC: 111 U/L — SIGNIFICANT CHANGE UP (ref 40–120)
ALT FLD-CCNC: 38 U/L RC — SIGNIFICANT CHANGE UP (ref 10–45)
ALT FLD-CCNC: 38 U/L RC — SIGNIFICANT CHANGE UP (ref 10–45)
ANION GAP SERPL CALC-SCNC: 11 MMOL/L — SIGNIFICANT CHANGE UP (ref 5–17)
ANION GAP SERPL CALC-SCNC: 12 MMOL/L — SIGNIFICANT CHANGE UP (ref 5–17)
APTT BLD: 40.1 SEC — HIGH (ref 27.5–37.4)
APTT BLD: 40.9 SEC — HIGH (ref 27.5–37.4)
AST SERPL-CCNC: 37 U/L — SIGNIFICANT CHANGE UP (ref 10–40)
AST SERPL-CCNC: 38 U/L — SIGNIFICANT CHANGE UP (ref 10–40)
BASOPHILS # BLD AUTO: 0 K/UL — SIGNIFICANT CHANGE UP (ref 0–0.2)
BASOPHILS NFR BLD AUTO: 0.2 % — SIGNIFICANT CHANGE UP (ref 0–2)
BILIRUB SERPL-MCNC: 1 MG/DL — SIGNIFICANT CHANGE UP (ref 0.2–1.2)
BILIRUB SERPL-MCNC: 1.2 MG/DL — SIGNIFICANT CHANGE UP (ref 0.2–1.2)
BUN SERPL-MCNC: 62 MG/DL — HIGH (ref 7–23)
BUN SERPL-MCNC: 69 MG/DL — HIGH (ref 7–23)
CALCIUM SERPL-MCNC: 7.6 MG/DL — LOW (ref 8.4–10.5)
CALCIUM SERPL-MCNC: 7.6 MG/DL — LOW (ref 8.4–10.5)
CHLORIDE SERPL-SCNC: 122 MMOL/L — HIGH (ref 96–108)
CHLORIDE SERPL-SCNC: 125 MMOL/L — HIGH (ref 96–108)
CO2 SERPL-SCNC: 17 MMOL/L — LOW (ref 22–31)
CO2 SERPL-SCNC: 20 MMOL/L — LOW (ref 22–31)
CREAT SERPL-MCNC: 1.79 MG/DL — HIGH (ref 0.5–1.3)
CREAT SERPL-MCNC: 1.91 MG/DL — HIGH (ref 0.5–1.3)
CULTURE RESULTS: SIGNIFICANT CHANGE UP
CULTURE RESULTS: SIGNIFICANT CHANGE UP
EOSINOPHIL # BLD AUTO: 0.3 K/UL — SIGNIFICANT CHANGE UP (ref 0–0.5)
EOSINOPHIL NFR BLD AUTO: 2 % — SIGNIFICANT CHANGE UP (ref 0–6)
GAS PNL BLDA: SIGNIFICANT CHANGE UP
GLUCOSE BLDC GLUCOMTR-MCNC: 129 MG/DL — HIGH (ref 70–99)
GLUCOSE BLDC GLUCOMTR-MCNC: 135 MG/DL — HIGH (ref 70–99)
GLUCOSE SERPL-MCNC: 131 MG/DL — HIGH (ref 70–99)
GLUCOSE SERPL-MCNC: 140 MG/DL — HIGH (ref 70–99)
HCT VFR BLD CALC: 29.9 % — LOW (ref 39–50)
HCT VFR BLD CALC: 31.5 % — LOW (ref 39–50)
HGB BLD-MCNC: 10.1 G/DL — LOW (ref 13–17)
HGB BLD-MCNC: 9.6 G/DL — LOW (ref 13–17)
INR BLD: 1.05 RATIO — SIGNIFICANT CHANGE UP (ref 0.88–1.16)
INR BLD: 1.06 RATIO — SIGNIFICANT CHANGE UP (ref 0.88–1.16)
LYMPHOCYTES # BLD AUTO: 0.9 K/UL — LOW (ref 1–3.3)
LYMPHOCYTES # BLD AUTO: 5.9 % — LOW (ref 13–44)
MCHC RBC-ENTMCNC: 30.3 PG — SIGNIFICANT CHANGE UP (ref 27–34)
MCHC RBC-ENTMCNC: 30.3 PG — SIGNIFICANT CHANGE UP (ref 27–34)
MCHC RBC-ENTMCNC: 32.1 GM/DL — SIGNIFICANT CHANGE UP (ref 32–36)
MCHC RBC-ENTMCNC: 32.1 GM/DL — SIGNIFICANT CHANGE UP (ref 32–36)
MCV RBC AUTO: 94.3 FL — SIGNIFICANT CHANGE UP (ref 80–100)
MCV RBC AUTO: 94.4 FL — SIGNIFICANT CHANGE UP (ref 80–100)
MONOCYTES # BLD AUTO: 0.6 K/UL — SIGNIFICANT CHANGE UP (ref 0–0.9)
MONOCYTES NFR BLD AUTO: 3.8 % — SIGNIFICANT CHANGE UP (ref 2–14)
NEUTROPHILS # BLD AUTO: 13.7 K/UL — HIGH (ref 1.8–7.4)
NEUTROPHILS NFR BLD AUTO: 88.1 % — HIGH (ref 43–77)
PLAT MORPH BLD: NORMAL — SIGNIFICANT CHANGE UP
PLATELET # BLD AUTO: 122 K/UL — LOW (ref 150–400)
PLATELET # BLD AUTO: 122 K/UL — LOW (ref 150–400)
POTASSIUM SERPL-MCNC: 4.3 MMOL/L — SIGNIFICANT CHANGE UP (ref 3.5–5.3)
POTASSIUM SERPL-MCNC: 4.4 MMOL/L — SIGNIFICANT CHANGE UP (ref 3.5–5.3)
POTASSIUM SERPL-SCNC: 4.3 MMOL/L — SIGNIFICANT CHANGE UP (ref 3.5–5.3)
POTASSIUM SERPL-SCNC: 4.4 MMOL/L — SIGNIFICANT CHANGE UP (ref 3.5–5.3)
PROT SERPL-MCNC: 5.1 G/DL — LOW (ref 6–8.3)
PROT SERPL-MCNC: 5.2 G/DL — LOW (ref 6–8.3)
PROTHROM AB SERPL-ACNC: 11.4 SEC — SIGNIFICANT CHANGE UP (ref 9.8–12.7)
PROTHROM AB SERPL-ACNC: 11.6 SEC — SIGNIFICANT CHANGE UP (ref 9.8–12.7)
RBC # BLD: 3.17 M/UL — LOW (ref 4.2–5.8)
RBC # BLD: 3.33 M/UL — LOW (ref 4.2–5.8)
RBC # FLD: 15.7 % — HIGH (ref 10.3–14.5)
RBC # FLD: 16 % — HIGH (ref 10.3–14.5)
RBC BLD AUTO: SIGNIFICANT CHANGE UP
SODIUM SERPL-SCNC: 153 MMOL/L — HIGH (ref 135–145)
SODIUM SERPL-SCNC: 154 MMOL/L — HIGH (ref 135–145)
SPECIMEN SOURCE: SIGNIFICANT CHANGE UP
SPECIMEN SOURCE: SIGNIFICANT CHANGE UP
WBC # BLD: 15.5 K/UL — HIGH (ref 3.8–10.5)
WBC # BLD: 16.5 K/UL — HIGH (ref 3.8–10.5)
WBC # FLD AUTO: 15.5 K/UL — HIGH (ref 3.8–10.5)
WBC # FLD AUTO: 16.5 K/UL — HIGH (ref 3.8–10.5)

## 2018-02-14 PROCEDURE — 93926 LOWER EXTREMITY STUDY: CPT | Mod: 26,LT

## 2018-02-14 PROCEDURE — 99232 SBSQ HOSP IP/OBS MODERATE 35: CPT

## 2018-02-14 PROCEDURE — 99291 CRITICAL CARE FIRST HOUR: CPT

## 2018-02-14 PROCEDURE — 71045 X-RAY EXAM CHEST 1 VIEW: CPT | Mod: 26

## 2018-02-14 RX ORDER — DIGOXIN 250 MCG
0.12 TABLET ORAL EVERY OTHER DAY
Qty: 0 | Refills: 0 | Status: DISCONTINUED | OUTPATIENT
Start: 2018-02-14 | End: 2018-02-15

## 2018-02-14 RX ORDER — CEFTRIAXONE 500 MG/1
INJECTION, POWDER, FOR SOLUTION INTRAMUSCULAR; INTRAVENOUS
Qty: 0 | Refills: 0 | Status: DISCONTINUED | OUTPATIENT
Start: 2018-02-14 | End: 2018-02-14

## 2018-02-14 RX ORDER — PANTOPRAZOLE SODIUM 20 MG/1
40 TABLET, DELAYED RELEASE ORAL
Qty: 0 | Refills: 0 | Status: DISCONTINUED | OUTPATIENT
Start: 2018-02-14 | End: 2018-02-14

## 2018-02-14 RX ORDER — CEFTRIAXONE 500 MG/1
2 INJECTION, POWDER, FOR SOLUTION INTRAMUSCULAR; INTRAVENOUS EVERY 24 HOURS
Qty: 0 | Refills: 0 | Status: DISCONTINUED | OUTPATIENT
Start: 2018-02-15 | End: 2018-03-01

## 2018-02-14 RX ORDER — ASPIRIN/CALCIUM CARB/MAGNESIUM 324 MG
325 TABLET ORAL DAILY
Qty: 0 | Refills: 0 | Status: DISCONTINUED | OUTPATIENT
Start: 2018-02-14 | End: 2018-02-27

## 2018-02-14 RX ORDER — GENTAMICIN SULFATE 40 MG/ML
30 VIAL (ML) INJECTION ONCE
Qty: 0 | Refills: 0 | Status: DISCONTINUED | OUTPATIENT
Start: 2018-02-14 | End: 2018-02-14

## 2018-02-14 RX ORDER — ACETAMINOPHEN 500 MG
650 TABLET ORAL EVERY 6 HOURS
Qty: 0 | Refills: 0 | Status: DISCONTINUED | OUTPATIENT
Start: 2018-02-14 | End: 2018-02-27

## 2018-02-14 RX ORDER — HEPARIN SODIUM 5000 [USP'U]/ML
5000 INJECTION INTRAVENOUS; SUBCUTANEOUS EVERY 8 HOURS
Qty: 0 | Refills: 0 | Status: DISCONTINUED | OUTPATIENT
Start: 2018-02-14 | End: 2018-02-18

## 2018-02-14 RX ORDER — ALBUMIN HUMAN 25 %
250 VIAL (ML) INTRAVENOUS ONCE
Qty: 0 | Refills: 0 | Status: COMPLETED | OUTPATIENT
Start: 2018-02-14 | End: 2018-02-14

## 2018-02-14 RX ORDER — AMIODARONE HYDROCHLORIDE 400 MG/1
200 TABLET ORAL DAILY
Qty: 0 | Refills: 0 | Status: DISCONTINUED | OUTPATIENT
Start: 2018-02-14 | End: 2018-03-05

## 2018-02-14 RX ORDER — CEFTRIAXONE 500 MG/1
INJECTION, POWDER, FOR SOLUTION INTRAMUSCULAR; INTRAVENOUS
Qty: 0 | Refills: 0 | Status: DISCONTINUED | OUTPATIENT
Start: 2018-02-14 | End: 2018-03-01

## 2018-02-14 RX ORDER — INSULIN LISPRO 100/ML
VIAL (ML) SUBCUTANEOUS EVERY 4 HOURS
Qty: 0 | Refills: 0 | Status: DISCONTINUED | OUTPATIENT
Start: 2018-02-14 | End: 2018-02-23

## 2018-02-14 RX ORDER — ATORVASTATIN CALCIUM 80 MG/1
80 TABLET, FILM COATED ORAL AT BEDTIME
Qty: 0 | Refills: 0 | Status: DISCONTINUED | OUTPATIENT
Start: 2018-02-14 | End: 2018-03-05

## 2018-02-14 RX ORDER — METOPROLOL TARTRATE 50 MG
25 TABLET ORAL EVERY 12 HOURS
Qty: 0 | Refills: 0 | Status: DISCONTINUED | OUTPATIENT
Start: 2018-02-14 | End: 2018-02-14

## 2018-02-14 RX ORDER — ACETAMINOPHEN 500 MG
1000 TABLET ORAL ONCE
Qty: 0 | Refills: 0 | Status: COMPLETED | OUTPATIENT
Start: 2018-02-14 | End: 2018-02-14

## 2018-02-14 RX ORDER — CHLORHEXIDINE GLUCONATE 213 G/1000ML
15 SOLUTION TOPICAL ONCE
Qty: 0 | Refills: 0 | Status: COMPLETED | OUTPATIENT
Start: 2018-02-14 | End: 2018-02-14

## 2018-02-14 RX ORDER — THIAMINE MONONITRATE (VIT B1) 100 MG
100 TABLET ORAL DAILY
Qty: 0 | Refills: 0 | Status: DISCONTINUED | OUTPATIENT
Start: 2018-02-14 | End: 2018-03-05

## 2018-02-14 RX ORDER — GENTAMICIN SULFATE 40 MG/ML
VIAL (ML) INJECTION
Qty: 0 | Refills: 0 | Status: DISCONTINUED | OUTPATIENT
Start: 2018-02-14 | End: 2018-02-14

## 2018-02-14 RX ORDER — LEVOTHYROXINE SODIUM 125 MCG
75 TABLET ORAL
Qty: 0 | Refills: 0 | Status: DISCONTINUED | OUTPATIENT
Start: 2018-02-14 | End: 2018-02-19

## 2018-02-14 RX ORDER — CEFTRIAXONE 500 MG/1
2 INJECTION, POWDER, FOR SOLUTION INTRAMUSCULAR; INTRAVENOUS ONCE
Qty: 0 | Refills: 0 | Status: COMPLETED | OUTPATIENT
Start: 2018-02-14 | End: 2018-02-14

## 2018-02-14 RX ORDER — FOLIC ACID 0.8 MG
1 TABLET ORAL DAILY
Qty: 0 | Refills: 0 | Status: DISCONTINUED | OUTPATIENT
Start: 2018-02-14 | End: 2018-03-05

## 2018-02-14 RX ORDER — GENTAMICIN SULFATE 40 MG/ML
40 VIAL (ML) INJECTION
Qty: 0 | Refills: 0 | Status: DISCONTINUED | OUTPATIENT
Start: 2018-02-14 | End: 2018-02-20

## 2018-02-14 RX ORDER — CALCIUM GLUCONATE 100 MG/ML
1 VIAL (ML) INTRAVENOUS ONCE
Qty: 0 | Refills: 0 | Status: COMPLETED | OUTPATIENT
Start: 2018-02-14 | End: 2018-02-14

## 2018-02-14 RX ORDER — GENTAMICIN SULFATE 40 MG/ML
30 VIAL (ML) INJECTION EVERY 12 HOURS
Qty: 0 | Refills: 0 | Status: DISCONTINUED | OUTPATIENT
Start: 2018-02-14 | End: 2018-02-14

## 2018-02-14 RX ORDER — FINASTERIDE 5 MG/1
5 TABLET, FILM COATED ORAL DAILY
Qty: 0 | Refills: 0 | Status: DISCONTINUED | OUTPATIENT
Start: 2018-02-14 | End: 2018-03-05

## 2018-02-14 RX ORDER — SODIUM CHLORIDE 9 MG/ML
1000 INJECTION, SOLUTION INTRAVENOUS
Qty: 0 | Refills: 0 | Status: DISCONTINUED | OUTPATIENT
Start: 2018-02-14 | End: 2018-02-15

## 2018-02-14 RX ORDER — PANTOPRAZOLE SODIUM 20 MG/1
40 TABLET, DELAYED RELEASE ORAL DAILY
Qty: 0 | Refills: 0 | Status: DISCONTINUED | OUTPATIENT
Start: 2018-02-14 | End: 2018-02-19

## 2018-02-14 RX ADMIN — Medication 125 MILLILITER(S): at 21:00

## 2018-02-14 RX ADMIN — HEPARIN SODIUM 5000 UNIT(S): 5000 INJECTION INTRAVENOUS; SUBCUTANEOUS at 13:07

## 2018-02-14 RX ADMIN — Medication 125 MILLILITER(S): at 15:45

## 2018-02-14 RX ADMIN — Medication 650 MILLIGRAM(S): at 10:35

## 2018-02-14 RX ADMIN — Medication 1 MILLIGRAM(S): at 12:01

## 2018-02-14 RX ADMIN — Medication 125 MILLILITER(S): at 15:30

## 2018-02-14 RX ADMIN — Medication 100 MILLIGRAM(S): at 12:01

## 2018-02-14 RX ADMIN — ATORVASTATIN CALCIUM 80 MILLIGRAM(S): 80 TABLET, FILM COATED ORAL at 21:25

## 2018-02-14 RX ADMIN — PANTOPRAZOLE SODIUM 40 MILLIGRAM(S): 20 TABLET, DELAYED RELEASE ORAL at 12:01

## 2018-02-14 RX ADMIN — CHLORHEXIDINE GLUCONATE 5 MILLILITER(S): 213 SOLUTION TOPICAL at 05:07

## 2018-02-14 RX ADMIN — Medication 2: at 17:12

## 2018-02-14 RX ADMIN — HEPARIN SODIUM 5000 UNIT(S): 5000 INJECTION INTRAVENOUS; SUBCUTANEOUS at 21:25

## 2018-02-14 RX ADMIN — AMIODARONE HYDROCHLORIDE 200 MILLIGRAM(S): 400 TABLET ORAL at 10:37

## 2018-02-14 RX ADMIN — CHLORHEXIDINE GLUCONATE 15 MILLILITER(S): 213 SOLUTION TOPICAL at 06:00

## 2018-02-14 RX ADMIN — MEROPENEM 100 MILLIGRAM(S): 1 INJECTION INTRAVENOUS at 01:15

## 2018-02-14 RX ADMIN — Medication 200 GRAM(S): at 15:30

## 2018-02-14 RX ADMIN — Medication 4 MILLIGRAM(S): at 05:07

## 2018-02-14 RX ADMIN — CEFTRIAXONE 100 GRAM(S): 500 INJECTION, POWDER, FOR SOLUTION INTRAMUSCULAR; INTRAVENOUS at 11:38

## 2018-02-14 RX ADMIN — FINASTERIDE 5 MILLIGRAM(S): 5 TABLET, FILM COATED ORAL at 12:02

## 2018-02-14 RX ADMIN — Medication 1000 MILLIGRAM(S): at 15:23

## 2018-02-14 RX ADMIN — Medication 2: at 13:07

## 2018-02-14 RX ADMIN — Medication 200 MILLIGRAM(S): at 10:29

## 2018-02-14 RX ADMIN — Medication 75 MICROGRAM(S): at 10:38

## 2018-02-14 RX ADMIN — Medication 5: at 21:25

## 2018-02-14 RX ADMIN — Medication 325 MILLIGRAM(S): at 12:01

## 2018-02-14 RX ADMIN — Medication 650 MILLIGRAM(S): at 11:05

## 2018-02-14 RX ADMIN — Medication 0.12 MILLIGRAM(S): at 12:01

## 2018-02-14 RX ADMIN — Medication 400 MILLIGRAM(S): at 15:04

## 2018-02-14 NOTE — PROGRESS NOTE ADULT - SUBJECTIVE AND OBJECTIVE BOX
Plastic Surgery Progress Note (pg [facility]: [id number], [facility]: [phone])    SUBJECTIVE:  Doing well. No overnight events.     OBJECTIVE:     ** VITAL SIGNS / I&O's **    Vital Signs Last 24 Hrs  T(C): 36.9 (14 Feb 2018 04:00), Max: 36.9 (14 Feb 2018 04:00)  T(F): 98.4 (14 Feb 2018 04:00), Max: 98.4 (14 Feb 2018 04:00)  HR: 62 (14 Feb 2018 07:00) (56 - 69)  BP: 102/49 (14 Feb 2018 05:00) (101/49 - 125/57)  BP(mean): 71 (14 Feb 2018 05:00) (71 - 85)  RR: 13 (14 Feb 2018 07:00) (13 - 29)  SpO2: 100% (14 Feb 2018 07:00) (100% - 100%)      13 Feb 2018 07:01  -  14 Feb 2018 07:00  --------------------------------------------------------  IN:    dextrose 5%.: 2100 mL    Enteral Tube Flush: 440 mL    Free Water: 500 mL    Solution: 150 mL    Solution: 50 mL    Vital HN: 840 mL  Total IN: 4080 mL    OUT:    Chest Tube: 160 mL    Chest Tube: 410 mL    Indwelling Catheter - Urethral: 2185 mL    Rectal Tube: 200 mL  Total OUT: 2955 mL    Total NET: 1125 mL          ** PHYSICAL EXAM **    -- CONSTITUTIONAL: AOx3. NAD.   -- HEENT:  -- NECK:   -- CHEST: VAC holding suction     **MEDS**  acetaminophen    Suspension. 650 milliGRAM(s) Oral every 6 hours PRN  amiodarone    Tablet 200 milliGRAM(s) Oral daily  aspirin 325 milliGRAM(s) Oral daily  dextrose 5%. 1000 milliLiter(s) IV Continuous <Continuous>  gentamicin   IVPB 40 milliGRAM(s) IV Intermittent <User Schedule>  insulin lispro (HumaLOG) corrective regimen sliding scale   SubCutaneous every 4 hours  levothyroxine Injectable 75 MICROGram(s) IV Push <User Schedule>  loperamide Solution 4 milliGRAM(s) Oral every 6 hours  meropenem  IVPB 500 milliGRAM(s) IV Intermittent every 12 hours  metoprolol     tartrate 25 milliGRAM(s) Oral every 12 hours  pantoprazole  Injectable 40 milliGRAM(s) IV Push daily  potassium chloride  10 mEq/50 mL IVPB 10 milliEquivalent(s) IV Intermittent every 1 hour  potassium chloride  10 mEq/50 mL IVPB 10 milliEquivalent(s) IV Intermittent every 1 hour  potassium chloride  10 mEq/50 mL IVPB 10 milliEquivalent(s) IV Intermittent once      ** LABS **                          9.6    16.5  )-----------( 122      ( 14 Feb 2018 01:16 )             29.9     14 Feb 2018 01:16    154    |  125    |  69     ----------------------------<  131    4.3     |  17     |  1.91     Ca    7.6        14 Feb 2018 01:16    TPro  5.2    /  Alb  2.3    /  TBili  1.0    /  DBili  x      /  AST  38     /  ALT  38     /  AlkPhos  108    14 Feb 2018 01:16    PT/INR - ( 14 Feb 2018 01:16 )   PT: 11.4 sec;   INR: 1.05 ratio         PTT - ( 14 Feb 2018 01:16 )  PTT:40.9 sec  CAPILLARY BLOOD GLUCOSE  148 (14 Feb 2018 05:00)  129 (14 Feb 2018 01:00)  171 (13 Feb 2018 21:00)  157 (13 Feb 2018 18:00)  181 (13 Feb 2018 14:00)  136 (13 Feb 2018 10:00)      POCT Blood Glucose.: 157 mg/dL (13 Feb 2018 17:26)  POCT Blood Glucose.: 181 mg/dL (13 Feb 2018 14:13)

## 2018-02-14 NOTE — PROGRESS NOTE ADULT - ASSESSMENT
A/P: 72M s/p CABG (LIMA) and AVR now w/ sternal wound infection  - NPO   - OR today for debridement, possible omental flap

## 2018-02-14 NOTE — PROGRESS NOTE ADULT - SUBJECTIVE AND OBJECTIVE BOX
CC: Pseudoaneurysm  HPI: 72 M presented with endocarditis and underwent an AVR on 2018. Patient had an IABP placed in the L femoral artery on 2018, which was removed on 2/3/2018. Patient now noticed to have a L groin hematoma. A Duplex revealed two femoral pseudoaneurysms.  24/Overnight events: Patient seen and examined at bedside. Patient is now s/p L pseudoanuerysm thrombin injection x3 with remaining pseudoaneursym injected with thrombin yesterday afternoon with U/S resolution. Patient is scheduled for a confirmatory repeat bedside arterial duplex today. Patient to undergo this morning an omental flap harvest with plastic and general surgery to assist in sternal wound closure. Lateral leukocytosis to 17, on meropenem and gentamicin.       Objective:  Vital Signs Last 24 Hrs  T(C): 36.9 (2018 04:00), Max: 36.9 (2018 04:00)  T(F): 98.4 (2018 04:00), Max: 98.4 (2018 04:00)  HR: 61 (2018 06:00) (56 - 69)  BP: 102/49 (2018 05:00) (101/49 - 125/57)  BP(mean): 71 (2018 05:00) (71 - 85)  RR: 21 (2018 06:00) (14 - 29)  SpO2: 100% (2018 06:00) (99% - 100%)    Physical Exam:  General: NAD  Respiratory: non-labored breathing   Pulse: resolving L groin mass, incision looks clean and dry, mildly tender, palpable AT pulse     MEDICATIONS  (STANDING):  amiodarone    Tablet 200 milliGRAM(s) Oral daily  aspirin 325 milliGRAM(s) Oral daily  dextrose 5%. 1000 milliLiter(s) (100 mL/Hr) IV Continuous <Continuous>  gentamicin   IVPB 40 milliGRAM(s) IV Intermittent <User Schedule>  insulin lispro (HumaLOG) corrective regimen sliding scale   SubCutaneous every 4 hours  levothyroxine Injectable 75 MICROGram(s) IV Push <User Schedule>  loperamide Solution 4 milliGRAM(s) Oral every 6 hours  meropenem  IVPB 500 milliGRAM(s) IV Intermittent every 12 hours  metoprolol     tartrate 25 milliGRAM(s) Oral every 12 hours  pantoprazole  Injectable 40 milliGRAM(s) IV Push daily  potassium chloride  10 mEq/50 mL IVPB 10 milliEquivalent(s) IV Intermittent every 1 hour  potassium chloride  10 mEq/50 mL IVPB 10 milliEquivalent(s) IV Intermittent every 1 hour  potassium chloride  10 mEq/50 mL IVPB 10 milliEquivalent(s) IV Intermittent once    MEDICATIONS  (PRN):  acetaminophen    Suspension. 650 milliGRAM(s) Oral every 6 hours PRN Mild Pain (1 - 3)    I&O's Detail    2018 07:01  -  2018 07:00  --------------------------------------------------------  IN:    Albumin 5%  - 250 mL: 500 mL    Enteral Tube Flush: 333 mL    Free Water: 500 mL    Solution: 250 mL    Vital HN: 1420 mL  Total IN: 3003 mL    OUT:    Chest Tube: 370 mL    Chest Tube: 60 mL    Indwelling Catheter - Urethral: 1815 mL    Rectal Tube: 200 mL  Total OUT: 2445 mL    Total NET: 558 mL      2018 07:01  -  2018 06:50  --------------------------------------------------------  IN:    dextrose 5%.: 2100 mL    Enteral Tube Flush: 440 mL    Free Water: 500 mL    Solution: 150 mL    Solution: 50 mL    Vital HN: 840 mL  Total IN: 4080 mL    OUT:    Chest Tube: 160 mL    Chest Tube: 410 mL    Indwelling Catheter - Urethral: 2110 mL    Rectal Tube: 200 mL  Total OUT: 2880 mL    Total NET: 1200 mL        Daily Height in cm: 170.18 (2018 21:46)    Daily Weight in k.5 (2018 00:00)    LABS:                        9.6    16.5  )-----------( 122      ( 2018 01:16 )             29.9     02-14    154<H>  |  125<H>  |  69<H>  ----------------------------<  131<H>  4.3   |  17<L>  |  1.91<H>    Ca    7.6<L>      2018 01:16    TPro  5.2<L>  /  Alb  2.3<L>  /  TBili  1.0  /  DBili  x   /  AST  38  /  ALT  38  /  AlkPhos  108  02-14    PT/INR - ( 2018 01:16 )   PT: 11.4 sec;   INR: 1.05 ratio         PTT - ( 2018 01:16 )  PTT:40.9 sec      RADIOLOGY & ADDITIONAL STUDIES:

## 2018-02-14 NOTE — BRIEF OPERATIVE NOTE - PROCEDURE
<<-----Click on this checkbox to enter Procedure Sternal wound debridement  02/14/2018  Removal of VAC, washout, closure  Active  NLE

## 2018-02-14 NOTE — PROGRESS NOTE ADULT - ASSESSMENT
72 year-old male with past medical history of T2DM, DLD, alcohol misuse brought in by family after having been found face down on the floor for approximately 2 days. On treatment for UTI, but now JANES with Aortic Valve lesions. 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 Bartonella, Legionella, Q Fever serology negative; fungal BCX negative. Brucella pending. Unclear cause of NVE, culture  with [facility]--pending. Having diarrhea, C diff negative. LFTs rising, working ELIAN, aortic balloon pump, pressors. Critically ill. Aortic valve vegetation, leukocytosis, post operative state.  -    discussed with colleagues and [facility]  organism is fastidious gram positive coccus   micro having trouble getting it to grow   not likely if it were enterococcus  more likely vit  b 6  def strep that needs combination therapy   asked micro to send to [place] for PCR    doing better  extubated afebrile     ab broadened over weekend  cultrues all negative to date and seems back to baseline  will go back to low dose genta and ceftriaxone  sternum closed today to watch it.         discussed with Dr. Mary in detail and plastics has hematoma in mediastinum but no evidence of sternal wd infection so it will probably be closed

## 2018-02-14 NOTE — PROGRESS NOTE ADULT - SUBJECTIVE AND OBJECTIVE BOX
infectious diseases progress note:    Patient is a 72y old  Male who presents with a chief complaint of Fall (09 Jan 2018 00:52)        Atrial fibrillation             No Known Allergies    Intolerances        ANTIBIOTICS/RELEVANT:  antimicrobials  cefTRIAXone   IVPB      gentamicin   IVPB        immunologic:    OTHER:      Objective:  Vital Signs Last 24 Hrs  T(C): 37 (14 Feb 2018 07:00), Max: 37 (14 Feb 2018 07:00)  T(F): 98.6 (14 Feb 2018 07:00), Max: 98.6 (14 Feb 2018 07:00)  HR: 62 (14 Feb 2018 07:00) (56 - 69)  BP: 102/49 (14 Feb 2018 05:00) (101/49 - 125/57)  BP(mean): 71 (14 Feb 2018 05:00) (71 - 85)  RR: 13 (14 Feb 2018 07:00) (13 - 29)  SpO2: 100% (14 Feb 2018 07:00) (100% - 100%)       Eyes:MYLA, EOMI  Ear/Nose/Throat: no oral lesion, no sinus tenderness on percussion	  Neck:no JVD, no lymphadenopathy, supple  Respiratory: CTA matt  Cardiovascular: S1S2 RRR, no murmurs  Gastrointestinal:soft, (+) BS, no HSM  Extremities:no e/e/c        LABS:                        9.6    16.5  )-----------( 122      ( 14 Feb 2018 01:16 )             29.9     02-14    154<H>  |  125<H>  |  69<H>  ----------------------------<  131<H>  4.3   |  17<L>  |  1.91<H>    Ca    7.6<L>      14 Feb 2018 01:16    TPro  5.2<L>  /  Alb  2.3<L>  /  TBili  1.0  /  DBili  x   /  AST  38  /  ALT  38  /  AlkPhos  108  02-14    PT/INR - ( 14 Feb 2018 01:16 )   PT: 11.4 sec;   INR: 1.05 ratio         PTT - ( 14 Feb 2018 01:16 )  PTT:40.9 sec        MICROBIOLOGY:    RECENT CULTURES:  02-11 @ 16:38 .Other Other, mediastinum #2       Rare polymorphonuclear leukocytes per oil power field  No organisms seen           Testing in progress    02-11 @ 06:42 .Blood Blood-Venous                No growth to date.    02-09 @ 22:27 .Blood Blood                No growth to date.    02-09 @ 20:47 .Blood Blood                No growth to date.          RESPIRATORY CULTURES:      Clostridium difficile [facility] Toxins A&amp;B, EIA:   Negative (02-09 @ 11:51)          RADIOLOGY & ADDITIONAL STUDIES:        Pager [phone]  After 5 pm/weekends or if no response :[phone]

## 2018-02-14 NOTE — PROGRESS NOTE ADULT - ASSESSMENT
The patient is a 72M with 2 L femoral pseudoaneurysms, now s/p thrombin injection x 3    - patient now s/p thrombin injection of residual pseudoaneurysm; scheduled for repeat arterial duplex for confirmation today. likely will be done follow the patient's return from the OR. Duplex can be done at bedside.   - no other vascular surgical intervention indicated besides above      Jackson Ojeda PGY2  [phone]

## 2018-02-14 NOTE — BRIEF OPERATIVE NOTE - PRE-OP DX
Aortic valve endocarditis  01/30/2018    Active  Nirmal Celestin  Pericardial effusion  02/11/2018    Active  Mckenzie Burt  Wound of sternal region  02/14/2018    Active  Liliana Andres

## 2018-02-14 NOTE — PROGRESS NOTE ADULT - ATTENDING COMMENTS
d/w CT sx and plastics sx after vac removal - wound overall clean - no need for omental flap harvest. will sign off - reconsult prn.    supportive care per CTSx/PRSx.

## 2018-02-14 NOTE — PROGRESS NOTE ADULT - SUBJECTIVE AND OBJECTIVE BOX
PRE OPERATIVE NOTE    Pre-op Diagnosis: sternal wound  Procedure: Laparoscopic omental flap harvest  Surgeon: DORENE Saunders                          9.8    16.7  )-----------( 143      ( 2018 02:35 )             28.8         159<H>  |  126<H>  |  76<H>  ----------------------------<  90  3.8   |  17<L>  |  2.28<H>    Ca    8.0<L>      2018 02:35    TPro  5.1<L>  /  Alb  2.6<L>  /  TBili  1.0  /  DBili  x   /  AST  33  /  ALT  38  /  AlkPhos  103  13    LIVER FUNCTIONS - ( 2018 02:35 )  Alb: 2.6 g/dL / Pro: 5.1 g/dL / ALK PHOS: 103 U/L / ALT: 38 U/L RC / AST: 33 U/L / GGT: x           PTT - ( 2018 11:24 )  PTT:33.9 sec  Urinalysis Basic - ( 2018 08:35 )    Color: Yellow / Appearance: SL Turbid / S.015 / pH: x  Gluc: x / Ketone: Negative  / Bili: Negative / Urobili: Negative   Blood: x / Protein: 100 mg/dL / Nitrite: Negative   Leuk Esterase: Trace / RBC: 10-25 /HPF / WBC 10-25 /HPF   Sq Epi: x / Non Sq Epi: Occasional /HPF / Bacteria: Moderate /HPF      CAPILLARY BLOOD GLUCOSE  136 (2018 10:00)      POCT Blood Glucose.: 166 mg/dL (2018 14:08)      Type & Screen: ordered  CXR:  < from: Xray Chest 1 View AP/PA (18 @ 03:43) >  Impression:    The heart is enlarged. Bilateral pleural effusion. Left lower lobe   pneumonia and/or atelectasis. Mild pulmonary vascular congestion. All   life supporting devices are in good position and unchanged when compared   to previous study done 2018.        < end of copied text >    EKG:  < from: 12 Lead ECG (18 @ 10:52) >  Ventricular Rate 65 BPM    Atrial Rate 54 BPM    QRS Duration 64 ms     ms    QTc 407 ms    R Axis 59 degrees    T Axis -9 degrees    Diagnosis Line JUNCTIONAL RHYTHM  LOW VOLTAGE QRS    < end of copied text >    Echocardiogram:   < from: Transthoracic Echocardiogram (02.10.18 @ 14:51) >  Conclusions:  ***Limited study to evaluate for pericardial effusion.  1. There is a circumferential echo-free area around the  heart consistent with a small-moderate pericardial  effusion. There is a large echodense area within the  pericardial space abutting the liver, which likely is  consistent with hematoma and/or abscess (measuring 4.5cm).  Echodensity is seen over the RV free wall and appears to be  restricting the motion of the right heart. IVC not well  seen.  *** Compared with echocardiogram of 2018, the RV free  wall was poorly seen therefore accurate comparison is not  possible. Findings discussed with AURELIANO Rodriguez and images  reviewed with Dr. Dey at 410 PM on 2/10/18.  ------------------------------------------------------------------------  Confirmed on  2/10/2018 - 16:14:41 by Eh Echevarria M.D.  ------------------------------------------------------------------------    < end of copied text >      A/P: 72y Male planned for above procedure  NPO past midnight, except medications; Please hold tube feeds at midnight  Please correct hypernatremia of 160  Please diurese for b/l pulm effusions  IVF  Pain/nausea control  AM labs  Consent in chart  amiodarone    Tablet  gentamicin   IVPB  meropenem  IVPB  metoprolol     tartrate

## 2018-02-14 NOTE — PROGRESS NOTE ADULT - SUBJECTIVE AND OBJECTIVE BOX
SAMANTHA CADENA   MRN#: [id number]     The patient is a 72y Male who was seen, evaluated, & examined with the CTICU staff on rounds and later in the day with a multidisciplinary care plan formulated & implemented.  All available clinical, laboratory, radiographic, pharmacologic, and electrocardiographic data were reviewed & analyzed.      The patient was in the CTICU in critical condition secondary to resolved large anterior mediational hematoma, S/P chest incision closure, persistent cardiopulmonary dysfunction, hemodynamically significant hypovolemia, Factor XI deficiency, acute on chronic postoperative kidney injury, hypernatremia and Gram positive cocci aortic valve endocarditis.        Respiratory status required supplemental oxygen, the following of ABG’s with A-line monitoring, continuous pulse oximetry monitoring, and IV antibiotics for support & to evaluate for & prevent further decompensation secondary to resolved large anterior mediational hematoma requiring RTOR, S/P chest incision closure, persistent cardiopulmonary dysfunction, and Gram positive cocci AV endocarditis.     Invasive hemodynamic monitoring with an A-line was required for the following of continuous MAP/BP monitoring to ensure adequate cardiovascular support and to evaluate for & help prevent decompensation S/P RTOR for evacuation of large anterior mediational hematoma and S/P chest incision closure.    Patient required more than the usual postoperative critical care management and I provided 30 minutes of non-continuous care to the patient.  Discussed at length with the CTICU staff and helped coordinate care.

## 2018-02-15 LAB
ALBUMIN SERPL ELPH-MCNC: 2.6 G/DL — LOW (ref 3.3–5)
ALP SERPL-CCNC: 92 U/L — SIGNIFICANT CHANGE UP (ref 40–120)
ALT FLD-CCNC: 30 U/L RC — SIGNIFICANT CHANGE UP (ref 10–45)
ANION GAP SERPL CALC-SCNC: 11 MMOL/L — SIGNIFICANT CHANGE UP (ref 5–17)
APPEARANCE UR: ABNORMAL
AST SERPL-CCNC: 32 U/L — SIGNIFICANT CHANGE UP (ref 10–40)
BACTERIA # UR AUTO: ABNORMAL /HPF
BILIRUB SERPL-MCNC: 0.9 MG/DL — SIGNIFICANT CHANGE UP (ref 0.2–1.2)
BILIRUB UR-MCNC: NEGATIVE — SIGNIFICANT CHANGE UP
BUN SERPL-MCNC: 57 MG/DL — HIGH (ref 7–23)
CALCIUM SERPL-MCNC: 7.6 MG/DL — LOW (ref 8.4–10.5)
CHLORIDE SERPL-SCNC: 118 MMOL/L — HIGH (ref 96–108)
CO2 SERPL-SCNC: 18 MMOL/L — LOW (ref 22–31)
COLOR SPEC: YELLOW — SIGNIFICANT CHANGE UP
COMMENT - URINE: SIGNIFICANT CHANGE UP
COMMENT - URINE: SIGNIFICANT CHANGE UP
CREAT SERPL-MCNC: 1.73 MG/DL — HIGH (ref 0.5–1.3)
DIFF PNL FLD: ABNORMAL
EPI CELLS # UR: SIGNIFICANT CHANGE UP /HPF
GAS PNL BLDA: SIGNIFICANT CHANGE UP
GLUCOSE BLDC GLUCOMTR-MCNC: 119 MG/DL — HIGH (ref 70–99)
GLUCOSE BLDC GLUCOMTR-MCNC: 124 MG/DL — HIGH (ref 70–99)
GLUCOSE BLDC GLUCOMTR-MCNC: 143 MG/DL — HIGH (ref 70–99)
GLUCOSE BLDC GLUCOMTR-MCNC: 158 MG/DL — HIGH (ref 70–99)
GLUCOSE SERPL-MCNC: 82 MG/DL — SIGNIFICANT CHANGE UP (ref 70–99)
GLUCOSE UR QL: NEGATIVE — SIGNIFICANT CHANGE UP
HCT VFR BLD CALC: 26.3 % — LOW (ref 39–50)
HGB BLD-MCNC: 8.9 G/DL — LOW (ref 13–17)
KETONES UR-MCNC: NEGATIVE — SIGNIFICANT CHANGE UP
LEUKOCYTE ESTERASE UR-ACNC: ABNORMAL
MAGNESIUM SERPL-MCNC: 1.7 MG/DL — SIGNIFICANT CHANGE UP (ref 1.6–2.6)
MCHC RBC-ENTMCNC: 32.2 PG — SIGNIFICANT CHANGE UP (ref 27–34)
MCHC RBC-ENTMCNC: 33.8 GM/DL — SIGNIFICANT CHANGE UP (ref 32–36)
MCV RBC AUTO: 95.2 FL — SIGNIFICANT CHANGE UP (ref 80–100)
NITRITE UR-MCNC: NEGATIVE — SIGNIFICANT CHANGE UP
PH UR: 5.5 — SIGNIFICANT CHANGE UP (ref 5–8)
PHOSPHATE SERPL-MCNC: 3.8 MG/DL — SIGNIFICANT CHANGE UP (ref 2.5–4.5)
PLATELET # BLD AUTO: 107 K/UL — LOW (ref 150–400)
POTASSIUM SERPL-MCNC: 4.1 MMOL/L — SIGNIFICANT CHANGE UP (ref 3.5–5.3)
POTASSIUM SERPL-SCNC: 4.1 MMOL/L — SIGNIFICANT CHANGE UP (ref 3.5–5.3)
PROT SERPL-MCNC: 5.1 G/DL — LOW (ref 6–8.3)
PROT UR-MCNC: 30 MG/DL
RBC # BLD: 2.76 M/UL — LOW (ref 4.2–5.8)
RBC # FLD: 16.8 % — HIGH (ref 10.3–14.5)
RBC CASTS # UR COMP ASSIST: >50 /HPF (ref 0–2)
SODIUM SERPL-SCNC: 147 MMOL/L — HIGH (ref 135–145)
SP GR SPEC: 1.01 — SIGNIFICANT CHANGE UP (ref 1.01–1.02)
UROBILINOGEN FLD QL: NEGATIVE — SIGNIFICANT CHANGE UP
WBC # BLD: 11.1 K/UL — HIGH (ref 3.8–10.5)
WBC # FLD AUTO: 11.1 K/UL — HIGH (ref 3.8–10.5)
WBC UR QL: SIGNIFICANT CHANGE UP /HPF (ref 0–5)

## 2018-02-15 PROCEDURE — 99291 CRITICAL CARE FIRST HOUR: CPT

## 2018-02-15 PROCEDURE — 71045 X-RAY EXAM CHEST 1 VIEW: CPT | Mod: 26

## 2018-02-15 PROCEDURE — 99233 SBSQ HOSP IP/OBS HIGH 50: CPT | Mod: GC

## 2018-02-15 PROCEDURE — 99232 SBSQ HOSP IP/OBS MODERATE 35: CPT

## 2018-02-15 RX ORDER — LOPERAMIDE HCL 2 MG
2 TABLET ORAL EVERY 8 HOURS
Qty: 0 | Refills: 0 | Status: COMPLETED | OUTPATIENT
Start: 2018-02-15 | End: 2018-02-16

## 2018-02-15 RX ORDER — ALBUMIN HUMAN 25 %
250 VIAL (ML) INTRAVENOUS ONCE
Qty: 0 | Refills: 0 | Status: COMPLETED | OUTPATIENT
Start: 2018-02-15 | End: 2018-02-14

## 2018-02-15 RX ADMIN — Medication 1 MILLIGRAM(S): at 12:10

## 2018-02-15 RX ADMIN — Medication 650 MILLIGRAM(S): at 16:58

## 2018-02-15 RX ADMIN — Medication 2 MILLIGRAM(S): at 12:07

## 2018-02-15 RX ADMIN — Medication 325 MILLIGRAM(S): at 12:09

## 2018-02-15 RX ADMIN — PANTOPRAZOLE SODIUM 40 MILLIGRAM(S): 20 TABLET, DELAYED RELEASE ORAL at 12:08

## 2018-02-15 RX ADMIN — HEPARIN SODIUM 5000 UNIT(S): 5000 INJECTION INTRAVENOUS; SUBCUTANEOUS at 05:17

## 2018-02-15 RX ADMIN — FINASTERIDE 5 MILLIGRAM(S): 5 TABLET, FILM COATED ORAL at 12:10

## 2018-02-15 RX ADMIN — Medication 2: at 12:32

## 2018-02-15 RX ADMIN — Medication 2: at 05:24

## 2018-02-15 RX ADMIN — AMIODARONE HYDROCHLORIDE 200 MILLIGRAM(S): 400 TABLET ORAL at 05:17

## 2018-02-15 RX ADMIN — ATORVASTATIN CALCIUM 80 MILLIGRAM(S): 80 TABLET, FILM COATED ORAL at 21:19

## 2018-02-15 RX ADMIN — Medication 5: at 21:38

## 2018-02-15 RX ADMIN — HEPARIN SODIUM 5000 UNIT(S): 5000 INJECTION INTRAVENOUS; SUBCUTANEOUS at 13:16

## 2018-02-15 RX ADMIN — HEPARIN SODIUM 5000 UNIT(S): 5000 INJECTION INTRAVENOUS; SUBCUTANEOUS at 21:19

## 2018-02-15 RX ADMIN — Medication 650 MILLIGRAM(S): at 18:10

## 2018-02-15 RX ADMIN — Medication 100 MILLIGRAM(S): at 15:11

## 2018-02-15 RX ADMIN — Medication 2: at 15:10

## 2018-02-15 RX ADMIN — Medication 2 MILLIGRAM(S): at 21:19

## 2018-02-15 RX ADMIN — Medication 200 MILLIGRAM(S): at 12:07

## 2018-02-15 RX ADMIN — CEFTRIAXONE 100 GRAM(S): 500 INJECTION, POWDER, FOR SOLUTION INTRAMUSCULAR; INTRAVENOUS at 13:15

## 2018-02-15 RX ADMIN — Medication 75 MICROGRAM(S): at 15:11

## 2018-02-15 NOTE — SWALLOW BEDSIDE ASSESSMENT ADULT - SWALLOW EVAL: DIAGNOSIS
Patient presents with oral and suspected pharyngeal dysphagia characterized by suspected premature spillover, delayed pharyngeal swallow trigger, repeat swallow suggestive of pharyngeal retention, and subtle throat clears following 4 ice chips suggestive of laryngeal penetration and/or aspiration with ice chips. Given ENT findings of R VF paresis, glottic gap, and b/l post intubation VF granulomas, objective assessment is warranted to comprehensively assess swallow function. Patient presents with oral and suspected pharyngeal dysphagia characterized by suspected premature spillover, delayed pharyngeal swallow trigger, and subtle throat clears following 4 ice chips suggestive of laryngeal penetration and/or aspiration with ice chips. Given ENT findings of R VF paresis, glottic gap, and b/l post intubation VF granulomas, objective assessment is warranted to comprehensively assess swallow function.

## 2018-02-15 NOTE — SWALLOW BEDSIDE ASSESSMENT ADULT - SLP PERTINENT HISTORY OF CURRENT PROBLEM
REFER TO ADDENDUM FOR COMPLETE HX/HC

## 2018-02-15 NOTE — SWALLOW BEDSIDE ASSESSMENT ADULT - ASR SWALLOW LINGUAL MOBILITY
reduced strength and agility, +pt unable to dissociate lingual movement from mandibular movement possibly suggestive of impaired lateralization
reduced strength and agility, +pt unable to dissociate lingual movement from mandibular movement possibly suggestive of impaired lateralization

## 2018-02-15 NOTE — PROGRESS NOTE ADULT - PROBLEM SELECTOR PLAN 1
Likely contrast induced nephropathy from cardiac cath/Toxic/Septic  ATN. Scr. has improved to 1.73 today.  Patient is non oliguric.  Plan is to continue monitoring Scr., electrolytes, and urine output. Monitor gentamicin and vancomycin trough levels. Avoid nephrotoxins. Likely contrast induced nephropathy from cardiac cath/Toxic/Septic  ATN. Scr. has improved to 1.73 today.  Patient is non oliguric.  Plan is to continue monitoring Scr., electrolytes, and urine output. Monitor gentamicin levels.

## 2018-02-15 NOTE — PROGRESS NOTE ADULT - SUBJECTIVE AND OBJECTIVE BOX
CC: Pseudoaneurysm  HPI: 72 M presented with endocarditis and underwent an AVR on 2018. Patient had an IABP placed in the L femoral artery on 2018, which was removed on 2/3/2018. Patient now noticed to have a L groin hematoma. A Duplex revealed two femoral pseudoaneurysms.  24/Overnight events: Patient seen and examined at bedside. Patient is now s/p L pseudoanuerysm thrombin injection x3 with remaining pseudoaneursym injected with thrombin two days prior with U/S resolution. Patient underwent repeat arterial duplex that showed resolution of pseudoaneurysm.       Objective:  Vital Signs Last 24 Hrs  T(C): 36.7 (15 Feb 2018 08:00), Max: 36.9 (2018 23:00)  T(F): 98.1 (15 Feb 2018 08:00), Max: 98.4 (2018 23:00)  HR: 72 (15 Feb 2018 08:00) (60 - 77)  BP: 109/57 (15 Feb 2018 06:00) (105/53 - 126/58)  BP(mean): 78 (15 Feb 2018 06:00) (75 - 83)  RR: 20 (15 Feb 2018 08:00) (11 - 34)  SpO2: 100% (15 Feb 2018 08:00) (99% - 100%)    Physical Exam:  General: NAD  Respiratory: non-labored breathing   Pulse: resolving L groin mass, incision looks clean and dry, mildly tender, palpable AT pulse     MEDICATIONS  (STANDING):  amiodarone    Tablet 200 milliGRAM(s) Oral daily  aspirin 325 milliGRAM(s) Oral daily  atorvastatin 80 milliGRAM(s) Oral at bedtime  cefTRIAXone   IVPB 2 Gram(s) IV Intermittent every 24 hours  cefTRIAXone   IVPB      dextrose 5%. 1000 milliLiter(s) (20 mL/Hr) IV Continuous <Continuous>  finasteride 5 milliGRAM(s) Oral daily  folic acid 1 milliGRAM(s) Oral daily  gentamicin   IVPB 40 milliGRAM(s) IV Intermittent <User Schedule>  heparin  Injectable 5000 Unit(s) SubCutaneous every 8 hours  insulin lispro (HumaLOG) corrective regimen sliding scale   SubCutaneous every 4 hours  levothyroxine Injectable 75 MICROGram(s) IV Push <User Schedule>  pantoprazole  Injectable 40 milliGRAM(s) IV Push daily  thiamine 100 milliGRAM(s) Oral daily    MEDICATIONS  (PRN):  acetaminophen    Suspension. 650 milliGRAM(s) Oral every 6 hours PRN Mild Pain (1 - 3)    I&O's Detail    2018 07:01  -  15 Feb 2018 07:00  --------------------------------------------------------  IN:    Albumin 5%  - 250 mL: 750 mL    dextrose 5%.: 920 mL    Enteral Tube Flush: 120 mL    Free Water: 250 mL    Solution: 300 mL    Vital HN: 1200 mL  Total IN: 3540 mL    OUT:    Chest Tube: 280 mL    Chest Tube: 430 mL    Indwelling Catheter - Urethral: 1835 mL    Rectal Tube: 350 mL  Total OUT: 2895 mL    Total NET: 645 mL      15 Feb 2018 07:01  -  15 Feb 2018 09:55  --------------------------------------------------------  IN:    Vital HN: 60 mL  Total IN: 60 mL    OUT:    Chest Tube: 30 mL    Chest Tube: 50 mL    Indwelling Catheter - Urethral: 120 mL  Total OUT: 200 mL    Total NET: -140 mL        Daily     Daily Weight in k.4 (15 Feb 2018 00:00)    LABS:                        8.9    11.1  )-----------( 107      ( 15 Feb 2018 00:35 )             26.3     02-15    147<H>  |  118<H>  |  57<H>  ----------------------------<  82  4.1   |  18<L>  |  1.73<H>    Ca    7.6<L>      15 Feb 2018 00:35  Phos  3.8     02-15  Mg     1.7     02-15    TPro  5.1<L>  /  Alb  2.6<L>  /  TBili  0.9  /  DBili  x   /  AST  32  /  ALT  30  /  AlkPhos  92  02-15    PT/INR - ( 2018 09:44 )   PT: 11.6 sec;   INR: 1.06 ratio         PTT - ( 2018 09:44 )  PTT:40.1 sec  Urinalysis Basic - ( 15 Feb 2018 02:21 )    Color: Yellow / Appearance: SL Turbid / S.015 / pH: x  Gluc: x / Ketone: Negative  / Bili: Negative / Urobili: Negative   Blood: x / Protein: 30 mg/dL / Nitrite: Negative   Leuk Esterase: Moderate / RBC: >50 /HPF / WBC 11-25 /HPF   Sq Epi: x / Non Sq Epi: OCC /HPF / Bacteria: Few /HPF        RADIOLOGY & ADDITIONAL STUDIES:

## 2018-02-15 NOTE — PROGRESS NOTE ADULT - SUBJECTIVE AND OBJECTIVE BOX
Plastic Surgery  Daily Progress Note     Subjective/24 Hour Events: No acute events overnight.    Objective:  Vitals:  T(C): 36.7 (02-15-18 @ 08:00), Max: 36.9 (02-14-18 @ 23:00)  HR: 73 (02-15-18 @ 10:00) (60 - 77)  BP: 109/57 (02-15-18 @ 06:00) (105/53 - 126/58)  RR: 18 (02-15-18 @ 10:00) (11 - 34)  SpO2: 100% (02-15-18 @ 10:00) (99% - 100%)    I/O:     02-14-18 @ 07:01  -  02-15-18 @ 07:00  --------------------------------------------------------  IN: 3540 mL / OUT: 2895 mL / NET: 645 mL    02-15-18 @ 07:01  -  02-15-18 @ 10:33  --------------------------------------------------------  IN: 60 mL / OUT: 200 mL / NET: -140 mL        Physical Exam:   - General: NAD  - Chest: Dressing in place over sternotomy incision, C/D/I    A/P: 72M s/p Sternal Debridement & Primary Closure (POD #1)    - Cont w/ Dressing  - Care as per primary team

## 2018-02-15 NOTE — PROGRESS NOTE ADULT - ASSESSMENT
The patient is a 72M with 2 L femoral pseudoaneurysms, now s/p thrombin injection x 3    - patient now s/p thrombin injection of residual pseudoaneurysm; repeat arterial duplex revealed confirmation of residual pseudoaneurysm  - no other vascular surgical intervention indicated  - will sign off, please reconsult as needed for any acute changes in patient's status or concerns      Jackson Ojeda PGY2  [phone]

## 2018-02-15 NOTE — SWALLOW BEDSIDE ASSESSMENT ADULT - SWALLOW EVAL: SECRETION MANAGEMENT
grossly WNL
+pooling of secretions in L lateral sulcus, +anterior loss of secretions on L, possibly related to positioning. Pt with baseline wet vocal quality.

## 2018-02-15 NOTE — PROGRESS NOTE ADULT - SUBJECTIVE AND OBJECTIVE BOX
[facility] Division of Kidney Diseases & Hypertension  FOLLOW UP NOTE  [phone]--------------------------------------------------------------------------------    73 y/o man with history of ETOH abuse who presents with fall and found with endocarditis, developed ELIAN in-hospital shortly after undergoing cardiac catheterization, s/p urgent AVR and CABG on 1/30/18 with clinical decompensation on 2/1 requiring intubation and multiple vasopressor support.  Patient now extubated. Patient able to communicate by nodding. Patient was noted to have hypothermia and elevated WBC over weekend. Echocardiogram was done which showed echodense area over pericardiac space consistent with abscess/hematoma. Patient had drainage of pericardial hematoma/abscess yesterday. Currently patient is sitting in chair with no complaints.     PAST HISTORY  --------------------------------------------------------------------------------  No significant changes to PMH, PSH, FHx, SHx, unless otherwise noted    ALLERGIES & MEDICATIONS  --------------------------------------------------------------------------------  Allergies    No Known Allergies    Intolerances      Standing Inpatient Medications  amiodarone    Tablet 200 milliGRAM(s) Oral daily  aspirin 325 milliGRAM(s) Oral daily  atorvastatin 80 milliGRAM(s) Oral at bedtime  cefTRIAXone   IVPB 2 Gram(s) IV Intermittent every 24 hours  cefTRIAXone   IVPB      dextrose 5%. 1000 milliLiter(s) IV Continuous <Continuous>  finasteride 5 milliGRAM(s) Oral daily  folic acid 1 milliGRAM(s) Oral daily  gentamicin   IVPB 40 milliGRAM(s) IV Intermittent <User Schedule>  heparin  Injectable 5000 Unit(s) SubCutaneous every 8 hours  insulin lispro (HumaLOG) corrective regimen sliding scale   SubCutaneous every 4 hours  levothyroxine Injectable 75 MICROGram(s) IV Push <User Schedule>  pantoprazole  Injectable 40 milliGRAM(s) IV Push daily  thiamine 100 milliGRAM(s) Oral daily    PRN Inpatient Medications  acetaminophen    Suspension. 650 milliGRAM(s) Oral every 6 hours PRN      REVIEW OF SYSTEMS  --------------------------------------------------------------------------------  Respiratory: No dyspnea  CV: No chest pain  GI: no abdominal pain    VITALS/PHYSICAL EXAM  --------------------------------------------------------------------------------  T(C): 36.7 (02-15-18 @ 08:00), Max: 36.9 (02-14-18 @ 23:00)  HR: 72 (02-15-18 @ 08:00) (60 - 77)  BP: 109/57 (02-15-18 @ 06:00) (105/53 - 126/58)  RR: 20 (02-15-18 @ 08:00) (11 - 34)  SpO2: 100% (02-15-18 @ 08:00) (99% - 100%)  Wt(kg): --  Height (cm): 170.18 (02-13-18 @ 21:46)  Weight (kg): 75.4 (02-13-18 @ 21:46)  BMI (kg/m2): 26 (02-13-18 @ 21:46)  BSA (m2): 1.87 (02-13-18 @ 21:46)      02-14-18 @ 07:01  -  02-15-18 @ 07:00  --------------------------------------------------------  IN: 3540 mL / OUT: 2895 mL / NET: 645 mL    02-15-18 @ 07:01  -  02-15-18 @ 09:54  --------------------------------------------------------  IN: 60 mL / OUT: 200 mL / NET: -140 mL      Physical Exam:  	              Gen: NAD ill appearing  	HEENT: NG tube  	Pulm: coarse breath sounds wound vac noted  	CV: RRR, S1S2;  rub sternotomy noted; wound vac present over chest   	Abd: +BS, soft, nontender/nondistended              LE: no edema              : kayli    LABS/STUDIES  --------------------------------------------------------------------------------              8.9    11.1  >-----------<  107      [02-15-18 @ 00:35]              26.3     147  |  118  |  57  ----------------------------<  82      [02-15-18 @ 00:35]  4.1   |  18  |  1.73        Ca     7.6     [02-15-18 @ 00:35]      Mg     1.7     [02-15-18 @ 00:35]      Phos  3.8     [02-15-18 @ 00:35]    TPro  5.1  /  Alb  2.6  /  TBili  0.9  /  DBili  x   /  AST  32  /  ALT  30  /  AlkPhos  92  [02-15-18 @ 00:35]    PT/INR: PT 11.6 , INR 1.06       [02-14-18 @ 09:44]  PTT: 40.1       [02-14-18 @ 09:44]      Creatinine Trend:  SCr 1.73 [02-15 @ 00:35]  SCr 1.79 [02-14 @ 09:44]  SCr 1.91 [02-14 @ 01:16]  SCr 2.28 [02-13 @ 02:35]  SCr 2.41 [02-12 @ 03:32]    Urinalysis - [02-15-18 @ 02:21]      Color Yellow / Appearance SL Turbid / SG 1.015 / pH 5.5      Gluc Negative / Ketone Negative  / Bili Negative / Urobili Negative       Blood Large / Protein 30 / Leuk Est Moderate / Nitrite Negative      RBC >50 / WBC 11-25 / Hyaline  / Gran  / Sq Epi  / Non Sq Epi OCC / Bacteria Few      TSH 12.56      [02-10-18 @ 12:11]    HIV Nonreact      [02-10-18 @ 12:11]

## 2018-02-15 NOTE — SWALLOW BEDSIDE ASSESSMENT ADULT - SLP GENERAL OBSERVATIONS
Patient encountered OOB in chair, +KFT. Pt A&O grossly x3, able to make some wants and needs known and follow directives for purpose of evaluation. Hoarse vocal quality noted, improved from initial assessment. Mildly imprecise articulation noted.

## 2018-02-15 NOTE — SWALLOW BEDSIDE ASSESSMENT ADULT - MUCOSAL QUALITY
+areas of ecchymosis L>R posteriorly along palate  +dried secretions posteriorly along palate; aggressive oral care provided prior to administration of PO trials
+areas of ecchymosis posteriorly along palate, R>L  +thick blood tinged secretions along lingual surface, clinicians provided oral care prior to administration of PO trials

## 2018-02-15 NOTE — PROGRESS NOTE ADULT - ASSESSMENT
72 year-old male with past medical history of T2DM, DLD, alcohol misuse brought in by family after having been found face down on the floor for approximately 2 days. On treatment for UTI, but now JANES with Aortic Valve lesions. 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 Bartonella, Legionella, Q Fever serology negative; fungal BCX negative. Brucella pending. Unclear cause of NVE, culture  with [facility]--pending. Having diarrhea, C diff negative. LFTs rising, working ELIAN, aortic balloon pump, pressors. Critically ill. Aortic valve vegetation, leukocytosis, post operative state.  -    discussed with colleagues and [facility]  organism is fastidious gram positive coccus   micro having trouble getting it to grow   not likely if it were enterococcus  more likely vit  b 6  def strep that needs combination therapy   asked micro to send to [facility] for PCR         ab broadened over weekend  cultrues all negative to date and seems back to baseline  will go back to low dose genta and ceftriaxone  sternum closed  but there was no evidence of infection at the time so we plan to watch it and complete course for presumed strept endocarditis ( PCR pending)        discussed with Dr. Mary in detail and plastics has hematoma in mediastinum but no evidence of sternal wd infection so it will probably be closed

## 2018-02-15 NOTE — PROGRESS NOTE ADULT - SUBJECTIVE AND OBJECTIVE BOX
infectious diseases progress note:    Patient is a 72y old  Male who presents with a chief complaint of Fall (2018 00:52)        Atrial fibrillation         Allergies    No Known Allergies    Intolerances        ANTIBIOTICS/RELEVANT:  antimicrobials  cefTRIAXone   IVPB 2 Gram(s) IV Intermittent every 24 hours  cefTRIAXone   IVPB      gentamicin   IVPB 40 milliGRAM(s) IV Intermittent <User Schedule>    immunologic:    OTHER:  acetaminophen    Suspension. 650 milliGRAM(s) Oral every 6 hours PRN  amiodarone    Tablet 200 milliGRAM(s) Oral daily  aspirin 325 milliGRAM(s) Oral daily  atorvastatin 80 milliGRAM(s) Oral at bedtime  dextrose 5%. 1000 milliLiter(s) IV Continuous <Continuous>  finasteride 5 milliGRAM(s) Oral daily  folic acid 1 milliGRAM(s) Oral daily  heparin  Injectable 5000 Unit(s) SubCutaneous every 8 hours  insulin lispro (HumaLOG) corrective regimen sliding scale   SubCutaneous every 4 hours  levothyroxine Injectable 75 MICROGram(s) IV Push <User Schedule>  pantoprazole  Injectable 40 milliGRAM(s) IV Push daily  thiamine 100 milliGRAM(s) Oral daily      Objective:  Vital Signs Last 24 Hrs  T(C): 36.9 (15 Feb 2018 03:00), Max: 36.9 (2018 23:00)  T(F): 98.4 (15 Feb 2018 03:00), Max: 98.4 (2018 23:00)  HR: 71 (15 Feb 2018 07:00) (60 - 77)  BP: 109/57 (15 Feb 2018 06:00) (105/53 - 126/58)  BP(mean): 78 (15 Feb 2018 06:00) (75 - 83)  RR: 20 (15 Feb 2018 07:00) (11 - 34)  SpO2: 100% (15 Feb 2018 07:00) (99% - 100%)       Eyes:MYLA, EOMI  Ear/Nose/Throat: no oral lesion, no sinus tenderness on percussion	  Neck:no JVD, no lymphadenopathy, supple  Respiratory: CTA matt wd closed   Cardiovascular: S1S2 RRR, no murmurs  Gastrointestinal:soft, (+) BS, no HSM  Extremities:no e/e/c        LABS:                        8.9    11.1  )-----------( 107      ( 15 Feb 2018 00:35 )             26.3     02-15    147<H>  |  118<H>  |  57<H>  ----------------------------<  82  4.1   |  18<L>  |  1.73<H>    Ca    7.6<L>      15 Feb 2018 00:35  Phos  3.8     02-15  Mg     1.7     02-15    TPro  5.1<L>  /  Alb  2.6<L>  /  TBili  0.9  /  DBili  x   /  AST  32  /  ALT  30  /  AlkPhos  92  02-15    PT/INR - ( 2018 09:44 )   PT: 11.6 sec;   INR: 1.06 ratio         PTT - ( 2018 09:44 )  PTT:40.1 sec  Urinalysis Basic - ( 15 Feb 2018 02:21 )    Color: Yellow / Appearance: SL Turbid / S.015 / pH: x  Gluc: x / Ketone: Negative  / Bili: Negative / Urobili: Negative   Blood: x / Protein: 30 mg/dL / Nitrite: Negative   Leuk Esterase: Moderate / RBC: >50 /HPF / WBC 11-25 /HPF   Sq Epi: x / Non Sq Epi: OCC /HPF / Bacteria: Few /HPF          MICROBIOLOGY:    RECENT CULTURES:   @ 16:38 .Other Other, mediastinum #2       Rare polymorphonuclear leukocytes per oil power field  No organisms seen           Testing in progress     @ 06:42 .Blood Blood-Venous                No growth to date.     @ 22:27 .Blood Blood                No growth at 5 days.     @ 20:47 .Blood Blood                No growth at 5 days.          RESPIRATORY CULTURES:      Clostridium difficile [facility] Toxins A&amp;B, EIA:   Negative ( @ 11:51)          RADIOLOGY & ADDITIONAL STUDIES:        Pager [phone]  After 5 pm/weekends or if no response :[phone]

## 2018-02-15 NOTE — SWALLOW BEDSIDE ASSESSMENT ADULT - PHARYNGEAL PHASE
Cough post oral intake/Delayed pharyngeal swallow/Wet vocal quality post oral intake
subtle throat clear post intake/Delayed pharyngeal swallow

## 2018-02-15 NOTE — PROGRESS NOTE ADULT - SUBJECTIVE AND OBJECTIVE BOX
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  All available clinical, laboratory, radiographic, pharmacologic, and electrocardiographic data were reviewed & analyzed.      The patient was in the CTICU in critical condition secondary to resolving septic and cardiogenic shock, chronic heart failure, culture negative endocarditis, paroxysmal atrial fibrillation, acute on chronic renal failure, with recent return to the OR for drainage of a pericardial effusion.    Respiratory status required supplemental oxygen, mobilization and assistance with pulmonary toilet, close monitoring of breathing pattern and respiratory rate, the following of ABG’s with A-line monitoring, continuous pulse oximetry monitoring, for support & to evaluate for & prevent further decompensation secondary to persistent cardiopulmonary dysfunction due to recent intubation for drainage of pericardial effusion, atelectasis and his deconditioned state.    Invasive hemodynamic monitoring with a central venous and arterial catheter were required for the following of continuous MAP/BP monitoring to ensure adequate cardiovascular support and to evaluate for & help prevent decompensation for his resolving cardiogenic and septic shock and while receiving oral amiodarone and for his atrial fibrillation and hydration for acute on chronic renal failure s/p pericardial drainage. Lopressor and Dig discontinued due to low blood pressure and heart rate. He is on enteral free water due to his hypernatremia and intravascular volume depletion. Chest xray shows new pleural effusion. Will plan to drain.    Metabolic stability, uncontrolled type 2 diabetes-hyperglycemia, & infection prophylaxis required an IV regular Insulin sliding scale & the following of serial glucose levels to help achieve & maintain euglycemia.      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     Patient recentlyl underwent a repeat thrombin injection today for his two left femoral pseudoaneurysms.    He is tolerating enteral feedings at goal rate. Plan for FEEST tomorrow.    Patient required more than the usual postoperative critical care management and I provided 35 minutes of non-continuous care to the patient.  Discussed at length with the CTICU staff and helped coordinate care. 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  All available clinical, laboratory, radiographic, pharmacologic, and electrocardiographic data were reviewed & analyzed.      The patient was in the CTICU in critical condition secondary to resolving septic and cardiogenic shock, chronic heart failure, culture negative endocarditis, paroxysmal atrial fibrillation, acute on chronic renal failure, with recent return to the OR for drainage of a pericardial effusion.    Respiratory status required supplemental oxygen, mobilization and assistance with pulmonary toilet, close monitoring of breathing pattern and respiratory rate, the following of ABG’s with A-line monitoring, continuous pulse oximetry monitoring, for support & to evaluate for & prevent further decompensation secondary to persistent cardiopulmonary dysfunction due to recent intubation for drainage of pericardial effusion, atelectasis and his deconditioned state.    Invasive hemodynamic monitoring with a central venous and arterial catheter were required for the following of continuous MAP/BP monitoring to ensure adequate cardiovascular support and to evaluate for & help prevent decompensation for his resolving cardiogenic and septic shock and while receiving oral amiodarone and for his atrial fibrillation and hydration for acute on chronic renal failure s/p pericardial drainage. Lopressor and Dig discontinued due to low blood pressure and heart rate. He is on enteral free water due to his hypernatremia and intravascular volume depletion. Chest xray shows new pleural effusion. Will plan to drain.    Metabolic stability, uncontrolled type 2 diabetes-hyperglycemia, & infection prophylaxis required an IV regular Insulin sliding scale & the following of serial glucose levels to help achieve & maintain euglycemia.      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    Patient recentlyl underwent a repeat thrombin injection today for his two left femoral pseudoaneurysms.    He is tolerating enteral feedings at goal rate. Plan for FEEST tomorrow.    Patient required more than the usual postoperative critical care management and I provided 35 minutes of non-continuous care to the patient.  Discussed at length with the CTICU staff and helped coordinate care.

## 2018-02-15 NOTE — SWALLOW BEDSIDE ASSESSMENT ADULT - SWALLOW EVAL: PATIENT/FAMILY GOALS STATEMENT
Patient denies h/o dysphagia. Endorses PNA since January, "when I came here." Endorses h/o GERD, stated "they give me something (medication) here."
Patient reports feeling stronger on this date. States his voice is "much better." Continues to deny h/o dysphagia PTA, denies h/o PNA and GERD. Endorses hunger.

## 2018-02-15 NOTE — PROGRESS NOTE ADULT - PROBLEM SELECTOR PLAN 2
Likely in setting of impaired urinary concentrating ability given recovering ATN. Serum sodium has improved to 147 today. C/w hypotonic IVF. Monitor serum sodium.

## 2018-02-15 NOTE — SWALLOW BEDSIDE ASSESSMENT ADULT - ADDITIONAL RECOMMENDATIONS
+areas of ecchymosis L>R posteriorly along palate  +dried secretions posteriorly along palate; aggressive oral care provided prior to administration of PO trials Maintain stringent oral hygiene.  POC pending MBS. Maintain stringent oral hygiene.  POC pending FEES.

## 2018-02-16 LAB
CULTURE RESULTS: SIGNIFICANT CHANGE UP
GLUCOSE BLDC GLUCOMTR-MCNC: 113 MG/DL — HIGH (ref 70–99)
GLUCOSE BLDC GLUCOMTR-MCNC: 134 MG/DL — HIGH (ref 70–99)
GLUCOSE BLDC GLUCOMTR-MCNC: 163 MG/DL — HIGH (ref 70–99)
GLUCOSE BLDC GLUCOMTR-MCNC: 199 MG/DL — HIGH (ref 70–99)
GLUCOSE BLDC GLUCOMTR-MCNC: 86 MG/DL — SIGNIFICANT CHANGE UP (ref 70–99)
SPECIMEN SOURCE: SIGNIFICANT CHANGE UP

## 2018-02-16 PROCEDURE — 93321 DOPPLER ECHO F-UP/LMTD STD: CPT | Mod: 26

## 2018-02-16 PROCEDURE — 71045 X-RAY EXAM CHEST 1 VIEW: CPT | Mod: 26

## 2018-02-16 PROCEDURE — 99232 SBSQ HOSP IP/OBS MODERATE 35: CPT | Mod: GC

## 2018-02-16 PROCEDURE — 92616 FEES W/LARYNGEAL SENSE TEST: CPT | Mod: GC,CK

## 2018-02-16 PROCEDURE — 99291 CRITICAL CARE FIRST HOUR: CPT

## 2018-02-16 PROCEDURE — 93308 TTE F-UP OR LMTD: CPT | Mod: 26

## 2018-02-16 PROCEDURE — 99232 SBSQ HOSP IP/OBS MODERATE 35: CPT

## 2018-02-16 RX ORDER — ALBUMIN HUMAN 25 %
250 VIAL (ML) INTRAVENOUS ONCE
Qty: 0 | Refills: 0 | Status: COMPLETED | OUTPATIENT
Start: 2018-02-16 | End: 2018-02-16

## 2018-02-16 RX ORDER — METOPROLOL TARTRATE 50 MG
25 TABLET ORAL EVERY 12 HOURS
Qty: 0 | Refills: 0 | Status: DISCONTINUED | OUTPATIENT
Start: 2018-02-16 | End: 2018-03-05

## 2018-02-16 RX ADMIN — Medication 2 MILLIGRAM(S): at 05:22

## 2018-02-16 RX ADMIN — Medication 5: at 02:25

## 2018-02-16 RX ADMIN — Medication 5: at 17:39

## 2018-02-16 RX ADMIN — Medication 25 MILLIGRAM(S): at 17:31

## 2018-02-16 RX ADMIN — Medication 100 MILLIGRAM(S): at 11:51

## 2018-02-16 RX ADMIN — Medication 25 MILLIGRAM(S): at 08:54

## 2018-02-16 RX ADMIN — Medication 1 MILLIGRAM(S): at 11:51

## 2018-02-16 RX ADMIN — FINASTERIDE 5 MILLIGRAM(S): 5 TABLET, FILM COATED ORAL at 11:51

## 2018-02-16 RX ADMIN — Medication 8: at 10:13

## 2018-02-16 RX ADMIN — Medication 2: at 13:44

## 2018-02-16 RX ADMIN — Medication 200 MILLIGRAM(S): at 10:37

## 2018-02-16 RX ADMIN — PANTOPRAZOLE SODIUM 40 MILLIGRAM(S): 20 TABLET, DELAYED RELEASE ORAL at 11:51

## 2018-02-16 RX ADMIN — Medication 125 MILLILITER(S): at 13:00

## 2018-02-16 RX ADMIN — HEPARIN SODIUM 5000 UNIT(S): 5000 INJECTION INTRAVENOUS; SUBCUTANEOUS at 21:40

## 2018-02-16 RX ADMIN — HEPARIN SODIUM 5000 UNIT(S): 5000 INJECTION INTRAVENOUS; SUBCUTANEOUS at 13:44

## 2018-02-16 RX ADMIN — CEFTRIAXONE 100 GRAM(S): 500 INJECTION, POWDER, FOR SOLUTION INTRAMUSCULAR; INTRAVENOUS at 08:54

## 2018-02-16 RX ADMIN — Medication 75 MICROGRAM(S): at 13:44

## 2018-02-16 RX ADMIN — AMIODARONE HYDROCHLORIDE 200 MILLIGRAM(S): 400 TABLET ORAL at 05:25

## 2018-02-16 RX ADMIN — HEPARIN SODIUM 5000 UNIT(S): 5000 INJECTION INTRAVENOUS; SUBCUTANEOUS at 05:22

## 2018-02-16 RX ADMIN — Medication 325 MILLIGRAM(S): at 11:51

## 2018-02-16 RX ADMIN — Medication 125 MILLILITER(S): at 14:00

## 2018-02-16 RX ADMIN — ATORVASTATIN CALCIUM 80 MILLIGRAM(S): 80 TABLET, FILM COATED ORAL at 21:40

## 2018-02-16 NOTE — PROGRESS NOTE ADULT - SUBJECTIVE AND OBJECTIVE BOX
infectious diseases progress note:    Patient is a 72y old  Male who presents with a chief complaint of Fall (2018 00:52)        Atrial fibrillation        R extubated     Allergies    No Known Allergies    Intolerances        ANTIBIOTICS/RELEVANT:  antimicrobials  cefTRIAXone   IVPB 2 Gram(s) IV Intermittent every 24 hours  cefTRIAXone   IVPB      gentamicin   IVPB 40 milliGRAM(s) IV Intermittent <User Schedule>    immunologic:    OTHER:  acetaminophen    Suspension. 650 milliGRAM(s) Oral every 6 hours PRN  amiodarone    Tablet 200 milliGRAM(s) Oral daily  aspirin 325 milliGRAM(s) Oral daily  atorvastatin 80 milliGRAM(s) Oral at bedtime  finasteride 5 milliGRAM(s) Oral daily  folic acid 1 milliGRAM(s) Oral daily  heparin  Injectable 5000 Unit(s) SubCutaneous every 8 hours  insulin lispro (HumaLOG) corrective regimen sliding scale   SubCutaneous every 4 hours  levothyroxine Injectable 75 MICROGram(s) IV Push <User Schedule>  metoprolol     tartrate 25 milliGRAM(s) Oral every 12 hours  pantoprazole  Injectable 40 milliGRAM(s) IV Push daily  thiamine 100 milliGRAM(s) Oral daily      Objective:  Vital Signs Last 24 Hrs  T(C): 36.6 (2018 08:00), Max: 37 (15 Feb 2018 12:00)  T(F): 97.9 (2018 08:00), Max: 98.6 (15 Feb 2018 12:00)  HR: 75 (2018 10:00) (71 - 129)  BP: --  BP(mean): --  RR: 18 (2018 10:00) (11 - 26)  SpO2: 100% (2018 10:00) (98% - 100%)    PH --no acute distress  Eyes:MYLA, EOMI  Ear/Nose/Throat: no oral lesion, no sinus tenderness on percussion	  Neck:no JVD, no lymphadenopathy, supple  Respiratory: CTA matt  Cardiovascular: S1S2 RRR, wd clean  Gastrointestinal:soft, (+) BS, no HSM  Extremities:no e/e/c        LABS:                        10.0   12.1  )-----------( 118      ( 2018 02:38 )             30.9     02-16    152<H>  |  122<H>  |  55<H>  ----------------------------<  159<H>  4.7   |  18<L>  |  1.62<H>    Ca    8.1<L>      2018 02:38  Phos  3.8     02-15  Mg     1.7     02-15    TPro  5.3<L>  /  Alb  2.4<L>  /  TBili  0.9  /  DBili  x   /  AST  43<H>  /  ALT  33  /  AlkPhos  134<H>        Urinalysis Basic - ( 15 Feb 2018 02:21 )    Color: Yellow / Appearance: SL Turbid / S.015 / pH: x  Gluc: x / Ketone: Negative  / Bili: Negative / Urobili: Negative   Blood: x / Protein: 30 mg/dL / Nitrite: Negative   Leuk Esterase: Moderate / RBC: >50 /HPF / WBC 11-25 /HPF   Sq Epi: x / Non Sq Epi: OCC /HPF / Bacteria: Few /HPF          MICROBIOLOGY:    RECENT CULTURES:   @ 18:44 .Surgical Swab sternal wound #1                No growth to date.     @ 16:38 .Other Other, mediastinum #2       Rare polymorphonuclear leukocytes per oil power field  No organisms seen           Testing in progress     @ 06:42 .Blood Blood-Venous                No growth at 5 days.     @ 22:27 .Blood Blood                No growth at 5 days.     @ 20:47 .Blood Blood                No growth at 5 days.          RESPIRATORY CULTURES:      Clostridium difficile GDH Toxins A&amp;B, EIA:   Negative ( @ 11:51)          RADIOLOGY & ADDITIONAL STUDIES:        Pager [phone]  After 5 pm/weekends or if no response :[phone]

## 2018-02-16 NOTE — PROGRESS NOTE ADULT - SUBJECTIVE AND OBJECTIVE BOX
Plastic Surgery  Daily Progress Note     Subjective/24 Hour Events:    Objective:    Vitals:  T(C): 36.6 (02-16-18 @ 00:00), Max: 37 (02-15-18 @ 12:00)  HR: 116 (02-16-18 @ 06:00) (71 - 116)  BP: --  RR: 17 (02-16-18 @ 06:00) (11 - 27)  SpO2: 99% (02-16-18 @ 06:00) (99% - 100%)    I/O:     02-15-18 @ 07:01  -  02-16-18 @ 07:00  --------------------------------------------------------  IN: 1640 mL / OUT: 1490 mL / NET: 150 mL        Physical Exam:   - General: NAD  - Chest: Dressing in place over sternotomy incision, C/D/I    A/P: 72M s/p Sternal Debridement & Primary Closure (POD #2)    - Cont w/ Dressing  - Care as per primary team Plastic Surgery  Daily Progress Note     Subjective/24 Hour Events: No acute events overnight.    Objective:    Vitals:  T(C): 36.6 (02-16-18 @ 00:00), Max: 37 (02-15-18 @ 12:00)  HR: 116 (02-16-18 @ 06:00) (71 - 116)  BP: --  RR: 17 (02-16-18 @ 06:00) (11 - 27)  SpO2: 99% (02-16-18 @ 06:00) (99% - 100%)    I/O:     02-15-18 @ 07:01  -  02-16-18 @ 07:00  --------------------------------------------------------  IN: 1640 mL / OUT: 1490 mL / NET: 150 mL        Physical Exam:   - General: NAD  - Chest: Dressing in place over sternotomy incision, C/D/I    A/P: 72M s/p Sternal Debridement & Primary Closure (POD #2)    - Cont w/ Dressing  - Care as per primary team

## 2018-02-16 NOTE — PROGRESS NOTE ADULT - PROBLEM SELECTOR PLAN 1
Likely contrast induced nephropathy from cardiac cath/Toxic/Septic  ATN. Scr. has improved to 1.62 today. Plan is to continue monitoring Scr., electrolytes, and urine output. Monitor gentamicin levels.

## 2018-02-16 NOTE — PROGRESS NOTE ADULT - PROBLEM SELECTOR PLAN 2
Likely in setting of impaired urinary concentrating ability given recovering ATN. Serum sodium remains elevated at 152. Consider starting patient on free water supplementation via NG tube. Monitor serum sodium. Likely in setting of impaired urinary concentrating ability given recovering ATN. Serum sodium remains elevated at 152. Improving with free water via NGT. Monitor serum sodium.

## 2018-02-16 NOTE — SWALLOW FEES ASSESSMENT ADULT - ROSENBEK'S PENETRATION ASPIRATION SCALE
at least 5, suspect 8 (3) material remains above the vocal cords, visible residue remains (penetration) (1) no aspiration, material does not enter airway single episode of 3, m/l 2/2 presence of scope in the laryngeal vestibule at least 5

## 2018-02-16 NOTE — SWALLOW FEES ASSESSMENT ADULT - RECOMMENDED CONSISTENCY
Dysphagia 1 THIN STRAINED PUREE ONLY, with Honey thickened liquids. All Liquids via teaspoon only.     MD/team Please enter the following as notes to RN: 1) Full assist with meals, 2) Crush meds or provide via alternate source, 3) ALL p.o. via teaspoon. 4) Provide small single bites and sips at slow rate 5) Alternate food and liquids to aid in oral and pharyngeal clearance 6) Aspiration precautions. Monitor for s/s aspiration/laryngeal penetration. If noted:  D/C p.o. intake, provide non-oral nutrition/hydration/meds

## 2018-02-16 NOTE — SWALLOW FEES ASSESSMENT ADULT - ORAL PHASE
Uncontrolled bolus/spillover in obey-pharynx/Uncontrolled bolus/spillover in hypopharynx/trace-mild spillover to the level of the pyriform sinuses Uncontrolled bolus/spillover in obey-pharynx/mild-moderate spillover to the level of the valleculae, trace-mild to the level of the pyriform sinuses/Uncontrolled bolus/spillover in hypopharynx Uncontrolled bolus/spillover in obey-pharynx trace spillover to the level of the valleculae/Uncontrolled bolus/spillover in obey-pharynx Uncontrolled bolus/spillover in obey-pharynx/trace spillover to the level of the valleculae trace spillover of residue to the superior aryepiglottic folds/Uncontrolled bolus/spillover in hypopharynx

## 2018-02-16 NOTE — SWALLOW FEES ASSESSMENT ADULT - LARYNGEAL PENETRATION DURING SWALLOW - SILENT
at least trace to the level of the vocal folds over the interarytenoid space, without retrieval/Trace over the aryepiglottic folds, without retrieval/Trace without retrieval/Trace single episode of penetration noted, m/l 2/2 presence of scope in the laryngeal vestibule Trace/over the aryepiglottic folds, without retrieval at least trace, without retrieval

## 2018-02-16 NOTE — SWALLOW FEES ASSESSMENT ADULT - COMMENTS
+vocal fold erythema  +bilateral vocal fold granulomas  +trace-mild pooling of secretions in valleculae and pyriform sinuses  +KFT +bilateral vocal fold granulomas and erythema at the posterior commissure with small area of erythema at the anterior commissure of the right vocal fold  +trace-mild pooling of secretions in valleculae and pyriform sinuses  +KFT

## 2018-02-16 NOTE — SWALLOW FEES ASSESSMENT ADULT - DIAGNOSTIC IMPRESSIONS
Patient presents with oropharyngeal dysphagia characterized by impaired oral management greatest with chewables, premature bolus spillover, mild-moderate post swallow pharyngeal residue greatest with thick puree, silent laryngeal penetration with thick puree that subsequently cleared, silent penetration with nectar thickened liquids and chewables without retrieval, and deep silent laryngeal penetration to the level of the vocal folds with thin liquids without retrieval. Although not visualized would suspect at least trace aspiration of thin liquid given ejection of blue tinged material from airway with cued cough.     Disorders include: reduced lingual strength/ROM/Rate of motion, reduced tongue to palate contact, reduced BOT to posterior pharyngeal wall contact, delay in trigger of the swallow reflex, reduced laryngeal closure, reduced pharyngeal contractility, and s/s of reduced supraglottic and possibly subglottic sensation. Patient presents with oropharyngeal dysphagia characterized by impaired oral management greatest with chewables, premature bolus spillover, mild-moderate post swallow pharyngeal residue greatest with thick puree, silent laryngeal penetration with thick puree, nectar thickened liquids and chewables without retrieval, and deep silent laryngeal penetration to the level of the vocal folds with thin liquids without retrieval. Although not visualized would suspect at least trace aspiration of thin liquid given ejection of blue tinged material from airway with cued cough.     Disorders include: reduced lingual strength/ROM/Rate of motion, reduced tongue to palate contact, reduced BOT to posterior pharyngeal wall contact, delay in trigger of the swallow reflex, reduced laryngeal closure, reduced pharyngeal contractility, and s/s of reduced supraglottic and possibly subglottic sensation. Patient presents with oropharyngeal dysphagia characterized by impaired oral management greatest with chewables, premature bolus spillover, mild-moderate post swallow pharyngeal residue greatest with thick puree, silent laryngeal penetration with thick puree, and nectar thickened liquids, and deep silent laryngeal penetration to the level of the vocal folds with thin liquids without retrieval. Although not visualized would suspect at least trace aspiration of thin liquid given ejection of blue tinged material from airway with cued cough.     Disorders include: reduced lingual strength/ROM/Rate of motion, reduced tongue to palate contact, reduced BOT to posterior pharyngeal wall contact, delay in trigger of the swallow reflex, reduced laryngeal closure, reduced pharyngeal contractility, and s/s of reduced supraglottic and possibly subglottic sensation.

## 2018-02-16 NOTE — SWALLOW FEES ASSESSMENT ADULT - ASPIRATION DURING SWALLOW - SILENT
although not visualized would suspect at least trace aspiration of thin liquid given ejection of blue tinged material from airway with cued cough

## 2018-02-16 NOTE — PROGRESS NOTE ADULT - SUBJECTIVE AND OBJECTIVE BOX
[facility] Division of Kidney Diseases & Hypertension  FOLLOW UP NOTE  [phone]--------------------------------------------------------------------------------    71 y/o man with history of ETOH abuse who presents with fall and found with endocarditis, developed ELIAN in-hospital shortly after undergoing cardiac catheterization, s/p urgent AVR and CABG on 1/30/18 with clinical decompensation on 2/1 requiring intubation and multiple vasopressor support.  Patient now extubated. Patient able to communicate by nodding. Patient was noted to have hypothermia and elevated WBC over weekend. Echocardiogram was done which showed echodense area over pericardiac space consistent with abscess/hematoma. Patient had drainage of pericardial hematoma/abscess yesterday. Currently patient is sitting in chair with no complaints.     PAST HISTORY  --------------------------------------------------------------------------------  No significant changes to PMH, PSH, FHx, SHx, unless otherwise noted    ALLERGIES & MEDICATIONS  --------------------------------------------------------------------------------  Allergies    No Known Allergies    Intolerances      Standing Inpatient Medications  amiodarone    Tablet 200 milliGRAM(s) Oral daily  aspirin 325 milliGRAM(s) Oral daily  atorvastatin 80 milliGRAM(s) Oral at bedtime  cefTRIAXone   IVPB 2 Gram(s) IV Intermittent every 24 hours  cefTRIAXone   IVPB      finasteride 5 milliGRAM(s) Oral daily  folic acid 1 milliGRAM(s) Oral daily  gentamicin   IVPB 40 milliGRAM(s) IV Intermittent <User Schedule>  heparin  Injectable 5000 Unit(s) SubCutaneous every 8 hours  insulin lispro (HumaLOG) corrective regimen sliding scale   SubCutaneous every 4 hours  levothyroxine Injectable 75 MICROGram(s) IV Push <User Schedule>  metoprolol     tartrate 25 milliGRAM(s) Oral every 12 hours  pantoprazole  Injectable 40 milliGRAM(s) IV Push daily  thiamine 100 milliGRAM(s) Oral daily    PRN Inpatient Medications  acetaminophen    Suspension. 650 milliGRAM(s) Oral every 6 hours PRN      REVIEW OF SYSTEMS  --------------------------------------------------------------------------------    Respiratory: No dyspnea  CV: No chest pain  GI: no abdominal pain    VITALS/PHYSICAL EXAM  --------------------------------------------------------------------------------  T(C): 36.6 (02-16-18 @ 08:00), Max: 37 (02-15-18 @ 12:00)  HR: 126 (02-16-18 @ 07:00) (71 - 126)  BP: 109/57  RR: 22 (02-16-18 @ 07:00) (11 - 27)  SpO2: 98% (02-16-18 @ 07:00) (98% - 100%)  Wt(kg): --        02-15-18 @ 07:01  -  02-16-18 @ 07:00  --------------------------------------------------------  IN: 1640 mL / OUT: 1490 mL / NET: 150 mL    02-16-18 @ 07:01  -  02-16-18 @ 09:07  --------------------------------------------------------  IN: 60 mL / OUT: 50 mL / NET: 10 mL      Physical Exam:  	             Gen: NAD ill appearing  	HEENT: NG tube  	Pulm: coarse breath sounds wound vac noted  	CV: RRR, S1S2;  wound vac present over chest   	Abd: +BS, soft, nontender/nondistended              LE: no edema    LABS/STUDIES  --------------------------------------------------------------------------------              10.0   12.1  >-----------<  118      [02-16-18 @ 02:38]              30.9     152  |  122  |  55  ----------------------------<  159      [02-16-18 @ 02:38]  4.7   |  18  |  1.62        Ca     8.1     [02-16-18 @ 02:38]      Mg     1.7     [02-15-18 @ 00:35]      Phos  3.8     [02-15-18 @ 00:35]    TPro  5.3  /  Alb  2.4  /  TBili  0.9  /  DBili  x   /  AST  43  /  ALT  33  /  AlkPhos  134  [02-16-18 @ 02:38]    PT/INR: PT 11.6 , INR 1.06       [02-14-18 @ 09:44]  PTT: 40.1       [02-14-18 @ 09:44]      Creatinine Trend:  SCr 1.62 [02-16 @ 02:38]  SCr 1.73 [02-15 @ 00:35]  SCr 1.79 [02-14 @ 09:44]  SCr 1.91 [02-14 @ 01:16]  SCr 2.28 [02-13 @ 02:35]    Urinalysis - [02-15-18 @ 02:21]      Color Yellow / Appearance SL Turbid / SG 1.015 / pH 5.5      Gluc Negative / Ketone Negative  / Bili Negative / Urobili Negative       Blood Large / Protein 30 / Leuk Est Moderate / Nitrite Negative      RBC >50 / WBC 11-25 / Hyaline  / Gran  / Sq Epi  / Non Sq Epi OCC / Bacteria Few      TSH 12.56      [02-10-18 @ 12:11]    HIV Nonreact      [02-10-18 @ 12:11] [facility] Division of Kidney Diseases & Hypertension  FOLLOW UP NOTE  [phone]--------------------------------------------------------------------------------    71 y/o man with history of ETOH abuse who presents with fall and found with endocarditis, developed ELIAN in-hospital shortly after undergoing cardiac catheterization, s/p urgent AVR and CABG on 1/30/18 with clinical decompensation on 2/1 requiring intubation and multiple vasopressor support.  Patient now extubated. Patient able to communicate by nodding. Patient was noted to have hypothermia and elevated WBC over weekend. Echocardiogram was done which showed echodense area over pericardiac space consistent with abscess/hematoma. Patient had drainage of pericardial hematoma/abscess yesterday. Currently patient is sitting in chair with no complaints.     PAST HISTORY  --------------------------------------------------------------------------------  No significant changes to PMH, PSH, FHx, SHx, unless otherwise noted    ALLERGIES & MEDICATIONS  --------------------------------------------------------------------------------  Allergies    No Known Allergies    Intolerances      Standing Inpatient Medications  amiodarone    Tablet 200 milliGRAM(s) Oral daily  aspirin 325 milliGRAM(s) Oral daily  atorvastatin 80 milliGRAM(s) Oral at bedtime  cefTRIAXone   IVPB 2 Gram(s) IV Intermittent every 24 hours  cefTRIAXone   IVPB      finasteride 5 milliGRAM(s) Oral daily  folic acid 1 milliGRAM(s) Oral daily  gentamicin   IVPB 40 milliGRAM(s) IV Intermittent <User Schedule>  heparin  Injectable 5000 Unit(s) SubCutaneous every 8 hours  insulin lispro (HumaLOG) corrective regimen sliding scale   SubCutaneous every 4 hours  levothyroxine Injectable 75 MICROGram(s) IV Push <User Schedule>  metoprolol     tartrate 25 milliGRAM(s) Oral every 12 hours  pantoprazole  Injectable 40 milliGRAM(s) IV Push daily  thiamine 100 milliGRAM(s) Oral daily    PRN Inpatient Medications  acetaminophen    Suspension. 650 milliGRAM(s) Oral every 6 hours PRN      REVIEW OF SYSTEMS  --------------------------------------------------------------------------------    Respiratory: No dyspnea  CV: No chest pain  GI: no abdominal pain    VITALS/PHYSICAL EXAM  --------------------------------------------------------------------------------  T(C): 36.6 (02-16-18 @ 08:00), Max: 37 (02-15-18 @ 12:00)  HR: 126 (02-16-18 @ 07:00) (71 - 126)  BP: 109/57  RR: 22 (02-16-18 @ 07:00) (11 - 27)  SpO2: 98% (02-16-18 @ 07:00) (98% - 100%)  Wt(kg): --        02-15-18 @ 07:01  -  02-16-18 @ 07:00  --------------------------------------------------------  IN: 1640 mL / OUT: 1490 mL / NET: 150 mL    02-16-18 @ 07:01  -  02-16-18 @ 09:07  --------------------------------------------------------  IN: 60 mL / OUT: 50 mL / NET: 10 mL      Physical Exam:  	              Gen: NAD ill appearing  	HEENT: NG tube  	Pulm: clear breath sounds wound vac noted  	CV: RRR, S1S2;  wound vac present over chest   	Abd: +BS, soft, nontender/nondistended              LE: no edema    LABS/STUDIES  --------------------------------------------------------------------------------              10.0   12.1  >-----------<  118      [02-16-18 @ 02:38]              30.9     152  |  122  |  55  ----------------------------<  159      [02-16-18 @ 02:38]  4.7   |  18  |  1.62        Ca     8.1     [02-16-18 @ 02:38]      Mg     1.7     [02-15-18 @ 00:35]      Phos  3.8     [02-15-18 @ 00:35]    TPro  5.3  /  Alb  2.4  /  TBili  0.9  /  DBili  x   /  AST  43  /  ALT  33  /  AlkPhos  134  [02-16-18 @ 02:38]    PT/INR: PT 11.6 , INR 1.06       [02-14-18 @ 09:44]  PTT: 40.1       [02-14-18 @ 09:44]      Creatinine Trend:  SCr 1.62 [02-16 @ 02:38]  SCr 1.73 [02-15 @ 00:35]  SCr 1.79 [02-14 @ 09:44]  SCr 1.91 [02-14 @ 01:16]  SCr 2.28 [02-13 @ 02:35]    Urinalysis - [02-15-18 @ 02:21]      Color Yellow / Appearance SL Turbid / SG 1.015 / pH 5.5      Gluc Negative / Ketone Negative  / Bili Negative / Urobili Negative       Blood Large / Protein 30 / Leuk Est Moderate / Nitrite Negative      RBC >50 / WBC 11-25 / Hyaline  / Gran  / Sq Epi  / Non Sq Epi OCC / Bacteria Few      TSH 12.56      [02-10-18 @ 12:11]    HIV Nonreact      [02-10-18 @ 12:11]

## 2018-02-16 NOTE — SWALLOW FEES ASSESSMENT ADULT - ADDITIONAL RECOMMENDATIONS
Consider RD consult for calorie count. Consider keeping KFT in place for supplemental feeding in case pt does not meet caloric needs via oral route as early satiety was noted on this exam. Consider RD consult for calorie count.   Consider keeping KFT in place for supplemental feeding in case pt does not meet caloric needs via oral route as early satiety was noted on this exam.

## 2018-02-16 NOTE — SWALLOW FEES ASSESSMENT ADULT - RESIDUE IN LARYNGEAL VESTIBULE/VENTRICAL
Trace/along the vocal folds Trace Trace/along the laryngeal surface of the aryepiglottic folds Trace/along the laryngeal surface of the aryepiglottic folds and the ventricular folds along the laryngeal surface of the epiglottis and the laryngeal surface of the aryepiglottic folds/Trace

## 2018-02-16 NOTE — PROGRESS NOTE ADULT - SUBJECTIVE AND OBJECTIVE BOX
SAMANTHA CADENA  MRN-[id number]    Admission HPI:  Patient is a 72 year-old male with past medical history of T2DM, DLD, alcohol misuse brought in by family after having been found face down on the floor for approximately 2 days. pt ws in heart failure with vegetatin on aortic valve with AI;      surgery/Hospital course:   AVR(T)/C1L   cardiogenic shock , reintubated, IABP insertion   RTOR pericardial fluid drain   sternal wound debriedment;    awake not dyspnic;       PAST MEDICAL & SURGICAL HISTORY:  High cholesterol  Diabetes: type 2  No significant past surgical history    Allergies o Known Allergies        PHYSICAL EXAM:  T(C): 36.5, Max: 36.6 (02-16-18 @ 00:00)  HR: 73 (68 - 129)  BP: --  RR: 19 (12 - 24)  SpO2: 100% (98% - 100%)  Gen:  Awake, alert, not in distress  CNS:A&O x3, No focal dificit  Eyes:PERRL  ENT/Neck: no JVD	  RES :+Breath sounds , No rhonchi, No wheezing  CVS: regular rhythm. Normal S1/S2. No Murmur;   Abd: soft; not distented; No Tenderness. Bowel sounds present .   Extremities:No edema  Skin: No rash  Psych: No lethargy .    MEDICATIONS  (STANDING):  amiodarone    Tablet 200 milliGRAM(s) Oral daily  aspirin 325 milliGRAM(s) Oral daily  atorvastatin 80 milliGRAM(s) Oral at bedtime  cefTRIAXone   IVPB 2 Gram(s) IV Intermittent every 24 hours  finasteride 5 milliGRAM(s) Oral daily  folic acid 1 milliGRAM(s) Oral daily  gentamicin   IVPB 40 milliGRAM(s) IV Intermittent <User Schedule>  heparin  Injectable 5000 Unit(s) SubCutaneous every 8 hours  insulin lispro (HumaLOG) corrective regimen sliding scale   SubCutaneous every 4 hours  levothyroxine Injectable 75 MICROGram(s) IV Push <User Schedule>  metoprolol     tartrate 25 milliGRAM(s) Oral every 12 hours  pantoprazole  Injectable 40 milliGRAM(s) IV Push daily  thiamine 100 milliGRAM(s) Oral daily    MEDICATIONS  (PRN):  acetaminophen    Suspension. 650 milliGRAM(s) Oral every 6 hours PRN Mild Pain (1 - 3)        Labs:                        10.0   12.1  )-----------( 118      ( 2018 02:38 )             30.9     02-16    152<H>  |  122<H>  |  55<H>  ----------------------------<  159<H>  4.7   |  18<L>  |  1.62<H>    Ca    8.1<L>      2018 02:38  Phos  3.8     02-15  Mg     1.7     02-15    TPro  5.3<L>  /  Alb  2.4<L>  /  TBili  0.9  /  DBili  x   /  AST  43<H>  /  ALT  33  /  AlkPhos  134<H>      LIVER FUNCTIONS - ( 2018 02:38 )  Alb: 2.4 g/dL / Pro: 5.3 g/dL / ALK PHOS: 134 U/L / ALT: 33 U/L RC / AST: 43 U/L / GGT: x             ABG - ( 2018 02:12 )  pH: 7.44  /  pCO2: 29    /  pO2: 83    / HCO3: 19    / Base Excess: -3.9  /  SaO2: 97                Urinalysis Basic - ( 15 Feb 2018 02:21 )    Color: Yellow / Appearance: SL Turbid / S.015 / pH: x  Gluc: x / Ketone: Negative  / Bili: Negative / Urobili: Negative   Blood: x / Protein: 30 mg/dL / Nitrite: Negative   Leuk Esterase: Moderate / RBC: >50 /HPF / WBC 11-25 /HPF   Sq Epi: x / Non Sq Epi: OCC /HPF / Bacteria: Few /HPF      Glucose:  CAPILLARY BLOOD GLUCOSE  113 (2018 22:00)  163 (2018 18:00)  134 (2018 14:00)  199 (2018 10:00)  86 (2018 06:00)  164 (2018 02:00)      ASSESMENT :  s/p AVR (/C1L  culture negative Enodarditis  sternal wound infection s/p debriedment  DM2  hypothyroidism  paroxya=smal a fib]  dysphagia                        PLAN:     =Neuro: analgesics   acetaminophen    Suspension. PRN  = Respiratory: oygen NC   =CVS/Hem:aspirin  amiodarone    Tablet 200 Oral daily  metoprolol     tartrate 25 Oral every 12 hours  heparin  Injectable 5000 every 8 hours    =Renal:  Dimas:  continue to  Monitor I/Os   =GI: GI prophylaxis;  Feeding : @  ml/hr  folic acid 1 daily  pantoprazole  Injectable 40 daily  thiamine 100 daily    =Endo: Glycemic control; Statin  atorvastatin 80 at bedtime  finasteride 5 daily  insulin lispro (HumaLOG) corrective regimen sliding scale  every 4 hours  levothyroxine Injectable 75 <User Schedule>    =Antibiotics:   cefTRIAXone   IVPB 2 every 24 hours  gentamicin   IVPB 40 <User Schedule>      Patient seen, examined and plan discussed with CT U team.    patient remains critical;  I spent 40  minutes of critical care time, noncontiuous, with this patient

## 2018-02-17 LAB
ALBUMIN SERPL ELPH-MCNC: 2.5 G/DL — LOW (ref 3.3–5)
ALP SERPL-CCNC: 189 U/L — HIGH (ref 40–120)
ALT FLD-CCNC: 34 U/L RC — SIGNIFICANT CHANGE UP (ref 10–45)
ANION GAP SERPL CALC-SCNC: 13 MMOL/L — SIGNIFICANT CHANGE UP (ref 5–17)
ANION GAP SERPL CALC-SCNC: 14 MMOL/L — SIGNIFICANT CHANGE UP (ref 5–17)
APTT BLD: 47 SEC — HIGH (ref 27.5–37.4)
AST SERPL-CCNC: 42 U/L — HIGH (ref 10–40)
BILIRUB SERPL-MCNC: 0.8 MG/DL — SIGNIFICANT CHANGE UP (ref 0.2–1.2)
BUN SERPL-MCNC: 60 MG/DL — HIGH (ref 7–23)
BUN SERPL-MCNC: 61 MG/DL — HIGH (ref 7–23)
CALCIUM SERPL-MCNC: 7.9 MG/DL — LOW (ref 8.4–10.5)
CALCIUM SERPL-MCNC: 8 MG/DL — LOW (ref 8.4–10.5)
CHLORIDE SERPL-SCNC: 122 MMOL/L — HIGH (ref 96–108)
CHLORIDE SERPL-SCNC: 125 MMOL/L — HIGH (ref 96–108)
CO2 SERPL-SCNC: 17 MMOL/L — LOW (ref 22–31)
CO2 SERPL-SCNC: 18 MMOL/L — LOW (ref 22–31)
CREAT SERPL-MCNC: 1.6 MG/DL — HIGH (ref 0.5–1.3)
CREAT SERPL-MCNC: 1.69 MG/DL — HIGH (ref 0.5–1.3)
GAS PNL BLDA: SIGNIFICANT CHANGE UP
GENTAMICIN TROUGH SERPL-MCNC: 1 UG/ML — SIGNIFICANT CHANGE UP (ref 0–2)
GLUCOSE BLDC GLUCOMTR-MCNC: 105 MG/DL — HIGH (ref 70–99)
GLUCOSE BLDC GLUCOMTR-MCNC: 124 MG/DL — HIGH (ref 70–99)
GLUCOSE BLDC GLUCOMTR-MCNC: 133 MG/DL — HIGH (ref 70–99)
GLUCOSE BLDC GLUCOMTR-MCNC: 137 MG/DL — HIGH (ref 70–99)
GLUCOSE BLDC GLUCOMTR-MCNC: 197 MG/DL — HIGH (ref 70–99)
GLUCOSE SERPL-MCNC: 141 MG/DL — HIGH (ref 70–99)
GLUCOSE SERPL-MCNC: 217 MG/DL — HIGH (ref 70–99)
HCT VFR BLD CALC: 27.9 % — LOW (ref 39–50)
HGB BLD-MCNC: 9 G/DL — LOW (ref 13–17)
INR BLD: 1.07 RATIO — SIGNIFICANT CHANGE UP (ref 0.88–1.16)
MCHC RBC-ENTMCNC: 30.9 PG — SIGNIFICANT CHANGE UP (ref 27–34)
MCHC RBC-ENTMCNC: 32.1 GM/DL — SIGNIFICANT CHANGE UP (ref 32–36)
MCV RBC AUTO: 96.3 FL — SIGNIFICANT CHANGE UP (ref 80–100)
PLATELET # BLD AUTO: 110 K/UL — LOW (ref 150–400)
POTASSIUM SERPL-MCNC: 5 MMOL/L — SIGNIFICANT CHANGE UP (ref 3.5–5.3)
POTASSIUM SERPL-MCNC: 5.1 MMOL/L — SIGNIFICANT CHANGE UP (ref 3.5–5.3)
POTASSIUM SERPL-SCNC: 5 MMOL/L — SIGNIFICANT CHANGE UP (ref 3.5–5.3)
POTASSIUM SERPL-SCNC: 5.1 MMOL/L — SIGNIFICANT CHANGE UP (ref 3.5–5.3)
PROT SERPL-MCNC: 5.7 G/DL — LOW (ref 6–8.3)
PROTHROM AB SERPL-ACNC: 11.7 SEC — SIGNIFICANT CHANGE UP (ref 9.8–12.7)
RBC # BLD: 2.9 M/UL — LOW (ref 4.2–5.8)
RBC # FLD: 16.8 % — HIGH (ref 10.3–14.5)
SODIUM SERPL-SCNC: 153 MMOL/L — HIGH (ref 135–145)
SODIUM SERPL-SCNC: 156 MMOL/L — HIGH (ref 135–145)
WBC # BLD: 10.3 K/UL — SIGNIFICANT CHANGE UP (ref 3.8–10.5)
WBC # FLD AUTO: 10.3 K/UL — SIGNIFICANT CHANGE UP (ref 3.8–10.5)

## 2018-02-17 PROCEDURE — 71045 X-RAY EXAM CHEST 1 VIEW: CPT | Mod: 26

## 2018-02-17 PROCEDURE — 99233 SBSQ HOSP IP/OBS HIGH 50: CPT

## 2018-02-17 RX ADMIN — Medication 200 MILLIGRAM(S): at 12:04

## 2018-02-17 RX ADMIN — FINASTERIDE 5 MILLIGRAM(S): 5 TABLET, FILM COATED ORAL at 12:04

## 2018-02-17 RX ADMIN — Medication 100 MILLIGRAM(S): at 12:05

## 2018-02-17 RX ADMIN — Medication 650 MILLIGRAM(S): at 05:52

## 2018-02-17 RX ADMIN — Medication 25 MILLIGRAM(S): at 16:59

## 2018-02-17 RX ADMIN — Medication 2: at 22:00

## 2018-02-17 RX ADMIN — AMIODARONE HYDROCHLORIDE 200 MILLIGRAM(S): 400 TABLET ORAL at 05:52

## 2018-02-17 RX ADMIN — Medication 2: at 17:08

## 2018-02-17 RX ADMIN — Medication 1 MILLIGRAM(S): at 12:04

## 2018-02-17 RX ADMIN — Medication 650 MILLIGRAM(S): at 06:51

## 2018-02-17 RX ADMIN — ATORVASTATIN CALCIUM 80 MILLIGRAM(S): 80 TABLET, FILM COATED ORAL at 22:00

## 2018-02-17 RX ADMIN — HEPARIN SODIUM 5000 UNIT(S): 5000 INJECTION INTRAVENOUS; SUBCUTANEOUS at 22:00

## 2018-02-17 RX ADMIN — Medication 2: at 14:20

## 2018-02-17 RX ADMIN — HEPARIN SODIUM 5000 UNIT(S): 5000 INJECTION INTRAVENOUS; SUBCUTANEOUS at 05:53

## 2018-02-17 RX ADMIN — PANTOPRAZOLE SODIUM 40 MILLIGRAM(S): 20 TABLET, DELAYED RELEASE ORAL at 12:05

## 2018-02-17 RX ADMIN — Medication 8: at 10:04

## 2018-02-17 RX ADMIN — CEFTRIAXONE 100 GRAM(S): 500 INJECTION, POWDER, FOR SOLUTION INTRAMUSCULAR; INTRAVENOUS at 09:53

## 2018-02-17 RX ADMIN — Medication 10: at 01:31

## 2018-02-17 RX ADMIN — Medication 25 MILLIGRAM(S): at 05:52

## 2018-02-17 RX ADMIN — Medication 75 MICROGRAM(S): at 14:20

## 2018-02-17 RX ADMIN — HEPARIN SODIUM 5000 UNIT(S): 5000 INJECTION INTRAVENOUS; SUBCUTANEOUS at 13:10

## 2018-02-17 RX ADMIN — Medication 325 MILLIGRAM(S): at 12:03

## 2018-02-17 NOTE — PROGRESS NOTE ADULT - SUBJECTIVE AND OBJECTIVE BOX
Plastic Surgery  Daily Progress Note     Subjective/24 Hour Events: No acute events overnight, doing well this morning    Objective:    Vitals:  T(C): 36.6 (02-17-18 @ 08:00), Max: 36.6 (02-16-18 @ 12:00)  HR: 68 (02-17-18 @ 07:00) (68 - 129)  BP: --  RR: 17 (02-17-18 @ 07:00) (12 - 24)  SpO2: 99% (02-17-18 @ 07:00) (98% - 100%)    I/O:     02-16-18 @ 07:01  -  02-17-18 @ 07:00  --------------------------------------------------------  IN: 3090 mL / OUT: 3240 mL / NET: -150 mL        Physical Exam:   - General: NAD  - Chest: Dressing in place over sternotomy incision, C/D/I    A/P: 72M s/p Sternal Debridement & Primary Closure (POD #3)    - Cont w/ Dressing  - Care as per primary team

## 2018-02-17 NOTE — PROGRESS NOTE ADULT - SUBJECTIVE AND OBJECTIVE BOX
SAMANTHA CADENA  MRN-[id number]    Admission HPI:  Patient is a 72 year-old male with past medical history of T2DM, DLD, alcohol misuse brought in by family after having been found face down on the floor for approximately 2 days. pt ws in heart failure with vegetatin on aortic valve with AI;      surgery/Hospital course:  1/30 AVR(T)/C1L  2/1 cardiogenic shock , reintubated, IABP insertion  2/11 RTOR pericardial fluid drain  2/14 sternal wound debriedment;      PAST MEDICAL & SURGICAL HISTORY:  High cholesterol  Diabetes: type 2  No significant past surgical history    Allergies No Known Allergies    PHYSICAL EXAM:    ICU Vital Signs Last 24 Hrs  T(C): 36.3 (17 Feb 2018 20:00), Max: 36.6 (17 Feb 2018 00:00)  T(F): 97.3 (17 Feb 2018 20:00), Max: 97.9 (17 Feb 2018 00:00)  HR: 70 (17 Feb 2018 22:00) (65 - 78)  BP: --  BP(mean): --  ABP: 121/47 (17 Feb 2018 22:00) (100/56 - 143/51)  ABP(mean): 73 (17 Feb 2018 22:00) (61 - 90)  RR: 12 (17 Feb 2018 22:00) (12 - 26)  SpO2: 100% (17 Feb 2018 22:00) (64% - 100%)    Gen:  Awake, alert, not in distress  CNS:A&O x3, No focal dificit  Eyes:PERRL  ENT/Neck: no JVD, NGT in place	  RES :+Breath sounds , No rhonchi, No wheezing  CVS: regular rhythm. Normal S1/S2. No Murmur;   Abd: soft; not distented; No Tenderness. Bowel sounds present .   Extremities: +1 edema, warm without cyanosis  Skin: No rash  Psych: No lethargy .    MEDICATIONS  (STANDING):  amiodarone    Tablet 200 milliGRAM(s) Oral daily  aspirin 325 milliGRAM(s) Oral daily  atorvastatin 80 milliGRAM(s) Oral at bedtime  cefTRIAXone   IVPB 2 Gram(s) IV Intermittent every 24 hours  cefTRIAXone   IVPB      finasteride 5 milliGRAM(s) Oral daily  folic acid 1 milliGRAM(s) Oral daily  gentamicin   IVPB 40 milliGRAM(s) IV Intermittent <User Schedule>  heparin  Injectable 5000 Unit(s) SubCutaneous every 8 hours  insulin lispro (HumaLOG) corrective regimen sliding scale   SubCutaneous every 4 hours  levothyroxine Injectable 75 MICROGram(s) IV Push <User Schedule>  metoprolol     tartrate 25 milliGRAM(s) Oral every 12 hours  pantoprazole  Injectable 40 milliGRAM(s) IV Push daily  thiamine 100 milliGRAM(s) Oral daily    MEDICATIONS  (PRN):  acetaminophen    Suspension. 650 milliGRAM(s) Oral every 6 hours PRN Mild Pain (1 - 3)    Labs:                        9.0    10.3  )-----------( 110      ( 17 Feb 2018 01:34 )             27.9   02-17    153<H>  |  122<H>  |  61<H>  ----------------------------<  141<H>  5.0   |  17<L>  |  1.69<H>    Ca    8.0<L>      17 Feb 2018 14:01    TPro  5.7<L>  /  Alb  2.5<L>  /  TBili  0.8  /  DBili  x   /  AST  42<H>  /  ALT  34  /  AlkPhos  189<H>  02-17    ABG - ( 17 Feb 2018 14:04 )  pH: 7.38  /  pCO2: 30    /  pO2: 111   / HCO3: 18    / Base Excess: -6.3  /  SaO2: 99        ASSESMENT :  s/p AVR /C1L  culture negative Enodarditis  sternal wound infection s/p debriedment  DM2  hypothyroidism  paroxya=smal a fib]  dysphagia                        PLAN:     =Neuro: intact   acetaminophen Suspension. PRN pain/fever  = Respiratory: oygen NC     =CVS/Hem:aspirin  amiodarone    Tablet 200 Oral daily  metoprolol     tartrate 25 Oral every 12 hours  heparin  Injectable 5000 every 8 hours    =Pulmonary  Requires mobilization and assistance with deep breathing and cough due to deconditioned state after carediogenic shcok and multiple operative procedures.    =Renal:  Dimas:  continue to  Monitor I/Os   =GI: GI prophylaxis;  Feeding : @ 60 ml/hr  passed speech and swallow, cleared for a dysphagia diet.  Enteral feedings for NG tube continued until patient able to take po reliably.  folic acid 1 daily  pantoprazole  Injectable 40 daily  thiamine 100 daily  free water increased for hypernatremia.    =Endo: Glycemic control; Statin  atorvastatin 80 at bedtime  finasteride 5 daily  insulin lispro (HumaLOG) corrective regimen sliding scale  every 4 hours  levothyroxine Injectable 75 <User Schedule>    =Antibiotics:  cefTRIAXone   IVPB 2 every 24 hours  gentamicin   IVPB 40 <User Schedule>      Patient seen, examined and plan discussed with CT U team.    Patient continues to benefit from ICU care/supervision while encouraging mobilization and po intake,  I spent 35 minutes providing care to this critically ill patient.

## 2018-02-18 LAB
ALBUMIN SERPL ELPH-MCNC: 2.4 G/DL — LOW (ref 3.3–5)
ALP SERPL-CCNC: 194 U/L — HIGH (ref 40–120)
ALT FLD-CCNC: 36 U/L RC — SIGNIFICANT CHANGE UP (ref 10–45)
ANION GAP SERPL CALC-SCNC: 12 MMOL/L — SIGNIFICANT CHANGE UP (ref 5–17)
APTT BLD: 75.5 SEC — HIGH (ref 27.5–37.4)
APTT BLD: 75.8 SEC — HIGH (ref 27.5–37.4)
AST SERPL-CCNC: 40 U/L — SIGNIFICANT CHANGE UP (ref 10–40)
BILIRUB SERPL-MCNC: 0.7 MG/DL — SIGNIFICANT CHANGE UP (ref 0.2–1.2)
BUN SERPL-MCNC: 63 MG/DL — HIGH (ref 7–23)
CALCIUM SERPL-MCNC: 8.1 MG/DL — LOW (ref 8.4–10.5)
CHLORIDE SERPL-SCNC: 120 MMOL/L — HIGH (ref 96–108)
CO2 SERPL-SCNC: 18 MMOL/L — LOW (ref 22–31)
CREAT SERPL-MCNC: 1.71 MG/DL — HIGH (ref 0.5–1.3)
GAS PNL BLDA: SIGNIFICANT CHANGE UP
GAS PNL BLDA: SIGNIFICANT CHANGE UP
GENTAMICIN PEAK SERPL-MCNC: 2.5 UG/ML — LOW (ref 5–10)
GENTAMICIN TROUGH SERPL-MCNC: 1.4 UG/ML — SIGNIFICANT CHANGE UP (ref 0–2)
GLUCOSE BLDC GLUCOMTR-MCNC: 104 MG/DL — HIGH (ref 70–99)
GLUCOSE BLDC GLUCOMTR-MCNC: 145 MG/DL — HIGH (ref 70–99)
GLUCOSE BLDC GLUCOMTR-MCNC: 148 MG/DL — HIGH (ref 70–99)
GLUCOSE BLDC GLUCOMTR-MCNC: 152 MG/DL — HIGH (ref 70–99)
GLUCOSE BLDC GLUCOMTR-MCNC: 161 MG/DL — HIGH (ref 70–99)
GLUCOSE SERPL-MCNC: 145 MG/DL — HIGH (ref 70–99)
HCT VFR BLD CALC: 27.5 % — LOW (ref 39–50)
HGB BLD-MCNC: 9.3 G/DL — LOW (ref 13–17)
INR BLD: 1.04 RATIO — SIGNIFICANT CHANGE UP (ref 0.88–1.16)
MCHC RBC-ENTMCNC: 32.6 PG — SIGNIFICANT CHANGE UP (ref 27–34)
MCHC RBC-ENTMCNC: 33.8 GM/DL — SIGNIFICANT CHANGE UP (ref 32–36)
MCV RBC AUTO: 96.5 FL — SIGNIFICANT CHANGE UP (ref 80–100)
PLATELET # BLD AUTO: 135 K/UL — LOW (ref 150–400)
POTASSIUM SERPL-MCNC: 4.9 MMOL/L — SIGNIFICANT CHANGE UP (ref 3.5–5.3)
POTASSIUM SERPL-SCNC: 4.9 MMOL/L — SIGNIFICANT CHANGE UP (ref 3.5–5.3)
PROT SERPL-MCNC: 5.9 G/DL — LOW (ref 6–8.3)
PROTHROM AB SERPL-ACNC: 11.4 SEC — SIGNIFICANT CHANGE UP (ref 9.8–12.7)
RBC # BLD: 2.85 M/UL — LOW (ref 4.2–5.8)
RBC # FLD: 16.9 % — HIGH (ref 10.3–14.5)
SODIUM SERPL-SCNC: 150 MMOL/L — HIGH (ref 135–145)
VANCOMYCIN TROUGH SERPL-MCNC: <4 UG/ML — LOW (ref 10–20)
WBC # BLD: 10.5 K/UL — SIGNIFICANT CHANGE UP (ref 3.8–10.5)
WBC # FLD AUTO: 10.5 K/UL — SIGNIFICANT CHANGE UP (ref 3.8–10.5)

## 2018-02-18 PROCEDURE — 99232 SBSQ HOSP IP/OBS MODERATE 35: CPT | Mod: GC

## 2018-02-18 PROCEDURE — 71045 X-RAY EXAM CHEST 1 VIEW: CPT | Mod: 26

## 2018-02-18 PROCEDURE — 71045 X-RAY EXAM CHEST 1 VIEW: CPT | Mod: 26,77

## 2018-02-18 PROCEDURE — 99291 CRITICAL CARE FIRST HOUR: CPT

## 2018-02-18 RX ADMIN — Medication 100 MILLIGRAM(S): at 11:15

## 2018-02-18 RX ADMIN — Medication 200 MILLIGRAM(S): at 10:03

## 2018-02-18 RX ADMIN — Medication 75 MICROGRAM(S): at 13:23

## 2018-02-18 RX ADMIN — Medication 325 MILLIGRAM(S): at 11:10

## 2018-02-18 RX ADMIN — CEFTRIAXONE 100 GRAM(S): 500 INJECTION, POWDER, FOR SOLUTION INTRAMUSCULAR; INTRAVENOUS at 11:09

## 2018-02-18 RX ADMIN — PANTOPRAZOLE SODIUM 40 MILLIGRAM(S): 20 TABLET, DELAYED RELEASE ORAL at 11:15

## 2018-02-18 RX ADMIN — Medication 1 MILLIGRAM(S): at 11:10

## 2018-02-18 RX ADMIN — FINASTERIDE 5 MILLIGRAM(S): 5 TABLET, FILM COATED ORAL at 11:10

## 2018-02-18 RX ADMIN — Medication 2: at 02:35

## 2018-02-18 RX ADMIN — HEPARIN SODIUM 5000 UNIT(S): 5000 INJECTION INTRAVENOUS; SUBCUTANEOUS at 05:26

## 2018-02-18 RX ADMIN — Medication 5: at 05:27

## 2018-02-18 RX ADMIN — Medication 5: at 13:19

## 2018-02-18 RX ADMIN — ATORVASTATIN CALCIUM 80 MILLIGRAM(S): 80 TABLET, FILM COATED ORAL at 21:37

## 2018-02-18 RX ADMIN — HEPARIN SODIUM 5000 UNIT(S): 5000 INJECTION INTRAVENOUS; SUBCUTANEOUS at 13:18

## 2018-02-18 RX ADMIN — Medication 2: at 21:54

## 2018-02-18 RX ADMIN — AMIODARONE HYDROCHLORIDE 200 MILLIGRAM(S): 400 TABLET ORAL at 05:26

## 2018-02-18 RX ADMIN — Medication 25 MILLIGRAM(S): at 17:05

## 2018-02-18 RX ADMIN — Medication 5: at 10:10

## 2018-02-18 NOTE — PROGRESS NOTE ADULT - PROBLEM SELECTOR PLAN 1
Likely contrast induced nephropathy from cardiac cath/Toxic/Septic  ATN. Scr stable. Plan is to continue monitoring Scr., electrolytes, and urine output.

## 2018-02-18 NOTE — PROGRESS NOTE ADULT - PROBLEM SELECTOR PLAN 2
Likely in setting of impaired urinary concentrating ability given recovering ATN. Serum sodium improving with free water via NGT. Monitor serum sodium.

## 2018-02-18 NOTE — PROGRESS NOTE ADULT - ASSESSMENT
Patient is a 73 y/o man with history of ETOH abuse who presents with fall and found with endocarditis, developed ELIAN in-hospital shortly after undergoing cardiac catheterization, s/p urgent AVR and CABG on 1/30/18 with clinical decompensation on 2/1 requiring intubation and multiple vasopressor support.  Now extubated.

## 2018-02-18 NOTE — PROGRESS NOTE ADULT - SUBJECTIVE AND OBJECTIVE BOX
SAMANTHA CADENA  MRN-[id number]    Admission HPI:  Patient is a 72 year-old male with past medical history of T2DM, DLD, alcohol misuse brought in by family after having been found face down on the floor for approximately 2 days. pt ws in heart failure with vegetatin on aortic valve with AI;      surgery/Hospital course:  1/30 AVR(T)/C1L  2/1 cardiogenic shock , reintubated, IABP insertion  2/111 RTOR pericardial fluid drain  2/14 sternal wound debriedment;    today: awake not dyspnic; , can not bear weight;       PAST MEDICAL & SURGICAL HISTORY:  High cholesterol  Diabetes: type 2  No significant past surgical history    Allergies o Known Allergies    PHYSICAL EXAM:  T(C): 36.8, Max: 36.8 (02-18-18 @ 20:00)  HR: 69 (66 - 82)  BP: --  RR: 20 (12 - 54)  SpO2: 100% (100% - 100%)  Gen:  Awake, alert, not in distress  CNS:A&O x3, No focal dificit  Eyes:PERRL  ENT/Neck: no JVD	  RES :+Breath sounds , No rhonchi, No wheezing  CVS: regular rhythm. Normal S1/S2. No Murmur;   Abd: soft; not distented; No Tenderness. Bowel sounds present .   Extremities:No edema  Skin: + skin break on the back  Psych: No lethargy .    I&O:    02-17-18 @ 07:01  -  02-18-18 @ 07:00  --------------------------------------------------------  IN: 4400 mL / OUT: 715 mL / NET: 3685 mL    02-18-18 @ 07:01  -  02-18-18 @ 23:00  --------------------------------------------------------  IN: 2610 mL / OUT: 470 mL / NET: 2140 mL  I&O's Detail  17 Feb 2018 07:01  -  18 Feb 2018 07:00  --------------------------------------------------------  IN:    Free Water: 1800 mL    Oral Fluid: 1160 mL    Vital HN: 1440 mL  Total IN: 4400 mL    OUT:    Chest Tube: 190 mL    Chest Tube: 80 mL    Chest Tube: 220 mL    Incontinent per Condom Catheter: 175 mL    Rectal Tube: 50 mL  Total OUT: 715 mL  Total NET: 3685 mL    18 Feb 2018 07:01  -  18 Feb 2018 23:00  --------------------------------------------------------  IN:    Free Water: 1200 mL    Oral Fluid: 360 mL    Solution: 150 mL    Vital HN: 900 mL  Total IN: 2610 mL    OUT:    Chest Tube: 60 mL    Chest Tube: 100 mL    Chest Tube: 60 mL    Incontinent per Condom Catheter: 200 mL    Rectal Tube: 50 mL  Total OUT: 470 mL  Total NET: 2140 mL  Labs:               9.3    10.5  )-----------( 135      ( 18 Feb 2018 01:13 )             27.5     150<H>  |  120<H>  |  63<H>  ----------------------------<  145<H>  4.9   |  18<L>  |  1.71<H>    Ca    8.1<L>      TPro  5.9<L>  /  Alb  2.4<L>  /  TBili  0.7  /  DBili  x   /  AST  40  /  ALT  36  /  AlkPhos  194<H>  02-18  COAGS: PT/INR - ( 18 Feb 2018 09:47 )   PT: 11.4 sec;   INR: 1.04 ratio    PTT - ( 18 Feb 2018 09:47 )  PTT:75.5 sec  CAPILLARY BLOOD GLUCOSE  148 (18 Feb 2018 22:00)  104 (18 Feb 2018 17:00)  152 (18 Feb 2018 13:00)  161 (18 Feb 2018 10:00)  161 (18 Feb 2018 06:00)    MEDICATIONS  (STANDING):  amiodarone    Tablet 200 milliGRAM(s) Oral daily  aspirin 325 milliGRAM(s) Oral daily  atorvastatin 80 milliGRAM(s) Oral at bedtime  cefTRIAXone   IVPB 2 Gram(s) IV Intermittent every 24 hour    finasteride 5 milliGRAM(s) Oral daily  folic acid 1 milliGRAM(s) Oral daily  gentamicin   IVPB 40 milliGRAM(s) IV Intermittent <User Schedule>  insulin lispro (HumaLOG) corrective regimen sliding scale   SubCutaneous every 4 hours  levothyroxine Injectable 75 MICROGram(s) IV Push <User Schedule>  metoprolol     tartrate 25 milliGRAM(s) Oral every 12 hours  pantoprazole  Injectable 40 milliGRAM(s) IV Push daily  thiamine 100 milliGRAM(s) Oral daily    MEDICATIONS  (PRN):  acetaminophen    Suspension. 650 milliGRAM(s) Oral every 6 hours PRN Mild Pain (1 - 3)    ASSESMENT :  s/p AVR /C1L  culture negative Enodarditis  sternal wound infection s/p debriedment  pericardial effusion ,  paroxysmal atrial fibrillation  DM2  hypothyroidism  paroxysmal A- fib  dysphagia   elevated ptt                       PLAN:   =Neuro: analgesics  acetaminophen    Suspension. PRN  = Respiratory: Oxygent NC   =CVS/Hem:   aspirin  amiodarone    Tablet 200 Oral daily  metoprolol     tartrate 25 Oral every 12 hours  heparin  Injectable 5000 every 8 hours  monitor hemodynamic with recurrnt  =Renal:   =GI: GI prophylaxis;  Feeding : vital @60  ml/hr  folic acid 1 daily  pantoprazole  Injectable 40 daily  thiamine 100 daily    =Endo: Glycemic control; Statin  atorvastatin 80 at bedtime  finasteride 5 daily  insulin lispro (HumaLOG) corrective regimen sliding scale  every 4 hours  levothyroxine Injectable 75 <User Schedule>    =Antibiotics:   cefTRIAXone   IVPB 2 every 24 hours  gentamicin   IVPB 40 <User Schedule>          patient remains critical;  I spent 35  minutes of critical care time, noncontiuous, with this patient

## 2018-02-18 NOTE — PROGRESS NOTE ADULT - SUBJECTIVE AND OBJECTIVE BOX
[facility] DIVISION OF KIDNEY DISEASES AND HYPERTENSION -- FOLLOW UP NOTE  --------------------------------------------------------------------------------  Chief Complaint: ELIAN    24 hour events/subjective:  no acute events        PAST HISTORY  --------------------------------------------------------------------------------  No significant changes to PMH, PSH, FHx, SHx, unless otherwise noted    ALLERGIES & MEDICATIONS  --------------------------------------------------------------------------------  Allergies    No Known Allergies    Intolerances      Standing Inpatient Medications  amiodarone    Tablet 200 milliGRAM(s) Oral daily  aspirin 325 milliGRAM(s) Oral daily  atorvastatin 80 milliGRAM(s) Oral at bedtime  cefTRIAXone   IVPB 2 Gram(s) IV Intermittent every 24 hours  cefTRIAXone   IVPB      finasteride 5 milliGRAM(s) Oral daily  folic acid 1 milliGRAM(s) Oral daily  gentamicin   IVPB 40 milliGRAM(s) IV Intermittent <User Schedule>  heparin  Injectable 5000 Unit(s) SubCutaneous every 8 hours  insulin lispro (HumaLOG) corrective regimen sliding scale   SubCutaneous every 4 hours  levothyroxine Injectable 75 MICROGram(s) IV Push <User Schedule>  metoprolol     tartrate 25 milliGRAM(s) Oral every 12 hours  pantoprazole  Injectable 40 milliGRAM(s) IV Push daily  thiamine 100 milliGRAM(s) Oral daily    PRN Inpatient Medications  acetaminophen    Suspension. 650 milliGRAM(s) Oral every 6 hours PRN      REVIEW OF SYSTEMS  --------------------------------------------------------------------------------  Gen: No weight changes, fatigue, fevers/chills, weakness  Skin: No rashes  Head/Eyes/Ears/Mouth: No headache; Normal hearing; Normal vision w/o blurriness; No sinus pain/discomfort, sore throat  Respiratory: No dyspnea, cough, wheezing, hemoptysis  CV: No chest pain, PND, orthopnea  GI: No abdominal pain, diarrhea, constipation, nausea, vomiting, melena, hematochezia  : No increased frequency, dysuria, hematuria, nocturia  MSK: No joint pain/swelling; no back pain; no edema  Neuro: No dizziness/lightheadedness, weakness, seizures, numbness, tingling  Heme: No easy bruising or bleeding  Endo: No heat/cold intolerance  Psych: No significant nervousness, anxiety, stress, depression    All other systems were reviewed and are negative, except as noted.    VITALS/PHYSICAL EXAM  --------------------------------------------------------------------------------  T(C): 36.7 (02-18-18 @ 15:00), Max: 36.7 (02-18-18 @ 08:00)  HR: 70 (02-18-18 @ 16:00) (65 - 82)  BP: --  RR: 15 (02-18-18 @ 16:00) (12 - 29)  SpO2: 100% (02-18-18 @ 16:00) (100% - 100%)  Wt(kg): --        02-17-18 @ 07:01  -  02-18-18 @ 07:00  --------------------------------------------------------  IN: 4400 mL / OUT: 715 mL / NET: 3685 mL    02-18-18 @ 07:01  -  02-18-18 @ 16:23  --------------------------------------------------------  IN: 1590 mL / OUT: 60 mL / NET: 1530 mL      Physical Exam:  	  Gen: NAD, sitting up in chair                HEENT- NGT +  	Pulm: coarse breath sounds  	CV: RRR, S1S2;  rub sternotomy noted; wound vac present over chest   	Abd: +BS, soft, nontender/nondistended              LE: no edema              : kayli    LABS/STUDIES  --------------------------------------------------------------------------------              9.3    10.5  >-----------<  135      [02-18-18 @ 01:13]              27.5     150  |  120  |  63  ----------------------------<  145      [02-18-18 @ 01:13]  4.9   |  18  |  1.71        Ca     8.1     [02-18-18 @ 01:13]    TPro  5.9  /  Alb  2.4  /  TBili  0.7  /  DBili  x   /  AST  40  /  ALT  36  /  AlkPhos  194  [02-18-18 @ 01:13]    PT/INR: PT 11.4 , INR 1.04       [02-18-18 @ 09:47]  PTT: 75.5       [02-18-18 @ 09:47]      Creatinine Trend:  SCr 1.71 [02-18 @ 01:13]  SCr 1.69 [02-17 @ 14:01]  SCr 1.60 [02-17 @ 01:34]  SCr 1.62 [02-16 @ 02:38]  SCr 1.73 [02-15 @ 00:35]    Urinalysis - [02-15-18 @ 02:21]      Color Yellow / Appearance SL Turbid / SG 1.015 / pH 5.5      Gluc Negative / Ketone Negative  / Bili Negative / Urobili Negative       Blood Large / Protein 30 / Leuk Est Moderate / Nitrite Negative      RBC >50 / WBC 11-25 / Hyaline  / Gran  / Sq Epi  / Non Sq Epi OCC / Bacteria Few      HbA1c 9.1      [01-09-18 @ 03:39]  TSH 12.56      [02-10-18 @ 12:11]  Lipid: chol 103, , HDL 16, LDL 67      [01-09-18 @ 08:09]    HIV Nonreact      [02-10-18 @ 12:11]

## 2018-02-19 LAB
ALBUMIN SERPL ELPH-MCNC: 2.1 G/DL — LOW (ref 3.3–5)
ALP SERPL-CCNC: 207 U/L — HIGH (ref 40–120)
ALT FLD-CCNC: 38 U/L RC — SIGNIFICANT CHANGE UP (ref 10–45)
ANION GAP SERPL CALC-SCNC: 12 MMOL/L — SIGNIFICANT CHANGE UP (ref 5–17)
ANION GAP SERPL CALC-SCNC: 14 MMOL/L — SIGNIFICANT CHANGE UP (ref 5–17)
APPEARANCE UR: ABNORMAL
APTT BLD: 56.7 SEC — HIGH (ref 27.5–37.4)
AST SERPL-CCNC: 37 U/L — SIGNIFICANT CHANGE UP (ref 10–40)
BILIRUB SERPL-MCNC: 0.5 MG/DL — SIGNIFICANT CHANGE UP (ref 0.2–1.2)
BILIRUB UR-MCNC: NEGATIVE — SIGNIFICANT CHANGE UP
BUN SERPL-MCNC: 71 MG/DL — HIGH (ref 7–23)
BUN SERPL-MCNC: 72 MG/DL — HIGH (ref 7–23)
CALCIUM SERPL-MCNC: 7.9 MG/DL — LOW (ref 8.4–10.5)
CALCIUM SERPL-MCNC: 8 MG/DL — LOW (ref 8.4–10.5)
CHLORIDE SERPL-SCNC: 113 MMOL/L — HIGH (ref 96–108)
CHLORIDE SERPL-SCNC: 116 MMOL/L — HIGH (ref 96–108)
CO2 SERPL-SCNC: 18 MMOL/L — LOW (ref 22–31)
CO2 SERPL-SCNC: 18 MMOL/L — LOW (ref 22–31)
COLOR SPEC: YELLOW — SIGNIFICANT CHANGE UP
CREAT SERPL-MCNC: 2.09 MG/DL — HIGH (ref 0.5–1.3)
CREAT SERPL-MCNC: 2.17 MG/DL — HIGH (ref 0.5–1.3)
CULTURE RESULTS: SIGNIFICANT CHANGE UP
CULTURE RESULTS: SIGNIFICANT CHANGE UP
DIFF PNL FLD: ABNORMAL
GAS PNL BLDA: SIGNIFICANT CHANGE UP
GAS PNL BLDA: SIGNIFICANT CHANGE UP
GLUCOSE BLDC GLUCOMTR-MCNC: 115 MG/DL — HIGH (ref 70–99)
GLUCOSE BLDC GLUCOMTR-MCNC: 165 MG/DL — HIGH (ref 70–99)
GLUCOSE BLDC GLUCOMTR-MCNC: 167 MG/DL — HIGH (ref 70–99)
GLUCOSE BLDC GLUCOMTR-MCNC: 207 MG/DL — HIGH (ref 70–99)
GLUCOSE BLDC GLUCOMTR-MCNC: 96 MG/DL — SIGNIFICANT CHANGE UP (ref 70–99)
GLUCOSE SERPL-MCNC: 167 MG/DL — HIGH (ref 70–99)
GLUCOSE SERPL-MCNC: 168 MG/DL — HIGH (ref 70–99)
GLUCOSE UR QL: NEGATIVE — SIGNIFICANT CHANGE UP
HCT VFR BLD CALC: 25.9 % — LOW (ref 39–50)
HGB BLD-MCNC: 8.5 G/DL — LOW (ref 13–17)
KETONES UR-MCNC: NEGATIVE — SIGNIFICANT CHANGE UP
LEUKOCYTE ESTERASE UR-ACNC: ABNORMAL
MCHC RBC-ENTMCNC: 31.5 PG — SIGNIFICANT CHANGE UP (ref 27–34)
MCHC RBC-ENTMCNC: 32.7 GM/DL — SIGNIFICANT CHANGE UP (ref 32–36)
MCV RBC AUTO: 96.3 FL — SIGNIFICANT CHANGE UP (ref 80–100)
NITRITE UR-MCNC: NEGATIVE — SIGNIFICANT CHANGE UP
OSMOLALITY UR: 357 MOS/KG — SIGNIFICANT CHANGE UP (ref 300–900)
PH UR: 6 — SIGNIFICANT CHANGE UP (ref 5–8)
PLATELET # BLD AUTO: 159 K/UL — SIGNIFICANT CHANGE UP (ref 150–400)
POTASSIUM SERPL-MCNC: 5.1 MMOL/L — SIGNIFICANT CHANGE UP (ref 3.5–5.3)
POTASSIUM SERPL-MCNC: 5.3 MMOL/L — SIGNIFICANT CHANGE UP (ref 3.5–5.3)
POTASSIUM SERPL-SCNC: 5.1 MMOL/L — SIGNIFICANT CHANGE UP (ref 3.5–5.3)
POTASSIUM SERPL-SCNC: 5.3 MMOL/L — SIGNIFICANT CHANGE UP (ref 3.5–5.3)
PROT SERPL-MCNC: 5.6 G/DL — LOW (ref 6–8.3)
PROT UR-MCNC: 30 MG/DL
RBC # BLD: 2.69 M/UL — LOW (ref 4.2–5.8)
RBC # FLD: 16.4 % — HIGH (ref 10.3–14.5)
SODIUM SERPL-SCNC: 145 MMOL/L — SIGNIFICANT CHANGE UP (ref 135–145)
SODIUM SERPL-SCNC: 146 MMOL/L — HIGH (ref 135–145)
SP GR SPEC: 1.01 — SIGNIFICANT CHANGE UP (ref 1.01–1.02)
SPECIMEN SOURCE: SIGNIFICANT CHANGE UP
SPECIMEN SOURCE: SIGNIFICANT CHANGE UP
UROBILINOGEN FLD QL: NEGATIVE — SIGNIFICANT CHANGE UP
WBC # BLD: 10 K/UL — SIGNIFICANT CHANGE UP (ref 3.8–10.5)
WBC # FLD AUTO: 10 K/UL — SIGNIFICANT CHANGE UP (ref 3.8–10.5)

## 2018-02-19 PROCEDURE — 99232 SBSQ HOSP IP/OBS MODERATE 35: CPT | Mod: GC

## 2018-02-19 PROCEDURE — 71045 X-RAY EXAM CHEST 1 VIEW: CPT | Mod: 26,59

## 2018-02-19 PROCEDURE — 99232 SBSQ HOSP IP/OBS MODERATE 35: CPT

## 2018-02-19 PROCEDURE — 93308 TTE F-UP OR LMTD: CPT | Mod: 26

## 2018-02-19 PROCEDURE — 93321 DOPPLER ECHO F-UP/LMTD STD: CPT | Mod: 26

## 2018-02-19 PROCEDURE — 99291 CRITICAL CARE FIRST HOUR: CPT

## 2018-02-19 RX ORDER — PANTOPRAZOLE SODIUM 20 MG/1
40 TABLET, DELAYED RELEASE ORAL DAILY
Qty: 0 | Refills: 0 | Status: DISCONTINUED | OUTPATIENT
Start: 2018-02-19 | End: 2018-02-27

## 2018-02-19 RX ORDER — LEVOTHYROXINE SODIUM 125 MCG
150 TABLET ORAL
Qty: 0 | Refills: 0 | Status: DISCONTINUED | OUTPATIENT
Start: 2018-02-19 | End: 2018-02-27

## 2018-02-19 RX ORDER — PANTOPRAZOLE SODIUM 20 MG/1
40 TABLET, DELAYED RELEASE ORAL
Qty: 0 | Refills: 0 | Status: DISCONTINUED | OUTPATIENT
Start: 2018-02-19 | End: 2018-02-19

## 2018-02-19 RX ADMIN — Medication 10: at 10:34

## 2018-02-19 RX ADMIN — Medication 650 MILLIGRAM(S): at 14:15

## 2018-02-19 RX ADMIN — AMIODARONE HYDROCHLORIDE 200 MILLIGRAM(S): 400 TABLET ORAL at 05:56

## 2018-02-19 RX ADMIN — Medication 650 MILLIGRAM(S): at 06:45

## 2018-02-19 RX ADMIN — Medication 5: at 18:14

## 2018-02-19 RX ADMIN — Medication 650 MILLIGRAM(S): at 14:45

## 2018-02-19 RX ADMIN — Medication 5: at 06:03

## 2018-02-19 RX ADMIN — Medication 5: at 01:57

## 2018-02-19 RX ADMIN — Medication 650 MILLIGRAM(S): at 06:15

## 2018-02-19 RX ADMIN — Medication 100 MILLIGRAM(S): at 12:59

## 2018-02-19 RX ADMIN — CEFTRIAXONE 100 GRAM(S): 500 INJECTION, POWDER, FOR SOLUTION INTRAMUSCULAR; INTRAVENOUS at 10:09

## 2018-02-19 RX ADMIN — Medication 325 MILLIGRAM(S): at 12:59

## 2018-02-19 RX ADMIN — FINASTERIDE 5 MILLIGRAM(S): 5 TABLET, FILM COATED ORAL at 12:59

## 2018-02-19 RX ADMIN — Medication 200 MILLIGRAM(S): at 10:10

## 2018-02-19 RX ADMIN — PANTOPRAZOLE SODIUM 40 MILLIGRAM(S): 20 TABLET, DELAYED RELEASE ORAL at 12:59

## 2018-02-19 RX ADMIN — Medication 150 MICROGRAM(S): at 14:22

## 2018-02-19 RX ADMIN — ATORVASTATIN CALCIUM 80 MILLIGRAM(S): 80 TABLET, FILM COATED ORAL at 21:26

## 2018-02-19 RX ADMIN — Medication 1 MILLIGRAM(S): at 12:59

## 2018-02-19 NOTE — PROGRESS NOTE ADULT - ASSESSMENT
A/P: 72M s/p Sternal Debridement & Primary Closure (POD #5)  - monitor hematoma; nothing to do at this time  - Cont w/ Dressing  - Care as per primary team

## 2018-02-19 NOTE — PROGRESS NOTE ADULT - SUBJECTIVE AND OBJECTIVE BOX
Infectious Diseases Follow up note     SAMANTHA CADENAARABELLA-[id number]      F/u:     Interval History/ROS:  no fever or chills.  no chest pain. no shortness of breath. no cough.   no nausea or vomiting.   no abdominal pain. no diarrhea. no burning urine.   no headache.     Allergies  No Known Allergies      ANTIMICROBIALS:   cefTRIAXone   IVPB 2 every 24 hours    gentamicin   IVPB 40 <User Schedule>    acetaminophen    Suspension. 650 milliGRAM(s) Oral every 6 hours PRN  amiodarone    Tablet 200 milliGRAM(s) Oral daily  aspirin 325 milliGRAM(s) Oral daily  atorvastatin 80 milliGRAM(s) Oral at bedtime  cefTRIAXone   IVPB 2 Gram(s) IV Intermittent every 24 hours     finasteride 5 milliGRAM(s) Oral daily  folic acid 1 milliGRAM(s) Oral daily  gentamicin   IVPB 40 milliGRAM(s) IV Intermittent <User Schedule>  insulin lispro (HumaLOG) corrective regimen sliding scale   SubCutaneous every 4 hours  levothyroxine Injectable 75 MICROGram(s) IV Push <User Schedule>  metoprolol     tartrate 25 milliGRAM(s) Oral every 12 hours  pantoprazole  Injectable 40 milliGRAM(s) IV Push daily  thiamine 100 milliGRAM(s) Oral daily    Vital Signs Last 24 Hrs  T(C): 36.8 (19 Feb 2018 08:00), Max: 36.8 (18 Feb 2018 20:00)  T(F): 98.3 (19 Feb 2018 08:00), Max: 98.3 (19 Feb 2018 08:00)  HR: 74 (19 Feb 2018 08:00) (66 - 82)  BP: --  RR: 20 (19 Feb 2018 08:00) (13 - 54)  SpO2: 99% (19 Feb 2018 08:00) (99% - 100%)    PHYSICAL EXAM:  General:  non-toxic  HEAD/EYES:  PERRL  white sclera  ENT:  normal  supple  Cardiovascular:   no murmur   Respiratory:   clear to ausculation bilaterally  GI:   soft, non-tender, normal bowel sounds  :   no CVA tenderness   Musculoskeletal:  no synovitis  Neurologic:   non-focal exam   Skin:   no rash  Lymph:  no lymphadenopathy  Psychiatric:   appropriate affect, alert & oriented  Lines:  no phlebitis                          8.5    10.0  )-----------( 159      ( 19 Feb 2018 01:39 )             25.9     02-19    146<H>  |  116<H>  |  71<H>  ----------------------------<  168<H>  5.1   |  18<L>  |  2.09<H>    Ca    7.9<L>      19 Feb 2018 01:39    TPro  5.6<L>  /  Alb  2.1<L>  /  TBili  0.5  /  DBili  x   /  AST  37  /  ALT  38  /  AlkPhos  207<H>  02-19    Microbiology:     Gentamicin Level, Peak: 2.5 ug/mL (02-18-18 @ 13:28)  Gentamicin Level, Trough: 1.4 ug/mL (02-18-18 @ 10:10)  Vancomycin Level, Trough: <4.0 ug/mL (02-18-18 @ 10:10)  v  RADIOLOGY: Infectious Diseases Follow up note     SAMANTHA CADENAARABELLA-[id number]      F/u: Endocarditis     Interval History/ROS:  no fever or chills.  no chest pain. no shortness of breath. no cough.   no nausea or vomiting.   no abdominal pain. no diarrhea. no burning urine.   no headache.     Allergies  No Known Allergies      ANTIMICROBIALS:   cefTRIAXone   IVPB 2 every 24 hours    gentamicin   IVPB 40 <User Schedule>    acetaminophen    Suspension. 650 milliGRAM(s) Oral every 6 hours PRN  amiodarone    Tablet 200 milliGRAM(s) Oral daily  aspirin 325 milliGRAM(s) Oral daily  atorvastatin 80 milliGRAM(s) Oral at bedtime  cefTRIAXone   IVPB 2 Gram(s) IV Intermittent every 24 hours     finasteride 5 milliGRAM(s) Oral daily  folic acid 1 milliGRAM(s) Oral daily  gentamicin   IVPB 40 milliGRAM(s) IV Intermittent <User Schedule>  insulin lispro (HumaLOG) corrective regimen sliding scale   SubCutaneous every 4 hours  levothyroxine Injectable 75 MICROGram(s) IV Push <User Schedule>  metoprolol     tartrate 25 milliGRAM(s) Oral every 12 hours  pantoprazole  Injectable 40 milliGRAM(s) IV Push daily  thiamine 100 milliGRAM(s) Oral daily    Vital Signs Last 24 Hrs  T(C): 36.8 (19 Feb 2018 08:00), Max: 36.8 (18 Feb 2018 20:00)  T(F): 98.3 (19 Feb 2018 08:00), Max: 98.3 (19 Feb 2018 08:00)  HR: 74 (19 Feb 2018 08:00) (66 - 82)  BP: art 98/34  RR: 20 (19 Feb 2018 08:00) (13 - 54)  SpO2: 99% (19 Feb 2018 08:00) (99% - 100%)    PHYSICAL EXAM:  General:  non-toxic  HEAD/EYES:  PERRL  white sclera  ENT:  normal  supple  Cardiovascular:   no murmur   Respiratory:   clear to ausculation bilaterally  GI:   soft, non-tender, normal bowel sounds  :   no CVA tenderness   Musculoskeletal:  no synovitis  Neurologic:   non-focal exam   Skin:   no rash  Lymph:  no lymphadenopathy  Psychiatric:   appropriate affect, alert & oriented  Lines:  no phlebitis                          8.5    10.0  )-----------( 159      ( 19 Feb 2018 01:39 )             25.9     02-19    146<H>  |  116<H>  |  71<H>  ----------------------------<  168<H>  5.1   |  18<L>  |  2.09<H>    Ca    7.9<L>      19 Feb 2018 01:39    TPro  5.6<L>  /  Alb  2.1<L>  /  TBili  0.5  /  DBili  x   /  AST  37  /  ALT  38  /  AlkPhos  207<H>  02-19    Microbiology:     Gentamicin Level, Peak: 2.5 ug/mL (02-18-18 @ 13:28)  Gentamicin Level, Trough: 1.4 ug/mL (02-18-18 @ 10:10)  Vancomycin Level, Trough: <4.0 ug/mL (02-18-18 @ 10:10)  v  RADIOLOGY:    < from: Xray Chest 1 View- PORTABLE-Routine (02.18.18 @ 03:17) >  FINDINGS:   S/P sternotomy and aortic valve replacement.  NG tube in stomach.  Left PICC line tip overlies SVC.  Left chest tube and mediastinal drain in situ.  Lower right chest pigtail catheter in situ.  The heartis magnified by projection.  Previous right-sided pneumothorax has resolved.  Small left pleural effusion persists.  No bilateral focal consolidations.  There are degenerative changes of the thoracic spine.    IMPRESSION:   1. Lines and tubes as above.  2. Persistent small left pleural effusion.  3. Resolution of previous right-sided pneumothorax. Infectious Diseases Follow up note     SAMANTHA CADENARegency Meridian-[id number]      F/u: Endocarditis     Interval History/ROS: he occasionally coughs and when it does his abdomen hurts at that time. Getting an echo.   no fever or chills.  no chest pain. no shortness of breath. no cough.   no nausea or vomiting.   no abdominal pain. no diarrhea. no burning urine.   no headache.     Allergies  No Known Allergies      ANTIMICROBIALS:   cefTRIAXone   IVPB 2 every 24 hours    gentamicin   IVPB 40 <User Schedule>    acetaminophen    Suspension. 650 milliGRAM(s) Oral every 6 hours PRN  amiodarone    Tablet 200 milliGRAM(s) Oral daily  aspirin 325 milliGRAM(s) Oral daily  atorvastatin 80 milliGRAM(s) Oral at bedtime  cefTRIAXone   IVPB 2 Gram(s) IV Intermittent every 24 hours     finasteride 5 milliGRAM(s) Oral daily  folic acid 1 milliGRAM(s) Oral daily  gentamicin   IVPB 40 milliGRAM(s) IV Intermittent <User Schedule>  insulin lispro (HumaLOG) corrective regimen sliding scale   SubCutaneous every 4 hours  levothyroxine Injectable 75 MICROGram(s) IV Push <User Schedule>  metoprolol     tartrate 25 milliGRAM(s) Oral every 12 hours  pantoprazole  Injectable 40 milliGRAM(s) IV Push daily  thiamine 100 milliGRAM(s) Oral daily    Vital Signs Last 24 Hrs  T(C): 36.8 (19 Feb 2018 08:00), Max: 36.8 (18 Feb 2018 20:00)  T(F): 98.3 (19 Feb 2018 08:00), Max: 98.3 (19 Feb 2018 08:00)  HR: 74 (19 Feb 2018 08:00) (66 - 82)  BP: art 98/34  RR: 20 (19 Feb 2018 08:00) (13 - 54)  SpO2: 99% (19 Feb 2018 08:00) (99% - 100%)    PHYSICAL EXAM:  General:  non-toxic  HEAD/EYES:  PERRL  white sclera  ENT:  normal  supple  NGT  Cardiovascular:   no murmur   mid sternal bandage- not tender  epigastric bandage   3 chest tubes   Respiratory:   clear to ausculation bilaterally  GI:   soft, non-tender, normal bowel sounds  rectal tube  :   no CVA tenderness   Musculoskeletal:  no synovitis  Neurologic:   non-focal exam   Skin:   no rash  Lymph:  no lymphadenopathy  Psychiatric:   appropriate affect, alert & oriented  Lines:  no phlebitis  right axilla central line                        8.5    10.0  )-----------( 159      ( 19 Feb 2018 01:39 )             25.9     02-19    146<H>  |  116<H>  |  71<H>  ----------------------------<  168<H>  5.1   |  18<L>  |  2.09<H>    Ca    7.9<L>      19 Feb 2018 01:39    TPro  5.6<L>  /  Alb  2.1<L>  /  TBili  0.5  /  DBili  x   /  AST  37  /  ALT  38  /  AlkPhos  207<H>  02-19    Microbiology:     Gentamicin Level, Peak: 2.5 ug/mL (02-18-18 @ 13:28)  Gentamicin Level, Trough: 1.4 ug/mL (02-18-18 @ 10:10)  Vancomycin Level, Trough: <4.0 ug/mL (02-18-18 @ 10:10)  v  RADIOLOGY:    < from: Xray Chest 1 View- PORTABLE-Routine (02.18.18 @ 03:17) >  FINDINGS:   S/P sternotomy and aortic valve replacement.  NG tube in stomach.  Left PICC line tip overlies SVC.  Left chest tube and mediastinal drain in situ.  Lower right chest pigtail catheter in situ.  The heartis magnified by projection.  Previous right-sided pneumothorax has resolved.  Small left pleural effusion persists.  No bilateral focal consolidations.  There are degenerative changes of the thoracic spine.    IMPRESSION:   1. Lines and tubes as above.  2. Persistent small left pleural effusion.  3. Resolution of previous right-sided pneumothorax. Infectious Diseases Follow up note     [id number]      F/u: Endocarditis     Interval History/ROS: he occasionally coughs and when it does his abdomen hurts at that time. Getting an echo.   no fever or chills.  no chest pain. no shortness of breath. no cough.   no nausea or vomiting.   no abdominal pain. no diarrhea. no burning urine.   no headache.   required milan catheter    Allergies  No Known Allergies      ANTIMICROBIALS:   cefTRIAXone   IVPB 2 every 24 hours    gentamicin   IVPB 40 <User Schedule>    acetaminophen    Suspension. 650 milliGRAM(s) Oral every 6 hours PRN  amiodarone    Tablet 200 milliGRAM(s) Oral daily  aspirin 325 milliGRAM(s) Oral daily  atorvastatin 80 milliGRAM(s) Oral at bedtime  cefTRIAXone   IVPB 2 Gram(s) IV Intermittent every 24 hours     finasteride 5 milliGRAM(s) Oral daily  folic acid 1 milliGRAM(s) Oral daily  gentamicin   IVPB 40 milliGRAM(s) IV Intermittent <User Schedule>  insulin lispro (HumaLOG) corrective regimen sliding scale   SubCutaneous every 4 hours  levothyroxine Injectable 75 MICROGram(s) IV Push <User Schedule>  metoprolol     tartrate 25 milliGRAM(s) Oral every 12 hours  pantoprazole  Injectable 40 milliGRAM(s) IV Push daily  thiamine 100 milliGRAM(s) Oral daily    Vital Signs Last 24 Hrs  T(C): 36.8 (19 Feb 2018 08:00), Max: 36.8 (18 Feb 2018 20:00)  T(F): 98.3 (19 Feb 2018 08:00), Max: 98.3 (19 Feb 2018 08:00)  HR: 74 (19 Feb 2018 08:00) (66 - 82)  BP: art 98/34  RR: 20 (19 Feb 2018 08:00) (13 - 54)  SpO2: 99% (19 Feb 2018 08:00) (99% - 100%)    PHYSICAL EXAM:  General:  non-toxic  HEAD/EYES:  PERRL  white sclera  ENT:  normal  supple  NGT  Cardiovascular:   no murmur   mid sternal bandage- not tender  epigastric bandage   3 chest tubes   Respiratory:   clear to ausculation bilaterally  GI:   soft, non-tender, normal bowel sounds  rectal tube  :   no CVA tenderness   Musculoskeletal:  no synovitis  Neurologic:   non-focal exam   Skin:   no rash  Lymph:  no lymphadenopathy  Psychiatric:   appropriate affect, alert & oriented  Lines:  no phlebitis  right axilla central line                        8.5    10.0  )-----------( 159      ( 19 Feb 2018 01:39 )             25.9     02-19    146<H>  |  116<H>  |  71<H>  ----------------------------<  168<H>  5.1   |  18<L>  |  2.09<H>    Ca    7.9<L>      19 Feb 2018 01:39    TPro  5.6<L>  /  Alb  2.1<L>  /  TBili  0.5  /  DBili  x   /  AST  37  /  ALT  38  /  AlkPhos  207<H>  02-19    Microbiology:     Gentamicin Level, Peak: 2.5 ug/mL (02-18-18 @ 13:28)  Gentamicin Level, Trough: 1.4 ug/mL (02-18-18 @ 10:10)  Vancomycin Level, Trough: <4.0 ug/mL (02-18-18 @ 10:10)  v  RADIOLOGY:    < from: Xray Chest 1 View- PORTABLE-Routine (02.18.18 @ 03:17) >  FINDINGS:   S/P sternotomy and aortic valve replacement.  NG tube in stomach.  Left PICC line tip overlies SVC.  Left chest tube and mediastinal drain in situ.  Lower right chest pigtail catheter in situ.  The heartis magnified by projection.  Previous right-sided pneumothorax has resolved.  Small left pleural effusion persists.  No bilateral focal consolidations.  There are degenerative changes of the thoracic spine.    IMPRESSION:   1. Lines and tubes as above.  2. Persistent small left pleural effusion.  3. Resolution of previous right-sided pneumothorax.

## 2018-02-19 NOTE — PROGRESS NOTE ADULT - PROBLEM SELECTOR PLAN 1
Consulted regarding use of heparin for DVT ppx given elevated aPTT.  Bloods have been drawn from A line so elevated aPTTs may all have been due to heparin contamination. A line removed today.  Recommend sending the following with AM labs on 2/20: aPTT, PT, Thrombin time, Reptilase time, Factor XI.  Doubt that he has a significant inherited or acquired coagulopathy, so after labs sent agree with starting heparin sc for DVT ppx.  Will follow.

## 2018-02-19 NOTE — PROGRESS NOTE ADULT - ASSESSMENT
72 male with T2DM, DLD, alcohol misuse brought in by family after having been found face down on the floor for approximately 2 days. On treatment for UTI, but now JANES with Aortic Valve lesions. 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 Bartonella, Legionella, Q Fever serology negative; fungal BCX negative. Brucella pending. Unclear cause of NVE, culture  with GPC--pending. Having diarrhea, C diff negative. LFTs rising, working ELIAN, aortic balloon pump, pressors. Critically ill. Aortic valve vegetation, leukocytosis, post operative state.  organism thought to be a fastidious gram positive coccus   micro having trouble getting it to grow   not likely if it were enterococcus  more likely vit  b 6  def strep that needs combination therapy   f/u [place] for PCR ( pending)  hematoma in mediastinum but no evidence of sternal woungd infection   continue ceftriaxone and low dose gentamicin  follow up level sent 72 male with T2DM, DLD, alcohol misuse brought in by family after having been found face down on the floor for approximately 2 days. On treatment for UTI, but now JANES with Aortic Valve lesions. Suspected culture negative endocarditis s/p AVR. Bartonella, Legionella, Q Fever serology negative; fungal BCX negative. Brucella pending. Unclear cause of NVE, culture  with GPC in pairs and chains. Having diarrhea, C diff negative. has ELIAN, aortic balloon pump, no longer on pressors. Aortic valve vegetation, no leukocytosis, post operative state, afebrile.  organism thought to be a fastidious gram positive coccus -  micro having trouble getting it to grow   not likely if it were enterococcus  more likely vit  b 6  def strep that needs combination therapy   f/u [place] for PCR ( pending)  hematoma in mediastinum but no evidence of sternal woungd infection   continue ceftriaxone and low dose gentamicin  follow up level sent 72 male with T2DM, DLD, alcohol misuse brought in by family after having been found face down on the floor for approximately 2 days. On treatment for UTI, but now JANES with Aortic Valve lesions. Suspected culture negative endocarditis s/p AVR. Bartonella, Legionella, Q Fever serology negative; fungal BCX negative. Brucella pending.  Unclear cause of NVE, culture  with GPC in pairs and chains.  C diff negative.   has ELIAN, aortic balloon pump - no longer on pressors, no leukocytosis, afebrile.  organism thought to be a fastidious gram positive coccus -  micro having trouble getting it to grow    f/u [place] for PCR ( pending)  hematoma in mediastinum but no evidence of sternal wound infection   continue ceftriaxone and low dose gentamicin  follow up level 72 male with T2DM, DLD, alcohol misuse brought in by family after having been found face down on the floor for approximately 2 days. On treatment for UTI, but now JANES with Aortic Valve lesions. Suspected culture negative endocarditis s/p AVR. Bartonella, Legionella, Q Fever serology negative; fungal BCX negative. Brucella pending.  Unclear cause of NVE, culture  with GPC in pairs and chains.  C diff negative.   has ELIAN, aortic balloon pump - no longer on pressors, no leukocytosis, afebrile. Creatinine 2.09. CrCl 32.7  organism thought to be a fastidious gram positive coccus -  micro having trouble getting it to grow    f/u [place] for PCR ( pending)  hematoma in mediastinum but no evidence of sternal wound infection   continue ceftriaxone and low dose gentamicin  follow up level 72 male with T2DM, DLD, alcohol misuse brought in by family after having been found face down on the floor for approximately 2 days. On treatment for UTI, but now JANES with Aortic Valve lesions. Suspected culture negative endocarditis s/p AVR. Bartonella, Legionella, Q Fever serology negative; fungal BCX negative. Brucella pending.  Unclear cause of NVE, culture  with GPC in pairs and chains.  C diff negative.   has ELIAN, aortic balloon pump - no longer on pressors, no leukocytosis, afebrile. Creatinine 2.09. CrCl 32.7  organism thought to be a fastidious gram positive coccus -  micro having trouble getting it to grow    f/u [place] for PCR ( pending)  hematoma in mediastinum but no evidence of sternal wound infection   continue ceftriaxone and low dose gentamicin  follow up level     follow gent levels with increased creatinine

## 2018-02-19 NOTE — PROGRESS NOTE ADULT - SUBJECTIVE AND OBJECTIVE BOX
Plastic Surgery Progress Note (pg [facility]: [id number], [facility]: [phone])    SUBJECTIVE:  Doing well. No overnight events.     OBJECTIVE:     ** VITAL SIGNS / I&O's **    Vital Signs Last 24 Hrs  T(C): 37 (19 Feb 2018 12:00), Max: 37 (19 Feb 2018 12:00)  T(F): 98.6 (19 Feb 2018 12:00), Max: 98.6 (19 Feb 2018 12:00)  HR: 73 (19 Feb 2018 15:00) (66 - 82)  BP: 101/54 (19 Feb 2018 15:00) (101/54 - 113/56)  BP(mean): 76 (19 Feb 2018 15:00) (72 - 81)  RR: 21 (19 Feb 2018 15:00) (13 - 54)  SpO2: 99% (19 Feb 2018 15:00) (90% - 100%)      18 Feb 2018 07:01  -  19 Feb 2018 07:00  --------------------------------------------------------  IN:    Free Water: 1800 mL    Oral Fluid: 360 mL    Solution: 150 mL    Vital HN: 1440 mL  Total IN: 3750 mL    OUT:    Chest Tube: 190 mL    Chest Tube: 230 mL    Chest Tube: 70 mL    Incontinent per Condom Catheter: 500 mL    Rectal Tube: 100 mL  Total OUT: 1090 mL    Total NET: 2660 mL      19 Feb 2018 07:01  -  19 Feb 2018 16:32  --------------------------------------------------------  IN:    Free Water: 600 mL    Vital HN: 540 mL  Total IN: 1140 mL    OUT:    Chest Tube: 30 mL    Chest Tube: 30 mL    Chest Tube: 90 mL    Indwelling Catheter - Urethral: 2925 mL  Total OUT: 3075 mL    Total NET: -1935 mL          ** PHYSICAL EXAM **    -- CONSTITUTIONAL: AOx3. NAD.   -- CHEST: Sternotomy incision c/d/i, small hematoma in the middle of the incision; stable; no bleeding or drainage; no evidence of expansion; no signs of infection      **MEDS**  acetaminophen    Suspension. 650 milliGRAM(s) Oral every 6 hours PRN  amiodarone    Tablet 200 milliGRAM(s) Oral daily  aspirin 325 milliGRAM(s) Oral daily  atorvastatin 80 milliGRAM(s) Oral at bedtime  cefTRIAXone   IVPB 2 Gram(s) IV Intermittent every 24 hours  cefTRIAXone   IVPB      finasteride 5 milliGRAM(s) Oral daily  folic acid 1 milliGRAM(s) Oral daily  gentamicin   IVPB 40 milliGRAM(s) IV Intermittent <User Schedule>  insulin lispro (HumaLOG) corrective regimen sliding scale   SubCutaneous every 4 hours  levothyroxine 150 MICROGram(s) Oral <User Schedule>  metoprolol     tartrate 25 milliGRAM(s) Oral every 12 hours  pantoprazole   Suspension 40 milliGRAM(s) Oral daily  thiamine 100 milliGRAM(s) Oral daily      ** LABS **                          8.5    10.0  )-----------( 159      ( 19 Feb 2018 01:39 )             25.9     19 Feb 2018 01:39    146    |  116    |  71     ----------------------------<  168    5.1     |  18     |  2.09     Ca    7.9        19 Feb 2018 01:39    TPro  5.6    /  Alb  2.1    /  TBili  0.5    /  DBili  x      /  AST  37     /  ALT  38     /  AlkPhos  207    19 Feb 2018 01:39    PT/INR - ( 18 Feb 2018 09:47 )   PT: 11.4 sec;   INR: 1.04 ratio         PTT - ( 19 Feb 2018 01:39 )  PTT:56.7 sec  CAPILLARY BLOOD GLUCOSE  96 (19 Feb 2018 14:00)  207 (19 Feb 2018 10:00)  160 (19 Feb 2018 06:00)  167 (19 Feb 2018 02:00)  148 (18 Feb 2018 22:00)  104 (18 Feb 2018 17:00)      POCT Blood Glucose.: 96 mg/dL (19 Feb 2018 14:19)  POCT Blood Glucose.: 207 mg/dL (19 Feb 2018 10:16)  POCT Blood Glucose.: 167 mg/dL (19 Feb 2018 01:54)  POCT Blood Glucose.: 148 mg/dL (18 Feb 2018 21:53)  POCT Blood Glucose.: 104 mg/dL (18 Feb 2018 17:03)

## 2018-02-19 NOTE — PROGRESS NOTE ADULT - SUBJECTIVE AND OBJECTIVE BOX
INTERVAL HPI/OVERNIGHT EVENTS:  Patient S&E at bedside. No o/n events, patient sitting up comfortably, reading paper. No complaints at this time.     ICU Vital Signs Last 24 Hrs  T(C): 37 (19 Feb 2018 12:00), Max: 37 (19 Feb 2018 12:00)  T(F): 98.6 (19 Feb 2018 12:00), Max: 98.6 (19 Feb 2018 12:00)  HR: 76 (19 Feb 2018 14:00) (66 - 82)  BP: 105/64 (19 Feb 2018 14:00) (105/64 - 113/56)  BP(mean): 72 (19 Feb 2018 14:00) (72 - 81)  ABP: 124/42 (19 Feb 2018 11:00) (98/34 - 139/53)  ABP(mean): 70 (19 Feb 2018 11:00) (54 - 86)  RR: 21 (19 Feb 2018 14:00) (13 - 54)  SpO2: 90% (19 Feb 2018 14:00) (90% - 100%)      PHYSICAL EXAM:    Constitutional: NAD  Eyes: EOMI, sclera non-icteric  Neck: supple, no masses, no JVD  Respiratory: CTA b/l, good air entry b/l  Cardiovascular: RRR, no M/R/G  Gastrointestinal: soft, NTND, no masses palpable, + BS, no hepatosplenomegaly  Extremities: no c/c/e  Neurological: AAOx3    MEDICATIONS  (STANDING):  amiodarone    Tablet 200 milliGRAM(s) Oral daily  aspirin 325 milliGRAM(s) Oral daily  atorvastatin 80 milliGRAM(s) Oral at bedtime  cefTRIAXone   IVPB 2 Gram(s) IV Intermittent every 24 hours  cefTRIAXone   IVPB      finasteride 5 milliGRAM(s) Oral daily  folic acid 1 milliGRAM(s) Oral daily  gentamicin   IVPB 40 milliGRAM(s) IV Intermittent <User Schedule>  insulin lispro (HumaLOG) corrective regimen sliding scale   SubCutaneous every 4 hours  levothyroxine 150 MICROGram(s) Oral <User Schedule>  metoprolol     tartrate 25 milliGRAM(s) Oral every 12 hours  pantoprazole   Suspension 40 milliGRAM(s) Oral daily  thiamine 100 milliGRAM(s) Oral daily    MEDICATIONS  (PRN):  acetaminophen    Suspension. 650 milliGRAM(s) Oral every 6 hours PRN Mild Pain (1 - 3)        Allergies    No Known Allergies    Intolerances        LABS:                        8.5    10.0  )-----------( 159      ( 19 Feb 2018 01:39 )             25.9   02-19    146<H>  |  116<H>  |  71<H>  ----------------------------<  168<H>  5.1   |  18<L>  |  2.09<H>    Ca    7.9<L>      19 Feb 2018 01:39    TPro  5.6<L>  /  Alb  2.1<L>  /  TBili  0.5  /  DBili  x   /  AST  37  /  ALT  38  /  AlkPhos  207<H>  02-19  PT/INR - ( 18 Feb 2018 09:47 )   PT: 11.4 sec;   INR: 1.04 ratio         PTT - ( 19 Feb 2018 01:39 )  PTT:56.7 sec      RADIOLOGY & ADDITIONAL TESTS:  Studies reviewed.

## 2018-02-19 NOTE — PROGRESS NOTE ADULT - SUBJECTIVE AND OBJECTIVE BOX
SAMANTHA CADENA   MRN#: [id number]     The patient is a 72y Male who was seen, evaluated, & examined with the CTICU staff on rounds and later in the day with a multidisciplinary care plan formulated & implemented.  All available clinical, laboratory, radiographic, pharmacologic, and electrocardiographic data were reviewed & analyzed.      The patient was in the CTICU in critical condition secondary to resolved large anterior mediational hematoma, S/P chest incision closure, persistent cardiopulmonary dysfunction, hemodynamically significant hypovolemia, Factor XI deficiency, acute on chronic postoperative kidney injury, hypernatremia and Gram positive cocci aortic valve endocarditis.        Respiratory status required supplemental oxygen, the following of ABG’s with A-line monitoring, continuous pulse oximetry monitoring, and IV antibiotics for support & to evaluate for & prevent further decompensation secondary to resolved large anterior mediational hematoma requiring RTOR, S/P chest incision closure, persistent cardiopulmonary dysfunction, and Gram positive cocci AV endocarditis.     Invasive hemodynamic monitoring with an A-line was required for the following of continuous MAP/BP monitoring to ensure adequate cardiovascular support and to evaluate for & help prevent decompensation S/P RTOR for evacuation of large anterior mediational hematoma and S/P chest incision closure.    Patient required more than the usual postoperative critical care management and I provided 30 minutes of non-continuous care to the patient.  Discussed at length with the CTICU staff and helped coordinate care.

## 2018-02-19 NOTE — PROGRESS NOTE ADULT - ASSESSMENT
71yo M in the CTICU 2/2 septic and cardiogenic shock (on vasopressin) with culture negative endocarditis (on Ceftriaxone, Gentamicin) s/p AVR (T)/C1V on 1/30 2/2 AV vegetation, PAF (on digoxin), chronic heart failure, and resolving acute renal failure. Now found to have left femoral pseudoaneurysm on bedside u/s.     Hematology consulted for prolonged aPTT of unclear etiology. No personal or family hx of bleeding disorder. Pt p/w normal aPTT, which prolonged appropriately 2/2 heparin gtt x48 hrs for suspected NSTEMI (1/10), then normalized after gtt discontinued. aPTT began prolonging again on 1/16 while on VTE PPx dose SubQ heparin only and has been intermittently prolonged since. s/p multiple transfusions. When evaluated on 2/8 he had normal RT and prolonged TT c/w heparin contamination.  Factor XI was mildly decreased at 50% when first tested but later normal at 86%.

## 2018-02-20 LAB
ALBUMIN SERPL ELPH-MCNC: 2.1 G/DL — LOW (ref 3.3–5)
ALP SERPL-CCNC: 208 U/L — HIGH (ref 40–120)
ALT FLD-CCNC: 42 U/L RC — SIGNIFICANT CHANGE UP (ref 10–45)
ANION GAP SERPL CALC-SCNC: 14 MMOL/L — SIGNIFICANT CHANGE UP (ref 5–17)
APTT BLD: 38.2 SEC — HIGH (ref 27.5–37.4)
AST SERPL-CCNC: 45 U/L — HIGH (ref 10–40)
BILIRUB SERPL-MCNC: 0.5 MG/DL — SIGNIFICANT CHANGE UP (ref 0.2–1.2)
BUN SERPL-MCNC: 67 MG/DL — HIGH (ref 7–23)
CALCIUM SERPL-MCNC: 8 MG/DL — LOW (ref 8.4–10.5)
CARDIOLIPIN AB SER-ACNC: NEGATIVE — SIGNIFICANT CHANGE UP
CHLORIDE SERPL-SCNC: 115 MMOL/L — HIGH (ref 96–108)
CO2 SERPL-SCNC: 17 MMOL/L — LOW (ref 22–31)
CREAT SERPL-MCNC: 2.05 MG/DL — HIGH (ref 0.5–1.3)
FERRITIN SERPL-MCNC: 1101 NG/ML — HIGH (ref 30–400)
GENTAMICIN TROUGH SERPL-MCNC: 1.2 UG/ML — SIGNIFICANT CHANGE UP (ref 0–2)
GLUCOSE BLDC GLUCOMTR-MCNC: 119 MG/DL — HIGH (ref 70–99)
GLUCOSE BLDC GLUCOMTR-MCNC: 136 MG/DL — HIGH (ref 70–99)
GLUCOSE BLDC GLUCOMTR-MCNC: 140 MG/DL — HIGH (ref 70–99)
GLUCOSE BLDC GLUCOMTR-MCNC: 148 MG/DL — HIGH (ref 70–99)
GLUCOSE BLDC GLUCOMTR-MCNC: 153 MG/DL — HIGH (ref 70–99)
GLUCOSE BLDC GLUCOMTR-MCNC: 157 MG/DL — HIGH (ref 70–99)
GLUCOSE SERPL-MCNC: 128 MG/DL — HIGH (ref 70–99)
HCT VFR BLD CALC: 23.8 % — LOW (ref 39–50)
HGB BLD-MCNC: 8 G/DL — LOW (ref 13–17)
INR BLD: 0.97 RATIO — SIGNIFICANT CHANGE UP (ref 0.88–1.16)
IRON SATN MFR SERPL: 19 % — SIGNIFICANT CHANGE UP (ref 16–55)
IRON SATN MFR SERPL: 21 UG/DL — LOW (ref 45–165)
MCHC RBC-ENTMCNC: 32.3 PG — SIGNIFICANT CHANGE UP (ref 27–34)
MCHC RBC-ENTMCNC: 33.4 GM/DL — SIGNIFICANT CHANGE UP (ref 32–36)
MCV RBC AUTO: 96.9 FL — SIGNIFICANT CHANGE UP (ref 80–100)
MISCELLANEOUS TEST NAME: SIGNIFICANT CHANGE UP
PLATELET # BLD AUTO: 162 K/UL — SIGNIFICANT CHANGE UP (ref 150–400)
POTASSIUM SERPL-MCNC: 5 MMOL/L — SIGNIFICANT CHANGE UP (ref 3.5–5.3)
POTASSIUM SERPL-SCNC: 5 MMOL/L — SIGNIFICANT CHANGE UP (ref 3.5–5.3)
PROT SERPL-MCNC: 5.4 G/DL — LOW (ref 6–8.3)
PROTHROM AB SERPL-ACNC: 10.5 SEC — SIGNIFICANT CHANGE UP (ref 9.8–12.7)
RBC # BLD: 2.46 M/UL — LOW (ref 4.2–5.8)
RBC # FLD: 16.4 % — HIGH (ref 10.3–14.5)
REPTILASE TIME: 16.2 SEC — SIGNIFICANT CHANGE UP (ref 15–20.5)
SODIUM SERPL-SCNC: 146 MMOL/L — HIGH (ref 135–145)
THROMBIN TIME: 25.7 SECS — HIGH (ref 16–25)
TIBC SERPL-MCNC: 111 UG/DL — LOW (ref 220–430)
UIBC SERPL-MCNC: 90 UG/DL — LOW (ref 110–370)
WBC # BLD: 8.6 K/UL — SIGNIFICANT CHANGE UP (ref 3.8–10.5)
WBC # FLD AUTO: 8.6 K/UL — SIGNIFICANT CHANGE UP (ref 3.8–10.5)

## 2018-02-20 PROCEDURE — 99291 CRITICAL CARE FIRST HOUR: CPT

## 2018-02-20 PROCEDURE — 93970 EXTREMITY STUDY: CPT | Mod: 26

## 2018-02-20 PROCEDURE — 99233 SBSQ HOSP IP/OBS HIGH 50: CPT | Mod: GC

## 2018-02-20 PROCEDURE — 71045 X-RAY EXAM CHEST 1 VIEW: CPT | Mod: 26,59

## 2018-02-20 PROCEDURE — 99232 SBSQ HOSP IP/OBS MODERATE 35: CPT | Mod: GC

## 2018-02-20 PROCEDURE — 99232 SBSQ HOSP IP/OBS MODERATE 35: CPT

## 2018-02-20 RX ADMIN — Medication 650 MILLIGRAM(S): at 07:00

## 2018-02-20 RX ADMIN — Medication 2: at 05:53

## 2018-02-20 RX ADMIN — Medication 100 MILLIGRAM(S): at 11:45

## 2018-02-20 RX ADMIN — Medication 325 MILLIGRAM(S): at 11:44

## 2018-02-20 RX ADMIN — Medication 650 MILLIGRAM(S): at 16:00

## 2018-02-20 RX ADMIN — Medication 650 MILLIGRAM(S): at 06:18

## 2018-02-20 RX ADMIN — Medication 1 MILLIGRAM(S): at 11:45

## 2018-02-20 RX ADMIN — Medication 2: at 02:30

## 2018-02-20 RX ADMIN — AMIODARONE HYDROCHLORIDE 200 MILLIGRAM(S): 400 TABLET ORAL at 05:53

## 2018-02-20 RX ADMIN — Medication 5: at 11:43

## 2018-02-20 RX ADMIN — Medication 150 MICROGRAM(S): at 11:44

## 2018-02-20 RX ADMIN — PANTOPRAZOLE SODIUM 40 MILLIGRAM(S): 20 TABLET, DELAYED RELEASE ORAL at 11:45

## 2018-02-20 RX ADMIN — ATORVASTATIN CALCIUM 80 MILLIGRAM(S): 80 TABLET, FILM COATED ORAL at 21:12

## 2018-02-20 RX ADMIN — Medication 5: at 21:12

## 2018-02-20 RX ADMIN — Medication 650 MILLIGRAM(S): at 15:30

## 2018-02-20 RX ADMIN — Medication 25 MILLIGRAM(S): at 17:51

## 2018-02-20 RX ADMIN — CEFTRIAXONE 100 GRAM(S): 500 INJECTION, POWDER, FOR SOLUTION INTRAMUSCULAR; INTRAVENOUS at 09:00

## 2018-02-20 RX ADMIN — Medication 2: at 14:50

## 2018-02-20 RX ADMIN — FINASTERIDE 5 MILLIGRAM(S): 5 TABLET, FILM COATED ORAL at 11:45

## 2018-02-20 NOTE — PROGRESS NOTE ADULT - ASSESSMENT
Patient is a 71 y/o man with history of ETOH abuse who presents with fall and found with endocarditis, developed ELIAN in-hospital shortly after undergoing cardiac catheterization, s/p urgent AVR and CABG on 1/30/18 with clinical decompensation on 2/1 requiring intubation and multiple vasopressor support.  Now extubated.

## 2018-02-20 NOTE — PROGRESS NOTE ADULT - ASSESSMENT
71yo M in the CTICU 2/2 septic and cardiogenic shock (on vasopressin) with culture negative endocarditis (on Ceftriaxone, Gentamicin) s/p AVR (T)/C1V on 1/30 2/2 AV vegetation, PAF (on digoxin), chronic heart failure, and resolving acute renal failure.   Hematology consulted for prolonged aPTT.

## 2018-02-20 NOTE — PROGRESS NOTE ADULT - SUBJECTIVE AND OBJECTIVE BOX
SAMANTHA CADENA   MRN#: [id number]     The patient is a 72y Male who was seen, evaluated, & examined with the CTICU staff on rounds and later in the day with a multidisciplinary care plan formulated & implemented.  All available clinical, laboratory, radiographic, pharmacologic, and electrocardiographic data were reviewed & analyzed.      The patient was in the CTICU in critical condition secondary to resolved large anterior mediational hematoma, S/P chest incision closure, persistent cardiopulmonary dysfunction, hemodynamically significant hypovolemia, Factor XI deficiency, acute on chronic postoperative kidney injury, hypernatremia and Gram positive cocci aortic valve endocarditis.        Respiratory status required supplemental oxygen, the following of ABG’s with A-line monitoring, continuous pulse oximetry monitoring, and IV antibiotics for support & to evaluate for & prevent further decompensation secondary to resolved large anterior mediational hematoma requiring RTOR, S/P chest incision closure, persistent cardiopulmonary dysfunction, and Gram positive cocci AV endocarditis.     Invasive hemodynamic monitoring with an A-line was required for the following of continuous MAP/BP monitoring to ensure adequate cardiovascular support and to evaluate for & help prevent decompensation S/P RTOR for evacuation of large anterior mediational hematoma and S/P chest incision closure.    Patient required more than the usual postoperative critical care management and I provided 30 minutes of non-continuous care to the patient.  Discussed at length with the CTICU staff and helped coordinate care.

## 2018-02-20 NOTE — CHART NOTE - NSCHARTNOTEFT_GEN_A_CORE
Source: Patient [ x ]    Family [ ]     other [ x ] RN, Comprehensive chart review, pt discussed during CTU rounds     Pt seen for nutrition follow up. Chart reviewed, events noted- s/p sternal wound debridement, removal of VAC, washout, and closure on 2/14; s/p FEES on 2/16 with recommendations for thin strained purees with honey thickened liquids. Per RN, pt refused to eat breakfast this morning but continues to tolerate Vital AF at goal rate 60ml/hr. Noted also receiving 300ml free water boluses every 4 hours. Pt with BM x1 thus far today.     Diet : consistent CHO, dysphagia 1 honey thickened liquids, low sodium   Enteral /Parenteral Nutrition: Vital AF, goal rate 60ml/hr provides 1168ml free water, 1728kcal and 108gm prot (25kcal/kg and 1.5gm prot/kg per lowest wt 70.1kg).    Admission Weight: 75.4kg  Current Weight: 74.7kg  Weight fluctuations noted, likely due to fluid shifts. Pt with 2+generalized and 3+bilateral foot/leg edema.     Pertinent Medications: MEDICATIONS  (STANDING):  amiodarone    Tablet 200 milliGRAM(s) Oral daily  aspirin 325 milliGRAM(s) Oral daily  atorvastatin 80 milliGRAM(s) Oral at bedtime  cefTRIAXone   IVPB 2 Gram(s) IV Intermittent every 24 hours  cefTRIAXone   IVPB      finasteride 5 milliGRAM(s) Oral daily  folic acid 1 milliGRAM(s) Oral daily  insulin lispro (HumaLOG) corrective regimen sliding scale   SubCutaneous every 4 hours  levothyroxine 150 MICROGram(s) Oral <User Schedule>  metoprolol     tartrate 25 milliGRAM(s) Oral every 12 hours  pantoprazole   Suspension 40 milliGRAM(s) Oral daily  thiamine 100 milliGRAM(s) Oral daily    MEDICATIONS  (PRN):  acetaminophen    Suspension. 650 milliGRAM(s) Oral every 6 hours PRN Mild Pain (1 - 3)    Pertinent Labs:  02-20 Na146 mmol/L<H> Glu 128 mg/dL<H> K+ 5.0 mmol/L Cr  2.05 mg/dL<H> BUN 67 mg/dL<H> Phos n/a   Alb 2.1 g/dL<L> PAB n/a       Point of Care Testing BG:(2/20)148,140, (2/19).    Skin: No pressure ulcers noted    Estimated Needs:   [ x ] no change since previous assessment  [ ] recalculated:       Previous Nutrition Diagnosis:   Malnutrition and Increased Nutrient Needs ongoing, being addressed with enteral nutrition support and initiation of PO diet.  Food & Nutrition Related Knowledge Deficit not applicable at this time.      New Nutrition Diagnosis: [ x ] not applicable      Interventions:   Recommend continue Vital AF at goal rate 60ml/hr as tolerated.   Additional free water as per team.  Provide encouragement and assistance with meals.  RD to follow up with diet education as appropriate.      Monitoring and Evaluation:     [ x ] PO intake [ x ] Tolerance to diet prescription [ x ] weights [ x ] follow up per protocol    [ ] other:

## 2018-02-20 NOTE — PROGRESS NOTE ADULT - ATTENDING COMMENTS
ELIAN hypernatremia  Had retention and now with good urine output  Replace fluids with hypotonic saline and trend Na and Cr  Encourage pt to get oob and get PT   He is not as motivated to get up

## 2018-02-20 NOTE — PROGRESS NOTE ADULT - PROBLEM SELECTOR PLAN 2
Likely in setting of impaired urinary concentrating ability given recovering ATN and post obstructive diuresis. Recommend to start on 1/2 NS to prevent hypernatremia from post obstructive diuresis. Monitor serum sodium.

## 2018-02-20 NOTE — PROGRESS NOTE ADULT - PROBLEM SELECTOR PLAN 1
Consulted regarding use of heparin for DVT ppx given elevated aPTT. Bloods have been drawn from A line so elevated aPTTs may all have been due to heparin contamination.  Pt had normal aPTT upon admission, which prolonged appropriately 2/2 heparin gtt x48 hrs for suspected NSTEMI (1/10), then normalized after gtt discontinued.  aPTT began prolonging again on 1/16 while on VTE PPx dose SubQ heparin only and has been intermittently prolonged since. s/p multiple transfusions. When evaluated on 2/8 he had normal RT and prolonged TT c/w heparin contamination.  Factor XI was mildly decreased at 50% when first tested but later normal at 86%.  Doubt that he has a significant inherited or acquired coagulopathy, agree with starting heparin sc for DVT ppx.      Grace Longoria  Hematology Fellow  [phone]

## 2018-02-20 NOTE — PROGRESS NOTE ADULT - ASSESSMENT
72 male with T2DM, DLD, alcohol misuse brought in by family after having been found face down on the floor for approximately 2 days. On treatment for UTI, but now JANES with Aortic Valve lesions. Suspected culture negative endocarditis s/p AVR. Bartonella, Legionella, Q Fever serology negative; fungal BCX negative. Brucella pending.  Unclear cause of NVE, culture  with GPC in pairs and chains.  C diff negative.   has ELIAN, aortic balloon pump - no longer on pressors, no leukocytosis, afebrile. Creatinine 2.09. CrCl 32.7  organism thought to be a fastidious gram positive coccus -  micro having trouble getting it to grow    f/u [place] for PCR ( pending)  hematoma in mediastinum but no evidence of sternal wound infection   continue ceftriaxone and low dose gentamicin  follow up level   genta level to high for this setting  will hold for 24 to 48 hrs

## 2018-02-20 NOTE — PROGRESS NOTE ADULT - SUBJECTIVE AND OBJECTIVE BOX
infectious diseases progress note:    Patient is a 72y old  Male who presents with a chief complaint of Fall (2018 00:52)        Atrial fibrillation             Allergies    No Known Allergies    Intolerances        ANTIBIOTICS/RELEVANT:  antimicrobials  cefTRIAXone   IVPB 2 Gram(s) IV Intermittent every 24 hours  cefTRIAXone   IVPB      gentamicin   IVPB 40 milliGRAM(s) IV Intermittent <User Schedule>    immunologic:    OTHER:  acetaminophen    Suspension. 650 milliGRAM(s) Oral every 6 hours PRN  amiodarone    Tablet 200 milliGRAM(s) Oral daily  aspirin 325 milliGRAM(s) Oral daily  atorvastatin 80 milliGRAM(s) Oral at bedtime  finasteride 5 milliGRAM(s) Oral daily  folic acid 1 milliGRAM(s) Oral daily  insulin lispro (HumaLOG) corrective regimen sliding scale   SubCutaneous every 4 hours  levothyroxine 150 MICROGram(s) Oral <User Schedule>  metoprolol     tartrate 25 milliGRAM(s) Oral every 12 hours  pantoprazole   Suspension 40 milliGRAM(s) Oral daily  thiamine 100 milliGRAM(s) Oral daily      Objective:  Vital Signs Last 24 Hrs  T(C): 36.6 (2018 07:00), Max: 37 (2018 12:00)  T(F): 97.9 (2018 07:00), Max: 98.6 (2018 12:00)  HR: 72 (2018 08:00) (70 - 76)  BP: 121/57 (2018 08:00) (94/51 - 127/60)  BP(mean): 82 (2018 08:00) (68 - 87)  RR: 11 (2018 08:00) (11 - 23)  SpO2: 100% (2018 08:00) (90% - 100%)    PHYSICAL EXAM:    well nourished--no acute distress  Eyes:MYLA, EOMI  Ear/Nose/Throat: no oral lesion, no sinus tenderness on percussion	  Neck:no JVD, no lymphadenopathy, supple  Respiratory: CTA matt  Cardiovascular: S1S2 RRR, no murmurs wd is without drainage or signs of infection   Gastrointestinal:soft, (+) BS, no HSM  Extremities:no e/e/c        LABS:                        8.0    8.6   )-----------( 162      ( 2018 01:26 )             23.8         146<H>  |  115<H>  |  67<H>  ----------------------------<  128<H>  5.0   |  17<L>  |  2.05<H>    Ca    8.0<L>      2018 01:26    TPro  5.4<L>  /  Alb  2.1<L>  /  TBili  0.5  /  DBili  x   /  AST  45<H>  /  ALT  42  /  AlkPhos  208<H>      PT/INR - ( 2018 01:26 )   PT: 10.5 sec;   INR: 0.97 ratio         PTT - ( 2018 01:26 )  PTT:38.2 sec  Urinalysis Basic - ( 2018 10:05 )    Color: Yellow / Appearance: SL Turbid / S.013 / pH: x  Gluc: x / Ketone: Negative  / Bili: Negative / Urobili: Negative   Blood: x / Protein: 30 mg/dL / Nitrite: Negative   Leuk Esterase: Small / RBC: 10-25 /HPF / WBC 10-25 /HPF   Sq Epi: x / Non Sq Epi: x / Bacteria: x          MICROBIOLOGY:    RECENT CULTURES:   @ 18:44 .Surgical Swab sternal wound #1                No growth at 5 days          RESPIRATORY CULTURES:              RADIOLOGY & ADDITIONAL STUDIES:        Pager [phone]  After 5 pm/weekends or if no response :[phone]

## 2018-02-20 NOTE — PROGRESS NOTE ADULT - ATTENDING COMMENTS
Pt stable, no signs of bleeding. Labs suggest med effect.  will f/u coag profile and trend daily PTT  no acute intervention recc  ok to start subq heparin px

## 2018-02-20 NOTE — PROGRESS NOTE ADULT - SUBJECTIVE AND OBJECTIVE BOX
INTERVAL HPI/OVERNIGHT EVENTS:  Patient S&E at bedside. No o/n events, patient resting comfortably. No complaints at this time. Patient denies fever, chills, dizziness, weakness, CP, palpitations, SOB, cough, N/V/D/C, dysuria, changes in bowel movements, LE edema.    VITAL SIGNS:  T(F): 98.1 (18 @ 15:00)  HR: 69 (18 @ 17:00)  BP: 140/61 (18 @ 17:00)  RR: 14 (18 @ 17:00)  SpO2: 100% (18 @ 17:00)  Wt(kg): --    PHYSICAL EXAM:  Constitutional: NAD  Eyes: EOMI, sclera non-icteric  Neck: supple, no masses, no JVD  Respiratory: CTA b/l, good air entry b/l  Cardiovascular: RRR, no M/R/G  Gastrointestinal: soft, NTND, no masses palpable, + BS, no hepatosplenomegaly  Extremities: no c/c/e  Neurological: AAOx3    MEDICATIONS  (STANDING):  amiodarone    Tablet 200 milliGRAM(s) Oral daily  aspirin 325 milliGRAM(s) Oral daily  atorvastatin 80 milliGRAM(s) Oral at bedtime  cefTRIAXone   IVPB 2 Gram(s) IV Intermittent every 24 hours  cefTRIAXone   IVPB      finasteride 5 milliGRAM(s) Oral daily  folic acid 1 milliGRAM(s) Oral daily  insulin lispro (HumaLOG) corrective regimen sliding scale   SubCutaneous every 4 hours  levothyroxine 150 MICROGram(s) Oral <User Schedule>  metoprolol     tartrate 25 milliGRAM(s) Oral every 12 hours  pantoprazole   Suspension 40 milliGRAM(s) Oral daily  thiamine 100 milliGRAM(s) Oral daily    MEDICATIONS  (PRN):  acetaminophen    Suspension. 650 milliGRAM(s) Oral every 6 hours PRN Mild Pain (1 - 3)    Allergies  No Known Allergies    LABS:                        8.0    8.6   )-----------( 162      ( 2018 01:26 )             23.8     02-20    146<H>  |  115<H>  |  67<H>  ----------------------------<  128<H>  5.0   |  17<L>  |  2.05<H>    Ca    8.0<L>      2018 01:26    TPro  5.4<L>  /  Alb  2.1<L>  /  TBili  0.5  /  DBili  x   /  AST  45<H>  /  ALT  42  /  AlkPhos  208<H>  02-20    PT/INR - ( 2018 01:26 )   PT: 10.5 sec;   INR: 0.97 ratio         PTT - ( 2018 01:26 )  PTT:38.2 sec  Urinalysis Basic - ( 2018 10:05 )    Color: Yellow / Appearance: SL Turbid / S.013 / pH: x  Gluc: x / Ketone: Negative  / Bili: Negative / Urobili: Negative   Blood: x / Protein: 30 mg/dL / Nitrite: Negative   Leuk Esterase: Small / RBC: 10-25 /HPF / WBC 10-25 /HPF   Sq Epi: x / Non Sq Epi: x / Bacteria: x        RADIOLOGY & ADDITIONAL TESTS:  Studies reviewed.    ASSESSMENT & PLAN: INTERVAL HPI/OVERNIGHT EVENTS:  Patient S&E at bedside. No o/n events, patient resting comfortably. No complaints at this time.    VITAL SIGNS:  T(F): 98.1 (18 @ 15:00)  HR: 69 (18 @ 17:00)  BP: 140/61 (18 @ 17:00)  RR: 14 (18 @ 17:00)  SpO2: 100% (18 @ 17:00)  Wt(kg): --    PHYSICAL EXAM:  Constitutional: NAD  HEENT: PERRL, EOMI, MMM, NG tube in place  Respiratory: CTA anteriorly; chest tube in place  Cardiovascular: RRR, no M/R/G; midline surgical site bandaged with adjacent bruising  Gastrointestinal: soft, NTND, no masses palpable, + BS  Extremities: no c/c/e  Neurological: AAOx3  Skin: multiple bruises on extremities    MEDICATIONS  (STANDING):  amiodarone    Tablet 200 milliGRAM(s) Oral daily  aspirin 325 milliGRAM(s) Oral daily  atorvastatin 80 milliGRAM(s) Oral at bedtime  cefTRIAXone   IVPB 2 Gram(s) IV Intermittent every 24 hours  cefTRIAXone   IVPB      finasteride 5 milliGRAM(s) Oral daily  folic acid 1 milliGRAM(s) Oral daily  insulin lispro (HumaLOG) corrective regimen sliding scale   SubCutaneous every 4 hours  levothyroxine 150 MICROGram(s) Oral <User Schedule>  metoprolol     tartrate 25 milliGRAM(s) Oral every 12 hours  pantoprazole   Suspension 40 milliGRAM(s) Oral daily  thiamine 100 milliGRAM(s) Oral daily    MEDICATIONS  (PRN):  acetaminophen    Suspension. 650 milliGRAM(s) Oral every 6 hours PRN Mild Pain (1 - 3)    Allergies  No Known Allergies    LABS:                        8.0    8.6   )-----------( 162      ( 2018 01:26 )             23.8     02-20    146<H>  |  115<H>  |  67<H>  ----------------------------<  128<H>  5.0   |  17<L>  |  2.05<H>    Ca    8.0<L>      2018 01:26    TPro  5.4<L>  /  Alb  2.1<L>  /  TBili  0.5  /  DBili  x   /  AST  45<H>  /  ALT  42  /  AlkPhos  208<H>  02-20    PT/INR - ( 2018 01:26 )   PT: 10.5 sec;   INR: 0.97 ratio         PTT - ( 2018 01:26 )  PTT:38.2 sec  Urinalysis Basic - ( 2018 10:05 )    Color: Yellow / Appearance: SL Turbid / S.013 / pH: x  Gluc: x / Ketone: Negative  / Bili: Negative / Urobili: Negative   Blood: x / Protein: 30 mg/dL / Nitrite: Negative   Leuk Esterase: Small / RBC: 10-25 /HPF / WBC 10-25 /HPF   Sq Epi: x / Non Sq Epi: x / Bacteria: x        RADIOLOGY & ADDITIONAL TESTS:  Studies reviewed.    ASSESSMENT & PLAN:

## 2018-02-20 NOTE — PROGRESS NOTE ADULT - PROBLEM SELECTOR PLAN 1
Likely contrast induced nephropathy from cardiac cath/Toxic/Septic  ATN. Scr worsened to 2.17 yesterday. Dimas was placed which drained about 1 L. Patient is currently non oliguric (UOP 4L); Recommend to start on maintenance IVF hydration to keep up with fluid loss. Plan is to continue monitoring Scr., electrolytes, and urine output.

## 2018-02-20 NOTE — PROGRESS NOTE ADULT - SUBJECTIVE AND OBJECTIVE BOX
[facility] Division of Kidney Diseases & Hypertension  FOLLOW UP NOTE  [phone]--------------------------------------------------------------------------------  Chief Complaint:Atrial fibrillation      24 hour events/subjective:    Patient has milan catheter placed yesterday which drained ~ 1 L of urine  Denies complains of SOB, CP, abdominal pain.     PAST HISTORY  --------------------------------------------------------------------------------  No significant changes to PMH, PSH, FHx, SHx, unless otherwise noted    ALLERGIES & MEDICATIONS  --------------------------------------------------------------------------------  Allergies    No Known Allergies    Intolerances      Standing Inpatient Medications  amiodarone    Tablet 200 milliGRAM(s) Oral daily  aspirin 325 milliGRAM(s) Oral daily  atorvastatin 80 milliGRAM(s) Oral at bedtime  cefTRIAXone   IVPB 2 Gram(s) IV Intermittent every 24 hours  cefTRIAXone   IVPB      finasteride 5 milliGRAM(s) Oral daily  folic acid 1 milliGRAM(s) Oral daily  gentamicin   IVPB 40 milliGRAM(s) IV Intermittent <User Schedule>  insulin lispro (HumaLOG) corrective regimen sliding scale   SubCutaneous every 4 hours  levothyroxine 150 MICROGram(s) Oral <User Schedule>  metoprolol     tartrate 25 milliGRAM(s) Oral every 12 hours  pantoprazole   Suspension 40 milliGRAM(s) Oral daily  thiamine 100 milliGRAM(s) Oral daily    PRN Inpatient Medications  acetaminophen    Suspension. 650 milliGRAM(s) Oral every 6 hours PRN      REVIEW OF SYSTEMS  --------------------------------------------------------------------------------  As above     VITALS/PHYSICAL EXAM  --------------------------------------------------------------------------------  T(C): 36.6 (02-20-18 @ 07:00), Max: 37 (02-19-18 @ 12:00)  HR: 72 (02-20-18 @ 08:00) (70 - 76)  BP: 121/57 (02-20-18 @ 08:00) (94/51 - 127/60)  RR: 11 (02-20-18 @ 08:00) (11 - 23)  SpO2: 100% (02-20-18 @ 08:00) (90% - 100%)  Wt(kg): --        02-19-18 @ 07:01  -  02-20-18 @ 07:00  --------------------------------------------------------  IN: 3270 mL / OUT: 5270 mL / NET: -2000 mL    02-20-18 @ 07:01  -  02-20-18 @ 09:08  --------------------------------------------------------  IN: 80 mL / OUT: 85 mL / NET: -5 mL      Physical Exam:  	              Gen: NAD, sitting up in chair                HEENT- NGT +  	Pulm: coarse breath sounds  	CV: RRR, S1S2;  rub sternotomy noted; wound vac present over chest   	Abd: +BS, soft, nontender/nondistended              LE: no edema              : kayli    LABS/STUDIES  --------------------------------------------------------------------------------              8.0    8.6   >-----------<  162      [02-20-18 @ 01:26]              23.8     146  |  115  |  67  ----------------------------<  128      [02-20-18 @ 01:26]  5.0   |  17  |  2.05        Ca     8.0     [02-20-18 @ 01:26]    TPro  5.4  /  Alb  2.1  /  TBili  0.5  /  DBili  x   /  AST  45  /  ALT  42  /  AlkPhos  208  [02-20-18 @ 01:26]    PT/INR: PT 10.5 , INR 0.97       [02-20-18 @ 01:26]  PTT: 38.2       [02-20-18 @ 01:26]      Creatinine Trend:  SCr 2.05 [02-20 @ 01:26]  SCr 2.17 [02-19 @ 17:36]  SCr 2.09 [02-19 @ 01:39]  SCr 1.71 [02-18 @ 01:13]  SCr 1.69 [02-17 @ 14:01]    Urinalysis - [02-19-18 @ 10:05]      Color Yellow / Appearance SL Turbid / SG 1.013 / pH 6.0      Gluc Negative / Ketone Negative  / Bili Negative / Urobili Negative       Blood Moderate / Protein 30 / Leuk Est Small / Nitrite Negative      RBC 10-25 / WBC 10-25 / Hyaline  / Gran  / Sq Epi  / Non Sq Epi  / Bacteria     Urine Osmolality 357      [02-19-18 @ 10:05]

## 2018-02-21 DIAGNOSIS — N40.1 BENIGN PROSTATIC HYPERPLASIA WITH LOWER URINARY TRACT SYMPTOMS: ICD-10-CM

## 2018-02-21 DIAGNOSIS — R33.9 RETENTION OF URINE, UNSPECIFIED: ICD-10-CM

## 2018-02-21 LAB
ALBUMIN SERPL ELPH-MCNC: 2.2 G/DL — LOW (ref 3.3–5)
ALP SERPL-CCNC: 228 U/L — HIGH (ref 40–120)
ALT FLD-CCNC: 42 U/L RC — SIGNIFICANT CHANGE UP (ref 10–45)
ANION GAP SERPL CALC-SCNC: 10 MMOL/L — SIGNIFICANT CHANGE UP (ref 5–17)
APTT BLD: 34 SEC — SIGNIFICANT CHANGE UP (ref 27.5–37.4)
AST SERPL-CCNC: 51 U/L — HIGH (ref 10–40)
BILIRUB SERPL-MCNC: 0.5 MG/DL — SIGNIFICANT CHANGE UP (ref 0.2–1.2)
BUN SERPL-MCNC: 56 MG/DL — HIGH (ref 7–23)
CALCIUM SERPL-MCNC: 8.1 MG/DL — LOW (ref 8.4–10.5)
CHLORIDE SERPL-SCNC: 112 MMOL/L — HIGH (ref 96–108)
CO2 SERPL-SCNC: 20 MMOL/L — LOW (ref 22–31)
CREAT SERPL-MCNC: 1.59 MG/DL — HIGH (ref 0.5–1.3)
FACT XII ACT/NOR PPP: 64 % — LOW (ref 70–145)
GLUCOSE BLDC GLUCOMTR-MCNC: 102 MG/DL — HIGH (ref 70–99)
GLUCOSE BLDC GLUCOMTR-MCNC: 111 MG/DL — HIGH (ref 70–99)
GLUCOSE BLDC GLUCOMTR-MCNC: 139 MG/DL — HIGH (ref 70–99)
GLUCOSE BLDC GLUCOMTR-MCNC: 143 MG/DL — HIGH (ref 70–99)
GLUCOSE BLDC GLUCOMTR-MCNC: 166 MG/DL — HIGH (ref 70–99)
GLUCOSE BLDC GLUCOMTR-MCNC: 169 MG/DL — HIGH (ref 70–99)
GLUCOSE SERPL-MCNC: 114 MG/DL — HIGH (ref 70–99)
HCT VFR BLD CALC: 25.5 % — LOW (ref 39–50)
HGB BLD-MCNC: 8.1 G/DL — LOW (ref 13–17)
INR BLD: 0.97 RATIO — SIGNIFICANT CHANGE UP (ref 0.88–1.16)
MCHC RBC-ENTMCNC: 30.8 PG — SIGNIFICANT CHANGE UP (ref 27–34)
MCHC RBC-ENTMCNC: 31.8 GM/DL — LOW (ref 32–36)
MCV RBC AUTO: 96.9 FL — SIGNIFICANT CHANGE UP (ref 80–100)
PLATELET # BLD AUTO: 190 K/UL — SIGNIFICANT CHANGE UP (ref 150–400)
POTASSIUM SERPL-MCNC: 5 MMOL/L — SIGNIFICANT CHANGE UP (ref 3.5–5.3)
POTASSIUM SERPL-SCNC: 5 MMOL/L — SIGNIFICANT CHANGE UP (ref 3.5–5.3)
PROT SERPL-MCNC: 5.5 G/DL — LOW (ref 6–8.3)
PROTHROM AB SERPL-ACNC: 10.6 SEC — SIGNIFICANT CHANGE UP (ref 9.8–12.7)
RBC # BLD: 2.63 M/UL — LOW (ref 4.2–5.8)
RBC # FLD: 16.2 % — HIGH (ref 10.3–14.5)
SODIUM SERPL-SCNC: 142 MMOL/L — SIGNIFICANT CHANGE UP (ref 135–145)
WBC # BLD: 9.1 K/UL — SIGNIFICANT CHANGE UP (ref 3.8–10.5)
WBC # FLD AUTO: 9.1 K/UL — SIGNIFICANT CHANGE UP (ref 3.8–10.5)

## 2018-02-21 PROCEDURE — 71045 X-RAY EXAM CHEST 1 VIEW: CPT | Mod: 26

## 2018-02-21 PROCEDURE — 99233 SBSQ HOSP IP/OBS HIGH 50: CPT | Mod: GC

## 2018-02-21 RX ORDER — TAMSULOSIN HYDROCHLORIDE 0.4 MG/1
0.4 CAPSULE ORAL AT BEDTIME
Qty: 0 | Refills: 0 | Status: DISCONTINUED | OUTPATIENT
Start: 2018-02-21 | End: 2018-03-05

## 2018-02-21 RX ORDER — HEPARIN SODIUM 5000 [USP'U]/ML
5000 INJECTION INTRAVENOUS; SUBCUTANEOUS EVERY 8 HOURS
Qty: 0 | Refills: 0 | Status: DISCONTINUED | OUTPATIENT
Start: 2018-02-21 | End: 2018-02-24

## 2018-02-21 RX ADMIN — HEPARIN SODIUM 5000 UNIT(S): 5000 INJECTION INTRAVENOUS; SUBCUTANEOUS at 22:19

## 2018-02-21 RX ADMIN — CEFTRIAXONE 100 GRAM(S): 500 INJECTION, POWDER, FOR SOLUTION INTRAMUSCULAR; INTRAVENOUS at 09:20

## 2018-02-21 RX ADMIN — Medication 650 MILLIGRAM(S): at 09:10

## 2018-02-21 RX ADMIN — Medication 650 MILLIGRAM(S): at 09:30

## 2018-02-21 RX ADMIN — Medication 25 MILLIGRAM(S): at 17:02

## 2018-02-21 RX ADMIN — TAMSULOSIN HYDROCHLORIDE 0.4 MILLIGRAM(S): 0.4 CAPSULE ORAL at 22:24

## 2018-02-21 RX ADMIN — Medication 150 MICROGRAM(S): at 14:22

## 2018-02-21 RX ADMIN — Medication 25 MILLIGRAM(S): at 05:56

## 2018-02-21 RX ADMIN — Medication 2: at 14:22

## 2018-02-21 RX ADMIN — AMIODARONE HYDROCHLORIDE 200 MILLIGRAM(S): 400 TABLET ORAL at 05:56

## 2018-02-21 RX ADMIN — Medication 2: at 05:56

## 2018-02-21 RX ADMIN — ATORVASTATIN CALCIUM 80 MILLIGRAM(S): 80 TABLET, FILM COATED ORAL at 22:24

## 2018-02-21 RX ADMIN — Medication 1 MILLIGRAM(S): at 12:32

## 2018-02-21 RX ADMIN — HEPARIN SODIUM 5000 UNIT(S): 5000 INJECTION INTRAVENOUS; SUBCUTANEOUS at 14:27

## 2018-02-21 RX ADMIN — Medication 325 MILLIGRAM(S): at 12:32

## 2018-02-21 RX ADMIN — Medication 100 MILLIGRAM(S): at 19:00

## 2018-02-21 RX ADMIN — PANTOPRAZOLE SODIUM 40 MILLIGRAM(S): 20 TABLET, DELAYED RELEASE ORAL at 12:32

## 2018-02-21 RX ADMIN — FINASTERIDE 5 MILLIGRAM(S): 5 TABLET, FILM COATED ORAL at 12:32

## 2018-02-21 RX ADMIN — Medication 5: at 19:02

## 2018-02-21 RX ADMIN — Medication 5: at 11:10

## 2018-02-21 NOTE — PROGRESS NOTE ADULT - SUBJECTIVE AND OBJECTIVE BOX
VITAL SIGNS    Telemetry:  NSR 76    Vital Signs Last 24 Hrs  T(C): 36.7 (18 @ 15:17), Max: 37 (18 @ 07:00)  T(F): 98.1 (18 @ 15:17), Max: 98.6 (18 @ 07:00)  HR: 77 (18 @ 15:17) (61 - 77)  BP: 101/54 (18 @ 15:17) (101/54 - 138/64)  RR: 18 (18 @ 15:17) (10 - 27)  SpO2: 100% (18 @ 15:17) (77% - 100%)                    @ 07:01  -   @ 07:00  --------------------------------------------------------  IN: 3470 mL / OUT: 4460 mL / NET: -990 mL     07:01  -   @ 17:03  --------------------------------------------------------  IN: 936 mL / OUT: 530 mL / NET: 406 mL          Daily     Daily Weight in k.1 (2018 00:00)        CAPILLARY BLOOD GLUCOSE  139 (2018 06:00)  102 (2018 02:00)  154 (2018 21:00)      POCT Blood Glucose.: 143 mg/dL (2018 14:00)  POCT Blood Glucose.: 166 mg/dL (2018 10:56)  POCT Blood Glucose.: 139 mg/dL (2018 05:49)  POCT Blood Glucose.: 102 mg/dL (2018 01:48)  POCT Blood Glucose.: 153 mg/dL (2018 21:11)  POCT Blood Glucose.: 119 mg/dL (2018 17:23)                  Coumadin    [ ] YES          [  ]      NO         Reason:                               PHYSICAL EXAM        Neurology: alert and oriented x 3, nonfocal, no gross deficits  CV : S1 S2 , RRR   Sternal Wound :  CDI , Stable  Pacing Wires        [ x ]   Settings:                                  Isolated  [  ]  Lungs: cta  Drains:     MS         [  ] Drainage:                 L Pleural  [  ]  Drainage:                R Pleural  [x  ]  Drainage:   Abdomen: soft, nontender, nondistended, positive bowel sounds, last bowel movement   :          voiding   Extremities:   Trace edema - calve tenderness           acetaminophen    Suspension. 650 milliGRAM(s) Oral every 6 hours PRN  amiodarone    Tablet 200 milliGRAM(s) Oral daily  aspirin 325 milliGRAM(s) Oral daily  atorvastatin 80 milliGRAM(s) Oral at bedtime  cefTRIAXone   IVPB 2 Gram(s) IV Intermittent every 24 hours  cefTRIAXone   IVPB      finasteride 5 milliGRAM(s) Oral daily  folic acid 1 milliGRAM(s) Oral daily  heparin  Injectable 5000 Unit(s) SubCutaneous every 8 hours  insulin lispro (HumaLOG) corrective regimen sliding scale   SubCutaneous every 4 hours  levothyroxine 150 MICROGram(s) Oral <User Schedule>  metoprolol     tartrate 25 milliGRAM(s) Oral every 12 hours  pantoprazole   Suspension 40 milliGRAM(s) Oral daily  tamsulosin 0.4 milliGRAM(s) Oral at bedtime  thiamine 100 milliGRAM(s) Oral daily                    Physical Therapy Rec:   Home  [  ]   Home w/ PT  [  ]  Rehab  [  ]  Discussed with Cardiothoracic Team at AM rounds.

## 2018-02-21 NOTE — PROGRESS NOTE ADULT - SUBJECTIVE AND OBJECTIVE BOX
[facility] Division of Kidney Diseases & Hypertension  FOLLOW UP NOTE  [phone]--------------------------------------------------------------------------------  Chief Complaint:Atrial fibrillation      24 hour events/subjective:    Patient currently sitting up in chair   Denies complains of SOB, CP, abdominal pain.   Good UOP( ~3L)    PAST HISTORY  --------------------------------------------------------------------------------  No significant changes to PMH, PSH, FHx, SHx, unless otherwise noted    ALLERGIES & MEDICATIONS  --------------------------------------------------------------------------------  Allergies    No Known Allergies    Intolerances      Standing Inpatient Medications  amiodarone    Tablet 200 milliGRAM(s) Oral daily  aspirin 325 milliGRAM(s) Oral daily  atorvastatin 80 milliGRAM(s) Oral at bedtime  cefTRIAXone   IVPB 2 Gram(s) IV Intermittent every 24 hours  cefTRIAXone   IVPB      finasteride 5 milliGRAM(s) Oral daily  folic acid 1 milliGRAM(s) Oral daily  insulin lispro (HumaLOG) corrective regimen sliding scale   SubCutaneous every 4 hours  levothyroxine 150 MICROGram(s) Oral <User Schedule>  metoprolol     tartrate 25 milliGRAM(s) Oral every 12 hours  pantoprazole   Suspension 40 milliGRAM(s) Oral daily  thiamine 100 milliGRAM(s) Oral daily    PRN Inpatient Medications  acetaminophen    Suspension. 650 milliGRAM(s) Oral every 6 hours PRN      REVIEW OF SYSTEMS  --------------------------------------------------------------------------------    as above    VITALS/PHYSICAL EXAM  --------------------------------------------------------------------------------  T(C): 37 (02-21-18 @ 07:00), Max: 37 (02-21-18 @ 07:00)  HR: 69 (02-21-18 @ 08:00) (61 - 77)  BP: 112/53 (02-21-18 @ 08:00) (110/55 - 145/63)  RR: 18 (02-21-18 @ 08:00) (10 - 27)  SpO2: 100% (02-21-18 @ 08:00) (77% - 100%)  Wt(kg): --        02-20-18 @ 07:01  -  02-21-18 @ 07:00  --------------------------------------------------------  IN: 3470 mL / OUT: 4460 mL / NET: -990 mL    02-21-18 @ 07:01  -  02-21-18 @ 08:51  --------------------------------------------------------  IN: 60 mL / OUT: 0 mL / NET: 60 mL      Physical Exam:  	   Gen: NAD, sitting up in chair                HEENT- NGT +  	Pulm: coarse breath sounds  	CV: RRR, S1S2;  rub sternotomy noted; wound vac present over chest   	Abd: +BS, soft, nontender/nondistended              LE: no edema              : kayli    LABS/STUDIES  --------------------------------------------------------------------------------              8.1    9.1   >-----------<  190      [02-21-18 @ 01:03]              25.5     142  |  112  |  56  ----------------------------<  114      [02-21-18 @ 01:03]  5.0   |  20  |  1.59        Ca     8.1     [02-21-18 @ 01:03]    TPro  5.5  /  Alb  2.2  /  TBili  0.5  /  DBili  x   /  AST  51  /  ALT  42  /  AlkPhos  228  [02-21-18 @ 01:03]    PT/INR: PT 10.6 , INR 0.97       [02-21-18 @ 01:03]  PTT: 34.0       [02-21-18 @ 01:03]      Creatinine Trend:  SCr 1.59 [02-21 @ 01:03]  SCr 2.05 [02-20 @ 01:26]  SCr 2.17 [02-19 @ 17:36]  SCr 2.09 [02-19 @ 01:39]  SCr 1.71 [02-18 @ 01:13]    Urinalysis - [02-19-18 @ 10:05]      Color Yellow / Appearance SL Turbid / SG 1.013 / pH 6.0      Gluc Negative / Ketone Negative  / Bili Negative / Urobili Negative       Blood Moderate / Protein 30 / Leuk Est Small / Nitrite Negative      RBC 10-25 / WBC 10-25 / Hyaline  / Gran  / Sq Epi  / Non Sq Epi  / Bacteria     Urine Osmolality 357      [02-19-18 @ 10:05]    Iron 21, TIBC 111, %sat 19      [02-20-18 @ 15:51]  Ferritin 1101.0      [02-20-18 @ 15:42] [facility] Division of Kidney Diseases & Hypertension  FOLLOW UP NOTE  [phone]--------------------------------------------------------------------------------  Chief Complaint:Atrial fibrillation      24 hour events/subjective:    Patient currently sitting up in chair   Denies complains of SOB, CP, abdominal pain.   Good UOP( ~3L)    PAST HISTORY  --------------------------------------------------------------------------------  No significant changes to PMH, PSH, FHx, SHx, unless otherwise noted    ALLERGIES & MEDICATIONS  --------------------------------------------------------------------------------  Allergies    No Known Allergies    Intolerances      Standing Inpatient Medications  amiodarone    Tablet 200 milliGRAM(s) Oral daily  aspirin 325 milliGRAM(s) Oral daily  atorvastatin 80 milliGRAM(s) Oral at bedtime  cefTRIAXone   IVPB 2 Gram(s) IV Intermittent every 24 hours  cefTRIAXone   IVPB      finasteride 5 milliGRAM(s) Oral daily  folic acid 1 milliGRAM(s) Oral daily  insulin lispro (HumaLOG) corrective regimen sliding scale   SubCutaneous every 4 hours  levothyroxine 150 MICROGram(s) Oral <User Schedule>  metoprolol     tartrate 25 milliGRAM(s) Oral every 12 hours  pantoprazole   Suspension 40 milliGRAM(s) Oral daily  thiamine 100 milliGRAM(s) Oral daily    PRN Inpatient Medications  acetaminophen    Suspension. 650 milliGRAM(s) Oral every 6 hours PRN      REVIEW OF SYSTEMS  --------------------------------------------------------------------------------    as above    VITALS/PHYSICAL EXAM  --------------------------------------------------------------------------------  T(C): 37 (02-21-18 @ 07:00), Max: 37 (02-21-18 @ 07:00)  HR: 69 (02-21-18 @ 08:00) (61 - 77)  BP: 112/53 (02-21-18 @ 08:00) (110/55 - 145/63)  RR: 18 (02-21-18 @ 08:00) (10 - 27)  SpO2: 100% (02-21-18 @ 08:00) (77% - 100%)  Wt(kg): --        02-20-18 @ 07:01  -  02-21-18 @ 07:00  --------------------------------------------------------  IN: 3470 mL / OUT: 4460 mL / NET: -990 mL    02-21-18 @ 07:01  -  02-21-18 @ 08:51  --------------------------------------------------------  IN: 60 mL / OUT: 0 mL / NET: 60 mL      Physical Exam:  	   Gen: NAD, sitting up in chair                HEENT- NGT +  	Pulm: coarse breath sounds  	CV: RRR, S1S2;  rub sternotomy noted; wound vac present over chest   	Abd: +BS, soft, nontender/nondistended              LE: no edema              : kayli +    LABS/STUDIES  --------------------------------------------------------------------------------              8.1    9.1   >-----------<  190      [02-21-18 @ 01:03]              25.5     142  |  112  |  56  ----------------------------<  114      [02-21-18 @ 01:03]  5.0   |  20  |  1.59        Ca     8.1     [02-21-18 @ 01:03]    TPro  5.5  /  Alb  2.2  /  TBili  0.5  /  DBili  x   /  AST  51  /  ALT  42  /  AlkPhos  228  [02-21-18 @ 01:03]    PT/INR: PT 10.6 , INR 0.97       [02-21-18 @ 01:03]  PTT: 34.0       [02-21-18 @ 01:03]      Creatinine Trend:  SCr 1.59 [02-21 @ 01:03]  SCr 2.05 [02-20 @ 01:26]  SCr 2.17 [02-19 @ 17:36]  SCr 2.09 [02-19 @ 01:39]  SCr 1.71 [02-18 @ 01:13]    Urinalysis - [02-19-18 @ 10:05]      Color Yellow / Appearance SL Turbid / SG 1.013 / pH 6.0      Gluc Negative / Ketone Negative  / Bili Negative / Urobili Negative       Blood Moderate / Protein 30 / Leuk Est Small / Nitrite Negative      RBC 10-25 / WBC 10-25 / Hyaline  / Gran  / Sq Epi  / Non Sq Epi  / Bacteria     Urine Osmolality 357      [02-19-18 @ 10:05]    Iron 21, TIBC 111, %sat 19      [02-20-18 @ 15:51]  Ferritin 1101.0      [02-20-18 @ 15:42]

## 2018-02-21 NOTE — PROGRESS NOTE ADULT - ASSESSMENT
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  s/p AVR (T)/C1L Jan 30.  Pod 1 pressors  inotropes,   right pleural effusion s/p pigtail placement with 1.6L removed.  POD 2 , cardiogenic shock, RACHEAL-Reintubated. ,IABP placed lasix infusion, hd catheter placed,  + inotropes. On antibiotics ceftriaxone and GENT  for gram + cocci endocarditis, reintubation  enteral feeds  Vascular consulted for pseudoaneurysm  2/7 Seen by ENT,  + R VC paresis medially with glottic gap, pooling of secretions and b/l posterior intubation granulomas  2/8 Hematology consulted for prolonged aPTT of unclear etiology.   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  Feb 13 repeat thrombin injection for L Fem PSA  Feb 14 RTOR sternal wound debridement, removal of VAC, washout and closure  2/15 Per Vasc Sx pt is now s/p L pseudoanuerysm thrombin injection x3 with remaining pseudoaneursym injected with thrombin two days prior with U/S resolution. Pt underwent repeat arterial duplex that showed resolution of pseudoaneurysm.   2/16 Dysphagia 1 THIN STRAINED PUREE ONLY, with Honey thickened liquids. All Liquids via teaspoon only.   Feb 16 R pigtail for pleural effusion  Dimas reinserted for retention , URO consulted, eventual TOV

## 2018-02-21 NOTE — PROGRESS NOTE ADULT - PROBLEM SELECTOR PLAN 2
Likely in setting of impaired urinary concentrating ability given recovering ATN and post obstructive diuresis. Serum sodium has improved to 142 today. Monitor serum sodium. Likely in setting of impaired urinary concentrating ability given recovering ATN and post obstructive diuresis. Resolved. Serum sodium has improved to 142 today. Continue with free water via NG tube. Monitor serum sodium.

## 2018-02-21 NOTE — PROGRESS NOTE ADULT - PROBLEM SELECTOR PLAN 1
Likely contrast induced nephropathy from cardiac cath/Toxic/Septic  ATN. Scr worsened to 2.17 yesterday. Dimas was placed which drained about 1 L. Patient is currently non oliguric (UOP 3.9 L);  Plan is to continue monitoring Scr., electrolytes, and urine output. Likely contrast induced nephropathy from cardiac cath/Toxic/Septic ATN, with component of obstructive uropathy. Scr worsened to 2.17 yesterday. Milan was placed which drained about 1 L. Patient is currently non oliguric (UOP 3.9 L), with improved Scr since milan catheter placement;  Plan is to continue monitoring Scr., electrolytes, and urine output.

## 2018-02-21 NOTE — CONSULT NOTE ADULT - ASSESSMENT
patient stable post op with history of bph and now with retention  urine debris likely from retention with no obvious blood  Will give trial of void when oob ambulating   stool softeners.

## 2018-02-21 NOTE — CONSULT NOTE ADULT - SUBJECTIVE AND OBJECTIVE BOX
HPI  Patient is a 72y old  Male who we are asked to evaluate the patient for hoarseness and failed S&S eval. Pt has significant past medical history of T2DM, DLD, alcohol misuse brought in by family after having been found face down on the floor for approximately 2 days. During admission pt treated for UTI, found to have Aortic Valve lesion and valvular failure despite antibiotics on JANES s/p AVR.  Post op he developed hematoma and wound evacuation.  His milan was removed now that his condition is improving but he had difficulty voiding  now with some debris via milan.  Pre admission he did have  nocturia x4 with hesistancy urgency and incomplete emptying.  he has followed with a Urologist and managed with flomax>  he denies dysuria, hematuria and retention.       PAST MEDICAL & SURGICAL HISTORY:  High cholesterol  Diabetes: type 2  No significant past surgical history      MEDICATIONS  (STANDING):  amiodarone    Tablet 200 milliGRAM(s) Oral daily  aspirin 325 milliGRAM(s) Oral daily  atorvastatin 80 milliGRAM(s) Oral at bedtime  cefTRIAXone   IVPB 2 Gram(s) IV Intermittent every 24 hours  cefTRIAXone   IVPB      finasteride 5 milliGRAM(s) Oral daily  folic acid 1 milliGRAM(s) Oral daily  heparin  Injectable 5000 Unit(s) SubCutaneous every 8 hours  insulin lispro (HumaLOG) corrective regimen sliding scale   SubCutaneous every 4 hours  levothyroxine 150 MICROGram(s) Oral <User Schedule>  metoprolol     tartrate 25 milliGRAM(s) Oral every 12 hours  pantoprazole   Suspension 40 milliGRAM(s) Oral daily  tamsulosin 0.4 milliGRAM(s) Oral at bedtime  thiamine 100 milliGRAM(s) Oral daily    MEDICATIONS  (PRN):  acetaminophen    Suspension. 650 milliGRAM(s) Oral every 6 hours PRN Mild Pain (1 - 3)      FAMILY HISTORY:  Family history of type 2 diabetes mellitus (Sibling)  Family history of lung cancer (Sibling)      Allergies    No Known Allergies      SOCIAL HISTORY:  +  etoh    REVIEW OF SYSTEMS: gen weak and lethargic  heent neg   neck neg  chest as per history  cor incisional pain  abd neg  gu per hpi  msk neg psych neg neuro neg  endo neg    Physical Exam  Vital signs  T(C): 37 (02-21-18 @ 07:00), Max: 37 (02-21-18 @ 07:00)  HR: 73 (02-21-18 @ 09:00)  BP: 133/61 (02-21-18 @ 09:00)  SpO2: 100% (02-21-18 @ 09:00)  Wt(kg): --    Gen: WDWN nad   heent  ncat neck + iv line  chest wound intact cor  reg   abd soft nd nt no cvat no aden guarding  gu indwelling milan urine clear normal phallus genitalia 3+ pr.  ext mild edema  neuro a and o x 3    LABS:      02-21 @ 01:03    WBC 9.1   / Hct 25.5  / SCr 1.59     02-20 @ 01:26    WBC 8.6   / Hct 23.8  / SCr 2.05     02-21    142  |  112<H>  |  56<H>  ----------------------------<  114<H>  5.0   |  20<L>  |  1.59<H>    Ca    8.1<L>      21 Feb 2018 01:03    TPro  5.5<L>  /  Alb  2.2<L>  /  TBili  0.5  /  DBili  x   /  AST  51<H>  /  ALT  42  /  AlkPhos  228<H>  02-21    PT/INR - ( 21 Feb 2018 01:03 )   PT: 10.6 sec;   INR: 0.97 ratio         PTT - ( 21 Feb 2018 01:03 )  PTT:34.0 sec

## 2018-02-21 NOTE — CONSULT NOTE ADULT - PROBLEM SELECTOR PROBLEM 1
BPH with urinary obstruction
ELIAN (acute kidney injury)
Vocal cord granuloma
Uncontrolled type 2 diabetes mellitus with hyperglycemia, without long-term current use of insulin
Vegetation of heart valve

## 2018-02-22 DIAGNOSIS — Z98.890 OTHER SPECIFIED POSTPROCEDURAL STATES: ICD-10-CM

## 2018-02-22 DIAGNOSIS — I72.4 ANEURYSM OF ARTERY OF LOWER EXTREMITY: ICD-10-CM

## 2018-02-22 DIAGNOSIS — Z95.1 PRESENCE OF AORTOCORONARY BYPASS GRAFT: ICD-10-CM

## 2018-02-22 DIAGNOSIS — R13.10 DYSPHAGIA, UNSPECIFIED: ICD-10-CM

## 2018-02-22 DIAGNOSIS — I50.23 ACUTE ON CHRONIC SYSTOLIC (CONGESTIVE) HEART FAILURE: ICD-10-CM

## 2018-02-22 DIAGNOSIS — Z95.2 PRESENCE OF PROSTHETIC HEART VALVE: ICD-10-CM

## 2018-02-22 DIAGNOSIS — I31.3 PERICARDIAL EFFUSION (NONINFLAMMATORY): ICD-10-CM

## 2018-02-22 DIAGNOSIS — D68.9 COAGULATION DEFECT, UNSPECIFIED: ICD-10-CM

## 2018-02-22 DIAGNOSIS — R33.9 RETENTION OF URINE, UNSPECIFIED: ICD-10-CM

## 2018-02-22 LAB
ALBUMIN SERPL ELPH-MCNC: 2.3 G/DL — LOW (ref 3.3–5)
ALP SERPL-CCNC: 247 U/L — HIGH (ref 40–120)
ALT FLD-CCNC: 53 U/L RC — HIGH (ref 10–45)
ANION GAP SERPL CALC-SCNC: 7 MMOL/L — SIGNIFICANT CHANGE UP (ref 5–17)
AST SERPL-CCNC: 55 U/L — HIGH (ref 10–40)
BILIRUB SERPL-MCNC: 0.5 MG/DL — SIGNIFICANT CHANGE UP (ref 0.2–1.2)
BUN SERPL-MCNC: 48 MG/DL — HIGH (ref 7–23)
CALCIUM SERPL-MCNC: 8.1 MG/DL — LOW (ref 8.4–10.5)
CHLORIDE SERPL-SCNC: 109 MMOL/L — HIGH (ref 96–108)
CO2 SERPL-SCNC: 21 MMOL/L — LOW (ref 22–31)
CREAT SERPL-MCNC: 1.5 MG/DL — HIGH (ref 0.5–1.3)
GLUCOSE BLDC GLUCOMTR-MCNC: 102 MG/DL — HIGH (ref 70–99)
GLUCOSE BLDC GLUCOMTR-MCNC: 131 MG/DL — HIGH (ref 70–99)
GLUCOSE BLDC GLUCOMTR-MCNC: 135 MG/DL — HIGH (ref 70–99)
GLUCOSE BLDC GLUCOMTR-MCNC: 140 MG/DL — HIGH (ref 70–99)
GLUCOSE BLDC GLUCOMTR-MCNC: 158 MG/DL — HIGH (ref 70–99)
GLUCOSE BLDC GLUCOMTR-MCNC: 174 MG/DL — HIGH (ref 70–99)
GLUCOSE SERPL-MCNC: 147 MG/DL — HIGH (ref 70–99)
HCT VFR BLD CALC: 26 % — LOW (ref 39–50)
HGB BLD-MCNC: 8.2 G/DL — LOW (ref 13–17)
MCHC RBC-ENTMCNC: 30.5 PG — SIGNIFICANT CHANGE UP (ref 27–34)
MCHC RBC-ENTMCNC: 31.5 GM/DL — LOW (ref 32–36)
MCV RBC AUTO: 96.8 FL — SIGNIFICANT CHANGE UP (ref 80–100)
PLATELET # BLD AUTO: 249 K/UL — SIGNIFICANT CHANGE UP (ref 150–400)
POTASSIUM SERPL-MCNC: 5.2 MMOL/L — SIGNIFICANT CHANGE UP (ref 3.5–5.3)
POTASSIUM SERPL-SCNC: 5.2 MMOL/L — SIGNIFICANT CHANGE UP (ref 3.5–5.3)
PROT SERPL-MCNC: 5.9 G/DL — LOW (ref 6–8.3)
RBC # BLD: 2.69 M/UL — LOW (ref 4.2–5.8)
RBC # FLD: 15.7 % — HIGH (ref 10.3–14.5)
SODIUM SERPL-SCNC: 137 MMOL/L — SIGNIFICANT CHANGE UP (ref 135–145)
WBC # BLD: 9 K/UL — SIGNIFICANT CHANGE UP (ref 3.8–10.5)
WBC # FLD AUTO: 9 K/UL — SIGNIFICANT CHANGE UP (ref 3.8–10.5)

## 2018-02-22 PROCEDURE — 99233 SBSQ HOSP IP/OBS HIGH 50: CPT

## 2018-02-22 RX ORDER — GENTAMICIN SULFATE 40 MG/ML
VIAL (ML) INJECTION
Qty: 0 | Refills: 0 | Status: DISCONTINUED | OUTPATIENT
Start: 2018-02-22 | End: 2018-02-22

## 2018-02-22 RX ORDER — GENTAMICIN SULFATE 40 MG/ML
30 VIAL (ML) INJECTION EVERY 12 HOURS
Qty: 0 | Refills: 0 | Status: DISCONTINUED | OUTPATIENT
Start: 2018-02-22 | End: 2018-02-22

## 2018-02-22 RX ORDER — GENTAMICIN SULFATE 40 MG/ML
30 VIAL (ML) INJECTION ONCE
Qty: 0 | Refills: 0 | Status: COMPLETED | OUTPATIENT
Start: 2018-02-22 | End: 2018-02-22

## 2018-02-22 RX ADMIN — Medication 100 MILLIGRAM(S): at 13:02

## 2018-02-22 RX ADMIN — FINASTERIDE 5 MILLIGRAM(S): 5 TABLET, FILM COATED ORAL at 13:01

## 2018-02-22 RX ADMIN — Medication 650 MILLIGRAM(S): at 05:17

## 2018-02-22 RX ADMIN — Medication 25 MILLIGRAM(S): at 05:34

## 2018-02-22 RX ADMIN — Medication 650 MILLIGRAM(S): at 05:47

## 2018-02-22 RX ADMIN — Medication 2: at 02:34

## 2018-02-22 RX ADMIN — CEFTRIAXONE 100 GRAM(S): 500 INJECTION, POWDER, FOR SOLUTION INTRAMUSCULAR; INTRAVENOUS at 10:25

## 2018-02-22 RX ADMIN — Medication 200 MILLIGRAM(S): at 10:27

## 2018-02-22 RX ADMIN — HEPARIN SODIUM 5000 UNIT(S): 5000 INJECTION INTRAVENOUS; SUBCUTANEOUS at 05:18

## 2018-02-22 RX ADMIN — Medication 5: at 15:43

## 2018-02-22 RX ADMIN — Medication 25 MILLIGRAM(S): at 17:32

## 2018-02-22 RX ADMIN — PANTOPRAZOLE SODIUM 40 MILLIGRAM(S): 20 TABLET, DELAYED RELEASE ORAL at 13:02

## 2018-02-22 RX ADMIN — Medication 5: at 06:37

## 2018-02-22 RX ADMIN — HEPARIN SODIUM 5000 UNIT(S): 5000 INJECTION INTRAVENOUS; SUBCUTANEOUS at 13:01

## 2018-02-22 RX ADMIN — AMIODARONE HYDROCHLORIDE 200 MILLIGRAM(S): 400 TABLET ORAL at 05:18

## 2018-02-22 RX ADMIN — Medication 1 MILLIGRAM(S): at 13:01

## 2018-02-22 RX ADMIN — Medication 150 MICROGRAM(S): at 15:44

## 2018-02-22 RX ADMIN — Medication 2: at 10:26

## 2018-02-22 RX ADMIN — HEPARIN SODIUM 5000 UNIT(S): 5000 INJECTION INTRAVENOUS; SUBCUTANEOUS at 23:07

## 2018-02-22 RX ADMIN — Medication 2: at 19:00

## 2018-02-22 RX ADMIN — Medication 325 MILLIGRAM(S): at 13:02

## 2018-02-22 RX ADMIN — ATORVASTATIN CALCIUM 80 MILLIGRAM(S): 80 TABLET, FILM COATED ORAL at 23:07

## 2018-02-22 RX ADMIN — TAMSULOSIN HYDROCHLORIDE 0.4 MILLIGRAM(S): 0.4 CAPSULE ORAL at 23:07

## 2018-02-22 NOTE — PROGRESS NOTE ADULT - SUBJECTIVE AND OBJECTIVE BOX
[facility] DIVISION OF KIDNEY DISEASES AND HYPERTENSION -- PROGRESS NOTE    Chief complaint: ELIAN    24 hour events/subjective: no acute events noted        PAST HISTORY  --------------------------------------------------------------------------------  No significant changes to PMH, PSH, FHx, SHx, unless otherwise noted    ALLERGIES & MEDICATIONS  --------------------------------------------------------------------------------  Allergies    No Known Allergies    Intolerances      Standing Inpatient Medications  amiodarone    Tablet 200 milliGRAM(s) Oral daily  aspirin 325 milliGRAM(s) Oral daily  atorvastatin 80 milliGRAM(s) Oral at bedtime  cefTRIAXone   IVPB 2 Gram(s) IV Intermittent every 24 hours  cefTRIAXone   IVPB      finasteride 5 milliGRAM(s) Oral daily  folic acid 1 milliGRAM(s) Oral daily  heparin  Injectable 5000 Unit(s) SubCutaneous every 8 hours  insulin lispro (HumaLOG) corrective regimen sliding scale   SubCutaneous every 4 hours  levothyroxine 150 MICROGram(s) Oral <User Schedule>  metoprolol     tartrate 25 milliGRAM(s) Oral every 12 hours  pantoprazole   Suspension 40 milliGRAM(s) Oral daily  tamsulosin 0.4 milliGRAM(s) Oral at bedtime  thiamine 100 milliGRAM(s) Oral daily    PRN Inpatient Medications  acetaminophen    Suspension. 650 milliGRAM(s) Oral every 6 hours PRN      REVIEW OF SYSTEMS  --------------------------------------------------------------------------------  Constitutional: [ ] Fever [ ] Chills [ ] Fatigue [ ] Weight change   HEENT: [ ] Blurred vision [ ] Eye Pain [ ] Headache [ ] Runny nose [ ] Sore Throat   Respiratory: [ ] Cough [ ] Wheezing [x ] no Shortness of breath  Cardiovascular: [x ] no Chest Pain [ ] Palpitations [ ] MCLEOD [ ] PND [ ] Orthopnea  Gastrointestinal: [ ] Abdominal Pain [ ] Diarrhea [ ] Constipation [ ] Hemorrhoids [ ] Nausea [ ] Vomiting  Genitourinary: [ ] Nocturia [ ] Dysuria [ ] Incontinence  Extremities: [ ] Swelling [ ] Joint Pain  Neurologic: [ ] Focal deficit [ ] Paresthesias [ ] Syncope  Lymphatic: [ ] Swelling [ ] Lymphadenopathy   Skin: [ ] Rash [ ] Ecchymoses [ ] Wounds [ ] Lesions  Psychiatry: [ ] Depression [ ] Suicidal/Homicidal Ideation [ ] Anxiety [ ] Sleep Disturbances  [x ] 10 point review of systems is otherwise negative except as mentioned above              [ ]Unable to obtain due to   All other systems were reviewed and are negative, except as noted.    VITALS/PHYSICAL EXAM  --------------------------------------------------------------------------------  T(C): 36.8 (02-22-18 @ 11:34), Max: 37.1 (02-21-18 @ 20:25)  HR: 73 (02-22-18 @ 11:34) (71 - 77)  BP: 109/55 (02-22-18 @ 11:34) (101/54 - 123/57)  RR: 18 (02-22-18 @ 11:34) (17 - 18)  SpO2: 100% (02-22-18 @ 11:34) (98% - 100%)  Wt(kg): --        02-21-18 @ 07:01  -  02-22-18 @ 07:00  --------------------------------------------------------  IN: 3296 mL / OUT: 1900 mL / NET: 1396 mL    02-22-18 @ 07:01  -  02-22-18 @ 12:37  --------------------------------------------------------  IN: 1340 mL / OUT: 1000 mL / NET: 340 mL      Physical Exam:  	Gen: NAD, well-appearing  	HEENT: on room air, NG tube present  	Pulm: CTA B/L  	CV: normal S1S2; no rub  	Abd: soft                      Back : No sacral edema  	: No milan  	LE: no edema  	Skin: Warm, without rashes  	    LABS/STUDIES  --------------------------------------------------------------------------------              8.2    9.0   >-----------<  249      [02-22-18 @ 03:59]              26.0     137  |  109  |  48  ----------------------------<  147      [02-22-18 @ 03:59]  5.2   |  21  |  1.50        Ca     8.1     [02-22-18 @ 03:59]    TPro  5.9  /  Alb  2.3  /  TBili  0.5  /  DBili  x   /  AST  55  /  ALT  53  /  AlkPhos  247  [02-22-18 @ 03:59]    PT/INR: PT 10.6 , INR 0.97       [02-21-18 @ 01:03]  PTT: 34.0       [02-21-18 @ 01:03]      Creatinine Trend:  SCr 1.50 [02-22 @ 03:59]  SCr 1.59 [02-21 @ 01:03]  SCr 2.05 [02-20 @ 01:26]  SCr 2.17 [02-19 @ 17:36]  SCr 2.09 [02-19 @ 01:39]    Urinalysis - [02-19-18 @ 10:05]      Color Yellow / Appearance SL Turbid / SG 1.013 / pH 6.0      Gluc Negative / Ketone Negative  / Bili Negative / Urobili Negative       Blood Moderate / Protein 30 / Leuk Est Small / Nitrite Negative      RBC 10-25 / WBC 10-25 / Hyaline  / Gran  / Sq Epi  / Non Sq Epi  / Bacteria     Urine Osmolality 357      [02-19-18 @ 10:05]    Iron 21, TIBC 111, %sat 19      [02-20-18 @ 15:51]  Ferritin 1101.0      [02-20-18 @ 15:42]  HbA1c 9.1      [01-09-18 @ 03:39]  TSH 12.56      [02-10-18 @ 12:11]  Lipid: chol 103, , HDL 16, LDL 67      [01-09-18 @ 08:09]    HIV Nonreact      [02-10-18 @ 12:11]

## 2018-02-22 NOTE — PROGRESS NOTE ADULT - PROBLEM SELECTOR PLAN 2
Likely in setting of impaired urinary concentrating ability given recovering ATN and post obstructive diuresis. Resolved. Serum sodium has improved to 137 today. Monitor serum sodium.

## 2018-02-22 NOTE — PROGRESS NOTE ADULT - SUBJECTIVE AND OBJECTIVE BOX
VITAL SIGNS-Tele: SR 70-80    Vital Signs Last 24 Hrs  T(C): 36.8 (18 @ 07:04), Max: 37.1 (18 @ 20:25)  HR: 74 (18 @ 07:04) (71 - 77)  BP: 103/53 (18 @ 07:04) (101/54 - 123/57)  SpO2: 100% (18 @ 07:04) (98% - 100%)          07:01  -   @ 07:00  --------------------------------------------------------  IN: 3296 mL / OUT: 1900 mL / NET: 1396 mL     @ 07:01  -   @ 10:58  --------------------------------------------------------  IN: 240 mL / OUT: 1000 mL / NET: -760 mL    Daily     Daily Weight in k.2 (2018 08:04)    CAPILLARY BLOOD GLUCOSE  POCT Blood Glucose.: 131 mg/dL (2018 10:01)  POCT Blood Glucose.: 174 mg/dL (2018 06:03)  POCT Blood Glucose.: 140 mg/dL (2018 02:10)    Drains:   R Pleural  [ x ]  Drainage:  Pacing Wires        [  ]   Settings:                                  Isolated  [x  ]       PHYSICAL EXAM:  Neurology: alert and oriented x 3, nonfocal, no gross deficits  CV : S1S2  Sternal Wound :  CDI , Stable  Lungs: CTA  Abdomen: soft, nontender, nondistended, positive bowel sounds, last bowel movement         Extremities:     ++ pitting edema LE - ankle area, no calf tenderness    acetaminophen    Suspension. 650 milliGRAM(s) Oral every 6 hours PRN  amiodarone    Tablet 200 milliGRAM(s) Oral daily  aspirin 325 milliGRAM(s) Oral daily  atorvastatin 80 milliGRAM(s) Oral at bedtime  cefTRIAXone   IVPB 2 Gram(s) IV Intermittent every 24 hours  cefTRIAXone   IVPB      finasteride 5 milliGRAM(s) Oral daily  folic acid 1 milliGRAM(s) Oral daily  gentamicin   IVPB      gentamicin   IVPB 30 milliGRAM(s) IV Intermittent every 12 hours  heparin  Injectable 5000 Unit(s) SubCutaneous every 8 hours  insulin lispro (HumaLOG) corrective regimen sliding scale   SubCutaneous every 4 hours  levothyroxine 150 MICROGram(s) Oral <User Schedule>  metoprolol     tartrate 25 milliGRAM(s) Oral every 12 hours  pantoprazole   Suspension 40 milliGRAM(s) Oral daily  tamsulosin 0.4 milliGRAM(s) Oral at bedtime  thiamine 100 milliGRAM(s) Oral daily    Physical Therapy Rec:   Home  [  ]   Home w/ PT  [  ]  Rehab  [  ]  Discussed with Cardiothoracic Team at AM rounds.

## 2018-02-22 NOTE — PROGRESS NOTE ADULT - PROBLEM SELECTOR PLAN 1
Likely contrast induced nephropathy from cardiac cath/Toxic/Septic ATN, with component of obstructive uropathy. Milan was placed on 2/20 which drained about 1 L. Patient is currently non oliguric, with improved Scr since milan catheter placement; ELIAN resolving. Plan is to continue monitoring Scr., electrolytes, and urine output.

## 2018-02-22 NOTE — PROGRESS NOTE ADULT - ASSESSMENT
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  1/20/18 AVR (T)/C1L   Pod 1 pressors  inotropes,  R PL effusion s/p pigtail placement with 1.6L removed.  POD 2 , cardiogenic shock, RACHEAL-Reintubated. ,IABP placed lasix infusion, hd catheter placed,  +inotropes. On antibiotics ceftriaxone and GENT  for gram + cocci endocarditis, reintubation  enteral feeds  Vascular consulted for pseudoaneurysm  2/7 Seen by ENT,  + R VC paresis medially with glottic gap, pooling of secretions and b/l posterior intubation granulomas  2/8 Hematology consulted for prolonged aPTT of unclear etiology.   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  Feb 13 repeat thrombin injection for L Fem PSA  Feb 14 RTOR sternal wound debridement, removal of VAC, washout and closure  2/15 Per Vasc Sx pt is now s/p L pseudoanuerysm thrombin injection x3 with remaining pseudoaneursym injected with thrombin two days prior with U/S resolution. Pt underwent repeat arterial duplex that showed resolution of pseudoaneurysm.   2/16 Dysphagia 1 THIN STRAINED PUREE ONLY, with Honey thickened liquids. All Liquids via teaspoon only.   Feb 16 R pigtail for pleural effusion  2/19/18 Dimas reinserted for retention , URO consulted, eventual TOV  2/22/18 - VSS - Kaofeed in place - will contact S&S re:repeat Feest  decrease free water boluses to 250 via kaofeed q6h as per Dr. Mary

## 2018-02-23 LAB
ANION GAP SERPL CALC-SCNC: 9 MMOL/L — SIGNIFICANT CHANGE UP (ref 5–17)
APTT BLD: 69.2 SEC — HIGH (ref 27.5–37.4)
BLD GP AB SCN SERPL QL: NEGATIVE — SIGNIFICANT CHANGE UP
BUN SERPL-MCNC: 42 MG/DL — HIGH (ref 7–23)
CALCIUM SERPL-MCNC: 7.9 MG/DL — LOW (ref 8.4–10.5)
CHLORIDE SERPL-SCNC: 109 MMOL/L — HIGH (ref 96–108)
CO2 SERPL-SCNC: 20 MMOL/L — LOW (ref 22–31)
CREAT SERPL-MCNC: 1.46 MG/DL — HIGH (ref 0.5–1.3)
GLUCOSE BLDC GLUCOMTR-MCNC: 112 MG/DL — HIGH (ref 70–99)
GLUCOSE BLDC GLUCOMTR-MCNC: 136 MG/DL — HIGH (ref 70–99)
GLUCOSE BLDC GLUCOMTR-MCNC: 140 MG/DL — HIGH (ref 70–99)
GLUCOSE BLDC GLUCOMTR-MCNC: 166 MG/DL — HIGH (ref 70–99)
GLUCOSE SERPL-MCNC: 134 MG/DL — HIGH (ref 70–99)
HCT VFR BLD CALC: 24 % — LOW (ref 39–50)
HGB BLD-MCNC: 7.7 G/DL — LOW (ref 13–17)
INR BLD: 1.08 RATIO — SIGNIFICANT CHANGE UP (ref 0.88–1.16)
MCHC RBC-ENTMCNC: 30.7 PG — SIGNIFICANT CHANGE UP (ref 27–34)
MCHC RBC-ENTMCNC: 32.1 GM/DL — SIGNIFICANT CHANGE UP (ref 32–36)
MCV RBC AUTO: 95.9 FL — SIGNIFICANT CHANGE UP (ref 80–100)
PLATELET # BLD AUTO: 267 K/UL — SIGNIFICANT CHANGE UP (ref 150–400)
POTASSIUM SERPL-MCNC: 4.7 MMOL/L — SIGNIFICANT CHANGE UP (ref 3.5–5.3)
POTASSIUM SERPL-SCNC: 4.7 MMOL/L — SIGNIFICANT CHANGE UP (ref 3.5–5.3)
PROTHROM AB SERPL-ACNC: 11.7 SEC — SIGNIFICANT CHANGE UP (ref 9.8–12.7)
RBC # BLD: 2.5 M/UL — LOW (ref 4.2–5.8)
RBC # FLD: 15.7 % — HIGH (ref 10.3–14.5)
RH IG SCN BLD-IMP: POSITIVE — SIGNIFICANT CHANGE UP
SODIUM SERPL-SCNC: 138 MMOL/L — SIGNIFICANT CHANGE UP (ref 135–145)
WBC # BLD: 8.4 K/UL — SIGNIFICANT CHANGE UP (ref 3.8–10.5)
WBC # FLD AUTO: 8.4 K/UL — SIGNIFICANT CHANGE UP (ref 3.8–10.5)

## 2018-02-23 PROCEDURE — 99232 SBSQ HOSP IP/OBS MODERATE 35: CPT

## 2018-02-23 PROCEDURE — 71045 X-RAY EXAM CHEST 1 VIEW: CPT | Mod: 26

## 2018-02-23 PROCEDURE — 99233 SBSQ HOSP IP/OBS HIGH 50: CPT

## 2018-02-23 RX ORDER — INSULIN LISPRO 100/ML
VIAL (ML) SUBCUTANEOUS EVERY 6 HOURS
Qty: 0 | Refills: 0 | Status: DISCONTINUED | OUTPATIENT
Start: 2018-02-23 | End: 2018-02-27

## 2018-02-23 RX ADMIN — Medication 25 MILLIGRAM(S): at 06:59

## 2018-02-23 RX ADMIN — ATORVASTATIN CALCIUM 80 MILLIGRAM(S): 80 TABLET, FILM COATED ORAL at 21:26

## 2018-02-23 RX ADMIN — Medication 25 MILLIGRAM(S): at 18:22

## 2018-02-23 RX ADMIN — Medication 2: at 02:39

## 2018-02-23 RX ADMIN — FINASTERIDE 5 MILLIGRAM(S): 5 TABLET, FILM COATED ORAL at 13:45

## 2018-02-23 RX ADMIN — AMIODARONE HYDROCHLORIDE 200 MILLIGRAM(S): 400 TABLET ORAL at 06:58

## 2018-02-23 RX ADMIN — HEPARIN SODIUM 5000 UNIT(S): 5000 INJECTION INTRAVENOUS; SUBCUTANEOUS at 06:58

## 2018-02-23 RX ADMIN — HEPARIN SODIUM 5000 UNIT(S): 5000 INJECTION INTRAVENOUS; SUBCUTANEOUS at 21:26

## 2018-02-23 RX ADMIN — HEPARIN SODIUM 5000 UNIT(S): 5000 INJECTION INTRAVENOUS; SUBCUTANEOUS at 13:45

## 2018-02-23 RX ADMIN — Medication 325 MILLIGRAM(S): at 13:45

## 2018-02-23 RX ADMIN — PANTOPRAZOLE SODIUM 40 MILLIGRAM(S): 20 TABLET, DELAYED RELEASE ORAL at 13:45

## 2018-02-23 RX ADMIN — TAMSULOSIN HYDROCHLORIDE 0.4 MILLIGRAM(S): 0.4 CAPSULE ORAL at 21:26

## 2018-02-23 RX ADMIN — Medication 100 MILLIGRAM(S): at 13:46

## 2018-02-23 RX ADMIN — Medication 150 MICROGRAM(S): at 13:46

## 2018-02-23 RX ADMIN — Medication 5: at 10:50

## 2018-02-23 RX ADMIN — CEFTRIAXONE 100 GRAM(S): 500 INJECTION, POWDER, FOR SOLUTION INTRAMUSCULAR; INTRAVENOUS at 10:50

## 2018-02-23 RX ADMIN — Medication 1 MILLIGRAM(S): at 13:45

## 2018-02-23 RX ADMIN — Medication 2: at 06:57

## 2018-02-23 NOTE — PROGRESS NOTE ADULT - ASSESSMENT
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  1/20/18 AVR (T)/C1L   Pod 1 pressors  inotropes,  R PL effusion s/p pigtail placement with 1.6L removed.  POD 2 , cardiogenic shock, RACHEAL-Reintubated. ,IABP placed lasix infusion, hd catheter placed,  +inotropes. On antibiotics ceftriaxone and GENT  for gram + cocci endocarditis, reintubation  enteral feeds  Vascular consulted for pseudoaneurysm  2/7 Seen by ENT,  + R VC paresis medially with glottic gap, pooling of secretions and b/l posterior intubation granulomas  2/8 Hematology consulted for prolonged aPTT of unclear etiology.   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  Feb 13 repeat thrombin injection for L Fem PSA  Feb 14 RTOR sternal wound debridement, removal of VAC, washout and closure  2/15 Per Vasc Sx pt is now s/p L pseudoanuerysm thrombin injection x3 with remaining pseudoaneursym injected with thrombin two days prior with U/S resolution. Pt underwent repeat arterial duplex that showed resolution of pseudoaneurysm.   2/16 Dysphagia 1 THIN STRAINED PUREE ONLY, with Honey thickened liquids. All Liquids via teaspoon only.   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  1/20/18 AVR (T)/C1L   Pod 1 pressors  inotropes,  R PL effusion s/p pigtail placement with 1.6L removed.  POD 2 , cardiogenic shock, RACHEAL-Reintubated. ,IABP placed lasix infusion, hd catheter placed,  +inotropes. On antibiotics ceftriaxone and GENT  for gram + cocci endocarditis, reintubation  enteral feeds  Vascular consulted for pseudoaneurysm  2/7 Seen by ENT,  + R VC paresis medially with glottic gap, pooling of secretions and b/l posterior intubation granulomas  2/8 Hematology consulted for prolonged aPTT of unclear etiology.   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  Feb 13 repeat thrombin injection for L Fem PSA  Feb 14 RTOR sternal wound debridement, removal of VAC, washout and closure  2/15 Per Vasc Sx pt is now s/p L pseudoanuerysm thrombin injection x3 with remaining pseudoaneursym injected with thrombin two days prior with U/S resolution. Pt underwent repeat arterial duplex that showed resolution of pseudoaneurysm.   2/16 Dysphagia 1 THIN STRAINED PUREE ONLY, with Honey thickened liquids. All Liquids via teaspoon only.   Feb 16 R pigtail for pleural effusion  2/19/18 Dimas reinserted for retention , URO consulted, eventual TOV  2/22/18 - VSS - Kaofeed in place - will contact S&S re:repeat Feest  decrease free water boluses to 250 via kaofeed q6h   2/23 Dental consulted for OR cx + GroupB strep, D/c pigtail, Calorie count in progress. MBS Monday /Tues

## 2018-02-23 NOTE — PROGRESS NOTE ADULT - SUBJECTIVE AND OBJECTIVE BOX
VITAL SIGNS    Telemetry:   SR 70-80  Vital Signs Last 24 Hrs  T(C): 36.9 (02-23-18 @ 04:51), Max: 36.9 (02-23-18 @ 04:51)  T(F): 98.4 (02-23-18 @ 04:51), Max: 98.4 (02-23-18 @ 04:51)  HR: 85 (02-23-18 @ 06:55) (68 - 85)  BP: 101/65 (02-23-18 @ 06:55) (93/53 - 122/59)  RR: 18 (02-23-18 @ 06:55) (17 - 18)  SpO2: 100% (02-23-18 @ 06:55) (98% - 100%)            02-22 @ 07:01  -  02-23 @ 07:00  --------------------------------------------------------  IN: 3240 mL / OUT: 3500 mL / NET: -260 mL    Daily   Admit Wt: Drug Dosing Weight  Height (cm): 170.18 (13 Feb 2018 21:46)  Weight (kg): 75.4 (13 Feb 2018 21:46)  BMI (kg/m2): 26 (13 Feb 2018 21:46)  BSA (m2): 1.87 (13 Feb 2018 21:46)      CAPILLARY BLOOD GLUCOSE      POCT Blood Glucose.: 140 mg/dL (23 Feb 2018 06:52)  POCT Blood Glucose.: 136 mg/dL (23 Feb 2018 02:10)  POCT Blood Glucose.: 102 mg/dL (22 Feb 2018 21:45)  POCT Blood Glucose.: 135 mg/dL (22 Feb 2018 18:10)  POCT Blood Glucose.: 158 mg/dL (22 Feb 2018 15:04)          MEDICATIONS  acetaminophen    Suspension. 650 milliGRAM(s) Oral every 6 hours PRN  amiodarone    Tablet 200 milliGRAM(s) Oral daily  aspirin 325 milliGRAM(s) Oral daily  atorvastatin 80 milliGRAM(s) Oral at bedtime  cefTRIAXone   IVPB 2 Gram(s) IV Intermittent every 24 hours  cefTRIAXone   IVPB      finasteride 5 milliGRAM(s) Oral daily  folic acid 1 milliGRAM(s) Oral daily  heparin  Injectable 5000 Unit(s) SubCutaneous every 8 hours  insulin lispro (HumaLOG) corrective regimen sliding scale   SubCutaneous every 4 hours  levothyroxine 150 MICROGram(s) Oral <User Schedule>  metoprolol     tartrate 25 milliGRAM(s) Oral every 12 hours  pantoprazole   Suspension 40 milliGRAM(s) Oral daily  tamsulosin 0.4 milliGRAM(s) Oral at bedtime  thiamine 100 milliGRAM(s) Oral daily      PHYSICAL EXAM  Subjective: NAD  Neurology: alert and oriented x 3, nonfocal, no gross deficits  CV : S1S2  Lungs: CTA b/l right pigtail to lws  Abdomen: soft, NT,ND, (- )BM  :  voiding  Extremities:  -c/c/e    LABS  02-23    138  |  109<H>  |  42<H>  ----------------------------<  134<H>  4.7   |  20<L>  |  1.46<H>    Ca    7.9<L>      23 Feb 2018 06:05    TPro  5.9<L>  /  Alb  2.3<L>  /  TBili  0.5  /  DBili  x   /  AST  55<H>  /  ALT  53<H>  /  AlkPhos  247<H>  02-22                                 7.7    8.4   )-----------( 267      ( 23 Feb 2018 06:05 )             24.0                 PAST MEDICAL & SURGICAL HISTORY:  High cholesterol  Diabetes: type 2  No significant past surgical history

## 2018-02-23 NOTE — CONSULT NOTE ADULT - SUBJECTIVE AND OBJECTIVE BOX
Patient is a 72y old  Male who presents with a chief complaint of Fall (09 Jan 2018 00:52)      HPI:  Patient is a 72 year-old male with past medical history of T2DM, DLD, alcohol misuse brought in by family after having been found face down on the floor for approximately 2 days. Patient was currently residing alone on one floor of the house (wife away in [place]) while other family members including a son live on other floors within the same house. He was not checked upon for 2 days. Patient reports multiple recent episodes of falls at home due to physical instability". He reports chronic alcohol use, which family states ranges from 6-12 beers almost daily. He has been more lethargic lately, with forgetfulness and disorientationHe also endorses fever, chills, dysuria and urinary frequency. Patient denies SOB, chest pain, back pain, diarrhea, melena/hematochezia, recent travel, sick contact or surgery. Patient is originally from [place] and has been in the  for 9 years.     ED course: Initial EKG noted for IW ST-elevations. Patient was hypotensive to 70/40 with HR in 170bpm with Atrial fibrillation + RVR on EKG. Labs notable for WBC 26K, lac 5.2 and troponin 0.6. Given IV NS blus x 5L and multiple DCCV with temporary resumption of NSR in 80s bpm before return to atrial fibrillation. (09 Jan 2018 00:52)      PAST MEDICAL & SURGICAL HISTORY:  High cholesterol  Diabetes: type 2  No significant past surgical history    MEDICATIONS  (STANDING):  amiodarone    Tablet 200 milliGRAM(s) Oral daily  aspirin 325 milliGRAM(s) Oral daily  atorvastatin 80 milliGRAM(s) Oral at bedtime  cefTRIAXone   IVPB 2 Gram(s) IV Intermittent every 24 hours  cefTRIAXone   IVPB      finasteride 5 milliGRAM(s) Oral daily  folic acid 1 milliGRAM(s) Oral daily  heparin  Injectable 5000 Unit(s) SubCutaneous every 8 hours  insulin lispro (HumaLOG) corrective regimen sliding scale   SubCutaneous every 6 hours  levothyroxine 150 MICROGram(s) Oral <User Schedule>  metoprolol     tartrate 25 milliGRAM(s) Oral every 12 hours  pantoprazole   Suspension 40 milliGRAM(s) Oral daily  tamsulosin 0.4 milliGRAM(s) Oral at bedtime  thiamine 100 milliGRAM(s) Oral daily    MEDICATIONS  (PRN):  acetaminophen    Suspension. 650 milliGRAM(s) Oral every 6 hours PRN Mild Pain (1 - 3)      Allergies    No Known Allergies    Intolerances        FAMILY HISTORY:  Family history of type 2 diabetes mellitus (Sibling)  Family history of lung cancer (Sibling)      Vital Signs Last 24 Hrs  T(C): 36.7 (23 Feb 2018 14:00), Max: 36.9 (23 Feb 2018 04:51)  T(F): 98.1 (23 Feb 2018 14:00), Max: 98.4 (23 Feb 2018 04:51)  HR: 80 (23 Feb 2018 14:00) (68 - 85)  BP: 101/62 (23 Feb 2018 14:00) (93/53 - 122/59)  BP(mean): 85 (22 Feb 2018 19:38) (85 - 85)  RR: 18 (23 Feb 2018 14:00) (17 - 18)  SpO2: 98% (23 Feb 2018 14:00) (97% - 100%)    EOE:  TMJ ( -  ) clicks                    (  -  ) pops                    (  -  ) crepitus             Mandible FROM             Facial bones and MOM grossly intact             ( -  ) trismus             (  - ) LAD             (  - ) swelling             (  - ) asymmetry             (  - ) palpation             ( -  ) dysphagia      IOE:  permanent dentition: multiple teeth with enamel and dentin fracture, multiple carious teeth           hard/soft palate: WNL           tongue: velvety white plaque seen on ventral surface of tongue on patient's right side           FOM: WNL           labial/buccal mucosa WNL           (  - ) percussion           (  - ) palpation           ( -  ) swelling     Dentition present: #4-15, 17,18, 20-32     Radiographs: No radiographs taken     LABS:                        7.7    8.4   )-----------( 267      ( 23 Feb 2018 06:05 )             24.0     02-23    138  |  109<H>  |  42<H>  ----------------------------<  134<H>  4.7   |  20<L>  |  1.46<H>    Ca    7.9<L>      23 Feb 2018 06:05    TPro  5.9<L>  /  Alb  2.3<L>  /  TBili  0.5  /  DBili  x   /  AST  55<H>  /  ALT  53<H>  /  AlkPhos  247<H>  02-22    WBC Count: 8.4 K/uL [3.8 - 10.5] (02-23 @ 06:05)    Platelet Count - Automated: 267 K/uL [150 - 400] (02-23 @ 06:05)  Platelet Count - Automated: 249 K/uL [150 - 400] (02-22 @ 03:59)    INR: 1.08 ratio [0.88 - 1.16] (02-23 @ 10:24)      ASSESSMENT: 72 year old male has multiple carious teeth, multiple teeth with enamel and dentin fractures. No extraoral or intraoral swellings. No acute signs of dental infection. Patient has white, velvet plaque on right ventral surface of tongue near teeth #30-32 that cannot be wiped away. Recommended that patient have biopsy of area as outpatient.     RECOMMENDATIONS:   2) Dental F/U with outpatient dentist for comprehensive dental care and biopsy of white, velvety plaque on right ventral surface of tongue   3) If any difficulty swallowing/breathing, fever occur, page dental.     Grace Feliciano DDS  Pager #[phone]  Oral surgeon consulted: Dr. Fowler

## 2018-02-23 NOTE — PROGRESS NOTE ADULT - SUBJECTIVE AND OBJECTIVE BOX
infectious diseases progress note:    Patient is a 72y old  Male who presents with a chief complaint of Fall (09 Jan 2018 00:52)        Atrial fibrillation              Intolerances        ANTIBIOTICS/RELEVANT:  antimicrobials  cefTRIAXone   IVPB 2 Gram(s) IV Intermittent every 24 hours  cefTRIAXone   IVPB        immunologic:    OTHER:  acetaminophen    Suspension. 650 milliGRAM(s) Oral every 6 hours PRN  amiodarone    Tablet 200 milliGRAM(s) Oral daily  aspirin 325 milliGRAM(s) Oral daily  atorvastatin 80 milliGRAM(s) Oral at bedtime  finasteride 5 milliGRAM(s) Oral daily  folic acid 1 milliGRAM(s) Oral daily  heparin  Injectable 5000 Unit(s) SubCutaneous every 8 hours  insulin lispro (HumaLOG) corrective regimen sliding scale   SubCutaneous every 4 hours  levothyroxine 150 MICROGram(s) Oral <User Schedule>  metoprolol     tartrate 25 milliGRAM(s) Oral every 12 hours  pantoprazole   Suspension 40 milliGRAM(s) Oral daily  tamsulosin 0.4 milliGRAM(s) Oral at bedtime  thiamine 100 milliGRAM(s) Oral daily      Objective:  Vital Signs Last 24 Hrs  T(C): 36.9 (23 Feb 2018 04:51), Max: 36.9 (23 Feb 2018 04:51)  T(F): 98.4 (23 Feb 2018 04:51), Max: 98.4 (23 Feb 2018 04:51)  HR: 85 (23 Feb 2018 06:55) (68 - 85)  BP: 101/65 (23 Feb 2018 06:55) (93/53 - 122/59)  BP(mean): 85 (22 Feb 2018 19:38) (79 - 85)  RR: 18 (23 Feb 2018 06:55) (17 - 18)  SpO2: 100% (23 Feb 2018 06:55) (98% - 100%)    PHYSICAL EXAM:  Constitutional:Well-developed, well nourished--no acute distress  Eyes:MYLA, EOMI  Ear/Nose/Throat: no oral lesion, no sinus tenderness on percussion	  Neck:no JVD, no lymphadenopathy, supple  Respiratory: CTA matt  wd is dry   Gastrointestinal:soft, (+) BS, no HSM  Extremities:no e/e/c        LABS:                        7.7    8.4   )-----------( 267      ( 23 Feb 2018 06:05 )             24.0     02-23    138  |  109<H>  |  42<H>  ----------------------------<  134<H>  4.7   |  20<L>  |  1.46<H>    Ca    7.9<L>      23 Feb 2018 06:05    TPro  5.9<L>  /  Alb  2.3<L>  /  TBili  0.5  /  DBili  x   /  AST  55<H>  /  ALT  53<H>  /  AlkPhos  247<H>  02-22            MICROBIOLOGY:    RECENT CULTURES:        RESPIRATORY CULTURES:              RADIOLOGY & ADDITIONAL STUDIES:        Pager [phone]  After 5 pm/weekends or if no response :[phone]

## 2018-02-23 NOTE — PROGRESS NOTE ADULT - ATTENDING COMMENTS
I have seen this patient with the fellow and agree with their assessment and plan. In addition, resolving ELIAN with endocarditis.  Geraldo [email or web address] along    Marie Spann MD  Cell   Pager   Office

## 2018-02-23 NOTE — CONSULT NOTE ADULT - PROVIDER SPECIALTY LIST ADULT
CCU
CT Surgery
Cardiology
Dental
Endocrinology
Heme/Onc
Surgery
Vascular Surgery
Urology
ENT
Nephrology
Infectious Disease

## 2018-02-23 NOTE — CONSULT NOTE ADULT - CONSULT REQUESTED DATE/TIME
08-Feb-2018 11:55
08-Feb-2018 16:55
08-Jan-2018 17:54
11-Jan-2018 16:24
11-Jan-2018 19:43
12-Feb-2018 23:19
23-Feb-2018 18:01
26-Jan-2018 17:33
21-Feb-2018
07-Feb-2018 20:58
15-Chao-2018 12:06
11-Jan-2018 16:23

## 2018-02-23 NOTE — PROGRESS NOTE ADULT - SUBJECTIVE AND OBJECTIVE BOX
[facility] Division of Kidney Diseases & Hypertension  FOLLOW UP NOTE  [phone]--------------------------------------------------------------------------------  Chief Complaint:Atrial fibrillation      24 hour events/subjective:    No complaints    PAST HISTORY  --------------------------------------------------------------------------------  No significant changes to PMH, PSH, FHx, SHx, unless otherwise noted    ALLERGIES & MEDICATIONS  --------------------------------------------------------------------------------  Allergies    No Known Allergies    Intolerances      Standing Inpatient Medications  amiodarone    Tablet 200 milliGRAM(s) Oral daily  aspirin 325 milliGRAM(s) Oral daily  atorvastatin 80 milliGRAM(s) Oral at bedtime  cefTRIAXone   IVPB 2 Gram(s) IV Intermittent every 24 hours  cefTRIAXone   IVPB      finasteride 5 milliGRAM(s) Oral daily  folic acid 1 milliGRAM(s) Oral daily  heparin  Injectable 5000 Unit(s) SubCutaneous every 8 hours  insulin lispro (HumaLOG) corrective regimen sliding scale   SubCutaneous every 6 hours  levothyroxine 150 MICROGram(s) Oral <User Schedule>  metoprolol     tartrate 25 milliGRAM(s) Oral every 12 hours  pantoprazole   Suspension 40 milliGRAM(s) Oral daily  tamsulosin 0.4 milliGRAM(s) Oral at bedtime  thiamine 100 milliGRAM(s) Oral daily    PRN Inpatient Medications  acetaminophen    Suspension. 650 milliGRAM(s) Oral every 6 hours PRN      REVIEW OF SYSTEMS  --------------------------------------------------------------------------------  Gen: No  fevers/chills  Skin: No rashes  Head/Eyes/Ears/Mouth: No headache; Normal hearing; Normal vision w/o blurriness  Respiratory: No dyspnea, cough, wheezing, hemoptysis  CV: No chest pain, PND, orthopnea  GI: No abdominal pain, diarrhea, constipation, nausea, vomiting  : No increased frequency, dysuria, hematuria, nocturia  MSK: No joint pain/swelling; no back pain; no edema  Neuro: No dizziness/lightheadedness, weakness, seizures, numbness, tingling      All other systems were reviewed and are negative, except as noted.    VITALS/PHYSICAL EXAM  --------------------------------------------------------------------------------  T(C): 36.9 (02-23-18 @ 04:51), Max: 36.9 (02-23-18 @ 04:51)  HR: 85 (02-23-18 @ 06:55) (68 - 85)  BP: 101/65 (02-23-18 @ 06:55) (93/53 - 122/59)  RR: 18 (02-23-18 @ 06:55) (17 - 18)  SpO2: 100% (02-23-18 @ 06:55) (98% - 100%)  Wt(kg): --        02-22-18 @ 07:01  -  02-23-18 @ 07:00  --------------------------------------------------------  IN: 3240 mL / OUT: 3500 mL / NET: -260 mL      Physical Exam:  	             Gen: NAD, well-appearing  	HEENT: on room air, NG tube present  	Pulm: CTA B/L  	CV: normal S1S2; no rub  	Abd: soft                      Back : No sacral edema  	: No milan  	LE: no edema  	Skin: Warm, without rashes  	    LABS/STUDIES  --------------------------------------------------------------------------------              7.7    8.4   >-----------<  267      [02-23-18 @ 06:05]              24.0     138  |  109  |  42  ----------------------------<  134      [02-23-18 @ 06:05]  4.7   |  20  |  1.46        Ca     7.9     [02-23-18 @ 06:05]    TPro  5.9  /  Alb  2.3  /  TBili  0.5  /  DBili  x   /  AST  55  /  ALT  53  /  AlkPhos  247  [02-22-18 @ 03:59]    PT/INR: PT 11.7 , INR 1.08       [02-23-18 @ 10:24]  PTT: 69.2       [02-23-18 @ 10:24]      Creatinine Trend:  SCr 1.46 [02-23 @ 06:05]  SCr 1.50 [02-22 @ 03:59]  SCr 1.59 [02-21 @ 01:03]  SCr 2.05 [02-20 @ 01:26]  SCr 2.17 [02-19 @ 17:36]    Urinalysis - [02-19-18 @ 10:05]      Color Yellow / Appearance SL Turbid / SG 1.013 / pH 6.0      Gluc Negative / Ketone Negative  / Bili Negative / Urobili Negative       Blood Moderate / Protein 30 / Leuk Est Small / Nitrite Negative      RBC 10-25 / WBC 10-25 / Hyaline  / Gran  / Sq Epi  / Non Sq Epi  / Bacteria     Urine Osmolality 357      [02-19-18 @ 10:05]    Iron 21, TIBC 111, %sat 19      [02-20-18 @ 15:51]  Ferritin 1101.0      [02-20-18 @ 15:42]

## 2018-02-23 NOTE — PROGRESS NOTE ADULT - PROBLEM SELECTOR PLAN 1
continue current meds, Ceftriaxone until 2/28th  ambulate  coughing & deep breathing  Anticipate discharge to rehab

## 2018-02-23 NOTE — PROGRESS NOTE ADULT - ASSESSMENT
72 male with T2DM, DLD, alcohol misuse brought in by family after having been found face down on the floor for approximately 2 days. On treatment for UTI, but now JANES with Aortic Valve lesions. Suspected culture negative endocarditis s/p AVR. Bartonella, Legionella, Q Fever serology negative; fungal BCX negative. Brucella pending.  Unclear cause of NVE, culture  with GPC in pairs and chains.  C diff negative.   has ELIAN, aortic balloon pump - no longer on pressors, no leukocytosis, afebrile. Creatinine 2.09. CrCl 32.7  organism thought to be a fastidious gram positive coccus -  micro having trouble getting it to grow    f/u [place] for PCR ( pending)  hematoma in mediastinum but no evidence of sternal wound infection       pcr came back from [facility] with group b strep     dc genta  complete ceftriaxone on 3/1

## 2018-02-23 NOTE — CONSULT NOTE ADULT - CONSULT REASON
Cath Consult
Femoral pseudoaneurysm
Prolonged aPTT
Sternal wound
Vegetation seen on ECHO
adhf
s/p AVR (t) CABG  OR cx + Group B Strep
type 2 diabetes
urinary retention with debris
ELIAN
hoarseness and failed S&S eval
Leukocytosis

## 2018-02-23 NOTE — CHART NOTE - NSCHARTNOTEFT_GEN_A_CORE
72 year old male pt with PMH T2DM, DLD, ETOH abuse brought in by family after having been found face down on the floor for approximately 2 days. 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 Now s/p 1/20/18 AVR (T), CABG x1. On Ceftriaxone until 3/1 per ID. Pt remains on enteral feeding and po diet is s/p FEES on 2/16 with recommendations for thin strained pureed with honey thickened liquids (liquids fed only with teaspoon per SLP recommendations). Kcal count started today 2/23/18 to assess adequacy of po intake for potential ween off TF with possible repeat FEES. Free H2O flushes decreased yesterday per Dr. Mary.    Pt seen today for malnutrition follow-up. Pt asleep upon visit. Per RN, pt ate 1/2 a yogurt this morning and not much else. TF currently held to help improve pt's appetite.    Source: Patient [ ]    Family [ ]     other [x] electronic medical records, RN    Diet : PO: Thin strained pureed with honey thickened liquids. DASH/TLC  Enteral: please see order below    Patient reports [ ] nausea  [ ] vomiting [ ] diarrhea [ ] constipation  [ ]chewing problems [ ] swallowing issues  [x] other:  Last BM today, per RN very soft    PO intake:  < 50% [x] 50-75% [ ]   % [ ]  other :    Source for PO intake [ ] Patient [ ] family [ ] chart [x] staff, RN  [x] other: Food intake records    Enteral /Parenteral Nutrition: (Currently held) Vital AF at 60 ml/hr x 24 hours via nasogastric tube.  + Free H2O 250 ml Q6 hours  Provides (based on current body wt 62 kG):  Calorie: 1728 kcal (28 kcal/kG)  Protein: 108 grams protein (1.7 grams/kG)  Free H2O: 2168 ml Free H2O (35 ml/kG)    Current Weight: 156.9 pounds (current, standing, +1 generalized edema, +2 B/L leg edema noted). 9 pound weight decrease from admit wt 166.2 pounds    Pertinent Medications: MEDICATIONS  (STANDING):  amiodarone    Tablet 200 milliGRAM(s) Oral daily  aspirin 325 milliGRAM(s) Oral daily  atorvastatin 80 milliGRAM(s) Oral at bedtime  cefTRIAXone   IVPB 2 Gram(s) IV Intermittent every 24 hours  cefTRIAXone   IVPB      finasteride 5 milliGRAM(s) Oral daily  folic acid 1 milliGRAM(s) Oral daily  heparin  Injectable 5000 Unit(s) SubCutaneous every 8 hours  insulin lispro (HumaLOG) corrective regimen sliding scale   SubCutaneous every 4 hours  levothyroxine 150 MICROGram(s) Oral <User Schedule>  metoprolol     tartrate 25 milliGRAM(s) Oral every 12 hours  pantoprazole   Suspension 40 milliGRAM(s) Oral daily  tamsulosin 0.4 milliGRAM(s) Oral at bedtime  thiamine 100 milliGRAM(s) Oral daily    MEDICATIONS  (PRN):  acetaminophen    Suspension. 650 milliGRAM(s) Oral every 6 hours PRN Mild Pain (1 - 3)    Pertinent Labs:  02-23 Na138 mmol/L Glu 134 mg/dL<H> K+ 4.7 mmol/L Cr  1.46 mg/dL<H> BUN 42 mg/dL<H>      Skin: No pressure ulcers noted      Estimated Needs:   [x] no change since previous assessment  [ ] recalculated:       Previous Nutrition Diagnosis:     Malnutrition and Increased Nutrient Needs ongoing, being addressed with enteral nutrition support and  PO diet.  Food & Nutrition Related Knowledge Deficit not applicable at this time.      New Nutrition Diagnosis: [ x ] not applicable      Interventions:     1) Recommend continue TF, can change to overnight feeding 8pm-8am (12 hours) at rate of 60 ml/hr Vital AF to provide supplemental kcal while pt consumes po meals during waking hours. Will provide half the nutrition of prior TF (see above)  2) Kcal count in progress today is day 1 of 3, to follow up Monday 2/26 with assessment/results.  3) Recommend add probiotic in setting of antibiotic use.    Monitoring and Evaluation:     [x] PO intake [x] Tolerance to diet prescription/TF [x] weights [x] follow up per protocol    [x] other: RD remains available: Gabriela Jenkins MS, RDN, CDN, CDE. #[phone] 72 year old male pt with PMH T2DM, DLD, ETOH abuse brought in by family after having been found face down on the floor for approximately 2 days. 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 Now s/p 1/20/18 AVR (T), CABG x1. On Ceftriaxone until 3/1 per ID. Pt remains on enteral feeding and po diet is s/p FEES on 2/16 with recommendations for thin strained pureed with honey thickened liquids (liquids fed only with teaspoon per SLP recommendations). Kcal count started today 2/23/18 to assess adequacy of po intake for potential ween off TF with possible repeat FEES. Free H2O flushes decreased yesterday per Dr. Mary.    Pt seen today for malnutrition follow-up. Pt asleep upon visit. Per RN, pt ate 1/2 a yogurt this morning and not much else. TF currently held to help improve pt's appetite.    Source: Patient [ ]    Family [ ]     other [x] electronic medical records, RN    Diet : PO: Thin strained pureed with honey thickened liquids. DASH/TLC  Enteral: please see order below    Patient reports [ ] nausea  [ ] vomiting [ ] diarrhea [ ] constipation  [ ]chewing problems [ ] swallowing issues  [x] other:  Last BM today, per RN very soft    PO intake:  < 50% [x] 50-75% [ ]   % [ ]  other :    Source for PO intake [ ] Patient [ ] family [ ] chart [x] staff, RN  [x] other: Food intake records    Enteral /Parenteral Nutrition: (Currently held) Vital AF at 60 ml/hr x 24 hours via nasogastric tube.  + Free H2O 250 ml Q6 hours  Provides (based on current body wt 62 kG):  Calorie: 1728 kcal (28 kcal/kG)  Protein: 108 grams protein (1.7 grams/kG)  Free H2O: 2168 ml Free H2O (35 ml/kG)    Current Weight: 156.9 pounds (current, standing, +1 generalized edema, +2 B/L leg edema noted). 9 pound weight decrease from admit wt 166.2 pounds    Pertinent Medications: MEDICATIONS  (STANDING):  amiodarone    Tablet 200 milliGRAM(s) Oral daily  aspirin 325 milliGRAM(s) Oral daily  atorvastatin 80 milliGRAM(s) Oral at bedtime  cefTRIAXone   IVPB 2 Gram(s) IV Intermittent every 24 hours  cefTRIAXone   IVPB      finasteride 5 milliGRAM(s) Oral daily  folic acid 1 milliGRAM(s) Oral daily  heparin  Injectable 5000 Unit(s) SubCutaneous every 8 hours  insulin lispro (HumaLOG) corrective regimen sliding scale   SubCutaneous every 4 hours  levothyroxine 150 MICROGram(s) Oral <User Schedule>  metoprolol     tartrate 25 milliGRAM(s) Oral every 12 hours  pantoprazole   Suspension 40 milliGRAM(s) Oral daily  tamsulosin 0.4 milliGRAM(s) Oral at bedtime  thiamine 100 milliGRAM(s) Oral daily    MEDICATIONS  (PRN):  acetaminophen    Suspension. 650 milliGRAM(s) Oral every 6 hours PRN Mild Pain (1 - 3)    Pertinent Labs:  02-23 Na138 mmol/L Glu 134 mg/dL<H> K+ 4.7 mmol/L Cr  1.46 mg/dL<H> BUN 42 mg/dL<H>      Skin: No pressure ulcers noted      Estimated Needs:   [x] no change since previous assessment  [ ] recalculated:       Previous Nutrition Diagnosis:     Moderate malnutrition and Increased Nutrient Needs ongoing, being addressed with enteral nutrition support and  PO diet.  Food & Nutrition Related Knowledge Deficit not applicable at this time, pt asleep/lethargic      New Nutrition Diagnosis: [ x ] not applicable      Interventions:     1) Recommend continue TF, can change to overnight feeding 8pm-8am (12 hours) at rate of 60 ml/hr Vital AF to provide supplemental kcal while pt consumes po meals during waking hours. Will provide half the nutrition of prior TF (see above)  2) Kcal count in progress today is day 1 of 3, to follow up Monday 2/26 with assessment/results.  3) Recommend add probiotic in setting of antibiotic use.  4) F/u with nutrition education as able when pt more alert    Monitoring and Evaluation:     [x] PO intake [x] Tolerance to diet prescription/TF [x] weights [x] follow up per protocol    [x] other: RD remains available: Gabriela Jenkins MS, RDN, CDN, CDE. #[phone]

## 2018-02-24 DIAGNOSIS — D50.0 IRON DEFICIENCY ANEMIA SECONDARY TO BLOOD LOSS (CHRONIC): ICD-10-CM

## 2018-02-24 LAB
ALBUMIN SERPL ELPH-MCNC: 2.2 G/DL — LOW (ref 3.3–5)
ALP SERPL-CCNC: 199 U/L — HIGH (ref 40–120)
ALT FLD-CCNC: 57 U/L RC — HIGH (ref 10–45)
ANION GAP SERPL CALC-SCNC: 12 MMOL/L — SIGNIFICANT CHANGE UP (ref 5–17)
APTT BLD: 51.1 SEC — HIGH (ref 27.5–37.4)
AST SERPL-CCNC: 65 U/L — HIGH (ref 10–40)
BILIRUB SERPL-MCNC: 0.6 MG/DL — SIGNIFICANT CHANGE UP (ref 0.2–1.2)
BLD GP AB SCN SERPL QL: NEGATIVE — SIGNIFICANT CHANGE UP
BUN SERPL-MCNC: 35 MG/DL — HIGH (ref 7–23)
CALCIUM SERPL-MCNC: 8.2 MG/DL — LOW (ref 8.4–10.5)
CHLORIDE SERPL-SCNC: 107 MMOL/L — SIGNIFICANT CHANGE UP (ref 96–108)
CO2 SERPL-SCNC: 21 MMOL/L — LOW (ref 22–31)
CREAT SERPL-MCNC: 1.24 MG/DL — SIGNIFICANT CHANGE UP (ref 0.5–1.3)
GLUCOSE BLDC GLUCOMTR-MCNC: 104 MG/DL — HIGH (ref 70–99)
GLUCOSE BLDC GLUCOMTR-MCNC: 113 MG/DL — HIGH (ref 70–99)
GLUCOSE BLDC GLUCOMTR-MCNC: 90 MG/DL — SIGNIFICANT CHANGE UP (ref 70–99)
GLUCOSE BLDC GLUCOMTR-MCNC: 93 MG/DL — SIGNIFICANT CHANGE UP (ref 70–99)
GLUCOSE SERPL-MCNC: 86 MG/DL — SIGNIFICANT CHANGE UP (ref 70–99)
HCT VFR BLD CALC: 21.7 % — LOW (ref 39–50)
HCT VFR BLD CALC: 27 % — LOW (ref 39–50)
HGB BLD-MCNC: 6.9 G/DL — CRITICAL LOW (ref 13–17)
HGB BLD-MCNC: 9.3 G/DL — LOW (ref 13–17)
MCHC RBC-ENTMCNC: 31.1 PG — SIGNIFICANT CHANGE UP (ref 27–34)
MCHC RBC-ENTMCNC: 31.9 PG — SIGNIFICANT CHANGE UP (ref 27–34)
MCHC RBC-ENTMCNC: 32 GM/DL — SIGNIFICANT CHANGE UP (ref 32–36)
MCHC RBC-ENTMCNC: 34.3 GM/DL — SIGNIFICANT CHANGE UP (ref 32–36)
MCV RBC AUTO: 93.1 FL — SIGNIFICANT CHANGE UP (ref 80–100)
MCV RBC AUTO: 97.2 FL — SIGNIFICANT CHANGE UP (ref 80–100)
PLATELET # BLD AUTO: 321 K/UL — SIGNIFICANT CHANGE UP (ref 150–400)
PLATELET # BLD AUTO: 348 K/UL — SIGNIFICANT CHANGE UP (ref 150–400)
POTASSIUM SERPL-MCNC: 4.7 MMOL/L — SIGNIFICANT CHANGE UP (ref 3.5–5.3)
POTASSIUM SERPL-SCNC: 4.7 MMOL/L — SIGNIFICANT CHANGE UP (ref 3.5–5.3)
PROT SERPL-MCNC: 5.9 G/DL — LOW (ref 6–8.3)
RBC # BLD: 2.24 M/UL — LOW (ref 4.2–5.8)
RBC # BLD: 2.9 M/UL — LOW (ref 4.2–5.8)
RBC # FLD: 15.8 % — HIGH (ref 10.3–14.5)
RBC # FLD: 16 % — HIGH (ref 10.3–14.5)
RH IG SCN BLD-IMP: POSITIVE — SIGNIFICANT CHANGE UP
SODIUM SERPL-SCNC: 140 MMOL/L — SIGNIFICANT CHANGE UP (ref 135–145)
WBC # BLD: 9 K/UL — SIGNIFICANT CHANGE UP (ref 3.8–10.5)
WBC # BLD: 9.1 K/UL — SIGNIFICANT CHANGE UP (ref 3.8–10.5)
WBC # FLD AUTO: 9 K/UL — SIGNIFICANT CHANGE UP (ref 3.8–10.5)
WBC # FLD AUTO: 9.1 K/UL — SIGNIFICANT CHANGE UP (ref 3.8–10.5)

## 2018-02-24 PROCEDURE — 71045 X-RAY EXAM CHEST 1 VIEW: CPT | Mod: 26

## 2018-02-24 RX ADMIN — HEPARIN SODIUM 5000 UNIT(S): 5000 INJECTION INTRAVENOUS; SUBCUTANEOUS at 06:26

## 2018-02-24 RX ADMIN — Medication 100 MILLIGRAM(S): at 11:34

## 2018-02-24 RX ADMIN — TAMSULOSIN HYDROCHLORIDE 0.4 MILLIGRAM(S): 0.4 CAPSULE ORAL at 21:43

## 2018-02-24 RX ADMIN — Medication 25 MILLIGRAM(S): at 06:26

## 2018-02-24 RX ADMIN — PANTOPRAZOLE SODIUM 40 MILLIGRAM(S): 20 TABLET, DELAYED RELEASE ORAL at 11:36

## 2018-02-24 RX ADMIN — AMIODARONE HYDROCHLORIDE 200 MILLIGRAM(S): 400 TABLET ORAL at 06:26

## 2018-02-24 RX ADMIN — Medication 150 MICROGRAM(S): at 14:13

## 2018-02-24 RX ADMIN — Medication 25 MILLIGRAM(S): at 17:39

## 2018-02-24 RX ADMIN — Medication 650 MILLIGRAM(S): at 09:37

## 2018-02-24 RX ADMIN — ATORVASTATIN CALCIUM 80 MILLIGRAM(S): 80 TABLET, FILM COATED ORAL at 21:43

## 2018-02-24 RX ADMIN — Medication 1 MILLIGRAM(S): at 11:33

## 2018-02-24 RX ADMIN — Medication 325 MILLIGRAM(S): at 11:33

## 2018-02-24 RX ADMIN — CEFTRIAXONE 100 GRAM(S): 500 INJECTION, POWDER, FOR SOLUTION INTRAMUSCULAR; INTRAVENOUS at 09:32

## 2018-02-24 RX ADMIN — FINASTERIDE 5 MILLIGRAM(S): 5 TABLET, FILM COATED ORAL at 11:33

## 2018-02-24 NOTE — PROGRESS NOTE ADULT - ASSESSMENT
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  1/20/18 AVR (T)/C1L   Pod 1 pressors  inotropes,  R PL effusion s/p pigtail placement with 1.6L removed.  POD 2 , cardiogenic shock, RACHEAL-Reintubated. ,IABP placed lasix infusion, hd catheter placed,  +inotropes. On antibiotics ceftriaxone and GENT  for gram + cocci endocarditis, reintubation  enteral feeds  Vascular consulted for pseudoaneurysm  2/7 Seen by ENT,  + R VC paresis medially with glottic gap, pooling of secretions and b/l posterior intubation granulomas  2/8 Hematology consulted for prolonged aPTT of unclear etiology.   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  Feb 13 repeat thrombin injection for L Fem PSA  Feb 14 RTOR sternal wound debridement, removal of VAC, washout and closure  2/15 Per Vasc Sx pt is now s/p L pseudoanuerysm thrombin injection x3 with remaining pseudoaneursym injected with thrombin two days prior with U/S resolution. Pt underwent repeat arterial duplex that showed resolution of pseudoaneurysm.   2/16 Dysphagia 1 THIN STRAINED PUREE ONLY, with Honey thickened liquids. All Liquids via teaspoon only.   Feb 16 R pigtail for pleural effusion  2/19/18 Dimas reinserted for retention , URO consulted, eventual TOV  2/22/18 - VSS - Kaofeed in place - will contact S&S re:repeat Feest  decrease free water boluses to 250 via kaofeed q6h   2/23 Dental consulted for OR cx + GroupB strep, D/c pigtail, Calorie count in progress. MBS Monday /Tues 2/24: Anemic requiring 1 unit PRBC. Dimas light hematuria. Per Dental White plaques on tongue to be biopsied as outpatient-no signs of infection

## 2018-02-24 NOTE — PROGRESS NOTE ADULT - SUBJECTIVE AND OBJECTIVE BOX
VITAL SIGNS    Telemetry:    Vital Signs Last 24 Hrs  T(C): 36.7 (02-24-18 @ 13:46), Max: 36.7 (02-23-18 @ 14:00)  T(F): 98.1 (02-24-18 @ 13:46), Max: 98.1 (02-23-18 @ 14:00)  HR: 73 (02-24-18 @ 13:46) (71 - 80)  BP: 120/72 (02-24-18 @ 13:46) (101/62 - 127/73)  RR: 18 (02-24-18 @ 13:46) (18 - 18)  SpO2: 100% (02-24-18 @ 13:46) (98% - 100%)            02-23 @ 07:01  -  02-24 @ 07:00  --------------------------------------------------------  IN: 1860 mL / OUT: 1950 mL / NET: -90 mL       Daily     Daily   Admit Wt: Drug Dosing Weight  Height (cm): 170.18 (13 Feb 2018 21:46)  Weight (kg): 75.4 (13 Feb 2018 21:46)  BMI (kg/m2): 26 (13 Feb 2018 21:46)  BSA (m2): 1.87 (13 Feb 2018 21:46)     Daily     LABS  02-24    140  |  107  |  35<H>  ----------------------------<  86  4.7   |  21<L>  |  1.24    Ca    8.2<L>      24 Feb 2018 07:10    TPro  5.9<L>  /  Alb  2.2<L>  /  TBili  0.6  /  DBili  x   /  AST  65<H>  /  ALT  57<H>  /  AlkPhos  199<H>  02-24                                 6.9    9.0   )-----------( 321      ( 24 Feb 2018 07:10 )             21.7          PT/INR - ( 23 Feb 2018 10:24 )   PT: 11.7 sec;   INR: 1.08 ratio         PTT - ( 24 Feb 2018 07:10 )  PTT:51.1 sec        Bilirubin Total, Serum: 0.6 mg/dL (02-24 @ 07:10)    CAPILLARY BLOOD GLUCOSE      POCT Blood Glucose.: 113 mg/dL (24 Feb 2018 12:18)  POCT Blood Glucose.: 90 mg/dL (24 Feb 2018 06:23)  POCT Blood Glucose.: 93 mg/dL (23 Feb 2018 23:58)  POCT Blood Glucose.: 112 mg/dL (23 Feb 2018 18:20)          Drains:     MS       [  ]   [  ]            L Pleural [  ]            R Pleural  [  ]            YESSICA  [  ]           Milan  [  ]    Pacing Wires      [  ]   Settings:                                  Isolated  [  ]                    CXR:      MEDICATIONS  acetaminophen    Suspension. 650 milliGRAM(s) Oral every 6 hours PRN  amiodarone    Tablet 200 milliGRAM(s) Oral daily  aspirin 325 milliGRAM(s) Oral daily  atorvastatin 80 milliGRAM(s) Oral at bedtime  cefTRIAXone   IVPB 2 Gram(s) IV Intermittent every 24 hours  cefTRIAXone   IVPB      finasteride 5 milliGRAM(s) Oral daily  folic acid 1 milliGRAM(s) Oral daily  insulin lispro (HumaLOG) corrective regimen sliding scale   SubCutaneous every 6 hours  levothyroxine 150 MICROGram(s) Oral <User Schedule>  metoprolol     tartrate 25 milliGRAM(s) Oral every 12 hours  pantoprazole   Suspension 40 milliGRAM(s) Oral daily  tamsulosin 0.4 milliGRAM(s) Oral at bedtime  thiamine 100 milliGRAM(s) Oral daily      PHYSICAL EXAM      Neurology: alert and oriented x 3, nonfocal, no gross deficits  CV :S1S2  Sternal Wound :  CDI , Stable  Lungs: dimished to bases-poor inspiratory effort  Abdomen: soft, nontender, nondistended, positive bowel sounds, last bowel movement   :   milan light hematuria    Extremities:   1-2+ edema lower extremities               PAST MEDICAL & SURGICAL HISTORY:  High cholesterol  Diabetes: type 2  No significant past surgical history                 Discussed with Cardiothoracic Team at AM rounds.

## 2018-02-25 DIAGNOSIS — R07.0 PAIN IN THROAT: ICD-10-CM

## 2018-02-25 LAB
ALBUMIN SERPL ELPH-MCNC: 2 G/DL — LOW (ref 3.3–5)
ALP SERPL-CCNC: 208 U/L — HIGH (ref 40–120)
ALT FLD-CCNC: 68 U/L RC — HIGH (ref 10–45)
ANION GAP SERPL CALC-SCNC: 12 MMOL/L — SIGNIFICANT CHANGE UP (ref 5–17)
APTT BLD: 31.4 SEC — SIGNIFICANT CHANGE UP (ref 27.5–37.4)
AST SERPL-CCNC: 62 U/L — HIGH (ref 10–40)
BILIRUB SERPL-MCNC: 0.6 MG/DL — SIGNIFICANT CHANGE UP (ref 0.2–1.2)
BUN SERPL-MCNC: 30 MG/DL — HIGH (ref 7–23)
CALCIUM SERPL-MCNC: 8.3 MG/DL — LOW (ref 8.4–10.5)
CHLORIDE SERPL-SCNC: 106 MMOL/L — SIGNIFICANT CHANGE UP (ref 96–108)
CO2 SERPL-SCNC: 20 MMOL/L — LOW (ref 22–31)
CREAT SERPL-MCNC: 1.24 MG/DL — SIGNIFICANT CHANGE UP (ref 0.5–1.3)
GLUCOSE BLDC GLUCOMTR-MCNC: 72 MG/DL — SIGNIFICANT CHANGE UP (ref 70–99)
GLUCOSE BLDC GLUCOMTR-MCNC: 73 MG/DL — SIGNIFICANT CHANGE UP (ref 70–99)
GLUCOSE BLDC GLUCOMTR-MCNC: 82 MG/DL — SIGNIFICANT CHANGE UP (ref 70–99)
GLUCOSE BLDC GLUCOMTR-MCNC: 84 MG/DL — SIGNIFICANT CHANGE UP (ref 70–99)
GLUCOSE BLDC GLUCOMTR-MCNC: 85 MG/DL — SIGNIFICANT CHANGE UP (ref 70–99)
GLUCOSE BLDC GLUCOMTR-MCNC: 90 MG/DL — SIGNIFICANT CHANGE UP (ref 70–99)
GLUCOSE SERPL-MCNC: 92 MG/DL — SIGNIFICANT CHANGE UP (ref 70–99)
HCT VFR BLD CALC: 28.9 % — LOW (ref 39–50)
HGB BLD-MCNC: 9.6 G/DL — LOW (ref 13–17)
INR BLD: 1.06 RATIO — SIGNIFICANT CHANGE UP (ref 0.88–1.16)
MCHC RBC-ENTMCNC: 31.5 PG — SIGNIFICANT CHANGE UP (ref 27–34)
MCHC RBC-ENTMCNC: 33.3 GM/DL — SIGNIFICANT CHANGE UP (ref 32–36)
MCV RBC AUTO: 94.5 FL — SIGNIFICANT CHANGE UP (ref 80–100)
PLATELET # BLD AUTO: 354 K/UL — SIGNIFICANT CHANGE UP (ref 150–400)
POTASSIUM SERPL-MCNC: 4.5 MMOL/L — SIGNIFICANT CHANGE UP (ref 3.5–5.3)
POTASSIUM SERPL-SCNC: 4.5 MMOL/L — SIGNIFICANT CHANGE UP (ref 3.5–5.3)
PROT SERPL-MCNC: 6.4 G/DL — SIGNIFICANT CHANGE UP (ref 6–8.3)
PROTHROM AB SERPL-ACNC: 11.6 SEC — SIGNIFICANT CHANGE UP (ref 9.8–12.7)
RBC # BLD: 3.05 M/UL — LOW (ref 4.2–5.8)
RBC # FLD: 15.9 % — HIGH (ref 10.3–14.5)
SODIUM SERPL-SCNC: 138 MMOL/L — SIGNIFICANT CHANGE UP (ref 135–145)
WBC # BLD: 9.4 K/UL — SIGNIFICANT CHANGE UP (ref 3.8–10.5)
WBC # FLD AUTO: 9.4 K/UL — SIGNIFICANT CHANGE UP (ref 3.8–10.5)

## 2018-02-25 RX ADMIN — TAMSULOSIN HYDROCHLORIDE 0.4 MILLIGRAM(S): 0.4 CAPSULE ORAL at 22:24

## 2018-02-25 RX ADMIN — AMIODARONE HYDROCHLORIDE 200 MILLIGRAM(S): 400 TABLET ORAL at 06:26

## 2018-02-25 RX ADMIN — PANTOPRAZOLE SODIUM 40 MILLIGRAM(S): 20 TABLET, DELAYED RELEASE ORAL at 11:23

## 2018-02-25 RX ADMIN — Medication 25 MILLIGRAM(S): at 17:24

## 2018-02-25 RX ADMIN — Medication 25 MILLIGRAM(S): at 06:26

## 2018-02-25 RX ADMIN — Medication 1 MILLIGRAM(S): at 11:23

## 2018-02-25 RX ADMIN — CEFTRIAXONE 100 GRAM(S): 500 INJECTION, POWDER, FOR SOLUTION INTRAMUSCULAR; INTRAVENOUS at 10:02

## 2018-02-25 RX ADMIN — Medication 325 MILLIGRAM(S): at 11:23

## 2018-02-25 RX ADMIN — Medication 150 MICROGRAM(S): at 14:17

## 2018-02-25 RX ADMIN — Medication 650 MILLIGRAM(S): at 06:26

## 2018-02-25 RX ADMIN — FINASTERIDE 5 MILLIGRAM(S): 5 TABLET, FILM COATED ORAL at 11:23

## 2018-02-25 RX ADMIN — ATORVASTATIN CALCIUM 80 MILLIGRAM(S): 80 TABLET, FILM COATED ORAL at 22:24

## 2018-02-25 RX ADMIN — Medication 100 MILLIGRAM(S): at 11:23

## 2018-02-25 NOTE — PROGRESS NOTE ADULT - PROBLEM SELECTOR PLAN 1
Pain control prn  Hydration  Throat Lozenges  Nasal Saline B/L 3x/day  Diet reccs per S&S, pt scheduled for MBS tomorrow  Reconsult prn  Care per primary team

## 2018-02-25 NOTE — PROGRESS NOTE ADULT - ASSESSMENT
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  1/20/18 AVR (T)/C1L   Pod 1 pressors  inotropes,  R PL effusion s/p pigtail placement with 1.6L removed.  POD 2 , cardiogenic shock, RACHEAL-Reintubated. ,IABP placed lasix infusion, hd catheter placed,  +inotropes. On antibiotics ceftriaxone and GENT  for gram + cocci endocarditis, reintubation  enteral feeds  Vascular consulted for pseudoaneurysm  2/7 Seen by ENT,  + R VC paresis medially with glottic gap, pooling of secretions and b/l posterior intubation granulomas  2/8 Hematology consulted for prolonged aPTT of unclear etiology.   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  Feb 13 repeat thrombin injection for L Fem PSA  Feb 14 RTOR sternal wound debridement, removal of VAC, washout and closure  2/15 Per Vasc Sx pt is now s/p L pseudoanuerysm thrombin injection x3 with remaining pseudoaneursym injected with thrombin two days prior with U/S resolution. Pt underwent repeat arterial duplex that showed resolution of pseudoaneurysm.   2/16 Dysphagia 1 THIN STRAINED PUREE ONLY, with Honey thickened liquids. All Liquids via teaspoon only.   Feb 16 R pigtail for pleural effusion  2/19/18 Dimas reinserted for retention , URO consulted, eventual TOV  2/22/18 - VSS - Kaofeed in place - will contact S&S re:repeat Feest  decrease free water boluses to 250 via kaofeed q6h   2/23 Dental consulted for OR cx + GroupB strep, D/c pigtail, Calorie count in progress. MBS Monday /Tues 2/24: Anemic requiring 1 unit PRBC. Dimas light hematuria. Per Dental White plaques on tongue to be biopsied as outpatient-no signs of infection  2/25: c/o sore throat appreciate ENT input

## 2018-02-25 NOTE — PROGRESS NOTE ADULT - ASSESSMENT
72yoM with chronic throat pain/discomfort, laryngoscopy reveals healing Vocal cord granuloma, cord mobile with good contact, no signs of Acute infection on exam

## 2018-02-25 NOTE — PROGRESS NOTE ADULT - SUBJECTIVE AND OBJECTIVE BOX
POD/STATUS POST/ENT ISSUE: Throat pain x few weeks    INTERVAL HPI: Called to re-eval patient for throat pain pt c/o R throat pain for many weeks, has R NGT in place, reports no voice change or hoarseness/SOB/wheezing/odynophagia/facial swelling/neck swelling Currently on Dysphagia 1 diet with honey consistency liquids    Vital Signs Last 24 Hrs  T(C): 36.7 (25 Feb 2018 06:19), Max: 36.8 (24 Feb 2018 20:19)  T(F): 98 (25 Feb 2018 06:19), Max: 98.2 (24 Feb 2018 20:19)  HR: 88 (25 Feb 2018 06:19) (71 - 88)  BP: 123/75 (25 Feb 2018 06:19) (120/72 - 145/75)  RR: 17 (25 Feb 2018 06:19) (17 - 18)  SpO2: 98% (25 Feb 2018 06:19) (98% - 100%)    PHYSICAL EXAM:  Gen: NAD, well-developed speaking full sentences no stridor  Head: Normocephalic, Atraumatic  Eyes: PERRL, EOMI, no scleral injection  Nose: Nares bilaterally patent, no discharge  Mouth:  dry oral Mucosa, tongue/uvula midline, oropharynx clear no thrush no pharyngitis/tonsillitis no pharyngeal erythema  Neck: Flat, supple, mild anterior cervical lymphadenopathy ( nontender), trachea midline, no masses  Resp: breathing easily, no stridor      LABS:                        9.6    9.4   )-----------( 354      ( 25 Feb 2018 07:23 )             28.9       Laryngoscopy Scope 2  Nasopharynx, oropharynx, and hypopharynx clear, no bleeding. Airway patent, no foreign body visualized. No erythema, edema, pooling of secretions, masses or lesions. No glottic/supraglottic edema. Vocal cords mobile with good contact b/l. Healing R VC granuloma

## 2018-02-25 NOTE — PROGRESS NOTE ADULT - SUBJECTIVE AND OBJECTIVE BOX
c/o sore throat    VITAL SIGNS    Telemetry:    Vital Signs Last 24 Hrs  T(C): 36.9 (02-25-18 @ 13:00), Max: 36.9 (02-25-18 @ 13:00)  T(F): 98.4 (02-25-18 @ 13:00), Max: 98.4 (02-25-18 @ 13:00)  HR: 74 (02-25-18 @ 13:00) (71 - 88)  BP: 122/46 (02-25-18 @ 13:00) (120/72 - 145/75)  RR: 18 (02-25-18 @ 13:00) (17 - 18)  SpO2: 98% (02-25-18 @ 13:00) (98% - 100%)            02-24 @ 07:01  -  02-25 @ 07:00  --------------------------------------------------------  IN: 1350 mL / OUT: 875 mL / NET: 475 mL    02-25 @ 07:01  -  02-25 @ 13:29  --------------------------------------------------------  IN: 490 mL / OUT: 0 mL / NET: 490 mL       Daily     Daily   Admit Wt: Drug Dosing Weight  Height (cm): 170.18 (13 Feb 2018 21:46)  Weight (kg): 75.4 (13 Feb 2018 21:46)  BMI (kg/m2): 26 (13 Feb 2018 21:46)  BSA (m2): 1.87 (13 Feb 2018 21:46)     Daily     LABS  02-25    138  |  106  |  30<H>  ----------------------------<  92  4.5   |  20<L>  |  1.24    Ca    8.3<L>      25 Feb 2018 07:55    TPro  6.4  /  Alb  2.0<L>  /  TBili  0.6  /  DBili  x   /  AST  62<H>  /  ALT  68<H>  /  AlkPhos  208<H>  02-25                                 9.6    9.4   )-----------( 354      ( 25 Feb 2018 07:23 )             28.9          PT/INR - ( 25 Feb 2018 07:23 )   PT: 11.6 sec;   INR: 1.06 ratio         PTT - ( 25 Feb 2018 07:23 )  PTT:31.4 sec        Bilirubin Total, Serum: 0.6 mg/dL (02-25 @ 07:55)    CAPILLARY BLOOD GLUCOSE      POCT Blood Glucose.: 85 mg/dL (25 Feb 2018 11:46)  POCT Blood Glucose.: 82 mg/dL (25 Feb 2018 06:20)  POCT Blood Glucose.: 90 mg/dL (25 Feb 2018 00:09)  POCT Blood Glucose.: 104 mg/dL (24 Feb 2018 18:10)          Drains:     MS       [  ]   [  ]            L Pleural [  ]            R Pleural  [  ]            YESSICA  [  ]           Milan  [  ]    Pacing Wires      [  ]   Settings:                                  Isolated  [  ]                    CXR:      MEDICATIONS  acetaminophen    Suspension. 650 milliGRAM(s) Oral every 6 hours PRN  amiodarone    Tablet 200 milliGRAM(s) Oral daily  aspirin 325 milliGRAM(s) Oral daily  atorvastatin 80 milliGRAM(s) Oral at bedtime  cefTRIAXone   IVPB 2 Gram(s) IV Intermittent every 24 hours  cefTRIAXone   IVPB      finasteride 5 milliGRAM(s) Oral daily  folic acid 1 milliGRAM(s) Oral daily  insulin lispro (HumaLOG) corrective regimen sliding scale   SubCutaneous every 6 hours  levothyroxine 150 MICROGram(s) Oral <User Schedule>  metoprolol     tartrate 25 milliGRAM(s) Oral every 12 hours  pantoprazole   Suspension 40 milliGRAM(s) Oral daily  tamsulosin 0.4 milliGRAM(s) Oral at bedtime  thiamine 100 milliGRAM(s) Oral daily      PHYSICAL EXAM      Neurology: alert and oriented x 3, nonfocal, no gross deficits  CV :S1S2  Sternal Wound :  CDI , Stable  Lungs: diminished breath sounds to bases  Abdomen: soft, nontender, nondistended, positive bowel sounds, last bowel movement   : milan 2/2 retention     Extremities:   + pp b/l 1+ edema               PAST MEDICAL & SURGICAL HISTORY:  High cholesterol  Diabetes: type 2  No significant past surgical history                 Discussed with Cardiothoracic Team at AM rounds.

## 2018-02-26 DIAGNOSIS — J90 PLEURAL EFFUSION, NOT ELSEWHERE CLASSIFIED: ICD-10-CM

## 2018-02-26 LAB
ALBUMIN SERPL ELPH-MCNC: 2.2 G/DL — LOW (ref 3.3–5)
ALP SERPL-CCNC: 198 U/L — HIGH (ref 40–120)
ALT FLD-CCNC: 61 U/L RC — HIGH (ref 10–45)
AMYLASE P1 CFR SERPL: 105 U/L — SIGNIFICANT CHANGE UP (ref 25–125)
ANION GAP SERPL CALC-SCNC: 10 MMOL/L — SIGNIFICANT CHANGE UP (ref 5–17)
AST SERPL-CCNC: 53 U/L — HIGH (ref 10–40)
BILIRUB SERPL-MCNC: 0.6 MG/DL — SIGNIFICANT CHANGE UP (ref 0.2–1.2)
BUN SERPL-MCNC: 24 MG/DL — HIGH (ref 7–23)
CALCIUM SERPL-MCNC: 8.1 MG/DL — LOW (ref 8.4–10.5)
CHLORIDE SERPL-SCNC: 104 MMOL/L — SIGNIFICANT CHANGE UP (ref 96–108)
CO2 SERPL-SCNC: 23 MMOL/L — SIGNIFICANT CHANGE UP (ref 22–31)
CREAT SERPL-MCNC: 1.26 MG/DL — SIGNIFICANT CHANGE UP (ref 0.5–1.3)
GLUCOSE BLDC GLUCOMTR-MCNC: 72 MG/DL — SIGNIFICANT CHANGE UP (ref 70–99)
GLUCOSE BLDC GLUCOMTR-MCNC: 75 MG/DL — SIGNIFICANT CHANGE UP (ref 70–99)
GLUCOSE BLDC GLUCOMTR-MCNC: 82 MG/DL — SIGNIFICANT CHANGE UP (ref 70–99)
GLUCOSE BLDC GLUCOMTR-MCNC: 91 MG/DL — SIGNIFICANT CHANGE UP (ref 70–99)
GLUCOSE SERPL-MCNC: 64 MG/DL — LOW (ref 70–99)
HCT VFR BLD CALC: 29.3 % — LOW (ref 39–50)
HGB BLD-MCNC: 9.5 G/DL — LOW (ref 13–17)
LIDOCAIN IGE QN: 91 U/L — HIGH (ref 7–60)
MCHC RBC-ENTMCNC: 30.6 PG — SIGNIFICANT CHANGE UP (ref 27–34)
MCHC RBC-ENTMCNC: 32.3 GM/DL — SIGNIFICANT CHANGE UP (ref 32–36)
MCV RBC AUTO: 94.8 FL — SIGNIFICANT CHANGE UP (ref 80–100)
PLATELET # BLD AUTO: 384 K/UL — SIGNIFICANT CHANGE UP (ref 150–400)
POTASSIUM SERPL-MCNC: 4.2 MMOL/L — SIGNIFICANT CHANGE UP (ref 3.5–5.3)
POTASSIUM SERPL-SCNC: 4.2 MMOL/L — SIGNIFICANT CHANGE UP (ref 3.5–5.3)
PROT SERPL-MCNC: 6.3 G/DL — SIGNIFICANT CHANGE UP (ref 6–8.3)
RBC # BLD: 3.09 M/UL — LOW (ref 4.2–5.8)
RBC # FLD: 15.7 % — HIGH (ref 10.3–14.5)
SODIUM SERPL-SCNC: 137 MMOL/L — SIGNIFICANT CHANGE UP (ref 135–145)
WBC # BLD: 11.7 K/UL — HIGH (ref 3.8–10.5)
WBC # FLD AUTO: 11.7 K/UL — HIGH (ref 3.8–10.5)

## 2018-02-26 PROCEDURE — 99232 SBSQ HOSP IP/OBS MODERATE 35: CPT | Mod: GC

## 2018-02-26 PROCEDURE — 71045 X-RAY EXAM CHEST 1 VIEW: CPT | Mod: 26

## 2018-02-26 RX ADMIN — PANTOPRAZOLE SODIUM 40 MILLIGRAM(S): 20 TABLET, DELAYED RELEASE ORAL at 12:38

## 2018-02-26 RX ADMIN — ATORVASTATIN CALCIUM 80 MILLIGRAM(S): 80 TABLET, FILM COATED ORAL at 21:18

## 2018-02-26 RX ADMIN — Medication 25 MILLIGRAM(S): at 18:18

## 2018-02-26 RX ADMIN — CEFTRIAXONE 100 GRAM(S): 500 INJECTION, POWDER, FOR SOLUTION INTRAMUSCULAR; INTRAVENOUS at 09:16

## 2018-02-26 RX ADMIN — Medication 1 MILLIGRAM(S): at 12:38

## 2018-02-26 RX ADMIN — FINASTERIDE 5 MILLIGRAM(S): 5 TABLET, FILM COATED ORAL at 12:38

## 2018-02-26 RX ADMIN — TAMSULOSIN HYDROCHLORIDE 0.4 MILLIGRAM(S): 0.4 CAPSULE ORAL at 21:18

## 2018-02-26 RX ADMIN — Medication 650 MILLIGRAM(S): at 16:05

## 2018-02-26 RX ADMIN — AMIODARONE HYDROCHLORIDE 200 MILLIGRAM(S): 400 TABLET ORAL at 05:46

## 2018-02-26 RX ADMIN — Medication 325 MILLIGRAM(S): at 12:41

## 2018-02-26 RX ADMIN — Medication 650 MILLIGRAM(S): at 16:35

## 2018-02-26 RX ADMIN — Medication 100 MILLIGRAM(S): at 12:38

## 2018-02-26 RX ADMIN — Medication 150 MICROGRAM(S): at 13:43

## 2018-02-26 RX ADMIN — Medication 25 MILLIGRAM(S): at 05:46

## 2018-02-26 RX ADMIN — Medication 650 MILLIGRAM(S): at 22:45

## 2018-02-26 RX ADMIN — Medication 650 MILLIGRAM(S): at 22:17

## 2018-02-26 NOTE — PROCEDURE NOTE - NSCOMPLICATION_GEN_A_CORE
no complications
no complications
pneumothorax/MD Mary notifed; pt O2 sat 99%, no distress, placed on NC, will repeat CXR
no complications

## 2018-02-26 NOTE — PROCEDURE NOTE - NSPOSTPRCRAD_GEN_A_CORE
central line located in the superior vena cava/post-procedure radiography performed
chest tube in correct position
central line located in the superior vena cava
chest tube in correct position

## 2018-02-26 NOTE — PROGRESS NOTE ADULT - ASSESSMENT
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  1/20/18 AVR (T)/C1L   Pod 1 Pressors inotropes,  R PL effusion s/p pigtail placement with 1.6L removed.  POD 2: Cardiogenic shock, RACHEAL-Reintubated.  IABP placed lasix infusion, hd catheter placed,  +inotropes. On antibiotics ceftriaxone and GENT for gram + cocci endocarditis, reintubation  enteral feeds  Vascular consulted for pseudoaneurysm  2/7 Seen by ENT,+ R VC paresis medially with glottic gap, pooling of secretions and b/l posterior intubation granulomas  2/8 Hematology consulted for prolonged aPTT of unclear etiology.   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  Feb 13 repeat thrombin injection for L Fem PSA  Feb 14 RTOR sternal wound debridement, removal of VAC, washout and closure  2/15 Per Vasc Sx pt is now s/p L pseudoanuerysm thrombin injection x3 with remaining pseudoaneursym injected with thrombin two days prior with U/S resolution. Pt underwent repeat arterial duplex that showed resolution of pseudoaneurysm.   2/16 Dysphagia 1 THIN STRAINED PUREE ONLY, with Honey thickened liquids. All Liquids via teaspoon only.   Feb 16 R pigtail for pleural effusion  2/19/18 Dimas reinserted for retention , URO consulted, eventual TOV  2/22/18 - VSS - Kaofeed in place - will contact S&S re: repeat FEEST   decrease free water boluses to 250 via kaofeed q6h   2/23 Dental consulted for OR cx + GroupB strep, D/c pigtail, Calorie count in progress. MBS Monday /Tues 2/24: Anemic requiring 1 unit PRBC. Dimas light hematuria. Per Dental White plaques on tongue to be biopsied as outpatient-no signs of infection  2/25: c/o sore throat appreciate ENT input  2/26 Left pleural effusion --> s/p Left pigtail cath placed; TOV --> Bladder scan >900 cc; Dimas cath reinserted with 1.9 L of urine.  Plan to repeat MBS in AM

## 2018-02-26 NOTE — PROCEDURE NOTE - NSANESTHESIA_GEN_A_CORE
1% lidocaine

## 2018-02-26 NOTE — PROCEDURE NOTE - NSPROCNAME_GEN_A_CORE
Arterial Puncture/Cannulation
Arterial Puncture/Cannulation
Chest Tube
Chest Tube
Central Line Insertion
Arterial Puncture/Cannulation
Central Line Insertion
Chest Tube

## 2018-02-26 NOTE — PROCEDURE NOTE - NSPROCDETAILS_GEN_ALL_CORE
guidewire recovered/lumen(s) aspirated and flushed/sterile dressing applied/ultrasound guidance/sterile technique, catheter placed
dressing applied/secured in place/percutaneous/Seldinger technique/sterile dressing applied/ultrasound assessment of fluid (location)
sutured in place/hemostasis with direct pressure, dressing applied/location identified, draped/prepped, sterile technique used, needle inserted/introduced/Seldinger technique/positive blood return obtained via catheter/connected to a pressurized flush line/all materials/supplies accounted for at end of procedure
location identified, draped/prepped, sterile technique used, needle inserted/introduced/connected to a pressurized flush line/all materials/supplies accounted for at end of procedure/positive blood return obtained via catheter/sutured in place/Seldinger technique
guidewire recovered/sterile technique, catheter placed/sterile dressing applied
positive blood return obtained via catheter/Seldinger technique/connected to a pressurized flush line/hemostasis with direct pressure, dressing applied/location identified, draped/prepped, sterile technique used, needle inserted/introduced/sutured in place
ultrasound assessment of fluid (size)/dressing applied/secured in place/ultrasound assessment of fluid (location)/Seldinger technique/sterile dressing applied/percutaneous
secured in place/Seldinger technique/sterile dressing applied/ultrasound assessment of fluid (location)/dressing applied

## 2018-02-26 NOTE — PROCEDURE NOTE - NSSITEPREP_SKIN_A_CORE
chlorhexidine
povidone iodine (if allergic to chlorhexidine)
chlorhexidine/Adherence to aseptic technique: hand hygiene prior to donning barriers (gown, gloves), don cap and mask, sterile drape over patient
chlorhexidine/Adherence to aseptic technique: hand hygiene prior to donning barriers (gown, gloves), don cap and mask, sterile drape over patient
chlorhexidine

## 2018-02-26 NOTE — CHART NOTE - NSCHARTNOTEFT_GEN_A_CORE
72 year old male pt with PMH T2DM, DLD, ETOH abuse brought in by family after having been found face down on the floor for approximately 2 days. 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 Now s/p 1/20/18 AVR (T), CABG x1. On Ceftriaxone until 3/1 per ID. Pt s/p FEES on 2/16 with recommendations for thin strained pureed with honey thickened liquids (liquids fed only with teaspoon per SLP recommendations) with enteral feeding via NGT prior for supplemental nutrition. Kcal count 2/23/18-2/26/18 to assess adequacy of po intake to wean off TF (results below), TF d/c throughout kcal count and today to promote po intake. NGT remains in place currently. Per NP and RN, Plans for repeat MBS or FEES tomorrow. 2/26 Left pigtail cath placed today for left pleural effusion.    Pt seen today for malnutrition follow-up. Pt reports fair appetite but admits to c/o disliking the pureed food served, states he rather have "[place] foods" of rice, meat, beans. Admits to mild throat pain but otherwise denies difficulty swallowing, seen by ENT for odynophagia with recommendations for hydration and throat spray prn. Otherwise states he is very tired, dosing off during interview.    Source: Patient [x]    Family [ ]     other [x] RN, NP, electronic medical records    Diet : Dysphagia 1 thin strained puree wit honey thickened liquids, Lactose restricted    Patient reports [ ] nausea  [ ] vomiting [ ] diarrhea [ ] constipation  [ ]chewing problems [x] swallowing issues: mild throat pain  [x] other: Pt denies GI distress last BM 2/25/18 noted with fecal incontinence    PO intake:  < 50% [x] 50-75% [ ]   % [ ]  other : Food Intake Record    Day 1  2/23/18:   Calorie: 310 kcal (4 kcal/kG current body wt 71 kG)  Protein: 4 grams (0.06 grams/kG current body wt 71 kG)    Day 2  2/24/18:  Calorie: 388 kcal (5 kcal/kG current body wt 71 kG)  Protein: 2 grams (0.03 grams/kG current body weight 71 kG)    Day 3  2/25/18:  Calorie: 215 kcal (3 kcal/kG current body wt 71 kG)  Protein: 9 grams (0.1 kcal/kG current body wt 71 kG)    Kcal/protein intake inadequate to meet pt's estimated nutrition needs 3797-1136 kcal; 75-90 grams protein daily    Source for PO intake [x] Patient [ ] family [x] chart [ ] staff [x] other: Food intake record      Enteral /Parenteral Nutrition: Enteral nutrition d/c as of 2/23/18; NGT remains in place      Current Weight: 157.4 pounds (current as of 2/25/17, bedscale, no edema noted). 9 pound weight decrease from admit wt 166.2 pounds      Pertinent Medications: MEDICATIONS  (STANDING):  amiodarone    Tablet 200 milliGRAM(s) Oral daily  aspirin 325 milliGRAM(s) Oral daily  atorvastatin 80 milliGRAM(s) Oral at bedtime  cefTRIAXone   IVPB 2 Gram(s) IV Intermittent every 24 hours  cefTRIAXone   IVPB      finasteride 5 milliGRAM(s) Oral daily  folic acid 1 milliGRAM(s) Oral daily  insulin lispro (HumaLOG) corrective regimen sliding scale   SubCutaneous every 6 hours  levothyroxine 150 MICROGram(s) Oral <User Schedule>  metoprolol     tartrate 25 milliGRAM(s) Oral every 12 hours  pantoprazole   Suspension 40 milliGRAM(s) Oral daily  tamsulosin 0.4 milliGRAM(s) Oral at bedtime  thiamine 100 milliGRAM(s) Oral daily    MEDICATIONS  (PRN):  acetaminophen    Suspension. 650 milliGRAM(s) Oral every 6 hours PRN Mild Pain (1 - 3)    Pertinent Labs:  02-26 Na137 mmol/L Glu 64 mg/dL<L> K+ 4.2 mmol/L Cr  1.26 mg/dL BUN 24 mg/dL<H> Phos n/a   Alb 2.2 g/dL<L>      Skin: No pressure ulcers noted      Estimated Needs:   [x] no change since previous assessment  [ ] recalculated:       Previous Nutrition Diagnosis:     Moderate malnutrition; Increased Nutrient Needs; Food & Nutrition Related Knowledge Deficit    Nutrition diagnosis is:  ongoing, addressed with PO diet, diet education as pt alert/amenable      New Nutrition Diagnosis: [ x ] not applicable      Interventions:     1) Continue current diet consistency per SLP recommendations, follow-up recommendations s/p MBS or FEES planned for tomorrow  2) Per kcal count pt not meeting estimated nutrition/hydration needs via po likely multifactorial pt dislikes pureed foods, (+) odynophagia with NGT in place, pt often lethargic. Speech recommends removing NGT today await objective swallowing test planned tomorrow  3) Would monitor po intake closely to assess for adequacy   4) Recommend add probiotic in setting of antibiotic use.  5) F/u with nutrition education as able when pt more alert and if/when po diet upgraded    Monitoring and Evaluation:     [x] PO intake [x] Tolerance to diet prescription [x] weights [x] follow up per protocol    [x] other: RD remains available: Gabriela Jenkins MS, RDN, CDN, CDE. #[phone].

## 2018-02-26 NOTE — CHART NOTE - NSCHARTNOTEFT_GEN_A_CORE
Patient seen and examined at bedside. Denies any complaints of SOB, CP, abdominal pain.     Patient is non oliguric and Scr is now normal for the past 3 days.     Will sign off for now; please recall if needed.

## 2018-02-26 NOTE — PROGRESS NOTE ADULT - PROBLEM SELECTOR PLAN 1
Continue current meds, Ceftriaxone until 2/28th  Increase activity as tolerated   Encourage cough and deep breathing  Anticipate discharge to rehab

## 2018-02-26 NOTE — PROCEDURE NOTE - NSINFORMCONSENT_GEN_A_CORE

## 2018-02-26 NOTE — PROCEDURE NOTE - NSINDICATIONS_GEN_A_CORE
arterial puncture to obtain ABG's/blood sampling
arterial puncture to obtain ABG's/critical patient
arterial puncture to obtain ABG's
hemodynamic monitoring/volume resuscitation/venous access
pleural effusion
pleural effusion
volume resuscitation/emergency venous access/hemodynamic monitoring
pleural effusion

## 2018-02-26 NOTE — PROCEDURE NOTE - NSPOSTCAREGUIDE_GEN_A_CORE
Care for catheter as per unit/ICU protocols/Verbal/written post procedure instructions were given to patient/caregiver
Care for catheter as per unit/ICU protocols/Verbal/written post procedure instructions were given to patient/caregiver/Keep the cast/splint/dressing clean and dry
Verbal/written post procedure instructions were given to patient/caregiver
Care for catheter as per unit/ICU protocols
Care for catheter as per unit/ICU protocols
Care for catheter as per unit/ICU protocols/Verbal/written post procedure instructions were given to patient/caregiver
Care for catheter as per unit/ICU protocols
Care for catheter as per unit/ICU protocols

## 2018-02-26 NOTE — PROGRESS NOTE ADULT - SUBJECTIVE AND OBJECTIVE BOX
VITAL SIGNS    Subjective: "Hello; my stomach feels very full"     Telemetry:  NSR 60-90     Vital Signs Last 24 Hrs  T(C): 36.8 (02-26-18 @ 13:40), Max: 36.9 (02-25-18 @ 20:19)  T(F): 98.3 (02-26-18 @ 13:40), Max: 98.4 (02-25-18 @ 20:19)  HR: 88 (02-26-18 @ 13:40) (71 - 88)  BP: 123/67 (02-26-18 @ 13:40) (111/54 - 129/70)  RR: 18 (02-26-18 @ 13:40) (17 - 18)  SpO2: 96% (02-26-18 @ 13:40) (96% - 98%)           02-25 @ 07:01  -  02-26 @ 07:00  --------------------------------------------------------  IN: 1240 mL / OUT: 625 mL / NET: 615 mL    02-26 @ 07:01  -  02-26 @ 15:22  --------------------------------------------------------  IN: 420 mL / OUT: 2800 mL / NET: -2380 mL    CAPILLARY BLOOD GLUCOSE    POCT Blood Glucose.: 91 mg/dL (26 Feb 2018 11:37)  POCT Blood Glucose.: 75 mg/dL (26 Feb 2018 07:18)  POCT Blood Glucose.: 72 mg/dL (26 Feb 2018 05:39)  POCT Blood Glucose.: 84 mg/dL (25 Feb 2018 23:49)  POCT Blood Glucose.: 73 mg/dL (25 Feb 2018 23:27)  POCT Blood Glucose.: 72 mg/dL (25 Feb 2018 18:03)          PHYSICAL EXAM    Neurology: alert and oriented x 3, nonfocal, no gross deficits    CV: (+) S1 and S2, No murmurs, rubs, gallops or clicks     Sternal Wound:  MSI -->CDI , sternum stable    Lungs: CTA B/L; Left base diminished     Abdomen: soft, (+) tenderness with palpation, (+) distended, positive bowel sounds, (+) Flatus; (+) BM     : Bladder distended                Extremities:  B/L LE (+) trace edema; negative calf tenderness; (+) 2 DP palpable        acetaminophen Suspension. 650 milliGRAM(s) Oral every 6 hours PRN  amiodarone Tablet 200 milliGRAM(s) Oral daily  aspirin 325 milliGRAM(s) Oral daily  atorvastatin 80 milliGRAM(s) Oral at bedtime  cefTRIAXone IVPB 2 Gram(s) IV Intermittent every 24 hours  cefTRIAXone IVPB      finasteride 5 milliGRAM(s) Oral daily  folic acid 1 milliGRAM(s) Oral daily  insulin lispro (HumaLOG) corrective regimen sliding scale SubCutaneous every 6 hours  levothyroxine 150 MICROGram(s) Oral <User Schedule>  metoprolol tartrate 25 milliGRAM(s) Oral every 12 hours  pantoprazole Suspension 40 milliGRAM(s) Oral daily  tamsulosin 0.4 milliGRAM(s) Oral at bedtime  thiamine 100 milliGRAM(s) Oral daily    Physical Therapy Rec:   Home  [  ]   Home w/ PT  [  ]  Rehab  [ X ]    Discussed with Cardiothoracic Team at AM rounds.

## 2018-02-26 NOTE — PROGRESS NOTE ADULT - PROBLEM SELECTOR PLAN 2
Continue with ASA   Continue with Lopressor 25 mg PO BID   Continue with Amio 200 mg PO daily   Continue with Statin

## 2018-02-27 ENCOUNTER — APPOINTMENT (OUTPATIENT)
Dept: INTERNAL MEDICINE | Facility: CLINIC | Age: 73
End: 2018-02-27

## 2018-02-27 LAB
ANION GAP SERPL CALC-SCNC: 11 MMOL/L — SIGNIFICANT CHANGE UP (ref 5–17)
BUN SERPL-MCNC: 21 MG/DL — SIGNIFICANT CHANGE UP (ref 7–23)
CALCIUM SERPL-MCNC: 8.1 MG/DL — LOW (ref 8.4–10.5)
CHLORIDE SERPL-SCNC: 104 MMOL/L — SIGNIFICANT CHANGE UP (ref 96–108)
CO2 SERPL-SCNC: 21 MMOL/L — LOW (ref 22–31)
CREAT SERPL-MCNC: 1.32 MG/DL — HIGH (ref 0.5–1.3)
FACT XII ACT/NOR PPP: 95 % — SIGNIFICANT CHANGE UP (ref 70–145)
GLUCOSE BLDC GLUCOMTR-MCNC: 104 MG/DL — HIGH (ref 70–99)
GLUCOSE BLDC GLUCOMTR-MCNC: 109 MG/DL — HIGH (ref 70–99)
GLUCOSE BLDC GLUCOMTR-MCNC: 144 MG/DL — HIGH (ref 70–99)
GLUCOSE BLDC GLUCOMTR-MCNC: 83 MG/DL — SIGNIFICANT CHANGE UP (ref 70–99)
GLUCOSE BLDC GLUCOMTR-MCNC: 95 MG/DL — SIGNIFICANT CHANGE UP (ref 70–99)
GLUCOSE SERPL-MCNC: 83 MG/DL — SIGNIFICANT CHANGE UP (ref 70–99)
HCT VFR BLD CALC: 29 % — LOW (ref 39–50)
HGB BLD-MCNC: 9.8 G/DL — LOW (ref 13–17)
MCHC RBC-ENTMCNC: 31.8 PG — SIGNIFICANT CHANGE UP (ref 27–34)
MCHC RBC-ENTMCNC: 33.8 GM/DL — SIGNIFICANT CHANGE UP (ref 32–36)
MCV RBC AUTO: 94 FL — SIGNIFICANT CHANGE UP (ref 80–100)
PLATELET # BLD AUTO: 386 K/UL — SIGNIFICANT CHANGE UP (ref 150–400)
POTASSIUM SERPL-MCNC: 4.2 MMOL/L — SIGNIFICANT CHANGE UP (ref 3.5–5.3)
POTASSIUM SERPL-SCNC: 4.2 MMOL/L — SIGNIFICANT CHANGE UP (ref 3.5–5.3)
RBC # BLD: 3.08 M/UL — LOW (ref 4.2–5.8)
RBC # FLD: 15.5 % — HIGH (ref 10.3–14.5)
SODIUM SERPL-SCNC: 136 MMOL/L — SIGNIFICANT CHANGE UP (ref 135–145)
WBC # BLD: 10.3 K/UL — SIGNIFICANT CHANGE UP (ref 3.8–10.5)
WBC # FLD AUTO: 10.3 K/UL — SIGNIFICANT CHANGE UP (ref 3.8–10.5)

## 2018-02-27 PROCEDURE — 99231 SBSQ HOSP IP/OBS SF/LOW 25: CPT | Mod: GC

## 2018-02-27 PROCEDURE — 99232 SBSQ HOSP IP/OBS MODERATE 35: CPT

## 2018-02-27 PROCEDURE — 74230 X-RAY XM SWLNG FUNCJ C+: CPT | Mod: 26

## 2018-02-27 RX ORDER — INSULIN LISPRO 100/ML
VIAL (ML) SUBCUTANEOUS
Qty: 0 | Refills: 0 | Status: DISCONTINUED | OUTPATIENT
Start: 2018-02-27 | End: 2018-03-05

## 2018-02-27 RX ORDER — ASPIRIN/CALCIUM CARB/MAGNESIUM 324 MG
325 TABLET ORAL DAILY
Qty: 0 | Refills: 0 | Status: DISCONTINUED | OUTPATIENT
Start: 2018-02-27 | End: 2018-03-05

## 2018-02-27 RX ORDER — ACETAMINOPHEN 500 MG
650 TABLET ORAL EVERY 6 HOURS
Qty: 0 | Refills: 0 | Status: DISCONTINUED | OUTPATIENT
Start: 2018-02-27 | End: 2018-03-05

## 2018-02-27 RX ORDER — LEVOTHYROXINE SODIUM 125 MCG
150 TABLET ORAL DAILY
Qty: 0 | Refills: 0 | Status: DISCONTINUED | OUTPATIENT
Start: 2018-02-28 | End: 2018-03-05

## 2018-02-27 RX ORDER — PANTOPRAZOLE SODIUM 20 MG/1
40 TABLET, DELAYED RELEASE ORAL
Qty: 0 | Refills: 0 | Status: DISCONTINUED | OUTPATIENT
Start: 2018-02-27 | End: 2018-03-05

## 2018-02-27 RX ORDER — ALTEPLASE 100 MG
2 KIT INTRAVENOUS ONCE
Qty: 0 | Refills: 0 | Status: COMPLETED | OUTPATIENT
Start: 2018-02-27 | End: 2018-02-27

## 2018-02-27 RX ADMIN — Medication 325 MILLIGRAM(S): at 13:25

## 2018-02-27 RX ADMIN — Medication 100 MILLIGRAM(S): at 13:25

## 2018-02-27 RX ADMIN — TAMSULOSIN HYDROCHLORIDE 0.4 MILLIGRAM(S): 0.4 CAPSULE ORAL at 22:22

## 2018-02-27 RX ADMIN — ATORVASTATIN CALCIUM 80 MILLIGRAM(S): 80 TABLET, FILM COATED ORAL at 22:22

## 2018-02-27 RX ADMIN — Medication 25 MILLIGRAM(S): at 05:41

## 2018-02-27 RX ADMIN — Medication 2: at 22:23

## 2018-02-27 RX ADMIN — ALTEPLASE 2 MILLIGRAM(S): KIT at 15:58

## 2018-02-27 RX ADMIN — PANTOPRAZOLE SODIUM 40 MILLIGRAM(S): 20 TABLET, DELAYED RELEASE ORAL at 13:25

## 2018-02-27 RX ADMIN — AMIODARONE HYDROCHLORIDE 200 MILLIGRAM(S): 400 TABLET ORAL at 05:41

## 2018-02-27 RX ADMIN — Medication 25 MILLIGRAM(S): at 18:13

## 2018-02-27 RX ADMIN — Medication 650 MILLIGRAM(S): at 14:00

## 2018-02-27 RX ADMIN — Medication 150 MICROGRAM(S): at 13:26

## 2018-02-27 RX ADMIN — Medication 650 MILLIGRAM(S): at 13:25

## 2018-02-27 RX ADMIN — CEFTRIAXONE 100 GRAM(S): 500 INJECTION, POWDER, FOR SOLUTION INTRAMUSCULAR; INTRAVENOUS at 09:56

## 2018-02-27 RX ADMIN — Medication 1 MILLIGRAM(S): at 13:25

## 2018-02-27 RX ADMIN — FINASTERIDE 5 MILLIGRAM(S): 5 TABLET, FILM COATED ORAL at 13:25

## 2018-02-27 NOTE — PROGRESS NOTE ADULT - ASSESSMENT
72 male with T2DM, DLD, alcohol misuse brought in by family after having been found face down on the floor for approximately 2 days. On treatment for UTI, but now JANES with Aortic Valve lesions. Suspected culture negative endocarditis s/p AVR. Bartonella, Legionella, Q Fever serology negative; fungal BCX negative. Brucella pending.  Unclear cause of NVE, culture  with GPC in pairs and chains.  C diff negative.   has ELIAN, aortic balloon pump - no longer on pressors, no leukocytosis, afebrile. Creatinine 2.09. CrCl 32.7  organism thought to be a fastidious gram positive coccus -  micro having trouble getting it to grow    f/u [place] for PCR ( pending)  hematoma in mediastinum but no evidence of sternal wound infection       pcr came back from [facility] with group b strep        complete ceftriaxone on 3/1  discussed with [facility]    thanks

## 2018-02-27 NOTE — SWALLOW VFSS/MBS ASSESSMENT ADULT - ADDITIONAL INFORMATION
+cervical osteophytes most prominent at the level of C5  +calcification of thyroid and arytenoid cartilages  +calcification of ? vessel in lateral neck vs other

## 2018-02-27 NOTE — SWALLOW VFSS/MBS ASSESSMENT ADULT - NS SWALLOW VFSS REC ASPIR MON
Monitor for s/s aspiration/laryngeal penetration. If noted:  D/C p.o. intake, provide non-oral nutrition/hydration/meds, and contact this service @ [phone]/change of breathing pattern/cough/gurgly voice/fever/throat clearing/pneumonia/upper respiratory infection

## 2018-02-27 NOTE — SWALLOW VFSS/MBS ASSESSMENT ADULT - PHARYNGEAL PHASE COMMENTS
cued repeat swallow vs liquid wash reduce overall residue profile to trace +cued repeat swallow reduced overall residue to trace Trace-mild silent laryngeal penetration with thin liquids noted with single episode of incomplete retrieval. Trace aspiration of thin liquids noted in a chin tuck posture without retrieval, +throat clear. Volume and rate reduction via small, single cup sips improves airway protection with thin liquids.

## 2018-02-27 NOTE — SWALLOW VFSS/MBS ASSESSMENT ADULT - RECOMMENDED CONSISTENCY
Soft diet with thin liquids via small, single cup sips. If patient is unable to implement compensatory strategies pt remains at risk for aspiration, and should remain on Dysphagia 3 with Nectar thickened liquids. Soft diet with thin liquids via small, single cup sips. If patient is unable to implement compensatory strategies pt remains at risk for aspiration, and should remain on Dysphagia 3 with Nectar thickened liquids.    MD/team Please enter the following as notes to RN: 1) Assist with meals, 2) Provide small single bites and sips at slow rate 3) Alternate food and liquids to aid in oral and pharyngeal clearance 4) No Straws 5) Aspiration precautions. Monitor for s/s aspiration/laryngeal penetration. If noted:  D/C p.o. intake, provide non-oral nutrition/hydration/meds

## 2018-02-27 NOTE — PROGRESS NOTE ADULT - SUBJECTIVE AND OBJECTIVE BOX
infectious diseases progress note:    Patient is a 72y old  Male who presents with a chief complaint of Fall (09 Jan 2018 00:52)        Atrial fibrillation             Allergies    No Known Allergies    Intolerances        ANTIBIOTICS/RELEVANT:  antimicrobials  cefTRIAXone   IVPB 2 Gram(s) IV Intermittent every 24 hours  cefTRIAXone   IVPB        immunologic:    OTHER:  acetaminophen    Suspension. 650 milliGRAM(s) Oral every 6 hours PRN  amiodarone    Tablet 200 milliGRAM(s) Oral daily  aspirin 325 milliGRAM(s) Oral daily  atorvastatin 80 milliGRAM(s) Oral at bedtime  finasteride 5 milliGRAM(s) Oral daily  folic acid 1 milliGRAM(s) Oral daily  insulin lispro (HumaLOG) corrective regimen sliding scale   SubCutaneous Before meals and at bedtime  levothyroxine 150 MICROGram(s) Oral <User Schedule>  metoprolol     tartrate 25 milliGRAM(s) Oral every 12 hours  pantoprazole   Suspension 40 milliGRAM(s) Oral daily  tamsulosin 0.4 milliGRAM(s) Oral at bedtime  thiamine 100 milliGRAM(s) Oral daily      Objective:  Vital Signs Last 24 Hrs  T(C): 36.8 (26 Feb 2018 20:12), Max: 36.8 (26 Feb 2018 12:06)  T(F): 98.3 (26 Feb 2018 20:12), Max: 98.3 (26 Feb 2018 12:06)  HR: 74 (26 Feb 2018 20:12) (74 - 88)  BP: 126/63 (26 Feb 2018 20:12) (111/54 - 126/71)  BP(mean): --  RR: 18 (26 Feb 2018 20:12) (18 - 18)  SpO2: 95% (26 Feb 2018 20:12) (95% - 99%)    PHYSICAL EXAM:  Constitutional:Well-developed, well nourished--no acute distress  Eyes:MYLA, EOMI  Ear/Nose/Throat: no oral lesion, no sinus tenderness on percussion	  Neck:no JVD, no lymphadenopathy, supple  Respiratory: CTA matt  Cardiovascular: S1S2 RRR, no murmurs  Gastrointestinal:soft, (+) BS, no HSM  Extremities:no e/e/c        LABS:                        9.8    10.3  )-----------( 386      ( 27 Feb 2018 05:47 )             29.0     02-27    136  |  104  |  21  ----------------------------<  83  4.2   |  21<L>  |  1.32<H>    Ca    8.1<L>      27 Feb 2018 05:47    TPro  6.3  /  Alb  2.2<L>  /  TBili  0.6  /  DBili  x   /  AST  53<H>  /  ALT  61<H>  /  AlkPhos  198<H>  02-26            MICROBIOLOGY:    RECENT CULTURES:        RESPIRATORY CULTURES:              RADIOLOGY & ADDITIONAL STUDIES:        Pager [phone]  After 5 pm/weekends or if no response :[phone]

## 2018-02-27 NOTE — SWALLOW VFSS/MBS ASSESSMENT ADULT - ORAL PHASE COMMENTS
trace-mild oral residue, greatest with thick puree +mild spillover to the level of the valleculae of piecemealed bolus delayed oral transit time with hard solids, +mild post swallow oral residue with hard solids

## 2018-02-27 NOTE — SWALLOW VFSS/MBS ASSESSMENT ADULT - ORAL PHASE
trace-mild spillover to the level of the valleculae trace-mild spillover to the level of the pyriform sinuses

## 2018-02-27 NOTE — PROGRESS NOTE ADULT - SUBJECTIVE AND OBJECTIVE BOX
Subjective  Hello OOB in chair no acute distress    VITAL SIGNS    Telemetry:  NSR 70-90    Vital Signs Last 24 Hrs  T(C): 36.7 (18 @ 08:00), Max: 36.8 (18 @ 12:06)  T(F): 98 (18 @ 08:00), Max: 98.3 (18 @ 12:06)  HR: 79 (18 @ 08:00) (74 - 88)  BP: 105/58 (18 @ 08:00) (105/58 - 126/71)  RR: 18 (18 @ 08:00) (18 - 18)  SpO2: 94% (18 @ 08:00) (94% - 99%)            @ 07:01  -   @ 07:00  --------------------------------------------------------  IN: 790 mL / OUT: 4080 mL / NET: -3290 mL     @ 07:01  -   @ 10:44  --------------------------------------------------------  IN: 0 mL / OUT: 0 mL / NET: 0 mL    Daily Weight in k (2018 18:00)    POCT Blood Glucose.: 83 mg/dL (2018 07:10)  POCT Blood Glucose.: 95 mg/dL (2018 01:14)  POCT Blood Glucose.: 82 mg/dL (2018 15:58)  POCT Blood Glucose.: 91 mg/dL (2018 11:37)          Drains:   L Pleural  [  x]  Drainage:  120/600       Pacing Wires      Isolated  [ x]     PHYSICAL EXAM  Neurology: alert and oriented x 3, nonfocal, no gross deficits  CV :S1 S2 RRR  Sternal Wound :  TY  hematoma no drainage  Stable  Lungs: b/l breath sounds left pigtail sero sanguinous drainage   Abdomen: soft, nontender, nondistended, positive bowel sounds, last bowel movement   :   milan          Extremities:   B/L LE warm+ Dp no calf tendreness   LUE PICC not functioning  no return unable to flush       Physical Therapy Rec:   Home  [  ]   Home w/ PT  [  ]  Rehab  [  x]    Discussed with Cardiothoracic Team at AM rounds. Subjective  Hello OOB in chair no acute distress    VITAL SIGNS    Telemetry:  NSR 70-90    Vital Signs Last 24 Hrs  T(C): 36.7 (18 @ 08:00), Max: 36.8 (18 @ 12:06)  T(F): 98 (18 @ 08:00), Max: 98.3 (18 @ 12:06)  HR: 79 (18 @ 08:00) (74 - 88)  BP: 105/58 (18 @ 08:00) (105/58 - 126/71)  RR: 18 (18 @ 08:00) (18 - 18)  SpO2: 94% (18 @ 08:00) (94% - 99%)            @ 07:01  -   @ 07:00  --------------------------------------------------------  IN: 790 mL / OUT: 4080 mL / NET: -3290 mL     @ 07:01  -   @ 10:44  --------------------------------------------------------  IN: 0 mL / OUT: 0 mL / NET: 0 mL    Daily Weight in k (2018 18:00)    POCT Blood Glucose.: 83 mg/dL (2018 07:10)  POCT Blood Glucose.: 95 mg/dL (2018 01:14)  POCT Blood Glucose.: 82 mg/dL (2018 15:58)  POCT Blood Glucose.: 91 mg/dL (2018 11:37)          Drains:   L Pleural  [  x]  Drainage:  120/600       Pacing Wires      Isolated  [ x]     PHYSICAL EXAM  Neurology: alert and oriented x 3, nonfocal, no gross deficits  CV :S1 S2 RRR  Sternal Wound :  TY  hematoma no drainage  Stable  Lungs: b/l breath sounds left pigtail sero sanguinous drainage   Abdomen: soft, nontender, nondistended, positive bowel sounds, IAD of sacrum  :   milan          Extremities:   B/L LE warm+ Dp no calf tendreness   LUE PICC not functioning  no return unable to flush       Physical Therapy Rec:   Home  [  ]   Home w/ PT  [  ]  Rehab  [  x]    Discussed with Cardiothoracic Team at AM rounds.

## 2018-02-27 NOTE — PROGRESS NOTE ADULT - PROBLEM SELECTOR PLAN 1
Continue current meds, Ceftriaxone until 3/1  Increase activity as tolerated   Encourage cough and deep breathing  Anticipate discharge to rehab

## 2018-02-27 NOTE — SWALLOW VFSS/MBS ASSESSMENT ADULT - DIAGNOSTIC IMPRESSIONS
Patient presents with improved swallowing function on this date. Oropharyngeal dysphagia is characterized by mildly impaired oral management greatest with hard solids, premature spillover to the level of the hypopharynx, moderate post swallow retention cleared via cued spontaneous swallow or liquid wash, silent laryngeal penetration with nectar thickened liquids with retrieval, silent laryngeal penetration with thin liquids without complete retrieval, and aspiration of thin liquids in a chin tuck posture without retrieval. Volume and rate reduction via small, single cup sips improves airway protection with thin liquids. Patient presents with improved swallowing function on this date. Oropharyngeal dysphagia is characterized by mildly impaired oral management greatest with hard solids, premature spillover, silent laryngeal penetration with nectar thickened liquids with retrieval, silent laryngeal penetration with thin liquids with episode of incomplete retrieval, and aspiration of thin liquids in a chin tuck posture without retrieval. Volume and rate reduction via small, single cup sips improves airway protection with thin liquids.    Disorders include: reduced lingual strength/ROM/Rate of motion, mildly reduced BOT to posterior pharyngeal wall contact, delay in trigger of the swallow reflex, reduced hyo-laryngeal excursion, reduced laryngeal closure, mildly reduced pharyngeal contractility, and s/s of reduced supraglottic sensation.

## 2018-02-27 NOTE — PROGRESS NOTE ADULT - ASSESSMENT
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  1/20/18 AVR (T)/C1L   Pod 1 Pressors inotropes,  R PL effusion s/p pigtail placement with 1.6L removed.  POD 2: Cardiogenic shock, RACHEAL-Reintubated.  IABP placed lasix infusion, hd catheter placed,  +inotropes. On antibiotics ceftriaxone and GENT for gram + cocci endocarditis, reintubation  enteral feeds  Vascular consulted for pseudoaneurysm  2/7 Seen by ENT,+ R VC paresis medially with glottic gap, pooling of secretions and b/l posterior intubation granulomas  2/8 Hematology consulted for prolonged aPTT of unclear etiology.   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  Feb 13 repeat thrombin injection for L Fem PSA  Feb 14 RTOR sternal wound debridement, removal of VAC, washout and closure  2/15 Per Vasc Sx pt is now s/p L pseudoanuerysm thrombin injection x3 with remaining pseudoaneursym injected with thrombin two days prior with U/S resolution. Pt underwent repeat arterial duplex that showed resolution of pseudoaneurysm.   2/16 Dysphagia 1 THIN STRAINED PUREE ONLY, with Honey thickened liquids. All Liquids via teaspoon only.   Feb 16 R pigtail for pleural effusion  2/19/18 Milan reinserted for retention , URO consulted, eventual TOV  2/22/18 - VSS - Kaofeed in place - will contact S&S re: repeat FEEST   decrease free water boluses to 250 via kaofeed q6h   2/23 Dental consulted for OR cx + GroupB strep, D/c pigtail, Calorie count in progress. MBS Monday /Tues 2/24: Anemic requiring 1 unit PRBC. Milan light hematuria. Per Dental White plaques on tongue to be biopsied as outpatient-no signs of infection  2/25: c/o sore throat appreciate ENT input  2/26 Left pleural effusion --> s/p Left pigtail cath placed; TOV --> Bladder scan >900 cc; Milan cath reinserted with 1.9 L of urine.  Plan to repeat MBS in AM   2/27 E1nwdda drained 110/730 Milan in place - seen by Urology Dr Goldberg will maintain milan  in  rehab MBS this am. PICC not patent IV team aware  per ID d/c ceftriaxone 3/1

## 2018-02-27 NOTE — SWALLOW VFSS/MBS ASSESSMENT ADULT - ROSENBEK'S PENETRATION ASPIRATION SCALE
(1) no aspiration, contrast does not enter airway (2) contrast enters airway, remains above the vocal cords, no residue remains (penetration) (7) contrast passes glottis, visible subglottic residue remains despite patient’s response (aspiration)/reduced to 2 via small, single cup sips

## 2018-02-28 DIAGNOSIS — N40.1 BENIGN PROSTATIC HYPERPLASIA WITH LOWER URINARY TRACT SYMPTOMS: ICD-10-CM

## 2018-02-28 LAB
ANION GAP SERPL CALC-SCNC: 8 MMOL/L — SIGNIFICANT CHANGE UP (ref 5–17)
APPEARANCE UR: ABNORMAL
BACTERIA # UR AUTO: ABNORMAL /HPF
BILIRUB UR-MCNC: NEGATIVE — SIGNIFICANT CHANGE UP
BUN SERPL-MCNC: 19 MG/DL — SIGNIFICANT CHANGE UP (ref 7–23)
CALCIUM SERPL-MCNC: 8 MG/DL — LOW (ref 8.4–10.5)
CHLORIDE SERPL-SCNC: 102 MMOL/L — SIGNIFICANT CHANGE UP (ref 96–108)
CO2 SERPL-SCNC: 25 MMOL/L — SIGNIFICANT CHANGE UP (ref 22–31)
COLOR SPEC: YELLOW — SIGNIFICANT CHANGE UP
COMMENT - URINE: SIGNIFICANT CHANGE UP
CREAT SERPL-MCNC: 1.12 MG/DL — SIGNIFICANT CHANGE UP (ref 0.5–1.3)
CULTURE RESULTS: SIGNIFICANT CHANGE UP
DIFF PNL FLD: ABNORMAL
EPI CELLS # UR: SIGNIFICANT CHANGE UP /HPF
GLUCOSE BLDC GLUCOMTR-MCNC: 105 MG/DL — HIGH (ref 70–99)
GLUCOSE BLDC GLUCOMTR-MCNC: 116 MG/DL — HIGH (ref 70–99)
GLUCOSE BLDC GLUCOMTR-MCNC: 121 MG/DL — HIGH (ref 70–99)
GLUCOSE BLDC GLUCOMTR-MCNC: 82 MG/DL — SIGNIFICANT CHANGE UP (ref 70–99)
GLUCOSE SERPL-MCNC: 85 MG/DL — SIGNIFICANT CHANGE UP (ref 70–99)
GLUCOSE UR QL: ABNORMAL
HCT VFR BLD CALC: 29.4 % — LOW (ref 39–50)
HGB BLD-MCNC: 9.9 G/DL — LOW (ref 13–17)
KETONES UR-MCNC: NEGATIVE — SIGNIFICANT CHANGE UP
LEUKOCYTE ESTERASE UR-ACNC: ABNORMAL
MCHC RBC-ENTMCNC: 31.9 PG — SIGNIFICANT CHANGE UP (ref 27–34)
MCHC RBC-ENTMCNC: 33.8 GM/DL — SIGNIFICANT CHANGE UP (ref 32–36)
MCV RBC AUTO: 94.3 FL — SIGNIFICANT CHANGE UP (ref 80–100)
NITRITE UR-MCNC: NEGATIVE — SIGNIFICANT CHANGE UP
PH UR: 6 — SIGNIFICANT CHANGE UP (ref 5–8)
PLATELET # BLD AUTO: 415 K/UL — HIGH (ref 150–400)
POTASSIUM SERPL-MCNC: 3.8 MMOL/L — SIGNIFICANT CHANGE UP (ref 3.5–5.3)
POTASSIUM SERPL-SCNC: 3.8 MMOL/L — SIGNIFICANT CHANGE UP (ref 3.5–5.3)
PROT UR-MCNC: 100 MG/DL
RBC # BLD: 3.11 M/UL — LOW (ref 4.2–5.8)
RBC # FLD: 15.7 % — HIGH (ref 10.3–14.5)
RBC CASTS # UR COMP ASSIST: ABNORMAL /HPF (ref 0–2)
SODIUM SERPL-SCNC: 135 MMOL/L — SIGNIFICANT CHANGE UP (ref 135–145)
SP GR SPEC: 1.02 — SIGNIFICANT CHANGE UP (ref 1.01–1.02)
SPECIMEN SOURCE: SIGNIFICANT CHANGE UP
UROBILINOGEN FLD QL: NEGATIVE — SIGNIFICANT CHANGE UP
WBC # BLD: 12.6 K/UL — HIGH (ref 3.8–10.5)
WBC # FLD AUTO: 12.6 K/UL — HIGH (ref 3.8–10.5)
WBC UR QL: >50 /HPF (ref 0–5)

## 2018-02-28 PROCEDURE — 71045 X-RAY EXAM CHEST 1 VIEW: CPT | Mod: 26

## 2018-02-28 RX ORDER — LIDOCAINE 4 G/100G
1 CREAM TOPICAL EVERY 12 HOURS
Qty: 0 | Refills: 0 | Status: DISCONTINUED | OUTPATIENT
Start: 2018-02-28 | End: 2018-03-05

## 2018-02-28 RX ADMIN — FINASTERIDE 5 MILLIGRAM(S): 5 TABLET, FILM COATED ORAL at 13:51

## 2018-02-28 RX ADMIN — PANTOPRAZOLE SODIUM 40 MILLIGRAM(S): 20 TABLET, DELAYED RELEASE ORAL at 10:18

## 2018-02-28 RX ADMIN — Medication 25 MILLIGRAM(S): at 05:41

## 2018-02-28 RX ADMIN — Medication 25 MILLIGRAM(S): at 17:22

## 2018-02-28 RX ADMIN — Medication 650 MILLIGRAM(S): at 05:41

## 2018-02-28 RX ADMIN — CEFTRIAXONE 100 GRAM(S): 500 INJECTION, POWDER, FOR SOLUTION INTRAMUSCULAR; INTRAVENOUS at 10:19

## 2018-02-28 RX ADMIN — Medication 2: at 17:22

## 2018-02-28 RX ADMIN — Medication 650 MILLIGRAM(S): at 06:10

## 2018-02-28 RX ADMIN — Medication 100 MILLIGRAM(S): at 13:51

## 2018-02-28 RX ADMIN — TAMSULOSIN HYDROCHLORIDE 0.4 MILLIGRAM(S): 0.4 CAPSULE ORAL at 21:49

## 2018-02-28 RX ADMIN — ATORVASTATIN CALCIUM 80 MILLIGRAM(S): 80 TABLET, FILM COATED ORAL at 21:49

## 2018-02-28 RX ADMIN — Medication 325 MILLIGRAM(S): at 13:51

## 2018-02-28 RX ADMIN — Medication 1 MILLIGRAM(S): at 13:51

## 2018-02-28 RX ADMIN — AMIODARONE HYDROCHLORIDE 200 MILLIGRAM(S): 400 TABLET ORAL at 05:41

## 2018-02-28 NOTE — PROGRESS NOTE ADULT - SUBJECTIVE AND OBJECTIVE BOX
Subjective hello oob in chair no acute distress    VITAL SIGNS    Telemetry: NSR 60-70     Vital Signs Last 24 Hrs  T(C): 36.7 (02-28-18 @ 05:00), Max: 36.7 (02-27-18 @ 20:21)  T(F): 98 (02-28-18 @ 05:00), Max: 98 (02-27-18 @ 20:21)  HR: 68 (02-28-18 @ 10:32) (68 - 75)  BP: 108/68 (02-28-18 @ 10:32) (108/68 - 126/71)  RR: 18 (02-28-18 @ 05:00) (18 - 18)  SpO2: 98% (02-28-18 @ 05:00) (98% - 98%)           02-27 @ 07:01  -  02-28 @ 07:00  --------------------------------------------------------  IN: 320 mL / OUT: 1970 mL / NET: -1650 mL    02-28 @ 07:01  -  02-28 @ 13:05  --------------------------------------------------------  IN: 240 mL / OUT: 0 mL / NET: 240 mL  daily 71 kg    POCT Blood Glucose.: 105 mg/dL (28 Feb 2018 11:34)  POCT Blood Glucose.: 82 mg/dL (28 Feb 2018 07:10)  POCT Blood Glucose.: 144 mg/dL (27 Feb 2018 21:42)  POCT Blood Glucose.: 109 mg/dL (27 Feb 2018 16:32)          Drains:     MS         [  ] Drainage:                 L Pleural  [x  ]  Drainage:     20/230           R Pleural  [  ]  Drainage:    Pacing Wires        [  ]   Settings:                                  Isolated  [ x ]    PHYSICAL EXAM  Neurology: alert and oriented x 3, nonfocal, no gross deficits  CV :S1 S2 RRR  Sternal Wound :  TY dermabond some drainage sternum stable  Lungs:CTA  left pigtail  Abdomen: soft, nontender, nondistended, positive bowel sounds, last bowel movement 2/28   : milan            Extremities:  B/ll Warm + DP no calftenderness            Physical Therapy Rec:   Home  [  ]   Home w/ PT  [  ]  Rehab  [x ]    Discussed with Cardiothoracic Team at AM rounds. Subjective hello oob in chair no acute distress    VITAL SIGNS    Telemetry: NSR 60-70     Vital Signs Last 24 Hrs  T(C): 36.7 (02-28-18 @ 05:00), Max: 36.7 (02-27-18 @ 20:21)  T(F): 98 (02-28-18 @ 05:00), Max: 98 (02-27-18 @ 20:21)  HR: 68 (02-28-18 @ 10:32) (68 - 75)  BP: 108/68 (02-28-18 @ 10:32) (108/68 - 126/71)  RR: 18 (02-28-18 @ 05:00) (18 - 18)  SpO2: 98% (02-28-18 @ 05:00) (98% - 98%)           02-27 @ 07:01  -  02-28 @ 07:00  --------------------------------------------------------  IN: 320 mL / OUT: 1970 mL / NET: -1650 mL    02-28 @ 07:01  -  02-28 @ 13:05  --------------------------------------------------------  IN: 240 mL / OUT: 0 mL / NET: 240 mL  MEDICATIONS  (STANDING):  amiodarone    Tablet 200 milliGRAM(s) Oral daily  aspirin 325 milliGRAM(s) Oral daily  atorvastatin 80 milliGRAM(s) Oral at bedtime  cefTRIAXone   IVPB 2 Gram(s) IV Intermittent every 24 hours  cefTRIAXone   IVPB      finasteride 5 milliGRAM(s) Oral daily  folic acid 1 milliGRAM(s) Oral daily  insulin lispro (HumaLOG) corrective regimen sliding scale   SubCutaneous Before meals and at bedtime  levothyroxine 150 MICROGram(s) Oral daily  metoprolol     tartrate 25 milliGRAM(s) Oral every 12 hours  pantoprazole    Tablet 40 milliGRAM(s) Oral before breakfast  tamsulosin 0.4 milliGRAM(s) Oral at bedtime  thiamine 100 milliGRAM(s) Oral daily    MEDICATIONS  (PRN):  acetaminophen   Tablet 650 milliGRAM(s) Oral every 6 hours PRN For Temp greater than 38.5 C (101.3 F)  acetaminophen   Tablet. 650 milliGRAM(s) Oral every 6 hours PRN Mild Pain (1 - 3)    daily 71 kg    POCT Blood Glucose.: 105 mg/dL (28 Feb 2018 11:34)  POCT Blood Glucose.: 82 mg/dL (28 Feb 2018 07:10)  POCT Blood Glucose.: 144 mg/dL (27 Feb 2018 21:42)  POCT Blood Glucose.: 109 mg/dL (27 Feb 2018 16:32)          Drains:     MS         [  ] Drainage:                 L Pleural  [x  ]  Drainage:     20/230           R Pleural  [  ]  Drainage:    Pacing Wires        [  ]   Settings:                                  Isolated  [ x ]    PHYSICAL EXAM  Neurology: alert and oriented x 3, nonfocal, no gross deficits  CV :S1 S2 RRR  Sternal Wound :  TY dermabond some drainage sternum stable  Lungs:CTA  left pigtail  Abdomen: soft, nontender, nondistended, positive bowel sounds, last bowel movement 2/28   : milan            Extremities:  B/ll Warm + DP no calftenderness            Physical Therapy Rec:   Home  [  ]   Home w/ PT  [  ]  Rehab  [x ]    Discussed with Cardiothoracic Team at AM rounds.

## 2018-02-28 NOTE — CHART NOTE - NSCHARTNOTEFT_GEN_A_CORE
72 year old male pt with PMH T2DM, DLD, ETOH abuse brought in by family after having been found face down on the floor for approximately 2 days. 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 Now s/p 1/20/18 AVR (T), CABG x1. On Ceftriaxone until 3/1 per ID. Pt s/p FEES on 2/16 with recommendations for thin strained pureed with honey thickened liquids (liquids fed only with teaspoon per SLP recommendations) with enteral feeding via NGT prior for supplemental nutrition. Kcal count 2/23/18-2/26/18 to assess adequacy of po intake to wean off TF (results below), TF d/c throughout kcal count to promote po intake. Pt now s/p MBS yesterday with SLP recommendations for dysphagia 3 soft diet and thin liquids and pt no longer with NGT.    Pt seen today for malnutrition follow-up. Pt seen    Source: Patient [x]    Family [ ]     other [x] electronic medical records    Diet : Mechanical Soft diet with thin liquids    Patient reports [ ] nausea  [ ] vomiting [ ] diarrhea [ ] constipation  [ ]chewing problems [ ] swallowing issues  [ ] other:     PO intake:  < 50% [ ] 50-75% [ ]   % [ ]  other :    Source for PO intake [ ] Patient [ ] family [ ] chart [ ] staff [ ] other    Enteral /Parenteral Nutrition:     Current Weight:     Pertinent Medications: MEDICATIONS  (STANDING):  amiodarone    Tablet 200 milliGRAM(s) Oral daily  aspirin 325 milliGRAM(s) Oral daily  atorvastatin 80 milliGRAM(s) Oral at bedtime  cefTRIAXone   IVPB 2 Gram(s) IV Intermittent every 24 hours  cefTRIAXone   IVPB      finasteride 5 milliGRAM(s) Oral daily  folic acid 1 milliGRAM(s) Oral daily  insulin lispro (HumaLOG) corrective regimen sliding scale   SubCutaneous Before meals and at bedtime  levothyroxine 150 MICROGram(s) Oral daily  metoprolol     tartrate 25 milliGRAM(s) Oral every 12 hours  pantoprazole    Tablet 40 milliGRAM(s) Oral before breakfast  tamsulosin 0.4 milliGRAM(s) Oral at bedtime  thiamine 100 milliGRAM(s) Oral daily    MEDICATIONS  (PRN):  acetaminophen   Tablet 650 milliGRAM(s) Oral every 6 hours PRN For Temp greater than 38.5 C (101.3 F)  acetaminophen   Tablet. 650 milliGRAM(s) Oral every 6 hours PRN Mild Pain (1 - 3)    Pertinent Labs:  02-28 Na135 mmol/L Glu 85 mg/dL K+ 3.8 mmol/L Cr  1.12 mg/dL BUN 19 mg/dL    Skin:     Estimated Needs:   [ ] no change since previous assessment  [ ] recalculated:       Previous Nutrition Diagnosis:     [ ] Inadequate Energy Intake [ ]Inadequate Oral Intake [ ] Excessive Energy Intake     [ ] Underweight [ ] Increased Nutrient Needs [ ] Overweight/Obesity     [ ] Altered GI Function [ ] Unintended Weight Loss [ ] Food & Nutrition Related Knowledge Deficit [ ] Malnutrition          Nutrition Diagnosis is [ ] ongoing  [ ] resolved [ ] not applicable          New Nutrition Diagnosis: [ ] not applicable    [ ] Inadequate Protein Energy Intake [ ]Inadequate Oral Intake [ ] Excessive Energy Intake     [ ] Underweight [ ] Increased Nutrient Needs [ ] Overweight/Obesity     [ ] Altered GI Function [ ] Unintended Weight Loss [ ] Food & Nutrition Related Knowledge Deficit[ ] Limited Adherence to nutrition related recommendations [ ] Malnutrition  [ ] other: Free text       Related to:      As evidenced by:      Interventions:     Recommend    [ ] Change Diet To:    [ ] Nutrition Supplement    [ ] Nutrition Support    [ ] Other:        Monitoring and Evaluation:     [ ] PO intake [ ] Tolerance to diet prescription [ ] weights [ ] follow up per protocol    [ ] other: 72 year old male pt with PMH T2DM, DLD, ETOH abuse brought in by family after having been found face down on the floor for approximately 2 days. 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 Now s/p 1/20/18 AVR (T), CABG x1. On Ceftriaxone until 3/1 per ID. Pt s/p FEES on 2/16 with recommendations for thin strained pureed with honey thickened liquids with enteral feeding via NGT prior for supplemental nutrition. Kcal count done 2/23/18-2/26/18 to assess adequacy of po intake to wean off TF, revealed inadequate po intake however pt disliked the pureed foods. Pt now s/p MBS yesterday with SLP recommendations for dysphagia 3 soft diet and thin liquids and pt no longer with NGT, eating better s/p diet upgrade.    Pt seen today for malnutrition follow-up. Pt seen today endorses improved po intake with upgrade of diet 50-75% meal intake, states he is tolerating the diet well with no swallowing difficulty at present. Pt appears more alert, is amenable to heart-healthy and T2DM diet/management education at this time. Noted on po DM medications PTA, hx of ETOH abuse 6-12 beers daily.    Source: Patient [x]    Family [ ]     other [x] electronic medical records    Diet : Mechanical Soft diet with thin liquids    Patient reports [ ] nausea  [ ] vomiting [ ] diarrhea [ ] constipation  [ ]chewing problems [ ] swallowing issues  [X] other: Pt denies GI distress last BM today noted with fecal incontinence    PO intake:  < 50% [ ] 50-75% [x]   % [x]  other :    Source for PO intake [x] Patient [ ] family [x] chart [ ] staff [ ] other    Enteral /Parenteral Nutrition: [x] n/a    Current Weight: 156.5 pounds (current as of 2/26, bed scale, no edema noted). 9 pound weight decrease from admit wt 166.2 pounds    Pertinent Medications: MEDICATIONS  (STANDING):  amiodarone    Tablet 200 milliGRAM(s) Oral daily  aspirin 325 milliGRAM(s) Oral daily  atorvastatin 80 milliGRAM(s) Oral at bedtime  cefTRIAXone   IVPB 2 Gram(s) IV Intermittent every 24 hours  cefTRIAXone   IVPB      finasteride 5 milliGRAM(s) Oral daily  folic acid 1 milliGRAM(s) Oral daily  insulin lispro (HumaLOG) corrective regimen sliding scale   SubCutaneous Before meals and at bedtime  levothyroxine 150 MICROGram(s) Oral daily  metoprolol     tartrate 25 milliGRAM(s) Oral every 12 hours  pantoprazole    Tablet 40 milliGRAM(s) Oral before breakfast  tamsulosin 0.4 milliGRAM(s) Oral at bedtime  thiamine 100 milliGRAM(s) Oral daily    MEDICATIONS  (PRN):  acetaminophen   Tablet 650 milliGRAM(s) Oral every 6 hours PRN For Temp greater than 38.5 C (101.3 F)  acetaminophen   Tablet. 650 milliGRAM(s) Oral every 6 hours PRN Mild Pain (1 - 3)      Pertinent Labs:  02-28 Na135 mmol/L Glu 85 mg/dL K+ 3.8 mmol/L Cr  1.12 mg/dL BUN 19 mg/dL. Fingersticks: 105, 82, 144, 109, 104. 1/9/18: HGA1c: 9.1%      Skin: No pressure ulcers noted      Estimated Needs:   [x] no change since previous assessment  [ ] recalculated:       Previous Nutrition Diagnosis:     Moderate malnutrition; Increased Nutrient Needs; Food & Nutrition Related Knowledge Deficit    Nutrition diagnosis is:  ongoing, addressed with PO diet, diet education as pt alert/amenable      New Nutrition Diagnosis: [ x ] not applicable      Interventions:     1) Change diet to dysphagia 3 soft with thin liquid diet per SLP recommendations s/p MBS 2/27/18. Pt eating better with upgraded diet and food preferences honored, can hold off with therapeutic diet (consistent CHO/low sodium) at this time to promote optimal po intake, fingersticks well controlled in house.  2) Consider adding probiotic in setting of antibiotic use  3) T2DM and heart healthy diet education provided to pt. Discussed the following: SMBG, goal fingerstick ranges, consistent CHO diet recommendations, review of food/beverage sources of CHO, avoiding sugar-sweetened beverages unless treating hypoglycemia, physical activity as tolerated/per MD discretion, maintaining a healthy body weight, low sodium diet recommendations, review of high sodium food items to limit/avoid, ETOH use per MD discretion and relationship of ETOH consumption with blood glucose and heart health.    Monitoring and Evaluation:     [x] PO intake [x] Tolerance to diet prescription [x] weights [x] follow up per protocol    [x] other: RD remains available: Gabriela Jenkins MS, RDN, CDN, CDE. #[phone].

## 2018-02-28 NOTE — CHART NOTE - NSCHARTNOTEFT_GEN_A_CORE
Hematology    Called about prolonged coags. Due to heparin, normalized on repeat. Will sign off. Page with questions.    Su Rossi  Heme fellow  [phone]

## 2018-02-28 NOTE — PROGRESS NOTE ADULT - SUBJECTIVE AND OBJECTIVE BOX
Subjective  no new complaints with milan    Objective  urine clear    Vital signs  T(F): , Max: 98.1 (02-27-18 @ 12:00)  HR: 75 (02-28-18 @ 05:00)  BP: 126/71 (02-28-18 @ 05:00)  SpO2: 98% (02-28-18 @ 05:00)  Wt(kg): --    Output     02-27 @ 07:01  -  02-28 @ 07:00  --------------------------------------------------------  IN: 320 mL / OUT: 1970 mL / NET: -1650 mL        Gen  awake and alert nad  Abd soft no cvat      Labs      02-28 @ 05:54    WBC 12.6  / Hct 29.4  / SCr 1.12     02-27 @ 05:47    WBC 10.3  / Hct 29.0  / SCr 1.32

## 2018-02-28 NOTE — PROGRESS NOTE ADULT - ASSESSMENT
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  1/20/18 AVR (T)/C1L   Pod 1 Pressors inotropes,  R PL effusion s/p pigtail placement with 1.6L removed.  POD 2: Cardiogenic shock, RACHEAL-Reintubated.  IABP placed lasix infusion, hd catheter placed,  +inotropes. On antibiotics ceftriaxone and GENT for gram + cocci endocarditis, reintubation  enteral feeds  Vascular consulted for pseudoaneurysm  2/7 Seen by ENT,+ R VC paresis medially with glottic gap, pooling of secretions and b/l posterior intubation granulomas  2/8 Hematology consulted for prolonged aPTT of unclear etiology.   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  Feb 13 repeat thrombin injection for L Fem PSA  Feb 14 RTOR sternal wound debridement, removal of VAC, washout and closure  2/15 Per Vasc Sx pt is now s/p L pseudoanuerysm thrombin injection x3 with remaining pseudoaneursym injected with thrombin two days prior with U/S resolution. Pt underwent repeat arterial duplex that showed resolution of pseudoaneurysm.   2/16 Dysphagia 1 THIN STRAINED PUREE ONLY, with Honey thickened liquids. All Liquids via teaspoon only.   Feb 16 R pigtail for pleural effusion  2/19/18 [place] reinserted for retention , URO consulted, eventual TOV  2/22/18 - VSS - Kaofeed in place - will contact S&S re: repeat FEEST   decrease free water boluses to 250 via kaofeed q6h   2/23 Dental consulted for OR cx + GroupB strep, D/c pigtail, Calorie count in progress. MBS Monday /Tues 2/24: Anemic requiring 1 unit PRBC. [place] light hematuria. Per Dental White plaques on tongue to be biopsied as outpatient-no signs of infection  2/25: c/o sore throat appreciate ENT input  2/26 Left pleural effusion --> s/p Left pigtail cath placed; TOV --> Bladder scan >900 cc; [place] cath reinserted with 1.9 L of urine.  Plan to repeat MBS in AM   2/27 I3blauo drained 110/730 [place] in place - seen by Urology Dr Goldberg will maintain milan  in  rehab MBS this am. 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  1/20/18 AVR (T)/C1L   Pod 1 Pressors inotropes,  R PL effusion s/p pigtail placement with 1.6L removed.  POD 2: Cardiogenic shock, RACHEAL-Reintubated.  IABP placed lasix infusion, hd catheter placed,  +inotropes. On antibiotics ceftriaxone and GENT for gram + cocci endocarditis, reintubation  enteral feeds  Vascular consulted for pseudoaneurysm  2/7 Seen by ENT,+ R VC paresis medially with glottic gap, pooling of secretions and b/l posterior intubation granulomas  2/8 Hematology consulted for prolonged aPTT of unclear etiology.   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  Feb 13 repeat thrombin injection for L Fem PSA  Feb 14 RTOR sternal wound debridement, removal of VAC, washout and closure  2/15 Per Vasc Sx pt is now s/p L pseudoanuerysm thrombin injection x3 with remaining pseudoaneursym injected with thrombin two days prior with U/S resolution. Pt underwent repeat arterial duplex that showed resolution of pseudoaneurysm.   2/16 Dysphagia 1 THIN STRAINED PUREE ONLY, with Honey thickened liquids. All Liquids via teaspoon only.   Feb 16 R pigtail for pleural effusion  2/19/18 [place] reinserted for retention , URO consulted, eventual TOV  2/22/18 - VSS - Kaofeed in place - will contact S&S re: repeat FEEST   decrease free water boluses to 250 via kaofeed q6h   2/23 Dental consulted for OR cx + GroupB strep, D/c pigtail, Calorie count in progress. MBS Monday /Tues 2/24: Anemic requiring 1 unit PRBC. [place] light hematuria. Per Dental White plaques on tongue to be biopsied as outpatient-no signs of infection  2/25: c/o sore throat appreciate ENT input  2/26 Left pleural effusion --> s/p Left pigtail cath placed; TOV --> Bladder scan >900 cc; [place] cath reinserted with 1.9 L of urine.  Plan to repeat MBS in AM   2/27 H5iljjr drained 110/730 [place] in place - seen by Urology Dr Goldberg will maintain milan  in  rehab MBS this am. 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  1/20/18 AVR (T)/C1L   Pod 1 Pressors inotropes,  R PL effusion s/p pigtail placement with 1.6L removed.  POD 2: Cardiogenic shock, RACHEAL-Reintubated.  IABP placed lasix infusion, hd catheter placed,  +inotropes. On antibiotics ceftriaxone and GENT for gram + cocci endocarditis, reintubation  enteral feeds  Vascular consulted for pseudoaneurysm  2/7 Seen by ENT,+ R VC paresis medially with glottic gap, pooling of secretions and b/l posterior intubation granulomas  2/8 Hematology consulted for prolonged aPTT of unclear etiology.   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  Feb 13 repeat thrombin injection for L Fem PSA  Feb 14 RTOR sternal wound debridement, removal of VAC, washout and closure  2/15 Per Vasc Sx pt is now s/p L pseudoanuerysm thrombin injection x3 with remaining pseudoaneursym injected with thrombin two days prior with U/S resolution. Pt underwent repeat arterial duplex that showed resolution of pseudoaneurysm.   2/16 Dysphagia 1 THIN STRAINED PUREE ONLY, with Honey thickened liquids. All Liquids via teaspoon only.   Feb 16 R pigtail for pleural effusion  2/19/18 [place] reinserted for retention , URO consulted, eventual TOV  2/22/18 - VSS - Kaofeed in place - will contact S&S re: repeat FEEST   decrease free water boluses to 250 via kaofeed q6h   2/23 Dental consulted for OR cx + GroupB strep, D/c pigtail, Calorie count in progress. MBS Monday /Tues 2/24: Anemic requiring 1 unit PRBC. [place] light hematuria. Per Dental White plaques on tongue to be biopsied as outpatient-no signs of infection  2/25: c/o sore throat appreciate ENT input  2/26 Left pleural effusion --> s/p Left pigtail cath placed; TOV --> Bladder scan >900 cc; [place] cath reinserted with 1.9 L of urine.  Plan to repeat MBS in AM   2/27 B3wqrks drained 110/730 [place] in place - seen by Urology Dr Goldberg will maintain milan  in  rehab MBS this am. PICC not patent IV team aware  per ID d/c ceftriaxone 3/1  2/28 Pigtail D/C pacing wires d/c  shower today  dyshagia 3 thin liquid diet d/w social work -[id number] placement last dose abx 3/1 d/c PICC after last dose abx   addendum OK for discharge in am after dialysis d/w Dr Bocanegra  d/w Dr Olmos to place avf outpt

## 2018-02-28 NOTE — PROGRESS NOTE ADULT - PROBLEM SELECTOR PLAN 1
Continue current meds, Ceftriaxone until 3/1  Increase activity as tolerated   Encourage cough and deep breathing  Anticipate discharge to rehab  Left pigtail d/c 2/28  pacing wires d/c 2/28

## 2018-03-01 LAB
ANION GAP SERPL CALC-SCNC: 11 MMOL/L — SIGNIFICANT CHANGE UP (ref 5–17)
APTT BLD: 32.5 SEC — SIGNIFICANT CHANGE UP (ref 27.5–37.4)
BUN SERPL-MCNC: 20 MG/DL — SIGNIFICANT CHANGE UP (ref 7–23)
CALCIUM SERPL-MCNC: 7.9 MG/DL — LOW (ref 8.4–10.5)
CHLORIDE SERPL-SCNC: 103 MMOL/L — SIGNIFICANT CHANGE UP (ref 96–108)
CO2 SERPL-SCNC: 23 MMOL/L — SIGNIFICANT CHANGE UP (ref 22–31)
CREAT SERPL-MCNC: 1.21 MG/DL — SIGNIFICANT CHANGE UP (ref 0.5–1.3)
GLUCOSE BLDC GLUCOMTR-MCNC: 107 MG/DL — HIGH (ref 70–99)
GLUCOSE BLDC GLUCOMTR-MCNC: 115 MG/DL — HIGH (ref 70–99)
GLUCOSE BLDC GLUCOMTR-MCNC: 119 MG/DL — HIGH (ref 70–99)
GLUCOSE BLDC GLUCOMTR-MCNC: 124 MG/DL — HIGH (ref 70–99)
GLUCOSE SERPL-MCNC: 108 MG/DL — HIGH (ref 70–99)
HCT VFR BLD CALC: 27.1 % — LOW (ref 39–50)
HGB BLD-MCNC: 9.2 G/DL — LOW (ref 13–17)
INR BLD: 1.04 RATIO — SIGNIFICANT CHANGE UP (ref 0.88–1.16)
MCHC RBC-ENTMCNC: 32.2 PG — SIGNIFICANT CHANGE UP (ref 27–34)
MCHC RBC-ENTMCNC: 34.1 GM/DL — SIGNIFICANT CHANGE UP (ref 32–36)
MCV RBC AUTO: 94.4 FL — SIGNIFICANT CHANGE UP (ref 80–100)
PLATELET # BLD AUTO: 383 K/UL — SIGNIFICANT CHANGE UP (ref 150–400)
POTASSIUM SERPL-MCNC: 3.6 MMOL/L — SIGNIFICANT CHANGE UP (ref 3.5–5.3)
POTASSIUM SERPL-SCNC: 3.6 MMOL/L — SIGNIFICANT CHANGE UP (ref 3.5–5.3)
PROTHROM AB SERPL-ACNC: 11.4 SEC — SIGNIFICANT CHANGE UP (ref 9.8–12.7)
RBC # BLD: 2.87 M/UL — LOW (ref 4.2–5.8)
RBC # FLD: 15.6 % — HIGH (ref 10.3–14.5)
SODIUM SERPL-SCNC: 137 MMOL/L — SIGNIFICANT CHANGE UP (ref 135–145)
WBC # BLD: 12 K/UL — HIGH (ref 3.8–10.5)
WBC # FLD AUTO: 12 K/UL — HIGH (ref 3.8–10.5)

## 2018-03-01 RX ADMIN — Medication 325 MILLIGRAM(S): at 11:21

## 2018-03-01 RX ADMIN — AMIODARONE HYDROCHLORIDE 200 MILLIGRAM(S): 400 TABLET ORAL at 06:09

## 2018-03-01 RX ADMIN — PANTOPRAZOLE SODIUM 40 MILLIGRAM(S): 20 TABLET, DELAYED RELEASE ORAL at 06:09

## 2018-03-01 RX ADMIN — CEFTRIAXONE 100 GRAM(S): 500 INJECTION, POWDER, FOR SOLUTION INTRAMUSCULAR; INTRAVENOUS at 09:50

## 2018-03-01 RX ADMIN — TAMSULOSIN HYDROCHLORIDE 0.4 MILLIGRAM(S): 0.4 CAPSULE ORAL at 21:17

## 2018-03-01 RX ADMIN — Medication 150 MICROGRAM(S): at 06:09

## 2018-03-01 RX ADMIN — LIDOCAINE 1 APPLICATION(S): 4 CREAM TOPICAL at 17:10

## 2018-03-01 RX ADMIN — Medication 650 MILLIGRAM(S): at 06:11

## 2018-03-01 RX ADMIN — LIDOCAINE 1 APPLICATION(S): 4 CREAM TOPICAL at 06:09

## 2018-03-01 RX ADMIN — Medication 25 MILLIGRAM(S): at 06:09

## 2018-03-01 RX ADMIN — ATORVASTATIN CALCIUM 80 MILLIGRAM(S): 80 TABLET, FILM COATED ORAL at 21:17

## 2018-03-01 RX ADMIN — FINASTERIDE 5 MILLIGRAM(S): 5 TABLET, FILM COATED ORAL at 11:21

## 2018-03-01 RX ADMIN — Medication 100 MILLIGRAM(S): at 11:21

## 2018-03-01 RX ADMIN — Medication 25 MILLIGRAM(S): at 17:09

## 2018-03-01 RX ADMIN — Medication 1 MILLIGRAM(S): at 11:21

## 2018-03-01 NOTE — PROGRESS NOTE ADULT - SUBJECTIVE AND OBJECTIVE BOX
S:  feels well.  Tolerating diet.      Telemetry:  SR 70-80     Vital Signs Last 24 Hrs  T(C): 36.6 (18 @ 13:36), Max: 36.9 (18 @ 20:15)  T(F): 97.9 (18 @ 13:36), Max: 98.4 (18 @ 20:15)  HR: 80 (18 @ 13:36) (75 - 80)  BP: 117/68 (18 @ 13:36) (102/63 - 125/63)  RR: 18 (18 @ 13:36) (18 - 18)  SpO2: 99% (18 @ 13:36) (98% - 100%)                    @ 07:01  -   @ 07:00  --------------------------------------------------------  IN: 640 mL / OUT: 1325 mL / NET: -685 mL     @ 07:01  -   @ 13:58  --------------------------------------------------------  IN: 425 mL / OUT: 650 mL / NET: -225 mL        Daily Weight in k.3 (01 Mar 2018 10:05)            POCT Blood Glucose.: 119 mg/dL (01 Mar 2018 11:57)  POCT Blood Glucose.: 107 mg/dL (01 Mar 2018 07:26)  POCT Blood Glucose.: 116 mg/dL (2018 21:43)  POCT Blood Glucose.: 121 mg/dL (2018 16:57)          Drains:     MS         [  ] Drainage:                 L Pleural  [  ]  Drainage:                R Pleural  [  ]  Drainage:    Pacing Wires        [  ]   Settings:                                  Isolated  [  ]    Coumadin    [ ] YES          [ x ]      NO         Reason:                                 9.2    12.0  )-----------( 383      ( 01 Mar 2018 04:44 )             27.1       03-    137  |  103  |  20  ----------------------------<  108<H>  3.6   |  23  |  1.21    Ca    7.9<L>      01 Mar 2018 04:44        PT/INR - ( 01 Mar 2018 04:44 )   PT: 11.4 sec;   INR: 1.04 ratio         PTT - ( 01 Mar 2018 04:44 )  PTT:32.5 sec    PHYSICAL EXAM:    Neurology: alert and oriented x 3, nonfocal, no gross deficits    CV :  S1 S2 heard RRR    Sternal Wound :  mid sternal swelling --hematoma?  old blood oozing thru midsternal incision    Lungs:  clear to ausc.    Abdomen: soft, nontender, nondistended, positive bowel sounds, last bowel movement     : milan in place              Extremities:   no edema            acetaminophen   Tablet 650 milliGRAM(s) Oral every 6 hours PRN  acetaminophen   Tablet. 650 milliGRAM(s) Oral every 6 hours PRN  amiodarone    Tablet 200 milliGRAM(s) Oral daily  aspirin 325 milliGRAM(s) Oral daily  atorvastatin 80 milliGRAM(s) Oral at bedtime  cefTRIAXone   IVPB 2 Gram(s) IV Intermittent every 24 hours  cefTRIAXone   IVPB      finasteride 5 milliGRAM(s) Oral daily  folic acid 1 milliGRAM(s) Oral daily  insulin lispro (HumaLOG) corrective regimen sliding scale   SubCutaneous Before meals and at bedtime  levothyroxine 150 MICROGram(s) Oral daily  lidocaine 5% Ointment 1 Application(s) Topical every 12 hours  metoprolol     tartrate 25 milliGRAM(s) Oral every 12 hours  pantoprazole    Tablet 40 milliGRAM(s) Oral before breakfast  tamsulosin 0.4 milliGRAM(s) Oral at bedtime  thiamine 100 milliGRAM(s) Oral daily                              Physical Therapy Rec:   Home  [  ]   Home w/ PT  [  ]  Rehab  [ x ]    Discussed with Cardiothoracic Team at AM rounds.

## 2018-03-01 NOTE — PROGRESS NOTE ADULT - PROBLEM SELECTOR PLAN 1
Continue current meds, Discontinue Ceftriaxone today  Increase activity as tolerated   Encourage cough and deep breathing  Anticipate discharge to rehab  old blood oozing from midt sternal incision Continue current meds, Discontinue Ceftriaxone today  Increase activity as tolerated   Encourage cough and deep breathing  Anticipate discharge to rehab  old blood oozing from mid sternal incision

## 2018-03-02 LAB
ANION GAP SERPL CALC-SCNC: 10 MMOL/L — SIGNIFICANT CHANGE UP (ref 5–17)
APTT BLD: 32.1 SEC — SIGNIFICANT CHANGE UP (ref 27.5–37.4)
BUN SERPL-MCNC: 18 MG/DL — SIGNIFICANT CHANGE UP (ref 7–23)
CALCIUM SERPL-MCNC: 7.8 MG/DL — LOW (ref 8.4–10.5)
CHLORIDE SERPL-SCNC: 104 MMOL/L — SIGNIFICANT CHANGE UP (ref 96–108)
CO2 SERPL-SCNC: 24 MMOL/L — SIGNIFICANT CHANGE UP (ref 22–31)
CREAT SERPL-MCNC: 1.2 MG/DL — SIGNIFICANT CHANGE UP (ref 0.5–1.3)
FACT XII ACT/NOR PPP: 73 % — SIGNIFICANT CHANGE UP (ref 70–145)
GLUCOSE BLDC GLUCOMTR-MCNC: 104 MG/DL — HIGH (ref 70–99)
GLUCOSE BLDC GLUCOMTR-MCNC: 110 MG/DL — HIGH (ref 70–99)
GLUCOSE BLDC GLUCOMTR-MCNC: 114 MG/DL — HIGH (ref 70–99)
GLUCOSE BLDC GLUCOMTR-MCNC: 140 MG/DL — HIGH (ref 70–99)
GLUCOSE BLDC GLUCOMTR-MCNC: 89 MG/DL — SIGNIFICANT CHANGE UP (ref 70–99)
GLUCOSE SERPL-MCNC: 95 MG/DL — SIGNIFICANT CHANGE UP (ref 70–99)
HCT VFR BLD CALC: 28.1 % — LOW (ref 39–50)
HGB BLD-MCNC: 9.3 G/DL — LOW (ref 13–17)
INR BLD: 1.12 RATIO — SIGNIFICANT CHANGE UP (ref 0.88–1.16)
MCHC RBC-ENTMCNC: 31.1 PG — SIGNIFICANT CHANGE UP (ref 27–34)
MCHC RBC-ENTMCNC: 33 GM/DL — SIGNIFICANT CHANGE UP (ref 32–36)
MCV RBC AUTO: 94.1 FL — SIGNIFICANT CHANGE UP (ref 80–100)
PLATELET # BLD AUTO: 399 K/UL — SIGNIFICANT CHANGE UP (ref 150–400)
POTASSIUM SERPL-MCNC: 3.9 MMOL/L — SIGNIFICANT CHANGE UP (ref 3.5–5.3)
POTASSIUM SERPL-SCNC: 3.9 MMOL/L — SIGNIFICANT CHANGE UP (ref 3.5–5.3)
PROTHROM AB SERPL-ACNC: 12.1 SEC — SIGNIFICANT CHANGE UP (ref 9.8–12.7)
RBC # BLD: 2.99 M/UL — LOW (ref 4.2–5.8)
RBC # FLD: 15.1 % — HIGH (ref 10.3–14.5)
SODIUM SERPL-SCNC: 138 MMOL/L — SIGNIFICANT CHANGE UP (ref 135–145)
WBC # BLD: 11.8 K/UL — HIGH (ref 3.8–10.5)
WBC # FLD AUTO: 11.8 K/UL — HIGH (ref 3.8–10.5)

## 2018-03-02 PROCEDURE — 99233 SBSQ HOSP IP/OBS HIGH 50: CPT

## 2018-03-02 RX ORDER — LEVOTHYROXINE SODIUM 125 MCG
1 TABLET ORAL
Qty: 0 | Refills: 0 | COMMUNITY
Start: 2018-03-02

## 2018-03-02 RX ORDER — SOD SULF/SODIUM/NAHCO3/KCL/PEG
240 SOLUTION, RECONSTITUTED, ORAL ORAL
Qty: 0 | Refills: 0 | COMMUNITY

## 2018-03-02 RX ORDER — CEFTRIAXONE 500 MG/1
2 INJECTION, POWDER, FOR SOLUTION INTRAMUSCULAR; INTRAVENOUS
Qty: 0 | Refills: 0 | COMMUNITY

## 2018-03-02 RX ORDER — AMIODARONE HYDROCHLORIDE 400 MG/1
1 TABLET ORAL
Qty: 0 | Refills: 0 | COMMUNITY
Start: 2018-03-02

## 2018-03-02 RX ADMIN — AMIODARONE HYDROCHLORIDE 200 MILLIGRAM(S): 400 TABLET ORAL at 05:57

## 2018-03-02 RX ADMIN — Medication 150 MICROGRAM(S): at 05:57

## 2018-03-02 RX ADMIN — ATORVASTATIN CALCIUM 80 MILLIGRAM(S): 80 TABLET, FILM COATED ORAL at 22:02

## 2018-03-02 RX ADMIN — Medication 25 MILLIGRAM(S): at 05:57

## 2018-03-02 RX ADMIN — Medication 25 MILLIGRAM(S): at 17:05

## 2018-03-02 RX ADMIN — Medication 1 MILLIGRAM(S): at 13:11

## 2018-03-02 RX ADMIN — LIDOCAINE 1 APPLICATION(S): 4 CREAM TOPICAL at 17:05

## 2018-03-02 RX ADMIN — FINASTERIDE 5 MILLIGRAM(S): 5 TABLET, FILM COATED ORAL at 13:11

## 2018-03-02 RX ADMIN — TAMSULOSIN HYDROCHLORIDE 0.4 MILLIGRAM(S): 0.4 CAPSULE ORAL at 22:02

## 2018-03-02 RX ADMIN — Medication 325 MILLIGRAM(S): at 13:11

## 2018-03-02 RX ADMIN — Medication 650 MILLIGRAM(S): at 06:27

## 2018-03-02 RX ADMIN — Medication 650 MILLIGRAM(S): at 05:57

## 2018-03-02 RX ADMIN — Medication 650 MILLIGRAM(S): at 13:40

## 2018-03-02 RX ADMIN — Medication 650 MILLIGRAM(S): at 13:12

## 2018-03-02 RX ADMIN — Medication 100 MILLIGRAM(S): at 13:11

## 2018-03-02 RX ADMIN — PANTOPRAZOLE SODIUM 40 MILLIGRAM(S): 20 TABLET, DELAYED RELEASE ORAL at 05:57

## 2018-03-02 RX ADMIN — Medication 2: at 17:04

## 2018-03-02 NOTE — PROGRESS NOTE ADULT - NSHPATTENDINGPLANDISCUSS_GEN_ALL_CORE
CCU team.
CTS NP
CTU Team/Attending
Medicine Attending
CTS Attending, microbiology supervisor, CTU PA
Medicine Attending
to reach Cardiology Attending call during weekdays [facility] [phone].
to reach Cardiology Attending call during weekdays [facility] [phone].
Medicine NP. To reach Cardiology Attending call during weekdays [facility] [phone].
Medicine NP. To reach Cardiology Attending call during weekdays Spectra [phone].
to reach Cardiology Attending call during weekdays [facility] [phone].
Dr Flowers
Dr Mary and CTICU team
Dr. Hollingsworth and heme/onc team
Dr. Hollingsworth and heme/onc team
[email or web address]
med team
med team, interventional radiology
CTICU
Dr. Longoria and heme/onc team
primary team
CTICU
CTU
CCU
Pr team, house staff
CTICU
CTICU
CTU
primary team and RN at bedside
WALT Cooper
WALT Muñoz
WALT Muñoz
CTS team
CTS team

## 2018-03-02 NOTE — PROGRESS NOTE ADULT - ASSESSMENT
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  1/20/18 AVR (T)/C1L   Pod 1 Pressors inotropes,  R PL effusion s/p pigtail placement with 1.6L removed.  POD 2: Cardiogenic shock, RACHEAL-Reintubated.  IABP placed lasix infusion, hd catheter placed,  +inotropes. On antibiotics ceftriaxone and GENT for gram + cocci endocarditis, reintubation  enteral feeds  Vascular consulted for pseudoaneurysm  2/7 Seen by ENT,+ R VC paresis medially with glottic gap, pooling of secretions and b/l posterior intubation granulomas  2/8 Hematology consulted for prolonged aPTT of unclear etiology.   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  Feb 13 repeat thrombin injection for L Fem PSA  Feb 14 RTOR sternal wound debridement, removal of VAC, washout and closure  2/15 Per Vasc Sx pt is now s/p L pseudoanuerysm thrombin injection x3 with remaining pseudoaneursym injected with thrombin two days prior with U/S resolution. Pt underwent repeat arterial duplex that showed resolution of pseudoaneurysm.   2/16 Dysphagia 1 THIN STRAINED PUREE ONLY, with Honey thickened liquids. All Liquids via teaspoon only.   Feb 16 R pigtail for pleural effusion  2/19/18 [place] reinserted for retention , URO consulted, eventual TOV  2/22/18 - VSS - Kaofeed in place - will contact S&S re: repeat FEEST   decrease free water boluses to 250 via kaofeed q6h   2/23 Dental consulted for OR cx + GroupB strep, D/c pigtail, Calorie count in progress. MBS Monday /Tues 2/24: Anemic requiring 1 unit PRBC. [place] light hematuria. Per Dental White plaques on tongue to be biopsied as outpatient-no signs of infection  2/25: c/o sore throat appreciate ENT input  2/26 Left pleural effusion --> s/p Left pigtail cath placed; TOV --> Bladder scan >900 cc; [place] cath reinserted with 1.9 L of urine.  Plan to repeat MBS in AM   2/27 C5thwae drained 110/730 [place] in place - seen by Urology Dr Goldberg will maintain milan  in  rehab MBS this am. PICC not patent IV team aware  per ID d/c ceftriaxone 3/1  2/28 Pigtail D/C pacing wires d/c  shower today  dyshagia 3 thin liquid diet d/w social work -[id number] placement last dose abx 3/1 d/c PICC after last dose abx   3/1  HD stable.  Ceftriaxone dc after dose today.  Mid sternal incision with Hematoma old blood oozing from incision.  DC PICC line.  Awaiting auth for rehab.  DC with milan  3/2 discharge cancelled secondary to midsternal collection.

## 2018-03-02 NOTE — PROGRESS NOTE ADULT - PROBLEM SELECTOR PLAN 1
Continue current meds  Increase activity as tolerated   Encourage cough and deep breathing  Anticipate discharge to rehab

## 2018-03-02 NOTE — PROGRESS NOTE ADULT - PROBLEM SELECTOR PLAN 3
DC ceftriaxone  maintain PICC line pending CT chest  Off abx for now. Will initiate Vanco if febrile or if WBC rises DC ceftriaxone  maintain PICC line   Off abx for now. Will initiate Vanco if febrile or if WBC rises

## 2018-03-02 NOTE — PROGRESS NOTE ADULT - ASSESSMENT
72 male with T2DM, DLD, alcohol misuse brought in by family after having been found face down on the floor for approximately 2 days. On treatment for UTI, but now JANES with Aortic Valve lesions. Initial suspected culture negative endocarditis, subsequently had valve failure, so had AVR on 1/30/18; PCR confirmed GBS. Completed treatment on 3/1 with Ceftriaxone. Patient had episode of sternal ? discharge, underwent wound exploration on 2/11--cultures negative--thought to be hematoma, noninfected. Patient was doing well and planned for discharge, but then team noticed increasing swelling at mid-sternal area. Now ID called back for new mid-sternal swelling. By exam, is unclear if is infected, but swelling suggestive of either seroma, hematoma, or underlying infection. No systemic signs of infection. Given uncertainties would repeat the CT chest to evaluate quality of collection. Overall, NVE, post operative, ? new wound infection.  - Continue off abx for now  - Low threshold to add vancomycin if signs infection  - Would check CT chest to evaluate swelling--Evaluate hematoma vs abscess vs seroma  - CTS eval to determine if role for exploration (although already had exploration prior in admission)    Baldev Small MD  Pager [phone]  After 5pm and on weekends call [phone]

## 2018-03-02 NOTE — PROGRESS NOTE ADULT - SUBJECTIVE AND OBJECTIVE BOX
VITAL SIGNS    Telemetry:  SR    Vital Signs Last 24 Hrs  T(C): 36.3 (03-02-18 @ 13:15), Max: 36.9 (03-02-18 @ 05:00)  T(F): 97.3 (03-02-18 @ 13:15), Max: 98.5 (03-02-18 @ 05:00)  HR: 78 (03-02-18 @ 13:15) (77 - 81)  BP: 119/70 (03-02-18 @ 13:15) (102/65 - 119/70)  RR: 18 (03-02-18 @ 13:15) (18 - 18)  SpO2: 100% (03-02-18 @ 13:15) (97% - 100%)            03-01 @ 07:01  -  03-02 @ 07:00  --------------------------------------------------------  IN: 475 mL / OUT: 1550 mL / NET: -1075 mL    03-02 @ 07:01  -  03-02 @ 16:29  --------------------------------------------------------  IN: 420 mL / OUT: 450 mL / NET: -30 mL       Daily     Daily   Admit Wt: Drug Dosing Weight  Height (cm): 170.18 (13 Feb 2018 21:46)  Weight (kg): 75.4 (13 Feb 2018 21:46)  BMI (kg/m2): 26 (13 Feb 2018 21:46)  BSA (m2): 1.87 (13 Feb 2018 21:46)      CAPILLARY BLOOD GLUCOSE      POCT Blood Glucose.: 114 mg/dL (02 Mar 2018 11:44)  POCT Blood Glucose.: 89 mg/dL (02 Mar 2018 07:24)  POCT Blood Glucose.: 110 mg/dL (01 Mar 2018 23:59)  POCT Blood Glucose.: 124 mg/dL (01 Mar 2018 21:45)  POCT Blood Glucose.: 115 mg/dL (01 Mar 2018 16:32)          MEDICATIONS  acetaminophen   Tablet 650 milliGRAM(s) Oral every 6 hours PRN  acetaminophen   Tablet. 650 milliGRAM(s) Oral every 6 hours PRN  amiodarone    Tablet 200 milliGRAM(s) Oral daily  aspirin 325 milliGRAM(s) Oral daily  atorvastatin 80 milliGRAM(s) Oral at bedtime  finasteride 5 milliGRAM(s) Oral daily  folic acid 1 milliGRAM(s) Oral daily  insulin lispro (HumaLOG) corrective regimen sliding scale   SubCutaneous Before meals and at bedtime  levothyroxine 150 MICROGram(s) Oral daily  lidocaine 5% Ointment 1 Application(s) Topical every 12 hours  metoprolol     tartrate 25 milliGRAM(s) Oral every 12 hours  pantoprazole    Tablet 40 milliGRAM(s) Oral before breakfast  tamsulosin 0.4 milliGRAM(s) Oral at bedtime  thiamine 100 milliGRAM(s) Oral daily      PHYSICAL EXAM  Subjective:   Neurology: alert and oriented x 3, nonfocal, no gross deficits  CV :  Sternal Wound :  midsternal 2ogg5hk area of fluctuance   wound edges + erythema  Lungs: CTA b/l  Abdomen: soft, NT,ND, (+ )BM  :  milan  Extremities:   -c/c/e    LABS  03-02    138  |  104  |  18  ----------------------------<  95  3.9   |  24  |  1.20    Ca    7.8<L>      02 Mar 2018 06:11                                   9.3    11.8  )-----------( 399      ( 02 Mar 2018 06:11 )             28.1          PT/INR - ( 02 Mar 2018 06:11 )   PT: 12.1 sec;   INR: 1.12 ratio         PTT - ( 02 Mar 2018 06:11 )  PTT:32.1 sec       PAST MEDICAL & SURGICAL HISTORY:  High cholesterol  Diabetes: type 2  No significant past surgical history VITAL SIGNS    Telemetry:  SR    Vital Signs Last 24 Hrs  T(C): 36.3 (03-02-18 @ 13:15), Max: 36.9 (03-02-18 @ 05:00)  T(F): 97.3 (03-02-18 @ 13:15), Max: 98.5 (03-02-18 @ 05:00)  HR: 78 (03-02-18 @ 13:15) (77 - 81)  BP: 119/70 (03-02-18 @ 13:15) (102/65 - 119/70)  RR: 18 (03-02-18 @ 13:15) (18 - 18)  SpO2: 100% (03-02-18 @ 13:15) (97% - 100%)            03-01 @ 07:01  -  03-02 @ 07:00  --------------------------------------------------------  IN: 475 mL / OUT: 1550 mL / NET: -1075 mL    03-02 @ 07:01  -  03-02 @ 16:29  --------------------------------------------------------  IN: 420 mL / OUT: 450 mL / NET: -30 mL       Daily     Daily   Admit Wt: Drug Dosing Weight  Height (cm): 170.18 (13 Feb 2018 21:46)  Weight (kg): 75.4 (13 Feb 2018 21:46)  BMI (kg/m2): 26 (13 Feb 2018 21:46)  BSA (m2): 1.87 (13 Feb 2018 21:46)      CAPILLARY BLOOD GLUCOSE      POCT Blood Glucose.: 114 mg/dL (02 Mar 2018 11:44)  POCT Blood Glucose.: 89 mg/dL (02 Mar 2018 07:24)  POCT Blood Glucose.: 110 mg/dL (01 Mar 2018 23:59)  POCT Blood Glucose.: 124 mg/dL (01 Mar 2018 21:45)  POCT Blood Glucose.: 115 mg/dL (01 Mar 2018 16:32)          MEDICATIONS  acetaminophen   Tablet 650 milliGRAM(s) Oral every 6 hours PRN  acetaminophen   Tablet. 650 milliGRAM(s) Oral every 6 hours PRN  amiodarone    Tablet 200 milliGRAM(s) Oral daily  aspirin 325 milliGRAM(s) Oral daily  atorvastatin 80 milliGRAM(s) Oral at bedtime  finasteride 5 milliGRAM(s) Oral daily  folic acid 1 milliGRAM(s) Oral daily  insulin lispro (HumaLOG) corrective regimen sliding scale   SubCutaneous Before meals and at bedtime  levothyroxine 150 MICROGram(s) Oral daily  lidocaine 5% Ointment 1 Application(s) Topical every 12 hours  metoprolol     tartrate 25 milliGRAM(s) Oral every 12 hours  pantoprazole    Tablet 40 milliGRAM(s) Oral before breakfast  tamsulosin 0.4 milliGRAM(s) Oral at bedtime  thiamine 100 milliGRAM(s) Oral daily      PHYSICAL EXAM  Subjective:   Neurology: alert and oriented x 3, nonfocal, no gross deficits  CV : S1S2  Sternal Wound :  midsternal [id number] area of fluctuance   wound edges + erythema  Lungs: CTA b/l  Abdomen: soft, NT,ND, (+ )BM  :  milan  Extremities:   -c/c/e    LABS  03-02    138  |  104  |  18  ----------------------------<  95  3.9   |  24  |  1.20    Ca    7.8<L>      02 Mar 2018 06:11                                   9.3    11.8  )-----------( 399      ( 02 Mar 2018 06:11 )             28.1          PT/INR - ( 02 Mar 2018 06:11 )   PT: 12.1 sec;   INR: 1.12 ratio         PTT - ( 02 Mar 2018 06:11 )  PTT:32.1 sec       PAST MEDICAL & SURGICAL HISTORY:  High cholesterol  Diabetes: type 2  No significant past surgical history

## 2018-03-02 NOTE — PROGRESS NOTE ADULT - SUBJECTIVE AND OBJECTIVE BOX
CC: F/U for Sternal Swelling    Saw/spoke to patient. No fevers, no chills. Patient well and planned for discharge but then noted to have area of swelling, mild redness at sternal wound site. Discussed with team--not prior seen on physical exam. Patient did not have any fevers, no chills, no other active signs infection. No purulent discharge at site, but some scabbing on lower aspect of sternum. No cough, no SOB, no abd pain, no N/V/D.    Allergies  No Known Allergies    ANTIMICROBIALS:  Off    PE:    Vital Signs Last 24 Hrs  T(C): 36.3 (02 Mar 2018 13:15), Max: 36.9 (02 Mar 2018 05:00)  T(F): 97.3 (02 Mar 2018 13:15), Max: 98.5 (02 Mar 2018 05:00)  HR: 78 (02 Mar 2018 13:15) (77 - 81)  BP: 119/70 (02 Mar 2018 13:15) (102/65 - 119/70)  BP(mean): --  RR: 18 (02 Mar 2018 13:15) (18 - 18)  SpO2: 100% (02 Mar 2018 13:15) (97% - 100%)    Gen: AOx3, NAD, non-toxic, pleasant  CV: S1+S2 normal, no murmurs, nontachycardic; midsternal wound area of ~1-2 x 4 cm swelling; no purulence, mild tenderness; on lower aspect of wound, scabbing wound  Resp: Clear bilat, no resp distress, no crackles/wheezes  Abd: Soft, nontender, +BS  Ext: No LE edema, no wounds; LUE PICC    LABS:                        9.3    11.8  )-----------( 399      ( 02 Mar 2018 06:11 )             28.1     03-02    138  |  104  |  18  ----------------------------<  95  3.9   |  24  |  1.20    Ca    7.8<L>      02 Mar 2018 06:11    Urinalysis Basic - ( 2018 20:28 )    Color: Yellow / Appearance: SL Turbid / S.018 / pH: x  Gluc: x / Ketone: Negative  / Bili: Negative / Urobili: Negative   Blood: x / Protein: 100 mg/dL / Nitrite: Negative   Leuk Esterase: Large / RBC: 5-10 /HPF / WBC >50 /HPF   Sq Epi: x / Non Sq Epi: Occasional /HPF / Bacteria: Few /HPF    MICROBIOLOGY:    .Surgical Swab sternal wound #1  18   No growth at 5 days     .Other Other, mediastinum #2  18   No fungus isolated at 1 week. No additional interim reports will be    .Blood Blood-Venous  18   No growth at 5 days.    .Blood Blood  18   No growth at 5 days.     .Blood Blood  18   No growth at 5 days.     .Urine Catheterized  18   No growth     .Blood Blood-Peripheral  18   No growth at 5 days.      .Sputum Sputum  18   Normal Respiratory Soni present    .Blood Blood-Venous  18   No growth at 5 days.     .Blood Blood  18   No growth at 5 days.     RADIOLOGY:    2/10 CT:    IMPRESSION:     Large mediastinal hematoma with hematocrit level and associated mass   effect on the right ventricle and right atrium. Echocardiogram may be of   use to evaluate for constrictive physiology/tamponade.     CXR: (I reviewed CXR on my own--PICC line, no consolidations)    FINDINGS:   Status post median sternotomy.  Left PICC line with tip in the SVC.  Interval removal of left chest tube.    There is no focal consolidation.  Small left pleural effusion.  No evidence of pneumothorax.  The cardiac silhouette is not enlarged.    IMPRESSION:  Small pleural effusion.  No pneumothorax status post removal of left chest tube.

## 2018-03-02 NOTE — PROGRESS NOTE ADULT - ASSESSMENT
I & D performed at bedside under sterile conditions  old hematoma evacuated, no purulence  wound area irrigated and packed with moist guaze  can consider wound vac to accelerate closure of wound

## 2018-03-02 NOTE — PROGRESS NOTE ADULT - SUBJECTIVE AND OBJECTIVE BOX
pt without complaints  called by ct team because of fluctuance over midsternal incision    nontender, no erythema  5x3 cm area of fluctuance

## 2018-03-03 LAB
ALBUMIN SERPL ELPH-MCNC: 2.2 G/DL — LOW (ref 3.3–5)
ALP SERPL-CCNC: 158 U/L — HIGH (ref 40–120)
ALT FLD-CCNC: 33 U/L RC — SIGNIFICANT CHANGE UP (ref 10–45)
ANION GAP SERPL CALC-SCNC: 8 MMOL/L — SIGNIFICANT CHANGE UP (ref 5–17)
APTT BLD: 31.1 SEC — SIGNIFICANT CHANGE UP (ref 27.5–37.4)
AST SERPL-CCNC: 32 U/L — SIGNIFICANT CHANGE UP (ref 10–40)
BILIRUB SERPL-MCNC: 0.5 MG/DL — SIGNIFICANT CHANGE UP (ref 0.2–1.2)
BUN SERPL-MCNC: 18 MG/DL — SIGNIFICANT CHANGE UP (ref 7–23)
CALCIUM SERPL-MCNC: 7.9 MG/DL — LOW (ref 8.4–10.5)
CHLORIDE SERPL-SCNC: 104 MMOL/L — SIGNIFICANT CHANGE UP (ref 96–108)
CO2 SERPL-SCNC: 24 MMOL/L — SIGNIFICANT CHANGE UP (ref 22–31)
CREAT SERPL-MCNC: 1.11 MG/DL — SIGNIFICANT CHANGE UP (ref 0.5–1.3)
GLUCOSE BLDC GLUCOMTR-MCNC: 113 MG/DL — HIGH (ref 70–99)
GLUCOSE BLDC GLUCOMTR-MCNC: 136 MG/DL — HIGH (ref 70–99)
GLUCOSE BLDC GLUCOMTR-MCNC: 164 MG/DL — HIGH (ref 70–99)
GLUCOSE BLDC GLUCOMTR-MCNC: 85 MG/DL — SIGNIFICANT CHANGE UP (ref 70–99)
GLUCOSE BLDC GLUCOMTR-MCNC: 97 MG/DL — SIGNIFICANT CHANGE UP (ref 70–99)
GLUCOSE SERPL-MCNC: 93 MG/DL — SIGNIFICANT CHANGE UP (ref 70–99)
HCT VFR BLD CALC: 28.3 % — LOW (ref 39–50)
HGB BLD-MCNC: 9.2 G/DL — LOW (ref 13–17)
INR BLD: 1.05 RATIO — SIGNIFICANT CHANGE UP (ref 0.88–1.16)
MCHC RBC-ENTMCNC: 30.6 PG — SIGNIFICANT CHANGE UP (ref 27–34)
MCHC RBC-ENTMCNC: 32.3 GM/DL — SIGNIFICANT CHANGE UP (ref 32–36)
MCV RBC AUTO: 94.7 FL — SIGNIFICANT CHANGE UP (ref 80–100)
PLATELET # BLD AUTO: 364 K/UL — SIGNIFICANT CHANGE UP (ref 150–400)
POTASSIUM SERPL-MCNC: 3.9 MMOL/L — SIGNIFICANT CHANGE UP (ref 3.5–5.3)
POTASSIUM SERPL-SCNC: 3.9 MMOL/L — SIGNIFICANT CHANGE UP (ref 3.5–5.3)
PROT SERPL-MCNC: 6.1 G/DL — SIGNIFICANT CHANGE UP (ref 6–8.3)
PROTHROM AB SERPL-ACNC: 11.3 SEC — SIGNIFICANT CHANGE UP (ref 9.8–12.7)
RBC # BLD: 2.99 M/UL — LOW (ref 4.2–5.8)
RBC # FLD: 15.6 % — HIGH (ref 10.3–14.5)
SODIUM SERPL-SCNC: 136 MMOL/L — SIGNIFICANT CHANGE UP (ref 135–145)
WBC # BLD: 10.3 K/UL — SIGNIFICANT CHANGE UP (ref 3.8–10.5)
WBC # FLD AUTO: 10.3 K/UL — SIGNIFICANT CHANGE UP (ref 3.8–10.5)

## 2018-03-03 RX ADMIN — Medication 100 MILLIGRAM(S): at 11:55

## 2018-03-03 RX ADMIN — Medication 2: at 21:58

## 2018-03-03 RX ADMIN — Medication 25 MILLIGRAM(S): at 05:15

## 2018-03-03 RX ADMIN — PANTOPRAZOLE SODIUM 40 MILLIGRAM(S): 20 TABLET, DELAYED RELEASE ORAL at 05:15

## 2018-03-03 RX ADMIN — Medication 1 MILLIGRAM(S): at 11:54

## 2018-03-03 RX ADMIN — ATORVASTATIN CALCIUM 80 MILLIGRAM(S): 80 TABLET, FILM COATED ORAL at 21:24

## 2018-03-03 RX ADMIN — Medication 25 MILLIGRAM(S): at 17:14

## 2018-03-03 RX ADMIN — TAMSULOSIN HYDROCHLORIDE 0.4 MILLIGRAM(S): 0.4 CAPSULE ORAL at 21:25

## 2018-03-03 RX ADMIN — LIDOCAINE 1 APPLICATION(S): 4 CREAM TOPICAL at 17:14

## 2018-03-03 RX ADMIN — Medication 325 MILLIGRAM(S): at 11:54

## 2018-03-03 RX ADMIN — AMIODARONE HYDROCHLORIDE 200 MILLIGRAM(S): 400 TABLET ORAL at 05:15

## 2018-03-03 RX ADMIN — FINASTERIDE 5 MILLIGRAM(S): 5 TABLET, FILM COATED ORAL at 11:54

## 2018-03-03 RX ADMIN — Medication 150 MICROGRAM(S): at 05:15

## 2018-03-03 RX ADMIN — Medication 5: at 17:14

## 2018-03-03 NOTE — PROGRESS NOTE ADULT - ASSESSMENT
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  1/20/18 AVR (T)/C1L   Pod 1 Pressors inotropes,  R PL effusion s/p pigtail placement with 1.6L removed.  POD 2: Cardiogenic shock, RACHEAL-Reintubated.  IABP placed lasix infusion, hd catheter placed,  +inotropes. On antibiotics ceftriaxone and GENT for gram + cocci endocarditis, reintubation  enteral feeds  Vascular consulted for pseudoaneurysm  2/7 Seen by ENT,+ R VC paresis medially with glottic gap, pooling of secretions and b/l posterior intubation granulomas  2/8 Hematology consulted for prolonged aPTT of unclear etiology.   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  Feb 13 repeat thrombin injection for L Fem PSA  Feb 14 RTOR sternal wound debridement, removal of VAC, washout and closure  2/15 Per Vasc Sx pt is now s/p L pseudoanuerysm thrombin injection x3 with remaining pseudoaneursym injected with thrombin two days prior with U/S resolution. Pt underwent repeat arterial duplex that showed resolution of pseudoaneurysm.   2/16 Dysphagia 1 THIN STRAINED PUREE ONLY, with Honey thickened liquids. All Liquids via teaspoon only.   Feb 16 R pigtail for pleural effusion  2/19/18 [place] reinserted for retention , URO consulted, eventual TOV  2/22/18 - VSS - Kaofeed in place - will contact S&S re: repeat FEEST   decrease free water boluses to 250 via kaofeed q6h   2/23 Dental consulted for OR cx + GroupB strep, D/c pigtail, Calorie count in progress. MBS Monday /Tues 2/24: Anemic requiring 1 unit PRBC. Milan light hematuria. Per Dental White plaques on tongue to be biopsied as outpatient-no signs of infection  2/25: c/o sore throat appreciate ENT input  2/26 Left pleural effusion --> s/p Left pigtail cath placed; TOV --> Bladder scan >900 cc; [place] cath reinserted with 1.9 L of urine.  Plan to repeat MBS in AM   2/27 [id number] drained 110/730 Milan in place - seen by Urology Dr Goldberg will maintain milan  in  rehab MBS this am. PICC not patent IV team aware  per ID d/c ceftriaxone 3/1  2/28 Pigtail D/C pacing wires d/c  shower today  dyshagia 3 thin liquid diet d/w social work -[id number] placement last dose abx 3/1 d/c PICC after last dose abx   3/1  HD stable.  Ceftriaxone dc after dose today.  Mid sternal incision with Hematoma old blood oozing from incision.  DC PICC line.  Awaiting auth for rehab.  DC with milan  3/2 discharge cancelled secondary to midsternal collection.   3/3 packing to mid sternum. Await wound PT for vac

## 2018-03-03 NOTE — PROGRESS NOTE ADULT - PROBLEM SELECTOR PLAN 3
DC ceftriaxone  maintain PICC line   Off abx for now. Will initiate Vanco if febrile or if WBC rises

## 2018-03-03 NOTE — PROGRESS NOTE ADULT - SUBJECTIVE AND OBJECTIVE BOX
Telemetry:  NSR    Vital Signs Last 24 Hrs  T(C): 36.6 (03-03-18 @ 05:00), Max: 36.8 (03-02-18 @ 20:33)  T(F): 97.9 (03-03-18 @ 05:00), Max: 98.2 (03-02-18 @ 20:33)  HR: 77 (03-03-18 @ 05:00) (66 - 78)  BP: 114/57 (03-03-18 @ 05:00) (114/57 - 126/65)  RR: 17 (03-03-18 @ 05:00) (17 - 18)  SpO2: 96% (03-03-18 @ 05:00) (96% - 100%)                   03-02 @ 07:01  -  03-03 @ 07:00  --------------------------------------------------------  IN: 660 mL / OUT: 2125 mL / NET: -1465 mL          Daily     Daily         CAPILLARY BLOOD GLUCOSE      POCT Blood Glucose.: 113 mg/dL (03 Mar 2018 11:52)  POCT Blood Glucose.: 85 mg/dL (03 Mar 2018 07:48)  POCT Blood Glucose.: 97 mg/dL (03 Mar 2018 02:11)  POCT Blood Glucose.: 104 mg/dL (02 Mar 2018 21:44)  POCT Blood Glucose.: 140 mg/dL (02 Mar 2018 16:38)          Drains:     MS         [  ] Drainage:                 L Pleural  [  ]  Drainage:                R Pleural  [  ]  Drainage:    Pacing Wires        [  ]   Settings:                                  Isolated  [  ]    Coumadin    [ ] YES          [  ]      NO         Reason:                                 9.2    10.3  )-----------( 364      ( 03 Mar 2018 05:17 )             28.3       03-03    136  |  104  |  18  ----------------------------<  93  3.9   |  24  |  1.11    Ca    7.9<L>      03 Mar 2018 05:17    TPro  6.1  /  Alb  2.2<L>  /  TBili  0.5  /  DBili  x   /  AST  32  /  ALT  33  /  AlkPhos  158<H>  03-03      PT/INR - ( 03 Mar 2018 05:17 )   PT: 11.3 sec;   INR: 1.05 ratio         PTT - ( 03 Mar 2018 05:17 )  PTT:31.1 sec    PHYSICAL EXAM:    Neurology: alert and oriented x 3, nonfocal, no gross deficits    CV : S1S2    Sternal Wound :  CDI , Stable. Open mid- sternum with packing.     Lungs:CTA b/l    Abdomen: soft, nontender, nondistended, positive bowel sounds, last bowel movement     :        Dimas for Retention       Extremities:    no edema + PP B/l           acetaminophen   Tablet 650 milliGRAM(s) Oral every 6 hours PRN  acetaminophen   Tablet. 650 milliGRAM(s) Oral every 6 hours PRN  amiodarone    Tablet 200 milliGRAM(s) Oral daily  aspirin 325 milliGRAM(s) Oral daily  atorvastatin 80 milliGRAM(s) Oral at bedtime  finasteride 5 milliGRAM(s) Oral daily  folic acid 1 milliGRAM(s) Oral daily  insulin lispro (HumaLOG) corrective regimen sliding scale   SubCutaneous Before meals and at bedtime  levothyroxine 150 MICROGram(s) Oral daily  lidocaine 5% Ointment 1 Application(s) Topical every 12 hours  metoprolol     tartrate 25 milliGRAM(s) Oral every 12 hours  pantoprazole    Tablet 40 milliGRAM(s) Oral before breakfast  tamsulosin 0.4 milliGRAM(s) Oral at bedtime  thiamine 100 milliGRAM(s) Oral daily                              Physical Therapy Rec:   Home  [  ]   Home w/ PT  [  ]  Rehab  [  ]    Discussed with Cardiothoracic Team at AM rounds.

## 2018-03-04 LAB
ALBUMIN SERPL ELPH-MCNC: 2.3 G/DL — LOW (ref 3.3–5)
ALP SERPL-CCNC: 158 U/L — HIGH (ref 40–120)
ALT FLD-CCNC: 34 U/L RC — SIGNIFICANT CHANGE UP (ref 10–45)
ANION GAP SERPL CALC-SCNC: 9 MMOL/L — SIGNIFICANT CHANGE UP (ref 5–17)
APTT BLD: 32.5 SEC — SIGNIFICANT CHANGE UP (ref 27.5–37.4)
AST SERPL-CCNC: 28 U/L — SIGNIFICANT CHANGE UP (ref 10–40)
BILIRUB SERPL-MCNC: 0.6 MG/DL — SIGNIFICANT CHANGE UP (ref 0.2–1.2)
BUN SERPL-MCNC: 16 MG/DL — SIGNIFICANT CHANGE UP (ref 7–23)
CALCIUM SERPL-MCNC: 7.9 MG/DL — LOW (ref 8.4–10.5)
CHLORIDE SERPL-SCNC: 102 MMOL/L — SIGNIFICANT CHANGE UP (ref 96–108)
CO2 SERPL-SCNC: 25 MMOL/L — SIGNIFICANT CHANGE UP (ref 22–31)
CREAT SERPL-MCNC: 0.91 MG/DL — SIGNIFICANT CHANGE UP (ref 0.5–1.3)
GLUCOSE BLDC GLUCOMTR-MCNC: 129 MG/DL — HIGH (ref 70–99)
GLUCOSE BLDC GLUCOMTR-MCNC: 139 MG/DL — HIGH (ref 70–99)
GLUCOSE BLDC GLUCOMTR-MCNC: 139 MG/DL — HIGH (ref 70–99)
GLUCOSE BLDC GLUCOMTR-MCNC: 95 MG/DL — SIGNIFICANT CHANGE UP (ref 70–99)
GLUCOSE SERPL-MCNC: 90 MG/DL — SIGNIFICANT CHANGE UP (ref 70–99)
HCT VFR BLD CALC: 28.9 % — LOW (ref 39–50)
HGB BLD-MCNC: 9.4 G/DL — LOW (ref 13–17)
INR BLD: 1.05 RATIO — SIGNIFICANT CHANGE UP (ref 0.88–1.16)
MCHC RBC-ENTMCNC: 30.9 PG — SIGNIFICANT CHANGE UP (ref 27–34)
MCHC RBC-ENTMCNC: 32.6 GM/DL — SIGNIFICANT CHANGE UP (ref 32–36)
MCV RBC AUTO: 94.7 FL — SIGNIFICANT CHANGE UP (ref 80–100)
PLATELET # BLD AUTO: 372 K/UL — SIGNIFICANT CHANGE UP (ref 150–400)
POTASSIUM SERPL-MCNC: 3.7 MMOL/L — SIGNIFICANT CHANGE UP (ref 3.5–5.3)
POTASSIUM SERPL-SCNC: 3.7 MMOL/L — SIGNIFICANT CHANGE UP (ref 3.5–5.3)
PROT SERPL-MCNC: 6.1 G/DL — SIGNIFICANT CHANGE UP (ref 6–8.3)
PROTHROM AB SERPL-ACNC: 11.3 SEC — SIGNIFICANT CHANGE UP (ref 9.8–12.7)
RBC # BLD: 3.05 M/UL — LOW (ref 4.2–5.8)
RBC # FLD: 15.3 % — HIGH (ref 10.3–14.5)
SODIUM SERPL-SCNC: 136 MMOL/L — SIGNIFICANT CHANGE UP (ref 135–145)
WBC # BLD: 10.8 K/UL — HIGH (ref 3.8–10.5)
WBC # FLD AUTO: 10.8 K/UL — HIGH (ref 3.8–10.5)

## 2018-03-04 PROCEDURE — 99232 SBSQ HOSP IP/OBS MODERATE 35: CPT

## 2018-03-04 RX ADMIN — AMIODARONE HYDROCHLORIDE 200 MILLIGRAM(S): 400 TABLET ORAL at 05:07

## 2018-03-04 RX ADMIN — ATORVASTATIN CALCIUM 80 MILLIGRAM(S): 80 TABLET, FILM COATED ORAL at 21:24

## 2018-03-04 RX ADMIN — Medication 2: at 22:31

## 2018-03-04 RX ADMIN — LIDOCAINE 1 APPLICATION(S): 4 CREAM TOPICAL at 17:49

## 2018-03-04 RX ADMIN — Medication 150 MICROGRAM(S): at 05:07

## 2018-03-04 RX ADMIN — PANTOPRAZOLE SODIUM 40 MILLIGRAM(S): 20 TABLET, DELAYED RELEASE ORAL at 05:09

## 2018-03-04 RX ADMIN — TAMSULOSIN HYDROCHLORIDE 0.4 MILLIGRAM(S): 0.4 CAPSULE ORAL at 21:24

## 2018-03-04 RX ADMIN — LIDOCAINE 1 APPLICATION(S): 4 CREAM TOPICAL at 05:08

## 2018-03-04 RX ADMIN — Medication 325 MILLIGRAM(S): at 12:10

## 2018-03-04 RX ADMIN — Medication 2: at 16:56

## 2018-03-04 RX ADMIN — Medication 25 MILLIGRAM(S): at 05:07

## 2018-03-04 RX ADMIN — Medication 25 MILLIGRAM(S): at 17:49

## 2018-03-04 RX ADMIN — Medication 650 MILLIGRAM(S): at 10:17

## 2018-03-04 RX ADMIN — Medication 1 MILLIGRAM(S): at 12:10

## 2018-03-04 RX ADMIN — Medication 2: at 12:22

## 2018-03-04 RX ADMIN — Medication 100 MILLIGRAM(S): at 12:11

## 2018-03-04 RX ADMIN — Medication 650 MILLIGRAM(S): at 10:50

## 2018-03-04 RX ADMIN — FINASTERIDE 5 MILLIGRAM(S): 5 TABLET, FILM COATED ORAL at 12:10

## 2018-03-04 NOTE — PROGRESS NOTE ADULT - ASSESSMENT
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  1/20/18 AVR (T)/C1L   Pod 1 Pressors inotropes,  R PL effusion s/p pigtail placement with 1.6L removed.  POD 2: Cardiogenic shock, RACHEAL-Reintubated.  IABP placed lasix infusion, hd catheter placed,  +inotropes. On antibiotics ceftriaxone and GENT for gram + cocci endocarditis, reintubation  enteral feeds  Vascular consulted for pseudoaneurysm  2/7 Seen by ENT,+ R VC paresis medially with glottic gap, pooling of secretions and b/l posterior intubation granulomas  2/8 Hematology consulted for prolonged aPTT of unclear etiology.   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  Feb 13 repeat thrombin injection for L Fem PSA  Feb 14 RTOR sternal wound debridement, removal of VAC, washout and closure  2/15 Per Vasc Sx pt is now s/p L pseudoanuerysm thrombin injection x3 with remaining pseudoaneursym injected with thrombin two days prior with U/S resolution. Pt underwent repeat arterial duplex that showed resolution of pseudoaneurysm.   2/16 Dysphagia 1 THIN STRAINED PUREE ONLY, with Honey thickened liquids. All Liquids via teaspoon only.   Feb 16 R pigtail for pleural effusion  2/19/18 [place] reinserted for retention , URO consulted, eventual TOV  2/22/18 - VSS - Kaofeed in place - will contact S&S re: repeat FEEST   decrease free water boluses to 250 via kaofeed q6h   2/23 Dental consulted for OR cx + GroupB strep, D/c pigtail, Calorie count in progress. MBS Monday /Tues 2/24: Anemic requiring 1 unit PRBC. [place] light hematuria. Per Dental White plaques on tongue to be biopsied as outpatient-no signs of infection  2/25: c/o sore throat appreciate ENT input  2/26 Left pleural effusion --> s/p Left pigtail cath placed; TOV --> Bladder scan >900 cc; [place] cath reinserted with 1.9 L of urine.  Plan to repeat MBS in AM   2/27 [id number] drained 110/730 [place] in place - seen by Urology Dr Goldberg will maintain milan  in  rehab MBS this am. PICC not patent IV team aware  per ID d/c ceftriaxone 3/1  2/28 Pigtail D/C pacing wires d/c  shower today  dyshagia 3 thin liquid diet d/w social work -[id number] placement last dose abx 3/1 d/c PICC after last dose abx   3/1  HD stable.  Ceftriaxone dc after dose today.  Mid sternal incision with Hematoma old blood oozing from incision.  DC PICC line.  Awaiting auth for rehab.  DC with milan  3/2 discharge cancelled secondary to midsternal collection.   3/3 packing to mid sternum. Await wound PT for vac  3/4 Vac applied today by wound PT.t will need rehab that will take vacs as well

## 2018-03-04 NOTE — PROGRESS NOTE ADULT - PROBLEM SELECTOR PLAN 5
Dr. Joyce, vascular surgeon following   thrombin injection x 2 post-op L groin
A1C 9.1  --Continue insulin drip  --Once extubated will transition to sliding scale + lantus; consider oral agents  --Consider Endo consult  --Diabetic diet, once extubated  --Diabetic teaching
FS well controlled  - continue lantus 12 units  - continue humalog 3 units  - FSS + ISS
daily Pt/ptt d/t factor XI deficiency
Dr. Joyce, vascular surgeon following   thrombin injection x 2 post-op L groin
FS well controlled  - continue lantus 12 units  - continue humalog 3 units  - FSS + ISS
FS well controlled  - continue lantus 12 units  - continue humalog 3 units  - FSS + ISS
daily Pt/ptt d/t factor XI deficiency
-C/w thiamine, MVI, and folic acid daily.
-C/w thiamine, MVI, and folic acid daily.   -Alcohol cessation counseling.
-C/w thiamine, MVI, and folic acid daily.   -Alcohol cessation counseling.
Dr. Joyce, vascular surgeon following   thrombin injection x 2 post-op L groin
FS well controlled  - continue lantus 12 units  - continue humalog 3 units  - FSS + ISS
Resolved, ?secondary to underlying infection
Resolved, ?secondary to underlying infection
creat 1.8  lasix increase to 40mg BID
evacuation of pericardial effusion 2//11/18  post-op echo - neg for reaccumulation of pericardial fluid
evacuation of pericardial effusion 2//11/18  post-op echo - neg for reaccumulation of pericardial fluid
-Slightly better today.  -C/w vanco and ceftriaxone.
-Stable today.   -C/w vanco and ceftriaxone.
creat 1.8  lasix increase to 40mg BID

## 2018-03-04 NOTE — PROGRESS NOTE ADULT - PROBLEM SELECTOR PROBLEM 2
Urinary retention due to benign prostatic hyperplasia
Acute pulmonary edema
ELIAN (acute kidney injury)
Endocarditis
S/P CABG x 1
ELIAN (acute kidney injury)
ELIAN (acute kidney injury)
Hypernatremia
Hypernatremia
S/P CABG x 1
ATN (acute tubular necrosis)
Acute infective endocarditis, due to unspecified organism
Acute pulmonary edema
ELIAN (acute kidney injury)
Hypernatremia
Hypotension
Hypotension
S/P CABG x 1
ELIAN (acute kidney injury)
ELIAN (acute kidney injury)
Hypernatremia
Acute infective endocarditis, due to unspecified organism
S/P CABG x 1

## 2018-03-04 NOTE — PROGRESS NOTE ADULT - PROBLEM SELECTOR PLAN 7
Reinserted milan cath 2/26/18   Continue with Flomax   uro following-Keep milan
- DVT ppx: lovenox  - GI ppx: PPI  - PT consult pending
Dr. Joyce, vascular surgeon following   thrombin injection x 2 post-op L groin
- DVT ppx: lovenox  - GI ppx: PPI  - PT consult pending
- DVT ppx: lovenox  - GI ppx: PPI  - PT consult pending
Dr. Joyce, vascular surgeon following   thrombin injection x 2 post-op L groin
Reinserted milan cath 2/26/18   Continue with Flomax   uro following-Keep milan
- DVT ppx: lovenox  - GI ppx: PPI  - PT consult pending
-C/w PPI for GI PPx.   -C/w lovenox SQ for DVT PPx.
-Improved   -C/w simethicone 80mg Q8H standing for gas discomfort.   -Tylenol as needed for pain.
-Improved   -C/w simethicone 80mg Q8H standing for gas discomfort.   -Tylenol as needed for pain.
CIWA  No sign of withdrawl at this time
Reinserted milan cath 2/26/18   Continue with Flomax   uro following-Keep milan
Reinserted milan cath 2/26/18   Continue with Flomax   uro following-Keep milan until pt ambulating, oob and good BM
Speech & Swallow following  2/16 Failed Feest  will touch base with Speech & swallow re: repeat Swallow study  continue Kaofeed w/ tube feeds
Speech & Swallow following  2/16 Failed Feest  will touch base with Speech & swallow re: repeat Swallow study mon/tues  continue Kaofeed w/ tube feeds
-Improved today.   -C/w simethicone 80mg Q8H standing for gas discomfort.   -Tylenol as needed for pain.
-Started simethicone 80mg Q8H standing for gas discomfort.   -Tylenol as needed for pain.

## 2018-03-04 NOTE — PROGRESS NOTE ADULT - PROBLEM SELECTOR PLAN 4
daily Pt/ptt d/t factor XI deficiency  Recheck factor XI assay
-no signs of withdrawal at this time  -folic acid, thiamine
Chronic Afib, a/w with rapid AFib s/p failed DCCV  --BB once stable off inotropes/vasopressors; consider Digoxin if rapid
iv antibiotics as per ID MD CHOUDHURY UABRAHAM picc
-no signs of withdrawal at this time  -folic acid, thiamine
-no signs of withdrawal at this time  -folic acid, thiamine
iv antibiotics as per ID MD CHOUDHURY UABRAHAM picc
-C/w lantus 12units QHS, lispro 3units with meals, and LANNY QAC/HS.
-no signs of withdrawal at this time  -folic acid, thiamine
Resolving.   -C/w vanco and ceftriaxone.
Stable
Stable
currently sinus rhythm; with occas APCs overnight, rate controlled  cont to monitor
daily Pt/ptt d/t factor XI deficiency
daily Pt/ptt d/t factor XI deficiency
daily Pt/ptt d/t factor XI deficiency  Recheck factor XI assay
-C/w lantus 12units QHS, lispro 3units with meals, and LANNY QAC/HS.
-C/w lantus 12units QHS, lispro 3units with meals, and LANNY QAC/HS.
currently sinus rhythm; with occas APCs overnight, rate controlled  cont to monitor

## 2018-03-04 NOTE — PROGRESS NOTE ADULT - PROBLEM SELECTOR PROBLEM 6
Dysphagia
ELIAN (acute kidney injury)
Leukocytosis, unspecified type
Pericardial effusion
Dysphagia
Leukocytosis, unspecified type
Leukocytosis, unspecified type
Pericardial effusion
Alcohol abuse
Alcohol abuse
Dysphagia
Gas pain
Leukocytosis, unspecified type
Pseudoaneurysm of left femoral artery
Pseudoaneurysm of left femoral artery
Type 2 diabetes mellitus with hyperglycemia, unspecified long term insulin use status
Type 2 diabetes mellitus with hyperglycemia, unspecified long term insulin use status
Alcohol abuse
Alcohol abuse

## 2018-03-04 NOTE — PROGRESS NOTE ADULT - PROBLEM SELECTOR PROBLEM 4
Factor deficiency, coagulation
Afib
Alcohol abuse
Status post debridement
Alcohol abuse
Alcohol abuse
Factor deficiency, coagulation
Status post debridement
Afib
Alcohol abuse
Factor deficiency, coagulation
Leukocytosis, unspecified type
Type 2 diabetes mellitus with hyperglycemia, unspecified long term insulin use status
Afib

## 2018-03-04 NOTE — PROGRESS NOTE ADULT - PROBLEM SELECTOR PLAN 9
Left pleural effusion   s/p Left Pigtail cath placed 2/26/18 yielding ~ 600 cc   pigtail d/c 2/28   xray to follow
milan reinserted 2/19/18   flomax initiated   will attempt TOV in a couple of days-uro following
Left pleural effusion   s/p Left Pigtail cath placed 2/26/18 yielding ~ 600 cc   pigtail d/c 2/28   xray to follow
milan reinserted 2/19/18   flomax initiated   will attempt TOV in a couple of days-uro following
Left pleural effusion   s/p Left Pigtail cath placed 2/26/18 yielding ~ 600 cc   pigtail d/c 2/28   resolved
Left pleural effusion   s/p Left Pigtail cath placed 2/26/18 yielding ~ 600 cc   pigtail d/c 2/28   resolved
Left plueral effusion   s/p Left Pigtail cath placed 2/26/18 yielding ~ 600 cc   Maintain pigtail cath --> LWS
Left plueral effusion   s/p Left Pigtail cath placed 2/26/18 yielding ~ 600 cc   Maintain pigtail cath --> LWS
Left plueral effusion   s/p Left Pigtail cath placed 2/26/18 yielding ~ 600 cc   pigtail d/c 2/28   xray to follow
Likely due to hematuria. Will Transfuse 1 unit PRBC today and monitor cbc and urine.
s/p 1 unit PRBC on 2/24 monitor cbc   Hematuria improving. Subcue heparin remains discontinued pt is also factor deficient. Continue with venodymes

## 2018-03-04 NOTE — PROGRESS NOTE ADULT - PROBLEM SELECTOR PROBLEM 3
Status post debridement
Acute on chronic systolic congestive heart failure
NSTEMI (non-ST elevated myocardial infarction)
Status post debridement
ATN (acute tubular necrosis)
Acute on chronic systolic congestive heart failure
NSTEMI (non-ST elevated myocardial infarction)
R/O CAD (coronary artery disease)
Status post debridement
Type 2 diabetes mellitus with hyperglycemia, unspecified long term insulin use status
NSTEMI (non-ST elevated myocardial infarction)
NSTEMI (non-ST elevated myocardial infarction)
R/O CAD (coronary artery disease)

## 2018-03-04 NOTE — PROGRESS NOTE ADULT - SUBJECTIVE AND OBJECTIVE BOX
CC: F/U for Sternal Swelling    interval history/ROS:  seen by plastic surgery who performed bedside I&D of the area of fluctuance.  hematoma seen.  packed.  feels well.  no fever/chills.  no n/v/d.  Rest of ROS otherwise negative.    Allergies  No Known Allergies    ANTIMICROBIALS:  none    MEDICATIONS  (STANDING):  amiodarone    Tablet 200 daily  aspirin 325 daily  atorvastatin 80 at bedtime  finasteride 5 daily  insulin lispro (HumaLOG) corrective regimen sliding scale  Before meals and at bedtime  levothyroxine 150 daily  metoprolol     tartrate 25 every 12 hours  pantoprazole    Tablet 40 before breakfast  tamsulosin 0.4 at bedtime    Vital Signs Last 24 Hrs  T(F): 98.1 (03-04-18 @ 04:48), Max: 98.4 (03-03-18 @ 19:58)  HR: 76 (03-04-18 @ 04:48)  BP: 122/67 (03-04-18 @ 04:48)  RR: 18 (03-04-18 @ 04:48)  SpO2: 99% (03-04-18 @ 04:48) (99% - 100%)  Wt(kg): --    PHYSICAL EXAM:  General: non-toxic  HEAD/EYES: anicteric  ENT:  supple  Cardiovascular:   S1, S2; sternotomy site with packing distal site - no malodor; no cellulitis, no pus  Respiratory:  clear bilaterally  GI:  soft, non-tender, normal bowel sounds   Musculoskeletal:  no synovitis  Neurologic:  grossly non-focal  Skin:  no rash  Lymph: no lymphadenopathy  Psychiatric:  appropriate affect  Vascular:  no phlebitis L PICC                        9.4    10.8  )-----------( 372      ( 04 Mar 2018 06:11 )             28.9 03-04    136  |  102  |  16  ----------------------------<  90  3.7   |  25  |  0.91  Ca    7.9      04 Mar 2018 06:11  TPro  6.1  /  Alb  2.3  /  TBili  0.6  /  DBili  x   /  AST  28  /  ALT  34  /  AlkPhos  158  03-04    MICROBIOLOGY:  .Surgical Swab sternal wound #1  02-14-18   No growth at 5 days     .Other Other, mediastinum #2  02-11-18   No fungus isolated at 1 week. No additional interim reports will be    .Blood Blood-Venous  02-11-18   No growth at 5 days.    .Blood Blood  02-09-18   No growth at 5 days.     .Blood Blood  02-09-18   No growth at 5 days.     .Urine Catheterized  02-06-18   No growth     .Blood Blood-Peripheral  02-05-18   No growth at 5 days.      .Sputum Sputum  02-02-18   Normal Respiratory Soni present    .Blood Blood-Venous  02-02-18   No growth at 5 days.     .Blood Blood  02-01-18   No growth at 5 days.     RADIOLOGY:  Xray Chest 1 View- PORTABLE-Urgent (02.28.18 @ 11:32)  IMPRESSION:  Small pleural effusion.  No pneumothorax status post removal of left chest tube.

## 2018-03-04 NOTE — PROGRESS NOTE ADULT - PROBLEM SELECTOR PROBLEM 1
Aortic insufficiency
BPH with urinary obstruction
ELIAN (acute kidney injury)
S/P AVR (aortic valve replacement)
Vegetation of heart valve
ELIAN (acute kidney injury)
ELIAN (acute kidney injury)
S/P AVR (aortic valve replacement)
Vegetation of heart valve
Vegetation of heart valve
Acute diastolic heart failure
Acute diastolic heart failure
Acute infective endocarditis, due to unspecified organism
BPH with urinary obstruction
Coagulopathy
Coagulopathy
ELIAN (acute kidney injury)
R/O Acute decompensated heart failure
S/P AVR (aortic valve replacement)
Throat pain
Urinary retention
Vegetation of heart valve
Coagulopathy
Vegetation of heart valve
Vegetation of heart valve
ELIAN (acute kidney injury)
Urinary retention due to benign prostatic hyperplasia
R/O Acute decompensated heart failure
S/P AVR (aortic valve replacement)

## 2018-03-04 NOTE — PROGRESS NOTE ADULT - PROBLEM SELECTOR PROBLEM 5
Pseudoaneurysm of left femoral artery
Factor deficiency, coagulation
Type 2 diabetes mellitus with hyperglycemia, unspecified long term insulin use status
Uncontrolled type 2 diabetes mellitus with hyperglycemia, without long-term current use of insulin
Factor deficiency, coagulation
Pseudoaneurysm of left femoral artery
Type 2 diabetes mellitus with hyperglycemia, unspecified long term insulin use status
Type 2 diabetes mellitus with hyperglycemia, unspecified long term insulin use status
Alcohol abuse
ELIAN (acute kidney injury)
Leukocytosis, unspecified type
Leukocytosis, unspecified type
Pericardial effusion
Pericardial effusion
Pseudoaneurysm of left femoral artery
Type 2 diabetes mellitus with hyperglycemia, unspecified long term insulin use status
ELIAN (acute kidney injury)
Leukocytosis, unspecified type
Leukocytosis, unspecified type

## 2018-03-04 NOTE — PROGRESS NOTE ADULT - PROBLEM SELECTOR PLAN 3
DC ceftriaxone  maintain PICC line   Off abx for now. Will initiate Vanco if febrile or if WBC rises maintain off abx  maintain PICC line   Off abx for now. Will initiate Vanco if febrile or if WBC rises

## 2018-03-04 NOTE — PROGRESS NOTE ADULT - PROBLEM SELECTOR PROBLEM 8
Anemia due to blood loss
Dysphagia
Anemia due to blood loss
Dysphagia
Anemia due to blood loss
Prophylactic measure
Prophylactic measure
Urinary retention
Urinary retention
Prophylactic measure
Prophylactic measure

## 2018-03-04 NOTE — PROGRESS NOTE ADULT - PROBLEM SELECTOR PROBLEM 9
Pleural effusion
Urinary retention
Pleural effusion
Urinary retention
Anemia due to blood loss
Anemia due to blood loss
Pleural effusion

## 2018-03-04 NOTE — PROGRESS NOTE ADULT - PROBLEM SELECTOR PLAN 8
s/p 1 unit PRBC on 2/24 monitor cbc   Hematuria improving.  Continue with Venodyne
Speech & Swallow following  2/16 Failed Feest  will touch base with Speech & swallow re: repeat Swallow study  continue Kaofeed w/ tube feeds
Speech & Swallow following  2/16 Failed Feest  will touch base with Speech & swallow re: repeat Swallow study  continue Kaofeed w/ tube feeds
s/p 1 unit PRBC on 2/24 monitor cbc   Hematuria improving.  Continue with Venodyne
-C/w PPI for GI PPx.   -C/w lovenox SQ for DVT PPx.
-C/w PPI for GI PPx.   -C/w lovenox SQ for DVT PPx.
milan reinserted 2/19/18   flomax initiated   uro following-Keep milan until pt ambulating, oob and good BM
milan reinserted 2/19/18   flomax initiated   will attempt TOV in a couple of days-uro following
s/p 1 unit PRBC on 2/24 monitor cbc   Hematuria improving.  Continue with Venodyne
s/p 1 unit PRBC on 2/24 monitor cbc   Hematuria improving.  Continue with venodymes
-C/w PPI for GI PPx.   -C/w lovenox SQ for DVT PPx.   -PT eval pending.
-C/w PPI for GI PPx.   -C/w lovenox SQ for DVT PPx.   -PT eval pending.

## 2018-03-04 NOTE — PROGRESS NOTE ADULT - PROBLEM SELECTOR PLAN 6
Speech & Swallow following  2/16 Failed Feest  2/27 MBS passed dyspahgia 3 thin liquids diet
Improving  --Follow SCr, UOP  --Avoid nephrotoxic agents  --Renal following, appreciate recs
Persistent leukocytosis likely in setting of endocarditis.   - abx regimen as above
evacuation of pericardial effusion 2//11/18  post-op echo - neg for reaccumulation of pericardial fluid
Persistent leukocytosis likely in setting of endocarditis.   - abx regimen as above
Persistent leukocytosis likely in setting of endocarditis.   - abx regimen as above
Speech & Swallow following  2/16 Failed Feest  2/27 MBS passed dyspahgia 3 thin liquids diet
evacuation of pericardial effusion 2//11/18  post-op echo - neg for reaccumulation of pericardial fluid
-C/w simethicone 80mg Q8H standing for gas discomfort.   -Tylenol as needed for pain.
-C/w thiamine, MVI, and folic acid daily.   -Alcohol cessation counseling.
-C/w thiamine, MVI, and folic acid daily.   -Alcohol cessation counseling.
-Improved   -C/w simethicone 80mg Q8H standing for gas discomfort.   -Tylenol as needed for pain.
-Improved   -C/w simethicone 80mg Q8H standing for gas discomfort.   -Tylenol as needed for pain.
Dr. Joyce, vascular surgeon following   thrombin injection x 2 post-op L groin
Dr. Joyce, vascular surgeon following   thrombin injection x 2 post-op L groin
Persistent leukocytosis likely in setting of endocarditis.   - abx regimen as above
Speech & Swallow following  2/16 Failed Feest  2/27 MBS passed dyspahgia 3 thin liquids diet
Speech & Swallow following  2/16 Failed Feest  Plan for Speech & swallow re: repeat Swallow study mon/tues  D/C Nathalie
Speech & Swallow following  2/16 Failed Feest  Plan for Speech & swallow re: repeat Swallow study tues 2/27  D/JEAN Zelaya
monitor glucose  c/w Lantus, humalog
-C/w thiamine, MVI, and folic acid daily.   -Alcohol cessation counseling.
-C/w thiamine, MVI, and folic acid daily.   -Alcohol cessation counseling.
monitor glucose  c/w Lantus, humalog

## 2018-03-04 NOTE — PROGRESS NOTE ADULT - PROBLEM SELECTOR PROBLEM 7
Urinary retention
Alcohol abuse
Prophylactic measure
Pseudoaneurysm of left femoral artery
Prophylactic measure
Pseudoaneurysm of left femoral artery
Urinary retention
Dysphagia
Dysphagia
Gas pain
Gas pain
Prophylactic measure
Urinary retention
Gas pain
Gas pain

## 2018-03-05 VITALS
TEMPERATURE: 98 F | SYSTOLIC BLOOD PRESSURE: 108 MMHG | RESPIRATION RATE: 18 BRPM | HEART RATE: 78 BPM | OXYGEN SATURATION: 96 % | DIASTOLIC BLOOD PRESSURE: 62 MMHG

## 2018-03-05 DIAGNOSIS — Z09 ENCOUNTER FOR FOLLOW-UP EXAMINATION AFTER COMPLETED TREATMENT FOR CONDITIONS OTHER THAN MALIGNANT NEOPLASM: ICD-10-CM

## 2018-03-05 DIAGNOSIS — Z95.2 PRESENCE OF PROSTHETIC HEART VALVE: ICD-10-CM

## 2018-03-05 DIAGNOSIS — Z95.1 PRESENCE OF AORTOCORONARY BYPASS GRAFT: ICD-10-CM

## 2018-03-05 LAB
ALBUMIN SERPL ELPH-MCNC: 2.2 G/DL — LOW (ref 3.3–5)
ALP SERPL-CCNC: 149 U/L — HIGH (ref 40–120)
ALT FLD-CCNC: 31 U/L RC — SIGNIFICANT CHANGE UP (ref 10–45)
ANION GAP SERPL CALC-SCNC: 8 MMOL/L — SIGNIFICANT CHANGE UP (ref 5–17)
APTT BLD: 31.1 SEC — SIGNIFICANT CHANGE UP (ref 27.5–37.4)
AST SERPL-CCNC: 33 U/L — SIGNIFICANT CHANGE UP (ref 10–40)
BILIRUB SERPL-MCNC: 0.5 MG/DL — SIGNIFICANT CHANGE UP (ref 0.2–1.2)
BUN SERPL-MCNC: 18 MG/DL — SIGNIFICANT CHANGE UP (ref 7–23)
CALCIUM SERPL-MCNC: 8 MG/DL — LOW (ref 8.4–10.5)
CHLORIDE SERPL-SCNC: 104 MMOL/L — SIGNIFICANT CHANGE UP (ref 96–108)
CO2 SERPL-SCNC: 24 MMOL/L — SIGNIFICANT CHANGE UP (ref 22–31)
CREAT SERPL-MCNC: 0.96 MG/DL — SIGNIFICANT CHANGE UP (ref 0.5–1.3)
GLUCOSE BLDC GLUCOMTR-MCNC: 100 MG/DL — HIGH (ref 70–99)
GLUCOSE BLDC GLUCOMTR-MCNC: 109 MG/DL — HIGH (ref 70–99)
GLUCOSE BLDC GLUCOMTR-MCNC: 173 MG/DL — HIGH (ref 70–99)
GLUCOSE SERPL-MCNC: 89 MG/DL — SIGNIFICANT CHANGE UP (ref 70–99)
HCT VFR BLD CALC: 27.8 % — LOW (ref 39–50)
HGB BLD-MCNC: 9.3 G/DL — LOW (ref 13–17)
INR BLD: 1.06 RATIO — SIGNIFICANT CHANGE UP (ref 0.88–1.16)
MCHC RBC-ENTMCNC: 31.7 PG — SIGNIFICANT CHANGE UP (ref 27–34)
MCHC RBC-ENTMCNC: 33.5 GM/DL — SIGNIFICANT CHANGE UP (ref 32–36)
MCV RBC AUTO: 94.5 FL — SIGNIFICANT CHANGE UP (ref 80–100)
PLATELET # BLD AUTO: 348 K/UL — SIGNIFICANT CHANGE UP (ref 150–400)
POTASSIUM SERPL-MCNC: 4 MMOL/L — SIGNIFICANT CHANGE UP (ref 3.5–5.3)
POTASSIUM SERPL-SCNC: 4 MMOL/L — SIGNIFICANT CHANGE UP (ref 3.5–5.3)
PROT SERPL-MCNC: 6.2 G/DL — SIGNIFICANT CHANGE UP (ref 6–8.3)
PROTHROM AB SERPL-ACNC: 11.5 SEC — SIGNIFICANT CHANGE UP (ref 9.8–12.7)
RBC # BLD: 2.94 M/UL — LOW (ref 4.2–5.8)
RBC # FLD: 15.1 % — HIGH (ref 10.3–14.5)
SODIUM SERPL-SCNC: 136 MMOL/L — SIGNIFICANT CHANGE UP (ref 135–145)
WBC # BLD: 11.2 K/UL — HIGH (ref 3.8–10.5)
WBC # FLD AUTO: 11.2 K/UL — HIGH (ref 3.8–10.5)

## 2018-03-05 PROCEDURE — 99152 MOD SED SAME PHYS/QHP 5/>YRS: CPT

## 2018-03-05 PROCEDURE — 85732 THROMBOPLASTIN TIME PARTIAL: CPT

## 2018-03-05 PROCEDURE — 85610 PROTHROMBIN TIME: CPT

## 2018-03-05 PROCEDURE — 96374 THER/PROPH/DIAG INJ IV PUSH: CPT | Mod: XU

## 2018-03-05 PROCEDURE — 71045 X-RAY EXAM CHEST 1 VIEW: CPT

## 2018-03-05 PROCEDURE — 84480 ASSAY TRIIODOTHYRONINE (T3): CPT

## 2018-03-05 PROCEDURE — 84300 ASSAY OF URINE SODIUM: CPT

## 2018-03-05 PROCEDURE — 82947 ASSAY GLUCOSE BLOOD QUANT: CPT

## 2018-03-05 PROCEDURE — 82435 ASSAY OF BLOOD CHLORIDE: CPT

## 2018-03-05 PROCEDURE — 87581 M.PNEUMON DNA AMP PROBE: CPT

## 2018-03-05 PROCEDURE — P9059: CPT

## 2018-03-05 PROCEDURE — C8929: CPT

## 2018-03-05 PROCEDURE — 82330 ASSAY OF CALCIUM: CPT

## 2018-03-05 PROCEDURE — 88305 TISSUE EXAM BY PATHOLOGIST: CPT

## 2018-03-05 PROCEDURE — 86901 BLOOD TYPING SEROLOGIC RH(D): CPT

## 2018-03-05 PROCEDURE — 87798 DETECT AGENT NOS DNA AMP: CPT

## 2018-03-05 PROCEDURE — 76942 ECHO GUIDE FOR BIOPSY: CPT

## 2018-03-05 PROCEDURE — 87040 BLOOD CULTURE FOR BACTERIA: CPT

## 2018-03-05 PROCEDURE — 84145 PROCALCITONIN (PCT): CPT

## 2018-03-05 PROCEDURE — 80202 ASSAY OF VANCOMYCIN: CPT

## 2018-03-05 PROCEDURE — 87449 NOS EACH ORGANISM AG IA: CPT

## 2018-03-05 PROCEDURE — 86850 RBC ANTIBODY SCREEN: CPT

## 2018-03-05 PROCEDURE — 87324 CLOSTRIDIUM AG IA: CPT

## 2018-03-05 PROCEDURE — 77001 FLUOROGUIDE FOR VEIN DEVICE: CPT

## 2018-03-05 PROCEDURE — 80170 ASSAY OF GENTAMICIN: CPT

## 2018-03-05 PROCEDURE — 80048 BASIC METABOLIC PNL TOTAL CA: CPT

## 2018-03-05 PROCEDURE — 93005 ELECTROCARDIOGRAM TRACING: CPT | Mod: XU

## 2018-03-05 PROCEDURE — 74176 CT ABD & PELVIS W/O CONTRAST: CPT

## 2018-03-05 PROCEDURE — 86638 Q FEVER ANTIBODY: CPT

## 2018-03-05 PROCEDURE — 85670 THROMBIN TIME PLASMA: CPT

## 2018-03-05 PROCEDURE — 82533 TOTAL CORTISOL: CPT

## 2018-03-05 PROCEDURE — 82550 ASSAY OF CK (CPK): CPT

## 2018-03-05 PROCEDURE — 82553 CREATINE MB FRACTION: CPT

## 2018-03-05 PROCEDURE — 92960 CARDIOVERSION ELECTRIC EXT: CPT

## 2018-03-05 PROCEDURE — C1894: CPT

## 2018-03-05 PROCEDURE — 99285 EMERGENCY DEPT VISIT HI MDM: CPT | Mod: 25

## 2018-03-05 PROCEDURE — 87206 SMEAR FLUORESCENT/ACID STAI: CPT

## 2018-03-05 PROCEDURE — C1889: CPT

## 2018-03-05 PROCEDURE — 85027 COMPLETE CBC AUTOMATED: CPT

## 2018-03-05 PROCEDURE — 86780 TREPONEMA PALLIDUM: CPT

## 2018-03-05 PROCEDURE — 94799 UNLISTED PULMONARY SVC/PX: CPT

## 2018-03-05 PROCEDURE — 70450 CT HEAD/BRAIN W/O DYE: CPT

## 2018-03-05 PROCEDURE — P9047: CPT

## 2018-03-05 PROCEDURE — 82803 BLOOD GASES ANY COMBINATION: CPT

## 2018-03-05 PROCEDURE — 99232 SBSQ HOSP IP/OBS MODERATE 35: CPT

## 2018-03-05 PROCEDURE — 87102 FUNGUS ISOLATION CULTURE: CPT

## 2018-03-05 PROCEDURE — 94002 VENT MGMT INPAT INIT DAY: CPT

## 2018-03-05 PROCEDURE — P9016: CPT

## 2018-03-05 PROCEDURE — 82436 ASSAY OF URINE CHLORIDE: CPT

## 2018-03-05 PROCEDURE — 81001 URINALYSIS AUTO W/SCOPE: CPT

## 2018-03-05 PROCEDURE — 87633 RESP VIRUS 12-25 TARGETS: CPT

## 2018-03-05 PROCEDURE — 82962 GLUCOSE BLOOD TEST: CPT

## 2018-03-05 PROCEDURE — 92610 EVALUATE SWALLOWING FUNCTION: CPT

## 2018-03-05 PROCEDURE — 87015 SPECIMEN INFECT AGNT CONCNTJ: CPT

## 2018-03-05 PROCEDURE — 80061 LIPID PANEL: CPT

## 2018-03-05 PROCEDURE — 93312 ECHO TRANSESOPHAGEAL: CPT

## 2018-03-05 PROCEDURE — 86923 COMPATIBILITY TEST ELECTRIC: CPT

## 2018-03-05 PROCEDURE — 82140 ASSAY OF AMMONIA: CPT

## 2018-03-05 PROCEDURE — 36600 WITHDRAWAL OF ARTERIAL BLOOD: CPT

## 2018-03-05 PROCEDURE — 87070 CULTURE OTHR SPECIMN AEROBIC: CPT

## 2018-03-05 PROCEDURE — 87641 MR-STAPH DNA AMP PROBE: CPT

## 2018-03-05 PROCEDURE — 84443 ASSAY THYROID STIM HORMONE: CPT

## 2018-03-05 PROCEDURE — 94640 AIRWAY INHALATION TREATMENT: CPT

## 2018-03-05 PROCEDURE — 87640 STAPH A DNA AMP PROBE: CPT

## 2018-03-05 PROCEDURE — 93321 DOPPLER ECHO F-UP/LMTD STD: CPT

## 2018-03-05 PROCEDURE — 86900 BLOOD TYPING SEROLOGIC ABO: CPT

## 2018-03-05 PROCEDURE — 80053 COMPREHEN METABOLIC PANEL: CPT

## 2018-03-05 PROCEDURE — 96375 TX/PRO/DX INJ NEW DRUG ADDON: CPT | Mod: XU

## 2018-03-05 PROCEDURE — 87103 BLOOD FUNGUS CULTURE: CPT

## 2018-03-05 PROCEDURE — 93454 CORONARY ARTERY ANGIO S&I: CPT

## 2018-03-05 PROCEDURE — 97162 PT EVAL MOD COMPLEX 30 MIN: CPT

## 2018-03-05 PROCEDURE — 76775 US EXAM ABDO BACK WALL LIM: CPT

## 2018-03-05 PROCEDURE — 93320 DOPPLER ECHO COMPLETE: CPT

## 2018-03-05 PROCEDURE — 85379 FIBRIN DEGRADATION QUANT: CPT

## 2018-03-05 PROCEDURE — 87486 CHLMYD PNEUM DNA AMP PROBE: CPT

## 2018-03-05 PROCEDURE — 74230 X-RAY XM SWLNG FUNCJ C+: CPT

## 2018-03-05 PROCEDURE — P9045: CPT

## 2018-03-05 PROCEDURE — 83036 HEMOGLOBIN GLYCOSYLATED A1C: CPT

## 2018-03-05 PROCEDURE — 83935 ASSAY OF URINE OSMOLALITY: CPT

## 2018-03-05 PROCEDURE — 84156 ASSAY OF PROTEIN URINE: CPT

## 2018-03-05 PROCEDURE — 93306 TTE W/DOPPLER COMPLETE: CPT

## 2018-03-05 PROCEDURE — 83735 ASSAY OF MAGNESIUM: CPT

## 2018-03-05 PROCEDURE — 85270 CLOT FACTOR XI PTA: CPT

## 2018-03-05 PROCEDURE — 76937 US GUIDE VASCULAR ACCESS: CPT

## 2018-03-05 PROCEDURE — 84100 ASSAY OF PHOSPHORUS: CPT

## 2018-03-05 PROCEDURE — 88311 DECALCIFY TISSUE: CPT

## 2018-03-05 PROCEDURE — 71250 CT THORAX DX C-: CPT

## 2018-03-05 PROCEDURE — 86891 AUTOLOGOUS BLOOD OP SALVAGE: CPT

## 2018-03-05 PROCEDURE — 80307 DRUG TEST PRSMV CHEM ANLYZR: CPT

## 2018-03-05 PROCEDURE — P9037: CPT

## 2018-03-05 PROCEDURE — 85576 BLOOD PLATELET AGGREGATION: CPT

## 2018-03-05 PROCEDURE — 94660 CPAP INITIATION&MGMT: CPT

## 2018-03-05 PROCEDURE — 86611 BARTONELLA ANTIBODY: CPT

## 2018-03-05 PROCEDURE — 76376 3D RENDER W/INTRP POSTPROCES: CPT

## 2018-03-05 PROCEDURE — 85384 FIBRINOGEN ACTIVITY: CPT

## 2018-03-05 PROCEDURE — 84540 ASSAY OF URINE/UREA-N: CPT

## 2018-03-05 PROCEDURE — C1887: CPT

## 2018-03-05 PROCEDURE — 93926 LOWER EXTREMITY STUDY: CPT

## 2018-03-05 PROCEDURE — 82805 BLOOD GASES W/O2 SATURATION: CPT

## 2018-03-05 PROCEDURE — 84484 ASSAY OF TROPONIN QUANT: CPT

## 2018-03-05 PROCEDURE — 86147 CARDIOLIPIN ANTIBODY EA IG: CPT

## 2018-03-05 PROCEDURE — 82728 ASSAY OF FERRITIN: CPT

## 2018-03-05 PROCEDURE — 99153 MOD SED SAME PHYS/QHP EA: CPT

## 2018-03-05 PROCEDURE — C1769: CPT

## 2018-03-05 PROCEDURE — 83605 ASSAY OF LACTIC ACID: CPT

## 2018-03-05 PROCEDURE — 76705 ECHO EXAM OF ABDOMEN: CPT

## 2018-03-05 PROCEDURE — 83690 ASSAY OF LIPASE: CPT

## 2018-03-05 PROCEDURE — 84436 ASSAY OF TOTAL THYROXINE: CPT

## 2018-03-05 PROCEDURE — 97116 GAIT TRAINING THERAPY: CPT

## 2018-03-05 PROCEDURE — 36569 INSJ PICC 5 YR+ W/O IMAGING: CPT

## 2018-03-05 PROCEDURE — 31720 CLEARANCE OF AIRWAYS: CPT

## 2018-03-05 PROCEDURE — 87116 MYCOBACTERIA CULTURE: CPT

## 2018-03-05 PROCEDURE — 87086 URINE CULTURE/COLONY COUNT: CPT

## 2018-03-05 PROCEDURE — 97605 NEG PRS WND THER DME<=50SQCM: CPT

## 2018-03-05 PROCEDURE — 87389 HIV-1 AG W/HIV-1&-2 AB AG IA: CPT

## 2018-03-05 PROCEDURE — 93325 DOPPLER ECHO COLOR FLOW MAPG: CPT

## 2018-03-05 PROCEDURE — 86713 LEGIONELLA ANTIBODY: CPT

## 2018-03-05 PROCEDURE — 93970 EXTREMITY STUDY: CPT

## 2018-03-05 PROCEDURE — 93308 TTE F-UP OR LMTD: CPT

## 2018-03-05 PROCEDURE — 82150 ASSAY OF AMYLASE: CPT

## 2018-03-05 PROCEDURE — 82570 ASSAY OF URINE CREATININE: CPT

## 2018-03-05 PROCEDURE — 74174 CTA ABD&PLVS W/CONTRAST: CPT

## 2018-03-05 PROCEDURE — 84132 ASSAY OF SERUM POTASSIUM: CPT

## 2018-03-05 PROCEDURE — 97530 THERAPEUTIC ACTIVITIES: CPT

## 2018-03-05 PROCEDURE — 86022 PLATELET ANTIBODIES: CPT

## 2018-03-05 PROCEDURE — 85240 CLOT FACTOR VIII AHG 1 STAGE: CPT

## 2018-03-05 PROCEDURE — 85635 REPTILASE TEST: CPT

## 2018-03-05 PROCEDURE — 85730 THROMBOPLASTIN TIME PARTIAL: CPT

## 2018-03-05 PROCEDURE — P9017: CPT

## 2018-03-05 PROCEDURE — 84295 ASSAY OF SERUM SODIUM: CPT

## 2018-03-05 PROCEDURE — P9012: CPT

## 2018-03-05 PROCEDURE — 83880 ASSAY OF NATRIURETIC PEPTIDE: CPT

## 2018-03-05 PROCEDURE — C1751: CPT

## 2018-03-05 PROCEDURE — 94003 VENT MGMT INPAT SUBQ DAY: CPT

## 2018-03-05 PROCEDURE — 92611 MOTION FLUOROSCOPY/SWALLOW: CPT

## 2018-03-05 PROCEDURE — 83550 IRON BINDING TEST: CPT

## 2018-03-05 PROCEDURE — 36430 TRANSFUSION BLD/BLD COMPNT: CPT

## 2018-03-05 PROCEDURE — 85014 HEMATOCRIT: CPT

## 2018-03-05 PROCEDURE — 97110 THERAPEUTIC EXERCISES: CPT

## 2018-03-05 PROCEDURE — 86622 BRUCELLA ANTIBODY: CPT

## 2018-03-05 PROCEDURE — 85250 CLOT FACTOR IX PTC/CHRSTMAS: CPT

## 2018-03-05 PROCEDURE — 82565 ASSAY OF CREATININE: CPT

## 2018-03-05 RX ADMIN — FINASTERIDE 5 MILLIGRAM(S): 5 TABLET, FILM COATED ORAL at 11:49

## 2018-03-05 RX ADMIN — Medication 100 MILLIGRAM(S): at 11:49

## 2018-03-05 RX ADMIN — AMIODARONE HYDROCHLORIDE 200 MILLIGRAM(S): 400 TABLET ORAL at 06:02

## 2018-03-05 RX ADMIN — Medication 25 MILLIGRAM(S): at 06:03

## 2018-03-05 RX ADMIN — Medication 5: at 17:10

## 2018-03-05 RX ADMIN — Medication 1 MILLIGRAM(S): at 11:49

## 2018-03-05 RX ADMIN — Medication 325 MILLIGRAM(S): at 11:49

## 2018-03-05 RX ADMIN — PANTOPRAZOLE SODIUM 40 MILLIGRAM(S): 20 TABLET, DELAYED RELEASE ORAL at 06:02

## 2018-03-05 RX ADMIN — LIDOCAINE 1 APPLICATION(S): 4 CREAM TOPICAL at 06:03

## 2018-03-05 RX ADMIN — Medication 150 MICROGRAM(S): at 06:02

## 2018-03-05 NOTE — PROGRESS NOTE ADULT - PROVIDER SPECIALTY LIST ADULT
Anesthesia
CCU
CT Surgery
CTU
Cardiology
Critical Care
Dental
ENT
Endocrinology
Heme/Onc
Hospitalist
Infectious Disease
Intervent Radiology
Nephrology
Plastic Surgery
Surgery
Urology
Vascular Surgery
CCU
Intervent Radiology
Surgery
CT Surgery
Infectious Disease
Infectious Disease
Nephrology
Plastic Surgery
Urology
Urology
Nephrology
CT Surgery
CTU
CT Surgery
Hospitalist
CT Surgery

## 2018-03-05 NOTE — ADVANCED PRACTICE NURSE CONSULT - ASSESSMENT
Pt now on 2 Paula, on a low air-loss surface being assisted with turning and positioning. Pt is able to turn to his side with minimal assistance.  Pt is using a seat cushion when OOB to the chair.  The pt is thin, frail- being followed by nutrition.  Pt reports hx of stool incontinence.  Upon assessment, the pt presents with 2 areas where the skin integrity has been altered. In the gluteal cleft is a wound measuring 2cm x 2cm x0.2cm. the wound presents with fibrinous exudate and granular budding, the periwound skin is hyperpigmented as can occur with chronic exposure to moisture, There is no erythema, induration or fluctuance.   Near the anus is a partial-thickness wound measuring 4cm x 2cm x0.2cm - the wound is red, moist. Again, the periwound skin is hyperpigmented. there is no erythema, induration, or fluctuance.  Suggest that these wounds may have occurred secondary to moisture and exposure of the skin to irritants in stool- staff have been applying Triad paste- pt reports discomfort with the paste- Cavilon was applied to lay down a protective coating on the skin.  PT Wound and Plastic surgery following sternal wound,

## 2018-03-05 NOTE — PROGRESS NOTE ADULT - SUBJECTIVE AND OBJECTIVE BOX
CC: F/U for Sternal Wound    Saw/spoke to patient. No fevers, no chills. Overall well. Had I+D to sternal wound site, now wound vac in place. (Reportedly collection at sternum found to be solely hematoma)    Allergies  No Known Allergies    ANTIMICROBIALS:  Off    PE:    Vital Signs Last 24 Hrs  T(C): 36.8 (05 Mar 2018 13:00), Max: 36.9 (05 Mar 2018 05:29)  T(F): 98.2 (05 Mar 2018 13:00), Max: 98.5 (05 Mar 2018 05:29)  HR: 78 (05 Mar 2018 13:00) (76 - 78)  BP: 108/62 (05 Mar 2018 13:00) (108/62 - 113/63)  BP(mean): --  RR: 18 (05 Mar 2018 13:00) (18 - 18)  SpO2: 96% (05 Mar 2018 13:00) (95% - 96%)    Gen: AOx3, NAD, non-toxic, pleasant  CV: S1+S2 normal, no murmurs, nontachycardic  Resp: Clear bilat, no resp distress, no crackles/wheezes  Abd: Soft, nontender, +BS  Ext: No LE edema, no wounds    LABS:                        9.3    11.2  )-----------( 348      ( 05 Mar 2018 06:17 )             27.8     03-05    136  |  104  |  18  ----------------------------<  89  4.0   |  24  |  0.96    Ca    8.0<L>      05 Mar 2018 06:17    TPro  6.2  /  Alb  2.2<L>  /  TBili  0.5  /  DBili  x   /  AST  33  /  ALT  31  /  AlkPhos  149<H>  03-05    MICROBIOLOGY:    .Surgical Swab sternal wound #1  02-14-18   No growth at 5 days     .Other Other, mediastinum #2  02-11-18   No fungus isolated at 1 week. No additional interim reports will be  issued unless there is a change in culture status.     .Blood Blood-Venous  02-11-18   No growth at 5 days.     .Blood Blood  02-09-18   No growth at 5 days.     .Blood Blood  02-09-18   No growth at 5 days.      .Urine Catheterized  02-06-18   No growth    .Blood Blood-Peripheral  02-05-18   No growth at 5 days.     RADIOLOGY:    No new available

## 2018-03-05 NOTE — PROGRESS NOTE ADULT - ASSESSMENT
72 male with T2DM, DLD, alcohol misuse brought in by family after having been found face down on the floor for approximately 2 days. On treatment for UTI, but now JANES with Aortic Valve lesions. Initial suspected culture negative endocarditis, subsequently had valve failure, so had AVR on 1/30/18; PCR confirmed GBS. Completed treatment on 3/1 with Ceftriaxone. Patient had episode of sternal ? discharge, underwent wound exploration on 2/11--cultures negative--thought to be hematoma, noninfected. Patient was doing well and planned for discharge, but then team noticed increasing swelling at mid-sternal area. Had plastic surgery eval to sternal wound, no signs active infection--thought to be hematoma on this evaluation as well. Doing well. Overall, NVE, post operative, wound hematoma.  - Continue off abx for now  - Wound care per surgery team  - Will sign off. Please call with further questions or change in status.    Baldev Small MD  Pager [phone]  After 5pm and on weekends call [phone]

## 2018-03-05 NOTE — ADVANCED PRACTICE NURSE CONSULT - RECOMMEDATIONS
Will recommend the followin. Gluteal cleft. perirectal area: apply Cavilon daily, continue to monitor  2. Continue with turning and positioning  3. nutrition support as pt condition allows.  4. Seat cushion when OOB to chair  Tx plan discussed with RN

## 2018-03-05 NOTE — ADVANCED PRACTICE NURSE CONSULT - REASON FOR CONSULT
Requested by staff to assess skin status: buttocks. 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  1/20/18 AVR (T)/C1L   Pod 1 Pressors inotropes,  R PL effusion s/p pigtail placement with 1.6L removed.  POD 2: Cardiogenic shock, RACHEAL-Reintubated.  IABP placed lasix infusion, hd catheter placed,  +inotropes. On antibiotics ceftriaxone and GENT for gram + cocci endocarditis, reintubation  enteral feeds  Vascular consulted for pseudoaneurysm  2/7 Seen by ENT,+ R VC paresis medially with glottic gap, pooling of secretions and b/l posterior intubation granulomas  2/8 Hematology consulted for prolonged aPTT of unclear etiology.   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  Feb 13 repeat thrombin injection for L Fem PSA  Feb 14 RTOR sternal wound debridement, removal of VAC, washout and closure  2/15 Per Vasc Sx pt is now s/p L pseudoanuerysm thrombin injection x3 with remaining pseudoaneursym injected with thrombin two days prior with U/S resolution. Pt underwent repeat arterial duplex that showed resolution of pseudoaneurysm.   2/16 Dysphagia 1 THIN STRAINED PUREE ONLY, with Honey thickened liquids. All Liquids via teaspoon only.   Feb 16 R pigtail for pleural effusion  2/19/18 Milan reinserted for retention , URO consulted, eventual TOV  2/22/18 - VSS - Kaofeed in place - will contact S&S re: repeat FEEST   decrease free water boluses to 250 via kaofeed q6h   2/23 Dental consulted for OR cx + GroupB strep, D/c pigtail, Calorie count in progress. MBS Monday /Tues 2/24: Anemic requiring 1 unit PRBC. Milan light hematuria. Per Dental White plaques on tongue to be biopsied as outpatient-no signs of infection  2/25: c/o sore throat appreciate ENT input  2/26 Left pleural effusion --> s/p Left pigtail cath placed; TOV --> Bladder scan >900 cc; Milan cath reinserted with 1.9 L of urine.  Plan to repeat MBS in AM   2/27 Pigatil drained 110/730 Milan in place - seen by Urology Dr Goldberg will maintain milan  in  rehab MBS this am. PICC not patent IV team aware  per ID d/c ceftriaxone 3/1  2/28 Pigtail D/C pacing wires d/c  shower today  dyshagia 3 thin liquid diet d/w social work -[id number] placement last dose abx 3/1 d/c PICC after last dose abx   3/1  HD stable.  Ceftriaxone dc after dose today.  Mid sternal incision with Hematoma old blood oozing from incision.  DC PICC line.  Awaiting auth for rehab.  DC with milan  3/2 discharge cancelled secondary to midsternal collection.   3/3 packing to mid sternum. Await wound PT for vac  3/4 Vac applied today by wound PT. will need rehab that will take vacs as well

## 2018-03-14 LAB
CULTURE RESULTS: SIGNIFICANT CHANGE UP
CULTURE RESULTS: SIGNIFICANT CHANGE UP
SPECIMEN SOURCE: SIGNIFICANT CHANGE UP
SPECIMEN SOURCE: SIGNIFICANT CHANGE UP

## 2018-03-21 LAB
CULTURE RESULTS: SIGNIFICANT CHANGE UP
SPECIMEN SOURCE: SIGNIFICANT CHANGE UP

## 2018-03-28 PROBLEM — Z09 POSTOP CHECK: Status: ACTIVE | Noted: 2018-03-28

## 2018-03-28 PROBLEM — Z95.1 S/P CABG X 1: Status: ACTIVE | Noted: 2018-03-28

## 2018-03-28 PROBLEM — Z95.2 AORTIC VALVE REPLACED: Status: ACTIVE | Noted: 2018-03-28

## 2018-03-28 RX ORDER — LINAGLIPTIN 5 MG/1
5 TABLET, FILM COATED ORAL DAILY
Refills: 0 | Status: DISCONTINUED | COMMUNITY
End: 2018-03-28

## 2018-03-28 RX ORDER — SIMETHICONE 80 MG/1
80 TABLET, CHEWABLE ORAL
Refills: 0 | Status: ACTIVE | COMMUNITY

## 2018-03-28 RX ORDER — FINASTERIDE 5 MG/1
5 TABLET, FILM COATED ORAL DAILY
Refills: 0 | Status: ACTIVE | COMMUNITY

## 2018-03-28 RX ORDER — TAMSULOSIN HYDROCHLORIDE 0.4 MG/1
0.4 CAPSULE ORAL
Qty: 90 | Refills: 0 | Status: ACTIVE | COMMUNITY
Start: 2017-09-05

## 2018-03-28 RX ORDER — MOMETASONE FUROATE 1 MG/G
0.1 CREAM TOPICAL
Refills: 0 | Status: DISCONTINUED | COMMUNITY
End: 2018-03-28

## 2018-03-28 RX ORDER — ACETAMINOPHEN 500 MG/1
TABLET ORAL EVERY 6 HOURS
Refills: 0 | Status: ACTIVE | COMMUNITY

## 2018-03-28 RX ORDER — PANTOPRAZOLE 40 MG/1
40 TABLET, DELAYED RELEASE ORAL
Qty: 90 | Refills: 0 | Status: ACTIVE | COMMUNITY
Start: 2017-08-18

## 2018-03-28 RX ORDER — AMIODARONE HYDROCHLORIDE 200 MG/1
200 TABLET ORAL DAILY
Refills: 0 | Status: ACTIVE | COMMUNITY

## 2018-03-28 RX ORDER — FOLIC ACID 1 MG/1
1 TABLET ORAL DAILY
Refills: 0 | Status: ACTIVE | COMMUNITY

## 2018-03-28 RX ORDER — MULTIVITAMIN
TABLET ORAL DAILY
Refills: 0 | Status: ACTIVE | COMMUNITY

## 2018-03-28 RX ORDER — TICAGRELOR 90 MG/1
90 TABLET ORAL TWICE DAILY
Refills: 0 | Status: ACTIVE | COMMUNITY

## 2018-03-28 RX ORDER — LEVOTHYROXINE SODIUM 0.15 MG/1
150 TABLET ORAL DAILY
Refills: 0 | Status: ACTIVE | COMMUNITY

## 2018-03-28 RX ORDER — ERGOCALCIFEROL 1.25 MG/1
1.25 MG CAPSULE ORAL
Refills: 0 | Status: DISCONTINUED | COMMUNITY
End: 2018-03-28

## 2018-03-28 RX ORDER — METFORMIN HYDROCHLORIDE 1000 MG/1
1000 TABLET, COATED ORAL
Refills: 0 | Status: ACTIVE | COMMUNITY

## 2018-03-28 RX ORDER — METFORMIN HYDROCHLORIDE 1000 MG/1
1000 TABLET, COATED ORAL DAILY
Refills: 0 | Status: DISCONTINUED | COMMUNITY
End: 2018-03-28

## 2018-03-28 RX ORDER — GUAIFENESIN 100 MG/5ML
100 LIQUID (ML) ORAL
Refills: 0 | Status: DISCONTINUED | COMMUNITY
End: 2018-03-28

## 2018-03-28 RX ORDER — ASPIRIN 81 MG/1
81 TABLET ORAL DAILY
Refills: 0 | Status: ACTIVE | COMMUNITY

## 2018-03-28 RX ORDER — SIMVASTATIN 20 MG/1
20 TABLET, FILM COATED ORAL DAILY
Refills: 0 | Status: DISCONTINUED | COMMUNITY
End: 2018-03-28

## 2018-03-28 RX ORDER — DOCUSATE SODIUM 100 MG/1
100 CAPSULE, LIQUID FILLED ORAL DAILY
Refills: 0 | Status: ACTIVE | COMMUNITY

## 2018-03-28 RX ORDER — POLYETHYLENE GLYCOL 3350 17 G/17G
17 POWDER, FOR SOLUTION ORAL DAILY
Refills: 0 | Status: ACTIVE | COMMUNITY

## 2018-03-28 RX ORDER — METOPROLOL TARTRATE 75 MG/1
TABLET, FILM COATED ORAL
Refills: 0 | Status: ACTIVE | COMMUNITY

## 2018-03-28 RX ORDER — ATORVASTATIN CALCIUM 80 MG/1
80 TABLET, FILM COATED ORAL DAILY
Refills: 0 | Status: ACTIVE | COMMUNITY

## 2018-04-06 ENCOUNTER — APPOINTMENT (OUTPATIENT)
Dept: CARDIOTHORACIC SURGERY | Facility: CLINIC | Age: 73
End: 2018-04-06
Payer: MEDICARE

## 2018-04-06 ENCOUNTER — OUTPATIENT (OUTPATIENT)
Dept: OUTPATIENT SERVICES | Facility: HOSPITAL | Age: 73
LOS: 1 days | End: 2018-04-06
Payer: SELF-PAY

## 2018-04-06 ENCOUNTER — OTHER (OUTPATIENT)
Age: 73
End: 2018-04-06

## 2018-04-06 ENCOUNTER — APPOINTMENT (OUTPATIENT)
Dept: CV DIAGNOSITCS | Facility: HOSPITAL | Age: 73
End: 2018-04-06

## 2018-04-06 VITALS
BODY MASS INDEX: 20.09 KG/M2 | DIASTOLIC BLOOD PRESSURE: 59 MMHG | OXYGEN SATURATION: 97 % | SYSTOLIC BLOOD PRESSURE: 89 MMHG | RESPIRATION RATE: 14 BRPM | TEMPERATURE: 97.7 F | WEIGHT: 128 LBS | HEIGHT: 67 IN | HEART RATE: 76 BPM

## 2018-04-06 PROCEDURE — 99024 POSTOP FOLLOW-UP VISIT: CPT

## 2018-04-06 PROCEDURE — 93308 TTE F-UP OR LMTD: CPT

## 2018-04-06 PROCEDURE — 93321 DOPPLER ECHO F-UP/LMTD STD: CPT | Mod: 26

## 2018-04-06 PROCEDURE — 93321 DOPPLER ECHO F-UP/LMTD STD: CPT

## 2018-04-06 PROCEDURE — 93308 TTE F-UP OR LMTD: CPT | Mod: 26

## 2018-04-06 RX ORDER — POLYETHYLENE GLYCOL 3350, SODIUM SULFATE ANHYDROUS, SODIUM BICARBONATE, SODIUM CHLORIDE, POTASSIUM CHLORIDE 236; 22.74; 6.74; 5.86; 2.97 G/4L; G/4L; G/4L; G/4L; G/4L
POWDER, FOR SOLUTION ORAL
Refills: 0 | Status: DISCONTINUED | COMMUNITY
End: 2018-04-06

## 2018-04-10 DIAGNOSIS — Z95.1 PRESENCE OF AORTOCORONARY BYPASS GRAFT: ICD-10-CM

## 2019-05-10 NOTE — CONSULT NOTE ADULT - ASSESSMENT
Patient presented for labs, phlebotomy protocol was followed. Patient tolerated lab draw with no complications.    72 year-old male with past medical history of T2DM, DLD, alcohol misuse brought in by family after having been found face down on the floor for approximately 2 days.  No fevers. Patient with history of diagnosis of enlarged prostate and urinary frequency. Also complains of dysuria, and bladder pain. Initially had high WBC, now resolving. WBC ? reactive to dehydration and stress response to being down vs active infection. Would treat for presumed UTI. Continue present therapy. AMS, leukocytosis, UTI.  - Zosyn 3.375g q 8  - DC Vancomycin  - Continue to trend WBC, monitor for fevers/symptoms    Baldev Small MD  Pager [phone]  After 5pm and on weekends call [phone]

## 2019-05-11 NOTE — ED ADULT NURSE REASSESSMENT NOTE - NS ED NURSE REASSESS COMMENT FT1
rvp sent to r/o resp iso. droplet iso sticker placed on door . precautions noted and taken This document is complete and the patient is ready for discharge.

## 2019-10-27 ENCOUNTER — INPATIENT (INPATIENT)
Facility: HOSPITAL | Age: 74
LOS: 4 days | Discharge: HOME HEALTH | DRG: 335 | End: 2019-11-01
Attending: SURGERY | Admitting: SURGERY
Payer: MEDICARE

## 2019-10-27 VITALS
RESPIRATION RATE: 16 BRPM | HEART RATE: 84 BPM | TEMPERATURE: 97 F | SYSTOLIC BLOOD PRESSURE: 100 MMHG | DIASTOLIC BLOOD PRESSURE: 60 MMHG

## 2019-10-27 DIAGNOSIS — K56.609 UNSPECIFIED INTESTINAL OBSTRUCTION, UNSPECIFIED AS TO PARTIAL VERSUS COMPLETE OBSTRUCTION: ICD-10-CM

## 2019-10-27 DIAGNOSIS — Z95.1 PRESENCE OF AORTOCORONARY BYPASS GRAFT: Chronic | ICD-10-CM

## 2019-10-27 LAB
ALBUMIN SERPL ELPH-MCNC: 1.9 G/DL — LOW (ref 3.3–5)
ALBUMIN SERPL ELPH-MCNC: 3.2 G/DL — LOW (ref 3.3–5)
ALP SERPL-CCNC: 44 U/L — SIGNIFICANT CHANGE UP (ref 40–120)
ALP SERPL-CCNC: 61 U/L — SIGNIFICANT CHANGE UP (ref 40–120)
ALT FLD-CCNC: 6 U/L — LOW (ref 10–45)
ALT FLD-CCNC: 8 U/L — LOW (ref 10–45)
ANION GAP SERPL CALC-SCNC: 11 MMOL/L — SIGNIFICANT CHANGE UP (ref 5–17)
ANION GAP SERPL CALC-SCNC: 12 MMOL/L — SIGNIFICANT CHANGE UP (ref 5–17)
APPEARANCE UR: ABNORMAL
APTT BLD: 31.7 SEC — SIGNIFICANT CHANGE UP (ref 27.5–36.3)
APTT BLD: 36.9 SEC — HIGH (ref 27.5–36.3)
AST SERPL-CCNC: 15 U/L — SIGNIFICANT CHANGE UP (ref 10–40)
AST SERPL-CCNC: 16 U/L — SIGNIFICANT CHANGE UP (ref 10–40)
BACTERIA # UR AUTO: ABNORMAL
BASOPHILS # BLD AUTO: 0 K/UL — SIGNIFICANT CHANGE UP (ref 0–0.2)
BASOPHILS NFR BLD AUTO: 0 % — SIGNIFICANT CHANGE UP (ref 0–2)
BILIRUB DIRECT SERPL-MCNC: 0.2 MG/DL — SIGNIFICANT CHANGE UP (ref 0–0.2)
BILIRUB INDIRECT FLD-MCNC: 0.3 MG/DL — SIGNIFICANT CHANGE UP (ref 0.2–1)
BILIRUB SERPL-MCNC: 0.4 MG/DL — SIGNIFICANT CHANGE UP (ref 0.2–1.2)
BILIRUB SERPL-MCNC: 0.5 MG/DL — SIGNIFICANT CHANGE UP (ref 0.2–1.2)
BILIRUB UR-MCNC: NEGATIVE — SIGNIFICANT CHANGE UP
BLD GP AB SCN SERPL QL: NEGATIVE — SIGNIFICANT CHANGE UP
BUN SERPL-MCNC: 28 MG/DL — HIGH (ref 7–23)
BUN SERPL-MCNC: 32 MG/DL — HIGH (ref 7–23)
CALCIUM SERPL-MCNC: 7.4 MG/DL — LOW (ref 8.4–10.5)
CALCIUM SERPL-MCNC: 8.7 MG/DL — SIGNIFICANT CHANGE UP (ref 8.4–10.5)
CHLORIDE SERPL-SCNC: 103 MMOL/L — SIGNIFICANT CHANGE UP (ref 96–108)
CHLORIDE SERPL-SCNC: 108 MMOL/L — SIGNIFICANT CHANGE UP (ref 96–108)
CO2 SERPL-SCNC: 18 MMOL/L — LOW (ref 22–31)
CO2 SERPL-SCNC: 25 MMOL/L — SIGNIFICANT CHANGE UP (ref 22–31)
COLOR SPEC: ABNORMAL
CREAT SERPL-MCNC: 1.02 MG/DL — SIGNIFICANT CHANGE UP (ref 0.5–1.3)
CREAT SERPL-MCNC: 1.26 MG/DL — SIGNIFICANT CHANGE UP (ref 0.5–1.3)
DIFF PNL FLD: ABNORMAL
EOSINOPHIL # BLD AUTO: 0.14 K/UL — SIGNIFICANT CHANGE UP (ref 0–0.5)
EOSINOPHIL NFR BLD AUTO: 1.8 % — SIGNIFICANT CHANGE UP (ref 0–6)
EPI CELLS # UR: 1 — SIGNIFICANT CHANGE UP
GAS PNL BLDA: SIGNIFICANT CHANGE UP
GAS PNL BLDA: SIGNIFICANT CHANGE UP
GAS PNL BLDV: SIGNIFICANT CHANGE UP
GLUCOSE SERPL-MCNC: 154 MG/DL — HIGH (ref 70–99)
GLUCOSE SERPL-MCNC: 201 MG/DL — HIGH (ref 70–99)
GLUCOSE UR QL: NEGATIVE — SIGNIFICANT CHANGE UP
HCT VFR BLD CALC: 29.8 % — LOW (ref 39–50)
HCT VFR BLD CALC: 30.3 % — LOW (ref 39–50)
HCT VFR BLD CALC: 31.8 % — LOW (ref 39–50)
HCT VFR BLD CALC: 34.4 % — LOW (ref 39–50)
HGB BLD-MCNC: 10.2 G/DL — LOW (ref 13–17)
HGB BLD-MCNC: 10.3 G/DL — LOW (ref 13–17)
HGB BLD-MCNC: 10.6 G/DL — LOW (ref 13–17)
HGB BLD-MCNC: 11.5 G/DL — LOW (ref 13–17)
HYALINE CASTS # UR AUTO: 0 /LPF — SIGNIFICANT CHANGE UP (ref 0–7)
INR BLD: 0.97 RATIO — SIGNIFICANT CHANGE UP (ref 0.88–1.16)
INR BLD: 0.99 RATIO — SIGNIFICANT CHANGE UP (ref 0.88–1.16)
KETONES UR-MCNC: NEGATIVE — SIGNIFICANT CHANGE UP
LEUKOCYTE ESTERASE UR-ACNC: ABNORMAL
LIDOCAIN IGE QN: 194 U/L — HIGH (ref 7–60)
LYMPHOCYTES # BLD AUTO: 0.27 K/UL — LOW (ref 1–3.3)
LYMPHOCYTES # BLD AUTO: 3.5 % — LOW (ref 13–44)
MAGNESIUM SERPL-MCNC: 1.5 MG/DL — LOW (ref 1.6–2.6)
MCHC RBC-ENTMCNC: 32 PG — SIGNIFICANT CHANGE UP (ref 27–34)
MCHC RBC-ENTMCNC: 32.5 PG — SIGNIFICANT CHANGE UP (ref 27–34)
MCHC RBC-ENTMCNC: 32.7 PG — SIGNIFICANT CHANGE UP (ref 27–34)
MCHC RBC-ENTMCNC: 32.8 PG — SIGNIFICANT CHANGE UP (ref 27–34)
MCHC RBC-ENTMCNC: 33.3 GM/DL — SIGNIFICANT CHANGE UP (ref 32–36)
MCHC RBC-ENTMCNC: 33.4 GM/DL — SIGNIFICANT CHANGE UP (ref 32–36)
MCHC RBC-ENTMCNC: 34 GM/DL — SIGNIFICANT CHANGE UP (ref 32–36)
MCHC RBC-ENTMCNC: 34.2 GM/DL — SIGNIFICANT CHANGE UP (ref 32–36)
MCV RBC AUTO: 95.6 FL — SIGNIFICANT CHANGE UP (ref 80–100)
MCV RBC AUTO: 95.8 FL — SIGNIFICANT CHANGE UP (ref 80–100)
MCV RBC AUTO: 96.1 FL — SIGNIFICANT CHANGE UP (ref 80–100)
MCV RBC AUTO: 97.7 FL — SIGNIFICANT CHANGE UP (ref 80–100)
MONOCYTES # BLD AUTO: 0.34 K/UL — SIGNIFICANT CHANGE UP (ref 0–0.9)
MONOCYTES NFR BLD AUTO: 4.4 % — SIGNIFICANT CHANGE UP (ref 2–14)
NEUTROPHILS # BLD AUTO: 6.89 K/UL — SIGNIFICANT CHANGE UP (ref 1.8–7.4)
NEUTROPHILS NFR BLD AUTO: 90.3 % — HIGH (ref 43–77)
NITRITE UR-MCNC: NEGATIVE — SIGNIFICANT CHANGE UP
NRBC # BLD: 0 /100 WBCS — SIGNIFICANT CHANGE UP (ref 0–0)
PH UR: 6 — SIGNIFICANT CHANGE UP (ref 5–8)
PHOSPHATE SERPL-MCNC: 3 MG/DL — SIGNIFICANT CHANGE UP (ref 2.5–4.5)
PLATELET # BLD AUTO: 178 K/UL — SIGNIFICANT CHANGE UP (ref 150–400)
PLATELET # BLD AUTO: 178 K/UL — SIGNIFICANT CHANGE UP (ref 150–400)
PLATELET # BLD AUTO: 210 K/UL — SIGNIFICANT CHANGE UP (ref 150–400)
PLATELET # BLD AUTO: 214 K/UL — SIGNIFICANT CHANGE UP (ref 150–400)
POTASSIUM SERPL-MCNC: 4 MMOL/L — SIGNIFICANT CHANGE UP (ref 3.5–5.3)
POTASSIUM SERPL-MCNC: 4.1 MMOL/L — SIGNIFICANT CHANGE UP (ref 3.5–5.3)
POTASSIUM SERPL-SCNC: 4 MMOL/L — SIGNIFICANT CHANGE UP (ref 3.5–5.3)
POTASSIUM SERPL-SCNC: 4.1 MMOL/L — SIGNIFICANT CHANGE UP (ref 3.5–5.3)
PROT SERPL-MCNC: 4.3 G/DL — LOW (ref 6–8.3)
PROT SERPL-MCNC: 6.4 G/DL — SIGNIFICANT CHANGE UP (ref 6–8.3)
PROT UR-MCNC: ABNORMAL
PROTHROM AB SERPL-ACNC: 11.2 SEC — SIGNIFICANT CHANGE UP (ref 10–12.9)
PROTHROM AB SERPL-ACNC: 11.3 SEC — SIGNIFICANT CHANGE UP (ref 10–12.9)
RBC # BLD: 3.11 M/UL — LOW (ref 4.2–5.8)
RBC # BLD: 3.17 M/UL — LOW (ref 4.2–5.8)
RBC # BLD: 3.31 M/UL — LOW (ref 4.2–5.8)
RBC # BLD: 3.52 M/UL — LOW (ref 4.2–5.8)
RBC # FLD: 19.3 % — HIGH (ref 10.3–14.5)
RBC # FLD: 19.4 % — HIGH (ref 10.3–14.5)
RBC CASTS # UR COMP ASSIST: 25 /HPF — HIGH (ref 0–4)
RH IG SCN BLD-IMP: POSITIVE — SIGNIFICANT CHANGE UP
RH IG SCN BLD-IMP: POSITIVE — SIGNIFICANT CHANGE UP
SODIUM SERPL-SCNC: 138 MMOL/L — SIGNIFICANT CHANGE UP (ref 135–145)
SODIUM SERPL-SCNC: 139 MMOL/L — SIGNIFICANT CHANGE UP (ref 135–145)
SP GR SPEC: 1.02 — SIGNIFICANT CHANGE UP (ref 1.01–1.02)
UROBILINOGEN FLD QL: ABNORMAL
WBC # BLD: 5.39 K/UL — SIGNIFICANT CHANGE UP (ref 3.8–10.5)
WBC # BLD: 6.62 K/UL — SIGNIFICANT CHANGE UP (ref 3.8–10.5)
WBC # BLD: 6.81 K/UL — SIGNIFICANT CHANGE UP (ref 3.8–10.5)
WBC # BLD: 7.63 K/UL — SIGNIFICANT CHANGE UP (ref 3.8–10.5)
WBC # FLD AUTO: 5.39 K/UL — SIGNIFICANT CHANGE UP (ref 3.8–10.5)
WBC # FLD AUTO: 6.62 K/UL — SIGNIFICANT CHANGE UP (ref 3.8–10.5)
WBC # FLD AUTO: 6.81 K/UL — SIGNIFICANT CHANGE UP (ref 3.8–10.5)
WBC # FLD AUTO: 7.63 K/UL — SIGNIFICANT CHANGE UP (ref 3.8–10.5)
WBC UR QL: >50 /HPF — HIGH (ref 0–5)

## 2019-10-27 PROCEDURE — 99053 MED SERV 10PM-8AM 24 HR FAC: CPT

## 2019-10-27 PROCEDURE — 99222 1ST HOSP IP/OBS MODERATE 55: CPT | Mod: 57

## 2019-10-27 PROCEDURE — 44005 FREEING OF BOWEL ADHESION: CPT | Mod: GC

## 2019-10-27 PROCEDURE — 74177 CT ABD & PELVIS W/CONTRAST: CPT | Mod: 26

## 2019-10-27 PROCEDURE — 71045 X-RAY EXAM CHEST 1 VIEW: CPT | Mod: 26

## 2019-10-27 PROCEDURE — 99222 1ST HOSP IP/OBS MODERATE 55: CPT | Mod: GC

## 2019-10-27 PROCEDURE — 99285 EMERGENCY DEPT VISIT HI MDM: CPT

## 2019-10-27 RX ORDER — MAGNESIUM SULFATE 500 MG/ML
2 VIAL (ML) INJECTION
Refills: 0 | Status: COMPLETED | OUTPATIENT
Start: 2019-10-27 | End: 2019-10-27

## 2019-10-27 RX ORDER — MORPHINE SULFATE 50 MG/1
2 CAPSULE, EXTENDED RELEASE ORAL ONCE
Refills: 0 | Status: DISCONTINUED | OUTPATIENT
Start: 2019-10-27 | End: 2019-10-27

## 2019-10-27 RX ORDER — SODIUM CHLORIDE 9 MG/ML
1000 INJECTION, SOLUTION INTRAVENOUS
Refills: 0 | Status: DISCONTINUED | OUTPATIENT
Start: 2019-10-27 | End: 2019-10-27

## 2019-10-27 RX ORDER — PIPERACILLIN AND TAZOBACTAM 4; .5 G/20ML; G/20ML
3.38 INJECTION, POWDER, LYOPHILIZED, FOR SOLUTION INTRAVENOUS EVERY 8 HOURS
Refills: 0 | Status: DISCONTINUED | OUTPATIENT
Start: 2019-10-27 | End: 2019-10-29

## 2019-10-27 RX ORDER — SODIUM CHLORIDE 9 MG/ML
1000 INJECTION INTRAMUSCULAR; INTRAVENOUS; SUBCUTANEOUS ONCE
Refills: 0 | Status: COMPLETED | OUTPATIENT
Start: 2019-10-27 | End: 2019-10-27

## 2019-10-27 RX ORDER — ACETAMINOPHEN 500 MG
1000 TABLET ORAL ONCE
Refills: 0 | Status: COMPLETED | OUTPATIENT
Start: 2019-10-27 | End: 2019-10-27

## 2019-10-27 RX ORDER — ACETAMINOPHEN 500 MG
1000 TABLET ORAL ONCE
Refills: 0 | Status: COMPLETED | OUTPATIENT
Start: 2019-10-28 | End: 2019-10-28

## 2019-10-27 RX ORDER — FAMOTIDINE 10 MG/ML
20 INJECTION INTRAVENOUS ONCE
Refills: 0 | Status: COMPLETED | OUTPATIENT
Start: 2019-10-27 | End: 2019-10-27

## 2019-10-27 RX ORDER — SODIUM CHLORIDE 9 MG/ML
500 INJECTION, SOLUTION INTRAVENOUS ONCE
Refills: 0 | Status: COMPLETED | OUTPATIENT
Start: 2019-10-27 | End: 2019-10-27

## 2019-10-27 RX ORDER — PANTOPRAZOLE SODIUM 20 MG/1
40 TABLET, DELAYED RELEASE ORAL EVERY 12 HOURS
Refills: 0 | Status: DISCONTINUED | OUTPATIENT
Start: 2019-10-27 | End: 2019-10-31

## 2019-10-27 RX ORDER — SODIUM CHLORIDE 9 MG/ML
1000 INJECTION, SOLUTION INTRAVENOUS
Refills: 0 | Status: DISCONTINUED | OUTPATIENT
Start: 2019-10-27 | End: 2019-10-29

## 2019-10-27 RX ORDER — CHLORHEXIDINE GLUCONATE 213 G/1000ML
1 SOLUTION TOPICAL
Refills: 0 | Status: DISCONTINUED | OUTPATIENT
Start: 2019-10-27 | End: 2019-10-31

## 2019-10-27 RX ORDER — ACETAMINOPHEN 500 MG
975 TABLET ORAL ONCE
Refills: 0 | Status: COMPLETED | OUTPATIENT
Start: 2019-10-27 | End: 2019-10-27

## 2019-10-27 RX ORDER — PIPERACILLIN AND TAZOBACTAM 4; .5 G/20ML; G/20ML
3.38 INJECTION, POWDER, LYOPHILIZED, FOR SOLUTION INTRAVENOUS ONCE
Refills: 0 | Status: COMPLETED | OUTPATIENT
Start: 2019-10-27 | End: 2019-10-27

## 2019-10-27 RX ADMIN — SODIUM CHLORIDE 100 MILLILITER(S): 9 INJECTION, SOLUTION INTRAVENOUS at 18:02

## 2019-10-27 RX ADMIN — Medication 1000 MILLIGRAM(S): at 18:12

## 2019-10-27 RX ADMIN — SODIUM CHLORIDE 100 MILLILITER(S): 9 INJECTION, SOLUTION INTRAVENOUS at 10:36

## 2019-10-27 RX ADMIN — Medication 1000 MILLIGRAM(S): at 22:15

## 2019-10-27 RX ADMIN — SODIUM CHLORIDE 3000 MILLILITER(S): 9 INJECTION, SOLUTION INTRAVENOUS at 18:08

## 2019-10-27 RX ADMIN — Medication 1000 MILLIGRAM(S): at 10:57

## 2019-10-27 RX ADMIN — Medication 975 MILLIGRAM(S): at 03:29

## 2019-10-27 RX ADMIN — Medication 400 MILLIGRAM(S): at 17:57

## 2019-10-27 RX ADMIN — PANTOPRAZOLE SODIUM 40 MILLIGRAM(S): 20 TABLET, DELAYED RELEASE ORAL at 13:41

## 2019-10-27 RX ADMIN — Medication 50 GRAM(S): at 13:41

## 2019-10-27 RX ADMIN — Medication 400 MILLIGRAM(S): at 22:00

## 2019-10-27 RX ADMIN — SODIUM CHLORIDE 1000 MILLILITER(S): 9 INJECTION INTRAMUSCULAR; INTRAVENOUS; SUBCUTANEOUS at 05:03

## 2019-10-27 RX ADMIN — SODIUM CHLORIDE 1000 MILLILITER(S): 9 INJECTION, SOLUTION INTRAVENOUS at 16:00

## 2019-10-27 RX ADMIN — SODIUM CHLORIDE 1000 MILLILITER(S): 9 INJECTION INTRAMUSCULAR; INTRAVENOUS; SUBCUTANEOUS at 03:29

## 2019-10-27 RX ADMIN — PIPERACILLIN AND TAZOBACTAM 25 GRAM(S): 4; .5 INJECTION, POWDER, LYOPHILIZED, FOR SOLUTION INTRAVENOUS at 21:14

## 2019-10-27 RX ADMIN — Medication 400 MILLIGRAM(S): at 10:42

## 2019-10-27 RX ADMIN — FAMOTIDINE 20 MILLIGRAM(S): 10 INJECTION INTRAVENOUS at 03:29

## 2019-10-27 RX ADMIN — PIPERACILLIN AND TAZOBACTAM 200 GRAM(S): 4; .5 INJECTION, POWDER, LYOPHILIZED, FOR SOLUTION INTRAVENOUS at 05:24

## 2019-10-27 RX ADMIN — Medication 50 GRAM(S): at 15:43

## 2019-10-27 RX ADMIN — PIPERACILLIN AND TAZOBACTAM 25 GRAM(S): 4; .5 INJECTION, POWDER, LYOPHILIZED, FOR SOLUTION INTRAVENOUS at 14:26

## 2019-10-27 NOTE — CONSULT NOTE ADULT - ATTENDING COMMENTS
74yr M hx of CABG not on any meds, tongue cancer on active chemorads s/p lap converted to open laparatomy and sb resection and primary anastomosis for closed loop SBO. extubated in OR and off pressors. of note, pt has bloody output from NGT as well as melena intra op   admitted to SICU for monitoring    - pain control, opioid sparing  - repeat labs  - NPO, NG to suction  - PPI bid, will discuss with primary team regarding involving GI for possible scope. of note, pt reports having GIB in remote past, but does not remember the results of his scope  - hold VTE PPX for now  - LR maintenance fluids  - milan remains to close IO monitoring  - zosyn for 4 days per primary team  - monitor glucose

## 2019-10-27 NOTE — ED PROVIDER NOTE - NS ED ROS FT
General: no fevers or chills  Head: no headache, +lightheadedness  Eyes: no vision change  ENT: no nasal discharge/congestion  CV: no chest pain  Resp: no SOB  GI: +nausea, +abdominal pain, no vomiting, no diarrhea  : +dysuria, no hematuria  MSK: no joint pain  Skin: no new rash  Neuro: no focal weakness, no change in sensation

## 2019-10-27 NOTE — H&P ADULT - HISTORY OF PRESENT ILLNESS
Pt is a 74 year old male PMH CAD s/p CABG last year and tongue cancer for which he just finished chemo/radiation, presents w/ abdominal pain since last night. Pt reports he developed abdominal pain at 11pm last night after eating cereal. He reports the pain was very significant and worsened. He reports nausea but no emesis. He reports he has been having bowel movements and passing gas.   In the emergency room, the pt was hypotensive to the 70s and initially responded to fluid bolus. Labs significant for a wbc of 7.63, lactate of 3.0. CT scan showing closed loop bowel obstruction w/ mesenteric edema.     Of note, the pt reports he has never had abdominal surgery. He also reports he takes no medications at home.

## 2019-10-27 NOTE — H&P ADULT - ATTENDING COMMENTS
seen and examined 10-27-19 @ 0605    soft / moderate RUQ/RLQ tenderness / mildly distended    WBC = 7  HCO3 = 25  lactate = 3.0    CT abd/pelvis w/ contrast - significant amount of bowel involved in closed-loop SBO      closed-loop SBO  -written informed consent obtained for emergent laparoscopic lysis of adhesions, possible ex-lap, possible small bowel resection  -will place NG tube and continue fluid resuscitation for hypotension prior to surgery

## 2019-10-27 NOTE — H&P ADULT - NSHPPHYSICALEXAM_GEN_ALL_CORE
General: NAD, awake and alert  Neuro: No focal neurologic deficits  HEENT: Sclera nonicteric  CV: RRR  Respiratory: Respirations nonlabored  Abdominal: soft, tender to palpation R>L, distended, No guarding or rebound tenderness  Ext: wwp

## 2019-10-27 NOTE — ED ADULT NURSE REASSESSMENT NOTE - NS ED NURSE REASSESS COMMENT FT1
pt taken to bathroom by ED resident. pt found in bathroom dizzy, pale, and diaphoretic. noted bright red blood in stool in toilet. pt placed wheelchair and taken back to stretcher where VS were taken and pt was found pt to be hypotensive. 1L NS infusing thought a pressure bag, 2nd IV line placed and pts appears less pale and more alert. MD aware and at bedside for this event. pt now stable and moving off unit for CT

## 2019-10-27 NOTE — BRIEF OPERATIVE NOTE - NSICDXBRIEFPROCEDURE_GEN_ALL_CORE_FT
PROCEDURES:  Enteroclysis 27-Oct-2019 14:12:25  Rene Nava  Exploratory laparotomy 27-Oct-2019 14:12:15  Rene Nava  Diagnostic laparoscopy 27-Oct-2019 14:12:10  Rene Nava

## 2019-10-27 NOTE — CHART NOTE - NSCHARTNOTEFT_GEN_A_CORE
Primary Surgeon: Nicky  Diagnosis: SBO  PROCEDURES: Enteroclysis, Exploratory laparotomy,Diagnostic laparoscopy    SUBJECTIVE: Pt seen in SICU hours after procedure. Appears to have tolerated procedure well.  No acute complaints.  No n/v.    Pt reports:  SOB:  [ ] YES [ x] NO  Chest Discomfort: [ ] YES [ x] NO    Nausea: [ ] YES [ x] NO           Vomiting: [ ] YES [ x] NO  Flatus: [ ] YES [ x] NO             Bowel Movement: [x ] YES [ ] NO - ?bloody  Pain Control Adequate: [ ] YES [ ] NO      OBJECTIVE:     ** PHYSICAL EXAM **    Gen: NAD, lying comfortably in bed  HEENT: NC/AT  RESP: nonlabored breathing  ABDOMEN: soft, non-distended, non-tender, incision site dressings c/d/i      ** VITAL SIGNS / I&O's **    Vital Signs Last 24 Hrs  T(C): 36.1 (27 Oct 2019 19:00), Max: 36.6 (27 Oct 2019 15:00)  T(F): 97 (27 Oct 2019 19:00), Max: 97.9 (27 Oct 2019 15:00)  HR: 80 (27 Oct 2019 19:00) (59 - 89)  BP: 115/57 (27 Oct 2019 19:00) (74/55 - 122/59)  BP(mean): 78 (27 Oct 2019 19:00) (62 - 78)  RR: 10 (27 Oct 2019 19:00) (10 - 17)  SpO2: 100% (27 Oct 2019 19:00) (100% - 100%)      27 Oct 2019 07:01  -  27 Oct 2019 19:53  --------------------------------------------------------  IN:    IV PiggyBack: 300 mL    Lactated Ringers IV Bolus: 1000 mL    lactated ringers.: 900 mL  Total IN: 2200 mL    OUT:    Nasoenteral Tube: 350 mL    Ureteral Catheter: 260 mL  Total OUT: 610 mL    Total NET: 1590 mL            ** LABS **                          10.2   6.81  )-----------( 178      ( 27 Oct 2019 19:15 )             29.8     27 Oct 2019 10:35    138    |  108    |  28     ----------------------------<  154    4.1     |  18     |  1.02     Ca    7.4        27 Oct 2019 10:35  Phos  3.0       27 Oct 2019 10:35  Mg     1.5       27 Oct 2019 10:35    TPro  4.3    /  Alb  1.9    /  TBili  0.5    /  DBili  0.2    /  AST  15     /  ALT  6      /  AlkPhos  44     27 Oct 2019 10:35    PT/INR - ( 27 Oct 2019 10:54 )   PT: 11.3 sec;   INR: 0.99 ratio         PTT - ( 27 Oct 2019 10:54 )  PTT:36.9 sec      LIVER FUNCTIONS - ( 27 Oct 2019 10:35 )  Alb: 1.9 g/dL / Pro: 4.3 g/dL / ALK PHOS: 44 U/L / ALT: 6 U/L / AST: 15 U/L / GGT: x             Urinalysis Basic - ( 27 Oct 2019 03:21 )    Color: Light Orange / Appearance: Turbid / S.018 / pH: x  Gluc: x / Ketone: Negative  / Bili: Negative / Urobili: 2 mg/dL   Blood: x / Protein: 30 mg/dL / Nitrite: Negative   Leuk Esterase: Large / RBC: 25 /hpf / WBC >50 /HPF   Sq Epi: x / Non Sq Epi: 1 / Bacteria: TNTC        MEDICATIONS  (STANDING):  acetaminophen  IVPB .. 1000 milliGRAM(s) IV Intermittent once  chlorhexidine 2% Cloths 1 Application(s) Topical <User Schedule>  lactated ringers. 1000 milliLiter(s) (100 mL/Hr) IV Continuous <Continuous>  pantoprazole  Injectable 40 milliGRAM(s) IV Push every 12 hours  piperacillin/tazobactam IVPB.. 3.375 Gram(s) IV Intermittent every 8 hours    MEDICATIONS  (PRN):      A/P:   - Appreciate care per sicu  - Continue to monitor GI fcn  - Monitor labs for acute bleed

## 2019-10-27 NOTE — ED ADULT NURSE NOTE - OBJECTIVE STATEMENT
74 yr male arrived to the ED via EMS c/o of abd pain. HX tongue CA, open heart surgery, chemo, radiation. pt states pain started 4 hours ago, he then had a bowel movement but has been feelings nauseous but denies vomiting. upon assessment pt is a&ox3, speaking clearly, answering questions and following commands. pt is a poor historian. lungs clear matt. respirations even and unlabored. abd is on distended. diffusely tender in lower quadrants. pt denies fever, chills, vomiting, chest pain or sob

## 2019-10-27 NOTE — ED PROVIDER NOTE - CLINICAL SUMMARY MEDICAL DECISION MAKING FREE TEXT BOX
75yo man PMH CAD s/p CABG, tongue cancer on chemo presents with diffuse abdominal pain with more tenderness to palpation of right quadrants with associated dysuria. Will obtain UA, urine culture, obtain CT a/p to evaluate for appendicitis vs diverticulitis. Will obtain cbc, cmp, vbg. Will give pain medication and continue to reassess.

## 2019-10-27 NOTE — ED PROVIDER NOTE - OBJECTIVE STATEMENT
75yo man PMH CAD s/p CABG not on AC, tongue cancer on chemo (most recently 2 days ago) presents with generalized abdominal pain that started today. Pt reports that he had eaten previously but not 73yo man PMH CAD s/p CABG not on AC, tongue cancer on chemo (most recently 2 days ago) presents with generalized abdominal pain that started today. Pt reports that he had eaten previously but the pain is not associated with food intake. He had initial nausea but no vomiting and had a normal BM 2 hours prior to abdominal pain. He has been passing gas since pain has started. He reports that he has dysuria but no blood in urine. He also has noticed that he feels more lightheaded when standing but no headache or weakness in arms or legs. He denies chest pain or SOB. No fevers.  Pt normally ambulates with a cane.

## 2019-10-27 NOTE — CONSULT NOTE ADULT - SUBJECTIVE AND OBJECTIVE BOX
CC: 74y old Male admitted with a chief complaint of abdominal pain, now presents post op for hemodynamic monitoring     HPI: 74M w/ PMH CAD s/p CABG last year and tongue cancer for which he just finished chemo/radiation, no PSH  presents w/ abdominal pain since last night. Pt reports he developed abdominal pain at 11pm last night after eating cereal. He reports the pain was very significant and worsened. He reports nausea but no emesis. He reports he has been having bowel movements and passing gas.  In the emergency room, the pt was hypotensive to the 70s and initially responded to fluid bolus. Labs significant for a wbc of 7.63, lactate of 3.0. CT scan showing closed loop bowel obstruction w/ mesenteric edema. PT was taken urgently to OR diagnostic laparoscopy, long segment of SB was ischemic 2/2 to adhesions, case was converted to open, WILFRID and bowel appearance improved, no SBR.     PMHx: Cancer of tongue  CAD, multiple vessel    PSHx: S/P CABG x 1  S/P CABG (coronary artery bypass graft)    Medications (inpatient): acetaminophen  IVPB .. 1000 milliGRAM(s) IV Intermittent once  acetaminophen  IVPB .. 1000 milliGRAM(s) IV Intermittent once  chlorhexidine 2% Cloths 1 Application(s) Topical <User Schedule>  lactated ringers Bolus 500 milliLiter(s) IV Bolus once  lactated ringers. 1000 milliLiter(s) IV Continuous <Continuous>  pantoprazole  Injectable 40 milliGRAM(s) IV Push every 12 hours  piperacillin/tazobactam IVPB.. 3.375 Gram(s) IV Intermittent every 8 hours    Medications (PRN):  Allergies: No Known Allergies  (Intolerances: )  Social Hx:   Family Hx:     Physical Exam  T(C): 36.1  HR: 75 (59 - 89)  BP: 120/55 (74/55 - 122/59)  RR: 11 (11 - 17)  SpO2: 100% (100% - 100%)  Tmax: T(C): , Max: 36.3 (10-27-19 @ 02:23)    10-27-19  -  10-27-19  --------------------------------------------------------  IN:    IV PiggyBack: 150 mL    lactated ringers.: 300 mL  Total IN: 450 mL    OUT:    Ureteral Catheter: 195 mL  Total OUT: 195 mL    Total NET: 255 mL        General: well developed, well nourished, NAD  Neuro: alert and oriented, no focal deficits, moves all extremities spontaneously  HEENT: NCAT, EOMI, anicteric, mucosa moist  Respiratory: airway patent, respirations unlabored  CVS: regular rate and rhythm  Abdomen: soft, nontender, nondistended  Extremities: no edema, sensation and movement grossly intact  Skin: warm, dry, appropriate color    Labs:                        10.6   6.62  )-----------( 210      ( 27 Oct 2019 15:49 )             31.8     PT/INR - ( 27 Oct 2019 10:54 )   PT: 11.3 sec;   INR: 0.99 ratio         PTT - ( 27 Oct 2019 10:54 )  PTT:36.9 sec  10-27    138  |  108  |  28<H>  ----------------------------<  154<H>  4.1   |  18<L>  |  1.02    Ca    7.4<L>      27 Oct 2019 10:35  Phos  3.0     10-27  Mg     1.5     10-27    TPro  4.3<L>  /  Alb  1.9<L>  /  TBili  0.5  /  DBili  0.2  /  AST  15  /  ALT  6<L>  /  AlkPhos  44  10-27    Urinalysis Basic - ( 27 Oct 2019 03:21 )    Color: Light Orange / Appearance: Turbid / S.018 / pH: x  Gluc: x / Ketone: Negative  / Bili: Negative / Urobili: 2 mg/dL   Blood: x / Protein: 30 mg/dL / Nitrite: Negative   Leuk Esterase: Large / RBC: 25 /hpf / WBC >50 /HPF   Sq Epi: x / Non Sq Epi: 1 / Bacteria: TNTC      ABG - ( 27 Oct 2019 14:45 )  pH, Arterial: 7.36  pH, Blood: x     /  pCO2: 35    /  pO2: 177   / HCO3: 20    / Base Excess: -4.7  /  SaO2: 100                   Imaging and other studies: CC: 74y old Male admitted with a chief complaint of abdominal pain, now presents post op for hemodynamic monitoring     HPI: 74M w/ PMH CAD s/p CABG last year and tongue cancer for which he just finished chemo/radiation, no PSH  presents w/ abdominal pain since last night. Pt reports he developed abdominal pain at 11pm last night after eating cereal. He reports the pain was very significant and worsened. He reports nausea but no emesis. He reports he has been having bowel movements and passing gas.  In the emergency room, the pt was hypotensive to the 70s and initially responded to fluid bolus. Labs significant for a wbc of 7.63, lactate of 3.0. CT scan showing closed loop bowel obstruction w/ mesenteric edema. PT was taken urgently to OR diagnostic laparoscopy, long segment of SB was ischemic 2/2 to adhesions, case was converted to open, after WILFRID and bowel appearance improved abd peristalsis noted,  no SBR, abdomen was closed. At the end of the case pt had one episode of melena. PT was taken to SICU for hemodynamic monitoring.     PMHx: Cancer of tongue  CAD, multiple vessel    PSHx: S/P CABG x 1  S/P CABG (coronary artery bypass graft)    Medications (inpatient): acetaminophen  IVPB .. 1000 milliGRAM(s) IV Intermittent once  acetaminophen  IVPB .. 1000 milliGRAM(s) IV Intermittent once  chlorhexidine 2% Cloths 1 Application(s) Topical <User Schedule>  lactated ringers Bolus 500 milliLiter(s) IV Bolus once  lactated ringers. 1000 milliLiter(s) IV Continuous <Continuous>  pantoprazole  Injectable 40 milliGRAM(s) IV Push every 12 hours  piperacillin/tazobactam IVPB.. 3.375 Gram(s) IV Intermittent every 8 hours    Medications (PRN):  Allergies: No Known Allergies  (Intolerances: )  Social Hx:   Family Hx:     Physical Exam  T(C): 36.1  HR: 75 (59 - 89)  BP: 120/55 (74/55 - 122/59)  RR: 11 (11 - 17)  SpO2: 100% (100% - 100%)  Tmax: T(C): , Max: 36.3 (10-27-19 @ 02:23)    10-27-19  -  10-27-19  --------------------------------------------------------  IN:    IV PiggyBack: 150 mL    lactated ringers.: 300 mL  Total IN: 450 mL    OUT:    Ureteral Catheter: 195 mL  Total OUT: 195 mL    Total NET: 255 mL        General: well developed, well nourished, NAD  Neuro: alert and oriented, no focal deficits, moves all extremities spontaneously  HEENT: NCAT, EOMI, anicteric, mucosa moist  Respiratory: airway patent, respirations unlabored  CVS: regular rate and rhythm  Abdomen: soft, appropriately tender and distended, incision w/ surgical dressing CDI; NGT w/ bloody output   Extremities: no edema, sensation and movement grossly intact  Skin: warm, dry, appropriate color    Labs:                        10.6   6.62  )-----------( 210      ( 27 Oct 2019 15:49 )             31.8     PT/INR - ( 27 Oct 2019 10:54 )   PT: 11.3 sec;   INR: 0.99 ratio         PTT - ( 27 Oct 2019 10:54 )  PTT:36.9 sec  10-27    138  |  108  |  28<H>  ----------------------------<  154<H>  4.1   |  18<L>  |  1.02    Ca    7.4<L>      27 Oct 2019 10:35  Phos  3.0     10-27  Mg     1.5     10-27    TPro  4.3<L>  /  Alb  1.9<L>  /  TBili  0.5  /  DBili  0.2  /  AST  15  /  ALT  6<L>  /  AlkPhos  44  10-27    Urinalysis Basic - ( 27 Oct 2019 03:21 )    Color: Light Orange / Appearance: Turbid / S.018 / pH: x  Gluc: x / Ketone: Negative  / Bili: Negative / Urobili: 2 mg/dL   Blood: x / Protein: 30 mg/dL / Nitrite: Negative   Leuk Esterase: Large / RBC: 25 /hpf / WBC >50 /HPF   Sq Epi: x / Non Sq Epi: 1 / Bacteria: TNTC      ABG - ( 27 Oct 2019 14:45 )  pH, Arterial: 7.36  pH, Blood: x     /  pCO2: 35    /  pO2: 177   / HCO3: 20    / Base Excess: -4.7  /  SaO2: 100       CT abd/pelv:     Closed-loop small bowel obstruction with transition points in the right   hemiabdomen associated with significant mesenteric edema. No pneumatosis or   free air.

## 2019-10-27 NOTE — H&P ADULT - NSHPLABSRESULTS_GEN_ALL_CORE
< from: CT Abdomen and Pelvis w/ Oral Cont and w/ IV Cont (10.27.19 @ 05:19) >    FINDINGS:    LOWER CHEST: Status post median sternotomy and aortic valve replacement.    LIVER: Within normal limits.  BILE DUCTS: Normal caliber.  GALLBLADDER: Within normal limits.  SPLEEN: Within normal limits.  PANCREAS: Within normal limits.  ADRENALS: Within normallimits.  KIDNEYS/URETERS: Within normal limits.  BLADDER: Within normal limits.  REPRODUCTIVE ORGANS: Prostate is enlarged.    BOWEL: Multiple dilated fluid-filled loops of small bowel with wall   thickening in a radial ray consistent with a closed loop bowel   obstruction with transition points in the right hemiabdomen (2:56 and   602:34) and (2:54 and 602:36). Significant right-sided mesenteric edema.   No pneumatosis.  Appendix is normal. Colonic diverticulosis.    PERITONEUM: Small to moderate volume ascites, notably on the right,   including perihepatic ascites. Small left paracolic gutter and pelvic   ascites.. No free air.    VESSELS: Atherosclerotic changes.  RETROPERITONEUM/LYMPH NODES: No lymphadenopathy.    ABDOMINAL WALL: Within normal limits.  BONES: Degenerative changes.    IMPRESSION:     Closed-loop small bowel obstruction with transition points in the right   hemiabdomen associated with significant mesenteric edema. No pneumatosis   or free air.    < end of copied text >

## 2019-10-27 NOTE — ED PROVIDER NOTE - ATTENDING CONTRIBUTION TO CARE
74M, pmh cad s/p cabg, not on a/c, with tongue ca on chemo, presents with gen abd pain, beginning earlier today. nausea, no vomiting. normal bm 2 hrs pta. pain ate prior to onset of pain, but no association with eating. dysuria, no hematuria. no cp, sob. no f/c.     PE: NAD, NCAT, MMM, Trachea midline, Normal conjunctiva, lungs CTAB, S1/S2 RRR, Normal perfusion, 2+ radial pulses bilat, Abdomen Soft, non-distended, +R-sided ttp, No rebound/guarding, No LE edema, No deformity of extremities, No rashes,  No focal motor or sensory deficits.     Ddx includes but not limtied to sbo, appendicitis, diverticulitis, cystitis/pyelonephritis. Obtain labs, urine, ct a/p. Anticipate likely admission. - Christiano Osorio MD

## 2019-10-27 NOTE — ED PROVIDER NOTE - PROGRESS NOTE DETAILS
Grace Cadena, resident MD: pt was found to have bowel obstruction and surgery was consulted and will admit to surgery for operative management.

## 2019-10-27 NOTE — CONSULT NOTE ADULT - ASSESSMENT
74M w/ PMH CAD s/p CABG last year and tongue cancer for which he just finished chemo/radiation, no PSH  presented with closed loop SBO s/p diagnostic lap, converted to open, WILFRID, ischemic bowel improved in color and regained some peristalsis after WILFRID, no SBR was performed. One episode of melena right after the case. Didn't require pressors, remained hemodynamically stable.     Neuro: A&O x 3  - Pain control with IV Tylenol   - 74M w/ PMH CAD s/p CABG last year and tongue cancer for which he just finished chemo/radiation, no PSH  presented with closed loop SBO s/p diagnostic lap, converted to open, WILFRID, ischemic bowel improved in color and regained some peristalsis after WILFRID, no SBR was performed. One episode of melena right after the case. Didn't require pressors, remained hemodynamically stable.     Neuro: A&O x 3  - Pain control with IV Tylenol     Respiratory: no active issues  - Wean off NC   - ISS, pulmonary toilet     CV:  CAD s/p CABG last year  - Pt reports that he is not taking any meds at home  - Hemodynamically stable, no pressor requirement   - Lactate cleared 1.4     GI: hx of closed loop SBO s/p exlap, WILFRID, no SBR  - C/w NPO/NGT  - NGT w/ blood tinged output  - Protonix 40 BID  - Melena x 1 immediately post op    Renal: no active issues  - Cr at baseline 1.02  - C/w LR @ 100  - C/w Wing     Heme/Onc: no acute issues   - Hold chemical  PPX  - Monitor H&H    ID: no active issues  - Zosyn for ppx 4 days course     Endo: no active issues    Dispo: SICU

## 2019-10-27 NOTE — ED ADULT NURSE REASSESSMENT NOTE - NS ED NURSE REASSESS COMMENT FT1
pts valuables, and pts belongs placed in cabinet, report given to OR pts valuables locked in safe, and pts belongs placed in cabinet, report given to OR

## 2019-10-27 NOTE — H&P ADULT - ASSESSMENT
Pt is a 74 year old male PMH CAD s/p CABG last year and tongue cancer for which he just finished chemo/radiation, and no history of abdominal surgery, presents w/ abdominal pain since last night, found to have a closed loop bowel obstruction on CT scan.     -Admit to Dr. Saunders  -Added on emergently for surgery, consented and preop labs sent  -IVF/NPO/NGT  -confirm home medications w/ patient after surgery - per pt he currently does not take medication, however he notes he was taking 25 medications 1 year ago  -DVT ppx    Seen and discussed w/ Dr. Saunders  ACS, [id number]

## 2019-10-27 NOTE — ED PROVIDER NOTE - PHYSICAL EXAMINATION
General: uncomfortable-appearing elderly man in mild distress 2/2 pain  Head: normocephalic, atraumatic  Eyes: clear eyes  Mouth: moist mucous membranes  Neck: supple neck  CV: normal rate and rhythm, no LE edema, peripheral pulses 2+ bilaterally  Respiratory: clear to auscultation bilaterally  Abdomen: soft, nondistended, tender to palpation of right quadrants, negative spain's, +tenderness at McBurney's point  : mild suprapubic tenderness, no CVAT  MSK: no Cspine tenderness to palpation, no midline tenderness to palpation  Neuro: alert and oriented x3, CN II-XII intact, speech clear, strength 5/5 UE and LE bilaterally, sensation equal and intact bilaterally  Skin: no rash on abdomen, port in right chest  Extremities: full ROM

## 2019-10-27 NOTE — ED ADULT NURSE NOTE - NSIMPLEMENTINTERV_GEN_ALL_ED
Implemented All Fall Risk Interventions:  [place] to call system. Call bell, personal items and telephone within reach. Instruct patient to call for assistance. Room bathroom lighting operational. Non-slip footwear when patient is off stretcher. Physically safe environment: no spills, clutter or unnecessary equipment. Stretcher in lowest position, wheels locked, appropriate side rails in place. Provide visual cue, wrist band, yellow gown, etc. Monitor gait and stability. Monitor for mental status changes and reorient to person, place, and time. Review medications for side effects contributing to fall risk. Reinforce activity limits and safety measures with patient and family.

## 2019-10-27 NOTE — BRIEF OPERATIVE NOTE - OPERATION/FINDINGS
Diagnostic laparoscopy, long segment of ischemic small bowel, no gross spillage or perforation noted. Converted to open, single RUQ adhesive band lysed with resultant complete mobilization of small bowel. There was peristalsis noted throughout ischemic segment. After several minutes, the color did improve. No resection performed. Abdomen closed.    Ligament of treitz to ischemic segment: 240cm  Ischemic segment: 165cm  Ischemic segment to terminal ileum: 70cm    Of note, upon entering the operating room pre-operatively, pt had a single episode of a bloody bowel movement. His BP was stable. He did not require blood transfusion or pressors during the case. Starting hct was 35 on blood gas. Diagnostic laparoscopy, long segment of acutely ischemic small bowel, no gross spillage or perforation noted. Converted to open, single RUQ adhesive band lysed with resultant complete mobilization of small bowel. There was peristalsis noted throughout ischemic segment. After several minutes, the color did improve. No resection performed. Abdomen closed.    Ligament of treitz to ischemic segment: 240cm  Ischemic segment: 165cm  Ischemic segment to terminal ileum: 70cm    Of note, upon entering the operating room pre-operatively, pt had a single episode of a bloody bowel movement. His BP was stable. He did not require blood transfusion or pressors during the case. Starting hct was 35 on blood gas.

## 2019-10-28 LAB
ANION GAP SERPL CALC-SCNC: 11 MMOL/L — SIGNIFICANT CHANGE UP (ref 5–17)
ANION GAP SERPL CALC-SCNC: 13 MMOL/L — SIGNIFICANT CHANGE UP (ref 5–17)
BUN SERPL-MCNC: 30 MG/DL — HIGH (ref 7–23)
BUN SERPL-MCNC: 32 MG/DL — HIGH (ref 7–23)
CALCIUM SERPL-MCNC: 7.7 MG/DL — LOW (ref 8.4–10.5)
CALCIUM SERPL-MCNC: 8.1 MG/DL — LOW (ref 8.4–10.5)
CHLORIDE SERPL-SCNC: 104 MMOL/L — SIGNIFICANT CHANGE UP (ref 96–108)
CHLORIDE SERPL-SCNC: 105 MMOL/L — SIGNIFICANT CHANGE UP (ref 96–108)
CO2 SERPL-SCNC: 18 MMOL/L — LOW (ref 22–31)
CO2 SERPL-SCNC: 19 MMOL/L — LOW (ref 22–31)
CREAT ?TM UR-MCNC: 86 MG/DL — SIGNIFICANT CHANGE UP
CREAT SERPL-MCNC: 1.16 MG/DL — SIGNIFICANT CHANGE UP (ref 0.5–1.3)
CREAT SERPL-MCNC: 1.29 MG/DL — SIGNIFICANT CHANGE UP (ref 0.5–1.3)
GLUCOSE SERPL-MCNC: 131 MG/DL — HIGH (ref 70–99)
GLUCOSE SERPL-MCNC: 163 MG/DL — HIGH (ref 70–99)
HCT VFR BLD CALC: 29 % — LOW (ref 39–50)
HCT VFR BLD CALC: 29.4 % — LOW (ref 39–50)
HGB BLD-MCNC: 9.8 G/DL — LOW (ref 13–17)
HGB BLD-MCNC: 9.8 G/DL — LOW (ref 13–17)
MAGNESIUM SERPL-MCNC: 2.4 MG/DL — SIGNIFICANT CHANGE UP (ref 1.6–2.6)
MCHC RBC-ENTMCNC: 31.8 PG — SIGNIFICANT CHANGE UP (ref 27–34)
MCHC RBC-ENTMCNC: 32.2 PG — SIGNIFICANT CHANGE UP (ref 27–34)
MCHC RBC-ENTMCNC: 33.3 GM/DL — SIGNIFICANT CHANGE UP (ref 32–36)
MCHC RBC-ENTMCNC: 33.8 GM/DL — SIGNIFICANT CHANGE UP (ref 32–36)
MCV RBC AUTO: 95.4 FL — SIGNIFICANT CHANGE UP (ref 80–100)
MCV RBC AUTO: 95.5 FL — SIGNIFICANT CHANGE UP (ref 80–100)
NRBC # BLD: 0 /100 WBCS — SIGNIFICANT CHANGE UP (ref 0–0)
NRBC # BLD: 0 /100 WBCS — SIGNIFICANT CHANGE UP (ref 0–0)
OSMOLALITY UR: 663 MOS/KG — SIGNIFICANT CHANGE UP (ref 300–900)
PHOSPHATE SERPL-MCNC: 3.6 MG/DL — SIGNIFICANT CHANGE UP (ref 2.5–4.5)
PLATELET # BLD AUTO: 185 K/UL — SIGNIFICANT CHANGE UP (ref 150–400)
PLATELET # BLD AUTO: 188 K/UL — SIGNIFICANT CHANGE UP (ref 150–400)
POTASSIUM SERPL-MCNC: 4.1 MMOL/L — SIGNIFICANT CHANGE UP (ref 3.5–5.3)
POTASSIUM SERPL-MCNC: 4.4 MMOL/L — SIGNIFICANT CHANGE UP (ref 3.5–5.3)
POTASSIUM SERPL-SCNC: 4.1 MMOL/L — SIGNIFICANT CHANGE UP (ref 3.5–5.3)
POTASSIUM SERPL-SCNC: 4.4 MMOL/L — SIGNIFICANT CHANGE UP (ref 3.5–5.3)
RBC # BLD: 3.04 M/UL — LOW (ref 4.2–5.8)
RBC # BLD: 3.08 M/UL — LOW (ref 4.2–5.8)
RBC # FLD: 19.4 % — HIGH (ref 10.3–14.5)
RBC # FLD: 19.5 % — HIGH (ref 10.3–14.5)
SODIUM SERPL-SCNC: 134 MMOL/L — LOW (ref 135–145)
SODIUM SERPL-SCNC: 136 MMOL/L — SIGNIFICANT CHANGE UP (ref 135–145)
SODIUM UR-SCNC: 70 MMOL/L — SIGNIFICANT CHANGE UP
WBC # BLD: 6.57 K/UL — SIGNIFICANT CHANGE UP (ref 3.8–10.5)
WBC # BLD: 6.6 K/UL — SIGNIFICANT CHANGE UP (ref 3.8–10.5)
WBC # FLD AUTO: 6.57 K/UL — SIGNIFICANT CHANGE UP (ref 3.8–10.5)
WBC # FLD AUTO: 6.6 K/UL — SIGNIFICANT CHANGE UP (ref 3.8–10.5)

## 2019-10-28 PROCEDURE — 99233 SBSQ HOSP IP/OBS HIGH 50: CPT

## 2019-10-28 RX ORDER — SODIUM CHLORIDE 9 MG/ML
1000 INJECTION, SOLUTION INTRAVENOUS ONCE
Refills: 0 | Status: COMPLETED | OUTPATIENT
Start: 2019-10-28 | End: 2019-10-28

## 2019-10-28 RX ORDER — ACETAMINOPHEN 500 MG
1000 TABLET ORAL ONCE
Refills: 0 | Status: COMPLETED | OUTPATIENT
Start: 2019-10-28 | End: 2019-10-28

## 2019-10-28 RX ORDER — SODIUM CHLORIDE 9 MG/ML
500 INJECTION, SOLUTION INTRAVENOUS ONCE
Refills: 0 | Status: COMPLETED | OUTPATIENT
Start: 2019-10-28 | End: 2019-10-28

## 2019-10-28 RX ORDER — ENOXAPARIN SODIUM 100 MG/ML
40 INJECTION SUBCUTANEOUS EVERY 24 HOURS
Refills: 0 | Status: DISCONTINUED | OUTPATIENT
Start: 2019-10-28 | End: 2019-10-29

## 2019-10-28 RX ORDER — HYDROMORPHONE HYDROCHLORIDE 2 MG/ML
0.5 INJECTION INTRAMUSCULAR; INTRAVENOUS; SUBCUTANEOUS ONCE
Refills: 0 | Status: DISCONTINUED | OUTPATIENT
Start: 2019-10-28 | End: 2019-10-29

## 2019-10-28 RX ADMIN — Medication 1000 MILLIGRAM(S): at 04:50

## 2019-10-28 RX ADMIN — SODIUM CHLORIDE 500 MILLILITER(S): 9 INJECTION, SOLUTION INTRAVENOUS at 20:21

## 2019-10-28 RX ADMIN — Medication 1000 MILLIGRAM(S): at 12:49

## 2019-10-28 RX ADMIN — SODIUM CHLORIDE 1500 MILLILITER(S): 9 INJECTION, SOLUTION INTRAVENOUS at 09:24

## 2019-10-28 RX ADMIN — PANTOPRAZOLE SODIUM 40 MILLIGRAM(S): 20 TABLET, DELAYED RELEASE ORAL at 00:39

## 2019-10-28 RX ADMIN — SODIUM CHLORIDE 100 MILLILITER(S): 9 INJECTION, SOLUTION INTRAVENOUS at 07:46

## 2019-10-28 RX ADMIN — SODIUM CHLORIDE 100 MILLILITER(S): 9 INJECTION, SOLUTION INTRAVENOUS at 20:21

## 2019-10-28 RX ADMIN — PANTOPRAZOLE SODIUM 40 MILLIGRAM(S): 20 TABLET, DELAYED RELEASE ORAL at 12:36

## 2019-10-28 RX ADMIN — SODIUM CHLORIDE 100 MILLILITER(S): 9 INJECTION, SOLUTION INTRAVENOUS at 10:07

## 2019-10-28 RX ADMIN — HYDROMORPHONE HYDROCHLORIDE 0.5 MILLIGRAM(S): 2 INJECTION INTRAMUSCULAR; INTRAVENOUS; SUBCUTANEOUS at 20:41

## 2019-10-28 RX ADMIN — Medication 400 MILLIGRAM(S): at 04:06

## 2019-10-28 RX ADMIN — HYDROMORPHONE HYDROCHLORIDE 0.5 MILLIGRAM(S): 2 INJECTION INTRAMUSCULAR; INTRAVENOUS; SUBCUTANEOUS at 20:23

## 2019-10-28 RX ADMIN — PIPERACILLIN AND TAZOBACTAM 25 GRAM(S): 4; .5 INJECTION, POWDER, LYOPHILIZED, FOR SOLUTION INTRAVENOUS at 12:35

## 2019-10-28 RX ADMIN — Medication 400 MILLIGRAM(S): at 12:34

## 2019-10-28 RX ADMIN — CHLORHEXIDINE GLUCONATE 1 APPLICATION(S): 213 SOLUTION TOPICAL at 05:35

## 2019-10-28 RX ADMIN — ENOXAPARIN SODIUM 40 MILLIGRAM(S): 100 INJECTION SUBCUTANEOUS at 12:35

## 2019-10-28 RX ADMIN — SODIUM CHLORIDE 100 MILLILITER(S): 9 INJECTION, SOLUTION INTRAVENOUS at 18:58

## 2019-10-28 RX ADMIN — PIPERACILLIN AND TAZOBACTAM 25 GRAM(S): 4; .5 INJECTION, POWDER, LYOPHILIZED, FOR SOLUTION INTRAVENOUS at 05:34

## 2019-10-28 RX ADMIN — SODIUM CHLORIDE 2000 MILLILITER(S): 9 INJECTION, SOLUTION INTRAVENOUS at 05:34

## 2019-10-28 RX ADMIN — PIPERACILLIN AND TAZOBACTAM 25 GRAM(S): 4; .5 INJECTION, POWDER, LYOPHILIZED, FOR SOLUTION INTRAVENOUS at 20:54

## 2019-10-28 NOTE — PROGRESS NOTE ADULT - ASSESSMENT
73 yo M s/p enteroclysis, exploratory laparotomy, diagnostic laparoscopy for SBO who had a bloody BM in the OR and bloody NGT post-op on (10/28), but has had stable h/h.    Plan:  - Appreciate care per SICU team  - Consider floor transfer if bloody NGT output stops  - Rec: address low bicarb

## 2019-10-28 NOTE — PHYSICAL THERAPY INITIAL EVALUATION ADULT - PLANNED THERAPY INTERVENTIONS, PT EVAL
balance training/bed mobility training/gait training/transfer training/GOAL: Pt will negotiate 10 steps with 1 HR and step to pattern independently in 4 weeks.

## 2019-10-28 NOTE — OCCUPATIONAL THERAPY INITIAL EVALUATION ADULT - ANTICIPATED DISCHARGE DISPOSITION, OT EVAL
home w/ OT/home with assist from family as needed and [facility] OT services for Activities of Daily Living re-training

## 2019-10-28 NOTE — OCCUPATIONAL THERAPY INITIAL EVALUATION ADULT - PRECAUTIONS/LIMITATIONS, REHAB EVAL
74y Male w/ PMH CAD s/p CABG last year and tongue cancer for which he just finished chemo/radiation, no PSH  presents w/ abdominal pain since last night. Pt reports he developed abdominal pain at 11pm last night after eating cereal. He reports the pain was very significant and worsened. He reports nausea but no emesis. He reports he has been having bowel movements and passing gas.  In the emergency room, the pt was hypotensive to the 70s and initially responded to fluid bolus. Labs significant for a wbc of 7.63, lactate of 3.0. CT scan showing closed loop bowel obstruction w/ mesenteric edema. PT was taken urgently to OR diagnostic laparoscopy, long segment of SB was ischemic 2/2 to adhesions, case was converted to open, after WILFRID and bowel appearance improved abd peristalsis noted,  no SBR, abdomen was closed. At the end of the case pt had one episode of melena. PT was taken to SICU for hemodynamic monitoring. 74y Male w/ PMH CAD s/p CABG last year and tongue cancer for which he just finished chemo/radiation, no PSH  presents w/ abdominal pain since last night. Pt reports he developed abdominal pain at 11pm last night after eating cereal. He reports the pain was very significant and worsened. He reports nausea but no emesis. He reports he has been having bowel movements and passing gas.  In the emergency room, the pt was hypotensive to the 70s and initially responded to fluid bolus. Labs significant for a wbc of 7.63, lactate of 3.0. CT scan showing closed loop bowel obstruction w/ mesenteric edema. PT was taken urgently to OR diagnostic laparoscopy, long segment of SB was ischemic 2/2 to adhesions, case was converted to open, after WILFRID and bowel appearance improved abd peristalsis noted,  no SBR, abdomen was closed. At the end of the case pt had one episode of melena. PT was taken to SICU for hemodynamic monitoring./fall precautions

## 2019-10-28 NOTE — PHYSICAL THERAPY INITIAL EVALUATION ADULT - PERTINENT HX OF CURRENT PROBLEM, REHAB EVAL
73 yo M p/w generalized abdominal pain that started on day of presentation. Pt reports that he had eaten previously but the pain is not associated with food intake. He had initial nausea but no vomiting and had a normal BM 2 hours prior to abd pain. He has been passing gas since pain has started. CT Abd on 10/27 showed small bowel obstruction ;s/p lap converted to open laparatomy and sb resection and primary anastomosis for closed loop SBO.

## 2019-10-28 NOTE — PROGRESS NOTE ADULT - SUBJECTIVE AND OBJECTIVE BOX
HISTORY  74y Male w/ PMH CAD s/p CABG last year and tongue cancer for which he just finished chemo/radiation, no PSH  presents w/ abdominal pain since last night. Pt reports he developed abdominal pain at 11pm last night after eating cereal. He reports the pain was very significant and worsened. He reports nausea but no emesis. He reports he has been having bowel movements and passing gas.  In the emergency room, the pt was hypotensive to the 70s and initially responded to fluid bolus. Labs significant for a wbc of 7.63, lactate of 3.0. CT scan showing closed loop bowel obstruction w/ mesenteric edema. PT was taken urgently to OR diagnostic laparoscopy, long segment of SB was ischemic 2/2 to adhesions, case was converted to open, after WILFRID and bowel appearance improved abd peristalsis noted,  no SBR, abdomen was closed. At the end of the case pt had one episode of melena. PT was taken to SICU for hemodynamic monitoring.       24 HOUR EVENTS:   - 500ml bolus of crystalloid administered for oliguria during the day.   - Hct stable as well as hemodynamics   - Received additional bolus of fluid overnight for oliguria    SUBJECTIVE/ROS:  [ ] A ten-point review of systems was otherwise negative except as noted.  [ ] Due to altered mental status/intubation, subjective information were not able to be obtained from the patient. History was obtained, to the extent possible, from review of the chart and collateral sources of information.      NEURO  Exam: awake, alert, oriented  Meds: none  [x] Adequacy of sedation and pain control has been assessed and adjusted      RESPIRATORY  RR: 11 (10-28-19 @ 06:00) (9 - 19)  SpO2: 100% (10-28-19 @ 06:00) (99% - 100%)  Exam: unlabored, clear to auscultation bilaterally  Mechanical Ventilation: none  ABG - ( 27 Oct 2019 14:45 )  pH: 7.36  /  pCO2: 35    /  pO2: 177   / HCO3: 20    / Base Excess: -4.7  /  SaO2: 100     Lactate: x      [N/A] Extubation Readiness Assessed  Meds: none      CARDIOVASCULAR  HR: 84 (10-28-19 @ 06:00) (59 - 87)  BP: 115/57 (10-27-19 @ 19:00) (101/59 - 120/55)  BP(mean): 78 (10-27-19 @ 19:00) (78 - 78)  ABP: 144/59 (10-28-19 @ 06:00) (108/49 - 144/59)  ABP(mean): 92 (10-28-19 @ 06:00) (71 - 92)  VBG - ( 27 Oct 2019 03:50 )  pH: 7.30  /  pCO2: 56    /  pO2: <20   / HCO3: 27    / Base Excess: 0.1   /  SaO2: 16     Lactate: 3.0    Exam: regular rate and rhythm  Cardiac Rhythm: sinus  Perfusion     [x]Adequate   [ ]Inadequate  Mentation   [x]Normal       [ ]Reduced  Extremities  [x]Warm         [ ]Cool  Volume Status [ ]Hypervolemic [x]Euvolemic [ ]Hypovolemic  Meds: none      GI/NUTRITION  Exam: soft, nontender, nondistended  Diet: NPO   Meds: pantoprazole  Injectable 40 milliGRAM(s) IV Push every 12 hours      GENITOURINARY  I&O's Detail    10-27 @ 07:01  -  10-28 @ 06:17  --------------------------------------------------------  IN:    IV PiggyBack: 550 mL    Lactated Ringers IV Bolus: 2000 mL    lactated ringers.: 2100 mL    Solution: 100 mL  Total IN: 4750 mL    OUT:    Nasoenteral Tube: 400 mL    Ureteral Catheter: 583 mL  Total OUT: 983 mL    Total NET: 3767 mL        Weight (kg): 66 (10-27 @ 07:35)  10-28    136  |  105  |  30<H>  ----------------------------<  163<H>  4.1   |  18<L>  |  1.16    Ca    7.7<L>      28 Oct 2019 00:54  Phos  3.6     10-28  Mg     2.4     10-28    TPro  4.3<L>  /  Alb  1.9<L>  /  TBili  0.5  /  DBili  0.2  /  AST  15  /  ALT  6<L>  /  AlkPhos  44  10-27    [ ] Dimas catheter, indication:   Meds: lactated ringers. 1000 milliLiter(s) IV Continuous <Continuous>        HEMATOLOGIC  Meds:   [x] VTE Prophylaxis                        9.8    6.60  )-----------( 185      ( 28 Oct 2019 04:25 )             29.0     PT/INR - ( 27 Oct 2019 10:54 )   PT: 11.3 sec;   INR: 0.99 ratio         PTT - ( 27 Oct 2019 10:54 )  PTT:36.9 sec  Transfusion     [ ] PRBC   [ ] Platelets   [ ] FFP   [ ] Cryoprecipitate      INFECTIOUS DISEASES  WBC Count: 6.60 K/uL (10-28 @ 04:25)  WBC Count: 6.57 K/uL (10-28 @ 00:54)  WBC Count: 6.81 K/uL (10-27 @ 19:15)  WBC Count: 6.62 K/uL (10-27 @ 15:49)  WBC Count: 5.39 K/uL (10-27 @ 10:35)    RECENT CULTURES:    Meds: piperacillin/tazobactam IVPB.. 3.375 Gram(s) IV Intermittent every 8 hours        ENDOCRINE  CAPILLARY BLOOD GLUCOSE        Meds: none      ACCESS DEVICES:  [x Peripheral IV  [ ] Central Venous Line	[ ] R	[ ] L	[ ] IJ	[ ] Fem	[ ] SC	Placed:   [x] Arterial Line		[ ] R	[ ] L	[ ] Fem	[x] Rad	[ ] Ax	Placed:   [ ] PICC:					[ ] Mediport  [ ] Urinary Catheter, Date Placed:   [x] Necessity of urinary, arterial, and venous catheters discussed    OTHER MEDICATIONS:  chlorhexidine 2% Cloths 1 Application(s) Topical <User Schedule>      CODE STATUS: full code       IMAGING: < from: CT Abdomen and Pelvis w/ Oral Cont and w/ IV Cont (10.27.19 @ 05:19) >  IMPRESSION:     Closed-loop small bowel obstruction with transition points in the right   hemiabdomen associated with significant mesenteric edema. No pneumatosis   or free air.      < end of copied text >

## 2019-10-28 NOTE — PROGRESS NOTE ADULT - ASSESSMENT
74M w/ PMH CAD s/p CABG last year and tongue cancer for which he just finished chemo/radiation, no PSH  presented with closed loop SBO s/p diagnostic lap, converted to open, WILFRID, ischemic bowel improved in color and regained some peristalsis after WILFRID, no SBR was performed. One episode of melena right after the case. Didn't require pressors, remained hemodynamically stable.     Neuro: A&O x 3  - Pain control with IV Tylenol     Respiratory: no active issues  - Wean off NC   - ISS, pulmonary toilet     CV:  CAD s/p CABG last year  - Pt reports that he is not taking any meds at home  - Hemodynamically stable, no pressor requirement     GI: hx of closed loop SBO s/p exlap, WILFRID, no SBR  - C/w NPO/NGT  - NGT w/ blood tinged output  - Protonix 40 BID  - Melena x 1 immediately post op    Renal: no active issues  - Cr at baseline 1.02  - C/w LR @ 100  - C/w Dimas     Heme/Onc: no acute issues   - Hold chemical  PPX  - Monitor H&H    ID: no active issues  - Zosyn for ppx 4 days course   - F/u urine Cx    Endo: no active issues    Dispo: SICU

## 2019-10-28 NOTE — PHYSICAL THERAPY INITIAL EVALUATION ADULT - ADDITIONAL COMMENTS
pt lives in private home with family, 4 steps to enter +2 HRs. Additional flight of stairs to pt's bedroom +2 HR. Prior to admission, pt was I with all functional mobility and ADLs without AD.

## 2019-10-28 NOTE — OCCUPATIONAL THERAPY INITIAL EVALUATION ADULT - PLANNED THERAPY INTERVENTIONS, OT EVAL
neuromuscular re-education/ADL retraining/balance training/parent/caregiver training.../transfer training

## 2019-10-28 NOTE — PHYSICAL THERAPY INITIAL EVALUATION ADULT - GAIT DEVIATIONS NOTED, PT EVAL
increased time in double stance/decreased velocity of limb motion/decreased cristino/decreased stride length/decreased weight-shifting ability

## 2019-10-28 NOTE — PROGRESS NOTE ADULT - SUBJECTIVE AND OBJECTIVE BOX
Subjective:  Patient seen and evaluated this AM with team.   Patient states that his pain is better controlled.  Denies nausea. Received bolus overnight for dec urine output.    OBJECTIVE:     ** PHYSICAL EXAM **    Gen: NAD, lying comfortably in bed  HEENT: NC/AT  RESP: nonlabored breathing  ABDOMEN: soft, non-distended, non-tender, incision site dressings c/d/i      ** VITAL SIGNS / I&O's **    Vital Signs Last 24 Hrs  T(C): 36.6 (28 Oct 2019 11:00), Max: 36.8 (28 Oct 2019 03:00)  T(F): 97.9 (28 Oct 2019 11:00), Max: 98.2 (28 Oct 2019 03:00)  HR: 88 (28 Oct 2019 14:00) (78 - 91)  BP: 130/62 (28 Oct 2019 07:00) (115/57 - 130/62)  BP(mean): 89 (28 Oct 2019 07:00) (78 - 89)  RR: 10 (28 Oct 2019 14:00) (9 - 19)  SpO2: 100% (28 Oct 2019 14:00) (99% - 100%)      27 Oct 2019 07:01  -  28 Oct 2019 07:00  --------------------------------------------------------  IN:    IV PiggyBack: 550 mL    Lactated Ringers IV Bolus: 2000 mL    lactated ringers.: 2100 mL    Solution: 100 mL  Total IN: 4750 mL    OUT:    Nasoenteral Tube: 400 mL    Ureteral Catheter: 598 mL  Total OUT: 998 mL    Total NET: 3752 mL      28 Oct 2019 07:01  -  28 Oct 2019 15:00  --------------------------------------------------------  IN:    IV PiggyBack: 100 mL    Lactated Ringers IV Bolus: 500 mL    lactated ringers.: 700 mL  Total IN: 1300 mL    OUT:    Ureteral Catheter: 95 mL  Total OUT: 95 mL    Total NET: 1205 mL            ** LABS **                          9.8    6.60  )-----------( 185      ( 28 Oct 2019 04:25 )             29.0     28 Oct 2019 11:00    134    |  104    |  32     ----------------------------<  131    4.4     |  19     |  1.29     Ca    8.1        28 Oct 2019 11:00  Phos  3.6       28 Oct 2019 00:54  Mg     2.4       28 Oct 2019 00:54    TPro  4.3    /  Alb  1.9    /  TBili  0.5    /  DBili  0.2    /  AST  15     /  ALT  6      /  AlkPhos  44     27 Oct 2019 10:35    PT/INR - ( 27 Oct 2019 10:54 )   PT: 11.3 sec;   INR: 0.99 ratio         PTT - ( 27 Oct 2019 10:54 )  PTT:36.9 sec      LIVER FUNCTIONS - ( 27 Oct 2019 10:35 )  Alb: 1.9 g/dL / Pro: 4.3 g/dL / ALK PHOS: 44 U/L / ALT: 6 U/L / AST: 15 U/L / GGT: x             Culture - Urine (collected 27 Oct 2019 08:36)  Source: .Urine  Preliminary Report (28 Oct 2019 08:26):    >100,000 CFU/ml Gram Negative Rods      Urinalysis Basic - ( 27 Oct 2019 03:21 )    Color: Light Orange / Appearance: Turbid / S.018 / pH: x  Gluc: x / Ketone: Negative  / Bili: Negative / Urobili: 2 mg/dL   Blood: x / Protein: 30 mg/dL / Nitrite: Negative   Leuk Esterase: Large / RBC: 25 /hpf / WBC >50 /HPF   Sq Epi: x / Non Sq Epi: 1 / Bacteria: TNTC        MEDICATIONS  (STANDING):  chlorhexidine 2% Cloths 1 Application(s) Topical <User Schedule>  enoxaparin Injectable 40 milliGRAM(s) SubCutaneous every 24 hours  lactated ringers. 1000 milliLiter(s) (100 mL/Hr) IV Continuous <Continuous>  pantoprazole  Injectable 40 milliGRAM(s) IV Push every 12 hours  piperacillin/tazobactam IVPB.. 3.375 Gram(s) IV Intermittent every 8 hours    MEDICATIONS  (PRN):

## 2019-10-28 NOTE — OCCUPATIONAL THERAPY INITIAL EVALUATION ADULT - ADL RETRAINING, OT EVAL
Goals: Pt will perform LB dressing independently within 2 weeks.  Pt will be I with toileting within 2 weeks.

## 2019-10-29 DIAGNOSIS — I25.10 ATHEROSCLEROTIC HEART DISEASE OF NATIVE CORONARY ARTERY WITHOUT ANGINA PECTORIS: ICD-10-CM

## 2019-10-29 DIAGNOSIS — N39.0 URINARY TRACT INFECTION, SITE NOT SPECIFIED: ICD-10-CM

## 2019-10-29 DIAGNOSIS — D50.0 IRON DEFICIENCY ANEMIA SECONDARY TO BLOOD LOSS (CHRONIC): ICD-10-CM

## 2019-10-29 DIAGNOSIS — K56.609 UNSPECIFIED INTESTINAL OBSTRUCTION, UNSPECIFIED AS TO PARTIAL VERSUS COMPLETE OBSTRUCTION: ICD-10-CM

## 2019-10-29 DIAGNOSIS — C02.9 MALIGNANT NEOPLASM OF TONGUE, UNSPECIFIED: ICD-10-CM

## 2019-10-29 LAB
-  AMIKACIN: SIGNIFICANT CHANGE UP
-  AMPICILLIN/SULBACTAM: SIGNIFICANT CHANGE UP
-  AMPICILLIN: SIGNIFICANT CHANGE UP
-  AZTREONAM: SIGNIFICANT CHANGE UP
-  CEFAZOLIN: SIGNIFICANT CHANGE UP
-  CEFEPIME: SIGNIFICANT CHANGE UP
-  CEFOXITIN: SIGNIFICANT CHANGE UP
-  CEFTRIAXONE: SIGNIFICANT CHANGE UP
-  CIPROFLOXACIN: SIGNIFICANT CHANGE UP
-  ERTAPENEM: SIGNIFICANT CHANGE UP
-  GENTAMICIN: SIGNIFICANT CHANGE UP
-  IMIPENEM: SIGNIFICANT CHANGE UP
-  LEVOFLOXACIN: SIGNIFICANT CHANGE UP
-  MEROPENEM: SIGNIFICANT CHANGE UP
-  NITROFURANTOIN: SIGNIFICANT CHANGE UP
-  PIPERACILLIN/TAZOBACTAM: SIGNIFICANT CHANGE UP
-  TIGECYCLINE: SIGNIFICANT CHANGE UP
-  TOBRAMYCIN: SIGNIFICANT CHANGE UP
-  TRIMETHOPRIM/SULFAMETHOXAZOLE: SIGNIFICANT CHANGE UP
ANION GAP SERPL CALC-SCNC: 7 MMOL/L — SIGNIFICANT CHANGE UP (ref 5–17)
BUN SERPL-MCNC: 30 MG/DL — HIGH (ref 7–23)
CALCIUM SERPL-MCNC: 7.7 MG/DL — LOW (ref 8.4–10.5)
CHLORIDE SERPL-SCNC: 105 MMOL/L — SIGNIFICANT CHANGE UP (ref 96–108)
CO2 SERPL-SCNC: 23 MMOL/L — SIGNIFICANT CHANGE UP (ref 22–31)
CREAT SERPL-MCNC: 1.26 MG/DL — SIGNIFICANT CHANGE UP (ref 0.5–1.3)
CULTURE RESULTS: SIGNIFICANT CHANGE UP
GAS PNL BLDV: SIGNIFICANT CHANGE UP
GLUCOSE SERPL-MCNC: 87 MG/DL — SIGNIFICANT CHANGE UP (ref 70–99)
HCT VFR BLD CALC: 23.6 % — LOW (ref 39–50)
HCT VFR BLD CALC: 24 % — LOW (ref 39–50)
HCT VFR BLD CALC: 24.1 % — LOW (ref 39–50)
HCT VFR BLD CALC: 25.7 % — LOW (ref 39–50)
HGB BLD-MCNC: 7.8 G/DL — LOW (ref 13–17)
HGB BLD-MCNC: 8 G/DL — LOW (ref 13–17)
HGB BLD-MCNC: 8.1 G/DL — LOW (ref 13–17)
HGB BLD-MCNC: 8.6 G/DL — LOW (ref 13–17)
MAGNESIUM SERPL-MCNC: 1.8 MG/DL — SIGNIFICANT CHANGE UP (ref 1.6–2.6)
MCHC RBC-ENTMCNC: 31.8 PG — SIGNIFICANT CHANGE UP (ref 27–34)
MCHC RBC-ENTMCNC: 32 PG — SIGNIFICANT CHANGE UP (ref 27–34)
MCHC RBC-ENTMCNC: 32.1 PG — SIGNIFICANT CHANGE UP (ref 27–34)
MCHC RBC-ENTMCNC: 32.3 PG — SIGNIFICANT CHANGE UP (ref 27–34)
MCHC RBC-ENTMCNC: 33.1 GM/DL — SIGNIFICANT CHANGE UP (ref 32–36)
MCHC RBC-ENTMCNC: 33.2 GM/DL — SIGNIFICANT CHANGE UP (ref 32–36)
MCHC RBC-ENTMCNC: 33.5 GM/DL — SIGNIFICANT CHANGE UP (ref 32–36)
MCHC RBC-ENTMCNC: 33.8 GM/DL — SIGNIFICANT CHANGE UP (ref 32–36)
MCV RBC AUTO: 95.6 FL — SIGNIFICANT CHANGE UP (ref 80–100)
MCV RBC AUTO: 95.9 FL — SIGNIFICANT CHANGE UP (ref 80–100)
MCV RBC AUTO: 96.3 FL — SIGNIFICANT CHANGE UP (ref 80–100)
MCV RBC AUTO: 96.4 FL — SIGNIFICANT CHANGE UP (ref 80–100)
METHOD TYPE: SIGNIFICANT CHANGE UP
NRBC # BLD: 0 /100 WBCS — SIGNIFICANT CHANGE UP (ref 0–0)
ORGANISM # SPEC MICROSCOPIC CNT: SIGNIFICANT CHANGE UP
ORGANISM # SPEC MICROSCOPIC CNT: SIGNIFICANT CHANGE UP
PHOSPHATE SERPL-MCNC: 2.7 MG/DL — SIGNIFICANT CHANGE UP (ref 2.5–4.5)
PLATELET # BLD AUTO: 157 K/UL — SIGNIFICANT CHANGE UP (ref 150–400)
PLATELET # BLD AUTO: 158 K/UL — SIGNIFICANT CHANGE UP (ref 150–400)
PLATELET # BLD AUTO: 162 K/UL — SIGNIFICANT CHANGE UP (ref 150–400)
PLATELET # BLD AUTO: 179 K/UL — SIGNIFICANT CHANGE UP (ref 150–400)
POTASSIUM SERPL-MCNC: 4.2 MMOL/L — SIGNIFICANT CHANGE UP (ref 3.5–5.3)
POTASSIUM SERPL-SCNC: 4.2 MMOL/L — SIGNIFICANT CHANGE UP (ref 3.5–5.3)
RBC # BLD: 2.45 M/UL — LOW (ref 4.2–5.8)
RBC # BLD: 2.5 M/UL — LOW (ref 4.2–5.8)
RBC # BLD: 2.51 M/UL — LOW (ref 4.2–5.8)
RBC # BLD: 2.68 M/UL — LOW (ref 4.2–5.8)
RBC # FLD: 19.2 % — HIGH (ref 10.3–14.5)
RBC # FLD: 19.4 % — HIGH (ref 10.3–14.5)
RBC # FLD: 19.5 % — HIGH (ref 10.3–14.5)
RBC # FLD: 19.5 % — HIGH (ref 10.3–14.5)
SODIUM SERPL-SCNC: 135 MMOL/L — SIGNIFICANT CHANGE UP (ref 135–145)
SPECIMEN SOURCE: SIGNIFICANT CHANGE UP
WBC # BLD: 4.5 K/UL — SIGNIFICANT CHANGE UP (ref 3.8–10.5)
WBC # BLD: 4.77 K/UL — SIGNIFICANT CHANGE UP (ref 3.8–10.5)
WBC # BLD: 5.25 K/UL — SIGNIFICANT CHANGE UP (ref 3.8–10.5)
WBC # BLD: 5.81 K/UL — SIGNIFICANT CHANGE UP (ref 3.8–10.5)
WBC # FLD AUTO: 4.5 K/UL — SIGNIFICANT CHANGE UP (ref 3.8–10.5)
WBC # FLD AUTO: 4.77 K/UL — SIGNIFICANT CHANGE UP (ref 3.8–10.5)
WBC # FLD AUTO: 5.25 K/UL — SIGNIFICANT CHANGE UP (ref 3.8–10.5)
WBC # FLD AUTO: 5.81 K/UL — SIGNIFICANT CHANGE UP (ref 3.8–10.5)

## 2019-10-29 PROCEDURE — 99223 1ST HOSP IP/OBS HIGH 75: CPT

## 2019-10-29 PROCEDURE — 99223 1ST HOSP IP/OBS HIGH 75: CPT | Mod: GC

## 2019-10-29 RX ORDER — MAGNESIUM SULFATE 500 MG/ML
2 VIAL (ML) INJECTION ONCE
Refills: 0 | Status: COMPLETED | OUTPATIENT
Start: 2019-10-29 | End: 2019-10-29

## 2019-10-29 RX ORDER — ERTAPENEM SODIUM 1 G/1
1000 INJECTION, POWDER, LYOPHILIZED, FOR SOLUTION INTRAMUSCULAR; INTRAVENOUS EVERY 24 HOURS
Refills: 0 | Status: DISCONTINUED | OUTPATIENT
Start: 2019-10-29 | End: 2019-11-01

## 2019-10-29 RX ORDER — ACETAMINOPHEN 500 MG
1000 TABLET ORAL ONCE
Refills: 0 | Status: COMPLETED | OUTPATIENT
Start: 2019-10-29 | End: 2019-10-29

## 2019-10-29 RX ORDER — SODIUM CHLORIDE 9 MG/ML
1000 INJECTION, SOLUTION INTRAVENOUS
Refills: 0 | Status: DISCONTINUED | OUTPATIENT
Start: 2019-10-29 | End: 2019-10-29

## 2019-10-29 RX ORDER — HYDROMORPHONE HYDROCHLORIDE 2 MG/ML
0.5 INJECTION INTRAMUSCULAR; INTRAVENOUS; SUBCUTANEOUS ONCE
Refills: 0 | Status: DISCONTINUED | OUTPATIENT
Start: 2019-10-29 | End: 2019-10-29

## 2019-10-29 RX ORDER — PIPERACILLIN AND TAZOBACTAM 4; .5 G/20ML; G/20ML
3.38 INJECTION, POWDER, LYOPHILIZED, FOR SOLUTION INTRAVENOUS EVERY 8 HOURS
Refills: 0 | Status: DISCONTINUED | OUTPATIENT
Start: 2019-10-29 | End: 2019-10-29

## 2019-10-29 RX ORDER — SODIUM CHLORIDE 9 MG/ML
1000 INJECTION, SOLUTION INTRAVENOUS
Refills: 0 | Status: DISCONTINUED | OUTPATIENT
Start: 2019-10-29 | End: 2019-10-30

## 2019-10-29 RX ADMIN — CHLORHEXIDINE GLUCONATE 1 APPLICATION(S): 213 SOLUTION TOPICAL at 05:56

## 2019-10-29 RX ADMIN — HYDROMORPHONE HYDROCHLORIDE 0.5 MILLIGRAM(S): 2 INJECTION INTRAMUSCULAR; INTRAVENOUS; SUBCUTANEOUS at 23:17

## 2019-10-29 RX ADMIN — HYDROMORPHONE HYDROCHLORIDE 0.5 MILLIGRAM(S): 2 INJECTION INTRAMUSCULAR; INTRAVENOUS; SUBCUTANEOUS at 23:32

## 2019-10-29 RX ADMIN — ERTAPENEM SODIUM 120 MILLIGRAM(S): 1 INJECTION, POWDER, LYOPHILIZED, FOR SOLUTION INTRAMUSCULAR; INTRAVENOUS at 17:10

## 2019-10-29 RX ADMIN — Medication 400 MILLIGRAM(S): at 05:55

## 2019-10-29 RX ADMIN — PANTOPRAZOLE SODIUM 40 MILLIGRAM(S): 20 TABLET, DELAYED RELEASE ORAL at 00:38

## 2019-10-29 RX ADMIN — Medication 62.5 MILLIMOLE(S): at 07:58

## 2019-10-29 RX ADMIN — SODIUM CHLORIDE 100 MILLILITER(S): 9 INJECTION, SOLUTION INTRAVENOUS at 23:17

## 2019-10-29 RX ADMIN — PIPERACILLIN AND TAZOBACTAM 25 GRAM(S): 4; .5 INJECTION, POWDER, LYOPHILIZED, FOR SOLUTION INTRAVENOUS at 13:30

## 2019-10-29 RX ADMIN — Medication 50 GRAM(S): at 03:05

## 2019-10-29 RX ADMIN — PANTOPRAZOLE SODIUM 40 MILLIGRAM(S): 20 TABLET, DELAYED RELEASE ORAL at 23:17

## 2019-10-29 RX ADMIN — Medication 1000 MILLIGRAM(S): at 06:10

## 2019-10-29 RX ADMIN — PIPERACILLIN AND TAZOBACTAM 25 GRAM(S): 4; .5 INJECTION, POWDER, LYOPHILIZED, FOR SOLUTION INTRAVENOUS at 05:56

## 2019-10-29 RX ADMIN — PANTOPRAZOLE SODIUM 40 MILLIGRAM(S): 20 TABLET, DELAYED RELEASE ORAL at 13:29

## 2019-10-29 NOTE — DIETITIAN INITIAL EVALUATION ADULT. - FACTORS AFF FOOD INTAKE
NGT removed 10/28 after clamping trial. Pt refused clear liquid tray at dinner per nursing flowsheet. NGT removed 10/28 after clamping trial. Pt refused clear liquid tray at dinner per nursing flowsheet, but consumed broth, tea and Promote supplement for breakfast. Pt requests diet advancement to Mechanical Soft + Ensure Enlive, due to h/o tongue cancer.

## 2019-10-29 NOTE — CONSULT NOTE ADULT - ASSESSMENT
74y Male w/ PMH CAD s/p CABG last year and tongue cancer who presented with abdominal pain found to have closed loop SBO s/p exploratory laparotomy, WILFRID now with melena

## 2019-10-29 NOTE — PROGRESS NOTE ADULT - ASSESSMENT
73 yo M s/p enteroclysis, exploratory laparotomy, diagnostic laparoscopy for SBO who had a bloody BM in the OR and continued bloody BM post operatively     Plan:  - Appreciate care per SICU team  - Transfer to floor when meets SICU criteria  - Advance diet as tolerated    ACS & Trauma, [id number]

## 2019-10-29 NOTE — CONSULT NOTE ADULT - ATTENDING COMMENTS
Patient seen and examined. Agree with above. Melena and drop in H/H more likely due to transient ischemia as seen on ex-lap of the small bowel. Would continue PPI and monitor for now. If H/H continues to drop or melena continues, then will consider EGD for other sources of bleeding.

## 2019-10-29 NOTE — CONSULT NOTE ADULT - PROBLEM SELECTOR RECOMMENDATION 5
s/p cisplatin and radiation therapy. Scheduled for outpatient surgery.   - Outpatient oncology follow up

## 2019-10-29 NOTE — CONSULT NOTE ADULT - ASSESSMENT
Impression:    1. Melena, drop in hgb, ddx includes residual melena from prior bowel ischemia, vs. active GI bleed from peptic ulcer disease, GI cancer or angioectasias.    2. SBO S/p WILFRID    3. Hx of CABG    4. Hx of tongue cancer    Recommendation:  -high dose PPI  -CBC q12 hx. Goal hgb 8.  -If melena persists and hgb continues to decline, would perform EGD tomorrow, so please keep pt NPO past midnight Impression:    1. Melena, drop in hgb, ddx includes residual melena from prior bowel ischemia, vs. less likely active GI bleed from peptic ulcer disease, GI cancer or angioectasias.    2. SBO S/p WILFRID    3. Hx of CABG    4. Hx of tongue cancer    Recommendation:  -high dose PPI  -CBC q12 hx. Goal hgb 8.  -If melena persists and hgb continues to decline, would perform EGD tomorrow, so please keep pt NPO past midnight

## 2019-10-29 NOTE — DIETITIAN INITIAL EVALUATION ADULT. - MALNUTRITION
RD will assess malnutrition status upon interview Moderate, in context of chronic illness Moderate, in context of acute on chronic illness

## 2019-10-29 NOTE — DIETITIAN INITIAL EVALUATION ADULT. - PHYSICAL APPEARANCE
other (specify)/ht: 5 feet 6 inches, admit wt: 146 pounds, BMI: 23.5 Kg/m2, IBW: 142 pounds (+/- 10%), 103% IBW Edema: 2+ generalized  Skin: no pressure injuries noted per nursing flowsheet  Nutrition Focused Physical Exam: Edema: 2+ generalized  Skin: no pressure injuries noted per nursing flowsheet  Nutrition Focused Physical Exam: Physical exam conducted with patient's permission indicates moderate depletion of temples, clavicles, deltoids, and calf. Pt reports loss of weight "all over"

## 2019-10-29 NOTE — DIETITIAN INITIAL EVALUATION ADULT. - REASON INDICATOR FOR ASSESSMENT
Nutrition Consult for decreased po intake x 5 days PTA received and appreciated.   Information obtained from: medical record [INCOMPLETE NOTE- IN PROGRESS]  Per chart: "74M w/ PMH CAD s/p CABG last year and tongue cancer for which he just finished chemo/radiation, no PSH  presented with closed loop SBO s/p diagnostic lap, converted to open, WILFRID, ischemic bowel improved in color and regained some peristalsis after WILFRID, no SBR was performed. One episode of melena right after the case." Nutrition Consult for decreased po intake x 5 days PTA received and appreciated.   Information obtained from: patient, medical record, 8ICU interdisciplinary rounds  Per chart: "74M w/ PMH CAD s/p CABG last year and tongue cancer for which he just finished chemo/radiation, no PSH  presented with closed loop SBO s/p diagnostic lap, converted to open, WILFRID, ischemic bowel improved in color and regained some peristalsis after WILFRID, no SBR was performed. One episode of melena right after the case."

## 2019-10-29 NOTE — PROGRESS NOTE ADULT - SUBJECTIVE AND OBJECTIVE BOX
HISTORY    74y Male w/ PMH CAD s/p CABG last year and tongue cancer for which he just finished chemo/radiation, no PSH  presents w/ abdominal pain since last night. Pt reports he developed abdominal pain at 11pm last night after eating cereal. He reports the pain was very significant and worsened. He reports nausea but no emesis. He reports he has been having bowel movements and passing gas.  In the emergency room, the pt was hypotensive to the 70s and initially responded to fluid bolus. Labs significant for a wbc of 7.63, lactate of 3.0. CT scan showing closed loop bowel obstruction w/ mesenteric edema. PT was taken urgently to OR diagnostic laparoscopy, long segment of SB was ischemic 2/2 to adhesions, case was converted to open, after WILFRID and bowel appearance improved abd peristalsis noted, no SBR, abdomen was closed. At the end of the case pt had one episode of melena. Pt was taken to SICU for hemodynamic monitoring.     24 HOUR EVENTS:  - NGT clamp trial, NGT removed  - Diet advanced to clears  - Given 500 cc bolus yesterday morning and another 500 cc LR in evening for ?low UOP  - ?Oliguria, nurse noted patient was peeing around catheter, possible falsely decreased UOP measurements, milan removed (patient peeing around catheter)  -Patient experiencing some pain in evening, given Dilaudid 0.5    SUBJECTIVE/ROS:  [X] A ten-point review of systems was otherwise negative except as noted.  [ ] Due to altered mental status/intubation, subjective information were not able to be obtained from the patient. History was obtained, to the extent possible, from review of the chart and collateral sources of information.    NEURO  Exam: awake, alert, oriented  Meds: APAP, Dilaudid 0.5,g x1 dose  [x] Adequacy of sedation and pain control has been assessed and adjusted    RESPIRATORY  RR: 7 (10-29-19 @ 01:00) (7 - 18)  SpO2: 100% (10-29-19 @ 01:00) (99% - 100%)  Exam: unlabored, clear to auscultation bilaterally  Mechanical Ventilation:   ABG - ( 27 Oct 2019 14:45 )  pH: 7.36  /  pCO2: 35    /  pO2: 177   / HCO3: 20    / Base Excess: -4.7  /  SaO2: 100     Lactate: x        [N/A] Extubation Readiness Assessed    CARDIOVASCULAR  HR: 80 (10-29-19 @ 01:00) (80 - 92)  BP: 112/58 (10-29-19 @ 01:00) (106/55 - 146/86)  BP(mean): 82 (10-29-19 @ 01:00) (77 - 102)  ABP: 146/86 (10-28-19 @ 13:00) (119/69 - 147/60)  ABP(mean): 102 (10-28-19 @ 13:00) (82 - 102)  VBG - ( 27 Oct 2019 03:50 )  pH: 7.30  /  pCO2: 56    /  pO2: <20   / HCO3: 27    / Base Excess: 0.1   /  SaO2: 16     Lactate: 3.0      Exam: regular rate and rhythm  Cardiac Rhythm: sinus  Perfusion     [x]Adequate   [ ]Inadequate  Mentation   [x]Normal       [ ]Reduced  Extremities  [x]Warm         [ ]Cool  Volume Status [ ]Hypervolemic [x]Euvolemic [ ]Hypovolemic    GI/NUTRITION  Exam: soft, nontender, nondistended, incision C/D/I  Diet: Clears  Meds: pantoprazole  Injectable 40 milliGRAM(s) IV Push every 12 hours    GENITOURINARY  I&O's Detail    10-27 @ 07:01  -  10-28 @ 07:00  --------------------------------------------------------  IN:    IV PiggyBack: 550 mL    Lactated Ringers IV Bolus: 2000 mL    lactated ringers.: 2100 mL    Solution: 100 mL  Total IN: 4750 mL    OUT:    Nasoenteral Tube: 400 mL    Ureteral Catheter: 598 mL  Total OUT: 998 mL    Total NET: 3752 mL      10-28 @ 07:01  -  10-29 @ 01:31  --------------------------------------------------------  IN:    IV PiggyBack: 250 mL    Lactated Ringers IV Bolus: 1000 mL    lactated ringers.: 1800 mL  Total IN: 3050 mL    OUT:    Nasoenteral Tube: 20 mL    Ureteral Catheter: 340 mL    Voided: 275 mL  Total OUT: 635 mL    Total NET: 2415 mL      10-28    134<L>  |  104  |  32<H>  ----------------------------<  131<H>  4.4   |  19<L>  |  1.29    Ca    8.1<L>      28 Oct 2019 11:00  Phos  3.6     10-28  Mg     2.4     10-28    TPro  4.3<L>  /  Alb  1.9<L>  /  TBili  0.5  /  DBili  0.2  /  AST  15  /  ALT  6<L>  /  AlkPhos  44  10-27    Milan removed  Meds: lactated ringers. 1000 milliLiter(s) IV Continuous <Continuous>    HEMATOLOGIC  Meds: enoxaparin Injectable 40 milliGRAM(s) SubCutaneous every 24 hours    [x] VTE Prophylaxis                        9.8    6.60  )-----------( 185      ( 28 Oct 2019 04:25 )             29.0     PT/INR - ( 27 Oct 2019 10:54 )   PT: 11.3 sec;   INR: 0.99 ratio         PTT - ( 27 Oct 2019 10:54 )  PTT:36.9 sec  Transfusion     [ ] PRBC   [ ] Platelets   [ ] FFP   [ ] Cryoprecipitate      INFECTIOUS DISEASES  WBC Count: 6.60 K/uL (10-28 @ 04:25)    RECENT CULTURES:  Specimen Source: .Urine  Date/Time: 10-27 @ 08:36  Culture Results:   >100,000 CFU/ml Gram Negative Rods    Meds: piperacillin/tazobactam IVPB.. 3.375 Gram(s) IV Intermittent every 8 hours    ENDOCRINE  CAPILLARY BLOOD GLUCOSE    Meds:     ACCESS DEVICES:  [X] Peripheral IV  [ ] Central Venous Line	[ ] R	[ ] L	[ ] IJ	[ ] Fem	[ ] SC	Placed:   [ ] Arterial Line		[ ] R	[ ] L	[ ] Fem	[ ] Rad	[ ] Ax	Placed:   [ ] PICC:					[ ] Mediport  [ ] Urinary Catheter, Date Placed:   [x] Necessity of urinary, arterial, and venous catheters discussed    OTHER MEDICATIONS:  chlorhexidine 2% Cloths 1 Application(s) Topical <User Schedule>

## 2019-10-29 NOTE — CONSULT NOTE ADULT - PROBLEM SELECTOR RECOMMENDATION 4
s/p CABG. Was previously on Brilinta and Lipitor, now no longer taking. No chest pain. s/p CABG. Was previously on Brilinta and Lipitor, now no longer taking. No chest pain.  - Given CAD pt should be on antiplatelet and statin therapy   - Would hold antiplatelet for now given concern for GI bleeding

## 2019-10-29 NOTE — DIETITIAN INITIAL EVALUATION ADULT. - DIET TYPE
fiber/residue restricted add 3 Ensure Enlive/supplement (specify)/mechanical soft/fiber/residue restricted

## 2019-10-29 NOTE — CONSULT NOTE ADULT - PROBLEM SELECTOR RECOMMENDATION 3
UA grossly positive, Ucx with over 100,000 gram negative rods. On Zosyn  - Agree with abx while awaiting speciation and sensitivities of cultures  - Would treat UTI for total 5 day course hair removal not indicated UA grossly positive, Ucx with over 100,000 gram negative rods. On Zosyn  - Ucx growing ESBL. Would change to Ertapenem  - Would treat UTI for total 5 day course

## 2019-10-29 NOTE — PROGRESS NOTE ADULT - ASSESSMENT
74M w/ PMH CAD s/p CABG last year and tongue cancer for which he just finished chemo/radiation, no PSH  presented with closed loop SBO s/p diagnostic lap, converted to open, WILFRID, ischemic bowel improved in color and regained some peristalsis after WILFRID, no SBR was performed. One episode of melena right after the case. Didn't require pressors, remained hemodynamically stable.     Neuro: A&O x 3  - Pain control with IV Tylenol and x1 dose of Dilaudid 0.5    Respiratory: no active issues  - Wean off NC   - ISS, pulmonary toilet     CV:  CAD s/p CABG last year  - Pt reports that he is not taking any meds at home  - Hemodynamically stable, no pressor requirement   - Lactate cleared 1.4     GI: hx of closed loop SBO s/p exlap, WILFRID, no SBR  - C/w NPO/NGT  - NGT w/ blood tinged output  - Protonix 40 BID  - Melena x 1 immediately post op    Renal: no active issues  - Cr at baseline 1.02  - C/w LR @ 100  - Dimas removed     Heme/Onc: no acute issues   - Hold chemical  PPX  - Monitor H&H    ID: no active issues  - Zosyn for ppx 4 days course     Endo: no active issues    Dispo: SICU 74M w/ PMH CAD s/p CABG last year and tongue cancer for which he just finished chemo/radiation, no PSH  presented with closed loop SBO s/p diagnostic lap, converted to open, WILFRID, ischemic bowel improved in color and regained some peristalsis after WILFRID, no SBR was performed. One episode of melena right after the case. Didn't require pressors, remained hemodynamically stable.     Neuro: A&O x 3  - Pain control with IV Tylenol and x1 dose of Dilaudid 0.5    Respiratory: no active issues  - Wean off NC   - ISS, pulmonary toilet     CV:  CAD s/p CABG last year  - Pt reports that he is not taking any meds at home  - Hemodynamically stable, no pressor requirement   - Lactate cleared 1.4     GI: hx of closed loop SBO s/p exlap, WILFRID, no SBR  - Diet advanced to clears  - NGT removed  - Protonix 40 BID  - Melena x 1 immediately post op    Renal: no active issues  - Cr at baseline 1.02  - C/w LR @ 100  - Dimas removed     Heme/Onc: no acute issues   - Hold chemical  PPX  - Monitor H&H    ID: no active issues  - Zosyn for ppx 4 days course     Endo: no active issues    Dispo: SICU

## 2019-10-29 NOTE — CONSULT NOTE ADULT - PROBLEM SELECTOR RECOMMENDATION 9
Found to have closed loop SBO, s/p ex lap with WILFRID. Pain controlled, was tolerating diet however with apparently melanotic BM's this am  - Care as per surgery

## 2019-10-29 NOTE — DIETITIAN INITIAL EVALUATION ADULT. - PERTINENT LABORATORY DATA
10-29 @ 01:05: Sodium 135, Potassium 4.2, Chloride 105, Calcium 7.7<L>, Magnesium 1.8, Phosphorus 2.7, BUN 30<H>, Creatinine 1.26, BG 87, Hemoglobin 8.6<L>, Hematocrit 25.7<L>

## 2019-10-29 NOTE — CONSULT NOTE ADULT - PROBLEM SELECTOR RECOMMENDATION 2
Pt with hematochezia on OR table. Now with melanotic stool this am  - H/H slowly downtrending, 11.5 on admission now 7.8  - PPI BID  - Consider GI consult  - Monitor CBC  - Transfuse for H/H <7

## 2019-10-29 NOTE — CHART NOTE - NSCHARTNOTEFT_GEN_A_CORE
Upon Nutritional Assessment by the Registered Dietitian your patient was determined to meet criteria / has evidence of the following diagnosis/diagnoses:          [ ]  Mild Protein Calorie Malnutrition        [X ]  Moderate Protein Calorie Malnutrition: in context of acute on chronic illness        [ ] Severe Protein Calorie Malnutrition        [ ] Unspecified Protein Calorie Malnutrition        [ ] Underweight / BMI <19        [ ] Morbid Obesity / BMI > 40      Findings as based on:  [ X] Comprehensive nutrition assessment   [X ] Nutrition Focused Physical Exam  [X] Other: reported weight loss >15% (recently regained), moderate muscle depletion, po intake <75% >5d.         Nutrition Plan/Recommendations:    1. Advance as tolerated to [employer] Soft, Low Fiber diet  2. Add 3 servings Ensure Enlive (350cal, 20Gm protein per 8oz serving)       PROVIDER Section:     By signing this assessment you are acknowledging and agree with the diagnosis/diagnoses assigned by the Registered Dietitian    Comments:

## 2019-10-29 NOTE — DIETITIAN INITIAL EVALUATION ADULT. - PERTINENT MEDS FT
MEDICATIONS  (STANDING):  chlorhexidine 2% Cloths 1 Application(s) Topical <User Schedule>  enoxaparin Injectable 40 milliGRAM(s) SubCutaneous every 24 hours  HYDROmorphone  Injectable 0.5 milliGRAM(s) IV Push once  lactated ringers. 1000 milliLiter(s) (100 mL/Hr) IV Continuous <Continuous>  pantoprazole  Injectable 40 milliGRAM(s) IV Push every 12 hours  piperacillin/tazobactam IVPB.. 3.375 Gram(s) IV Intermittent every 8 hours  sodium phosphate IVPB 15 milliMole(s) IV Intermittent once

## 2019-10-29 NOTE — DIETITIAN INITIAL EVALUATION ADULT. - OTHER INFO
INFORMATION PTA  Diet PTA: Pt interview pending  Nutrition status PTA: Pt interview pending. Pt noted with tongue cancer, S/P chemo/radiation therapy.  Nutrition Supplements PTA: none reported  Food Allergies: NKFA  Weight History PTA:   Other Information:    INFORMATION THIS ADMISSION  NGT Output x 24-hours: 20ml, removed after clamping trial  Last BM: 10/28, 10/29  Other  Information:  Therapeutic Diet Education Provided: not appropriate at this time INFORMATION PTA  Diet PTA: Pt reports he typically eats meat/cheese sandwiches for breakfast and lunch, and soup, rice and shredded meat for dinner. However, pt was consuming only Ensure and juice during treatment for tongue cancer. Pt reports be resumed solid/soft food ~3 weeks ago.  Nutrition status PTA: Per pt reports, h/o malnutrition during treatment for tongue cancer, with prolonged liqiud diet order, dependence on oral supplements and weight loss. Pt reports recent tolerance of solid food, with subsequent weight gain.  Nutrition Supplements PTA: Pt was drinking 3+ Ensure daily during cancer treatment, now drinking 1-2 daily.  Food Allergies: NKFA    INFORMATION THIS ADMISSION  NGT Output x 24-hours: 20ml, removed after clamping trial  Last BM: 10/28, 10/29, melena noted overnight  Therapeutic Diet Education Provided: not appropriate at this time

## 2019-10-29 NOTE — CONSULT NOTE ADULT - SUBJECTIVE AND OBJECTIVE BOX
TRAUMA COMANAGEMENT MEDICINE ATTENDING INITIAL CONSULT NOTE    HPI:  74y Male w/ PMH CAD s/p CABG last year and tongue cancer for which he just finished chemo/radiation, no PSH  presents w/ abdominal pain since last night. Pt reports he developed abdominal pain at 11pm last night after eating cereal. He reports the pain was very significant and worsened. He reports nausea but no emesis. He reports he has been having bowel movements and passing gas.  In the emergency room, the pt was hypotensive to the 70s and initially responded to fluid bolus. Labs significant for a wbc of 7.63, lactate of 3.0. CT scan showing closed loop bowel obstruction w/ mesenteric edema. PT was taken urgently to OR diagnostic laparoscopy, long segment of SB was ischemic 2/2 to adhesions, case was converted to open, after WILFRID and bowel appearance improved abd peristalsis noted, no SBR, abdomen was closed. At the end of the case pt had one episode of melena. Pt was taken to SICU for hemodynamic monitoring.         SUBJECTIVE: With several bowel movements this am, reported to be dark. Was tolerating diet, backed down to NPO. No abdominal pain. No SOB, palpitations    Home Medications:  No home medications    PAST MEDICAL & SURGICAL HISTORY:  Cancer of tongue  CAD, multiple vessel  S/P CABG x 1  S/P CABG (coronary artery bypass graft)      Review of Systems:   CONSTITUTIONAL: No fever, weight loss, or fatigue  EYES: No eye pain, visual disturbances, or discharge  ENMT:  No difficulty hearing, tinnitus, vertigo; No sinus or throat pain  NECK: No pain or stiffness  BREASTS: No pain, masses, or nipple discharge  RESPIRATORY: No cough, wheezing, chills or hemoptysis; No shortness of breath  CARDIOVASCULAR: No chest pain, palpitations, dizziness, or leg swelling  GASTROINTESTINAL: No abdominal or epigastric pain. No nausea, vomiting, or hematemesis; +melena  GENITOURINARY: No dysuria, frequency, hematuria, or incontinence  NEUROLOGICAL: No headaches, memory loss, loss of strength, numbness, or tremors  SKIN: No itching, burning, rashes, or lesions   ENDOCRINE: No heat or cold intolerance; No hair loss  MUSCULOSKELETAL: No joint pain or swelling; No muscle, back, or extremity pain  PSYCHIATRIC: No depression, anxiety, mood swings, or difficulty sleeping  HEME/LYMPH: No easy bruising, or bleeding gums    Allergies    No Known Allergies    Intolerances        Social History: Nonsmoker, No etoh usage    FAMILY HISTORY:  No history of colon cancer    Vital Signs Last 24 Hrs  T(C): 36.7 (29 Oct 2019 11:00), Max: 36.7 (28 Oct 2019 19:00)  T(F): 98.1 (29 Oct 2019 11:00), Max: 98.1 (28 Oct 2019 19:00)  HR: 76 (29 Oct 2019 14:00) (76 - 96)  BP: 134/58 (29 Oct 2019 14:00) (106/55 - 163/72)  BP(mean): 84 (29 Oct 2019 14:00) (77 - 104)  RR: 10 (29 Oct 2019 14:00) (7 - 28)  SpO2: 99% (29 Oct 2019 14:00) (99% - 100%)  CAPILLARY BLOOD GLUCOSE          PHYSICAL EXAM:  GENERAL: NAD, well-developed  HEAD:  NCAT  EYES: EOMI  NECK: Supple, No JVD  CHEST/LUNG: Clear to auscultation bilaterally; No wheeze  HEART: Reg rate. No M/R/G.  ABDOMEN: Soft, NT/ND, Bowel sounds present, incision C/D/I  EXTREMITIES:  2+ Peripheral Pulses, No clubbing, cyanosis, or edema  PSYCH: AAOx3  NEUROLOGY: non-focal  SKIN: No rashes or lesions    LABS:                        7.8    4.77  )-----------( 158      ( 29 Oct 2019 13:31 )             23.6     10-29    135  |  105  |  30<H>  ----------------------------<  87  4.2   |  23  |  1.26    Ca    7.7<L>      29 Oct 2019 01:05  Phos  2.7     10-29  Mg     1.8     10-29                Culture - Urine (collected 27 Oct 2019 08:36)  Source: .Urine  Preliminary Report (28 Oct 2019 08:26):    >100,000 CFU/ml Gram Negative Rods        MEDICATIONS  (STANDING):  chlorhexidine 2% Cloths 1 Application(s) Topical <User Schedule>  dextrose 5% + sodium chloride 0.9%. 1000 milliLiter(s) (100 mL/Hr) IV Continuous <Continuous>  HYDROmorphone  Injectable 0.5 milliGRAM(s) IV Push once  pantoprazole  Injectable 40 milliGRAM(s) IV Push every 12 hours  piperacillin/tazobactam IVPB.. 3.375 Gram(s) IV Intermittent every 8 hours    MEDICATIONS  (PRN):    EXAM:  CT ABDOMEN AND PELVIS OC IC                            PROCEDURE DATE:  10/27/2019            INTERPRETATION:  CLINICAL INFORMATION: Abdominal pain. Right upper and   right lower quadrant tenderness.    COMPARISON: None.    PROCEDURE: CT of the Abdomen and Pelvis was performed with intravenous   contrast.   Intravenous contrast: 90 ml Omnipaque 350. 10 ml discarded.  Oral contrast: positive contrast was administered.  Sagittal and coronal reformats were performed.    FINDINGS:    LOWER CHEST: Status post median sternotomy and aortic valve replacement.    LIVER: Within normal limits.  BILE DUCTS: Normal caliber.  GALLBLADDER: Within normal limits.  SPLEEN: Within normal limits.  PANCREAS: Within normal limits.  ADRENALS: Within normal limits.  KIDNEYS/URETERS: Within normal limits.  BLADDER: Within normal limits.  REPRODUCTIVE ORGANS: Prostate is enlarged.    BOWEL: Multiple dilated fluid-filled loops of small bowel with wall   thickening in a radial ray consistent with a closed loop bowel   obstruction with transition points in the right hemiabdomen (2:56 and   602:34) and (2:54 and 602:36). Significant right-sided mesenteric edema.   No pneumatosis.  Appendix is normal. Colonic diverticulosis.    PERITONEUM: Small to moderate volume ascites, notably on the right,   including perihepatic ascites. Small left paracolic gutter and pelvic   ascites.. No free air.    VESSELS: Atherosclerotic changes.  RETROPERITONEUM/LYMPH NODES: No lymphadenopathy.    ABDOMINAL WALL: Within normal limits.  BONES: Degenerative changes.    IMPRESSION:     Closed-loop small bowel obstruction with transition points in the right   hemiabdomen associated with significant mesenteric edema. No pneumatosis   or free air.

## 2019-10-29 NOTE — DIETITIAN INITIAL EVALUATION ADULT. - ADD RECOMMEND
1) Advance to low fiber diet as tolerated; monitor need for texture modified diet in setting of tongue cancer. 2) Monitor need for oral supplements 1) Advance to Mechanical Soft, Low Fiber diet as tolerated. 2) Add 3 servings Ensure Enlive (350cal, 20Gm protein per 8oz serving)

## 2019-10-29 NOTE — PROGRESS NOTE ADULT - SUBJECTIVE AND OBJECTIVE BOX
Subjective:    Patient seen and evaluated this AM with team.   Patient states that his pain is better controlled.  Denies nausea. Received bolus overnight for dec urine output. The NGT was clamped and removed. He was also given 500cc bolus of LR and 500cc. He had multiple bowel movements overnight including one episode of melena.     OBJECTIVE:     ** PHYSICAL EXAM **    Gen: NAD, lying comfortably in bed  HEENT: NC/AT  RESP: nonlabored breathing  ABDOMEN: soft, non-distended, non-tender, incision site dressings c/d/i      Vital Signs Last 24 Hrs  T(C): 36.7 (29 Oct 2019 11:00), Max: 36.7 (28 Oct 2019 19:00)  T(F): 98.1 (29 Oct 2019 11:00), Max: 98.1 (28 Oct 2019 19:00)  HR: 80 (29 Oct 2019 13:00) (76 - 96)  BP: 107/58 (29 Oct 2019 13:00) (106/55 - 163/72)  BP(mean): 79 (29 Oct 2019 13:00) (77 - 104)  RR: 13 (29 Oct 2019 13:00) (7 - 28)  SpO2: 100% (29 Oct 2019 13:00) (99% - 100%)      10-28 @ 07:01  -  10-29 @ 07:00  --------------------------------------------------------  IN: 3825 mL / OUT: 1235 mL / NET: 2590 mL    10-29 @ 07:01  -  10-29 @ 13:19  --------------------------------------------------------  IN: 900 mL / OUT: 600 mL / NET: 300 mL            ** LABS **                            8.0    5.25  )-----------( 157      ( 29 Oct 2019 09:27 )             24.1     10-29    135  |  105  |  30<H>  ----------------------------<  87  4.2   |  23  |  1.26    Ca    7.7<L>      29 Oct 2019 01:05  Phos  2.7     10-29  Mg     1.8     10-29

## 2019-10-29 NOTE — DIETITIAN INITIAL EVALUATION ADULT. - ETIOLOGY
inability to meet protein-calorie needs during treatment and chemotherapy for tongue cancer; admit with SBO

## 2019-10-29 NOTE — CONSULT NOTE ADULT - SUBJECTIVE AND OBJECTIVE BOX
Chief Complaint:  Patient is a 74y old  Male who presents with a chief complaint of abdominal pain (29 Oct 2019 14:13)      HPI:  74y Male w/ PMH CAD s/p CABG last year and tongue cancer for which he just finished chemo/radiation, no PSH  presents w/ abdominal pain, was found to have closed loop SBO. The patient underwent WILFRID on 10/27 with successful decompression of the bowel, however the patient had melenic stool in the OR. The patient has continued to have melena and his blood count has dropped. He denies current abd pain, history of ulcer disease. He has never had EGD but had a colonoscopy within the past year which was notable only for suboptimal prep.  He denies NSAID, antiplatelet or blood thinner use.  Allergies:  No Known Allergies      Home Medications:    Hospital Medications:  chlorhexidine 2% Cloths 1 Application(s) Topical <User Schedule>  dextrose 5% + sodium chloride 0.9%. 1000 milliLiter(s) IV Continuous <Continuous>  ertapenem  IVPB 1000 milliGRAM(s) IV Intermittent every 24 hours  HYDROmorphone  Injectable 0.5 milliGRAM(s) IV Push once  pantoprazole  Injectable 40 milliGRAM(s) IV Push every 12 hours      PMHX/PSHX:  Cancer of tongue  CAD, multiple vessel  S/P CABG x 1  S/P CABG (coronary artery bypass graft)      Family history:      Social History:     ROS:     General:  No wt loss, fevers, chills, night sweats, fatigue,   Eyes:  Good vision, no reported pain  ENT:  No sore throat, pain, runny nose, dysphagia  CV:  No pain, palpitations, hypo/hypertension  Resp:  No dyspnea, cough, tachypnea, wheezing  GI:  See HPI  :  No pain, bleeding, incontinence, nocturia  Muscle:  No pain, weakness  Neuro:  No weakness, tingling, memory problems  Psych:  No fatigue, insomnia, mood problems, depression  Endocrine:  No polyuria, polydipsia, cold/heat intolerance  Heme:  No petechiae, ecchymosis, easy bruisability  Skin:  No rash, edema      PHYSICAL EXAM:     GENERAL:  Appears stated age, well-groomed, well-nourished, no distress  HEENT:  NC/AT,  conjunctivae clear and pink,  no JVD  CHEST:  Full & symmetric excursion, no increased effort, breath sounds clear  HEART:  Regular rhythm, S1, S2, no murmur/rub/S3/S4, no abdominal bruit, no edema  ABDOMEN:  Soft, non-tender, non-distended, normoactive bowel sounds,  no masses , no hepatosplenomegaly  EXTREMITIES:  no cyanosis,clubbing or edema  SKIN:  No rash/erythema/ecchymoses/petechiae/wounds/abscess/warm/dry  NEURO:  Alert, oriented  RECTAL: melena    Vital Signs:  Vital Signs Last 24 Hrs  T(C): 36.5 (29 Oct 2019 15:00), Max: 36.7 (28 Oct 2019 19:00)  T(F): 97.7 (29 Oct 2019 15:00), Max: 98.1 (28 Oct 2019 19:00)  HR: 93 (29 Oct 2019 17:00) (76 - 96)  BP: 139/66 (29 Oct 2019 17:00) (106/55 - 163/72)  BP(mean): 95 (29 Oct 2019 17:00) (77 - 104)  RR: 19 (29 Oct 2019 17:00) (7 - 28)  SpO2: 98% (29 Oct 2019 17:00) (98% - 100%)  Daily     Daily Weight in k (29 Oct 2019 07:30)    LABS:                        7.8    4.77  )-----------( 158      ( 29 Oct 2019 13:31 )             23.6     10-29    135  |  105  |  30<H>  ----------------------------<  87  4.2   |  23  |  1.26    Ca    7.7<L>      29 Oct 2019 01:05  Phos  2.7     10-29  Mg     1.8     10-29                Imaging: Chief Complaint:  Patient is a 74y old  Male who presents with a chief complaint of abdominal pain (29 Oct 2019 14:13)      HPI:  74y Male w/ PMH CAD s/p CABG last year and tongue cancer for which he just finished chemo/radiation, no PSH  presents w/ abdominal pain, was found to have closed loop SBO. The patient underwent WILFRID on 10/27 with successful decompression of the bowel, however the patient had melenic stool in the OR. The patient has continued to have melena and his blood count has dropped. He denies current abd pain, history of ulcer disease. He has never had EGD but had a colonoscopy within the past year which was notable only for suboptimal prep.  He denies NSAID, antiplatelet or blood thinner use. He reports some pain after the surgery that is mild in the abdomen. No nausea/vomiting.      Allergies:  No Known Allergies      Home Medications:    Hospital Medications:  chlorhexidine 2% Cloths 1 Application(s) Topical <User Schedule>  dextrose 5% + sodium chloride 0.9%. 1000 milliLiter(s) IV Continuous <Continuous>  ertapenem  IVPB 1000 milliGRAM(s) IV Intermittent every 24 hours  HYDROmorphone  Injectable 0.5 milliGRAM(s) IV Push once  pantoprazole  Injectable 40 milliGRAM(s) IV Push every 12 hours      PMHX/PSHX:  Cancer of tongue  CAD, multiple vessel  S/P CABG x 1  S/P CABG (coronary artery bypass graft)      Family history:  Reviewed; non-contributory, no IBD    Social History:  No illicit drugs, ETOH    ROS:     General:  No wt loss, fevers, chills, night sweats, fatigue,   Eyes:  Good vision, no reported pain  ENT:  No sore throat, pain, runny nose, dysphagia  CV:  No pain, palpitations, hypo/hypertension  Resp:  No dyspnea, cough, tachypnea, wheezing  GI:  See HPI  :  No pain, bleeding, incontinence, nocturia  Muscle:  No pain, weakness  Neuro:  No weakness, tingling, memory problems  Psych:  No fatigue, insomnia, mood problems, depression  Endocrine:  No polyuria, polydipsia, cold/heat intolerance  Heme:  No petechiae, ecchymosis, easy bruisability  Skin:  No rash, edema      PHYSICAL EXAM:     GENERAL:  Appears stated age, well-groomed, well-nourished, no distress  HEENT:  NC/AT,  conjunctivae clear and pink,  no JVD  CHEST:  Full & symmetric excursion, no increased effort, breath sounds clear  HEART:  Regular rhythm, S1, S2, no murmur/rub/S3/S4, no abdominal bruit, no edema  ABDOMEN:  Soft, mild tenderness over surgical site; intact surgical dressings; non-distended, normoactive bowel sounds,  no masses  EXTREMITIES:  no cyanosis,clubbing or edema  SKIN:  No rash/erythema/ecchymoses/petechiae/wounds/abscess/warm/dry  NEURO:  Alert, oriented  RECTAL: melena    Vital Signs:  Vital Signs Last 24 Hrs  T(C): 36.5 (29 Oct 2019 15:00), Max: 36.7 (28 Oct 2019 19:00)  T(F): 97.7 (29 Oct 2019 15:00), Max: 98.1 (28 Oct 2019 19:00)  HR: 93 (29 Oct 2019 17:00) (76 - 96)  BP: 139/66 (29 Oct 2019 17:00) (106/55 - 163/72)  BP(mean): 95 (29 Oct 2019 17:00) (77 - 104)  RR: 19 (29 Oct 2019 17:00) (7 - 28)  SpO2: 98% (29 Oct 2019 17:00) (98% - 100%)  Daily     Daily Weight in k (29 Oct 2019 07:30)    LABS:                        7.8    4.77  )-----------( 158      ( 29 Oct 2019 13:31 )             23.6     10-29    135  |  105  |  30<H>  ----------------------------<  87  4.2   |  23  |  1.26    Ca    7.7<L>      29 Oct 2019 01:05  Phos  2.7     10-29  Mg     1.8     10-29                Imaging:      < from: CT Abdomen and Pelvis w/ Oral Cont and w/ IV Cont (10.27.19 @ 05:19) >  IMPRESSION:     Closed-loop small bowel obstruction with transition points in the right   hemiabdomen associated with significant mesenteric edema. No pneumatosis   or free air.    Findings were discussedwith Dr. Cadena by Dr. Deluca at 5:42 AM on   10/27/2019 who indicated that the communication was understood.    < end of copied text >

## 2019-10-30 DIAGNOSIS — Z29.9 ENCOUNTER FOR PROPHYLACTIC MEASURES, UNSPECIFIED: ICD-10-CM

## 2019-10-30 LAB
ANION GAP SERPL CALC-SCNC: 6 MMOL/L — SIGNIFICANT CHANGE UP (ref 5–17)
BLD GP AB SCN SERPL QL: NEGATIVE — SIGNIFICANT CHANGE UP
BUN SERPL-MCNC: 20 MG/DL — SIGNIFICANT CHANGE UP (ref 7–23)
CALCIUM SERPL-MCNC: 7.4 MG/DL — LOW (ref 8.4–10.5)
CHLORIDE SERPL-SCNC: 105 MMOL/L — SIGNIFICANT CHANGE UP (ref 96–108)
CO2 SERPL-SCNC: 24 MMOL/L — SIGNIFICANT CHANGE UP (ref 22–31)
CREAT SERPL-MCNC: 1.17 MG/DL — SIGNIFICANT CHANGE UP (ref 0.5–1.3)
GLUCOSE SERPL-MCNC: 106 MG/DL — HIGH (ref 70–99)
HCT VFR BLD CALC: 23.7 % — LOW (ref 39–50)
HCT VFR BLD CALC: 26.2 % — LOW (ref 39–50)
HCT VFR BLD CALC: 26.8 % — LOW (ref 39–50)
HGB BLD-MCNC: 8.1 G/DL — LOW (ref 13–17)
HGB BLD-MCNC: 8.6 G/DL — LOW (ref 13–17)
HGB BLD-MCNC: 8.8 G/DL — LOW (ref 13–17)
MAGNESIUM SERPL-MCNC: 1.8 MG/DL — SIGNIFICANT CHANGE UP (ref 1.6–2.6)
MCHC RBC-ENTMCNC: 32 PG — SIGNIFICANT CHANGE UP (ref 27–34)
MCHC RBC-ENTMCNC: 32 PG — SIGNIFICANT CHANGE UP (ref 27–34)
MCHC RBC-ENTMCNC: 32.7 PG — SIGNIFICANT CHANGE UP (ref 27–34)
MCHC RBC-ENTMCNC: 32.8 GM/DL — SIGNIFICANT CHANGE UP (ref 32–36)
MCHC RBC-ENTMCNC: 32.8 GM/DL — SIGNIFICANT CHANGE UP (ref 32–36)
MCHC RBC-ENTMCNC: 34.2 GM/DL — SIGNIFICANT CHANGE UP (ref 32–36)
MCV RBC AUTO: 95.6 FL — SIGNIFICANT CHANGE UP (ref 80–100)
MCV RBC AUTO: 97.4 FL — SIGNIFICANT CHANGE UP (ref 80–100)
MCV RBC AUTO: 97.5 FL — SIGNIFICANT CHANGE UP (ref 80–100)
NRBC # BLD: 0 /100 WBCS — SIGNIFICANT CHANGE UP (ref 0–0)
PHOSPHATE SERPL-MCNC: 2.2 MG/DL — LOW (ref 2.5–4.5)
PLATELET # BLD AUTO: 169 K/UL — SIGNIFICANT CHANGE UP (ref 150–400)
PLATELET # BLD AUTO: 177 K/UL — SIGNIFICANT CHANGE UP (ref 150–400)
PLATELET # BLD AUTO: 198 K/UL — SIGNIFICANT CHANGE UP (ref 150–400)
POTASSIUM SERPL-MCNC: 3.3 MMOL/L — LOW (ref 3.5–5.3)
POTASSIUM SERPL-SCNC: 3.3 MMOL/L — LOW (ref 3.5–5.3)
RBC # BLD: 2.48 M/UL — LOW (ref 4.2–5.8)
RBC # BLD: 2.69 M/UL — LOW (ref 4.2–5.8)
RBC # BLD: 2.75 M/UL — LOW (ref 4.2–5.8)
RBC # FLD: 19.1 % — HIGH (ref 10.3–14.5)
RBC # FLD: 19.1 % — HIGH (ref 10.3–14.5)
RBC # FLD: 19.2 % — HIGH (ref 10.3–14.5)
RH IG SCN BLD-IMP: POSITIVE — SIGNIFICANT CHANGE UP
SODIUM SERPL-SCNC: 135 MMOL/L — SIGNIFICANT CHANGE UP (ref 135–145)
WBC # BLD: 4.42 K/UL — SIGNIFICANT CHANGE UP (ref 3.8–10.5)
WBC # BLD: 4.48 K/UL — SIGNIFICANT CHANGE UP (ref 3.8–10.5)
WBC # BLD: 4.78 K/UL — SIGNIFICANT CHANGE UP (ref 3.8–10.5)
WBC # FLD AUTO: 4.42 K/UL — SIGNIFICANT CHANGE UP (ref 3.8–10.5)
WBC # FLD AUTO: 4.48 K/UL — SIGNIFICANT CHANGE UP (ref 3.8–10.5)
WBC # FLD AUTO: 4.78 K/UL — SIGNIFICANT CHANGE UP (ref 3.8–10.5)

## 2019-10-30 PROCEDURE — 99233 SBSQ HOSP IP/OBS HIGH 50: CPT

## 2019-10-30 PROCEDURE — 99232 SBSQ HOSP IP/OBS MODERATE 35: CPT | Mod: GC

## 2019-10-30 RX ORDER — SODIUM CHLORIDE 9 MG/ML
1000 INJECTION, SOLUTION INTRAVENOUS
Refills: 0 | Status: DISCONTINUED | OUTPATIENT
Start: 2019-10-30 | End: 2019-10-31

## 2019-10-30 RX ORDER — POTASSIUM CHLORIDE 20 MEQ
20 PACKET (EA) ORAL
Refills: 0 | Status: COMPLETED | OUTPATIENT
Start: 2019-10-30 | End: 2019-10-30

## 2019-10-30 RX ORDER — MAGNESIUM SULFATE 500 MG/ML
2 VIAL (ML) INJECTION ONCE
Refills: 0 | Status: COMPLETED | OUTPATIENT
Start: 2019-10-30 | End: 2019-10-30

## 2019-10-30 RX ORDER — POTASSIUM PHOSPHATE, MONOBASIC POTASSIUM PHOSPHATE, DIBASIC 236; 224 MG/ML; MG/ML
15 INJECTION, SOLUTION INTRAVENOUS ONCE
Refills: 0 | Status: COMPLETED | OUTPATIENT
Start: 2019-10-30 | End: 2019-10-30

## 2019-10-30 RX ADMIN — PANTOPRAZOLE SODIUM 40 MILLIGRAM(S): 20 TABLET, DELAYED RELEASE ORAL at 12:09

## 2019-10-30 RX ADMIN — CHLORHEXIDINE GLUCONATE 1 APPLICATION(S): 213 SOLUTION TOPICAL at 05:16

## 2019-10-30 RX ADMIN — Medication 100 MILLIEQUIVALENT(S): at 03:48

## 2019-10-30 RX ADMIN — Medication 100 MILLIEQUIVALENT(S): at 02:37

## 2019-10-30 RX ADMIN — Medication 50 GRAM(S): at 03:49

## 2019-10-30 RX ADMIN — Medication 100 MILLIEQUIVALENT(S): at 03:00

## 2019-10-30 RX ADMIN — SODIUM CHLORIDE 100 MILLILITER(S): 9 INJECTION, SOLUTION INTRAVENOUS at 03:50

## 2019-10-30 RX ADMIN — POTASSIUM PHOSPHATE, MONOBASIC POTASSIUM PHOSPHATE, DIBASIC 62.5 MILLIMOLE(S): 236; 224 INJECTION, SOLUTION INTRAVENOUS at 05:16

## 2019-10-30 RX ADMIN — ERTAPENEM SODIUM 120 MILLIGRAM(S): 1 INJECTION, POWDER, LYOPHILIZED, FOR SOLUTION INTRAMUSCULAR; INTRAVENOUS at 17:06

## 2019-10-30 NOTE — PROGRESS NOTE ADULT - ASSESSMENT
74M w/ PMH CAD s/p CABG last year and tongue cancer for which he just finished chemo/radiation, no PSH  presented with closed loop SBO s/p diagnostic lap, converted to open, WILFRID, ischemic bowel improved in color and regained some peristalsis after WILFRID, no SBR was performed. One episode of melena right after the case. Didn't require pressors, remained hemodynamically stable.     Neuro: A&O x 3  - Pain control with IV Tylenol and x1 dose of Dilaudid 0.5    Respiratory: no active issues  - Wean off NC   - ISS, pulmonary toilet     CV:  CAD s/p CABG last year  - Pt reports that he is not taking any meds at home  - Hemodynamically stable, no pressor requirement   - Lactate cleared 1.4     GI: hx of closed loop SBO s/p exlap, WILFRID, no SBR  - NGT removed, Diet advanced to clears  - x3 Melena yesterday, x1 overnight  - GI will consider EGD tomorrow  - Protonix 40 BID    Renal: no active issues  - Cr at baseline 1.02  - C/w LR @ 100  - Dimas removed     Heme/Onc: no acute issues   - Hold chemical  PPX  - Monitor H&H  - Q8 CBC    ID: no active issues  - Ertapenem, end date 11/5    Endo: no active issues    Dispo: SICU 74M w/ PMH CAD s/p CABG last year and tongue cancer for which he just finished chemo/radiation, no PSH  presented with closed loop SBO s/p diagnostic lap, converted to open, WILFRID, ischemic bowel improved in color and regained some peristalsis after WILFRID, no SBR was performed. One episode of melena right after the case. Didn't require pressors, remained hemodynamically stable.     Neuro: A&O x 3  - Pain control with IV Tylenol and Dilaudid PRN    Respiratory: no active issues  - Wean off NC   - ISS, pulmonary toilet     CV:  CAD s/p CABG last year  - Pt reports that he is not taking any meds at home  - Hemodynamically stable, no pressor requirement   - Lactate cleared 1.4     GI: hx of closed loop SBO s/p exlap, WILFRID, no SBR  - NGT removed, Diet advanced to clears  - x3 Melena yesterday, x1 overnight  - Holding antiplatelets 2/2 possible GI bleed  - GI will consider EGD tomorrow  - Protonix 40 BID    Renal: no active issues  - Cr at baseline 1.02  - C/w LR @ 100  - Dimas removed     Heme/Onc: no acute issues   - Hold chemical  PPX  - Monitor H&H  - Q8 CBC    ID: no active issues  - Ertapenem, end date 11/5    Endo: no active issues    Dispo: SICU

## 2019-10-30 NOTE — PROGRESS NOTE ADULT - ASSESSMENT
73 yo M s/p enteroclysis, exploratory laparotomy, diagnostic laparoscopy for SBO who had a bloody BM in the OR and continued bloody BM post operatively. H/H remaining stable.     Plan:  - Plan for GI to scope today to evaluate source of bleed  - Continue to monitor H/H, transfuse if necessary   - Once bleeding resolves, clear liquid diet     ACS & Trauma, [id number]

## 2019-10-30 NOTE — PROGRESS NOTE ADULT - SUBJECTIVE AND OBJECTIVE BOX
HISTORY  74y Male w/ PMH CAD s/p CABG last year and tongue cancer for which he just finished chemo/radiation, no PSH  presents w/ abdominal pain since last night. Pt reports he developed abdominal pain at 11pm last night after eating cereal. He reports the pain was very significant and worsened. He reports nausea but no emesis. He reports he has been having bowel movements and passing gas.  In the emergency room, the pt was hypotensive to the 70s and initially responded to fluid bolus. Labs significant for a wbc of 7.63, lactate of 3.0. CT scan showing closed loop bowel obstruction w/ mesenteric edema. PT was taken urgently to OR diagnostic laparoscopy, long segment of SB was ischemic 2/2 to adhesions, case was converted to open, after WILFRID and bowel appearance improved abd peristalsis noted, no SBR, abdomen was closed. At the end of the case pt had one episode of melena. Pt was taken to SICU for hemodynamic monitoring.     24 HOUR EVENTS:  -Had 3 episodes of melena yesterday, 1 overnight  -UA grew ESBL, >100K GNR, switched from Zosyn to Ertapenem (end date 11/5)  -GI suggests monitoring Hct, Goal Hb > 8, will consider doing EGD tomorrow    SUBJECTIVE/ROS:  [X] A ten-point review of systems was otherwise negative except as noted.  [ ] Due to altered mental status/intubation, subjective information were not able to be obtained from the patient. History was obtained, to the extent possible, from review of the chart and collateral sources of information.    NEURO  Exam: awake, alert, oriented  [x] Adequacy of sedation and pain control has been assessed and adjusted      RESPIRATORY  RR: 9 (10-29-19 @ 23:00) (7 - 28)  SpO2: 98% (10-29-19 @ 23:00) (97% - 100%)  Exam: unlabored, clear to auscultation bilaterally    [N/A] Extubation Readiness Assessed    CARDIOVASCULAR  HR: 83 (10-29-19 @ 23:00) (73 - 96)  BP: 156/74 (10-29-19 @ 23:00) (107/58 - 163/72)  BP(mean): 107 (10-29-19 @ 23:00) (79 - 107)    VBG - ( 29 Oct 2019 04:22 )  pH: 7.38  /  pCO2: 44    /  pO2: 35    / HCO3: 25    / Base Excess: 0.3   /  SaO2: 66     Lactate: 0.8      Exam: regular rate and rhythm  Cardiac Rhythm: sinus  Perfusion     [x]Adequate   [ ]Inadequate  Mentation   [x]Normal       [ ]Reduced  Extremities  [x]Warm         [ ]Cool  Volume Status [ ]Hypervolemic [x]Euvolemic [ ]Hypovolemic  Meds:     GI/NUTRITION  Exam: soft, nontender, nondistended, incision C/D/I  Diet:  Meds: pantoprazole  Injectable 40 milliGRAM(s) IV Push every 12 hours      GENITOURINARY  I&O's Detail    10-28 @ 07:01  -  10-29 @ 07:00  --------------------------------------------------------  IN:    IV PiggyBack: 425 mL    Lactated Ringers IV Bolus: 1000 mL    lactated ringers.: 2400 mL  Total IN: 3825 mL    OUT:    Nasoenteral Tube: 20 mL    Ureteral Catheter: 340 mL    Voided: 875 mL  Total OUT: 1235 mL    Total NET: 2590 mL      10-29 @ 07:01  -  10-30 @ 00:29  --------------------------------------------------------  IN:    dextrose 5% + sodium chloride 0.9%.: 1300 mL    IV PiggyBack: 150 mL    lactated ringers.: 300 mL    Solution: 250 mL  Total IN: 2000 mL    OUT:    Voided: 1950 mL  Total OUT: 1950 mL    Total NET: 50 mL          10-29    135  |  105  |  30<H>  ----------------------------<  87  4.2   |  23  |  1.26    Ca    7.7<L>      29 Oct 2019 01:05  Phos  2.7     10-29  Mg     1.8     10-29      [ ] Dimas catheter, indication: N/A  Meds: dextrose 5% + sodium chloride 0.9%. 1000 milliLiter(s) IV Continuous <Continuous>        HEMATOLOGIC  Meds:   [x] VTE Prophylaxis                        8.1    4.50  )-----------( 179      ( 29 Oct 2019 17:23 )             24.0       Transfusion     [ ] PRBC   [ ] Platelets   [ ] FFP   [ ] Cryoprecipitate      INFECTIOUS DISEASES  WBC Count: 4.50 K/uL (10-29 @ 17:23)  WBC Count: 4.77 K/uL (10-29 @ 13:31)  WBC Count: 5.25 K/uL (10-29 @ 09:27)  WBC Count: 5.81 K/uL (10-29 @ 01:05)    RECENT CULTURES:  Specimen Source: .Urine  Date/Time: 10-27 @ 08:36  Culture Results:   >100,000 CFU/ml Escherichia coli ESBL  Gram Stain: --  Organism: Escherichia coli ESBL    Meds: ertapenem  IVPB 1000 milliGRAM(s) IV Intermittent every 24 hours    ENDOCRINE  CAPILLARY BLOOD GLUCOSE    Meds:       ACCESS DEVICES:  [ ] Peripheral IV  [ ] Central Venous Line	[ ] R	[ ] L	[ ] IJ	[ ] Fem	[ ] SC	Placed:   [ ] Arterial Line		[ ] R	[ ] L	[ ] Fem	[ ] Rad	[ ] Ax	Placed:   [ ] PICC:					[ ] Mediport  [ ] Urinary Catheter, Date Placed:   [x] Necessity of urinary, arterial, and venous catheters discussed    OTHER MEDICATIONS:  chlorhexidine 2% Cloths 1 Application(s) Topical <User Schedule>      CODE STATUS:      IMAGING: HISTORY  74y Male w/ PMH CAD s/p CABG last year and tongue cancer for which he just finished chemo/radiation, no PSH  presents w/ abdominal pain since last night. Pt reports he developed abdominal pain at 11pm last night after eating cereal. He reports the pain was very significant and worsened. He reports nausea but no emesis. He reports he has been having bowel movements and passing gas.  In the emergency room, the pt was hypotensive to the 70s and initially responded to fluid bolus. Labs significant for a wbc of 7.63, lactate of 3.0. CT scan showing closed loop bowel obstruction w/ mesenteric edema. PT was taken urgently to OR diagnostic laparoscopy, long segment of SB was ischemic 2/2 to adhesions, case was converted to open, after WILFRID and bowel appearance improved abd peristalsis noted, no SBR, abdomen was closed. At the end of the case pt had one episode of melena. Pt was taken to SICU for hemodynamic monitoring.     24 HOUR EVENTS:  -Had 3 episodes of melena yesterday, 1 overnight  -UA grew ESBL, >100K GNR, switched from Zosyn to Ertapenem (end date 11/5)  -GI suggests monitoring Hct, Goal Hb > 8, will consider doing EGD tomorrow  -Holding antiplatelets 2/2 GI bleed    SUBJECTIVE/ROS:  [X] A ten-point review of systems was otherwise negative except as noted.  [ ] Due to altered mental status/intubation, subjective information were not able to be obtained from the patient. History was obtained, to the extent possible, from review of the chart and collateral sources of information.    NEURO  Exam: awake, alert, oriented  [x] Adequacy of sedation and pain control has been assessed and adjusted      RESPIRATORY  RR: 9 (10-29-19 @ 23:00) (7 - 28)  SpO2: 98% (10-29-19 @ 23:00) (97% - 100%)  Exam: unlabored, clear to auscultation bilaterally    [N/A] Extubation Readiness Assessed    CARDIOVASCULAR  HR: 83 (10-29-19 @ 23:00) (73 - 96)  BP: 156/74 (10-29-19 @ 23:00) (107/58 - 163/72)  BP(mean): 107 (10-29-19 @ 23:00) (79 - 107)    VBG - ( 29 Oct 2019 04:22 )  pH: 7.38  /  pCO2: 44    /  pO2: 35    / HCO3: 25    / Base Excess: 0.3   /  SaO2: 66     Lactate: 0.8      Exam: regular rate and rhythm  Cardiac Rhythm: sinus  Perfusion     [x]Adequate   [ ]Inadequate  Mentation   [x]Normal       [ ]Reduced  Extremities  [x]Warm         [ ]Cool  Volume Status [ ]Hypervolemic [x]Euvolemic [ ]Hypovolemic  Meds:     GI/NUTRITION  Exam: soft, nontender, nondistended, incision C/D/I  Diet:  Meds: pantoprazole  Injectable 40 milliGRAM(s) IV Push every 12 hours      GENITOURINARY  I&O's Detail    10-28 @ 07:01  -  10-29 @ 07:00  --------------------------------------------------------  IN:    IV PiggyBack: 425 mL    Lactated Ringers IV Bolus: 1000 mL    lactated ringers.: 2400 mL  Total IN: 3825 mL    OUT:    Nasoenteral Tube: 20 mL    Ureteral Catheter: 340 mL    Voided: 875 mL  Total OUT: 1235 mL    Total NET: 2590 mL      10-29 @ 07:01  -  10-30 @ 00:29  --------------------------------------------------------  IN:    dextrose 5% + sodium chloride 0.9%.: 1300 mL    IV PiggyBack: 150 mL    lactated ringers.: 300 mL    Solution: 250 mL  Total IN: 2000 mL    OUT:    Voided: 1950 mL  Total OUT: 1950 mL    Total NET: 50 mL          10-29    135  |  105  |  30<H>  ----------------------------<  87  4.2   |  23  |  1.26    Ca    7.7<L>      29 Oct 2019 01:05  Phos  2.7     10-29  Mg     1.8     10-29      [ ] Dimas catheter, indication: N/A  Meds: dextrose 5% + sodium chloride 0.9%. 1000 milliLiter(s) IV Continuous <Continuous>        HEMATOLOGIC  Meds:   [x] VTE Prophylaxis                        8.1    4.50  )-----------( 179      ( 29 Oct 2019 17:23 )             24.0       Transfusion     [ ] PRBC   [ ] Platelets   [ ] FFP   [ ] Cryoprecipitate      INFECTIOUS DISEASES  WBC Count: 4.50 K/uL (10-29 @ 17:23)  WBC Count: 4.77 K/uL (10-29 @ 13:31)  WBC Count: 5.25 K/uL (10-29 @ 09:27)  WBC Count: 5.81 K/uL (10-29 @ 01:05)    RECENT CULTURES:  Specimen Source: .Urine  Date/Time: 10-27 @ 08:36  Culture Results:   >100,000 CFU/ml Escherichia coli ESBL  Gram Stain: --  Organism: Escherichia coli ESBL    Meds: ertapenem  IVPB 1000 milliGRAM(s) IV Intermittent every 24 hours    ENDOCRINE  CAPILLARY BLOOD GLUCOSE    Meds:       ACCESS DEVICES:  [ ] Peripheral IV  [ ] Central Venous Line	[ ] R	[ ] L	[ ] IJ	[ ] Fem	[ ] SC	Placed:   [ ] Arterial Line		[ ] R	[ ] L	[ ] Fem	[ ] Rad	[ ] Ax	Placed:   [ ] PICC:					[ ] Mediport  [ ] Urinary Catheter, Date Placed:   [x] Necessity of urinary, arterial, and venous catheters discussed    OTHER MEDICATIONS:  chlorhexidine 2% Cloths 1 Application(s) Topical <User Schedule>      CODE STATUS:      IMAGING:

## 2019-10-30 NOTE — PROGRESS NOTE ADULT - SUBJECTIVE AND OBJECTIVE BOX
All Woodson MD  Division of Hospital Medicine  Pager [phone]  If no response or off hours page: [phone]  ---------------------------------------------------------    SAMANTHA CADENA  74y  Male      Patient is a 74y old  Male who presents with a chief complaint of abdominal pain (30 Oct 2019 00:28)      INTERVAL HPI/OVERNIGHT EVENTS:        REVIEW OF SYSTEMS: 14 point ROS negative unless listed above    T(C): 36.4 (10-30-19 @ 07:30), Max: 37.3 (10-29-19 @ 23:00)  HR: 74 (10-30-19 @ 10:00) (73 - 100)  BP: 154/67 (10-30-19 @ 10:00) (107/58 - 162/72)  RR: 10 (10-30-19 @ 10:00) (7 - 19)  SpO2: 99% (10-30-19 @ 10:00) (93% - 100%)  Wt(kg): --Vital Signs Last 24 Hrs  T(C): 36.4 (30 Oct 2019 07:30), Max: 37.3 (29 Oct 2019 23:00)  T(F): 97.5 (30 Oct 2019 07:30), Max: 99.1 (29 Oct 2019 23:00)  HR: 74 (30 Oct 2019 10:00) (73 - 100)  BP: 154/67 (30 Oct 2019 10:00) (107/58 - 162/72)  BP(mean): 97 (30 Oct 2019 10:00) (79 - 107)  RR: 10 (30 Oct 2019 10:00) (7 - 19)  SpO2: 99% (30 Oct 2019 10:00) (93% - 100%)    PHYSICAL EXAM:  GENERAL: NAD, well-groomed, well-developed  HEAD:  Atraumatic, Normocephalic  EYES: EOMI, PERRLA, conjunctiva and sclera clear  ENMT: No tonsillar erythema, exudates; Moist mucous membranes. No lesions  NECK: Supple, No JVD  CHEST/LUNG: Clear to auscultation bilaterally; No rales, rhonchi, wheezing, or rubs  HEART: Regular rate and rhythm; No murmurs, rubs, or gallops  ABDOMEN: Soft, Nontender, Nondistended; Bowel sounds present.    EXTREMITIES:  2+ Peripheral Pulses, No clubbing, cyanosis, or edema  LYMPH: No lymphadenopathy noted  SKIN: No rashes or lesions  PSYCH: Alert & Oriented x3  NERVOUS SYSTEM: Motor Strength 5/5 B/L upper and lower extremities.  Sensory intact    Consultant(s) Notes Reviewed:  [x ] YES  [ ] NO  Care Discussed with Consultants/Other Providers [ x] YES  [ ] NO    LABS:                        8.8    4.78  )-----------( 177      ( 30 Oct 2019 08:47 )             26.8     10-30    135  |  105  |  20  ----------------------------<  106<H>  3.3<L>   |  24  |  1.17    Ca    7.4<L>      30 Oct 2019 00:38  Phos  2.2     10-30  Mg     1.8     10-30          CAPILLARY BLOOD GLUCOSE                RADIOLOGY & ADDITIONAL TESTS:    Imaging Personally Reviewed:  [x ] YES  [ ] NO All Woodson MD  Division of Hospital Medicine  Pager [phone]  If no response or off hours page: [phone]  ---------------------------------------------------------    SAMANTHA CADENA  74y  Male      Patient is a 74y old  Male who presents with a chief complaint of abdominal pain (30 Oct 2019 00:28)      INTERVAL HPI/OVERNIGHT EVENTS:  Less melanotic bowel movements, pain controlled.       REVIEW OF SYSTEMS: 14 point ROS negative unless listed above    T(C): 36.4 (10-30-19 @ 07:30), Max: 37.3 (10-29-19 @ 23:00)  HR: 74 (10-30-19 @ 10:00) (73 - 100)  BP: 154/67 (10-30-19 @ 10:00) (107/58 - 162/72)  RR: 10 (10-30-19 @ 10:00) (7 - 19)  SpO2: 99% (10-30-19 @ 10:00) (93% - 100%)  Wt(kg): --Vital Signs Last 24 Hrs  T(C): 36.4 (30 Oct 2019 07:30), Max: 37.3 (29 Oct 2019 23:00)  T(F): 97.5 (30 Oct 2019 07:30), Max: 99.1 (29 Oct 2019 23:00)  HR: 74 (30 Oct 2019 10:00) (73 - 100)  BP: 154/67 (30 Oct 2019 10:00) (107/58 - 162/72)  BP(mean): 97 (30 Oct 2019 10:00) (79 - 107)  RR: 10 (30 Oct 2019 10:00) (7 - 19)  SpO2: 99% (30 Oct 2019 10:00) (93% - 100%)    PHYSICAL EXAM:  GENERAL: NAD, well-developed  NECK: Supple, No JVD  CHEST/LUNG: Clear to auscultation bilaterally; No wheeze  HEART: Reg rate. No M/R/G.  ABDOMEN: Soft, NT/ND, Bowel sounds present, incision C/D/I  EXTREMITIES:  2+ Peripheral Pulses, No clubbing, cyanosis, or edema  PSYCH: AAOx3  NEUROLOGY: non-focal  SKIN: No rashes or lesions        Consultant(s) Notes Reviewed:  [x ] YES  [ ] NO  Care Discussed with Consultants/Other Providers [ x] YES  [ ] NO    LABS:                        8.8    4.78  )-----------( 177      ( 30 Oct 2019 08:47 )             26.8     10-30    135  |  105  |  20  ----------------------------<  106<H>  3.3<L>   |  24  |  1.17    Ca    7.4<L>      30 Oct 2019 00:38  Phos  2.2     10-30  Mg     1.8     10-30          CAPILLARY BLOOD GLUCOSE                RADIOLOGY & ADDITIONAL TESTS:    Imaging Personally Reviewed:  [x ] YES  [ ] NO

## 2019-10-30 NOTE — PROGRESS NOTE ADULT - SUBJECTIVE AND OBJECTIVE BOX
Chief Complaint:  Patient is a 74y old  Male who presents with a chief complaint of abdominal pain (30 Oct 2019 12:08)    Brief Summary: 74y Male here with abd pain    Interval Events: Pt reports significant reduction in amount of melena. Hb uptrending w/o transfusion.     Hospital Medications:  chlorhexidine 2% Cloths 1 Application(s) Topical <User Schedule>  dextrose 5% + sodium chloride 0.9% 1000 milliLiter(s) IV Continuous <Continuous>  ertapenem  IVPB 1000 milliGRAM(s) IV Intermittent every 24 hours  pantoprazole  Injectable 40 milliGRAM(s) IV Push every 12 hours      ROS:   General:  No wt loss, fevers, chills, night sweats  Eyes:  Good vision, no reported pain  ENT:  No sore throat, pain, runny nose, dysphagia  CV:  No pain, palpitations, hypo/hypertension  Pulm:  No dyspnea, cough, tachypnea, wheezing  GI:  See HPI, otherwise negative  :  No pain, bleeding, incontinence, nocturia  Muscle:  No pain, weakness  Neuro:  No weakness, tingling, memory problems  Psych:  No fatigue, insomnia, mood problems, depression  Endocrine:  No polyuria, polydipsia, cold/heat intolerance  Heme:  No petechiae, ecchymosis, easy bruisability  Skin:  No rash, tattoos, scars, edema    PHYSICAL EXAM:   Vital Signs:  Vital Signs Last 24 Hrs  T(C): 36.4 (30 Oct 2019 11:00), Max: 37.3 (29 Oct 2019 23:00)  T(F): 97.5 (30 Oct 2019 11:00), Max: 99.1 (29 Oct 2019 23:00)  HR: 77 (30 Oct 2019 13:00) (73 - 100)  BP: 137/64 (30 Oct 2019 13:00) (116/58 - 162/72)  BP(mean): 92 (30 Oct 2019 13:00) (82 - 107)  RR: 16 (30 Oct 2019 13:00) (7 - 21)  SpO2: 99% (30 Oct 2019 13:00) (93% - 100%)  Daily     Daily Weight in k.7 (30 Oct 2019 05:08)    GENERAL:  Appears stated age, well-groomed, well-nourished, no distress  HEENT:  NC/AT,  conjunctivae clear and pink,  no JVD  CHEST:  Full & symmetric excursion, no increased effort, breath sounds clear  HEART:  Regular rhythm, S1, S2, no murmur/rub/S3/S4, no abdominal bruit, no edema  ABDOMEN:  Soft, mild tenderness over surgical site; intact surgical dressings; non-distended, normoactive bowel sounds,  no masses  EXTREMITIES:  no cyanosis,clubbing or edema  SKIN:  No rash/erythema/ecchymoses/petechiae/wounds/abscess/warm/dry  NEURO:  Alert, oriented  RECTAL: melena    LABS: reviewed                        8.8    4.78  )-----------( 177      ( 30 Oct 2019 08:47 )             26.8     10-30    135  |  105  |  20  ----------------------------<  106<H>  3.3<L>   |  24  |  1.17    Ca    7.4<L>      30 Oct 2019 00:38  Phos  2.2     10-30  Mg     1.8     10-30          Interval Diagnostic Studies: see sunrise for full report

## 2019-10-30 NOTE — PROGRESS NOTE ADULT - SUBJECTIVE AND OBJECTIVE BOX
Subjective:    Patient seen and evaluated this AM with team. Overnight the patient had repeat bouts of melena. UA grew an ESBL and her antibiotics were changed from zosyn to ertapenem. GI planning on scoping today to assess for site of GI bleed.     OBJECTIVE:     ** PHYSICAL EXAM **    Gen: NAD, lying comfortably in bed  HEENT: NC/AT  RESP: nonlabored breathing  ABDOMEN: soft, non-distended, non-tender, incision site dressings c/d/i      Vital Signs Last 24 Hrs  T(C): 36.4 (30 Oct 2019 11:00), Max: 37.3 (29 Oct 2019 23:00)  T(F): 97.5 (30 Oct 2019 11:00), Max: 99.1 (29 Oct 2019 23:00)  HR: 73 (30 Oct 2019 12:00) (73 - 100)  BP: 146/66 (30 Oct 2019 12:00) (107/58 - 162/72)  BP(mean): 95 (30 Oct 2019 12:00) (79 - 107)  RR: 9 (30 Oct 2019 12:00) (7 - 21)  SpO2: 97% (30 Oct 2019 12:00) (93% - 100%)        10-29 @ 07:01  -  10-30 @ 07:00  --------------------------------------------------------  IN: 3163 mL / OUT: 3570 mL / NET: -407 mL    10-30 @ 07:01  -  10-30 @ 12:13  --------------------------------------------------------  IN: 687.5 mL / OUT: 1050 mL / NET: -362.5 mL                ** LABS **                          8.8    4.78  )-----------( 177      ( 30 Oct 2019 08:47 )             26.8     10-30    135  |  105  |  20  ----------------------------<  106<H>  3.3<L>   |  24  |  1.17    Ca    7.4<L>      30 Oct 2019 00:38  Phos  2.2     10-30  Mg     1.8     10-30

## 2019-10-30 NOTE — PROGRESS NOTE ADULT - ASSESSMENT
Impression:    1. Melena, drop in hgb, ddx includes residual melena from prior bowel ischemia (most likely given reduction in amount of melena over time and stabilizing Hb) vs. less likely active GI bleed from peptic ulcer disease, GI cancer or angioectasias.    2. SBO S/p WILFRID    3. Hx of CABG    4. Hx of tongue cancer    Recommendation:  - can c/w IV PPI BID  - diet per primary team  - trend Hb, transfuse to Hb > 8  - no plan for endoscopic evaluation at this time, please call back if patient’s melena increases or Hb continues to downtrend significantly or patient requires transfusions    Josesito Knox  Gastroenterology Fellow  Pager# [phone] or [phone]  Page on-call GI fellow after 5pm or on weekends

## 2019-10-31 LAB
ANION GAP SERPL CALC-SCNC: 6 MMOL/L — SIGNIFICANT CHANGE UP (ref 5–17)
APTT BLD: 32 SEC — SIGNIFICANT CHANGE UP (ref 27.5–36.3)
BUN SERPL-MCNC: 10 MG/DL — SIGNIFICANT CHANGE UP (ref 7–23)
CALCIUM SERPL-MCNC: 7.6 MG/DL — LOW (ref 8.4–10.5)
CHLORIDE SERPL-SCNC: 108 MMOL/L — SIGNIFICANT CHANGE UP (ref 96–108)
CO2 SERPL-SCNC: 22 MMOL/L — SIGNIFICANT CHANGE UP (ref 22–31)
CREAT SERPL-MCNC: 0.97 MG/DL — SIGNIFICANT CHANGE UP (ref 0.5–1.3)
GLUCOSE SERPL-MCNC: 114 MG/DL — HIGH (ref 70–99)
HCT VFR BLD CALC: 26.5 % — LOW (ref 39–50)
HGB BLD-MCNC: 8.9 G/DL — LOW (ref 13–17)
INR BLD: 0.9 RATIO — SIGNIFICANT CHANGE UP (ref 0.88–1.16)
MAGNESIUM SERPL-MCNC: 1.8 MG/DL — SIGNIFICANT CHANGE UP (ref 1.6–2.6)
MCHC RBC-ENTMCNC: 32.5 PG — SIGNIFICANT CHANGE UP (ref 27–34)
MCHC RBC-ENTMCNC: 33.6 GM/DL — SIGNIFICANT CHANGE UP (ref 32–36)
MCV RBC AUTO: 96.7 FL — SIGNIFICANT CHANGE UP (ref 80–100)
NRBC # BLD: 0 /100 WBCS — SIGNIFICANT CHANGE UP (ref 0–0)
PHOSPHATE SERPL-MCNC: 1.7 MG/DL — LOW (ref 2.5–4.5)
PLATELET # BLD AUTO: 197 K/UL — SIGNIFICANT CHANGE UP (ref 150–400)
POTASSIUM SERPL-MCNC: 4.3 MMOL/L — SIGNIFICANT CHANGE UP (ref 3.5–5.3)
POTASSIUM SERPL-SCNC: 4.3 MMOL/L — SIGNIFICANT CHANGE UP (ref 3.5–5.3)
PROTHROM AB SERPL-ACNC: 10.3 SEC — SIGNIFICANT CHANGE UP (ref 10–12.9)
RBC # BLD: 2.74 M/UL — LOW (ref 4.2–5.8)
RBC # FLD: 19 % — HIGH (ref 10.3–14.5)
SODIUM SERPL-SCNC: 136 MMOL/L — SIGNIFICANT CHANGE UP (ref 135–145)
WBC # BLD: 5.56 K/UL — SIGNIFICANT CHANGE UP (ref 3.8–10.5)
WBC # FLD AUTO: 5.56 K/UL — SIGNIFICANT CHANGE UP (ref 3.8–10.5)

## 2019-10-31 PROCEDURE — 93970 EXTREMITY STUDY: CPT | Mod: 26

## 2019-10-31 PROCEDURE — 99233 SBSQ HOSP IP/OBS HIGH 50: CPT

## 2019-10-31 RX ORDER — ATORVASTATIN CALCIUM 80 MG/1
40 TABLET, FILM COATED ORAL AT BEDTIME
Refills: 0 | Status: DISCONTINUED | OUTPATIENT
Start: 2019-10-31 | End: 2019-11-01

## 2019-10-31 RX ORDER — PANTOPRAZOLE SODIUM 20 MG/1
40 TABLET, DELAYED RELEASE ORAL
Refills: 0 | Status: DISCONTINUED | OUTPATIENT
Start: 2019-10-31 | End: 2019-11-01

## 2019-10-31 RX ORDER — ENOXAPARIN SODIUM 100 MG/ML
40 INJECTION SUBCUTANEOUS DAILY
Refills: 0 | Status: DISCONTINUED | OUTPATIENT
Start: 2019-10-31 | End: 2019-11-01

## 2019-10-31 RX ORDER — MAGNESIUM SULFATE 500 MG/ML
2 VIAL (ML) INJECTION ONCE
Refills: 0 | Status: COMPLETED | OUTPATIENT
Start: 2019-10-31 | End: 2019-10-31

## 2019-10-31 RX ORDER — ASPIRIN/CALCIUM CARB/MAGNESIUM 324 MG
81 TABLET ORAL DAILY
Refills: 0 | Status: DISCONTINUED | OUTPATIENT
Start: 2019-10-31 | End: 2019-11-01

## 2019-10-31 RX ORDER — ACETAMINOPHEN 500 MG
650 TABLET ORAL EVERY 6 HOURS
Refills: 0 | Status: DISCONTINUED | OUTPATIENT
Start: 2019-10-31 | End: 2019-11-01

## 2019-10-31 RX ADMIN — Medication 62.5 MILLIMOLE(S): at 04:54

## 2019-10-31 RX ADMIN — ERTAPENEM SODIUM 120 MILLIGRAM(S): 1 INJECTION, POWDER, LYOPHILIZED, FOR SOLUTION INTRAMUSCULAR; INTRAVENOUS at 18:12

## 2019-10-31 RX ADMIN — CHLORHEXIDINE GLUCONATE 1 APPLICATION(S): 213 SOLUTION TOPICAL at 08:00

## 2019-10-31 RX ADMIN — PANTOPRAZOLE SODIUM 40 MILLIGRAM(S): 20 TABLET, DELAYED RELEASE ORAL at 18:11

## 2019-10-31 RX ADMIN — Medication 650 MILLIGRAM(S): at 22:04

## 2019-10-31 RX ADMIN — PANTOPRAZOLE SODIUM 40 MILLIGRAM(S): 20 TABLET, DELAYED RELEASE ORAL at 12:33

## 2019-10-31 RX ADMIN — ATORVASTATIN CALCIUM 40 MILLIGRAM(S): 80 TABLET, FILM COATED ORAL at 22:03

## 2019-10-31 RX ADMIN — PANTOPRAZOLE SODIUM 40 MILLIGRAM(S): 20 TABLET, DELAYED RELEASE ORAL at 01:03

## 2019-10-31 RX ADMIN — SODIUM CHLORIDE 100 MILLILITER(S): 9 INJECTION, SOLUTION INTRAVENOUS at 04:54

## 2019-10-31 RX ADMIN — Medication 50 GRAM(S): at 04:53

## 2019-10-31 RX ADMIN — Medication 81 MILLIGRAM(S): at 12:33

## 2019-10-31 RX ADMIN — ENOXAPARIN SODIUM 40 MILLIGRAM(S): 100 INJECTION SUBCUTANEOUS at 12:33

## 2019-10-31 RX ADMIN — Medication 650 MILLIGRAM(S): at 23:04

## 2019-10-31 RX ADMIN — Medication 250 MILLIMOLE(S): at 10:06

## 2019-10-31 NOTE — PROGRESS NOTE ADULT - SUBJECTIVE AND OBJECTIVE BOX
All Woodson MD  Division of Hospital Medicine  Pager [phone]  If no response or off hours page: [phone]  ---------------------------------------------------------    SAMANTHA CADENA  74y  Male      Patient is a 74y old  Male who presents with a chief complaint of abdominal pain (31 Oct 2019 10:11)      INTERVAL HPI/OVERNIGHT EVENTS:  Seen by GI yesterday, given stable H/H and no further melena no plan for EGD      REVIEW OF SYSTEMS: 14 point ROS negative unless listed above    T(C): 36.6 (10-31-19 @ 11:00), Max: 37 (10-30-19 @ 23:00)  HR: 80 (10-31-19 @ 14:00) (74 - 106)  BP: 139/76 (10-31-19 @ 14:00) (101/60 - 156/72)  RR: 14 (10-31-19 @ 14:00) (9 - 19)  SpO2: 100% (10-31-19 @ 14:00) (97% - 100%)  Wt(kg): --Vital Signs Last 24 Hrs  T(C): 36.6 (31 Oct 2019 11:00), Max: 37 (30 Oct 2019 23:00)  T(F): 97.9 (31 Oct 2019 11:00), Max: 98.6 (30 Oct 2019 23:00)  HR: 80 (31 Oct 2019 14:00) (74 - 106)  BP: 139/76 (31 Oct 2019 14:00) (101/60 - 156/72)  BP(mean): 99 (31 Oct 2019 14:00) (76 - 104)  RR: 14 (31 Oct 2019 14:00) (9 - 19)  SpO2: 100% (31 Oct 2019 14:00) (97% - 100%)    PHYSICAL EXAM:  GENERAL: NAD, well-developed  NECK: Supple, No JVD  CHEST/LUNG: Clear to auscultation bilaterally; No wheeze  HEART: Reg rate. No M/R/G.  ABDOMEN: Soft, NT/ND, Bowel sounds present, incision C/D/I  EXTREMITIES:  2+ Peripheral Pulses, No clubbing, cyanosis, or edema  PSYCH: AAOx3  NEUROLOGY: non-focal  SKIN: No rashes or lesions      Consultant(s) Notes Reviewed:  [x ] YES  [ ] NO  Care Discussed with Consultants/Other Providers [ x] YES  [ ] NO    LABS:                        8.9    5.56  )-----------( 197      ( 31 Oct 2019 00:58 )             26.5     10-31    136  |  108  |  10  ----------------------------<  114<H>  4.3   |  22  |  0.97    Ca    7.6<L>      31 Oct 2019 00:58  Phos  1.7     10-31  Mg     1.8     10-31      PT/INR - ( 31 Oct 2019 00:59 )   PT: 10.3 sec;   INR: 0.90 ratio         PTT - ( 31 Oct 2019 00:59 )  PTT:32.0 sec    CAPILLARY BLOOD GLUCOSE                RADIOLOGY & ADDITIONAL TESTS:    Imaging Personally Reviewed:  [x ] YES  [ ] NO

## 2019-10-31 NOTE — PROGRESS NOTE ADULT - ASSESSMENT
74M w/ PMH CAD s/p CABG last year and tongue cancer for which he just finished chemo/radiation, no PSH  presented with closed loop SBO s/p diagnostic lap, converted to open, WILFRID, ischemic bowel improved in color and regained some peristalsis after WILFRID, no SBR was performed. One episode of melena right after the case and several additional episodes of melena on POD2-3. Didn't require pressors, remained hemodynamically stable.    Neuro: A&O x 3  -     Respiratory: no active issues  - Not requiring nasal canula, saturating well on room air   - ISS    CV:  CAD s/p CABG last year  - Pt reports that he is not taking any meds at home. Per IM, recommend starting antiplatelet and statin therapy for CAD when medically indicated.   - Hemodynamically stable, no pressor requirement     GI: hx of closed loop SBO s/p exlap, WILFRID, no SBR  - NPO except sips and chips. Advance diet as tolerated.   - No additional episodes of melena overnight  - Holding antiplatelets 2/2 possible GI bleed  - GI has no plans for endoscopic intervention as pt. is hemodynamically stable and melena seems to be resolving. Advised to reconsult if melena/anemia worsens.  - Protonix 40 BID    Renal: no active issues  - Cr at baseline  - C/w D5 1/2 NS + 20meq KCl @ 100  - complicated UTI w/ ESBL E. coli, currently being treated with ertapenem.  - Repeat BMP daily, replete electrolytes as needed.    Heme/Onc: no acute issues   - Hold chemical PPX, however pt. can have SCDs  - Q12 CBC, f/u coags  - Per IM, transfuse for Hgb < 8 given coronary disease    ID: no active issues  - Ertapenem, end date 11/5    Endo: no active issues  - Per IM, recommend starting lipitor 40mg when able to tolerate PO    Dispo: SICU

## 2019-10-31 NOTE — CHART NOTE - NSCHARTNOTEFT_GEN_A_CORE
SICU Transfer Note    Transfer from: SICU  Transfer to: 2[facility]      SICU COURSE:    Pt is a 74 year old male PMH CAD s/p CABG last year and tongue cancer for which he just finished chemo/radiation, presents w/ abdominal pain since last night. Pt reports he developed abdominal pain at 11pm last night after eating cereal. He reports the pain was very significant and worsened. He reports nausea but no emesis. He reports he has been having bowel movements and passing gas.   In the emergency room, the pt was hypotensive to the 70s and initially responded to fluid bolus. Labs significant for a wbc of 7.63, lactate of 3.0. CT scan showing closed loop bowel obstruction w/ mesenteric edema.  He was therefore admitted to the SICU.    10/28 24 HOUR EVENTS:   - 500ml bolus of crystalloid administered for oliguria during the day.   - Hct stable as well as hemodynamics   - Received additional bolus of fluid overnight for oliguria    10/29 24 HOUR EVENTS:  - NGT clamp trial, NGT removed  - Diet advanced to clears  - Given 500 cc bolus yesterday morning and another 500 cc LR in evening for ?low UOP  - ?Oliguria, nurse noted patient was peeing around catheter, possible falsely decreased UOP measurements, milan removed (patient peeing around catheter)  -Patient experiencing some pain in evening, given Dilaudid 0.5    10/30 24 HOUR EVENTS:  -Had 3 episodes of melena yesterday, 1 overnight  -UA grew ESBL, >100K GNR, switched from Zosyn to Ertapenem (end date 11/5)  -GI suggests monitoring Hct, Goal Hb > 8, will consider doing EGD tomorrow  -Holding antiplatelets 2/2 GI bleed    10/31 24 HOUR EVENTS:  - Pt. had two BMs at 1500 and 0000 without evidence of melena.   - H/H stable (8.1 -> 8.8 -> 8.6 -> now 8.9 / 23.7 -> 26.8 -> 26.2 -> now 26.5)  - Patient stable and transferred to [facility]    ASSESSMENT & PLAN:   73 yo M s/p enteroclysis, exploratory laparotomy, diagnostic laparoscopy for SBO who had a bloody BM in the OR and continued bloody BM post operatively. H/H remaining stable.     Plan:  - Consider advancing diet as patient tolerates  - Continue to monitor H/H      Vital Signs Last 24 Hrs  T(C): 36.9 (31 Oct 2019 15:00), Max: 37 (30 Oct 2019 23:00)  T(F): 98.4 (31 Oct 2019 15:00), Max: 98.6 (30 Oct 2019 23:00)  HR: 83 (31 Oct 2019 15:00) (74 - 106)  BP: 140/67 (31 Oct 2019 15:00) (101/60 - 156/72)  BP(mean): 96 (31 Oct 2019 15:00) (76 - 104)  RR: 14 (31 Oct 2019 15:00) (9 - 19)  SpO2: 100% (31 Oct 2019 15:00) (97% - 100%)  I&O's Summary    30 Oct 2019 07:01  -  31 Oct 2019 07:00  --------------------------------------------------------  IN: 2712.5 mL / OUT: 4275 mL / NET: -1562.5 mL    31 Oct 2019 07:01  -  31 Oct 2019 15:29  --------------------------------------------------------  IN: 687.5 mL / OUT: 400 mL / NET: 287.5 mL          MEDICATIONS  (STANDING):  aspirin  chewable 81 milliGRAM(s) Oral daily  atorvastatin 40 milliGRAM(s) Oral at bedtime  enoxaparin Injectable 40 milliGRAM(s) SubCutaneous daily  ertapenem  IVPB 1000 milliGRAM(s) IV Intermittent every 24 hours  pantoprazole    Tablet 40 milliGRAM(s) Oral two times a day    MEDICATIONS  (PRN):  acetaminophen   Tablet .. 650 milliGRAM(s) Oral every 6 hours PRN Mild Pain (1 - 3), Moderate Pain (4 - 6)        LABS                                            8.9                   Neurophils% (auto):   x      (10-31 @ 00:58):    5.56 )-----------(197          Lymphocytes% (auto):  x                                             26.5                   Eosinphils% (auto):   x        Manual%: Neutrophils x    ; Lymphocytes x    ; Eosinophils x    ; Bands%: x    ; Blasts x                                    136    |  108    |  10                  Calcium: 7.6   / iCa: x      (10-31 @ 00:58)    ----------------------------<  114       Magnesium: 1.8                              4.3     |  22     |  0.97             Phosphorous: 1.7        ( 10-31 @ 00:59 )   PT: 10.3 sec;   INR: 0.90 ratio  aPTT: 32.0 sec

## 2019-10-31 NOTE — PROGRESS NOTE ADULT - SUBJECTIVE AND OBJECTIVE BOX
HISTORY  74y Male w/ PMH CAD s/p CABG last year and tongue cancer for which he just finished chemo/radiation, no PSH  presents w/ abdominal pain since last night. Pt reports he developed abdominal pain at 11pm last night after eating cereal. He reports the pain was very significant and worsened. He reports nausea but no emesis. He reports he has been having bowel movements and passing gas.  In the emergency room, the pt was hypotensive to the 70s and initially responded to fluid bolus. Labs significant for a wbc of 7.63, lactate of 3.0. CT scan showing closed loop bowel obstruction w/ mesenteric edema. PT was taken urgently to OR diagnostic laparoscopy, long segment of SB was ischemic 2/2 to adhesions, case was converted to open, after WILFRID and bowel appearance improved abd peristalsis noted, no SBR, abdomen was closed. At the end of the case pt had one episode of melena. Pt was taken to SICU for hemodynamic monitoring.     24 HOUR EVENTS:  - Pt. had two BMs at 1500 and 0000 without evidence of melena.   - H/H stable (8.1 -> 8.8 -> 8.6 -> now 8.9 / 23.7 -> 26.8 -> 26.2 -> now 26.5)      SUBJECTIVE/ROS:  [ ] A ten-point review of systems was otherwise negative except as noted.  [ ] Due to altered mental status/intubation, subjective information were not able to be obtained from the patient. History was obtained, to the extent possible, from review of the chart and collateral sources of information.      NEURO  RASS:     GCS:     CAM ICU:  Exam: awake, alert, oriented, reports minimal pain only with movement / changes in position  Meds:   [x] Adequacy of sedation and pain control has been assessed and adjusted      RESPIRATORY  RR: 11 (10-31-19 @ 02:00) (8 - 21)  SpO2: 98% (10-31-19 @ 02:00) (93% - 100%)  Wt(kg): --  Exam: unlabored, clear to auscultation bilaterally      [N/A] Extubation Readiness Assessed  Meds:       CARDIOVASCULAR  HR: 77 (10-31-19 @ 02:00) (73 - 100)  BP: 144/65 (10-31-19 @ 02:00) (119/62 - 162/72)  BP(mean): 93 (10-31-19 @ 02:00) (85 - 104)  ABP: --  ABP(mean): --  Wt(kg): --  CVP(cm H2O): --  VBG - ( 29 Oct 2019 04:22 )  pH: 7.38  /  pCO2: 44    /  pO2: 35    / HCO3: 25    / Base Excess: 0.3   /  SaO2: 66     Lactate: 0.8                Exam: regular rate and rhythm  Cardiac Rhythm: sinus  Perfusion     [x]Adequate   [ ]Inadequate  Mentation   [x]Normal       [ ]Reduced  Extremities  [x]Warm         [ ]Cool  Volume Status [ ]Hypervolemic [x]Euvolemic [ ]Hypovolemic  Meds:       GI/NUTRITION  Exam: soft, nontender, nondistended, incision C/D/I  Diet:  Meds: pantoprazole  Injectable 40 milliGRAM(s) IV Push every 12 hours      GENITOURINARY  I&O's Detail    10-29 @ 07:01  -  10-30 @ 07:00  --------------------------------------------------------  IN:    dextrose 5% + sodium chloride 0.9%: 1500 mL    dextrose 5% + sodium chloride 0.9%: 600 mL    lactated ringers.: 300 mL    Solution: 313 mL    Solution: 450 mL  Total IN: 3163 mL    OUT:    Voided: 3570 mL  Total OUT: 3570 mL    Total NET: -407 mL      10-30 @ 07:01  -  10-31 @ 02:22  --------------------------------------------------------  IN:    dextrose 5% + sodium chloride 0.9%: 1600 mL    Solution: 237.5 mL  Total IN: 1837.5 mL    OUT:    Voided: 3475 mL  Total OUT: 3475 mL    Total NET: -1637.5 mL          10-31    136  |  108  |  10  ----------------------------<  114<H>  4.3   |  22  |  0.97    Ca    7.6<L>      31 Oct 2019 00:58  Phos  1.7     10-31  Mg     1.8     10-31      [ ] Dimas catheter, indication: N/A  Meds: dextrose 5% + sodium chloride 0.9% 1000 milliLiter(s) IV Continuous <Continuous>  magnesium sulfate  IVPB 2 Gram(s) IV Intermittent once  sodium phosphate IVPB 15 milliMole(s) IV Intermittent once        HEMATOLOGIC  Meds:   [x] VTE Prophylaxis                        8.9    5.56  )-----------( 197      ( 31 Oct 2019 00:58 )             26.5     PT/INR - ( 31 Oct 2019 00:59 )   PT: 10.3 sec;   INR: 0.90 ratio         PTT - ( 31 Oct 2019 00:59 )  PTT:32.0 sec  Transfusion     [ ] PRBC   [ ] Platelets   [ ] FFP   [ ] Cryoprecipitate      INFECTIOUS DISEASES  WBC Count: 5.56 K/uL (10-31 @ 00:58)  WBC Count: 4.42 K/uL (10-30 @ 20:42)  WBC Count: 4.78 K/uL (10-30 @ 08:47)    RECENT CULTURES:  Specimen Source: .Urine  Date/Time: 10-27 @ 08:36  Culture Results:   >100,000 CFU/ml Escherichia coli ESBL  Gram Stain: --  Organism: Escherichia coli ESBL    Meds: ertapenem  IVPB 1000 milliGRAM(s) IV Intermittent every 24 hours        ENDOCRINE  CAPILLARY BLOOD GLUCOSE        Meds:       ACCESS DEVICES:  [ ] Peripheral IV  [ ] Central Venous Line	[ ] R	[ ] L	[ ] IJ	[ ] Fem	[ ] SC	Placed:   [ ] Arterial Line		[ ] R	[ ] L	[ ] Fem	[ ] Rad	[ ] Ax	Placed:   [ ] PICC:					[ ] Mediport  [ ] Urinary Catheter, Date Placed:   [x] Necessity of urinary, arterial, and venous catheters discussed    OTHER MEDICATIONS:  chlorhexidine 2% Cloths 1 Application(s) Topical <User Schedule>      CODE STATUS:      IMAGING: HISTORY  74y Male w/ PMH CAD s/p CABG last year and tongue cancer for which he just finished chemo/radiation, no PSH  presents w/ abdominal pain since last night. Pt reports he developed abdominal pain the day prior to admission after eating cereal. He reported the pain was very significant and worsened. He reported nausea but no emesis. He reported he has been having bowel movements and passing gas.  In the emergency room, the pt was hypotensive to the 70s and initially responded to fluid bolus. Labs significant for a wbc of 7.63, lactate of 3.0. CT scan showing closed loop bowel obstruction w/ mesenteric edema. PT was taken urgently to OR for diagnostic laparoscopy which showed that a long segment of SB was ischemic 2/2 to adhesions. Case was converted to open, after WILFRID and bowel appearance improved and peristalsis noted, no SBR, abdomen was closed. At the end of the case pt had one episode of melena. Pt was taken to SICU for hemodynamic monitoring.     24 HOUR EVENTS:  - Pt. had two BMs at 1500 and 0000 without evidence of melena.   - H/H stable (8.1 -> 8.8 -> 8.6 -> now 8.9 / 23.7 -> 26.8 -> 26.2 -> now 26.5)      SUBJECTIVE/ROS:  [ ] A ten-point review of systems was otherwise negative except as noted.  [ ] Due to altered mental status/intubation, subjective information were not able to be obtained from the patient. History was obtained, to the extent possible, from review of the chart and collateral sources of information.      NEURO  RASS:     GCS:     CAM ICU:  Exam: awake, alert, oriented, reports minimal pain only with movement / changes in position  Meds:   [x] Adequacy of sedation and pain control has been assessed and adjusted      RESPIRATORY  RR: 11 (10-31-19 @ 02:00) (8 - 21)  SpO2: 98% (10-31-19 @ 02:00) (93% - 100%)  Wt(kg): --  Exam: unlabored, clear to auscultation bilaterally      [N/A] Extubation Readiness Assessed  Meds:       CARDIOVASCULAR  HR: 77 (10-31-19 @ 02:00) (73 - 100)  BP: 144/65 (10-31-19 @ 02:00) (119/62 - 162/72)  BP(mean): 93 (10-31-19 @ 02:00) (85 - 104)  ABP: --  ABP(mean): --  Wt(kg): --  CVP(cm H2O): --  VBG - ( 29 Oct 2019 04:22 )  pH: 7.38  /  pCO2: 44    /  pO2: 35    / HCO3: 25    / Base Excess: 0.3   /  SaO2: 66     Lactate: 0.8                Exam: regular rate and rhythm  Cardiac Rhythm: sinus  Perfusion     [x]Adequate   [ ]Inadequate  Mentation   [x]Normal       [ ]Reduced  Extremities  [x]Warm         [ ]Cool  Volume Status [ ]Hypervolemic [x]Euvolemic [ ]Hypovolemic  Meds:       GI/NUTRITION  Exam: soft, nontender, nondistended, incision C/D/I  Diet:  Meds: pantoprazole  Injectable 40 milliGRAM(s) IV Push every 12 hours      GENITOURINARY  I&O's Detail    10-29 @ 07:01  -  10-30 @ 07:00  --------------------------------------------------------  IN:    dextrose 5% + sodium chloride 0.9%: 1500 mL    dextrose 5% + sodium chloride 0.9%: 600 mL    lactated ringers.: 300 mL    Solution: 313 mL    Solution: 450 mL  Total IN: 3163 mL    OUT:    Voided: 3570 mL  Total OUT: 3570 mL    Total NET: -407 mL      10-30 @ 07:01  -  10-31 @ 02:22  --------------------------------------------------------  IN:    dextrose 5% + sodium chloride 0.9%: 1600 mL    Solution: 237.5 mL  Total IN: 1837.5 mL    OUT:    Voided: 3475 mL  Total OUT: 3475 mL    Total NET: -1637.5 mL          10-31    136  |  108  |  10  ----------------------------<  114<H>  4.3   |  22  |  0.97    Ca    7.6<L>      31 Oct 2019 00:58  Phos  1.7     10-31  Mg     1.8     10-31      [ ] Dimas catheter, indication: N/A  Meds: dextrose 5% + sodium chloride 0.9% 1000 milliLiter(s) IV Continuous <Continuous>  magnesium sulfate  IVPB 2 Gram(s) IV Intermittent once  sodium phosphate IVPB 15 milliMole(s) IV Intermittent once        HEMATOLOGIC  Meds:   [x] VTE Prophylaxis                        8.9    5.56  )-----------( 197      ( 31 Oct 2019 00:58 )             26.5     PT/INR - ( 31 Oct 2019 00:59 )   PT: 10.3 sec;   INR: 0.90 ratio         PTT - ( 31 Oct 2019 00:59 )  PTT:32.0 sec  Transfusion     [ ] PRBC   [ ] Platelets   [ ] FFP   [ ] Cryoprecipitate      INFECTIOUS DISEASES  WBC Count: 5.56 K/uL (10-31 @ 00:58)  WBC Count: 4.42 K/uL (10-30 @ 20:42)  WBC Count: 4.78 K/uL (10-30 @ 08:47)    RECENT CULTURES:  Specimen Source: .Urine  Date/Time: 10-27 @ 08:36  Culture Results:   >100,000 CFU/ml Escherichia coli ESBL  Gram Stain: --  Organism: Escherichia coli ESBL    Meds: ertapenem  IVPB 1000 milliGRAM(s) IV Intermittent every 24 hours        ENDOCRINE  CAPILLARY BLOOD GLUCOSE        Meds:       ACCESS DEVICES:  [ ] Peripheral IV  [ ] Central Venous Line	[ ] R	[ ] L	[ ] IJ	[ ] Fem	[ ] SC	Placed:   [ ] Arterial Line		[ ] R	[ ] L	[ ] Fem	[ ] Rad	[ ] Ax	Placed:   [ ] PICC:					[ ] Mediport  [ ] Urinary Catheter, Date Placed:   [x] Necessity of urinary, arterial, and venous catheters discussed    OTHER MEDICATIONS:  chlorhexidine 2% Cloths 1 Application(s) Topical <User Schedule>      CODE STATUS:      IMAGING:

## 2019-10-31 NOTE — PROGRESS NOTE ADULT - ATTENDING COMMENTS
Agree with above. Given stable H/H without transfusion and resolving melena, likely melena due to ischemic small bowel, now resolving. Hold off on endoscopic evaluation for now, unless melena/anemia worsens.
Patient seen and examined and agree with above.   Patient receive 500 cc boluses for oliguria.   Mental status is normal; pain control with tylenol   Hemodynamically normal  Oliguria - continue to resuscitate as necessitated  -UTI - urine culture > 100, 00 GNR  -will continue to monitor urine output   NGT with minimal output overnight. Clamp trial and if residual remains low will advance diet to CLD.   Awaiting bowel function   PT to see patient    Will continue to monitor oliguria closely
Pt seen and examined  Chart reviewed  Resident note confirmed  Plan of care discussed with Dr. Alicia  Management per SICU team
Pt seen and examined  Chart reviewed  Resident note confirmed  Plan of care discussed with Dr. Alicia  Management per SICU team
Pt seen and examined  Chart reviewed  Resident note confirmed  Plan of care discussed with Dr. Alicia  Management per SICU team .
Pt seen and examined, agree with above. Pt feeling well, tolerating diet, pain well-controlled. ESBL UTI: ertapenem for 7 days. Hypophosphatemia: replete and recheck. CAD: restart statin and ASA 81mg (pt had melena, but never required transfusion or was hemodynamically unstable from bleeding, so given significant CAD and in setting of BID Protonix, risk of holding ASA is likely higher than risk of giving it). Melena has resolved, acute posthemorrhagic anemia is stable. Chemical VTE prophylaxis started. As patient was off chemical VTE prophylaxis for 48 hours, screening LE duplex ordered.
Patient seen and examined and agree with above.   Improved urine output   Continues to have  melena overnight   Melena-plan for endoscopy today  -H/H is stable but overall decreased from prior    Mental status is normal; pain control with tylenol   Hemodynamically normal  Oliguria - continue to resuscitate as necessitated  -UTI - urine culture - ESBL- continue antibiotics  -will continue to monitor urine output   s/p ex lap, WILFRID for closed loop SBO- NPO  Hypophosphatemia and hypokalemia- replete and will recheck later  labs reviewed  Continue to work with PT
Patient seen and examined and agree with above.   Improved urine output   Had an episode of melena overnight   Melena- Hct slight drop  and hemodynamically appropriate  - will consult GI   -stopped DVT ppx  -will make NPO     Mental status is normal; pain control with tylenol   Hemodynamically normal  Oliguria - continue to resuscitate as necessitated  -UTI - urine culture > 100, 00 GNR  -will continue to monitor urine output   s/p ex lap, WILFRID for closed loop SBO- tolerating CLD but will make NPO as patient has multiple bowel movements that appear melanotic   labs reviewed  Continue to work with PT

## 2019-10-31 NOTE — PROGRESS NOTE ADULT - SUBJECTIVE AND OBJECTIVE BOX
Subjective:  Patient seen and evaluated this AM with team.  Patient had 2 non-melanous bms overnight.  H/H stable.  Denies any acute complaints this AM.  Pain controlled.    OBJECTIVE:     ** PHYSICAL EXAM **    Gen: NAD, lying comfortably in bed  HEENT: NC/AT  RESP: nonlabored breathing  ABDOMEN: soft, non-distended, non-tender, incision site dressings c/d/i      ** VITAL SIGNS / I&O's **    Vital Signs Last 24 Hrs  T(C): 36.5 (31 Oct 2019 07:00), Max: 37 (30 Oct 2019 23:00)  T(F): 97.7 (31 Oct 2019 07:00), Max: 98.6 (30 Oct 2019 23:00)  HR: 94 (31 Oct 2019 10:00) (73 - 106)  BP: 117/68 (31 Oct 2019 10:00) (101/60 - 156/72)  BP(mean): 88 (31 Oct 2019 10:00) (76 - 104)  RR: 15 (31 Oct 2019 10:00) (9 - 21)  SpO2: 99% (31 Oct 2019 10:00) (97% - 100%)      30 Oct 2019 07:01  -  31 Oct 2019 07:00  --------------------------------------------------------  IN:    dextrose 5% + sodium chloride 0.9%: 2300 mL    Solution: 50 mL    Solution: 362.5 mL  Total IN: 2712.5 mL    OUT:    Voided: 4275 mL  Total OUT: 4275 mL    Total NET: -1562.5 mL      31 Oct 2019 07:01  -  31 Oct 2019 10:13  --------------------------------------------------------  IN:    dextrose 5% + sodium chloride 0.9%: 300 mL    Solution: 187.5 mL  Total IN: 487.5 mL    OUT:  Total OUT: 0 mL    Total NET: 487.5 mL        ** LABS **                          8.9    5.56  )-----------( 197      ( 31 Oct 2019 00:58 )             26.5     31 Oct 2019 00:58    136    |  108    |  10     ----------------------------<  114    4.3     |  22     |  0.97     Ca    7.6        31 Oct 2019 00:58  Phos  1.7       31 Oct 2019 00:58  Mg     1.8       31 Oct 2019 00:58      PT/INR - ( 31 Oct 2019 00:59 )   PT: 10.3 sec;   INR: 0.90 ratio         PTT - ( 31 Oct 2019 00:59 )  PTT:32.0 sec      MEDICATIONS  (STANDING):  chlorhexidine 2% Cloths 1 Application(s) Topical <User Schedule>  dextrose 5% + sodium chloride 0.9% 1000 milliLiter(s) (100 mL/Hr) IV Continuous <Continuous>  ertapenem  IVPB 1000 milliGRAM(s) IV Intermittent every 24 hours  pantoprazole  Injectable 40 milliGRAM(s) IV Push every 12 hours    MEDICATIONS  (PRN):

## 2019-10-31 NOTE — PROGRESS NOTE ADULT - ASSESSMENT
73 yo M s/p enteroclysis, exploratory laparotomy, diagnostic laparoscopy for SBO who had a bloody BM in the OR and continued bloody BM post operatively. H/H remaining stable.     Plan:  - Appreciate care per SICU team  - Consider advancing diet as patient tolerates  - Continue to monitor H/H

## 2019-11-01 VITALS
HEART RATE: 100 BPM | TEMPERATURE: 98 F | DIASTOLIC BLOOD PRESSURE: 78 MMHG | OXYGEN SATURATION: 98 % | RESPIRATION RATE: 18 BRPM | SYSTOLIC BLOOD PRESSURE: 147 MMHG

## 2019-11-01 LAB
ANION GAP SERPL CALC-SCNC: 6 MMOL/L — SIGNIFICANT CHANGE UP (ref 5–17)
APTT BLD: 37.6 SEC — HIGH (ref 27.5–36.3)
BUN SERPL-MCNC: 10 MG/DL — SIGNIFICANT CHANGE UP (ref 7–23)
CALCIUM SERPL-MCNC: 7.9 MG/DL — LOW (ref 8.4–10.5)
CHLORIDE SERPL-SCNC: 104 MMOL/L — SIGNIFICANT CHANGE UP (ref 96–108)
CO2 SERPL-SCNC: 21 MMOL/L — LOW (ref 22–31)
CREAT SERPL-MCNC: 1.04 MG/DL — SIGNIFICANT CHANGE UP (ref 0.5–1.3)
GLUCOSE SERPL-MCNC: 91 MG/DL — SIGNIFICANT CHANGE UP (ref 70–99)
HCT VFR BLD CALC: 26.3 % — LOW (ref 39–50)
HGB BLD-MCNC: 8.9 G/DL — LOW (ref 13–17)
INR BLD: 0.92 RATIO — SIGNIFICANT CHANGE UP (ref 0.88–1.16)
MAGNESIUM SERPL-MCNC: 1.8 MG/DL — SIGNIFICANT CHANGE UP (ref 1.6–2.6)
MCHC RBC-ENTMCNC: 33 PG — SIGNIFICANT CHANGE UP (ref 27–34)
MCHC RBC-ENTMCNC: 33.8 GM/DL — SIGNIFICANT CHANGE UP (ref 32–36)
MCV RBC AUTO: 97.4 FL — SIGNIFICANT CHANGE UP (ref 80–100)
NRBC # BLD: 0 /100 WBCS — SIGNIFICANT CHANGE UP (ref 0–0)
PHOSPHATE SERPL-MCNC: 2.6 MG/DL — SIGNIFICANT CHANGE UP (ref 2.5–4.5)
PLATELET # BLD AUTO: 208 K/UL — SIGNIFICANT CHANGE UP (ref 150–400)
POTASSIUM SERPL-MCNC: 4.4 MMOL/L — SIGNIFICANT CHANGE UP (ref 3.5–5.3)
POTASSIUM SERPL-SCNC: 4.4 MMOL/L — SIGNIFICANT CHANGE UP (ref 3.5–5.3)
PROTHROM AB SERPL-ACNC: 10.5 SEC — SIGNIFICANT CHANGE UP (ref 10–12.9)
RBC # BLD: 2.7 M/UL — LOW (ref 4.2–5.8)
RBC # FLD: 19.2 % — HIGH (ref 10.3–14.5)
SODIUM SERPL-SCNC: 131 MMOL/L — LOW (ref 135–145)
WBC # BLD: 4.94 K/UL — SIGNIFICANT CHANGE UP (ref 3.8–10.5)
WBC # FLD AUTO: 4.94 K/UL — SIGNIFICANT CHANGE UP (ref 3.8–10.5)

## 2019-11-01 PROCEDURE — 99232 SBSQ HOSP IP/OBS MODERATE 35: CPT

## 2019-11-01 RX ORDER — PANTOPRAZOLE SODIUM 20 MG/1
1 TABLET, DELAYED RELEASE ORAL
Qty: 0 | Refills: 0 | DISCHARGE
Start: 2019-11-01

## 2019-11-01 RX ORDER — NITROFURANTOIN MACROCRYSTAL 50 MG
100 CAPSULE ORAL
Refills: 0 | Status: DISCONTINUED | OUTPATIENT
Start: 2019-11-01 | End: 2019-11-01

## 2019-11-01 RX ORDER — NITROFURANTOIN MACROCRYSTAL 50 MG
1 CAPSULE ORAL
Qty: 9 | Refills: 0
Start: 2019-11-01 | End: 2019-11-04

## 2019-11-01 RX ORDER — ASPIRIN/CALCIUM CARB/MAGNESIUM 324 MG
1 TABLET ORAL
Qty: 0 | Refills: 0 | DISCHARGE
Start: 2019-11-01

## 2019-11-01 RX ORDER — ATORVASTATIN CALCIUM 80 MG/1
1 TABLET, FILM COATED ORAL
Qty: 0 | Refills: 0 | DISCHARGE
Start: 2019-11-01

## 2019-11-01 RX ORDER — ACETAMINOPHEN 500 MG
2 TABLET ORAL
Qty: 0 | Refills: 0 | DISCHARGE
Start: 2019-11-01

## 2019-11-01 RX ORDER — ATORVASTATIN CALCIUM 80 MG/1
1 TABLET, FILM COATED ORAL
Qty: 21 | Refills: 0
Start: 2019-11-01 | End: 2019-11-21

## 2019-11-01 RX ADMIN — PANTOPRAZOLE SODIUM 40 MILLIGRAM(S): 20 TABLET, DELAYED RELEASE ORAL at 18:27

## 2019-11-01 RX ADMIN — ENOXAPARIN SODIUM 40 MILLIGRAM(S): 100 INJECTION SUBCUTANEOUS at 11:59

## 2019-11-01 RX ADMIN — Medication 81 MILLIGRAM(S): at 11:59

## 2019-11-01 RX ADMIN — Medication 100 MILLIGRAM(S): at 09:33

## 2019-11-01 RX ADMIN — Medication 100 MILLIGRAM(S): at 18:27

## 2019-11-01 RX ADMIN — PANTOPRAZOLE SODIUM 40 MILLIGRAM(S): 20 TABLET, DELAYED RELEASE ORAL at 06:00

## 2019-11-01 NOTE — CHART NOTE - NSCHARTNOTEFT_GEN_A_CORE
Nutrition Follow Up Note  Patient seen for: initial malnutrition follow-up     Chart reviewed, events noted. This is a 75 yo M  with history of tongue cancer, s/p enteroclysis, exploratory laparotomy, diagnostic laparoscopy for SBO who had a bloody BM in the OR and continued bloody BM post operatively. Transferred from SICU to floor yesterday. Discharge planning.     Source: patient, medical record     Diet : Regular (since 10/31)    Patient reports good PO intake today, states he has good appetite after being NPO/ clear liquid diet x 3 days. RD observed breakfast tray with 100% of meal consumed (2 eggs, bread, milk, coffee). Pt reports some difficulty consuming harder food items, amendable to trying mechanical soft diet. Pt reports he supplements with nutrition supplements at home due to past history of weight loss from cancer treatment. Pt amendable to receiving Mighty Shakes on ach tray to supplement PO intake. Pt encouraged to consume protein-rich, nutrient dense foods as able. Pt reports last BM yesterday, reports blood in stool was resolving yesterday.      PO intake : >75% of breakfast tray today (previously NPO/clear liquid diet x 3 days)     Source for PO intake: pt report      Enteral /Parenteral Nutrition: N/A    Weights   Daily Weight in k.8 (10-31), Weight in k.7 (10-30), Weight in k (10-29), Weight in k.7 (10-29), Weight in k.5 (10-28)      Pertinent Medications: MEDICATIONS  (STANDING):  aspirin  chewable 81 milliGRAM(s) Oral daily  atorvastatin 40 milliGRAM(s) Oral at bedtime  enoxaparin Injectable 40 milliGRAM(s) SubCutaneous daily  nitrofurantoin monohydrate/macrocrystals (MACROBID) 100 milliGRAM(s) Oral two times a day with meals  pantoprazole    Tablet 40 milliGRAM(s) Oral two times a day    MEDICATIONS  (PRN):  acetaminophen   Tablet .. 650 milliGRAM(s) Oral every 6 hours PRN Mild Pain (1 - 3), Moderate Pain (4 - 6)    Pertinent Labs:  @ 06:57: Na 131<L>, BUN 10, Cr 1.04, BG 91, K+ 4.4, Phos 2.6, Mg 1.8, Alk Phos --, ALT/SGPT --, AST/SGOT --, HbA1c --      Skin per nursing documentation: free  of pressure injuries, noted with midline abdomen incision   Edema: none     Estimated Needs:   [x ] no change since previous assessment  [ ] recalculated:     Previous Nutrition Diagnosis:  Malnutrition (moderate)   Nutrition Diagnosis is: on going     New Nutrition Diagnosis: N/A       Interventions:   Recommend  1) Recommend change diet to Mechanical Soft + Low Fiber diet   2) RD to add Mighty Shakes TID to supplement PO intake   3) Monitor tolerance to diet.     Monitoring and Evaluation:     Continue to monitor Nutritional intake, Tolerance to diet prescription, weights, labs, skin integrity    RD remains available upon request and will follow up per protocol  Pushpa Avalos RD, CDN, Pager # [phone]

## 2019-11-01 NOTE — PROGRESS NOTE ADULT - PROBLEM SELECTOR PLAN 4
s/p CABG. Was previously on Brilinta and Lipitor, now no longer taking. No chest pain.  - Given CAD pt should be on antiplatelet and statin therapy   - c/w Lipitor 40mg   - c/w Aspirin given no further bleeding
s/p CABG. Was previously on Brilinta and Lipitor, now no longer taking prior to admission. No chest pain.  - Given CAD pt should be on antiplatelet and statin therapy   - c/w Lipitor 40mg   - c/w Aspirin given no further bleeding
s/p CABG. Was previously on Brilinta and Lipitor, now no longer taking. No chest pain.  - Given CAD pt should be on antiplatelet and statin therapy   - Would start Lipitor 40mg when able to tolerate PO  - Would hold antiplatelet for now given concern for GI bleeding.

## 2019-11-01 NOTE — PROGRESS NOTE ADULT - ASSESSMENT
74y Male w/ PMH CAD s/p CABG last year and tongue cancer who presented with abdominal pain found to have closed loop SBO s/p exploratory laparotomy, WILFRID with melena postoperatively now resolved

## 2019-11-01 NOTE — PROGRESS NOTE ADULT - PROBLEM SELECTOR PLAN 1
Pt with hematochezia on OR table and several melanotic stools postoperatively  - H/H downtrended from admission CBC, but H/H now stabilized  - c/w PPI BID for now. If remains stable over next 24 hours would transition to PO  - GI consulted, no plan for EGD at this time given no further melena and stable CBC  - Monitor CBC  - Transfuse for H/H <8 given coronary disease.
Pt with hematochezia on OR table and several melanotic stools postoperatively  - H/H downtrended from admission CBC, but H/H now stabilized  - c/w PPI BID for now.  - GI consulted, no plan for EGD at this time given no further melena and stable CBC  - Will need outpatient GI f/u  - Monitor CBC  - Transfuse for H/H <8 given coronary disease.
Pt with hematochezia on OR table. Now with melanotic stool this am  - H/H slowly downtrending, 11.5 on admission now 8.8. Stable from yesterday  - PPI BID  - GI consulted, EGD per GI  - Monitor CBC  - Transfuse for H/H <8 given coronary disease.

## 2019-11-01 NOTE — DISCHARGE NOTE PROVIDER - NSDCFUADDAPPT_GEN_ALL_CORE_FT
Please follow up with your primary care physician regarding your hospitalization. Please schedule an appointment with your primary care provider within two weeks after your discharge to review your hospital course. Please follow up with your primary care physician regarding your hospitalization. Please schedule an appointment with your primary care provider within two weeks after your discharge to review your hospital course.    You will need outpatient follow up with gastroenterology please make an appointment with Dr. Prakash within 2 weeks of discharge. Please call [phone] for an appointment.

## 2019-11-01 NOTE — DISCHARGE NOTE NURSING/CASE MANAGEMENT/SOCIAL WORK - NSDCFUADDAPPT_GEN_ALL_CORE_FT
Please follow up with your primary care physician regarding your hospitalization. Please schedule an appointment with your primary care provider within two weeks after your discharge to review your hospital course.    You will need outpatient follow up with gastroenterology please make an appointment with Dr. Prakash within 2 weeks of discharge. Please call [phone] for an appointment.

## 2019-11-01 NOTE — DISCHARGE NOTE PROVIDER - NSDCMRMEDTOKEN_GEN_ALL_CORE_FT
acetaminophen 325 mg oral tablet: 2 tab(s) orally every 6 hours, As needed, Mild Pain (1 - 3), Moderate Pain (4 - 6)  aspirin 81 mg oral tablet, chewable: 1 tab(s) orally once a day  atorvastatin 40 mg oral tablet: 1 tab(s) orally once a day (at bedtime)  nitrofurantoin macrocrystals-monohydrate 100 mg oral capsule: 1 cap(s) orally 2 times a day (with meals)  pantoprazole 40 mg oral delayed release tablet: 1 tab(s) orally 2 times a day

## 2019-11-01 NOTE — DISCHARGE NOTE PROVIDER - CARE PROVIDER_API CALL
Alfonzo Saunders (MD)  Surgery; Surgical Critical Care  [address] [facility], [address], [place] [address]  Phone: [phone]  Fax: [phone]  Follow Up Time: 2 weeks Alfonzo Saunders)  Surgery; Surgical Critical Care  [address] [facility], [address], [place] [address]  Phone: [phone]  Fax: [phone]  Follow Up Time: 2 weeks    Addi Lee)  Gastroenterology; Internal Medicine  [address] [place] [address]  Phone: [phone]  Fax: [phone]  Follow Up Time: 2 weeks

## 2019-11-01 NOTE — PROGRESS NOTE ADULT - PROBLEM SELECTOR PLAN 2
A grossly positive, Ucx with over 100,000 gram negative rods.  - Ucx growing ESBL. c/w Ertapenem  - Would treat UTI for total 5 day course.
UA grossly positive, Ucx with over 100,000 gram negative rods.  - Ucx growing ESBL. c/w Ertapenem, transitioned to Macrobid today based on sensitivities
A grossly positive, Ucx with over 100,000 gram negative rods.  - Ucx growing ESBL. c/w Ertapenem  - Would treat UTI for total 5 day course.

## 2019-11-01 NOTE — PROGRESS NOTE ADULT - PROVIDER SPECIALTY LIST ADULT
Gastroenterology
Internal Medicine
Internal Medicine
SICU
Trauma Surgery
SICU
Internal Medicine

## 2019-11-01 NOTE — PROGRESS NOTE ADULT - PROBLEM SELECTOR PLAN 3
Found to have closed loop SBO, s/p ex lap with WILFRID. Pain controlled, was tolerating diet however with apparently melanotic BM's perhaps 2/2 ischemic small bowel now resolved  - Diet advanced  - Care as per surgery.
Found to have closed loop SBO, s/p ex lap with WILFRID. Pain controlled, was tolerating diet however with apparently melanotic BM's perhaps 2/2 ischemic small bowel now resolved  - Diet advanced  - Care as per surgery.
Found to have closed loop SBO, s/p ex lap with WILFRID. Pain controlled, was tolerating diet however with apparently melanotic BM's perhaps 2/2 ischemic small bowel  - Care as per surgery.

## 2019-11-01 NOTE — PROGRESS NOTE ADULT - ASSESSMENT
75 yo M s/p enteroclysis, exploratory laparotomy, diagnostic laparoscopy for SBO who had a bloody BM in the OR and continued bloody BM post operatively. H/H remaining stable.     Plan:  - Regular diet  - Continue ertapenem - will consider transitioning to macrobid   - Lovenox + ASA  - Encourage OOB

## 2019-11-01 NOTE — DISCHARGE NOTE PROVIDER - PROVIDER TOKENS
PROVIDER:[TOKEN:[[id number]],FOLLOWUP:[2 weeks]] PROVIDER:[TOKEN:[[id number]],FOLLOWUP:[2 weeks]],PROVIDER:[TOKEN:[[id number]],FOLLOWUP:[2 weeks]]

## 2019-11-01 NOTE — DISCHARGE NOTE NURSING/CASE MANAGEMENT/SOCIAL WORK - NSSCTYPOFSERV_GEN_ALL_CORE
Home physical and occupational therapy services.  An RN will contact you prior to your initial visit.  Please call if you have any questions.

## 2019-11-01 NOTE — DISCHARGE NOTE NURSING/CASE MANAGEMENT/SOCIAL WORK - PATIENT PORTAL LINK FT
You can access the FollowMyHealth Patient Portal offered by [facility] by registering at the following website: [email or web address]. By joining [employer]’s FollowMyHealth portal, you will also be able to view your health information using other applications (apps) compatible with our system.

## 2019-11-01 NOTE — DISCHARGE NOTE PROVIDER - HOSPITAL COURSE
Pt is a 74 year old male PMH CAD s/p CABG last year and tongue cancer for which he just finished chemo/radiation, presents w/ abdominal pain since last night. Pt reports he developed abdominal pain at 11pm last night after eating cereal. He reports the pain was very significant and worsened. He reports nausea but no emesis. He reports he has been having bowel movements and passing gas.     In the emergency room, the pt was hypotensive to the 70s and initially responded to fluid bolus. Labs significant for a wbc of 7.63, lactate of 3.0. CT scan showing closed loop bowel obstruction w/ mesenteric edema.         Of note, the pt reports he has never had abdominal surgery. He also reports he takes no medications at home.         Pt admitted to the acute care surgery service, made NPO, IVF started and pt taken to the OR emergently for a diagnostic laparoscopy which showed that a long segment of SB was ischemic 2/2 to adhesions. Case was converted to open, after WILFRID and bowel appearance improved and peristalsis noted, no SBR, abdomen was closed. At the end of the case pt had one episode of melena. Pt was taken to SICU for hemodynamic monitoring. Pt treated for oliguria with IVF boluses. UCulture + Ecoli treated with Ertapenem x 7 days. Pt continued to have melena post op. GI consulted and recommended trending H/H which remained stable, PPI BID. Melena resolved without any intervention, likely due to ischemic small bowel seen intraop.  Pt treated for hypophosphatemia with repletions. NGT was removed and diet advanced as tolerated once GI function resumed. Pt remained hemodynamically stable and transferred to the floor on POD#4.     PT/OT evaluated pt and recommended home PT/ home OT.         Pt currently tolerating a regular diet, ambulating, voiding and pain controlled. Pt stable for discharge to home. He was instructed to follow up with Dr. Saunders in 1-2 weeks. Pt is a 74 year old male PMH CAD s/p CABG last year and tongue cancer for which he just finished chemo/radiation, presents w/ abdominal pain since last night. Pt reports he developed abdominal pain at 11pm last night after eating cereal. He reports the pain was very significant and worsened. He reports nausea but no emesis. He reports he has been having bowel movements and passing gas.     In the emergency room, the pt was hypotensive to the 70s and initially responded to fluid bolus. Labs significant for a wbc of 7.63, lactate of 3.0. CT scan showing closed loop bowel obstruction w/ mesenteric edema.         Of note, the pt reports he has never had abdominal surgery. He also reports he takes no medications at home.         Pt admitted to the acute care surgery service, made NPO, IVF started and pt taken to the OR emergently for a diagnostic laparoscopy which showed that a long segment of SB was ischemic 2/2 to adhesions. Case was converted to open, after WILFRID and bowel appearance improved and peristalsis noted, no SBR, abdomen was closed. At the end of the case pt had one episode of melena. Pt was taken to SICU for hemodynamic monitoring. Pt treated for oliguria with IVF boluses. UCulture + Ecoli treated with Ertapenem and then transitioned to macrobid (7 day course). Pt continued to have melena post op. GI consulted and recommended trending H/H which remained stable, PPI BID. Melena resolved without any intervention, likely due to ischemic small bowel seen intraop.  Pt treated for hypophosphatemia with repletions. NGT was removed and diet advanced as tolerated once GI function resumed. Pt remained hemodynamically stable and transferred to the floor on POD#4.     PT/OT evaluated pt and recommended home PT/ home OT.         Pt currently tolerating a regular diet, ambulating, voiding and pain controlled. Pt stable for discharge to home. He was instructed to follow up with Dr. Saunders in 1-2 weeks.

## 2019-11-01 NOTE — DISCHARGE NOTE PROVIDER - NSDCCPCAREPLAN_GEN_ALL_CORE_FT
PRINCIPAL DISCHARGE DIAGNOSIS  Diagnosis: SBO (small bowel obstruction)  Assessment and Plan of Treatment: Follow up with Dr. Saunders (Surgeon) in 1-2 weeks.   Please call [phone] to schedule an appointment.  [address] [facility]  [address]  [place], [address]   NOTIFY YOUR SURGEON IF: You have any bleeding that does not stop, any pus draining from your wound, any fever (over 100.4 F) or chills, persistent nausea/vomiting, persistent diarrhea, or if your pain is not controlled on your discharge pain medications.      SECONDARY DISCHARGE DIAGNOSES  Diagnosis: Melena  Assessment and Plan of Treatment: resolved    Diagnosis: Hypophosphatemia  Assessment and Plan of Treatment: now resolved with repletions    Diagnosis: Anemia due to blood loss  Assessment and Plan of Treatment: hemoglobin stable.    Diagnosis: CAD, multiple vessel  Assessment and Plan of Treatment: CAD, multiple vessel    Diagnosis: UTI (urinary tract infection)  Assessment and Plan of Treatment: Continue antibiotics for urinary tract infection as instructed

## 2019-11-01 NOTE — DISCHARGE NOTE PROVIDER - NSDCACTIVITY_GEN_ALL_CORE
Do not drive or operate machinery/Walking - Indoors allowed/No heavy lifting/straining/Walking - Outdoors allowed/Stairs allowed/Showering allowed/Do not make important decisions/Driving allowed

## 2019-11-01 NOTE — DISCHARGE NOTE PROVIDER - CARE PROVIDERS DIRECT ADDRESSES
,[email or web address]

## 2019-11-01 NOTE — DISCHARGE NOTE PROVIDER - NSDCCPTREATMENT_GEN_ALL_CORE_FT
PRINCIPAL PROCEDURE  Procedure: Exploratory laparotomy  Findings and Treatment:       SECONDARY PROCEDURE  Procedure: Diagnostic laparoscopy  Findings and Treatment:     Procedure: Enteroclysis  Findings and Treatment:

## 2019-11-01 NOTE — DISCHARGE NOTE NURSING/CASE MANAGEMENT/SOCIAL WORK - NSDCVIVACCINE_GEN_ALL_CORE_FT
Teto castaneda is a 66 y/o gentlemen with numerous spinal surgeries as reported below,  shunt placed for hydrocephalus and HTN who presented to the ER complaining of back pain. He calmly states that his current pain starts in the left lower back and radiates down to his buttock and leg. Due to the pain he is unable to ambulate and has limited ROM. Pain is something unlike he has ever had before, and he thinks it's more severe than the pain prior or even after his surgeries. Denies fevers or chills. Pain could be due to sciatica, however further workup may be required due to surgical history.   ID rec No antibiotics with nl esr and crp- No CP /sob infection  History and PE NOT c/w any cord compression or caude equina syndrome.  acute on chronic back pain    < from: CT Lumbar Spine w/ IV Cont (11.05.18 @ 13:05) >  L3-L5 transpedicular screws are again identified and appear unchanged in   position without violation of the neural foramen, or migration into the   spinal canal.    There has been interval insertion of an L5-S1 intervertebral fusion   device since 2015. Periprosthetic lucency involving S1 intervertebral   disc fusion fixation screws however, are suggestive of hardware   loosening. There is prevertebral fatty reticulation and swelling.    Though evaluation of the intraspinal compartment is limited, there is   suggestion for an anterior epidural phlegmon versus abscess at the L5   level measuring approximately 0.4 x 0.9 cm.    Contrast-enhanced MR imaging may be done for further evaluation.    The examination is otherwise unchanged.    L4 and L5 laminectomy is again identified. Nonspecific soft tissue within   the posterior paraspinal regions are unchanged in configuration,   consistent with postoperative change.    At L4-L5, moderate left foraminal stenosis on a bony productive change is   unchanged.    At L3-L4, mild to moderate bilateral foraminal narrowing is unchanged.    At L2-L3, moderate bilateral foraminal narrowing on a bony productive   basis is increased.    At L1-L2, there is no significant canal or foraminal stenosis.      < end of copied text > No Vaccines Administered.

## 2019-11-01 NOTE — PROGRESS NOTE ADULT - SUBJECTIVE AND OBJECTIVE BOX
Subjective:  Patient seen and evaluated this AM with team.  Yesterday evening, patient was transferred from the SICU in stable condition.  Pain under control.  + flatus / - BM    OBJECTIVE:     ** PHYSICAL EXAM **    Gen: NAD, lying comfortably in bed  HEENT: NC/AT  RESP: nonlabored breathing  ABDOMEN: soft, non-distended, non-tender, incision site dressings c/d/i        ** VITAL SIGNS / I&O's **    Vital Signs Last 24 Hrs  T(C): 36.9 (01 Nov 2019 04:42), Max: 37.1 (01 Nov 2019 00:48)  T(F): 98.5 (01 Nov 2019 04:42), Max: 98.8 (01 Nov 2019 00:48)  HR: 77 (01 Nov 2019 04:42) (77 - 106)  BP: 132/68 (01 Nov 2019 04:42) (101/60 - 145/79)  BP(mean): 85 (31 Oct 2019 16:00) (76 - 99)  RR: 18 (01 Nov 2019 04:42) (11 - 19)  SpO2: 97% (01 Nov 2019 04:42) (97% - 100%)      30 Oct 2019 07:01  -  31 Oct 2019 07:00  --------------------------------------------------------  IN:    dextrose 5% + sodium chloride 0.9%: 2300 mL    Solution: 50 mL    Solution: 362.5 mL  Total IN: 2712.5 mL    OUT:    Voided: 4275 mL  Total OUT: 4275 mL    Total NET: -1562.5 mL      31 Oct 2019 07:01  -  01 Nov 2019 06:56  --------------------------------------------------------  IN:    dextrose 5% + sodium chloride 0.9%: 500 mL    Solution: 237.5 mL  Total IN: 737.5 mL    OUT:    Voided: 1650 mL  Total OUT: 1650 mL    Total NET: -912.5 mL            ** LABS **                          8.9    5.56  )-----------( 197      ( 31 Oct 2019 00:58 )             26.5     31 Oct 2019 00:58    136    |  108    |  10     ----------------------------<  114    4.3     |  22     |  0.97     Ca    7.6        31 Oct 2019 00:58  Phos  1.7       31 Oct 2019 00:58  Mg     1.8       31 Oct 2019 00:58      PT/INR - ( 31 Oct 2019 00:59 )   PT: 10.3 sec;   INR: 0.90 ratio         PTT - ( 31 Oct 2019 00:59 )  PTT:32.0 sec    MEDICATIONS  (STANDING):  aspirin  chewable 81 milliGRAM(s) Oral daily  atorvastatin 40 milliGRAM(s) Oral at bedtime  enoxaparin Injectable 40 milliGRAM(s) SubCutaneous daily  ertapenem  IVPB 1000 milliGRAM(s) IV Intermittent every 24 hours  pantoprazole    Tablet 40 milliGRAM(s) Oral two times a day    MEDICATIONS  (PRN):  acetaminophen   Tablet .. 650 milliGRAM(s) Oral every 6 hours PRN Mild Pain (1 - 3), Moderate Pain (4 - 6)

## 2019-11-01 NOTE — PROGRESS NOTE ADULT - PROBLEM SELECTOR PLAN 5
s/p cisplatin and radiation therapy. Scheduled for outpatient surgery.   - Outpatient oncology follow up.

## 2019-11-01 NOTE — PROGRESS NOTE ADULT - SUBJECTIVE AND OBJECTIVE BOX
All Woodson MD  Division of Hospital Medicine  Pager [phone]  If no response or off hours page: [phone]  ---------------------------------------------------------    SAMANTHA CADENA  74y  Male      Patient is a 74y old  Male who presents with a chief complaint of abdominal pain (01 Nov 2019 07:29)      INTERVAL HPI/OVERNIGHT EVENTS:  Sitting in chair. Continues to have normal BM's. Tolerating diet. Only has abdominal pain when coughing      REVIEW OF SYSTEMS: 14 point ROS negative unless listed above    T(C): 37.1 (11-01-19 @ 09:13), Max: 37.1 (11-01-19 @ 00:48)  HR: 96 (11-01-19 @ 09:13) (77 - 97)  BP: 120/74 (11-01-19 @ 09:13) (117/71 - 145/79)  RR: 18 (11-01-19 @ 09:13) (13 - 18)  SpO2: 98% (11-01-19 @ 09:13) (97% - 100%)  Wt(kg): --Vital Signs Last 24 Hrs  T(C): 37.1 (01 Nov 2019 09:13), Max: 37.1 (01 Nov 2019 00:48)  T(F): 98.8 (01 Nov 2019 09:13), Max: 98.8 (01 Nov 2019 00:48)  HR: 96 (01 Nov 2019 09:13) (77 - 97)  BP: 120/74 (01 Nov 2019 09:13) (117/71 - 145/79)  BP(mean): 85 (31 Oct 2019 16:00) (83 - 99)  RR: 18 (01 Nov 2019 09:13) (13 - 18)  SpO2: 98% (01 Nov 2019 09:13) (97% - 100%)    PHYSICAL EXAM:  GENERAL: NAD, well-developed  NECK: Supple, No JVD  CHEST/LUNG: Clear to auscultation bilaterally; No wheeze  HEART: Reg rate. No M/R/G.  ABDOMEN: Soft, NT/ND, Bowel sounds present, incision C/D/I  EXTREMITIES:  2+ Peripheral Pulses, No clubbing, cyanosis, or edema  PSYCH: AAOx3  NEUROLOGY: non-focal  SKIN: No rashes or lesions      Consultant(s) Notes Reviewed:  [x ] YES  [ ] NO  Care Discussed with Consultants/Other Providers [ x] YES  [ ] NO    LABS:                        8.9    4.94  )-----------( 208      ( 01 Nov 2019 07:01 )             26.3     11-01    131<L>  |  104  |  10  ----------------------------<  91  4.4   |  21<L>  |  1.04    Ca    7.9<L>      01 Nov 2019 06:57  Phos  2.6     11-01  Mg     1.8     11-01      PT/INR - ( 01 Nov 2019 07:00 )   PT: 10.5 sec;   INR: 0.92 ratio         PTT - ( 01 Nov 2019 07:00 )  PTT:37.6 sec    CAPILLARY BLOOD GLUCOSE                RADIOLOGY & ADDITIONAL TESTS:    Imaging Personally Reviewed:  [ ] YES  [ ] NO

## 2019-11-04 ENCOUNTER — INBOUND DOCUMENT (OUTPATIENT)
Age: 74
End: 2019-11-04

## 2019-11-14 ENCOUNTER — APPOINTMENT (OUTPATIENT)
Dept: TRAUMA SURGERY | Facility: CLINIC | Age: 74
End: 2019-11-14

## 2019-12-04 PROBLEM — I25.10 ATHEROSCLEROTIC HEART DISEASE OF NATIVE CORONARY ARTERY WITHOUT ANGINA PECTORIS: Chronic | Status: ACTIVE | Noted: 2019-10-27

## 2019-12-04 PROBLEM — C02.9 MALIGNANT NEOPLASM OF TONGUE, UNSPECIFIED: Chronic | Status: ACTIVE | Noted: 2019-10-27

## 2019-12-12 ENCOUNTER — APPOINTMENT (OUTPATIENT)
Dept: TRAUMA SURGERY | Facility: CLINIC | Age: 74
End: 2019-12-12
Payer: MEDICARE

## 2019-12-12 VITALS
HEART RATE: 105 BPM | SYSTOLIC BLOOD PRESSURE: 148 MMHG | TEMPERATURE: 98.8 F | BODY MASS INDEX: 23.3 KG/M2 | HEIGHT: 66 IN | DIASTOLIC BLOOD PRESSURE: 77 MMHG | WEIGHT: 145 LBS

## 2019-12-12 PROCEDURE — 99024 POSTOP FOLLOW-UP VISIT: CPT

## 2019-12-12 NOTE — PHYSICAL EXAM
[de-identified] : appears well [de-identified] : soft / NT / ND. laparotomy and trocar incisions healed without infection.\par

## 2019-12-12 NOTE — HISTORY OF PRESENT ILLNESS
[de-identified] : admitted to SICU after surgery for LGIB which resolved without treatment.\par discharged home on 11/1. [de-identified] : tolerating regular diet without nausea, vomiting or abdominal pain.\par no fevers or chills.\par no recurrent LGIB.

## 2020-01-01 NOTE — PROCEDURE NOTE - PROCEDURE DATE TIME, MLM
Subjective:      Araseli Forrester is a 2 wk. o. male who is brought for his well child visit. History was provided by the mother. Born at [facility]    39weeks via vacuum assisted  to a 25 YO . GBS positive received abx during delivery. Birth complicated by clavicular fracture at birth and need for vacuum assist. Was induced for preE. Current Concerns:Yellow skin and right Clavicular fracture on birth. Vacuum assisted delivery     Birth Weight= 7 lb 12 ounces  Discharge Wt= 7 lb 9 oz  20 weight = 7 lb 5 oz  Today's weight = 8 lb 3.8 oz    Feeding well. Sleeping well. Breast and bottle feeding 2 oz q2-3 hours, using [employer]    Parental coping and self-care: Doing well, no concerns. Mom on Zoloft for post partum depression. Social Support - boyfriend is helping at home     Pertinent Maternal History: HTN, Heart Murmur, History of MRSA, preeclampsia during pregnancy  Pertinent Family history: Diabetes, thyroid dysfunction     Screen: normal     Hearing screen: passed BL according to Mom    Bilirubin: stated it was normal before discharge    Birth History    Birth     Length: 1' 9.5\" (0.546 m)     Weight: 7 lb 12 oz (3.515 kg)    Discharge Weight: 7 lb 5 oz (3.317 kg)    Gestation Age: 44 wks     Vacuum assisted in setting of Preeclampsia in the setting on Chronic Hypertension. Heart Mumrmur heard, but negative study after. Patient Active Problem List    Diagnosis Date Noted    Closed displaced fracture of shaft of right clavicle 2020     History reviewed. No pertinent past medical history. No current outpatient medications on file. No current facility-administered medications for this visit. No Known Allergies      There is no immunization history on file for this patient. Current Issues:  Current concerns about Veronica Skelton include need info for pediatric ortho .     Objective:     Visit Vitals  Pulse 136   Temp 98.2 °F (36.8 °C)
(Temporal)   Resp 56   Wt 8 lb 3.8 oz (3.735 kg)   HC 37 cm   SpO2 96%       26 %ile (Z= -0.64) based on WHO (Boys, 0-2 years) weight-for-age data using vitals from 2020. No height on file for this encounter. 72 %ile (Z= 0.57) based on WHO (Boys, 0-2 years) head circumference-for-age based on Head Circumference recorded on 2020.    6% weight change since birth    General:  Alert, no distress   Skin:  Normal   Head:  Normal fontanelles, nl appearance   Eyes:  Sclerae white, pupils equal and reactive, red reflex normal bilaterally   Ears:  Ear canals and TM normal bilaterally   Nose: Nares patent. Nasal mucosa pink. No nasal discharge. Mouth:  Moist MM. Tonsils nonerythematous and without exudate. Lungs:  Clear to auscultation bilaterally, no w/r/r/c   Heart:  Regular rate and rhythm. S1, S2 normal. No murmurs, clicks, rubs or gallop   Abdomen: Bowel sounds present, soft, no masses   Screening DDH:  Ortolani's and Cortez's signs absent bilaterally, leg length symmetrical, hip ROM normal bilaterally   :  Normal circumscribed genitalia, testes descended BL   Femoral pulses:  Present bilaterally. No radial-femoral pulse delay. Extremities:  Extremities normal. No clavicular crepitus or tenting appreciated. Moves R arm less then L but has some flexion ROM. No cyanosis or edema. Neuro:  Alert, moves all extremities spontaneously, good 3-phase Montgomery reflex, good suck reflex, good rooting reflex normal tone     Assessment:      Healthy 2 wk. o. old well child exam.      ICD-10-CM ICD-9-CM    1.  weight check Z00.111 V20.32    2. Closed nondisplaced fracture of clavicle with routine healing, unspecified laterality, unspecified part of clavicle, subsequent encounter S42.009D V54.11 REFERRAL TO PEDIATRIC ORTHOPEDIC SURGERY     Plan:   2 wk [facility]  1. Need records from [address] [place] [address] to confirm Hep B was given,  screen nml  2. Has surpassed birth weight, continue current feeding regimen  3.  Clavicular Fx
- Given info for Peds Ortho at [facility]  4.  Follow up for 2 mo [facility]    Orders Placed This Encounter    REFERRAL TO PEDIATRIC ORTHOPEDIC SURGERY     Referral Priority:   Routine     Referral Type:   Consultation     Referral Reason:   Specialty Services Required     Referred to Provider:   Guille Jones MD     Number of Visits Requested:   1       Patient discussed with Dr. Ilya Ch (Attending)    Victorino Levy DO  Family Medicine Resident
01-Feb-2018 12:00
26-Feb-2018 13:00
30-Jan-2018 04:40
30-Jan-2018 16:48
01-Feb-2018 12:00
05-Feb-2018 04:00
05-Feb-2018 00:10
16-Feb-2018 13:00

## 2020-01-15 NOTE — DISCHARGE NOTE ADULT - PRINCIPAL DIAGNOSIS
Atrial fibrillation with rapid ventricular response The patient is a 1 yr old male who presents with URI symptoms likely viral syndrome. S/P AVR (aortic valve replacement)

## 2021-03-22 NOTE — PROGRESS NOTE ADULT - ASSESSMENT
BRONCHOSPASM/BRONCHOCONSTRICTION     [x]         IMPROVE AERATION/BREATH SOUNDS  [x]   ADMINISTER BRONCHODILATOR THERAPY AS APPROPRIATE  [x]   ASSESS BREATH SOUNDS  []   IMPLEMENT AEROSOL/MDI PROTOCOL  [x]   PATIENT EDUCATION AS NEEDED 72 year-old male with past medical history of T2DM, DLD, alcohol misuse brought in by family after having been found face down on the floor for approximately 2 days. On treatment for UTI, but now JANES with Aortic Valve lesions. 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 Bartonella, Legionella, Q Fever serology negative; fungal BCX negative. Brucella pending. Unclear cause of NVE, culture  with [facility]--pending. Having diarrhea, C diff negative. LFTs rising, working ELIAN, aortic balloon pump, pressors. Critically ill. Aortic valve vegetation, leukocytosis, post operative state.  -    discussed with colleagues and [facility]  organism is fastidious gram positive coccus   micro having trouble getting it to grow   not likely if it were enterococcus  more likely vit  b 6  def strep that needs combination therapy   asked micro to send to [facility] for PCR ( pending)                  discussed with Dr. Mary in detail and plastics has hematoma in mediastinum but no evidence of sternal wd infection so  sterum has been closed and pt is back on ceftriaxone and low dose genta  follow up level sent. ( ab were broadened last weekend but cultures again negative)   hope to get PCR back soon

## 2022-01-27 NOTE — PROGRESS NOTE ADULT - SUBJECTIVE AND OBJECTIVE BOX
PROCEDURE NOTE: Lucentis 0.5mg PFS OD. Diagnosis: Neovascular AMD with Active CNV. Anesthesia: Lidocaine 4%. Prep: Betadine Flush. Prior to injection, risks/benefits/alternatives discussed including but not limited to infection, loss of vision or eye, hemorrhage, cataract, glaucoma, retinal tears or detachment. The patient wished to proceed with treatment. Topical anesthesia was induced with Alcaine. Additional anesthesia was achieved using drop(s) or injection checked above. A drop of Povidone-iodine 5% ophthalmic solution was instilled over the injection site and in the inferior fornix. Betadine prep was performed. A single use prefilled syringe of intravitreal Lucentis 0.5mg/0.05ml was used and excess was disposed of as waste. The needle was passed 3.0 mm posterior to the limbus in pseudophakic patients, and 3.5 mm posterior to the limbus in phakic patients. Injection Time 8:45. Patient tolerated the procedure well. There were no complications. The eye was irrigated with sterile irrigating solution. Post procedure instructions given. The patient was instructed to use Artificial Tears q.i.d. p.r.n for comfort. The patient was instructed to return for re-evaluation in approximately 4-12 weeks depending on his/her condition and was told to call immediately if vision decreases and/or if his/her eye becomes red, painful, and/or light sensitive. The patient was instructed to go to the emergency room or call 911 if unable to reach the doctor within an hour or two of trying or calling. Ana Mcclendon PROCEDURE NOTE: Lucentis 0.5mg PFS OS. Diagnosis: Neovascular AMD with Active CNV. Anesthesia: Lidocaine 4%. Prep: Betadine Flush. Prior to injection, risks/benefits/alternatives discussed including but not limited to infection, loss of vision or eye, hemorrhage, cataract, glaucoma, retinal tears or detachment. The patient wished to proceed with treatment. Topical anesthesia was induced with Alcaine. Additional anesthesia was achieved using drop(s) or injection checked above. A drop of Povidone-iodine 5% ophthalmic solution was instilled over the injection site and in the inferior fornix. Betadine prep was performed. A single use prefilled syringe of intravitreal Lucentis 0.5mg/0.05ml was used and excess was disposed of as waste. The needle was passed 3.0 mm posterior to the limbus in pseudophakic patients, and 3.5 mm posterior to the limbus in phakic patients. Injection Time 8:45. Patient tolerated the procedure well. There were no complications. The eye was irrigated with sterile irrigating solution. Post procedure instructions given. The patient was instructed to use Artificial Tears q.i.d. p.r.n for comfort. The patient was instructed to return for re-evaluation in approximately 4-12 weeks depending on his/her condition and was told to call immediately if vision decreases and/or if his/her eye becomes red, painful, and/or light sensitive. The patient was instructed to go to the emergency room or call 911 if unable to reach the doctor within an hour or two of trying or calling. Ana Mcclendon [facility] DIVISION OF KIDNEY DISEASES AND HYPERTENSION -- FOLLOW UP NOTE  --------------------------------------------------------------------------------  Chief Complaint: ELIAN     24 hour events/subjective:  Patient seen and examined. Has no complaints at this time     PAST HISTORY  --------------------------------------------------------------------------------  No significant changes to PMH, PSH, FHx, SHx, unless otherwise noted    ALLERGIES & MEDICATIONS  --------------------------------------------------------------------------------  Allergies    No Known Allergies    Intolerances      Standing Inpatient Medications  aspirin  chewable 81 milliGRAM(s) Oral daily  atorvastatin 80 milliGRAM(s) Oral at bedtime  benzocaine 15 mG/menthol 3.6 mG Lozenge 1 Lozenge Oral three times a day  cefTRIAXone   IVPB 2 Gram(s) IV Intermittent every 24 hours  dextrose 5%. 1000 milliLiter(s) IV Continuous <Continuous>  dextrose 50% Injectable 12.5 Gram(s) IV Push once  dextrose 50% Injectable 25 Gram(s) IV Push once  dextrose 50% Injectable 25 Gram(s) IV Push once  docusate sodium 100 milliGRAM(s) Oral daily  enoxaparin Injectable 40 milliGRAM(s) SubCutaneous daily  finasteride 5 milliGRAM(s) Oral daily  folic acid 1 milliGRAM(s) Oral daily  influenza   Vaccine 0.5 milliLiter(s) IntraMuscular once  insulin glargine Injectable (LANTUS) 12 Unit(s) SubCutaneous at bedtime  insulin lispro (HumaLOG) corrective regimen sliding scale   SubCutaneous three times a day before meals  insulin lispro (HumaLOG) corrective regimen sliding scale   SubCutaneous at bedtime  insulin lispro Injectable (HumaLOG) 3 Unit(s) SubCutaneous three times a day with meals  metoprolol     tartrate 12.5 milliGRAM(s) Oral two times a day  multivitamin 1 Tablet(s) Oral daily  pantoprazole    Tablet 40 milliGRAM(s) Oral before breakfast  simethicone 80 milliGRAM(s) Chew every 8 hours  sodium chloride 0.9% lock flush 20 milliLiter(s) IV Push once  tamsulosin 0.4 milliGRAM(s) Oral at bedtime  thiamine 100 milliGRAM(s) Oral daily  ticagrelor 90 milliGRAM(s) Oral every 12 hours  vancomycin  IVPB 1000 milliGRAM(s) IV Intermittent every 24 hours    PRN Inpatient Medications  acetaminophen   Tablet. 650 milliGRAM(s) Oral every 6 hours PRN  dextrose Gel 1 Dose(s) Oral once PRN  glucagon  Injectable 1 milliGRAM(s) IntraMuscular once PRN  polyethylene glycol 3350 17 Gram(s) Oral daily PRN  sodium chloride 0.9% lock flush 10 milliLiter(s) IV Push every 1 hour PRN  sodium chloride 0.9% lock flush 10 milliLiter(s) IV Push every 12 hours PRN      REVIEW OF SYSTEMS  --------------------------------------------------------------------------------  Gen: No weakness  Skin: No rashes  Head/Eyes/Ears/Mouth: No headache; Normal hearing  Respiratory: No dyspnea, cough  CV: No chest pain, PND, orthopnea  GI: No abdominal pain, diarrhea, constipation  : No increased frequency, dysuria  MSK: No edema  Neuro: No dizziness/lightheadedness, weakness  Heme: No bleeding    All other systems were reviewed and are negative, except as noted.    VITALS/PHYSICAL EXAM  --------------------------------------------------------------------------------  T(C): 36.6 (01-22-18 @ 11:51), Max: 36.7 (01-22-18 @ 04:46)  HR: 90 (01-22-18 @ 11:51) (80 - 103)  BP: 102/53 (01-22-18 @ 11:51) (94/48 - 110/58)  RR: 18 (01-22-18 @ 11:51) (18 - 18)  SpO2: 100% (01-22-18 @ 11:51) (96% - 100%)  Wt(kg): --        01-21-18 @ 07:01  -  01-22-18 @ 07:00  --------------------------------------------------------  IN: 1260 mL / OUT: 950 mL / NET: 310 mL    01-22-18 @ 07:01  -  01-22-18 @ 14:26  --------------------------------------------------------  IN: 240 mL / OUT: 0 mL / NET: 240 mL    Physical Exam:  	Gen: NAD  	HEENT:  supple neck  	Pulm: CTA B/L  	CV: RRR, S1S2  	Abd: +BS, soft, nontender/nondistended  	UE: Warm, no asterixis  	LE: Warm, no edema  	Psych: Normal affect and mood  	Skin: Warm, without rashes    LABS/STUDIES  --------------------------------------------------------------------------------              9.5    11.22 >-----------<  362      [01-22-18 @ 07:19]              29.5     139  |  103  |  25  ----------------------------<  114      [01-22-18 @ 09:26]  4.1   |  23  |  1.74        Ca     8.2     [01-22-18 @ 09:26]    Creatinine Trend:  SCr 1.74 [01-22 @ 09:26]  SCr 1.72 [01-21 @ 08:36]  SCr 1.71 [01-20 @ 08:52]  SCr 1.77 [01-19 @ 07:25]  SCr 1.79 [01-18 @ 07:50]    Urinalysis - [01-15-18 @ 09:30]      Color Colorless / Appearance Clear / SG 1.007 / pH 6.0      Gluc Negative / Ketone Negative  / Bili Negative / Urobili Negative       Blood Small / Protein Negative / Leuk Est Negative / Nitrite Negative      RBC 0-2 / WBC 0-2 / Hyaline  / Gran  / Sq Epi  / Non Sq Epi Occasional / Bacteria       HbA1c 9.1      [01-09-18 @ 03:39]  TSH 4.60      [01-09-18 @ 03:39]  Lipid: chol 103, , HDL 16, LDL 67      [01-09-18 @ 08:09]    HIV Nonreact      [01-09-18 @ 07:54]    Syphilis Screen (Treponema Pallidum Ab) Negative      [01-11-18 @ 19:58]

## 2022-09-06 NOTE — H&P ADULT - PROBLEM SELECTOR PROBLEM 9
Patient was discharged on 9/5. Will keep appointments as scheduled     Klaudia Toth RN   Need for prophylactic measure

## 2022-10-13 NOTE — SWALLOW FEES ASSESSMENT ADULT - ORAL PHASE COMMENTS
Quality 111:Pneumonia Vaccination Status For Older Adults: Pneumococcal Vaccination not Administered or Previously Received, Reason not Otherwise Specified Quality 226: Preventive Care And Screening: Tobacco Use: Screening And Cessation Intervention: Patient screened for tobacco use and is an ex/non-smoker Quality 130: Documentation Of Current Medications In The Medical Record: Current Medications Documented Detail Level: Detailed Quality 110: Preventive Care And Screening: Influenza Immunization: Influenza Immunization not Administered for Documented Reasons. +piecemeal deglutition +delayed oral transit time; ~25s +delayed oral transit time, +mild oral residue

## 2022-11-03 NOTE — CONSULT NOTE ADULT - SUBJECTIVE AND OBJECTIVE BOX
Problem: Nutrition/Hydration-ADULT  Goal: Nutrient/Hydration intake appropriate for improving, restoring or maintaining nutritional needs  Description: Monitor and assess patient's nutrition/hydration status for malnutrition  Collaborate with interdisciplinary team and initiate plan and interventions as ordered  Monitor patient's weight and dietary intake as ordered or per policy  Utilize nutrition screening tool and intervene as necessary  Determine patient's food preferences and provide high-protein, high-caloric foods as appropriate       INTERVENTIONS:  - Monitor oral intake, urinary output, labs, and treatment plans  - Assess nutrition and hydration status and recommend course of action  - Evaluate amount of meals eaten  - Assist patient with eating if necessary   - Allow adequate time for meals  - Recommend/ encourage appropriate diets, oral nutritional supplements, and vitamin/mineral supplements  - Order, calculate, and assess calorie counts as needed  - Recommend, monitor, and adjust tube feedings and TPN/PPN based on assessed needs  - Assess need for intravenous fluids  - Provide specific nutrition/hydration education as appropriate  - Include patient/family/caregiver in decisions related to nutrition  Outcome: Progressing History of Present Illness:  Patient is a 72 year-old male with past medical history of T2DM, DLD, alcohol misuse brought in by family after having been found face down on the floor for approximately 2 days. Patient was currently residing alone on one floor of the house (wife away in [place]) while other family members including a son live on other floors within the same house. He was not checked upon for 2 days. Patient reports multiple recent episodes of falls at home due to physical instability". He reports chronic alcohol use, which family states ranges from 6-12 beers almost daily. He has been more lethargic lately, with forgetfulness and disorientationHe also endorses fever, chills, dysuria and urinary frequency. Patient denies SOB, chest pain, back pain, diarrhea, melena/hematochezia, recent travel, sick contact or surgery. Patient is originally from [place] and has been in the  for 9 years.     ED course: Initial EKG noted for IW ST-elevations. Patient was hypotensive to 70/40 with HR in 170bpm with Atrial fibrillation + RVR on EKG. Labs notable for WBC 26K, lac 5.2 and troponin 0.6. Given IV NS blus x 5L and multiple DCCV with temporary resumption of NSR in 80s bpm before return to atrial fibrillation. (2018 00:52)   CT Surgery consulted for  Vegetation seen on ECHO    Past Medical History  High cholesterol  Diabetes: type 2      Past Surgical History  No significant past surgical history      MEDICATIONS  (STANDING):  aspirin  chewable 81 milliGRAM(s) Oral daily  atorvastatin 80 milliGRAM(s) Oral at bedtime  dextrose 5%. 1000 milliLiter(s) (50 mL/Hr) IV Continuous <Continuous>  docusate sodium 100 milliGRAM(s) Oral daily  enoxaparin Injectable 40 milliGRAM(s) SubCutaneous daily  finasteride 5 milliGRAM(s) Oral daily  folic acid 1 milliGRAM(s) Oral daily  influenza   Vaccine 0.5 milliLiter(s) IntraMuscular once  insulin glargine Injectable (LANTUS) 12 Unit(s) SubCutaneous at bedtime  insulin lispro (HumaLOG) corrective regimen sliding scale   SubCutaneous three times a day before meals  insulin lispro (HumaLOG) corrective regimen sliding scale   SubCutaneous at bedtime  insulin lispro Injectable (HumaLOG) 3 Unit(s) SubCutaneous three times a day with meals  multivitamin 1 Tablet(s) Oral daily  pantoprazole    Tablet 40 milliGRAM(s) Oral before breakfast  piperacillin/tazobactam IVPB. 3.375 Gram(s) IV Intermittent every 8 hours  tamsulosin 0.4 milliGRAM(s) Oral at bedtime  thiamine 100 milliGRAM(s) Oral daily  ticagrelor 90 milliGRAM(s) Oral two times a day  vancomycin  IVPB 1250 milliGRAM(s) IV Intermittent every 8 hours    MEDICATIONS  (PRN):  dextrose Gel 1 Dose(s) Oral once PRN Blood Glucose LESS THAN 70 milliGRAM(s)/deciliter  glucagon  Injectable 1 milliGRAM(s) IntraMuscular once PRN Glucose LESS THAN 70 milligrams/deciliter  LORazepam   Injectable 2 milliGRAM(s) IV Push every 2 hours PRN Symptom-triggered: 2 point increase in CIWA -Ar score and a total score of 7 or LESS  polyethylene glycol 3350 17 Gram(s) Oral daily PRN Constipation      Vital Signs Last 24 Hrs  T(C): 36.8 (18 @ 15:45), Max: 36.8 (18 @ 10:45)  T(F): 98.2 (18 @ 15:45), Max: 98.2 (18 @ 10:45)  HR: 78 (18 @ 17:44) (64 - 90)  BP: 100/46 (18 @ 17:44) (86/39 - 148/59)  RR: 19 (18 @ 17:44) (11 - 27)  SpO2: 98% (18 @ 17:44) (93% - 100%)             Daily Weight in k.5 (2018 04:00)  Admit Wt: Drug Dosing Weight  Height (cm): 170.18 (2018 00:25)  Weight (kg): 75.4 (2018 22:56)  BMI (kg/m2): 26 (2018 00:25)  BSA (m2): 1.87 (2018 00:25)    Allergies: No Known Allergies      SOCIAL HISTORY:  Smoker: [ ] Yes  [ x] No        PACK YEARS:                         WHEN QUIT?  ETOH use: x[ ] Yes  [ ] No              FREQUENCY / QUANTITY: daily  Ilicit Drug use:  [ ] Yes  [ x] No  FAMILY HISTORY:  Family history of type 2 diabetes mellitus (Sibling)  Family history of lung cancer (Sibling)    Review of Systems  GENERAL:  no weakness, fatigue, fevers or chills  NEURO: no dizziness, numbness, tingling or weakness  SKIN: rashes noted on extremities    HEENT: no visual changes;  no headache, no vertigo, no recent colds  RESPIRATORY: shortness of breath, no cough, sputum, wheezing, hemoptysis;   CARDIOVASCULAR:  no chest pain,  or palpitations  GI: no abd pain. no N/V/D.  PERIPHERAL VASCULAR: no swelling, no tenderness, no erythema, no varicose veins.     PHYSICAL EXAM  General: Well nourished, well developed, NAD.                                              Neuro: Normal exam oriented to person/place & time with no focal motor or sensory  deficits.                    Eyes: Normal exam of conjunctiva & lids, pupils equally reactive.   ENT: Normal exam of nasal/oral mucosa with absence of cyanosis.   Neck: Normal exam of jugular veins, trachea & thyroid.   Chest: Normal lung exam with good air movement absence of wheezes, rales, or rhonchi:                                                                          CV:  Auscultation: normal S1S2,  regular   2/6 murmur   Carotids: No Bruits[ x]  Abdominal Aorta: normal [ x] nonpalpable[ x]                                                                         GI: Normal exam of abdomen with no noted masses or tenderness. +BSx4Q                                                                                            Extremities: Normal no evidence of cyanosis or deformity, Edema:   Lower Extremity Pulses: Right[ ] Left[ ]Varicosities[ ]  SKIN : Normal exam to inspection & palpation.                                                           LABS:                        11.0   11.3  )-----------( 136      ( 2018 04:11 )             31.0         130<L>  |  98  |  9   ----------------------------<  157<H>  3.8   |  22  |  0.44<L>    Ca    7.5<L>      2018 04:11  Phos  2.7       Mg     1.7         TPro  5.5<L>  /  Alb  1.9<L>  /  TBili  0.4  /  DBili  x   /  AST  33  /  ALT  29  /  AlkPhos  89      PT/INR - ( 2018 04:11 )   PT: 11.5 sec;   INR: 1.05 ratio    PTT - ( 2018 04:11 )  PTT:34.0 sec    < from: Transesophageal Echocardiogram w/o TTE (18 @ 13:41) >  . Calcified trileaflet aortic valve with decreased  opening.   Echogenic structure seen on the aortic valve  consistent with vegetation (left coronary cusp).   Peak transaortic valve gradient equals 31 mm Hg, mean  transaortic valve gradient equals 18 mm Hg, estimated  aortic valve area equals 1 sqcm (by continuity equation),  aortic valve velocity time integral equals 64 cm,  consistent with moderate to severe aortic stenosis.  Moderate-severe eccentric aortic regurgitation.  2. No left atrial or left atrial appendage thrombus.  3. Normal left ventricular internal dimensions and wall  thicknesses.  4. Normal left ventricular systolic function. No segmental  wall motion abnormalities.  5. Agitated saline injection and color flow Doppler  demonstrates no evidence of a patent foramen ovale.  6. Left pleural effusion. History of Present Illness:  Patient is a 72 year-old male with past medical history of T2DM, HLD, alcohol misuse brought in by family after having been found face down on the floor for approximately 2 days. Patient was currently residing alone on one floor of the house (wife away in [place]) while other family members including a son live on other floors within the same house. He was not checked upon for 2 days. Patient reports multiple recent episodes of falls at home due to physical instability". He reports chronic alcohol use, which family states ranges from 6-12 beers almost daily. He has been more lethargic lately, with forgetfulness and disorientationHe also endorses fever, chills, dysuria and urinary frequency. Patient denies SOB, chest pain, back pain, diarrhea, melena/hematochezia, recent travel, sick contact or surgery. Patient is originally from [place] and has been in the  for 9 years.     ED course: Initial EKG noted for IW ST-elevations. Patient was hypotensive to 70/40 with HR in 170bpm with Atrial fibrillation + RVR on EKG. Labs notable for WBC 26K, lac 5.2 and troponin 0.6. Given IV NS blus x 5L and multiple DCCV with temporary resumption of NSR in 80s bpm before return to atrial fibrillation. (2018 00:52)   CT Surgery consulted for  Vegetation seen on ECHO    Past Medical History  High cholesterol  Diabetes: type 2      Past Surgical History  No significant past surgical history      MEDICATIONS  (STANDING):  aspirin  chewable 81 milliGRAM(s) Oral daily  atorvastatin 80 milliGRAM(s) Oral at bedtime  dextrose 5%. 1000 milliLiter(s) (50 mL/Hr) IV Continuous <Continuous>  docusate sodium 100 milliGRAM(s) Oral daily  enoxaparin Injectable 40 milliGRAM(s) SubCutaneous daily  finasteride 5 milliGRAM(s) Oral daily  folic acid 1 milliGRAM(s) Oral daily  influenza   Vaccine 0.5 milliLiter(s) IntraMuscular once  insulin glargine Injectable (LANTUS) 12 Unit(s) SubCutaneous at bedtime  insulin lispro (HumaLOG) corrective regimen sliding scale   SubCutaneous three times a day before meals  insulin lispro (HumaLOG) corrective regimen sliding scale   SubCutaneous at bedtime  insulin lispro Injectable (HumaLOG) 3 Unit(s) SubCutaneous three times a day with meals  multivitamin 1 Tablet(s) Oral daily  pantoprazole    Tablet 40 milliGRAM(s) Oral before breakfast  piperacillin/tazobactam IVPB. 3.375 Gram(s) IV Intermittent every 8 hours  tamsulosin 0.4 milliGRAM(s) Oral at bedtime  thiamine 100 milliGRAM(s) Oral daily  ticagrelor 90 milliGRAM(s) Oral two times a day  vancomycin  IVPB 1250 milliGRAM(s) IV Intermittent every 8 hours    MEDICATIONS  (PRN):  dextrose Gel 1 Dose(s) Oral once PRN Blood Glucose LESS THAN 70 milliGRAM(s)/deciliter  glucagon  Injectable 1 milliGRAM(s) IntraMuscular once PRN Glucose LESS THAN 70 milligrams/deciliter  LORazepam   Injectable 2 milliGRAM(s) IV Push every 2 hours PRN Symptom-triggered: 2 point increase in CIWA -Ar score and a total score of 7 or LESS  polyethylene glycol 3350 17 Gram(s) Oral daily PRN Constipation      Vital Signs Last 24 Hrs  T(C): 36.8 (18 @ 15:45), Max: 36.8 (18 @ 10:45)  T(F): 98.2 (18 @ 15:45), Max: 98.2 (18 @ 10:45)  HR: 78 (18 @ 17:44) (64 - 90)  BP: 100/46 (18 @ 17:44) (86/39 - 148/59)  RR: 19 (18 @ 17:44) (11 - 27)  SpO2: 98% (18 @ 17:44) (93% - 100%)             Daily Weight in k.5 (2018 04:00)  Admit Wt: Drug Dosing Weight  Height (cm): 170.18 (2018 00:25)  Weight (kg): 75.4 (2018 22:56)  BMI (kg/m2): 26 (2018 00:25)  BSA (m2): 1.87 (2018 00:25)    Allergies: No Known Allergies      SOCIAL HISTORY:  Smoker: [ ] Yes  [ x] No        PACK YEARS:                         WHEN QUIT?  ETOH use: x[ ] Yes  [ ] No              FREQUENCY / QUANTITY: daily  Ilicit Drug use:  [ ] Yes  [ x] No  FAMILY HISTORY:  Family history of type 2 diabetes mellitus (Sibling)  Family history of lung cancer (Sibling)    Review of Systems  GENERAL:  no weakness, fatigue, fevers or chills  NEURO: no dizziness, numbness, tingling or weakness  SKIN: rashes noted on extremities    HEENT: no visual changes;  no headache, no vertigo, no recent colds  RESPIRATORY: shortness of breath, no cough, sputum, wheezing, hemoptysis;   CARDIOVASCULAR:  no chest pain,  or palpitations  GI: no abd pain. no N/V/D.  PERIPHERAL VASCULAR: no swelling, no tenderness, no erythema, no varicose veins.     PHYSICAL EXAM  General: Well nourished, well developed, NAD.                                              Neuro: Normal exam oriented to person/place & time with no focal motor or sensory  deficits.                    Eyes: Normal exam of conjunctiva & lids, pupils equally reactive.   ENT: Normal exam of nasal/oral mucosa with absence of cyanosis.   Neck: Normal exam of jugular veins, trachea & thyroid.   Chest: Normal lung exam with good air movement absence of wheezes, rales, or rhonchi:                                                                          CV:  Auscultation: normal S1S2,  regular   2/6 murmur   Carotids: No Bruits[ x]  Abdominal Aorta: normal [ x] nonpalpable[ x]                                                                         GI: Normal exam of abdomen with no noted masses or tenderness. +BSx4Q                                                                                            Extremities: Normal no evidence of cyanosis or deformity, Edema:   Lower Extremity Pulses: Right[ ] Left[ ]Varicosities[ ]  SKIN : Normal exam to inspection & palpation.                                                           LABS:                        11.0   11.3  )-----------( 136      ( 2018 04:11 )             31.0         130<L>  |  98  |  9   ----------------------------<  157<H>  3.8   |  22  |  0.44<L>    Ca    7.5<L>      2018 04:11  Phos  2.7       Mg     1.7         TPro  5.5<L>  /  Alb  1.9<L>  /  TBili  0.4  /  DBili  x   /  AST  33  /  ALT  29  /  AlkPhos  89      PT/INR - ( 2018 04:11 )   PT: 11.5 sec;   INR: 1.05 ratio    PTT - ( 2018 04:11 )  PTT:34.0 sec    < from: Transesophageal Echocardiogram w/o TTE (18 @ 13:41) >  . Calcified trileaflet aortic valve with decreased  opening.   Echogenic structure seen on the aortic valve  consistent with vegetation (left coronary cusp).   Peak transaortic valve gradient equals 31 mm Hg, mean  transaortic valve gradient equals 18 mm Hg, estimated  aortic valve area equals 1 sqcm (by continuity equation),  aortic valve velocity time integral equals 64 cm,  consistent with moderate to severe aortic stenosis.  Moderate-severe eccentric aortic regurgitation.  2. No left atrial or left atrial appendage thrombus.  3. Normal left ventricular internal dimensions and wall  thicknesses.  4. Normal left ventricular systolic function. No segmental  wall motion abnormalities.  5. Agitated saline injection and color flow Doppler  demonstrates no evidence of a patent foramen ovale.  6. Left pleural effusion.

## 2023-12-18 NOTE — OCCUPATIONAL THERAPY INITIAL EVALUATION ADULT - GENERAL OBSERVATIONS, REHAB EVAL
Pt received seated in chair at bedside with +A line, +PIV, +Central line, +cardiac monitor and +NG tube to suction.
Diabetes managed with insulin regimen

## 2024-06-25 NOTE — CHART NOTE - NSCHARTNOTESELECT_GEN_ALL_CORE
What Type Of Note Output Would You Prefer (Optional)?: Standard Output How Severe Is Your Rash?: mild Transfer Note/CCU transfer note Is This A New Presentation, Or A Follow-Up?: Rash

## 2024-11-21 NOTE — ED ADULT NURSE NOTE - EXTENSIONS OF SELF_ADULT
Problem: Pain  Goal: Verbalizes/displays adequate comfort level or baseline comfort level  11/21/2024 1046 by Yojana Loco RN  Outcome: Progressing  11/21/2024 0447 by Jessa Guevara, RN  Outcome: Progressing     Problem: Safety - Adult  Goal: Free from fall injury  11/21/2024 1046 by Yojana Loco RN  Outcome: Progressing  11/21/2024 0447 by Jessa Guevara, RN  Outcome: Progressing     Problem: ABCDS Injury Assessment  Goal: Absence of physical injury  11/21/2024 0447 by Jessa Guevara, RN  Outcome: Progressing      None

## 2024-12-27 NOTE — ED PROCEDURE NOTE - CPROC ED INFORMED CONSENT1
Benefits, risks, and possible complications of procedure explained to patient/caregiver who verbalized understanding and gave verbal consent. 162

## 2025-07-17 NOTE — SWALLOW BEDSIDE ASSESSMENT ADULT - DATE
Lab Results   Component Value Date    INR 2.27 (H) 07/17/2025    INR 1.28 (H) 07/07/2025    PROTIME 26.2 (H) 07/17/2025    PROTIME 16.6 (H) 07/07/2025        PAF     Range: 2.0-3.0     6mg Daily per [facility] APRN on 7/7/25     [facility]      Per Afshan Lynn APRN:   Level is therapeutic. Continue current dose warfarin. Recheck INR in 1 week.     Relayed message, patient vocalized understanding.     31-Jan-2018